# Patient Record
Sex: MALE | Race: WHITE | Employment: OTHER | URBAN - METROPOLITAN AREA
[De-identification: names, ages, dates, MRNs, and addresses within clinical notes are randomized per-mention and may not be internally consistent; named-entity substitution may affect disease eponyms.]

---

## 2017-08-11 ENCOUNTER — HOSPITAL ENCOUNTER (OUTPATIENT)
Dept: GENERAL RADIOLOGY | Age: 57
Discharge: HOME | End: 2017-08-11
Attending: FAMILY MEDICINE | Admitting: FAMILY MEDICINE

## 2017-08-11 LAB
ALBUMIN SERPL-MCNC: 3.8 GM/DL (ref 3.4–5)
ALP BLD-CCNC: 131 IU/L (ref 40–128)
ALT SERPL-CCNC: 113 U/L (ref 10–40)
ANION GAP SERPL CALCULATED.3IONS-SCNC: 13 MMOL/L (ref 4–16)
AST SERPL-CCNC: 114 IU/L (ref 15–37)
BACTERIA: ABNORMAL /HPF
BILIRUB SERPL-MCNC: 0.8 MG/DL (ref 0–1)
BILIRUBIN URINE: NEGATIVE MG/DL
BLOOD, URINE: ABNORMAL
BUN BLDV-MCNC: 14 MG/DL (ref 6–23)
CALCIUM SERPL-MCNC: 9.2 MG/DL (ref 8.3–10.6)
CHLORIDE BLD-SCNC: 100 MMOL/L (ref 99–110)
CHOLESTEROL: 160 MG/DL
CLARITY: CLEAR
CO2: 23 MMOL/L (ref 21–32)
COLOR: YELLOW
CREAT SERPL-MCNC: 0.6 MG/DL (ref 0.9–1.3)
ERYTHROCYTE SEDIMENTATION RATE: 35 MM/HR (ref 0–20)
ESTIMATED AVERAGE GLUCOSE: 255 MG/DL
GFR AFRICAN AMERICAN: >60 ML/MIN/1.73M2
GFR NON-AFRICAN AMERICAN: >60 ML/MIN/1.73M2
GLUCOSE BLD-MCNC: 278 MG/DL (ref 70–140)
GLUCOSE, URINE: >500 MG/DL
HBA1C MFR BLD: 10.5 % (ref 4.2–6.3)
HCT VFR BLD CALC: 46.2 % (ref 42–52)
HDLC SERPL-MCNC: 42 MG/DL
HEMOGLOBIN: 15.7 GM/DL (ref 13.5–18)
HYALINE CASTS: 2 /LPF
KETONES, URINE: NEGATIVE MG/DL
LDL CHOLESTEROL CALCULATED: 85 MG/DL
LEUKOCYTE ESTERASE, URINE: ABNORMAL
MCH RBC QN AUTO: 31.3 PG (ref 27–31)
MCHC RBC AUTO-ENTMCNC: 34 % (ref 32–36)
MCV RBC AUTO: 92 FL (ref 78–100)
MUCUS: ABNORMAL HPF
NITRITE URINE, QUANTITATIVE: NEGATIVE
PDW BLD-RTO: 14 % (ref 11.7–14.9)
PH, URINE: 5 (ref 5–8)
PHOSPHORUS: 3.9 MG/DL (ref 2.5–4.9)
PLATELET # BLD: 169 K/CU MM (ref 140–440)
PMV BLD AUTO: 12.2 FL (ref 7.5–11.1)
POTASSIUM SERPL-SCNC: 4.4 MMOL/L (ref 3.5–5.1)
PROSTATE SPECIFIC ANTIGEN: 0.08 NG/ML (ref 0–4)
PROTEIN UA: 30 MG/DL
RBC # BLD: 5.02 M/CU MM (ref 4.6–6.2)
RBC URINE: 5 /HPF (ref 0–3)
SODIUM BLD-SCNC: 136 MMOL/L (ref 135–145)
SPECIFIC GRAVITY UA: 1.03 (ref 1–1.03)
SQUAMOUS EPITHELIAL: 6 /HPF
T4 FREE: 1.28 NG/DL (ref 0.9–1.8)
TOTAL PROTEIN: 8.5 GM/DL (ref 6.4–8.2)
TRIGL SERPL-MCNC: 167 MG/DL
TSH HIGH SENSITIVITY: 5.29 UIU/ML (ref 0.27–4.2)
UROBILINOGEN, URINE: NORMAL MG/DL (ref 0.2–1)
VITAMIN D 25-HYDROXY: 15.63 NG/ML
WBC # BLD: 5.9 K/CU MM (ref 4–10.5)
WBC UA: 5 /HPF (ref 0–2)

## 2018-05-07 ENCOUNTER — TELEPHONE (OUTPATIENT)
Dept: PHYSICAL MEDICINE AND REHAB | Age: 58
End: 2018-05-07

## 2018-08-26 PROBLEM — G93.40 ENCEPHALOPATHY: Status: ACTIVE | Noted: 2018-08-26

## 2018-08-29 PROBLEM — A09 INFECTIOUS GASTROENTERITIS: Status: ACTIVE | Noted: 2018-08-29

## 2018-08-29 PROBLEM — R26.9 GAIT DISTURBANCE: Status: ACTIVE | Noted: 2018-08-29

## 2018-08-29 PROBLEM — R29.898 BILATERAL LEG WEAKNESS: Status: ACTIVE | Noted: 2018-08-29

## 2018-08-29 PROBLEM — G93.41 METABOLIC ENCEPHALOPATHY: Status: ACTIVE | Noted: 2018-08-29

## 2018-09-09 PROBLEM — E03.9 HYPOTHYROIDISM: Status: ACTIVE | Noted: 2018-09-09

## 2018-09-09 PROBLEM — I65.22 LEFT CAROTID STENOSIS: Status: ACTIVE | Noted: 2018-09-09

## 2019-04-30 ENCOUNTER — APPOINTMENT (OUTPATIENT)
Dept: GENERAL RADIOLOGY | Age: 59
End: 2019-04-30
Payer: MEDICARE

## 2019-04-30 ENCOUNTER — APPOINTMENT (OUTPATIENT)
Dept: CT IMAGING | Age: 59
End: 2019-04-30
Payer: MEDICARE

## 2019-04-30 ENCOUNTER — HOSPITAL ENCOUNTER (EMERGENCY)
Age: 59
Discharge: HOME | End: 2019-04-30
Payer: MEDICARE

## 2019-04-30 VITALS
OXYGEN SATURATION: 98 % | RESPIRATION RATE: 14 BRPM | DIASTOLIC BLOOD PRESSURE: 69 MMHG | BODY MASS INDEX: 25.11 KG/M2 | TEMPERATURE: 98.1 F | HEIGHT: 67 IN | HEART RATE: 91 BPM | SYSTOLIC BLOOD PRESSURE: 128 MMHG | WEIGHT: 160 LBS

## 2019-04-30 DIAGNOSIS — S09.90XA INJURY OF HEAD, INITIAL ENCOUNTER: Primary | ICD-10-CM

## 2019-04-30 DIAGNOSIS — M54.2 NECK PAIN: ICD-10-CM

## 2019-04-30 DIAGNOSIS — S49.91XA ARM INJURY, RIGHT, INITIAL ENCOUNTER: ICD-10-CM
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
## 2019-04-30 DIAGNOSIS — S69.92XA FINGER INJURY, LEFT, INITIAL ENCOUNTER: ICD-10-CM

## 2019-04-30 PROCEDURE — 73030 X-RAY EXAM OF SHOULDER: CPT

## 2019-04-30 PROCEDURE — 71046 X-RAY EXAM CHEST 2 VIEWS: CPT

## 2019-04-30 PROCEDURE — 73080 X-RAY EXAM OF ELBOW: CPT

## 2019-04-30 PROCEDURE — 73110 X-RAY EXAM OF WRIST: CPT

## 2019-04-30 PROCEDURE — 70450 CT HEAD/BRAIN W/O DYE: CPT

## 2019-04-30 PROCEDURE — 73130 X-RAY EXAM OF HAND: CPT

## 2019-04-30 PROCEDURE — 99284 EMERGENCY DEPT VISIT MOD MDM: CPT

## 2019-04-30 PROCEDURE — 72125 CT NECK SPINE W/O DYE: CPT

## 2019-04-30 ASSESSMENT — PAIN DESCRIPTION - PAIN TYPE: TYPE: ACUTE PAIN

## 2019-04-30 ASSESSMENT — PAIN DESCRIPTION - ORIENTATION: ORIENTATION: RIGHT

## 2019-04-30 ASSESSMENT — PAIN DESCRIPTION - DESCRIPTORS: DESCRIPTORS: SHARP

## 2019-04-30 ASSESSMENT — PAIN DESCRIPTION - FREQUENCY: FREQUENCY: CONTINUOUS

## 2019-04-30 ASSESSMENT — PAIN SCALES - GENERAL: PAINLEVEL_OUTOF10: 7

## 2019-04-30 ASSESSMENT — PAIN DESCRIPTION - LOCATION: LOCATION: ARM;SHOULDER;WRIST
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
## 2019-07-05 ENCOUNTER — APPOINTMENT (OUTPATIENT)
Dept: ULTRASOUND IMAGING | Age: 59
End: 2019-07-05
Payer: MEDICARE

## 2019-07-05 ENCOUNTER — HOSPITAL ENCOUNTER (EMERGENCY)
Age: 59
Discharge: HOME | End: 2019-07-05
Payer: MEDICARE

## 2019-07-05 ENCOUNTER — APPOINTMENT (OUTPATIENT)
Dept: GENERAL RADIOLOGY | Age: 59
End: 2019-07-05
Payer: MEDICARE

## 2019-07-05 VITALS
HEIGHT: 67 IN | WEIGHT: 150 LBS | HEART RATE: 104 BPM | RESPIRATION RATE: 14 BRPM | OXYGEN SATURATION: 100 % | DIASTOLIC BLOOD PRESSURE: 77 MMHG | TEMPERATURE: 97.6 F | BODY MASS INDEX: 23.54 KG/M2 | SYSTOLIC BLOOD PRESSURE: 157 MMHG

## 2019-07-05 DIAGNOSIS — M79.604 RIGHT LEG PAIN: Primary | ICD-10-CM

## 2019-07-05 PROCEDURE — 99283 EMERGENCY DEPT VISIT LOW MDM: CPT

## 2019-07-05 ASSESSMENT — PAIN - FUNCTIONAL ASSESSMENT: PAIN_FUNCTIONAL_ASSESSMENT: PREVENTS OR INTERFERES SOME ACTIVE ACTIVITIES AND ADLS

## 2019-07-05 ASSESSMENT — PAIN DESCRIPTION - DESCRIPTORS: DESCRIPTORS: DULL

## 2019-07-05 ASSESSMENT — PAIN DESCRIPTION - PROGRESSION: CLINICAL_PROGRESSION: GRADUALLY WORSENING

## 2019-07-05 ASSESSMENT — PAIN DESCRIPTION - PAIN TYPE: TYPE: ACUTE PAIN;OTHER (COMMENT)

## 2019-07-05 ASSESSMENT — PAIN DESCRIPTION - LOCATION: LOCATION: LEG

## 2019-07-05 ASSESSMENT — PAIN DESCRIPTION - ORIENTATION: ORIENTATION: RIGHT

## 2019-07-05 ASSESSMENT — PAIN DESCRIPTION - ONSET: ONSET: GRADUAL

## 2019-07-05 ASSESSMENT — PAIN SCALES - GENERAL: PAINLEVEL_OUTOF10: 4

## 2019-07-05 ASSESSMENT — PAIN DESCRIPTION - FREQUENCY: FREQUENCY: INTERMITTENT
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
## 2020-03-23 ENCOUNTER — APPOINTMENT (OUTPATIENT)
Dept: GENERAL RADIOLOGY | Age: 60
DRG: 271 | End: 2020-03-23
Payer: MEDICARE

## 2020-03-23 ENCOUNTER — HOSPITAL ENCOUNTER (INPATIENT)
Age: 60
LOS: 9 days | Discharge: NURSING, REHAB OR LONG-TERM CARE | DRG: 271 | End: 2020-04-01
Attending: EMERGENCY MEDICINE | Admitting: INTERNAL MEDICINE
Payer: MEDICARE

## 2020-03-23 PROBLEM — M86.9 OSTEOMYELITIS (HCC): Status: ACTIVE | Noted: 2020-03-23

## 2020-03-23 LAB
ALBUMIN SERPL-MCNC: 2.4 GM/DL (ref 3.4–5)
ALP BLD-CCNC: 110 IU/L (ref 40–128)
ALT SERPL-CCNC: 15 U/L (ref 10–40)
ANION GAP SERPL CALCULATED.3IONS-SCNC: 11 MMOL/L (ref 4–16)
AST SERPL-CCNC: 24 IU/L (ref 15–37)
BASOPHILS ABSOLUTE: 0.1 K/CU MM
BASOPHILS RELATIVE PERCENT: 0.5 % (ref 0–1)
BILIRUB SERPL-MCNC: 0.4 MG/DL (ref 0–1)
BUN BLDV-MCNC: 19 MG/DL (ref 6–23)
CALCIUM SERPL-MCNC: 8.5 MG/DL (ref 8.3–10.6)
CHLORIDE BLD-SCNC: 95 MMOL/L (ref 99–110)
CO2: 24 MMOL/L (ref 21–32)
CREAT SERPL-MCNC: 0.6 MG/DL (ref 0.9–1.3)
DIFFERENTIAL TYPE: ABNORMAL
EOSINOPHILS ABSOLUTE: 0 K/CU MM
EOSINOPHILS RELATIVE PERCENT: 0.2 % (ref 0–3)
GFR AFRICAN AMERICAN: >60 ML/MIN/1.73M2
GFR NON-AFRICAN AMERICAN: >60 ML/MIN/1.73M2
GLUCOSE BLD-MCNC: 302 MG/DL (ref 70–99)
GLUCOSE BLD-MCNC: 338 MG/DL (ref 70–99)
GLUCOSE BLD-MCNC: 352 MG/DL (ref 70–99)
HCT VFR BLD CALC: 38.5 % (ref 42–52)
HEMOGLOBIN: 13.2 GM/DL (ref 13.5–18)
IMMATURE NEUTROPHIL %: 0.6 % (ref 0–0.43)
LACTATE: 1.5 MMOL/L (ref 0.4–2)
LYMPHOCYTES ABSOLUTE: 1.6 K/CU MM
LYMPHOCYTES RELATIVE PERCENT: 11.6 % (ref 24–44)
MCH RBC QN AUTO: 29.4 PG (ref 27–31)
MCHC RBC AUTO-ENTMCNC: 34.3 % (ref 32–36)
MCV RBC AUTO: 85.7 FL (ref 78–100)
MONOCYTES ABSOLUTE: 0.9 K/CU MM
MONOCYTES RELATIVE PERCENT: 6.3 % (ref 0–4)
NUCLEATED RBC %: 0 %
PDW BLD-RTO: 14 % (ref 11.7–14.9)
PLATELET # BLD: 250 K/CU MM (ref 140–440)
PMV BLD AUTO: 10.1 FL (ref 7.5–11.1)
POTASSIUM SERPL-SCNC: 3.9 MMOL/L (ref 3.5–5.1)
RBC # BLD: 4.49 M/CU MM (ref 4.6–6.2)
SEGMENTED NEUTROPHILS ABSOLUTE COUNT: 11.3 K/CU MM
SEGMENTED NEUTROPHILS RELATIVE PERCENT: 80.8 % (ref 36–66)
SODIUM BLD-SCNC: 130 MMOL/L (ref 135–145)
TOTAL IMMATURE NEUTOROPHIL: 0.09 K/CU MM
TOTAL NUCLEATED RBC: 0 K/CU MM
TOTAL PROTEIN: 8 GM/DL (ref 6.4–8.2)
TOTAL RETICULOCYTE COUNT: 0.09 K/CU MM
TSH HIGH SENSITIVITY: 3.99 UIU/ML (ref 0.27–4.2)
WBC # BLD: 14 K/CU MM (ref 4–10.5)

## 2020-03-23 PROCEDURE — 99284 EMERGENCY DEPT VISIT MOD MDM: CPT

## 2020-03-23 PROCEDURE — 96375 TX/PRO/DX INJ NEW DRUG ADDON: CPT

## 2020-03-23 PROCEDURE — 1200000000 HC SEMI PRIVATE

## 2020-03-23 PROCEDURE — 6360000002 HC RX W HCPCS: Performed by: PHYSICIAN ASSISTANT

## 2020-03-23 PROCEDURE — 83605 ASSAY OF LACTIC ACID: CPT

## 2020-03-23 PROCEDURE — 83036 HEMOGLOBIN GLYCOSYLATED A1C: CPT

## 2020-03-23 PROCEDURE — 2580000003 HC RX 258: Performed by: NURSE PRACTITIONER

## 2020-03-23 PROCEDURE — 80053 COMPREHEN METABOLIC PANEL: CPT

## 2020-03-23 PROCEDURE — 6360000002 HC RX W HCPCS: Performed by: NURSE PRACTITIONER

## 2020-03-23 PROCEDURE — 2580000003 HC RX 258: Performed by: PHYSICIAN ASSISTANT

## 2020-03-23 PROCEDURE — 73630 X-RAY EXAM OF FOOT: CPT

## 2020-03-23 PROCEDURE — 85025 COMPLETE CBC W/AUTO DIFF WBC: CPT

## 2020-03-23 PROCEDURE — 6370000000 HC RX 637 (ALT 250 FOR IP): Performed by: PHYSICIAN ASSISTANT

## 2020-03-23 PROCEDURE — 84443 ASSAY THYROID STIM HORMONE: CPT

## 2020-03-23 PROCEDURE — 6370000000 HC RX 637 (ALT 250 FOR IP): Performed by: NURSE PRACTITIONER

## 2020-03-23 PROCEDURE — 96365 THER/PROPH/DIAG IV INF INIT: CPT

## 2020-03-23 PROCEDURE — 87040 BLOOD CULTURE FOR BACTERIA: CPT

## 2020-03-23 PROCEDURE — 82962 GLUCOSE BLOOD TEST: CPT

## 2020-03-23 RX ORDER — SODIUM CHLORIDE 0.9 % (FLUSH) 0.9 %
10 SYRINGE (ML) INJECTION PRN
Status: DISCONTINUED | OUTPATIENT
Start: 2020-03-23 | End: 2020-04-01 | Stop reason: HOSPADM

## 2020-03-23 RX ORDER — LEVOTHYROXINE SODIUM 0.1 MG/1
100 TABLET ORAL DAILY
Status: DISCONTINUED | OUTPATIENT
Start: 2020-03-24 | End: 2020-04-01 | Stop reason: HOSPADM

## 2020-03-23 RX ORDER — MAGNESIUM SULFATE IN WATER 40 MG/ML
2 INJECTION, SOLUTION INTRAVENOUS PRN
Status: DISCONTINUED | OUTPATIENT
Start: 2020-03-23 | End: 2020-04-01 | Stop reason: HOSPADM

## 2020-03-23 RX ORDER — ACETAMINOPHEN 325 MG/1
650 TABLET ORAL EVERY 6 HOURS PRN
Status: DISCONTINUED | OUTPATIENT
Start: 2020-03-23 | End: 2020-04-01 | Stop reason: HOSPADM

## 2020-03-23 RX ORDER — POTASSIUM CHLORIDE 7.45 MG/ML
10 INJECTION INTRAVENOUS PRN
Status: DISCONTINUED | OUTPATIENT
Start: 2020-03-23 | End: 2020-04-01 | Stop reason: HOSPADM

## 2020-03-23 RX ORDER — POLYETHYLENE GLYCOL 3350 17 G/17G
17 POWDER, FOR SOLUTION ORAL DAILY PRN
Status: DISCONTINUED | OUTPATIENT
Start: 2020-03-23 | End: 2020-04-01 | Stop reason: HOSPADM

## 2020-03-23 RX ORDER — VANCOMYCIN HYDROCHLORIDE 1 G/200ML
1000 INJECTION, SOLUTION INTRAVENOUS EVERY 12 HOURS
Status: DISCONTINUED | OUTPATIENT
Start: 2020-03-24 | End: 2020-03-25

## 2020-03-23 RX ORDER — SODIUM CHLORIDE 0.9 % (FLUSH) 0.9 %
10 SYRINGE (ML) INJECTION EVERY 12 HOURS SCHEDULED
Status: DISCONTINUED | OUTPATIENT
Start: 2020-03-23 | End: 2020-04-01 | Stop reason: HOSPADM

## 2020-03-23 RX ORDER — NICOTINE 21 MG/24HR
1 PATCH, TRANSDERMAL 24 HOURS TRANSDERMAL DAILY
Status: DISCONTINUED | OUTPATIENT
Start: 2020-03-23 | End: 2020-04-01 | Stop reason: HOSPADM

## 2020-03-23 RX ORDER — GABAPENTIN 300 MG/1
600 CAPSULE ORAL 3 TIMES DAILY
Status: DISCONTINUED | OUTPATIENT
Start: 2020-03-23 | End: 2020-04-01 | Stop reason: HOSPADM

## 2020-03-23 RX ORDER — PROMETHAZINE HYDROCHLORIDE 25 MG/1
12.5 TABLET ORAL EVERY 6 HOURS PRN
Status: DISCONTINUED | OUTPATIENT
Start: 2020-03-23 | End: 2020-04-01 | Stop reason: HOSPADM

## 2020-03-23 RX ORDER — SODIUM CHLORIDE 9 MG/ML
INJECTION, SOLUTION INTRAVENOUS CONTINUOUS
Status: DISCONTINUED | OUTPATIENT
Start: 2020-03-23 | End: 2020-04-01 | Stop reason: HOSPADM

## 2020-03-23 RX ORDER — HEPARIN SODIUM 5000 [USP'U]/ML
5000 INJECTION, SOLUTION INTRAVENOUS; SUBCUTANEOUS EVERY 8 HOURS SCHEDULED
Status: DISCONTINUED | OUTPATIENT
Start: 2020-03-23 | End: 2020-03-30

## 2020-03-23 RX ORDER — VANCOMYCIN HYDROCHLORIDE 1 G/200ML
1000 INJECTION, SOLUTION INTRAVENOUS ONCE
Status: COMPLETED | OUTPATIENT
Start: 2020-03-23 | End: 2020-03-23

## 2020-03-23 RX ORDER — MORPHINE SULFATE 4 MG/ML
4 INJECTION, SOLUTION INTRAMUSCULAR; INTRAVENOUS EVERY 30 MIN PRN
Status: DISCONTINUED | OUTPATIENT
Start: 2020-03-23 | End: 2020-04-01 | Stop reason: HOSPADM

## 2020-03-23 RX ORDER — ACETAMINOPHEN 650 MG/1
650 SUPPOSITORY RECTAL EVERY 6 HOURS PRN
Status: DISCONTINUED | OUTPATIENT
Start: 2020-03-23 | End: 2020-04-01 | Stop reason: HOSPADM

## 2020-03-23 RX ORDER — POTASSIUM CHLORIDE 20 MEQ/1
40 TABLET, EXTENDED RELEASE ORAL PRN
Status: DISCONTINUED | OUTPATIENT
Start: 2020-03-23 | End: 2020-04-01 | Stop reason: HOSPADM

## 2020-03-23 RX ORDER — HYDROCODONE BITARTRATE AND ACETAMINOPHEN 5; 325 MG/1; MG/1
1 TABLET ORAL EVERY 6 HOURS PRN
Status: DISCONTINUED | OUTPATIENT
Start: 2020-03-23 | End: 2020-03-31

## 2020-03-23 RX ORDER — MORPHINE SULFATE 2 MG/ML
2 INJECTION, SOLUTION INTRAMUSCULAR; INTRAVENOUS EVERY 4 HOURS PRN
Status: DISCONTINUED | OUTPATIENT
Start: 2020-03-23 | End: 2020-04-01 | Stop reason: HOSPADM

## 2020-03-23 RX ORDER — ONDANSETRON 2 MG/ML
4 INJECTION INTRAMUSCULAR; INTRAVENOUS EVERY 6 HOURS PRN
Status: DISCONTINUED | OUTPATIENT
Start: 2020-03-23 | End: 2020-04-01 | Stop reason: HOSPADM

## 2020-03-23 RX ADMIN — MORPHINE SULFATE 4 MG: 4 INJECTION, SOLUTION INTRAMUSCULAR; INTRAVENOUS at 16:50

## 2020-03-23 RX ADMIN — CEFEPIME HYDROCHLORIDE 2 G: 2 INJECTION, POWDER, FOR SOLUTION INTRAVENOUS at 15:54

## 2020-03-23 RX ADMIN — SODIUM CHLORIDE: 9 INJECTION, SOLUTION INTRAVENOUS at 17:48

## 2020-03-23 RX ADMIN — GABAPENTIN 600 MG: 300 CAPSULE ORAL at 21:32

## 2020-03-23 RX ADMIN — VANCOMYCIN HYDROCHLORIDE 1000 MG: 1 INJECTION, SOLUTION INTRAVENOUS at 16:50

## 2020-03-23 RX ADMIN — HYDROCODONE BITARTRATE AND ACETAMINOPHEN 1 TABLET: 5; 325 TABLET ORAL at 20:56

## 2020-03-23 RX ADMIN — HEPARIN SODIUM 5000 UNITS: 5000 INJECTION, SOLUTION INTRAVENOUS; SUBCUTANEOUS at 21:32

## 2020-03-23 ASSESSMENT — PAIN DESCRIPTION - ORIENTATION: ORIENTATION: LEFT

## 2020-03-23 ASSESSMENT — PAIN SCALES - GENERAL
PAINLEVEL_OUTOF10: 10
PAINLEVEL_OUTOF10: 7

## 2020-03-23 ASSESSMENT — PAIN DESCRIPTION - PAIN TYPE: TYPE: ACUTE PAIN

## 2020-03-23 ASSESSMENT — PAIN DESCRIPTION - LOCATION: LOCATION: FOOT

## 2020-03-23 NOTE — ED PROVIDER NOTES
I independently examined and evaluated Malika Mitchell. In brief, 9year-old male with 10 out of 10 pain in the right foot. Started 2 weeks ago with a wound after wearing heavy work boots, had a blister, was treating that and the blister popped and then the wound started enlarging. Now having pain over the last 7 days. It is 10 out of 10 pain and burning in nature. He is a type II diabetic, states his sugars were doing well and so he stopped taking insulin 6 months ago. No fevers. Now having redness and drainage from the wound. Focused exam revealed patient in no acute distress laying on cot, regular rate and rhythm, nonlabored respirations. Abdomen soft and nondistended. No peripheral edema other than right foot. Right foot with full-thickness ulceration over fifth MTP joint extending along the lateral mid to distal foot on the dorsal aspect and extends over the medial midfoot, crosses the third metatarsal.  Dark eschar in place, foul-smelling odor. There is a lip on the lateral aspect of the wound that is blanched, no good blood flow, appears to be early necrosis. Over the more proximal edge there is significant erythema, tenderness. Does appear to have purulent drainage. ED course: large wound that is full thickness, suspect at least fifth metatarsal head is involved given appearance, labs and XR ordered, confirms osteomyelitis, antibiotics had been ordered. Plan for admission. All diagnostic, treatment, and disposition decisions were made by myself in conjunction with the advanced practice provider. For all further details of the patient's emergency department visit, please see the advanced practice provider's documentation. Comment: Please note this report has been produced using speech recognition software and may contain errors related to that system including errors in grammar, punctuation, and spelling, as well as words and phrases that may be inappropriate.  If there are any
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
## 2020-03-23 NOTE — H&P
History and Physical      Name:  Christoph Zuñiga /Age/Sex: 1960  (60 y.o. male)   MRN & CSN:  [id number] & [id number] Admission Date/Time: 3/23/2020  3:03 PM   Location:  ED[facility] PCP: No primary care provider on file. Hospital Day: 1        Admitting Physician: Dr. Lisbet Pratt and Plan:   Christoph Zuñiga is a 60 y.o. male who presents with Wound Infection (right foot)     Osteomyelitis-right foot. XR:\"5th MTP joint with soft tissue air or gas.  Suspected osteomyelitis of the 5th toe proximally and 5th metatarsal head. \"    Admit to Med/Surg   IV abx    Pending cultures    General surgery consult    Wound care   PRN pain control      DMII with chronic complications and with long term use of insulin. Nonadherent to insulin regimen x6 months last A1C 7.7 (2018)   Monitor FSBS and cover with medium dose SSI   Endocrinology consulted       CAD s/p CABG ()   Last TTE 2018 EF 55 to 60% G1 DD, suspicious hyper echoic echodensity located in the [facility] vegetation cannot be ruled out\"   Telemetry    Will likely need cardiology pre operative evaluation. Previously evaluated by Dr Earlene Edmond Hypothyroid- continue synthroid. Last T4 1.12 (2018)     Hx of polysubstance abuse   Pending UDS     Patient case discussed with ED provider    Diet Carb control    DVT Prophylaxis [] Lovenox, [x]  Heparin, [] SCDs, [] Ambulation  [] Long term AC   GI Prophylaxis [] PPI,  [] H2 Blocker,  [] Carafate,  [x] Diet/Tube Feeds   Code Status Full     Disposition Admit to inpatient. Patient plans to return home upon discharge   MDM [] Low, [] Moderate,[x]  High     -Patient assessment and plan discussed and reviewed with admitting physician: Sonya Torres MD.     History of Present Illness:     Chief Complaint: Wound Infection (right foot)    Christoph Zuñiga is a 60 y.o. male who presents with foot pain and concern for wound infection.  He states wound has been worsening over several weeks and that

## 2020-03-23 NOTE — PROGRESS NOTES
PHARMACY VANCOMYCIN MONITORING SERVICE    Antonio Canas is a 60 y.o. male started on vancomycin for Osteomyelitis of the right foot. Pharmacy consulted by STEPHANIE Pacheco/ Dr. Phil Ward for monitoring and adjustment. Indication for treatment: Osteomyelitis of the right foot  Goal trough: 15-20 mcg/mL  Other antimicrobials: Cefepime    Ht Readings from Last 1 Encounters:   03/23/20 5' 8\" (1.727 m)     Wt Readings from Last 3 Encounters:   03/23/20 143 lb (64.9 kg)   07/05/19 150 lb (68 kg)   04/30/19 160 lb (72.6 kg)        Pertinent Laboratory Values:   Temp Readings from Last 3 Encounters:   03/23/20 98.8 °F (37.1 °C) (Oral)   07/05/19 97.6 °F (36.4 °C) (Oral)   04/30/19 98.1 °F (36.7 °C) (Oral)     Recent Labs     03/23/20  1530   WBC 14.0*   LACTATE 1.5     Recent Labs     03/23/20  1530   BUN 19   CREATININE 0.6*     Estimated Creatinine Clearance: 120 mL/min (A) (based on SCr of 0.6 mg/dL (L)). Intake/Output Summary (Last 24 hours) at 3/23/2020 1808  Last data filed at 3/23/2020 1732  Gross per 24 hour   Intake --   Output 350 ml   Net -350 ml       Pertinent Cultures:  Date Source Results   03/23/2020 Wound Pending   03/23/2020 Blood Pending       Previous vancomycin levels:  TROUGH:  No results for input(s): VANCOTROUGH in the last 72 hours. RANDOM:  No results for input(s): VANCORANDOM in the last 72 hours. Assessment/Plan:  Patient is being treated for Osteomyelitis of the right foot. Start Vancomycin 1000 mg Q12H   Estimated trough to be 15mcg/mL  Pharmacy will continue to monitor patient and adjust therapy as indicated    Deondre 03/25/2020 @ 0666    Thank you for the consult.   Meg Dickinson, Student Pharmacist  3/23/2020 6:08 PM

## 2020-03-24 LAB
ALBUMIN SERPL-MCNC: 2 GM/DL (ref 3.4–5)
ALP BLD-CCNC: 102 IU/L (ref 40–128)
ALT SERPL-CCNC: 12 U/L (ref 10–40)
AMPHETAMINES: NEGATIVE
ANION GAP SERPL CALCULATED.3IONS-SCNC: 8 MMOL/L (ref 4–16)
AST SERPL-CCNC: 21 IU/L (ref 15–37)
BACTERIA: ABNORMAL /HPF
BARBITURATE SCREEN URINE: NEGATIVE
BASOPHILS ABSOLUTE: 0.1 K/CU MM
BASOPHILS RELATIVE PERCENT: 1 % (ref 0–1)
BENZODIAZEPINE SCREEN, URINE: NEGATIVE
BILIRUB SERPL-MCNC: 0.2 MG/DL (ref 0–1)
BILIRUBIN URINE: NEGATIVE MG/DL
BLOOD, URINE: ABNORMAL
BUN BLDV-MCNC: 20 MG/DL (ref 6–23)
CALCIUM SERPL-MCNC: 7.8 MG/DL (ref 8.3–10.6)
CANNABINOID SCREEN URINE: NEGATIVE
CHLORIDE BLD-SCNC: 103 MMOL/L (ref 99–110)
CLARITY: CLEAR
CO2: 24 MMOL/L (ref 21–32)
COCAINE METABOLITE: NEGATIVE
COLOR: YELLOW
CREAT SERPL-MCNC: 0.7 MG/DL (ref 0.9–1.3)
DIFFERENTIAL TYPE: ABNORMAL
EOSINOPHILS ABSOLUTE: 0.1 K/CU MM
EOSINOPHILS RELATIVE PERCENT: 1.1 % (ref 0–3)
ESTIMATED AVERAGE GLUCOSE: 303 MG/DL
GFR AFRICAN AMERICAN: >60 ML/MIN/1.73M2
GFR NON-AFRICAN AMERICAN: >60 ML/MIN/1.73M2
GLUCOSE BLD-MCNC: 168 MG/DL (ref 70–99)
GLUCOSE BLD-MCNC: 217 MG/DL (ref 70–99)
GLUCOSE BLD-MCNC: 313 MG/DL (ref 70–99)
GLUCOSE BLD-MCNC: 78 MG/DL (ref 70–99)
GLUCOSE BLD-MCNC: 81 MG/DL (ref 70–99)
GLUCOSE, URINE: >500 MG/DL
HBA1C MFR BLD: 12.2 % (ref 4.2–6.3)
HCT VFR BLD CALC: 32.5 % (ref 42–52)
HEMOGLOBIN: 11 GM/DL (ref 13.5–18)
IMMATURE NEUTROPHIL %: 0.7 % (ref 0–0.43)
KETONES, URINE: NEGATIVE MG/DL
LEUKOCYTE ESTERASE, URINE: NEGATIVE
LYMPHOCYTES ABSOLUTE: 2.3 K/CU MM
LYMPHOCYTES RELATIVE PERCENT: 22.8 % (ref 24–44)
MCH RBC QN AUTO: 29.7 PG (ref 27–31)
MCHC RBC AUTO-ENTMCNC: 33.8 % (ref 32–36)
MCV RBC AUTO: 87.8 FL (ref 78–100)
MONOCYTES ABSOLUTE: 0.9 K/CU MM
MONOCYTES RELATIVE PERCENT: 8.5 % (ref 0–4)
MUCUS: ABNORMAL HPF
NITRITE URINE, QUANTITATIVE: NEGATIVE
NUCLEATED RBC %: 0 %
OPIATES, URINE: ABNORMAL
OXYCODONE: ABNORMAL
PDW BLD-RTO: 13.7 % (ref 11.7–14.9)
PH, URINE: 6 (ref 5–8)
PHENCYCLIDINE, URINE: NEGATIVE
PLATELET # BLD: 171 K/CU MM (ref 140–440)
PMV BLD AUTO: 10.2 FL (ref 7.5–11.1)
POTASSIUM SERPL-SCNC: 4.1 MMOL/L (ref 3.5–5.1)
PROTEIN UA: 100 MG/DL
RBC # BLD: 3.7 M/CU MM (ref 4.6–6.2)
RBC URINE: 3 /HPF (ref 0–3)
SEGMENTED NEUTROPHILS ABSOLUTE COUNT: 6.6 K/CU MM
SEGMENTED NEUTROPHILS RELATIVE PERCENT: 65.9 % (ref 36–66)
SODIUM BLD-SCNC: 135 MMOL/L (ref 135–145)
SPECIFIC GRAVITY UA: 1.02 (ref 1–1.03)
SQUAMOUS EPITHELIAL: 1 /HPF
TOTAL IMMATURE NEUTOROPHIL: 0.07 K/CU MM
TOTAL NUCLEATED RBC: 0 K/CU MM
TOTAL PROTEIN: 6.2 GM/DL (ref 6.4–8.2)
TRANSITIONAL EPITHELIAL: <1 /HPF
TRICHOMONAS: ABNORMAL /HPF
UROBILINOGEN, URINE: NORMAL MG/DL (ref 0.2–1)
WBC # BLD: 10 K/CU MM (ref 4–10.5)
WBC UA: 3 /HPF (ref 0–2)

## 2020-03-24 PROCEDURE — 6370000000 HC RX 637 (ALT 250 FOR IP): Performed by: PHYSICIAN ASSISTANT

## 2020-03-24 PROCEDURE — 6360000002 HC RX W HCPCS: Performed by: NURSE PRACTITIONER

## 2020-03-24 PROCEDURE — 87073 CULTURE BACTERIA ANAEROBIC: CPT

## 2020-03-24 PROCEDURE — 6370000000 HC RX 637 (ALT 250 FOR IP): Performed by: NURSE PRACTITIONER

## 2020-03-24 PROCEDURE — 81001 URINALYSIS AUTO W/SCOPE: CPT

## 2020-03-24 PROCEDURE — 36415 COLL VENOUS BLD VENIPUNCTURE: CPT

## 2020-03-24 PROCEDURE — 2580000003 HC RX 258: Performed by: NURSE PRACTITIONER

## 2020-03-24 PROCEDURE — 80053 COMPREHEN METABOLIC PANEL: CPT

## 2020-03-24 PROCEDURE — 87081 CULTURE SCREEN ONLY: CPT

## 2020-03-24 PROCEDURE — 82962 GLUCOSE BLOOD TEST: CPT

## 2020-03-24 PROCEDURE — 87071 CULTURE AEROBIC QUANT OTHER: CPT

## 2020-03-24 PROCEDURE — 80307 DRUG TEST PRSMV CHEM ANLYZR: CPT

## 2020-03-24 PROCEDURE — 6360000002 HC RX W HCPCS: Performed by: INTERNAL MEDICINE

## 2020-03-24 PROCEDURE — 94761 N-INVAS EAR/PLS OXIMETRY MLT: CPT

## 2020-03-24 PROCEDURE — 1200000000 HC SEMI PRIVATE

## 2020-03-24 PROCEDURE — 2500000003 HC RX 250 WO HCPCS: Performed by: INTERNAL MEDICINE

## 2020-03-24 PROCEDURE — 85025 COMPLETE CBC W/AUTO DIFF WBC: CPT

## 2020-03-24 RX ORDER — INSULIN GLARGINE 100 [IU]/ML
30 INJECTION, SOLUTION SUBCUTANEOUS NIGHTLY
Status: DISCONTINUED | OUTPATIENT
Start: 2020-03-24 | End: 2020-04-01

## 2020-03-24 RX ADMIN — VANCOMYCIN HYDROCHLORIDE 1000 MG: 1 INJECTION, SOLUTION INTRAVENOUS at 17:35

## 2020-03-24 RX ADMIN — SODIUM CHLORIDE: 9 INJECTION, SOLUTION INTRAVENOUS at 22:43

## 2020-03-24 RX ADMIN — GABAPENTIN 600 MG: 300 CAPSULE ORAL at 14:54

## 2020-03-24 RX ADMIN — CEFEPIME 2 G: 20 INJECTION, POWDER, FOR SOLUTION INTRAVENOUS at 16:15

## 2020-03-24 RX ADMIN — CEFEPIME 2 G: 20 INJECTION, POWDER, FOR SOLUTION INTRAVENOUS at 03:38

## 2020-03-24 RX ADMIN — VANCOMYCIN HYDROCHLORIDE 1000 MG: 1 INJECTION, SOLUTION INTRAVENOUS at 05:31

## 2020-03-24 RX ADMIN — HYDROCODONE BITARTRATE AND ACETAMINOPHEN 1 TABLET: 5; 325 TABLET ORAL at 05:35

## 2020-03-24 RX ADMIN — SODIUM CHLORIDE: 9 INJECTION, SOLUTION INTRAVENOUS at 06:40

## 2020-03-24 RX ADMIN — MORPHINE SULFATE 2 MG: 2 INJECTION, SOLUTION INTRAMUSCULAR; INTRAVENOUS at 15:01

## 2020-03-24 RX ADMIN — HYDROCODONE BITARTRATE AND ACETAMINOPHEN 1 TABLET: 5; 325 TABLET ORAL at 12:03

## 2020-03-24 RX ADMIN — HEPARIN SODIUM 5000 UNITS: 5000 INJECTION, SOLUTION INTRAVENOUS; SUBCUTANEOUS at 14:54

## 2020-03-24 RX ADMIN — LEVOTHYROXINE SODIUM 100 MCG: 100 TABLET ORAL at 05:31

## 2020-03-24 RX ADMIN — GABAPENTIN 600 MG: 300 CAPSULE ORAL at 05:31

## 2020-03-24 RX ADMIN — MORPHINE SULFATE 2 MG: 2 INJECTION, SOLUTION INTRAMUSCULAR; INTRAVENOUS at 22:25

## 2020-03-24 RX ADMIN — GABAPENTIN 600 MG: 300 CAPSULE ORAL at 22:43

## 2020-03-24 RX ADMIN — HEPARIN SODIUM 5000 UNITS: 5000 INJECTION, SOLUTION INTRAVENOUS; SUBCUTANEOUS at 05:31

## 2020-03-24 RX ADMIN — HYDROCODONE BITARTRATE AND ACETAMINOPHEN 1 TABLET: 5; 325 TABLET ORAL at 18:26

## 2020-03-24 RX ADMIN — METRONIDAZOLE 500 MG: 500 INJECTION, SOLUTION INTRAVENOUS at 22:43

## 2020-03-24 RX ADMIN — HEPARIN SODIUM 5000 UNITS: 5000 INJECTION, SOLUTION INTRAVENOUS; SUBCUTANEOUS at 22:43

## 2020-03-24 ASSESSMENT — PAIN DESCRIPTION - LOCATION: LOCATION: FOOT

## 2020-03-24 ASSESSMENT — PAIN SCALES - GENERAL
PAINLEVEL_OUTOF10: 8
PAINLEVEL_OUTOF10: 7
PAINLEVEL_OUTOF10: 10
PAINLEVEL_OUTOF10: 9
PAINLEVEL_OUTOF10: 9

## 2020-03-24 ASSESSMENT — PAIN DESCRIPTION - ORIENTATION: ORIENTATION: LEFT

## 2020-03-24 ASSESSMENT — PAIN DESCRIPTION - PAIN TYPE: TYPE: ACUTE PAIN

## 2020-03-24 NOTE — PROGRESS NOTES
[facility]  Pharmacy consulted by STEPHANIE Pacheco/ Dr. Leandro Diaz for monitoring and adjustment. Indication for treatment: Osteomyelitis of the right foot  Goal trough: 15-20 mcg/mL  Other antimicrobials: Cefepime    Ht Readings from Last 1 Encounters:   03/23/20 5' 8\" (1.727 m)     Wt Readings from Last 3 Encounters:   03/23/20 143 lb (64.9 kg)   07/05/19 150 lb (68 kg)   04/30/19 160 lb (72.6 kg)        Pertinent Laboratory Values:   Temp Readings from Last 3 Encounters:   03/24/20 98.5 °F (36.9 °C) (Oral)   07/05/19 97.6 °F (36.4 °C) (Oral)   04/30/19 98.1 °F (36.7 °C) (Oral)     Recent Labs     03/23/20  1530 03/24/20  0332   WBC 14.0* 10.0   LACTATE 1.5  --      Recent Labs     03/23/20  1530 03/24/20  0332   BUN 19 20   CREATININE 0.6* 0.7*     Estimated Creatinine Clearance: 103 mL/min (A) (based on SCr of 0.7 mg/dL (L)). Intake/Output Summary (Last 24 hours) at 3/24/2020 1625  Last data filed at 3/24/2020 0466  Gross per 24 hour   Intake 1990 ml   Output 650 ml   Net 1340 ml       Pertinent Cultures:  Date Source Results   03/23/2020 Wound Pending   03/23/2020 Blood Pending       Previous vancomycin levels:  TROUGH:  No results for input(s): VANCOTROUGH in the last 72 hours. RANDOM:  No results for input(s): VANCORANDOM in the last 72 hours. Assessment:  · WBC and temperature: WBC improved; Afebrile  · SCr, BUN, and urine output: Stable, WNL  · Day(s) of therapy: #2  · Vancomycin level:  To be collected    Plan:  · Continue vancomycin 1000 mg IVPB q12h  · Renal trends are stable, WNL  · A trough level is ordered: 03/25 @ 0430  · Pharmacy will continue to monitor patient and adjust therapy as indicated    [place] 03/25/2020 @ 0430    Thank you for the consult,    Brenda Nazario, PharmD, [facility]

## 2020-03-24 NOTE — PROGRESS NOTES
This nurse consulted general surgery on call, Dr Aamir Mccormick answered paged. Bernadette said he does not have much experience and to page on call podiatrist. No on call podiatry at this time. Kassy Tirado RN interim manager said to  let day shift nurse know and will page dayshift on call general surgeon.   Electronically signed by Khushboo Luu LPN on 1/37/5708 at 0:65 AM

## 2020-03-25 ENCOUNTER — APPOINTMENT (OUTPATIENT)
Dept: ULTRASOUND IMAGING | Age: 60
DRG: 271 | End: 2020-03-25
Payer: MEDICARE

## 2020-03-25 ENCOUNTER — APPOINTMENT (OUTPATIENT)
Dept: MRI IMAGING | Age: 60
DRG: 271 | End: 2020-03-25
Payer: MEDICARE

## 2020-03-25 LAB
BASOPHILS ABSOLUTE: 0.1 K/CU MM
BASOPHILS RELATIVE PERCENT: 0.9 % (ref 0–1)
CULTURE: NORMAL
DIFFERENTIAL TYPE: ABNORMAL
DOSE AMOUNT: NORMAL
DOSE TIME: NORMAL
EOSINOPHILS ABSOLUTE: 0.1 K/CU MM
EOSINOPHILS RELATIVE PERCENT: 0.8 % (ref 0–3)
GLUCOSE BLD-MCNC: 121 MG/DL (ref 70–99)
GLUCOSE BLD-MCNC: 161 MG/DL (ref 70–99)
GLUCOSE BLD-MCNC: 182 MG/DL (ref 70–99)
GLUCOSE BLD-MCNC: 192 MG/DL (ref 70–99)
GLUCOSE BLD-MCNC: 273 MG/DL (ref 70–99)
HCT VFR BLD CALC: 32.8 % (ref 42–52)
HEMOGLOBIN: 10.9 GM/DL (ref 13.5–18)
IMMATURE NEUTROPHIL %: 0.9 % (ref 0–0.43)
LYMPHOCYTES ABSOLUTE: 1.4 K/CU MM
LYMPHOCYTES RELATIVE PERCENT: 15.1 % (ref 24–44)
Lab: NORMAL
MCH RBC QN AUTO: 29.5 PG (ref 27–31)
MCHC RBC AUTO-ENTMCNC: 33.2 % (ref 32–36)
MCV RBC AUTO: 88.9 FL (ref 78–100)
MONOCYTES ABSOLUTE: 0.8 K/CU MM
MONOCYTES RELATIVE PERCENT: 9.2 % (ref 0–4)
NUCLEATED RBC %: 0 %
PDW BLD-RTO: 13.8 % (ref 11.7–14.9)
PLATELET # BLD: 157 K/CU MM (ref 140–440)
PMV BLD AUTO: 10.4 FL (ref 7.5–11.1)
RBC # BLD: 3.69 M/CU MM (ref 4.6–6.2)
SEGMENTED NEUTROPHILS ABSOLUTE COUNT: 6.7 K/CU MM
SEGMENTED NEUTROPHILS RELATIVE PERCENT: 73.1 % (ref 36–66)
SPECIMEN: NORMAL
T4 FREE: 0.95 NG/DL (ref 0.9–1.8)
TOTAL IMMATURE NEUTOROPHIL: 0.08 K/CU MM
TOTAL NUCLEATED RBC: 0 K/CU MM
VANCOMYCIN TROUGH: 12.1 UG/ML (ref 10–20)
WBC # BLD: 9.2 K/CU MM (ref 4–10.5)

## 2020-03-25 PROCEDURE — 2580000003 HC RX 258: Performed by: INTERNAL MEDICINE

## 2020-03-25 PROCEDURE — 6370000000 HC RX 637 (ALT 250 FOR IP): Performed by: PHYSICIAN ASSISTANT

## 2020-03-25 PROCEDURE — 94761 N-INVAS EAR/PLS OXIMETRY MLT: CPT

## 2020-03-25 PROCEDURE — 2500000003 HC RX 250 WO HCPCS: Performed by: INTERNAL MEDICINE

## 2020-03-25 PROCEDURE — 1200000000 HC SEMI PRIVATE

## 2020-03-25 PROCEDURE — 84439 ASSAY OF FREE THYROXINE: CPT

## 2020-03-25 PROCEDURE — 6370000000 HC RX 637 (ALT 250 FOR IP): Performed by: NURSE PRACTITIONER

## 2020-03-25 PROCEDURE — 6360000002 HC RX W HCPCS: Performed by: NURSE PRACTITIONER

## 2020-03-25 PROCEDURE — 93925 LOWER EXTREMITY STUDY: CPT

## 2020-03-25 PROCEDURE — 6360000004 HC RX CONTRAST MEDICATION: Performed by: INTERNAL MEDICINE

## 2020-03-25 PROCEDURE — 80202 ASSAY OF VANCOMYCIN: CPT

## 2020-03-25 PROCEDURE — 99232 SBSQ HOSP IP/OBS MODERATE 35: CPT | Performed by: SURGERY

## 2020-03-25 PROCEDURE — 6360000002 HC RX W HCPCS: Performed by: INTERNAL MEDICINE

## 2020-03-25 PROCEDURE — 73720 MRI LWR EXTREMITY W/O&W/DYE: CPT

## 2020-03-25 PROCEDURE — A9579 GAD-BASE MR CONTRAST NOS,1ML: HCPCS | Performed by: INTERNAL MEDICINE

## 2020-03-25 PROCEDURE — 85025 COMPLETE CBC W/AUTO DIFF WBC: CPT

## 2020-03-25 PROCEDURE — 6370000000 HC RX 637 (ALT 250 FOR IP): Performed by: INTERNAL MEDICINE

## 2020-03-25 PROCEDURE — 82962 GLUCOSE BLOOD TEST: CPT

## 2020-03-25 PROCEDURE — 36415 COLL VENOUS BLD VENIPUNCTURE: CPT

## 2020-03-25 RX ORDER — MORPHINE SULFATE 2 MG/ML
2 INJECTION, SOLUTION INTRAMUSCULAR; INTRAVENOUS ONCE
Status: COMPLETED | OUTPATIENT
Start: 2020-03-25 | End: 2020-03-25

## 2020-03-25 RX ORDER — 0.9 % SODIUM CHLORIDE 0.9 %
250 INTRAVENOUS SOLUTION INTRAVENOUS ONCE
Status: COMPLETED | OUTPATIENT
Start: 2020-03-25 | End: 2020-03-25

## 2020-03-25 RX ORDER — INSULIN GLARGINE 100 [IU]/ML
25 INJECTION, SOLUTION SUBCUTANEOUS ONCE
Status: COMPLETED | OUTPATIENT
Start: 2020-03-25 | End: 2020-03-25

## 2020-03-25 RX ORDER — DIPHENHYDRAMINE HCL 50 MG
50 CAPSULE ORAL ONCE
Status: DISCONTINUED | OUTPATIENT
Start: 2020-03-26 | End: 2020-04-01 | Stop reason: HOSPADM

## 2020-03-25 RX ORDER — DIAZEPAM 5 MG/1
5 TABLET ORAL ONCE
Status: COMPLETED | OUTPATIENT
Start: 2020-03-26 | End: 2020-03-26

## 2020-03-25 RX ADMIN — METRONIDAZOLE 500 MG: 500 INJECTION, SOLUTION INTRAVENOUS at 14:50

## 2020-03-25 RX ADMIN — METRONIDAZOLE 500 MG: 500 INJECTION, SOLUTION INTRAVENOUS at 22:36

## 2020-03-25 RX ADMIN — GABAPENTIN 600 MG: 300 CAPSULE ORAL at 13:56

## 2020-03-25 RX ADMIN — GABAPENTIN 600 MG: 300 CAPSULE ORAL at 06:45

## 2020-03-25 RX ADMIN — VANCOMYCIN HYDROCHLORIDE 1000 MG: 1 INJECTION, SOLUTION INTRAVENOUS at 04:49

## 2020-03-25 RX ADMIN — HYDROCODONE BITARTRATE AND ACETAMINOPHEN 1 TABLET: 5; 325 TABLET ORAL at 19:33

## 2020-03-25 RX ADMIN — VANCOMYCIN HYDROCHLORIDE 1250 MG: 5 INJECTION, POWDER, LYOPHILIZED, FOR SOLUTION INTRAVENOUS at 18:12

## 2020-03-25 RX ADMIN — METRONIDAZOLE 500 MG: 500 INJECTION, SOLUTION INTRAVENOUS at 06:46

## 2020-03-25 RX ADMIN — SODIUM CHLORIDE 250 ML: 9 INJECTION, SOLUTION INTRAVENOUS at 11:32

## 2020-03-25 RX ADMIN — MORPHINE SULFATE 2 MG: 2 INJECTION, SOLUTION INTRAMUSCULAR; INTRAVENOUS at 20:32

## 2020-03-25 RX ADMIN — INSULIN LISPRO 1 UNITS: 100 INJECTION, SOLUTION INTRAVENOUS; SUBCUTANEOUS at 01:53

## 2020-03-25 RX ADMIN — CEFEPIME 2 G: 20 INJECTION, POWDER, FOR SOLUTION INTRAVENOUS at 00:11

## 2020-03-25 RX ADMIN — HYDROCODONE BITARTRATE AND ACETAMINOPHEN 1 TABLET: 5; 325 TABLET ORAL at 13:05

## 2020-03-25 RX ADMIN — GABAPENTIN 600 MG: 300 CAPSULE ORAL at 20:11

## 2020-03-25 RX ADMIN — HEPARIN SODIUM 5000 UNITS: 5000 INJECTION, SOLUTION INTRAVENOUS; SUBCUTANEOUS at 13:58

## 2020-03-25 RX ADMIN — MORPHINE SULFATE 2 MG: 2 INJECTION, SOLUTION INTRAMUSCULAR; INTRAVENOUS at 22:36

## 2020-03-25 RX ADMIN — LEVOTHYROXINE SODIUM 100 MCG: 100 TABLET ORAL at 06:45

## 2020-03-25 RX ADMIN — INSULIN LISPRO 3 UNITS: 100 INJECTION, SOLUTION INTRAVENOUS; SUBCUTANEOUS at 20:14

## 2020-03-25 RX ADMIN — INSULIN GLARGINE 25 UNITS: 100 INJECTION, SOLUTION SUBCUTANEOUS at 20:59

## 2020-03-25 RX ADMIN — CEFEPIME 2 G: 20 INJECTION, POWDER, FOR SOLUTION INTRAVENOUS at 08:46

## 2020-03-25 RX ADMIN — CEFEPIME 2 G: 20 INJECTION, POWDER, FOR SOLUTION INTRAVENOUS at 17:35

## 2020-03-25 RX ADMIN — HYDROCODONE BITARTRATE AND ACETAMINOPHEN 1 TABLET: 5; 325 TABLET ORAL at 04:49

## 2020-03-25 RX ADMIN — HEPARIN SODIUM 5000 UNITS: 5000 INJECTION, SOLUTION INTRAVENOUS; SUBCUTANEOUS at 20:12

## 2020-03-25 RX ADMIN — HEPARIN SODIUM 5000 UNITS: 5000 INJECTION, SOLUTION INTRAVENOUS; SUBCUTANEOUS at 06:46

## 2020-03-25 RX ADMIN — MORPHINE SULFATE 2 MG: 2 INJECTION, SOLUTION INTRAMUSCULAR; INTRAVENOUS at 07:12

## 2020-03-25 RX ADMIN — MORPHINE SULFATE 2 MG: 2 INJECTION, SOLUTION INTRAMUSCULAR; INTRAVENOUS at 16:03

## 2020-03-25 RX ADMIN — GADOTERIDOL 13 ML: 279.3 INJECTION, SOLUTION INTRAVENOUS at 16:50

## 2020-03-25 ASSESSMENT — PAIN DESCRIPTION - FREQUENCY
FREQUENCY: CONTINUOUS

## 2020-03-25 ASSESSMENT — PAIN SCALES - GENERAL
PAINLEVEL_OUTOF10: 10
PAINLEVEL_OUTOF10: 8
PAINLEVEL_OUTOF10: 9
PAINLEVEL_OUTOF10: 8
PAINLEVEL_OUTOF10: 10
PAINLEVEL_OUTOF10: 8
PAINLEVEL_OUTOF10: 7
PAINLEVEL_OUTOF10: 9
PAINLEVEL_OUTOF10: 7
PAINLEVEL_OUTOF10: 5
PAINLEVEL_OUTOF10: 8
PAINLEVEL_OUTOF10: 8
PAINLEVEL_OUTOF10: 10

## 2020-03-25 ASSESSMENT — PAIN DESCRIPTION - DIRECTION
RADIATING_TOWARDS: DOWN
RADIATING_TOWARDS: ACROSS
RADIATING_TOWARDS: DOWN

## 2020-03-25 ASSESSMENT — PAIN DESCRIPTION - ONSET
ONSET: ON-GOING

## 2020-03-25 ASSESSMENT — PAIN DESCRIPTION - ORIENTATION
ORIENTATION: RIGHT

## 2020-03-25 ASSESSMENT — PAIN DESCRIPTION - LOCATION
LOCATION: FOOT

## 2020-03-25 ASSESSMENT — PAIN DESCRIPTION - PAIN TYPE
TYPE: ACUTE PAIN

## 2020-03-25 ASSESSMENT — PAIN DESCRIPTION - DESCRIPTORS
DESCRIPTORS: BURNING;ACHING
DESCRIPTORS: SHARP
DESCRIPTORS: BURNING;ACHING

## 2020-03-25 ASSESSMENT — PAIN DESCRIPTION - PROGRESSION
CLINICAL_PROGRESSION: GRADUALLY WORSENING
CLINICAL_PROGRESSION: GRADUALLY WORSENING
CLINICAL_PROGRESSION: GRADUALLY IMPROVING
CLINICAL_PROGRESSION: GRADUALLY IMPROVING
CLINICAL_PROGRESSION: GRADUALLY WORSENING
CLINICAL_PROGRESSION: GRADUALLY WORSENING
CLINICAL_PROGRESSION: GRADUALLY IMPROVING
CLINICAL_PROGRESSION: NOT CHANGED
CLINICAL_PROGRESSION: GRADUALLY WORSENING

## 2020-03-25 ASSESSMENT — PAIN - FUNCTIONAL ASSESSMENT

## 2020-03-25 NOTE — CARE COORDINATION
CM reviewed chart and saw pt. Pt is a . Pt quit drinking in 1999. Pt states he is independent. His girlfriend is able to drive him to appt. Pt is being seen by ID and surgery. Unsure of outcome at this time. CM discussed both HHC and SNF, pt verbalized understanding. CM will need to con't to follow for d/c planning.

## 2020-03-25 NOTE — CONSULTS
yrs ago    right thumb       Allergies:  Patient has no known allergies. Family History:       Problem Relation Age of Onset   Katarina Leary Cancer Mother         breast    Other Father         parkinsons disease, CVA,HTN, heart disease     REVIEW OF SYSTEMS:  Review of System Done as noted above     PHYSICAL EXAM:      Vitals:    /61   Pulse 61   Temp 98.5 °F (36.9 °C) (Oral)   Resp 12   Ht 5' 8\" (1.727 m)   Wt 143 lb (64.9 kg)   SpO2 98%   BMI 21.74 kg/m²     CONSTITUTIONAL:  awake, alert, cooperative, appears stated age   EYES:  vision intact Fundoscopic Exam not performed   ENT:Normal  NECK:  Supple, No JVD. Thyroid Exam:Normal   LUNGS:  Has Vesicular Breath Sounds,   CARDIOVASCULAR:  Normal apical impulse, regular rate and rhythm, normal S1 and S2, no S3 or S4, and has no  murmur   ABDOMEN:  No scars, normal bowel sounds, soft, non-distended, non-tender, no masses palpated, no hepatolienomegaly  Musculoskeletal: Normal  Extremities: Normal, peripheral pulses normal, , has no edema Right  foot ulcerations possibly gangrene is areas  NEUROLOGIC:  Awake, alert, oriented to name, place and time. Cranial nerves II-XII are grossly intact. Motor is  intact.   Sensory neuropathy,  and gait is abnormal.  And unstable    DATA:    CBC:   Recent Labs     03/23/20  1530 03/24/20  0332   WBC 14.0* 10.0   HGB 13.2* 11.0*    171    CMP:  Recent Labs     03/23/20  1530 03/24/20  0332   * 135   K 3.9 4.1   CL 95* 103   CO2 24 24   BUN 19 20   CREATININE 0.6* 0.7*   CALCIUM 8.5 7.8*   PROT 8.0 6.2*   LABALBU 2.4* 2.0*   BILITOT 0.4 0.2   ALKPHOS 110 102   AST 24 21   ALT 15 12     Lipids:   Lab Results   Component Value Date    CHOL 121 08/29/2018    HDL 67 08/29/2018    TRIG 65 08/29/2018     Glucose:   Recent Labs     03/24/20  0704 03/24/20  1158 03/24/20  1609   POCGLU 313* 78 168*     Hemoglobin A1C:   Lab Results   Component Value Date    LABA1C 12.2 03/23/2020     Free T4:   Lab Results   Component

## 2020-03-25 NOTE — CONSULTS
thumb     Current Medications:   Current Facility-Administered Medications: vancomycin (VANCOCIN) 1,250 mg in dextrose 5 % 250 mL IVPB, 1,250 mg, Intravenous, Q12H  insulin lispro (HUMALOG) injection vial 15 Units, 15 Units, Subcutaneous, TID WC  insulin glargine (LANTUS) injection vial 30 Units, 30 Units, Subcutaneous, Nightly  insulin lispro (HUMALOG) injection vial 0-12 Units, 0-12 Units, Subcutaneous, TID WC  insulin lispro (HUMALOG) injection vial 0-6 Units, 0-6 Units, Subcutaneous, 2 times per day  cefepime (MAXIPIME) 2 g in dextrose 5 % 50 mL IVPB, 2 g, Intravenous, Q8H  metronidazole (FLAGYL) 500 mg in NaCl 100 mL IVPB premix, 500 mg, Intravenous, Q8H  morphine sulfate (PF) injection 4 mg, 4 mg, Intravenous, Q30 Min PRN  0.9 % sodium chloride infusion, , Intravenous, Continuous  sodium chloride flush 0.9 % injection 10 mL, 10 mL, Intravenous, 2 times per day  sodium chloride flush 0.9 % injection 10 mL, 10 mL, Intravenous, PRN  acetaminophen (TYLENOL) tablet 650 mg, 650 mg, Oral, Q6H PRN **OR** acetaminophen (TYLENOL) suppository 650 mg, 650 mg, Rectal, Q6H PRN  polyethylene glycol (GLYCOLAX) packet 17 g, 17 g, Oral, Daily PRN  promethazine (PHENERGAN) tablet 12.5 mg, 12.5 mg, Oral, Q6H PRN **OR** ondansetron (ZOFRAN) injection 4 mg, 4 mg, Intravenous, Q6H PRN  heparin (porcine) injection 5,000 Units, 5,000 Units, Subcutaneous, 3 times per day  potassium chloride (KLOR-CON M) extended release tablet 40 mEq, 40 mEq, Oral, PRN **OR** potassium bicarb-citric acid (EFFER-K) effervescent tablet 40 mEq, 40 mEq, Oral, PRN **OR** potassium chloride 10 mEq/100 mL IVPB (Peripheral Line), 10 mEq, Intravenous, PRN  magnesium sulfate 2 g in 50 mL IVPB premix, 2 g, Intravenous, PRN  nicotine (NICODERM CQ) 21 MG/24HR 1 patch, 1 patch, Transdermal, Daily  gabapentin (NEURONTIN) capsule 600 mg, 600 mg, Oral, TID  levothyroxine (SYNTHROID) tablet 100 mcg, 100 mcg, Oral, Daily  morphine (PF) injection 2 mg, 2 mg, Intravenous, Q4H PRN  HYDROcodone-acetaminophen (NORCO) 5-325 MG per tablet 1 tablet, 1 tablet, Oral, Q6H PRN  Allergies:  No Known Allergies    Social History:   Social History     Socioeconomic History    Marital status: Single     Spouse name: Not on file    Number of children: 10    Years of education: Not on file    Highest education level: Not on file   Occupational History    Not on file   Social Needs    Financial resource strain: Not on file    Food insecurity     Worry: Not on file     Inability: Not on file    Transportation needs     Medical: Not on file     Non-medical: Not on file   Tobacco Use    Smoking status: Current Some Day Smoker     Packs/day: 1.00     Years: 40.00     Pack years: 40.00     Types: Cigarettes    Smokeless tobacco: Former User   Substance and Sexual Activity    Alcohol use: No     Comment: \"quit 19 yrs ago, use to drink, pretty heavy\"    CAFFEINE: 1-16 oz cup coffee daily.  Drug use: Yes     Comment: heroin    Sexual activity: Not on file     Comment:    Lifestyle    Physical activity     Days per week: Not on file     Minutes per session: Not on file    Stress: Not on file   Relationships    Social connections     Talks on phone: Not on file     Gets together: Not on file     Attends Gnosticist service: Not on file     Active member of club or organization: Not on file     Attends meetings of clubs or organizations: Not on file     Relationship status: Not on file    Intimate partner violence     Fear of current or ex partner: Not on file     Emotionally abused: Not on file     Physically abused: Not on file     Forced sexual activity: Not on file   Other Topics Concern    Not on file   Social History Narrative    Not on file       Family History:        Problem Relation Age of Onset    Cancer Mother         breast    Other Father         parkinsons disease, CVA,HTN, heart disease       REVIEW OF SYSTEMS:  ROS: Is as noted above in HPI.  Complete system review is

## 2020-03-25 NOTE — PLAN OF CARE
Problem: Falls - Risk of:  Goal: Will remain free from falls  Description: Will remain free from falls  3/25/2020 0008 by Myke Liang LPN  Outcome: Ongoing  3/24/2020 1701 by Calderon Rooney LPN  Outcome: Ongoing  Goal: Absence of physical injury  Description: Absence of physical injury  3/25/2020 0008 by Myke Liang LPN  Outcome: Ongoing  3/24/2020 1701 by Calderon Rooney LPN  Outcome: Ongoing     Problem: Nutrition  Goal: Optimal nutrition therapy  3/25/2020 0008 by Myke Liang LPN  Outcome: Ongoing  3/24/2020 1701 by Calderon Rooney LPN  Outcome: Ongoing  3/24/2020 1405 by Zoë Jerome RD, LD  Outcome: Ongoing

## 2020-03-25 NOTE — CONSULTS
infusion   Intravenous Continuous Beola Lei, APRN - CNP 75 mL/hr at 03/25/20 9665      sodium chloride flush 0.9 % injection 10 mL  10 mL Intravenous 2 times per day Beola Lei, APRN - CNP        sodium chloride flush 0.9 % injection 10 mL  10 mL Intravenous PRN Beola Lei, APRN - CNP        acetaminophen (TYLENOL) tablet 650 mg  650 mg Oral Q6H PRN Beola Lei, APRN - CNP        Or    acetaminophen (TYLENOL) suppository 650 mg  650 mg Rectal Q6H PRN Beola Lei, APRN - CNP        polyethylene glycol (GLYCOLAX) packet 17 g  17 g Oral Daily PRN Beola Lei, APRN - CNP        promethazine (PHENERGAN) tablet 12.5 mg  12.5 mg Oral Q6H PRN Beola Lei, APRN - CNP        Or    ondansetron (ZOFRAN) injection 4 mg  4 mg Intravenous Q6H PRN Beola Lei, APRN - CNP        heparin (porcine) injection 5,000 Units  5,000 Units Subcutaneous 3 times per day Beola Lei, APRN - CNP   5,000 Units at 03/25/20 0646    potassium chloride (KLOR-CON M) extended release tablet 40 mEq  40 mEq Oral PRN Beola Lei, APRN - CNP        Or    potassium bicarb-citric acid (EFFER-K) effervescent tablet 40 mEq  40 mEq Oral PRN Beola Lei, APRN - CNP        Or    potassium chloride 10 mEq/100 mL IVPB (Peripheral Line)  10 mEq Intravenous PRN Beola Lei, APRN - CNP        magnesium sulfate 2 g in 50 mL IVPB premix  2 g Intravenous PRN Beola Lei, APRN - CNP        nicotine (NICODERM CQ) 21 MG/24HR 1 patch  1 patch Transdermal Daily Beola Lei, APRN - CNP   1 patch at 03/25/20 0846    gabapentin (NEURONTIN) capsule 600 mg  600 mg Oral TID Beola Lei, APRN - CNP   600 mg at 03/25/20 0645    levothyroxine (SYNTHROID) tablet 100 mcg  100 mcg Oral Daily Beola Lei, APRN - CNP   100 mcg at 03/25/20 0645    morphine (PF) injection 2 mg  2 mg Intravenous Q4H PRN Beola Lei, APRN - CNP   2 mg at 03/25/20 7250    HYDROcodone-acetaminophen (NORCO) 5-325 MG per tablet 1 tablet  1 tablet Oral Q6H PRN Easton Fagan PA-C   1 tablet at 03/25/20 0449      sodium chloride 75 mL/hr at 03/25/20 0796       Social History / Family History:     Social History     Socioeconomic History    Marital status: Single     Spouse name: None    Number of children: 6    Years of education: None    Highest education level: None   Occupational History    None   Social Needs    Financial resource strain: None    Food insecurity     Worry: None     Inability: None    Transportation needs     Medical: None     Non-medical: None   Tobacco Use    Smoking status: Current Some Day Smoker     Packs/day: 1.00     Years: 40.00     Pack years: 40.00     Types: Cigarettes    Smokeless tobacco: Former User   Substance and Sexual Activity    Alcohol use: No     Comment: \"quit 19 yrs ago, use to drink, pretty heavy\"    CAFFEINE: 1-16 oz cup coffee daily.  Drug use: Yes     Comment: heroin    Sexual activity: None     Comment:    Lifestyle    Physical activity     Days per week: None     Minutes per session: None    Stress: None   Relationships    Social connections     Talks on phone: None     Gets together: None     Attends Gnosticist service: None     Active member of club or organization: None     Attends meetings of clubs or organizations: None     Relationship status: None    Intimate partner violence     Fear of current or ex partner: None     Emotionally abused: None     Physically abused: None     Forced sexual activity: None   Other Topics Concern    None   Social History Narrative    None      Family History   Problem Relation Age of Onset    Cancer Mother         breast    Other Father         parkinsons disease, CVA,HTN, heart disease    otherwise irrelevant to this surgical issue. Review of Systems:  Constitutional: Negative for fever, chills, diaphoresis, appetite change and fatigue. HENT: Negative for sore throat, trouble swallowing and voice change. Respiratory: Negative for cough, positive for shortness of breath no wheezing. 24 hour(s))   POCT Glucose    Collection Time: 03/24/20 11:58 AM   Result Value Ref Range    POC Glucose 78 70 - 99 MG/DL   POCT Glucose    Collection Time: 03/24/20  4:09 PM   Result Value Ref Range    POC Glucose 168 (H) 70 - 99 MG/DL   POCT Glucose    Collection Time: 03/24/20 10:36 PM   Result Value Ref Range    POC Glucose 81 70 - 99 MG/DL   POCT Glucose    Collection Time: 03/25/20  1:52 AM   Result Value Ref Range    POC Glucose 192 (H) 70 - 99 MG/DL   Vancomycin, trough    Collection Time: 03/25/20  4:30 AM   Result Value Ref Range    Vancomycin Tr 12.1 10 - 20 UG/ML    DOSE AMOUNT DOSE AMT.  GIVEN - 1gm     DOSE TIME DOSE TIME GIVEN - 3/25 @ 0500    CBC auto differential    Collection Time: 03/25/20  4:30 AM   Result Value Ref Range    WBC 9.2 4.0 - 10.5 K/CU MM    RBC 3.69 (L) 4.6 - 6.2 M/CU MM    Hemoglobin 10.9 (L) 13.5 - 18.0 GM/DL    Hematocrit 32.8 (L) 42 - 52 %    MCV 88.9 78 - 100 FL    MCH 29.5 27 - 31 PG    MCHC 33.2 32.0 - 36.0 %    RDW 13.8 11.7 - 14.9 %    Platelets 812 640 - 384 K/CU MM    MPV 10.4 7.5 - 11.1 FL    Differential Type AUTOMATED DIFFERENTIAL     Segs Relative 73.1 (H) 36 - 66 %    Lymphocytes % 15.1 (L) 24 - 44 %    Monocytes % 9.2 (H) 0 - 4 %    Eosinophils % 0.8 0 - 3 %    Basophils % 0.9 0 - 1 %    Segs Absolute 6.7 K/CU MM    Lymphocytes Absolute 1.4 K/CU MM    Monocytes Absolute 0.8 K/CU MM    Eosinophils Absolute 0.1 K/CU MM    Basophils Absolute 0.1 K/CU MM    Nucleated RBC % 0.0 %    Total Nucleated RBC 0.0 K/CU MM    Total Immature Neutrophil 0.08 K/CU MM    Immature Neutrophil % 0.9 (H) 0 - 0.43 %   T4, Free    Collection Time: 03/25/20  4:30 AM   Result Value Ref Range    T4 Free 0.95 0.9 - 1.8 NG/DL   POCT Glucose    Collection Time: 03/25/20  6:49 AM   Result Value Ref Range    POC Glucose 161 (H) 70 - 99 MG/DL       Diagnosis:  Patient Active Problem List   Diagnosis    Chest pain    Diabetes mellitus    H/O echocardiogram    S/P CABG (coronary artery bypass graft)    Chest pain    Cellulitis    Heroin withdrawal (HCC)    CAD (coronary artery disease)    Polysubstance abuse (HCC)    Small bowel obstruction (HCC)    Narcotic abuse, continuous ([facility] [address]    Hx of hepatitis    Epigastric pain    Diarrhea    Constipation with Ileus    Vomiting of fecal matter with nausea    Encephalopathy    Metabolic encephalopathy    Infectious gastroenteritis    Bilateral leg weakness    Gait disturbance    Left carotid stenosis    Hypothyroidism    Osteomyelitis (HCC)       Assessment & plan:  Margarie Paget is a very pleasant 60 y.o. male presenting with right lateral foot ischemia and 5th toe gangrene, with a major wound in the lateral foot, but I could NOT feel pulses, and vascular optimization IS NEEDED BEFORE the amputation. .    Thank you doctor Isaias Rubio MD very much for your consultation and for the opportunity to take care of Mr Margarie Paget with you, I'll follow along with you and I'll update you on any new events in his care.    ____________________________________________    Tatianna Amin MD, FACS, FICS    3/25/2020  8:47 AM      Patient was seen with total face to face time of 45 minutes. More than 50% of this visit was counseling and education as above in my assessment and plan section of my note.

## 2020-03-26 LAB
ACTIVATED CLOTTING TIME, LOW RANGE: 153 SEC
ACTIVATED CLOTTING TIME, LOW RANGE: 178 SEC
ACTIVATED CLOTTING TIME, LOW RANGE: 317 SEC
BASOPHILS ABSOLUTE: 0.1 K/CU MM
BASOPHILS RELATIVE PERCENT: 0.8 % (ref 0–1)
DIFFERENTIAL TYPE: ABNORMAL
EOSINOPHILS ABSOLUTE: 0.1 K/CU MM
EOSINOPHILS RELATIVE PERCENT: 0.7 % (ref 0–3)
GLUCOSE BLD-MCNC: 121 MG/DL (ref 70–99)
GLUCOSE BLD-MCNC: 151 MG/DL (ref 70–99)
GLUCOSE BLD-MCNC: 345 MG/DL (ref 70–99)
GLUCOSE BLD-MCNC: 93 MG/DL (ref 70–99)
HCT VFR BLD CALC: 35.6 % (ref 42–52)
HEMOGLOBIN: 11.9 GM/DL (ref 13.5–18)
IMMATURE NEUTROPHIL %: 0.7 % (ref 0–0.43)
LYMPHOCYTES ABSOLUTE: 1.3 K/CU MM
LYMPHOCYTES RELATIVE PERCENT: 14.7 % (ref 24–44)
MCH RBC QN AUTO: 29.8 PG (ref 27–31)
MCHC RBC AUTO-ENTMCNC: 33.4 % (ref 32–36)
MCV RBC AUTO: 89.2 FL (ref 78–100)
MONOCYTES ABSOLUTE: 0.9 K/CU MM
MONOCYTES RELATIVE PERCENT: 9.8 % (ref 0–4)
NUCLEATED RBC %: 0 %
PDW BLD-RTO: 13.7 % (ref 11.7–14.9)
PLATELET # BLD: 126 K/CU MM (ref 140–440)
PMV BLD AUTO: 11.1 FL (ref 7.5–11.1)
RBC # BLD: 3.99 M/CU MM (ref 4.6–6.2)
SEGMENTED NEUTROPHILS ABSOLUTE COUNT: 6.7 K/CU MM
SEGMENTED NEUTROPHILS RELATIVE PERCENT: 73.3 % (ref 36–66)
TOTAL IMMATURE NEUTOROPHIL: 0.06 K/CU MM
TOTAL NUCLEATED RBC: 0 K/CU MM
WBC # BLD: 9.1 K/CU MM (ref 4–10.5)

## 2020-03-26 PROCEDURE — 6370000000 HC RX 637 (ALT 250 FOR IP): Performed by: SURGERY

## 2020-03-26 PROCEDURE — 82962 GLUCOSE BLOOD TEST: CPT

## 2020-03-26 PROCEDURE — 04HL3DZ INSERTION OF INTRALUMINAL DEVICE INTO LEFT FEMORAL ARTERY, PERCUTANEOUS APPROACH: ICD-10-PCS | Performed by: SURGERY

## 2020-03-26 PROCEDURE — 6360000002 HC RX W HCPCS: Performed by: SURGERY

## 2020-03-26 PROCEDURE — B41F1ZZ FLUOROSCOPY OF RIGHT LOWER EXTREMITY ARTERIES USING LOW OSMOLAR CONTRAST: ICD-10-PCS | Performed by: SURGERY

## 2020-03-26 PROCEDURE — 6370000000 HC RX 637 (ALT 250 FOR IP)

## 2020-03-26 PROCEDURE — 2580000003 HC RX 258: Performed by: NURSE PRACTITIONER

## 2020-03-26 PROCEDURE — 36415 COLL VENOUS BLD VENIPUNCTURE: CPT

## 2020-03-26 PROCEDURE — 37225 HC FEM POP TERRITORY ATHERECTOMY: CPT

## 2020-03-26 PROCEDURE — 6360000002 HC RX W HCPCS: Performed by: INTERNAL MEDICINE

## 2020-03-26 PROCEDURE — 2580000003 HC RX 258

## 2020-03-26 PROCEDURE — 75710 ARTERY X-RAYS ARM/LEG: CPT

## 2020-03-26 PROCEDURE — 2500000003 HC RX 250 WO HCPCS: Performed by: SURGERY

## 2020-03-26 PROCEDURE — 2580000003 HC RX 258: Performed by: INTERNAL MEDICINE

## 2020-03-26 PROCEDURE — 6360000002 HC RX W HCPCS: Performed by: PHYSICIAN ASSISTANT

## 2020-03-26 PROCEDURE — 2580000003 HC RX 258: Performed by: SURGERY

## 2020-03-26 PROCEDURE — 04CK3ZZ EXTIRPATION OF MATTER FROM RIGHT FEMORAL ARTERY, PERCUTANEOUS APPROACH: ICD-10-PCS | Performed by: SURGERY

## 2020-03-26 PROCEDURE — 6360000002 HC RX W HCPCS

## 2020-03-26 PROCEDURE — 6360000004 HC RX CONTRAST MEDICATION

## 2020-03-26 PROCEDURE — 85347 COAGULATION TIME ACTIVATED: CPT

## 2020-03-26 PROCEDURE — 2140000000 HC CCU INTERMEDIATE R&B

## 2020-03-26 PROCEDURE — APPSS60 APP SPLIT SHARED TIME 46-60 MINUTES: Performed by: NURSE PRACTITIONER

## 2020-03-26 PROCEDURE — C1769 GUIDE WIRE: HCPCS

## 2020-03-26 PROCEDURE — C1884 EMBOLIZATION PROTECT SYST: HCPCS

## 2020-03-26 PROCEDURE — C1725 CATH, TRANSLUMIN NON-LASER: HCPCS

## 2020-03-26 PROCEDURE — 85025 COMPLETE CBC W/AUTO DIFF WBC: CPT

## 2020-03-26 PROCEDURE — 94761 N-INVAS EAR/PLS OXIMETRY MLT: CPT

## 2020-03-26 PROCEDURE — 6370000000 HC RX 637 (ALT 250 FOR IP): Performed by: NURSE PRACTITIONER

## 2020-03-26 PROCEDURE — B4101ZZ FLUOROSCOPY OF ABDOMINAL AORTA USING LOW OSMOLAR CONTRAST: ICD-10-PCS | Performed by: SURGERY

## 2020-03-26 PROCEDURE — 2709999900 HC NON-CHARGEABLE SUPPLY

## 2020-03-26 PROCEDURE — C1714 CATH, TRANS ATHERECTOMY, DIR: HCPCS

## 2020-03-26 PROCEDURE — 2500000003 HC RX 250 WO HCPCS: Performed by: INTERNAL MEDICINE

## 2020-03-26 PROCEDURE — 99232 SBSQ HOSP IP/OBS MODERATE 35: CPT | Performed by: SURGERY

## 2020-03-26 PROCEDURE — 2500000003 HC RX 250 WO HCPCS

## 2020-03-26 PROCEDURE — C1894 INTRO/SHEATH, NON-LASER: HCPCS

## 2020-03-26 PROCEDURE — 99222 1ST HOSP IP/OBS MODERATE 55: CPT | Performed by: INTERNAL MEDICINE

## 2020-03-26 PROCEDURE — 6360000002 HC RX W HCPCS: Performed by: NURSE PRACTITIONER

## 2020-03-26 RX ORDER — CLOPIDOGREL BISULFATE 75 MG/1
75 TABLET ORAL DAILY
Status: DISCONTINUED | OUTPATIENT
Start: 2020-03-27 | End: 2020-03-30

## 2020-03-26 RX ORDER — ATROPINE SULFATE 0.1 MG/ML
INJECTION INTRAVENOUS
Status: DISCONTINUED
Start: 2020-03-26 | End: 2020-03-26 | Stop reason: WASHOUT

## 2020-03-26 RX ORDER — ASPIRIN 81 MG/1
81 TABLET ORAL ONCE
Status: COMPLETED | OUTPATIENT
Start: 2020-03-27 | End: 2020-03-27

## 2020-03-26 RX ADMIN — CEFEPIME 2 G: 20 INJECTION, POWDER, FOR SOLUTION INTRAVENOUS at 16:48

## 2020-03-26 RX ADMIN — DIAZEPAM 5 MG: 5 TABLET ORAL at 10:59

## 2020-03-26 RX ADMIN — CEFEPIME 2 G: 20 INJECTION, POWDER, FOR SOLUTION INTRAVENOUS at 08:42

## 2020-03-26 RX ADMIN — ACETAMINOPHEN 650 MG: 325 TABLET ORAL at 21:14

## 2020-03-26 RX ADMIN — MORPHINE SULFATE 4 MG: 4 INJECTION, SOLUTION INTRAMUSCULAR; INTRAVENOUS at 08:51

## 2020-03-26 RX ADMIN — HEPARIN SODIUM 5000 UNITS: 5000 INJECTION, SOLUTION INTRAVENOUS; SUBCUTANEOUS at 21:15

## 2020-03-26 RX ADMIN — INSULIN GLARGINE 30 UNITS: 100 INJECTION, SOLUTION SUBCUTANEOUS at 21:17

## 2020-03-26 RX ADMIN — SODIUM CHLORIDE, PRESERVATIVE FREE 10 ML: 5 INJECTION INTRAVENOUS at 08:52

## 2020-03-26 RX ADMIN — MORPHINE SULFATE 2 MG: 2 INJECTION, SOLUTION INTRAMUSCULAR; INTRAVENOUS at 21:16

## 2020-03-26 RX ADMIN — VANCOMYCIN HYDROCHLORIDE 1250 MG: 5 INJECTION, POWDER, LYOPHILIZED, FOR SOLUTION INTRAVENOUS at 05:10

## 2020-03-26 RX ADMIN — MORPHINE SULFATE 2 MG: 2 INJECTION, SOLUTION INTRAMUSCULAR; INTRAVENOUS at 16:45

## 2020-03-26 RX ADMIN — VANCOMYCIN HYDROCHLORIDE 1250 MG: 5 INJECTION, POWDER, LYOPHILIZED, FOR SOLUTION INTRAVENOUS at 18:02

## 2020-03-26 RX ADMIN — GABAPENTIN 600 MG: 300 CAPSULE ORAL at 13:08

## 2020-03-26 RX ADMIN — METRONIDAZOLE 500 MG: 500 INJECTION, SOLUTION INTRAVENOUS at 07:11

## 2020-03-26 RX ADMIN — GABAPENTIN 600 MG: 300 CAPSULE ORAL at 21:15

## 2020-03-26 RX ADMIN — SODIUM CHLORIDE: 9 INJECTION, SOLUTION INTRAVENOUS at 03:22

## 2020-03-26 RX ADMIN — CEFEPIME 2 G: 20 INJECTION, POWDER, FOR SOLUTION INTRAVENOUS at 00:15

## 2020-03-26 RX ADMIN — MORPHINE SULFATE 2 MG: 2 INJECTION, SOLUTION INTRAMUSCULAR; INTRAVENOUS at 05:44

## 2020-03-26 RX ADMIN — MORPHINE SULFATE 2 MG: 2 INJECTION, SOLUTION INTRAMUSCULAR; INTRAVENOUS at 13:08

## 2020-03-26 RX ADMIN — INSULIN LISPRO 4 UNITS: 100 INJECTION, SOLUTION INTRAVENOUS; SUBCUTANEOUS at 21:16

## 2020-03-26 RX ADMIN — METRONIDAZOLE 500 MG: 500 INJECTION, SOLUTION INTRAVENOUS at 15:27

## 2020-03-26 RX ADMIN — METRONIDAZOLE 500 MG: 500 INJECTION, SOLUTION INTRAVENOUS at 22:35

## 2020-03-26 ASSESSMENT — PAIN DESCRIPTION - DESCRIPTORS
DESCRIPTORS: BURNING;ACHING
DESCRIPTORS: ACHING;BURNING
DESCRIPTORS: BURNING;ACHING

## 2020-03-26 ASSESSMENT — PAIN SCALES - GENERAL
PAINLEVEL_OUTOF10: 9
PAINLEVEL_OUTOF10: 10
PAINLEVEL_OUTOF10: 8
PAINLEVEL_OUTOF10: 8
PAINLEVEL_OUTOF10: 9
PAINLEVEL_OUTOF10: 8
PAINLEVEL_OUTOF10: 6

## 2020-03-26 ASSESSMENT — PAIN DESCRIPTION - LOCATION
LOCATION: FOOT

## 2020-03-26 ASSESSMENT — PAIN DESCRIPTION - PAIN TYPE
TYPE: ACUTE PAIN;CHRONIC PAIN
TYPE: ACUTE PAIN
TYPE: CHRONIC PAIN
TYPE: ACUTE PAIN

## 2020-03-26 ASSESSMENT — PAIN DESCRIPTION - DIRECTION
RADIATING_TOWARDS: DOWN
RADIATING_TOWARDS: DOWN

## 2020-03-26 ASSESSMENT — PAIN DESCRIPTION - ONSET
ONSET: ON-GOING
ONSET: ON-GOING

## 2020-03-26 ASSESSMENT — PAIN DESCRIPTION - PROGRESSION
CLINICAL_PROGRESSION: GRADUALLY IMPROVING
CLINICAL_PROGRESSION: NOT CHANGED
CLINICAL_PROGRESSION: GRADUALLY IMPROVING

## 2020-03-26 ASSESSMENT — PAIN DESCRIPTION - ORIENTATION
ORIENTATION: RIGHT

## 2020-03-26 ASSESSMENT — PAIN DESCRIPTION - FREQUENCY
FREQUENCY: CONTINUOUS
FREQUENCY: CONTINUOUS

## 2020-03-26 NOTE — PROGRESS NOTES
DIFFERENTIAL     Segs Relative 73.1 (H) 36 - 66 %    Lymphocytes % 15.1 (L) 24 - 44 %    Monocytes % 9.2 (H) 0 - 4 %    Eosinophils % 0.8 0 - 3 %    Basophils % 0.9 0 - 1 %    Segs Absolute 6.7 K/CU MM    Lymphocytes Absolute 1.4 K/CU MM    Monocytes Absolute 0.8 K/CU MM    Eosinophils Absolute 0.1 K/CU MM    Basophils Absolute 0.1 K/CU MM    Nucleated RBC % 0.0 %    Total Nucleated RBC 0.0 K/CU MM    Total Immature Neutrophil 0.08 K/CU MM    Immature Neutrophil % 0.9 (H) 0 - 0.43 %   T4, Free    Collection Time: 03/25/20  4:30 AM   Result Value Ref Range    T4 Free 0.95 0.9 - 1.8 NG/DL   POCT Glucose    Collection Time: 03/25/20  6:49 AM   Result Value Ref Range    POC Glucose 161 (H) 70 - 99 MG/DL   POCT Glucose    Collection Time: 03/25/20 11:38 AM   Result Value Ref Range    POC Glucose 121 (H) 70 - 99 MG/DL   POCT Glucose    Collection Time: 03/25/20  5:38 PM   Result Value Ref Range    POC Glucose 182 (H) 70 - 99 MG/DL   POCT Glucose    Collection Time: 03/25/20  8:14 PM   Result Value Ref Range    POC Glucose 273 (H) 70 - 99 MG/DL   POCT Glucose    Collection Time: 03/26/20  2:14 AM   Result Value Ref Range    POC Glucose 151 (H) 70 - 99 MG/DL   CBC auto differential    Collection Time: 03/26/20  3:58 AM   Result Value Ref Range    WBC 9.1 4.0 - 10.5 K/CU MM    RBC 3.99 (L) 4.6 - 6.2 M/CU MM    Hemoglobin 11.9 (L) 13.5 - 18.0 GM/DL    Hematocrit 35.6 (L) 42 - 52 %    MCV 89.2 78 - 100 FL    MCH 29.8 27 - 31 PG    MCHC 33.4 32.0 - 36.0 %    RDW 13.7 11.7 - 14.9 %    Platelets 370 (L) 659 - 440 K/CU MM    MPV 11.1 7.5 - 11.1 FL    Differential Type AUTOMATED DIFFERENTIAL     Segs Relative 73.3 (H) 36 - 66 %    Lymphocytes % 14.7 (L) 24 - 44 %    Monocytes % 9.8 (H) 0 - 4 %    Eosinophils % 0.7 0 - 3 %    Basophils % 0.8 0 - 1 %    Segs Absolute 6.7 K/CU MM    Lymphocytes Absolute 1.3 K/CU MM    Monocytes Absolute 0.9 K/CU MM    Eosinophils Absolute 0.1 K/CU MM    Basophils Absolute 0.1 K/CU MM    Nucleated RBC

## 2020-03-26 NOTE — PROGRESS NOTES
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 male)   MRN & CSN:  [id number] & [id number] Admission Date/Time: 3/23/2020  3:03 PM   Location:  [address] [place] [address]- PCP: No primary care provider on file. 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 Nonadherent to insulin regimen x6 months last A1C 7.7 (2018)              Monitor FSBS and cover with medium dose SSI              Endocrinology consulted                  CAD s/p CABG ()              Last TTE 2018 EF 55 to 60% G1 DD, suspicious hyper echoic echodensity located in the [facility] vegetation cannot be ruled out\"              Telemetry               Will likely need cardiology pre operative evaluation. Previously evaluated by Dr Harley Denney Hypothyroid- continue synthroid. Last T4 1.12 (2018)      Hx of polysubstance abuse      History of Present Illness:     Having a lot of right foot pain    Objective: Intake/Output Summary (Last 24 hours) at 3/26/2020 0738  Last data filed at 3/25/2020 1934  Gross per 24 hour   Intake 1580 ml   Output 1025 ml   Net 555 ml      Vitals:   Vitals:    20 0731   BP:    Pulse:    Resp: 16   Temp:    SpO2: 96%     Physical Exam:      Physical Exam  Pulmonary:      Effort: Pulmonary effort is normal.   Neurological:      General: No focal deficit present. Mental Status: He is alert.            .    Medications:   Medications:    vancomycin  1,250 mg Intravenous Q12H    diazePAM  5 mg Oral Once    diphenhydrAMINE  50 mg Oral Once    insulin lispro  15 Units Subcutaneous TID WC    insulin glargine  30 Units Subcutaneous Nightly    insulin lispro  0-12 Units Subcutaneous TID WC    insulin lispro 0-6 Units Subcutaneous 2 times per day    cefepime  2 g Intravenous Q8H    metroNIDAZOLE  500 mg Intravenous Q8H    sodium chloride flush  10 mL Intravenous 2 times per day    heparin (porcine)  5,000 Units Subcutaneous 3 times per day    nicotine  1 patch Transdermal Daily    gabapentin  600 mg Oral TID    levothyroxine  100 mcg Oral Daily      Infusions:    sodium chloride 75 mL/hr at 03/26/20 0322     PRN Meds: morphine, 4 mg, Q30 Min PRN  sodium chloride flush, 10 mL, PRN  acetaminophen, 650 mg, Q6H PRN    Or  acetaminophen, 650 mg, Q6H PRN  polyethylene glycol, 17 g, Daily PRN  promethazine, 12.5 mg, Q6H PRN    Or  ondansetron, 4 mg, Q6H PRN  potassium chloride, 40 mEq, PRN    Or  potassium alternative oral replacement, 40 mEq, PRN    Or  potassium chloride, 10 mEq, PRN  magnesium sulfate, 2 g, PRN  morphine, 2 mg, Q4H PRN  HYDROcodone 5 mg - acetaminophen, 1 tablet, Q6H PRN          Electronically signed by Fabiola Frias MD on 3/26/2020 at 7:38 AM

## 2020-03-26 NOTE — PROGRESS NOTES
Progress Note( Dr. Longo Arrow)  3/25/2020  Subjective:   Admit Date: 3/23/2020  PCP: No primary care provider on file. Admitted For :Right foot infection possibly gangrene    Consulted For: Better control of blood glucose    Interval History: No big change except started on antibiotic and ordered for arterial Doppler  General surgery saw the patient recommended to have podiatry see him    Denies any chest pains,   Denies SOB . Denies nausea or vomiting. No new bowel or bladder symptoms. Intake/Output Summary (Last 24 hours) at 3/25/2020 2003  Last data filed at 3/25/2020 1934  Gross per 24 hour   Intake 3006.25 ml   Output 1575 ml   Net 1431.25 ml       DATA    CBC:   Recent Labs     03/23/20  1530 03/24/20  0332 03/25/20  0430   WBC 14.0* 10.0 9.2   HGB 13.2* 11.0* 10.9*    171 157    CMP:  Recent Labs     03/23/20  1530 03/24/20  0332   * 135   K 3.9 4.1   CL 95* 103   CO2 24 24   BUN 19 20   CREATININE 0.6* 0.7*   CALCIUM 8.5 7.8*   PROT 8.0 6.2*   LABALBU 2.4* 2.0*   BILITOT 0.4 0.2   ALKPHOS 110 102   AST 24 21   ALT 15 12     Lipids:   Lab Results   Component Value Date    CHOL 121 08/29/2018    HDL 67 08/29/2018    TRIG 65 08/29/2018     Glucose:  Recent Labs     03/25/20  0649 03/25/20  1138 03/25/20  1738   POCGLU 161* 121* 182*     WbmoodixlvA3W:  Lab Results   Component Value Date    LABA1C 12.2 03/23/2020     High Sensitivity TSH:   Lab Results   Component Value Date    TSHHS 3.990 03/23/2020     Free T3: No results found for: FT3  Free T4:  Lab Results   Component Value Date    T4FREE 0.95 03/25/2020       Xr Foot Right (min 3 Views)    Result Date: 3/24/2020  EXAMINATION: THREE XRAY VIEWS OF THE RIGHT FOOT 3/23/2020 3:32 pm     Soft tissue ulceration adjacent to the 5th MTP joint with soft tissue air or gas. Suspected osteomyelitis of the 5th toe proximally and 5th metatarsal head.      Vl Dup Lower Extremity Arteries Bilateral    Result Date: 3/25/2020  EXAMINATION: ARTERIAL DUPLEX ULTRASOUND OF THE BILATERAL LOWER EXTREMITIES  3/25/2020 5:55 am       1. The bilateral proximal peroneal arteries are not visualized. 2. Biphasic and monophasic waveforms are identified throughout the right lower extremity. Scheduled Medicines   Medications:    vancomycin  1,250 mg Intravenous Q12H    [START ON 3/26/2020] diazePAM  5 mg Oral Once    [START ON 3/26/2020] diphenhydrAMINE  50 mg Oral Once    insulin lispro  15 Units Subcutaneous TID WC    insulin glargine  30 Units Subcutaneous Nightly    insulin lispro  0-12 Units Subcutaneous TID WC    insulin lispro  0-6 Units Subcutaneous 2 times per day    cefepime  2 g Intravenous Q8H    metroNIDAZOLE  500 mg Intravenous Q8H    sodium chloride flush  10 mL Intravenous 2 times per day    heparin (porcine)  5,000 Units Subcutaneous 3 times per day    nicotine  1 patch Transdermal Daily    gabapentin  600 mg Oral TID    levothyroxine  100 mcg Oral Daily      Infusions:    sodium chloride 75 mL/hr at 03/25/20 0988         Objective:   Vitals: /60   Pulse 67   Temp 99.5 °F (37.5 °C) (Oral)   Resp 17   Ht 5' 8\" (1.727 m)   Wt 143 lb (64.9 kg)   SpO2 96%   BMI 21.74 kg/m²   General appearance: alert and cooperative with exam  Neck: no JVD or bruit  Thyroid : Normal lobes   Lungs: Has Vesicular Breath sounds   Heart:  regular rate and rhythm  Abdomen: soft, non-tender; bowel sounds normal; no masses,  no organomegaly  Musculoskeletal: Normal  Extremities: extremities normal, , no edema//   Right  foot ulcerations possibly gangrene is areas   Has peripheral vascular disease bilaterally  lower extremities  Neurologic:  Awake, alert, oriented to name, place and time. Cranial nerves II-XII are grossly intact. Motor is  intact.   Sensory neuropathy t.,  and gait is abnormal.  And unstable    Assessment:     Patient Active Problem List:   Diabetes mellitus    S/P CABG (coronary artery bypass graft)    Cellulitis    CAD (coronary

## 2020-03-27 ENCOUNTER — ANESTHESIA EVENT (OUTPATIENT)
Dept: OPERATING ROOM | Age: 60
DRG: 271 | End: 2020-03-27
Payer: MEDICARE

## 2020-03-27 LAB
BASOPHILS ABSOLUTE: 0.1 K/CU MM
BASOPHILS RELATIVE PERCENT: 0.9 % (ref 0–1)
CREAT SERPL-MCNC: 0.7 MG/DL (ref 0.9–1.3)
CULTURE: ABNORMAL
DIFFERENTIAL TYPE: ABNORMAL
DOSE AMOUNT: NORMAL
DOSE TIME: NORMAL
EOSINOPHILS ABSOLUTE: 0.1 K/CU MM
EOSINOPHILS RELATIVE PERCENT: 0.7 % (ref 0–3)
ERYTHROCYTE SEDIMENTATION RATE: 107 MM/HR (ref 0–20)
GFR AFRICAN AMERICAN: >60 ML/MIN/1.73M2
GFR NON-AFRICAN AMERICAN: >60 ML/MIN/1.73M2
GLUCOSE BLD-MCNC: 137 MG/DL (ref 70–99)
GLUCOSE BLD-MCNC: 141 MG/DL (ref 70–99)
GLUCOSE BLD-MCNC: 209 MG/DL (ref 70–99)
GLUCOSE BLD-MCNC: 279 MG/DL (ref 70–99)
GLUCOSE BLD-MCNC: 83 MG/DL (ref 70–99)
HCT VFR BLD CALC: 30.3 % (ref 42–52)
HEMOGLOBIN: 10.3 GM/DL (ref 13.5–18)
HIGH SENSITIVE C-REACTIVE PROTEIN: 36.6 MG/L
IMMATURE NEUTROPHIL %: 0.7 % (ref 0–0.43)
LYMPHOCYTES ABSOLUTE: 1.4 K/CU MM
LYMPHOCYTES RELATIVE PERCENT: 16.9 % (ref 24–44)
Lab: ABNORMAL
MCH RBC QN AUTO: 29.8 PG (ref 27–31)
MCHC RBC AUTO-ENTMCNC: 34 % (ref 32–36)
MCV RBC AUTO: 87.6 FL (ref 78–100)
MONOCYTES ABSOLUTE: 0.8 K/CU MM
MONOCYTES RELATIVE PERCENT: 9.4 % (ref 0–4)
NUCLEATED RBC %: 0 %
PDW BLD-RTO: 14 % (ref 11.7–14.9)
PLATELET # BLD: 131 K/CU MM (ref 140–440)
PMV BLD AUTO: 10.6 FL (ref 7.5–11.1)
RBC # BLD: 3.46 M/CU MM (ref 4.6–6.2)
SEGMENTED NEUTROPHILS ABSOLUTE COUNT: 6 K/CU MM
SEGMENTED NEUTROPHILS RELATIVE PERCENT: 71.4 % (ref 36–66)
SPECIMEN: ABNORMAL
TOTAL IMMATURE NEUTOROPHIL: 0.06 K/CU MM
TOTAL NUCLEATED RBC: 0 K/CU MM
VANCOMYCIN TROUGH: 14.6 UG/ML (ref 10–20)
WBC # BLD: 8.5 K/CU MM (ref 4–10.5)

## 2020-03-27 PROCEDURE — 2140000000 HC CCU INTERMEDIATE R&B

## 2020-03-27 PROCEDURE — 6360000002 HC RX W HCPCS: Performed by: INTERNAL MEDICINE

## 2020-03-27 PROCEDURE — 94761 N-INVAS EAR/PLS OXIMETRY MLT: CPT

## 2020-03-27 PROCEDURE — 6370000000 HC RX 637 (ALT 250 FOR IP): Performed by: SURGERY

## 2020-03-27 PROCEDURE — 2580000003 HC RX 258: Performed by: SURGERY

## 2020-03-27 PROCEDURE — 82962 GLUCOSE BLOOD TEST: CPT

## 2020-03-27 PROCEDURE — 6360000002 HC RX W HCPCS: Performed by: SURGERY

## 2020-03-27 PROCEDURE — 85025 COMPLETE CBC W/AUTO DIFF WBC: CPT

## 2020-03-27 PROCEDURE — 85652 RBC SED RATE AUTOMATED: CPT

## 2020-03-27 PROCEDURE — 2580000003 HC RX 258: Performed by: INTERNAL MEDICINE

## 2020-03-27 PROCEDURE — 2500000003 HC RX 250 WO HCPCS: Performed by: SURGERY

## 2020-03-27 PROCEDURE — 99232 SBSQ HOSP IP/OBS MODERATE 35: CPT | Performed by: SURGERY

## 2020-03-27 PROCEDURE — 36415 COLL VENOUS BLD VENIPUNCTURE: CPT

## 2020-03-27 PROCEDURE — 99232 SBSQ HOSP IP/OBS MODERATE 35: CPT | Performed by: NURSE PRACTITIONER

## 2020-03-27 PROCEDURE — 80202 ASSAY OF VANCOMYCIN: CPT

## 2020-03-27 PROCEDURE — 86141 C-REACTIVE PROTEIN HS: CPT

## 2020-03-27 PROCEDURE — 82565 ASSAY OF CREATININE: CPT

## 2020-03-27 RX ADMIN — MORPHINE SULFATE 2 MG: 2 INJECTION, SOLUTION INTRAMUSCULAR; INTRAVENOUS at 22:07

## 2020-03-27 RX ADMIN — HYDROCODONE BITARTRATE AND ACETAMINOPHEN 1 TABLET: 5; 325 TABLET ORAL at 14:06

## 2020-03-27 RX ADMIN — METRONIDAZOLE 500 MG: 500 INJECTION, SOLUTION INTRAVENOUS at 16:14

## 2020-03-27 RX ADMIN — VANCOMYCIN HYDROCHLORIDE 1250 MG: 5 INJECTION, POWDER, LYOPHILIZED, FOR SOLUTION INTRAVENOUS at 06:39

## 2020-03-27 RX ADMIN — HEPARIN SODIUM 5000 UNITS: 5000 INJECTION, SOLUTION INTRAVENOUS; SUBCUTANEOUS at 14:01

## 2020-03-27 RX ADMIN — GABAPENTIN 600 MG: 300 CAPSULE ORAL at 06:39

## 2020-03-27 RX ADMIN — HEPARIN SODIUM 5000 UNITS: 5000 INJECTION, SOLUTION INTRAVENOUS; SUBCUTANEOUS at 06:37

## 2020-03-27 RX ADMIN — METRONIDAZOLE 500 MG: 500 INJECTION, SOLUTION INTRAVENOUS at 22:07

## 2020-03-27 RX ADMIN — MORPHINE SULFATE 2 MG: 2 INJECTION, SOLUTION INTRAMUSCULAR; INTRAVENOUS at 00:00

## 2020-03-27 RX ADMIN — MORPHINE SULFATE 2 MG: 2 INJECTION, SOLUTION INTRAMUSCULAR; INTRAVENOUS at 12:13

## 2020-03-27 RX ADMIN — ASPIRIN 81 MG: 81 TABLET, COATED ORAL at 01:09

## 2020-03-27 RX ADMIN — SODIUM CHLORIDE, PRESERVATIVE FREE 10 ML: 5 INJECTION INTRAVENOUS at 01:17

## 2020-03-27 RX ADMIN — INSULIN GLARGINE 30 UNITS: 100 INJECTION, SOLUTION SUBCUTANEOUS at 22:12

## 2020-03-27 RX ADMIN — CLOPIDOGREL BISULFATE 75 MG: 75 TABLET ORAL at 08:54

## 2020-03-27 RX ADMIN — GABAPENTIN 600 MG: 300 CAPSULE ORAL at 14:01

## 2020-03-27 RX ADMIN — MORPHINE SULFATE 2 MG: 2 INJECTION, SOLUTION INTRAMUSCULAR; INTRAVENOUS at 07:29

## 2020-03-27 RX ADMIN — METRONIDAZOLE 500 MG: 500 INJECTION, SOLUTION INTRAVENOUS at 07:30

## 2020-03-27 RX ADMIN — GABAPENTIN 600 MG: 300 CAPSULE ORAL at 22:14

## 2020-03-27 RX ADMIN — HEPARIN SODIUM 5000 UNITS: 5000 INJECTION, SOLUTION INTRAVENOUS; SUBCUTANEOUS at 22:12

## 2020-03-27 RX ADMIN — INSULIN LISPRO 3 UNITS: 100 INJECTION, SOLUTION INTRAVENOUS; SUBCUTANEOUS at 02:17

## 2020-03-27 RX ADMIN — INSULIN LISPRO 6 UNITS: 100 INJECTION, SOLUTION INTRAVENOUS; SUBCUTANEOUS at 16:42

## 2020-03-27 RX ADMIN — SODIUM CHLORIDE: 9 INJECTION, SOLUTION INTRAVENOUS at 22:07

## 2020-03-27 RX ADMIN — MORPHINE SULFATE 2 MG: 2 INJECTION, SOLUTION INTRAMUSCULAR; INTRAVENOUS at 01:17

## 2020-03-27 RX ADMIN — HYDROCODONE BITARTRATE AND ACETAMINOPHEN 1 TABLET: 5; 325 TABLET ORAL at 23:13

## 2020-03-27 RX ADMIN — CEFEPIME 2 G: 2 INJECTION, POWDER, FOR SOLUTION INTRAVENOUS at 08:53

## 2020-03-27 RX ADMIN — MORPHINE SULFATE 2 MG: 2 INJECTION, SOLUTION INTRAMUSCULAR; INTRAVENOUS at 17:51

## 2020-03-27 RX ADMIN — CEFEPIME 2 G: 20 INJECTION, POWDER, FOR SOLUTION INTRAVENOUS at 01:08

## 2020-03-27 RX ADMIN — LEVOTHYROXINE SODIUM 100 MCG: 100 TABLET ORAL at 06:39

## 2020-03-27 RX ADMIN — VANCOMYCIN HYDROCHLORIDE 1250 MG: 5 INJECTION, POWDER, LYOPHILIZED, FOR SOLUTION INTRAVENOUS at 16:44

## 2020-03-27 RX ADMIN — CEFEPIME 2 G: 2 INJECTION, POWDER, FOR SOLUTION INTRAVENOUS at 16:51

## 2020-03-27 ASSESSMENT — PAIN SCALES - GENERAL
PAINLEVEL_OUTOF10: 10
PAINLEVEL_OUTOF10: 9
PAINLEVEL_OUTOF10: 7
PAINLEVEL_OUTOF10: 0
PAINLEVEL_OUTOF10: 8
PAINLEVEL_OUTOF10: 9
PAINLEVEL_OUTOF10: 9
PAINLEVEL_OUTOF10: 8
PAINLEVEL_OUTOF10: 7
PAINLEVEL_OUTOF10: 8
PAINLEVEL_OUTOF10: 6

## 2020-03-27 ASSESSMENT — PAIN DESCRIPTION - ONSET
ONSET: ON-GOING
ONSET: ON-GOING

## 2020-03-27 ASSESSMENT — PAIN DESCRIPTION - PAIN TYPE
TYPE: CHRONIC PAIN

## 2020-03-27 ASSESSMENT — PAIN DESCRIPTION - FREQUENCY
FREQUENCY: CONTINUOUS
FREQUENCY: CONTINUOUS

## 2020-03-27 ASSESSMENT — PAIN DESCRIPTION - LOCATION
LOCATION: FOOT

## 2020-03-27 ASSESSMENT — PAIN DESCRIPTION - DESCRIPTORS
DESCRIPTORS: ACHING;BURNING
DESCRIPTORS: ACHING;BURNING

## 2020-03-27 ASSESSMENT — PAIN - FUNCTIONAL ASSESSMENT
PAIN_FUNCTIONAL_ASSESSMENT: PREVENTS OR INTERFERES SOME ACTIVE ACTIVITIES AND ADLS
PAIN_FUNCTIONAL_ASSESSMENT: PREVENTS OR INTERFERES SOME ACTIVE ACTIVITIES AND ADLS

## 2020-03-27 ASSESSMENT — PAIN DESCRIPTION - ORIENTATION
ORIENTATION: RIGHT

## 2020-03-27 ASSESSMENT — LIFESTYLE VARIABLES: SMOKING_STATUS: 1
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
## 2020-03-27 NOTE — PROGRESS NOTES
Progress Note( Dr. Sabrina Gonzalez)  3/26/2020  Subjective:   Admit Date: 3/23/2020  PCP: No primary care provider on file. Admitted For :Right foot infection possibly gangrene    Consulted For: Better control of blood glucose    Interval History: No big change except started on antibiotic and ordered for arterial Doppler  Patient was seen by cardiovascular surgery and going for  right leg angiogram later in the day    Denies any chest pains,   Denies SOB . Denies nausea or vomiting. No new bowel or bladder symptoms. Intake/Output Summary (Last 24 hours) at 3/26/2020 2029  Last data filed at 3/26/2020 0745  Gross per 24 hour   Intake --   Output 500 ml   Net -500 ml       DATA    CBC:   Recent Labs     03/24/20  0332 03/25/20  0430 03/26/20  0358   WBC 10.0 9.2 9.1   HGB 11.0* 10.9* 11.9*    157 126*    CMP:  Recent Labs     03/24/20  0332      K 4.1      CO2 24   BUN 20   CREATININE 0.7*   CALCIUM 7.8*   PROT 6.2*   LABALBU 2.0*   BILITOT 0.2   ALKPHOS 102   AST 21   ALT 12     Lipids:   Lab Results   Component Value Date    CHOL 121 08/29/2018    HDL 67 08/29/2018    TRIG 65 08/29/2018     Glucose:  Recent Labs     03/26/20  0214 03/26/20  0848 03/26/20  1313   POCGLU 151* 121* 93     UcylnokhyrV2R:  Lab Results   Component Value Date    LABA1C 12.2 03/23/2020     High Sensitivity TSH:   Lab Results   Component Value Date    TSHHS 3.990 03/23/2020     Free T3: No results found for: FT3  Free T4:  Lab Results   Component Value Date    T4FREE 0.95 03/25/2020       Xr Foot Right (min 3 Views)    Result Date: 3/24/2020  EXAMINATION: THREE XRAY VIEWS OF THE RIGHT FOOT 3/23/2020 3:32 pm     Soft tissue ulceration adjacent to the 5th MTP joint with soft tissue air or gas. Suspected osteomyelitis of the 5th toe proximally and 5th metatarsal head.      Vl Dup Lower Extremity Arteries Bilateral    Result Date: 3/25/2020  EXAMINATION: ARTERIAL DUPLEX ULTRASOUND OF THE BILATERAL LOWER EXTREMITIES

## 2020-03-27 NOTE — PROGRESS NOTES
or motor deficits     Radiologic / Imaging / TESTING  3/23/20 XR Foot Right:  Impression   Soft tissue ulceration adjacent to the 5th MTP joint with soft tissue air or   gas.  Suspected osteomyelitis of the 5th toe proximally and 5th metatarsal   head.          3/25/20 VL Dup Lower Extremity:  Impression   1. The bilateral proximal peroneal arteries are not visualized. 2. Biphasic and monophasic waveforms are identified throughout the right   lower extremity.          3/25/20 MRI Foot Right W WO Contrast:  Impression   1. Osteomyelitis of the 5th metatarsal head.  Mild marrow edema extending   into the shaft without decreased T1 signal compatible with early   osteomyelitis versus noninfectious reactive osteitis. 2. Deep soft tissue ulceration lateral to the 5th MTP joint with an irregular   underlying fluid signal collection measuring approximately 4.9 x 2.5 x 3.1 cm   compatible with an abscess versus phlegmon.  Associated soft tissue gas. Labs:    Recent Results (from the past 24 hour(s))   POCT Glucose    Collection Time: 03/26/20  9:08 PM   Result Value Ref Range    POC Glucose 345 (H) 70 - 99 MG/DL   POCT Glucose    Collection Time: 03/27/20  2:14 AM   Result Value Ref Range    POC Glucose 279 (H) 70 - 99 MG/DL   Vancomycin, trough    Collection Time: 03/27/20  5:32 AM   Result Value Ref Range    Vancomycin Tr 14.6 10 - 20 UG/ML    DOSE AMOUNT DOSE AMT.  GIVEN - 1250 mg     DOSE TIME DOSE TIME GIVEN - 0500    CBC auto differential    Collection Time: 03/27/20  5:32 AM   Result Value Ref Range    WBC 8.5 4.0 - 10.5 K/CU MM    RBC 3.46 (L) 4.6 - 6.2 M/CU MM    Hemoglobin 10.3 (L) 13.5 - 18.0 GM/DL    Hematocrit 30.3 (L) 42 - 52 %    MCV 87.6 78 - 100 FL    MCH 29.8 27 - 31 PG    MCHC 34.0 32.0 - 36.0 %    RDW 14.0 11.7 - 14.9 %    Platelets 048 (L) 439 - 440 K/CU MM    MPV 10.6 7.5 - 11.1 FL    Differential Type AUTOMATED DIFFERENTIAL     Segs Relative 71.4 (H) 36 - 66 %    Lymphocytes % 16.9 (L) 24 - 44 %    Monocytes % 9.4 (H) 0 - 4 %    Eosinophils % 0.7 0 - 3 %    Basophils % 0.9 0 - 1 %    Segs Absolute 6.0 K/CU MM    Lymphocytes Absolute 1.4 K/CU MM    Monocytes Absolute 0.8 K/CU MM    Eosinophils Absolute 0.1 K/CU MM    Basophils Absolute 0.1 K/CU MM    Nucleated RBC % 0.0 %    Total Nucleated RBC 0.0 K/CU MM    Total Immature Neutrophil 0.06 K/CU MM    Immature Neutrophil % 0.7 (H) 0 - 0.43 %   Creatinine, Serum    Collection Time: 03/27/20  5:32 AM   Result Value Ref Range    CREATININE 0.7 (L) 0.9 - 1.3 MG/DL    GFR Non-African American >60 >60 mL/min/1.73m2    GFR African American >60 >60 mL/min/1.73m2   C-reactive protein    Collection Time: 03/27/20  5:32 AM   Result Value Ref Range    CRP, High Sensitivity 36.6 mg/L   Sedimentation Rate    Collection Time: 03/27/20  5:32 AM   Result Value Ref Range    Sed Rate 107 (H) 0 - 20 MM/HR   POCT Glucose    Collection Time: 03/27/20  7:50 AM   Result Value Ref Range    POC Glucose 137 (H) 70 - 99 MG/DL   POCT Glucose    Collection Time: 03/27/20 11:41 AM   Result Value Ref Range    POC Glucose 83 70 - 99 MG/DL     CULTURE results: Invalid input(s): BLOOD CULTURE,  URINE CULTURE, SURGICAL CULTURE    Diagnosis:  Patient Active Problem List   Diagnosis    Chest pain    Diabetes mellitus    H/O echocardiogram    S/P CABG (coronary artery bypass graft)    Chest pain    Cellulitis    Heroin withdrawal ([address]    CAD (coronary artery disease)    Polysubstance abuse ([address]    Small bowel obstruction ([address]    Narcotic abuse, continuous ([address]    Hx of hepatitis    Epigastric pain    Diarrhea    Constipation with Ileus    Vomiting of fecal matter with nausea    Encephalopathy    Metabolic encephalopathy    Infectious gastroenteritis    Bilateral leg weakness    Gait disturbance    Left carotid stenosis    Hypothyroidism    Osteomyelitis (HCC)    Diabetic osteomyelitis ([address]       Active Problems  Active Problems:    Osteomyelitis (HCC)    Diabetic

## 2020-03-27 NOTE — PROGRESS NOTES
mL/min/1.73m2   C-reactive protein    Collection Time: 03/27/20  5:32 AM   Result Value Ref Range    CRP, High Sensitivity 36.6 mg/L   Sedimentation Rate    Collection Time: 03/27/20  5:32 AM   Result Value Ref Range    Sed Rate 107 (H) 0 - 20 MM/HR       Scheduled Meds:   insulin lispro  0-18 Units Subcutaneous TID WC    insulin lispro  0-18 Units Subcutaneous 2 times per day    clopidogrel  75 mg Oral Daily    vancomycin  1,250 mg Intravenous Q12H    diphenhydrAMINE  50 mg Oral Once    insulin lispro  15 Units Subcutaneous TID WC    insulin glargine  30 Units Subcutaneous Nightly    cefepime  2 g Intravenous Q8H    metroNIDAZOLE  500 mg Intravenous Q8H    sodium chloride flush  10 mL Intravenous 2 times per day    heparin (porcine)  5,000 Units Subcutaneous 3 times per day    nicotine  1 patch Transdermal Daily    gabapentin  600 mg Oral TID    levothyroxine  100 mcg Oral Daily       Continuous Infusions:   sodium chloride 75 mL/hr at 03/26/20 0322       Physical Exam:  HEENT: Anicteric sclerae, Oropharyngeal mucosae moist, pink and intact. Heart:  Normal S1 and S2, RRR  Lungs: Clear to auscultation bilaterally, No audible Wheezes or Rales. Extremities: No edema. The right lateral foot is ischemic, with right 5th toe gangrene. Neuro: Alert and Oriented x 3, Non focal.  Abdomen: Soft, Benign, Non tender, Non distended, Positive bowel sounds. Active Problems:    Osteomyelitis (HCC)    Diabetic osteomyelitis ([address]  Resolved Problems:    * No resolved hospital problems. *      Assessment and Plan:  Ariel Perdomo is a 63 y.o. male who is POD # 1 status post:  Lower extremity angiogram, and addressed the right femoral artery, in the cath lab, will follow and proceed with the needed amputation Monday. Increase Ambulation to at least 4x/day walk in the hallways with assistance. Respiratory vicki-operative care: Incentive Spirometry / deep breathing and coughing 10x/hr while awake.     Continue DVT prophylaxis with Teds and SCDs and SC Lovenox. Continue GI prophylaxis with Protonix IV till able to tolerate PO.    ___________________________________________    Nara Mcconnell MD, FACS, FICS  3/27/2020  7:53 AM    Patient was seen with total face to face time of 25 minutes. More than 50% of this visit was counseling and education as above in my assessment and plan section of my note.

## 2020-03-27 NOTE — PROGRESS NOTES
PATIENT NAME: Jacey Ochoa    TODAY'S DATE: 03/27/20    Reason for today's visit: PAD s/p RLE angio and CFA/SFA intervention    SUBJECTIVE:    Pt with minimal complaints. R foot pain is stable. OBJECTIVE:   VITALS:    Vitals:    03/27/20 0732   BP: 117/62   Pulse: 60   Resp: 12   Temp: 97.8 °F (36.6 °C)   SpO2:      INTAKE/OUTPUT:    Date 03/27/20 0000 - 03/27/20 2359   Shift 8883-0200 3823-7238 3772-2213 24 Hour Total   INTAKE   Shift Total(mL/kg)       OUTPUT   Urine(mL/kg/hr) 600(1.2)   600   Shift Total(mL/kg) 600(9.4)   600(9.4)   Weight (kg) 63.7 63.7 63.7 63.7      Patient Vitals for the past 96 hrs (Last 3 readings):   Weight   03/27/20 0424 140 lb 6.4 oz (63.7 kg)   03/26/20 0603 141 lb 11.2 oz (64.3 kg)   03/23/20 1715 143 lb (64.9 kg)       EXAM:  Constitutional: Blood pressure 117/62, pulse 60, temperature 97.8 °F (36.6 °C), temperature source Oral, resp. rate 12, height 5' 8\" (1.727 m), weight 140 lb 6.4 oz (63.7 kg), SpO2 97 %. No apparent distress, appears stated age and cooperative. Neurologic: follows commands, no focal weakness noted   Lungs: Good respiratory effort. Clear to auscultation,   CV: Regular rate/ rhythm , no peripheral edema, feet warm and well perfused, biphasic DP and PT pulses noted  GI: Soft, non-tender in all four quadrants, non-distended, + bowel sounds, spleen and liver no palpable masses   : bladder nondistended   MSK: no obvious deformity   Skin: R foot with necrotic 5th toe       Data:  CBC:   Recent Labs     03/25/20  0430 03/26/20  0358 03/27/20  0532   WBC 9.2 9.1 8.5   HGB 10.9* 11.9* 10.3*   HCT 32.8* 35.6* 30.3*    126* 131*     BMP:    Recent Labs     03/27/20  0532   CREATININE 0.7*     Hepatic: No results for input(s): AST, ALT, ALB, BILITOT, ALKPHOS in the last 72 hours. Mag:    No results for input(s): MG in the last 72 hours. Phos:   No results for input(s): PHOS in the last 72 hours.    INR: No results for input(s): INR in the last 72

## 2020-03-27 NOTE — CARE COORDINATION
CM reviewed chart. POC amputation on Monday. ID is following, currently on cefemine and Flgyl IV, ID stated they will reevaluate after surgery. May need home IV antibiotics.   CM will con't to follow Remington (dad) returned Tammy's call. Remington said it is ok to leave a detailed message. Please call back. Thank you. [phone]

## 2020-03-28 LAB
BASOPHILS ABSOLUTE: 0.1 K/CU MM
BASOPHILS RELATIVE PERCENT: 0.9 % (ref 0–1)
CREAT SERPL-MCNC: 0.7 MG/DL (ref 0.9–1.3)
CULTURE: NORMAL
CULTURE: NORMAL
DIFFERENTIAL TYPE: ABNORMAL
EOSINOPHILS ABSOLUTE: 0.1 K/CU MM
EOSINOPHILS RELATIVE PERCENT: 1 % (ref 0–3)
GFR AFRICAN AMERICAN: >60 ML/MIN/1.73M2
GFR NON-AFRICAN AMERICAN: >60 ML/MIN/1.73M2
GLUCOSE BLD-MCNC: 117 MG/DL (ref 70–99)
GLUCOSE BLD-MCNC: 135 MG/DL (ref 70–99)
GLUCOSE BLD-MCNC: 141 MG/DL (ref 70–99)
GLUCOSE BLD-MCNC: 200 MG/DL (ref 70–99)
GLUCOSE BLD-MCNC: 229 MG/DL (ref 70–99)
GLUCOSE BLD-MCNC: 249 MG/DL (ref 70–99)
HCT VFR BLD CALC: 30.9 % (ref 42–52)
HEMOGLOBIN: 9.6 GM/DL (ref 13.5–18)
HIGH SENSITIVE C-REACTIVE PROTEIN: 25.9 MG/L
IMMATURE NEUTROPHIL %: 0.7 % (ref 0–0.43)
LYMPHOCYTES ABSOLUTE: 1.5 K/CU MM
LYMPHOCYTES RELATIVE PERCENT: 20.1 % (ref 24–44)
Lab: NORMAL
Lab: NORMAL
MCH RBC QN AUTO: 29.2 PG (ref 27–31)
MCHC RBC AUTO-ENTMCNC: 31.1 % (ref 32–36)
MCV RBC AUTO: 93.9 FL (ref 78–100)
MONOCYTES ABSOLUTE: 0.7 K/CU MM
MONOCYTES RELATIVE PERCENT: 8.7 % (ref 0–4)
NUCLEATED RBC %: 0 %
PDW BLD-RTO: 14.2 % (ref 11.7–14.9)
PLATELET # BLD: 127 K/CU MM (ref 140–440)
PMV BLD AUTO: 10.8 FL (ref 7.5–11.1)
RBC # BLD: 3.29 M/CU MM (ref 4.6–6.2)
SEGMENTED NEUTROPHILS ABSOLUTE COUNT: 5.2 K/CU MM
SEGMENTED NEUTROPHILS RELATIVE PERCENT: 68.6 % (ref 36–66)
SPECIMEN: NORMAL
SPECIMEN: NORMAL
TOTAL IMMATURE NEUTOROPHIL: 0.05 K/CU MM
TOTAL NUCLEATED RBC: 0 K/CU MM
WBC # BLD: 7.6 K/CU MM (ref 4–10.5)

## 2020-03-28 PROCEDURE — 2580000003 HC RX 258: Performed by: SURGERY

## 2020-03-28 PROCEDURE — 6370000000 HC RX 637 (ALT 250 FOR IP): Performed by: SURGERY

## 2020-03-28 PROCEDURE — 36415 COLL VENOUS BLD VENIPUNCTURE: CPT

## 2020-03-28 PROCEDURE — 6360000002 HC RX W HCPCS: Performed by: INTERNAL MEDICINE

## 2020-03-28 PROCEDURE — 6360000002 HC RX W HCPCS: Performed by: SURGERY

## 2020-03-28 PROCEDURE — 82962 GLUCOSE BLOOD TEST: CPT

## 2020-03-28 PROCEDURE — 86141 C-REACTIVE PROTEIN HS: CPT

## 2020-03-28 PROCEDURE — 94761 N-INVAS EAR/PLS OXIMETRY MLT: CPT

## 2020-03-28 PROCEDURE — 94150 VITAL CAPACITY TEST: CPT

## 2020-03-28 PROCEDURE — 85025 COMPLETE CBC W/AUTO DIFF WBC: CPT

## 2020-03-28 PROCEDURE — 2500000003 HC RX 250 WO HCPCS: Performed by: SURGERY

## 2020-03-28 PROCEDURE — 2140000000 HC CCU INTERMEDIATE R&B

## 2020-03-28 PROCEDURE — 2580000003 HC RX 258: Performed by: INTERNAL MEDICINE

## 2020-03-28 PROCEDURE — 82565 ASSAY OF CREATININE: CPT

## 2020-03-28 RX ADMIN — HEPARIN SODIUM 5000 UNITS: 5000 INJECTION, SOLUTION INTRAVENOUS; SUBCUTANEOUS at 06:42

## 2020-03-28 RX ADMIN — MORPHINE SULFATE 2 MG: 2 INJECTION, SOLUTION INTRAMUSCULAR; INTRAVENOUS at 02:13

## 2020-03-28 RX ADMIN — HYDROCODONE BITARTRATE AND ACETAMINOPHEN 1 TABLET: 5; 325 TABLET ORAL at 05:16

## 2020-03-28 RX ADMIN — SODIUM CHLORIDE, PRESERVATIVE FREE 10 ML: 5 INJECTION INTRAVENOUS at 22:55

## 2020-03-28 RX ADMIN — GABAPENTIN 600 MG: 300 CAPSULE ORAL at 06:43

## 2020-03-28 RX ADMIN — GABAPENTIN 600 MG: 300 CAPSULE ORAL at 22:55

## 2020-03-28 RX ADMIN — GABAPENTIN 600 MG: 300 CAPSULE ORAL at 13:07

## 2020-03-28 RX ADMIN — HEPARIN SODIUM 5000 UNITS: 5000 INJECTION, SOLUTION INTRAVENOUS; SUBCUTANEOUS at 14:57

## 2020-03-28 RX ADMIN — CEFEPIME 2 G: 2 INJECTION, POWDER, FOR SOLUTION INTRAVENOUS at 17:30

## 2020-03-28 RX ADMIN — MORPHINE SULFATE 4 MG: 4 INJECTION, SOLUTION INTRAMUSCULAR; INTRAVENOUS at 15:02

## 2020-03-28 RX ADMIN — CEFEPIME 2 G: 2 INJECTION, POWDER, FOR SOLUTION INTRAVENOUS at 02:19

## 2020-03-28 RX ADMIN — VANCOMYCIN HYDROCHLORIDE 1250 MG: 5 INJECTION, POWDER, LYOPHILIZED, FOR SOLUTION INTRAVENOUS at 05:19

## 2020-03-28 RX ADMIN — INSULIN GLARGINE 30 UNITS: 100 INJECTION, SOLUTION SUBCUTANEOUS at 22:56

## 2020-03-28 RX ADMIN — HYDROCODONE BITARTRATE AND ACETAMINOPHEN 1 TABLET: 5; 325 TABLET ORAL at 22:56

## 2020-03-28 RX ADMIN — HYDROCODONE BITARTRATE AND ACETAMINOPHEN 1 TABLET: 5; 325 TABLET ORAL at 13:03

## 2020-03-28 RX ADMIN — SODIUM CHLORIDE, PRESERVATIVE FREE 10 ML: 5 INJECTION INTRAVENOUS at 09:38

## 2020-03-28 RX ADMIN — CEFEPIME 2 G: 2 INJECTION, POWDER, FOR SOLUTION INTRAVENOUS at 09:38

## 2020-03-28 RX ADMIN — METRONIDAZOLE 500 MG: 500 INJECTION, SOLUTION INTRAVENOUS at 14:56

## 2020-03-28 RX ADMIN — SODIUM CHLORIDE: 9 INJECTION, SOLUTION INTRAVENOUS at 16:29

## 2020-03-28 RX ADMIN — LEVOTHYROXINE SODIUM 100 MCG: 100 TABLET ORAL at 06:43

## 2020-03-28 RX ADMIN — CLOPIDOGREL BISULFATE 75 MG: 75 TABLET ORAL at 09:38

## 2020-03-28 RX ADMIN — MORPHINE SULFATE 2 MG: 2 INJECTION, SOLUTION INTRAMUSCULAR; INTRAVENOUS at 06:42

## 2020-03-28 RX ADMIN — HEPARIN SODIUM 5000 UNITS: 5000 INJECTION, SOLUTION INTRAVENOUS; SUBCUTANEOUS at 22:56

## 2020-03-28 RX ADMIN — VANCOMYCIN HYDROCHLORIDE 1250 MG: 5 INJECTION, POWDER, LYOPHILIZED, FOR SOLUTION INTRAVENOUS at 22:55

## 2020-03-28 RX ADMIN — METRONIDAZOLE 500 MG: 500 INJECTION, SOLUTION INTRAVENOUS at 06:43

## 2020-03-28 ASSESSMENT — PAIN DESCRIPTION - ORIENTATION
ORIENTATION: RIGHT

## 2020-03-28 ASSESSMENT — PAIN DESCRIPTION - DESCRIPTORS
DESCRIPTORS: ACHING;BURNING

## 2020-03-28 ASSESSMENT — PAIN SCALES - GENERAL
PAINLEVEL_OUTOF10: 8
PAINLEVEL_OUTOF10: 8
PAINLEVEL_OUTOF10: 7
PAINLEVEL_OUTOF10: 8
PAINLEVEL_OUTOF10: 5
PAINLEVEL_OUTOF10: 7
PAINLEVEL_OUTOF10: 7
PAINLEVEL_OUTOF10: 8
PAINLEVEL_OUTOF10: 7
PAINLEVEL_OUTOF10: 5
PAINLEVEL_OUTOF10: 5
PAINLEVEL_OUTOF10: 7

## 2020-03-28 ASSESSMENT — PAIN DESCRIPTION - FREQUENCY
FREQUENCY: CONTINUOUS

## 2020-03-28 ASSESSMENT — PAIN DESCRIPTION - PAIN TYPE
TYPE: CHRONIC PAIN

## 2020-03-28 ASSESSMENT — PAIN - FUNCTIONAL ASSESSMENT: PAIN_FUNCTIONAL_ASSESSMENT: PREVENTS OR INTERFERES SOME ACTIVE ACTIVITIES AND ADLS

## 2020-03-28 ASSESSMENT — PAIN DESCRIPTION - LOCATION
LOCATION: FOOT

## 2020-03-28 NOTE — PROGRESS NOTES
insulin glargine  30 Units Subcutaneous Nightly    metroNIDAZOLE  500 mg Intravenous Q8H    sodium chloride flush  10 mL Intravenous 2 times per day    heparin (porcine)  5,000 Units Subcutaneous 3 times per day    nicotine  1 patch Transdermal Daily    gabapentin  600 mg Oral TID    levothyroxine  100 mcg Oral Daily      Infusions:    sodium chloride 75 mL/hr at 03/27/20 2207     PRN Meds: morphine, 4 mg, Q30 Min PRN  sodium chloride flush, 10 mL, PRN  acetaminophen, 650 mg, Q6H PRN    Or  acetaminophen, 650 mg, Q6H PRN  polyethylene glycol, 17 g, Daily PRN  promethazine, 12.5 mg, Q6H PRN    Or  ondansetron, 4 mg, Q6H PRN  potassium chloride, 40 mEq, PRN    Or  potassium alternative oral replacement, 40 mEq, PRN    Or  potassium chloride, 10 mEq, PRN  magnesium sulfate, 2 g, PRN  morphine, 2 mg, Q4H PRN  HYDROcodone 5 mg - acetaminophen, 1 tablet, Q6H PRN          Electronically signed by Collin Jacobo MD on 3/28/2020 at 12:13 AM

## 2020-03-29 LAB
BASOPHILS ABSOLUTE: 0.1 K/CU MM
BASOPHILS RELATIVE PERCENT: 0.8 % (ref 0–1)
CREAT SERPL-MCNC: 0.8 MG/DL (ref 0.9–1.3)
DIFFERENTIAL TYPE: ABNORMAL
EOSINOPHILS ABSOLUTE: 0.1 K/CU MM
EOSINOPHILS RELATIVE PERCENT: 1.2 % (ref 0–3)
GFR AFRICAN AMERICAN: >60 ML/MIN/1.73M2
GFR NON-AFRICAN AMERICAN: >60 ML/MIN/1.73M2
GLUCOSE BLD-MCNC: 183 MG/DL (ref 70–99)
GLUCOSE BLD-MCNC: 257 MG/DL (ref 70–99)
GLUCOSE BLD-MCNC: 266 MG/DL (ref 70–99)
GLUCOSE BLD-MCNC: 76 MG/DL (ref 70–99)
GLUCOSE BLD-MCNC: 82 MG/DL (ref 70–99)
HCT VFR BLD CALC: 33.2 % (ref 42–52)
HEMOGLOBIN: 10.8 GM/DL (ref 13.5–18)
IMMATURE NEUTROPHIL %: 1.1 % (ref 0–0.43)
LYMPHOCYTES ABSOLUTE: 1.6 K/CU MM
LYMPHOCYTES RELATIVE PERCENT: 18.5 % (ref 24–44)
MCH RBC QN AUTO: 29.8 PG (ref 27–31)
MCHC RBC AUTO-ENTMCNC: 32.5 % (ref 32–36)
MCV RBC AUTO: 91.5 FL (ref 78–100)
MONOCYTES ABSOLUTE: 0.9 K/CU MM
MONOCYTES RELATIVE PERCENT: 10.3 % (ref 0–4)
NUCLEATED RBC %: 0 %
PDW BLD-RTO: 14.2 % (ref 11.7–14.9)
PLATELET # BLD: 148 K/CU MM (ref 140–440)
PMV BLD AUTO: 10.5 FL (ref 7.5–11.1)
RBC # BLD: 3.63 M/CU MM (ref 4.6–6.2)
SEGMENTED NEUTROPHILS ABSOLUTE COUNT: 5.8 K/CU MM
SEGMENTED NEUTROPHILS RELATIVE PERCENT: 68.1 % (ref 36–66)
TOTAL IMMATURE NEUTOROPHIL: 0.09 K/CU MM
TOTAL NUCLEATED RBC: 0 K/CU MM
WBC # BLD: 8.5 K/CU MM (ref 4–10.5)

## 2020-03-29 PROCEDURE — 85025 COMPLETE CBC W/AUTO DIFF WBC: CPT

## 2020-03-29 PROCEDURE — 6360000002 HC RX W HCPCS: Performed by: INTERNAL MEDICINE

## 2020-03-29 PROCEDURE — 6370000000 HC RX 637 (ALT 250 FOR IP): Performed by: SURGERY

## 2020-03-29 PROCEDURE — 82565 ASSAY OF CREATININE: CPT

## 2020-03-29 PROCEDURE — 6360000002 HC RX W HCPCS: Performed by: SURGERY

## 2020-03-29 PROCEDURE — 36415 COLL VENOUS BLD VENIPUNCTURE: CPT

## 2020-03-29 PROCEDURE — 94761 N-INVAS EAR/PLS OXIMETRY MLT: CPT

## 2020-03-29 PROCEDURE — 2580000003 HC RX 258: Performed by: INTERNAL MEDICINE

## 2020-03-29 PROCEDURE — 82962 GLUCOSE BLOOD TEST: CPT

## 2020-03-29 PROCEDURE — 2580000003 HC RX 258: Performed by: SURGERY

## 2020-03-29 PROCEDURE — 2140000000 HC CCU INTERMEDIATE R&B

## 2020-03-29 PROCEDURE — 2500000003 HC RX 250 WO HCPCS: Performed by: SURGERY

## 2020-03-29 PROCEDURE — 6370000000 HC RX 637 (ALT 250 FOR IP): Performed by: INTERNAL MEDICINE

## 2020-03-29 RX ADMIN — VANCOMYCIN HYDROCHLORIDE 1250 MG: 5 INJECTION, POWDER, LYOPHILIZED, FOR SOLUTION INTRAVENOUS at 21:57

## 2020-03-29 RX ADMIN — VANCOMYCIN HYDROCHLORIDE 1250 MG: 5 INJECTION, POWDER, LYOPHILIZED, FOR SOLUTION INTRAVENOUS at 15:22

## 2020-03-29 RX ADMIN — CLOPIDOGREL BISULFATE 75 MG: 75 TABLET ORAL at 09:33

## 2020-03-29 RX ADMIN — HEPARIN SODIUM 5000 UNITS: 5000 INJECTION, SOLUTION INTRAVENOUS; SUBCUTANEOUS at 15:21

## 2020-03-29 RX ADMIN — METRONIDAZOLE 500 MG: 500 INJECTION, SOLUTION INTRAVENOUS at 02:01

## 2020-03-29 RX ADMIN — HYDROCODONE BITARTRATE AND ACETAMINOPHEN 1 TABLET: 5; 325 TABLET ORAL at 23:30

## 2020-03-29 RX ADMIN — METRONIDAZOLE 500 MG: 500 INJECTION, SOLUTION INTRAVENOUS at 09:58

## 2020-03-29 RX ADMIN — INSULIN LISPRO 3 UNITS: 100 INJECTION, SOLUTION INTRAVENOUS; SUBCUTANEOUS at 09:31

## 2020-03-29 RX ADMIN — GABAPENTIN 600 MG: 300 CAPSULE ORAL at 06:02

## 2020-03-29 RX ADMIN — SODIUM CHLORIDE, PRESERVATIVE FREE 10 ML: 5 INJECTION INTRAVENOUS at 06:02

## 2020-03-29 RX ADMIN — MORPHINE SULFATE 2 MG: 2 INJECTION, SOLUTION INTRAMUSCULAR; INTRAVENOUS at 02:09

## 2020-03-29 RX ADMIN — HEPARIN SODIUM 5000 UNITS: 5000 INJECTION, SOLUTION INTRAVENOUS; SUBCUTANEOUS at 22:11

## 2020-03-29 RX ADMIN — SODIUM CHLORIDE: 9 INJECTION, SOLUTION INTRAVENOUS at 09:35

## 2020-03-29 RX ADMIN — GABAPENTIN 600 MG: 300 CAPSULE ORAL at 15:22

## 2020-03-29 RX ADMIN — HYDROCODONE BITARTRATE AND ACETAMINOPHEN 1 TABLET: 5; 325 TABLET ORAL at 05:02

## 2020-03-29 RX ADMIN — HYDROCODONE BITARTRATE AND ACETAMINOPHEN 1 TABLET: 5; 325 TABLET ORAL at 11:23

## 2020-03-29 RX ADMIN — CEFEPIME 2 G: 2 INJECTION, POWDER, FOR SOLUTION INTRAVENOUS at 19:31

## 2020-03-29 RX ADMIN — METRONIDAZOLE 500 MG: 500 INJECTION, SOLUTION INTRAVENOUS at 17:41

## 2020-03-29 RX ADMIN — INSULIN GLARGINE 30 UNITS: 100 INJECTION, SOLUTION SUBCUTANEOUS at 22:10

## 2020-03-29 RX ADMIN — CEFEPIME 2 G: 2 INJECTION, POWDER, FOR SOLUTION INTRAVENOUS at 02:54

## 2020-03-29 RX ADMIN — LEVOTHYROXINE SODIUM 100 MCG: 100 TABLET ORAL at 06:02

## 2020-03-29 RX ADMIN — MORPHINE SULFATE 2 MG: 2 INJECTION, SOLUTION INTRAMUSCULAR; INTRAVENOUS at 17:36

## 2020-03-29 RX ADMIN — HEPARIN SODIUM 5000 UNITS: 5000 INJECTION, SOLUTION INTRAVENOUS; SUBCUTANEOUS at 06:01

## 2020-03-29 RX ADMIN — GABAPENTIN 600 MG: 300 CAPSULE ORAL at 21:57

## 2020-03-29 RX ADMIN — CEFEPIME 2 G: 2 INJECTION, POWDER, FOR SOLUTION INTRAVENOUS at 11:23

## 2020-03-29 RX ADMIN — MORPHINE SULFATE 2 MG: 2 INJECTION, SOLUTION INTRAMUSCULAR; INTRAVENOUS at 06:03

## 2020-03-29 RX ADMIN — SODIUM CHLORIDE: 9 INJECTION, SOLUTION INTRAVENOUS at 23:31

## 2020-03-29 RX ADMIN — MORPHINE SULFATE 2 MG: 2 INJECTION, SOLUTION INTRAMUSCULAR; INTRAVENOUS at 21:51

## 2020-03-29 ASSESSMENT — PAIN DESCRIPTION - PROGRESSION
CLINICAL_PROGRESSION: GRADUALLY WORSENING
CLINICAL_PROGRESSION: GRADUALLY IMPROVING

## 2020-03-29 ASSESSMENT — PAIN DESCRIPTION - FREQUENCY
FREQUENCY: CONTINUOUS
FREQUENCY: CONTINUOUS

## 2020-03-29 ASSESSMENT — PAIN SCALES - GENERAL
PAINLEVEL_OUTOF10: 8
PAINLEVEL_OUTOF10: 3
PAINLEVEL_OUTOF10: 8
PAINLEVEL_OUTOF10: 3
PAINLEVEL_OUTOF10: 7
PAINLEVEL_OUTOF10: 9
PAINLEVEL_OUTOF10: 7
PAINLEVEL_OUTOF10: 7
PAINLEVEL_OUTOF10: 8

## 2020-03-29 ASSESSMENT — PAIN DESCRIPTION - LOCATION
LOCATION: FOOT

## 2020-03-29 ASSESSMENT — PAIN - FUNCTIONAL ASSESSMENT
PAIN_FUNCTIONAL_ASSESSMENT: PREVENTS OR INTERFERES SOME ACTIVE ACTIVITIES AND ADLS

## 2020-03-29 ASSESSMENT — PAIN DESCRIPTION - ONSET
ONSET: ON-GOING

## 2020-03-29 ASSESSMENT — PAIN DESCRIPTION - DESCRIPTORS
DESCRIPTORS: ACHING;BURNING

## 2020-03-29 ASSESSMENT — PAIN DESCRIPTION - PAIN TYPE
TYPE: CHRONIC PAIN

## 2020-03-29 ASSESSMENT — PAIN DESCRIPTION - ORIENTATION
ORIENTATION: RIGHT

## 2020-03-29 NOTE — PROGRESS NOTES
Hospitalist Progress Note      Name:  Miguelina Ryan /Age/Sex: 1960  (63 y.o. male)   MRN & CSN:  [id number] & [id number] Admission Date/Time: 3/23/2020  3:03 PM   Location:  [id number] PCP: No primary care provider on file. Hospital Day: 6    Assessment and Plan:   Miguelina Ryan is a 63 y.o.  male  who presents with:    Osteomyelitis right foot  -R 5th toe amputation planned 3/30      PAD s/p PCI RLE 3/26 - appreciate surgery cs  -continue Plavix         DMII with chronic complications and with long term use of insulin. Nonadherent to insulin regimen x6 months last A1C 7.7 (2018)              Monitor FSBS and cover with medium dose SSI              Endocrinology consulted                  CAD s/p CABG ()              Last TTE 2018 EF 55 to 60% G1 DD, suspicious hyper echoic echodensity located in the [facility] vegetation cannot be ruled out\"              Telemetry               Will likely need cardiology pre operative evaluation. Previously evaluated by Dr Jonathan Hanson Hypothyroid- continue synthroid. Last T4 1.12 (2018)      Hx of polysubstance abuse      History of Present Illness:     Having anxiety about the planned amputation    Objective:     No intake or output data in the 24 hours ending 20 2150   Vitals:   Vitals:    20 1930   BP: 126/73   Pulse: 77   Resp: 16   Temp: 98.8 °F (37.1 °C)   SpO2: 97%     Physical Exam:      Physical Exam  Pulmonary:      Effort: Pulmonary effort is normal.   Neurological:      General: No focal deficit present. Mental Status: He is alert.            .    Medications:   Medications:    insulin lispro  0-18 Units Subcutaneous TID WC    insulin lispro  0-18 Units Subcutaneous 2 times per day    cefepime  2 g Intravenous Q8H    clopidogrel  75 mg Oral Daily    vancomycin  1,250 mg Intravenous Q12H    diphenhydrAMINE  50 mg Oral Once    insulin lispro  15 Units Subcutaneous TID WC    insulin glargine  30 Units Subcutaneous

## 2020-03-29 NOTE — PROGRESS NOTES
Hospitalist Progress Note      Name:  Melida Barksdale /Age/Sex: 1960  (63 y.o. male)   MRN & CSN:  [id number] & [id number] Admission Date/Time: 3/23/2020  3:03 PM   Location:  [address] [place] [address]- PCP: No primary care provider on file. Hospital Day: 7    Assessment and Plan:   Melida Barksdale is a 63 y.o.  male  who presents with:    Osteomyelitis right foot  -R 5th toe amputation planned 3/30, stable today, continue IV antibiotics      PAD s/p PCI RLE 3/26 - appreciate surgery cs  -continue Plavix         DMII with chronic complications and with long term use of insulin. Nonadherent to insulin regimen x6 months last A1C 7.7 (2018)              Monitor FSBS and cover with medium dose SSI              Endocrinology consulted    Continue insulin                 CAD s/p CABG ()              Last TTE 2018 EF 55 to 60% G1 DD, suspicious hyper echoic echodensity located in the [facility] vegetation cannot be ruled out\"              Telemetry               Will likely need cardiology pre operative evaluation. Previously evaluated by Dr Damir Galdamez Hypothyroid- continue synthroid. Last T4 1.12 (2018)      Hx of polysubstance abuse      History of Present Illness:     Was asleep both time when I saw him today    Objective: Intake/Output Summary (Last 24 hours) at 3/29/2020 0842  Last data filed at 3/29/2020 0711  Gross per 24 hour   Intake 1443 ml   Output 1050 ml   Net 393 ml      Vitals:   Vitals:    20 0241   BP: (!) 142/66   Pulse: 69   Resp: 20   Temp: 98.7 °F (37.1 °C)   SpO2: 97%     Physical Exam:      Physical Exam  Pulmonary:      Effort: Pulmonary effort is normal.   Neurological:      General: No focal deficit present. Mental Status: He is alert.            .    Medications:   Medications:    insulin lispro  0-18 Units Subcutaneous TID WC    insulin lispro  0-18 Units Subcutaneous 2 times per day    cefepime  2 g Intravenous Q8H    clopidogrel  75 mg Oral Daily    vancomycin

## 2020-03-29 NOTE — PROGRESS NOTES
[address] [facility]  consulted by STEPHANIE Pacheco/Dr. Zohra Mann for monitoring and adjustment. Indication for treatment: Osteomyelitis of the right foot  Goal trough: 15-20 mcg/mL  Other antimicrobials: Cefepime    Ht Readings from Last 1 Encounters:   03/28/20 5' 8\" (1.727 m)     Wt Readings from Last 3 Encounters:   03/29/20 147 lb (66.7 kg)   07/05/19 150 lb (68 kg)   04/30/19 160 lb (72.6 kg)        Pertinent Laboratory Values:   Temp Readings from Last 3 Encounters:   03/29/20 98.7 °F (37.1 °C) (Oral)   07/05/19 97.6 °F (36.4 °C) (Oral)   04/30/19 98.1 °F (36.7 °C) (Oral)     Recent Labs     03/27/20  0532 03/28/20  0343 03/29/20  0306   WBC 8.5 7.6 8.5     Recent Labs     03/27/20  0532 03/28/20  0343 03/29/20  0306   CREATININE 0.7* 0.7* 0.8*     Estimated Creatinine Clearance: 91 mL/min (A) (based on SCr of 0.8 mg/dL (L)). Intake/Output Summary (Last 24 hours) at 3/29/2020 1526  Last data filed at 3/29/2020 0937  Gross per 24 hour   Intake 3843 ml   Output 1450 ml   Net 2393 ml       Pertinent Cultures:  Date Source Results   03/23/2020 Wound CoNS   03/23/2020  Blood NGTD     Previous vancomycin levels:  TROUGH:    Recent Labs     03/27/20  0532   VANCOTROUGH 14.6     RANDOM:  No results for input(s): VANCORANDOM in the last 72 hours. Assessment:  · WBC and temperature: WNL;  Afebrile  · SCr, BUN, and urine output: WNL  · Day(s) of therapy: #7  · Vancomycin level:   · 3/25: 12.1 mcg/mL, subtherapeutic (1000 mg q12h)  · 3/27: 14.6 mcg/mL, therapeutic (1250 mg q12h)    Plan:  · Continue vancomycin 1250 mg IVPB q12h  · Renal trends are stable  · Repeat trough level: 3/30 @ 0430  · Pharmacy will continue to monitor patient and adjust therapy as indicated    Thank you for the consult,    Selwyn Suarez, PharmD, [facility]

## 2020-03-29 NOTE — PROGRESS NOTES
IV access lost, consult to IV therapy made. This RN attempted twice. Vancomycin will need to be retimed once IV access is obtained.

## 2020-03-30 ENCOUNTER — ANESTHESIA (OUTPATIENT)
Dept: OPERATING ROOM | Age: 60
DRG: 271 | End: 2020-03-30
Payer: MEDICARE

## 2020-03-30 LAB
BASOPHILS ABSOLUTE: 0.1 K/CU MM
BASOPHILS RELATIVE PERCENT: 0.9 % (ref 0–1)
CREAT SERPL-MCNC: 0.7 MG/DL (ref 0.9–1.3)
DIFFERENTIAL TYPE: ABNORMAL
DOSE AMOUNT: NORMAL
DOSE TIME: NORMAL
EOSINOPHILS ABSOLUTE: 0.1 K/CU MM
EOSINOPHILS RELATIVE PERCENT: 1.1 % (ref 0–3)
GFR AFRICAN AMERICAN: >60 ML/MIN/1.73M2
GFR NON-AFRICAN AMERICAN: >60 ML/MIN/1.73M2
GLUCOSE BLD-MCNC: 106 MG/DL (ref 70–99)
GLUCOSE BLD-MCNC: 109 MG/DL (ref 70–99)
GLUCOSE BLD-MCNC: 182 MG/DL (ref 70–99)
GLUCOSE BLD-MCNC: 84 MG/DL (ref 70–99)
HCT VFR BLD CALC: 31.9 % (ref 42–52)
HEMOGLOBIN: 10 GM/DL (ref 13.5–18)
IMMATURE NEUTROPHIL %: 1.8 % (ref 0–0.43)
LYMPHOCYTES ABSOLUTE: 1.5 K/CU MM
LYMPHOCYTES RELATIVE PERCENT: 18.5 % (ref 24–44)
MCH RBC QN AUTO: 30.1 PG (ref 27–31)
MCHC RBC AUTO-ENTMCNC: 31.3 % (ref 32–36)
MCV RBC AUTO: 96.1 FL (ref 78–100)
MONOCYTES ABSOLUTE: 0.8 K/CU MM
MONOCYTES RELATIVE PERCENT: 10.3 % (ref 0–4)
NUCLEATED RBC %: 0 %
PDW BLD-RTO: 14.4 % (ref 11.7–14.9)
PLATELET # BLD: 130 K/CU MM (ref 140–440)
PMV BLD AUTO: 10.8 FL (ref 7.5–11.1)
RBC # BLD: 3.32 M/CU MM (ref 4.6–6.2)
SEGMENTED NEUTROPHILS ABSOLUTE COUNT: 5.3 K/CU MM
SEGMENTED NEUTROPHILS RELATIVE PERCENT: 67.4 % (ref 36–66)
TOTAL IMMATURE NEUTOROPHIL: 0.14 K/CU MM
TOTAL NUCLEATED RBC: 0 K/CU MM
VANCOMYCIN TROUGH: 16.9 UG/ML (ref 10–20)
WBC # BLD: 7.9 K/CU MM (ref 4–10.5)

## 2020-03-30 PROCEDURE — 80202 ASSAY OF VANCOMYCIN: CPT

## 2020-03-30 PROCEDURE — 2580000003 HC RX 258: Performed by: INTERNAL MEDICINE

## 2020-03-30 PROCEDURE — 36415 COLL VENOUS BLD VENIPUNCTURE: CPT

## 2020-03-30 PROCEDURE — 6360000002 HC RX W HCPCS: Performed by: INTERNAL MEDICINE

## 2020-03-30 PROCEDURE — 6360000002 HC RX W HCPCS: Performed by: SURGERY

## 2020-03-30 PROCEDURE — 2500000003 HC RX 250 WO HCPCS: Performed by: SURGERY

## 2020-03-30 PROCEDURE — 6370000000 HC RX 637 (ALT 250 FOR IP): Performed by: SURGERY

## 2020-03-30 PROCEDURE — 94761 N-INVAS EAR/PLS OXIMETRY MLT: CPT

## 2020-03-30 PROCEDURE — 85025 COMPLETE CBC W/AUTO DIFF WBC: CPT

## 2020-03-30 PROCEDURE — 82962 GLUCOSE BLOOD TEST: CPT

## 2020-03-30 PROCEDURE — 2140000000 HC CCU INTERMEDIATE R&B

## 2020-03-30 PROCEDURE — 94150 VITAL CAPACITY TEST: CPT

## 2020-03-30 PROCEDURE — 99232 SBSQ HOSP IP/OBS MODERATE 35: CPT | Performed by: SURGERY

## 2020-03-30 PROCEDURE — 2580000003 HC RX 258: Performed by: SURGERY

## 2020-03-30 PROCEDURE — 82565 ASSAY OF CREATININE: CPT

## 2020-03-30 PROCEDURE — 99232 SBSQ HOSP IP/OBS MODERATE 35: CPT | Performed by: NURSE PRACTITIONER

## 2020-03-30 RX ORDER — VANCOMYCIN HYDROCHLORIDE 1 G/200ML
1000 INJECTION, SOLUTION INTRAVENOUS EVERY 12 HOURS
Status: DISCONTINUED | OUTPATIENT
Start: 2020-03-30 | End: 2020-04-01 | Stop reason: HOSPADM

## 2020-03-30 RX ORDER — HEPARIN SODIUM 5000 [USP'U]/ML
5000 INJECTION, SOLUTION INTRAVENOUS; SUBCUTANEOUS EVERY 8 HOURS SCHEDULED
Status: COMPLETED | OUTPATIENT
Start: 2020-03-30 | End: 2020-03-30

## 2020-03-30 RX ADMIN — HYDROCODONE BITARTRATE AND ACETAMINOPHEN 1 TABLET: 5; 325 TABLET ORAL at 14:20

## 2020-03-30 RX ADMIN — GABAPENTIN 600 MG: 300 CAPSULE ORAL at 14:20

## 2020-03-30 RX ADMIN — HYDROCODONE BITARTRATE AND ACETAMINOPHEN 1 TABLET: 5; 325 TABLET ORAL at 21:08

## 2020-03-30 RX ADMIN — CEFEPIME 2 G: 2 INJECTION, POWDER, FOR SOLUTION INTRAVENOUS at 19:48

## 2020-03-30 RX ADMIN — GABAPENTIN 600 MG: 300 CAPSULE ORAL at 21:08

## 2020-03-30 RX ADMIN — MORPHINE SULFATE 2 MG: 2 INJECTION, SOLUTION INTRAMUSCULAR; INTRAVENOUS at 02:40

## 2020-03-30 RX ADMIN — HYDROCODONE BITARTRATE AND ACETAMINOPHEN 1 TABLET: 5; 325 TABLET ORAL at 05:31

## 2020-03-30 RX ADMIN — MORPHINE SULFATE 2 MG: 2 INJECTION, SOLUTION INTRAMUSCULAR; INTRAVENOUS at 11:33

## 2020-03-30 RX ADMIN — MORPHINE SULFATE 2 MG: 2 INJECTION, SOLUTION INTRAMUSCULAR; INTRAVENOUS at 06:40

## 2020-03-30 RX ADMIN — MORPHINE SULFATE 2 MG: 2 INJECTION, SOLUTION INTRAMUSCULAR; INTRAVENOUS at 20:07

## 2020-03-30 RX ADMIN — METRONIDAZOLE 500 MG: 500 INJECTION, SOLUTION INTRAVENOUS at 02:05

## 2020-03-30 RX ADMIN — METRONIDAZOLE 500 MG: 500 INJECTION, SOLUTION INTRAVENOUS at 10:03

## 2020-03-30 RX ADMIN — VANCOMYCIN HYDROCHLORIDE 1000 MG: 1 INJECTION, SOLUTION INTRAVENOUS at 14:28

## 2020-03-30 RX ADMIN — SODIUM CHLORIDE: 9 INJECTION, SOLUTION INTRAVENOUS at 18:07

## 2020-03-30 RX ADMIN — INSULIN GLARGINE 30 UNITS: 100 INJECTION, SOLUTION SUBCUTANEOUS at 21:13

## 2020-03-30 RX ADMIN — METRONIDAZOLE 500 MG: 500 INJECTION, SOLUTION INTRAVENOUS at 17:59

## 2020-03-30 RX ADMIN — GABAPENTIN 600 MG: 300 CAPSULE ORAL at 06:39

## 2020-03-30 RX ADMIN — CEFEPIME 2 G: 2 INJECTION, POWDER, FOR SOLUTION INTRAVENOUS at 02:41

## 2020-03-30 RX ADMIN — HEPARIN SODIUM 5000 UNITS: 5000 INJECTION, SOLUTION INTRAVENOUS; SUBCUTANEOUS at 21:14

## 2020-03-30 RX ADMIN — CEFEPIME 2 G: 2 INJECTION, POWDER, FOR SOLUTION INTRAVENOUS at 11:32

## 2020-03-30 RX ADMIN — MORPHINE SULFATE 2 MG: 2 INJECTION, SOLUTION INTRAMUSCULAR; INTRAVENOUS at 15:35

## 2020-03-30 RX ADMIN — HEPARIN SODIUM 5000 UNITS: 5000 INJECTION, SOLUTION INTRAVENOUS; SUBCUTANEOUS at 06:39

## 2020-03-30 RX ADMIN — LEVOTHYROXINE SODIUM 100 MCG: 100 TABLET ORAL at 06:39

## 2020-03-30 ASSESSMENT — PAIN DESCRIPTION - ORIENTATION
ORIENTATION: RIGHT

## 2020-03-30 ASSESSMENT — PAIN SCALES - GENERAL
PAINLEVEL_OUTOF10: 5
PAINLEVEL_OUTOF10: 3
PAINLEVEL_OUTOF10: 7
PAINLEVEL_OUTOF10: 7
PAINLEVEL_OUTOF10: 10
PAINLEVEL_OUTOF10: 7
PAINLEVEL_OUTOF10: 7
PAINLEVEL_OUTOF10: 8
PAINLEVEL_OUTOF10: 6
PAINLEVEL_OUTOF10: 9
PAINLEVEL_OUTOF10: 10
PAINLEVEL_OUTOF10: 5
PAINLEVEL_OUTOF10: 9
PAINLEVEL_OUTOF10: 8
PAINLEVEL_OUTOF10: 6

## 2020-03-30 ASSESSMENT — PAIN DESCRIPTION - ONSET
ONSET: ON-GOING
ONSET: ON-GOING

## 2020-03-30 ASSESSMENT — PAIN DESCRIPTION - FREQUENCY
FREQUENCY: CONTINUOUS
FREQUENCY: CONTINUOUS

## 2020-03-30 ASSESSMENT — PAIN DESCRIPTION - LOCATION
LOCATION: FOOT

## 2020-03-30 ASSESSMENT — PAIN - FUNCTIONAL ASSESSMENT
PAIN_FUNCTIONAL_ASSESSMENT: PREVENTS OR INTERFERES WITH MANY ACTIVE NOT PASSIVE ACTIVITIES
PAIN_FUNCTIONAL_ASSESSMENT: PREVENTS OR INTERFERES SOME ACTIVE ACTIVITIES AND ADLS

## 2020-03-30 ASSESSMENT — PAIN DESCRIPTION - PAIN TYPE
TYPE: CHRONIC PAIN
TYPE: CHRONIC PAIN
TYPE: ACUTE PAIN

## 2020-03-30 ASSESSMENT — PAIN DESCRIPTION - DESCRIPTORS
DESCRIPTORS: ACHING
DESCRIPTORS: ACHING;BURNING

## 2020-03-30 ASSESSMENT — PAIN DESCRIPTION - PROGRESSION: CLINICAL_PROGRESSION: GRADUALLY WORSENING

## 2020-03-30 NOTE — PLAN OF CARE
Problem: Falls - Risk of:  Goal: Will remain free from falls  Description: Will remain free from falls  3/29/2020 2244 by Shukri Canada RN  Outcome: Ongoing     Problem: Falls - Risk of:  Goal: Absence of physical injury  Description: Absence of physical injury  3/29/2020 2244 by Shukri Canada RN  Outcome: Ongoing     Problem: Nutrition  Goal: Optimal nutrition therapy  3/29/2020 2244 by Shukri Canada RN  Outcome: Ongoing     Problem: Pain:  Goal: Pain level will decrease  Description: Pain level will decrease  3/29/2020 2244 by Shukri Canada RN  Outcome: Ongoing     Problem: Pain:  Goal: Control of acute pain  Description: Control of acute pain  3/29/2020 2244 by Shukri Canada RN  Outcome: Ongoing     Problem: Pain:  Goal: Control of chronic pain  Description: Control of chronic pain  3/29/2020 2244 by Shukri Canada RN  Outcome: Ongoing     Problem: Nutritional:  Goal: Nutritional status will improve  Description: Nutritional status will improve  3/29/2020 2244 by Shukri Canada RN  Outcome: Ongoing     Problem: Physical Regulation:  Goal: Diagnostic test results will improve  Description: Diagnostic test results will improve  3/29/2020 2244 by Shukri Canada RN  Outcome: Ongoing     Problem: Physical Regulation:  Goal: Will remain free from infection  Description: Will remain free from infection  3/29/2020 2244 by Shukri Canada RN  Outcome: Ongoing     Problem: Physical Regulation:  Goal: Ability to maintain vital signs within normal range will improve  Description: Ability to maintain vital signs within normal range will improve  3/29/2020 2244 by Shukri Canada RN  Outcome: Ongoing     Problem: Skin Integrity:  Goal: Demonstration of wound healing without infection will improve  Description: Demonstration of wound healing without infection will improve  3/29/2020 2244 by Shukri Canada RN  Outcome: Ongoing     Problem: Skin Integrity:  Goal: Complications related to intravenous access or infusion will be avoided or minimized  Description: Complications related to intravenous access or infusion will be avoided or minimized  3/29/2020 2244 by Hui Ruggiero RN  Outcome: Ongoing     Problem: Skin Integrity:  Goal: Will show no infection signs and symptoms  Description: Will show no infection signs and symptoms  Outcome: Ongoing

## 2020-03-30 NOTE — PROGRESS NOTES
[address] [facility]  consulted by STEPHANIE Pacheco/Dr. Tipton Speaker for monitoring and adjustment. Indication for treatment: Osteomyelitis of the right foot  Goal trough: 15-20 mcg/mL  Other antimicrobials: Cefepime    Ht Readings from Last 1 Encounters:   03/30/20 5' 8\" (1.727 m)     Wt Readings from Last 3 Encounters:   03/30/20 147 lb 14.4 oz (67.1 kg)   07/05/19 150 lb (68 kg)   04/30/19 160 lb (72.6 kg)        Pertinent Laboratory Values:   Temp Readings from Last 3 Encounters:   03/30/20 98 °F (36.7 °C) (Oral)   07/05/19 97.6 °F (36.4 °C) (Oral)   04/30/19 98.1 °F (36.7 °C) (Oral)     Recent Labs     03/28/20  0343 03/29/20  0306 03/30/20  0349   WBC 7.6 8.5 7.9     Recent Labs     03/28/20  0343 03/29/20  0306 03/30/20  0349   CREATININE 0.7* 0.8* 0.7*     Estimated Creatinine Clearance: 105 mL/min (A) (based on SCr of 0.7 mg/dL (L)). Intake/Output Summary (Last 24 hours) at 3/30/2020 1250  Last data filed at 3/30/2020 0240  Gross per 24 hour   Intake 931.25 ml   Output 650 ml   Net 281.25 ml       Pertinent Cultures:  Date Source Results   03/23/2020 Wound CoNS   03/23/2020  Blood NGTD     Previous vancomycin levels:  TROUGH:    Recent Labs     03/30/20  1120   VANCOTROUGH 16.9     RANDOM:  No results for input(s): VANCORANDOM in the last 72 hours. Assessment:  · WBC and temperature: WBC WNL; Afebrile  · SCr, BUN, and urine output: SCR remains normal, output borderline  · Day(s) of therapy: #8  · Vancomycin level:   · 3/25: 12.1 mcg/mL, subtherapeutic (1000 mg q12h)  · 3/27: 14.6 mcg/mL, therapeutic (1250 mg q12h)  · 3/30: 16.9 mcg/ml, 13 hour level, therapeutic (1,250mg q12h)    Plan:  · Vancomycin level therapeutic @16.9, however, vanco appears to be accumulating. Renal trends remain stable.   · Decrease vancomycin to 1gm ivpb q12h  · Repeat trough level: 4/1 @01:30  · Pharmacy will continue to monitor patient and adjust therapy as indicated    Thank you for the consult,    Mark Chin

## 2020-03-30 NOTE — PROGRESS NOTES
identified throughout the right lower extremity. Right leg arteriogram done on 3/26/2020   Lesion on Superficial Femoral, Right: 100% stenosis. Peripheral Vascular Intervention:Peripheral PTA, Peripheral   Atherectomy: Fem/Pop  Findings: Severe CFA stenosis noted with good result post PTA      Additional Procedure: Atherectomy of CFA and prox SFA and PTA of CFA and SFA      Scheduled Medicines   Medications:    insulin lispro  0-18 Units Subcutaneous TID WC    insulin lispro  0-18 Units Subcutaneous 2 times per day    cefepime  2 g Intravenous Q8H    clopidogrel  75 mg Oral Daily    vancomycin  1,250 mg Intravenous Q12H    diphenhydrAMINE  50 mg Oral Once    insulin lispro  15 Units Subcutaneous TID WC    insulin glargine  30 Units Subcutaneous Nightly    metroNIDAZOLE  500 mg Intravenous Q8H    sodium chloride flush  10 mL Intravenous 2 times per day    heparin (porcine)  5,000 Units Subcutaneous 3 times per day    nicotine  1 patch Transdermal Daily    gabapentin  600 mg Oral TID    levothyroxine  100 mcg Oral Daily      Infusions:    sodium chloride 75 mL/hr at 03/29/20 2331         Objective:   Vitals: /66   Pulse 71   Temp 98.4 °F (36.9 °C) (Oral)   Resp 14   Ht 5' 8\" (1.727 m)   Wt 147 lb 14.4 oz (67.1 kg)   SpO2 97%   BMI 22.49 kg/m²   General appearance: alert and cooperative with exam  Neck: no JVD or bruit  Thyroid : Normal lobes   Lungs: Has Vesicular Breath sounds   Heart:  regular rate and rhythm  Abdomen: soft, non-tender; bowel sounds normal; no masses,  no organomegaly  Musculoskeletal: Normal  Extremities: extremities normal, , no edema//   Right  foot ulcerations possibly gangrene is areas   Has peripheral vascular disease bilaterally  lower extremities  Neurologic:  Awake, alert, oriented to name, place and time. Cranial nerves II-XII are grossly intact. Motor is  intact.   Sensory neuropathy t.,  and gait is abnormal.  And unstable    Assessment:     Patient Active Problem List:   Diabetes mellitus    S/P CABG (coronary artery bypass graft)    Cellulitis    CAD (coronary artery disease)    Polysubstance abuse (HCC)    Narcotic abuse, continuous (HCC)    Hx of hepatitis    Epigastric pain    Bilateral leg weakness    Gait disturbance    Left carotid stenosis    Hypothyroidism    Osteomyelitis (HCC) Right foot          Peripheral vascular disease        Plan:     1. Reviewed POC blood glucose . Labs and X ray results   2. Reviewed Current Medicines   3. On meal/ Correction bolus Humalog/ Basal Lantus Insulin regime   4. Monitor Blood glucose frequently   5. Modified  the dose of Insulin/ other medicines as needed   6. Right leg arteriogram done vascular disease and the peripheral vascular disease was corrected see the note above  7. Seen by general surgeon and possibly amputation of the foot  tomorrow or Monday  8. Will follow     .      Shavonne Flores MD

## 2020-03-30 NOTE — PROGRESS NOTES
GENERAL SURGERY PROGRESS NOTE    CC/HPI:           Patient feels better but still hurting right foot. Vitals:    03/29/20 1735 03/29/20 2147 03/30/20 0240 03/30/20 1018   BP: 131/64 119/60 127/66 123/66   Pulse: 69 80 71 63   Resp: 15 19 14 14   Temp: 98.8 °F (37.1 °C) 98.6 °F (37 °C) 98.4 °F (36.9 °C)    TempSrc: Oral Oral Oral    SpO2:  98% 97% 98%   Weight:   147 lb 14.4 oz (67.1 kg)    Height:   5' 8\" (1.727 m)      I/O last 3 completed shifts: In: 4231.3 [P.O.:2400; I.V.:1831.3]  Out: 1050 [Urine:1050]  No intake/output data recorded.     Diet NPO Effective Now Exceptions are: Sips with Meds    Recent Results (from the past 48 hour(s))   POCT Glucose    Collection Time: 03/28/20 11:23 AM   Result Value Ref Range    POC Glucose 200 (H) 70 - 99 MG/DL   POCT Glucose    Collection Time: 03/28/20  4:31 PM   Result Value Ref Range    POC Glucose 117 (H) 70 - 99 MG/DL   POCT Glucose    Collection Time: 03/28/20 10:48 PM   Result Value Ref Range    POC Glucose 229 (H) 70 - 99 MG/DL   POCT Glucose    Collection Time: 03/29/20  2:53 AM   Result Value Ref Range    POC Glucose 76 70 - 99 MG/DL   CBC auto differential    Collection Time: 03/29/20  3:06 AM   Result Value Ref Range    WBC 8.5 4.0 - 10.5 K/CU MM    RBC 3.63 (L) 4.6 - 6.2 M/CU MM    Hemoglobin 10.8 (L) 13.5 - 18.0 GM/DL    Hematocrit 33.2 (L) 42 - 52 %    MCV 91.5 78 - 100 FL    MCH 29.8 27 - 31 PG    MCHC 32.5 32.0 - 36.0 %    RDW 14.2 11.7 - 14.9 %    Platelets 529 528 - 121 K/CU MM    MPV 10.5 7.5 - 11.1 FL    Differential Type AUTOMATED DIFFERENTIAL     Segs Relative 68.1 (H) 36 - 66 %    Lymphocytes % 18.5 (L) 24 - 44 %    Monocytes % 10.3 (H) 0 - 4 %    Eosinophils % 1.2 0 - 3 %    Basophils % 0.8 0 - 1 %    Segs Absolute 5.8 K/CU MM    Lymphocytes Absolute 1.6 K/CU MM    Monocytes Absolute 0.9 K/CU MM    Eosinophils Absolute 0.1 K/CU MM    Basophils Absolute 0.1 K/CU MM    Nucleated RBC % 0.0 %    Total Nucleated RBC 0.0 K/CU MM    Total Immature Neutrophil 0.09 K/CU MM    Immature Neutrophil % 1.1 (H) 0 - 0.43 %   Creatinine, Serum    Collection Time: 03/29/20  3:06 AM   Result Value Ref Range    CREATININE 0.8 (L) 0.9 - 1.3 MG/DL    GFR Non-African American >60 >60 mL/min/1.73m2    GFR African American >60 >60 mL/min/1.73m2   POCT Glucose    Collection Time: 03/29/20  7:39 AM   Result Value Ref Range    POC Glucose 183 (H) 70 - 99 MG/DL   POCT Glucose    Collection Time: 03/29/20 11:25 AM   Result Value Ref Range    POC Glucose 82 70 - 99 MG/DL   POCT Glucose    Collection Time: 03/29/20  4:36 PM   Result Value Ref Range    POC Glucose 257 (H) 70 - 99 MG/DL   POCT Glucose    Collection Time: 03/29/20  9:57 PM   Result Value Ref Range    POC Glucose 266 (H) 70 - 99 MG/DL   POCT Glucose    Collection Time: 03/30/20  2:39 AM   Result Value Ref Range    POC Glucose 109 (H) 70 - 99 MG/DL   CBC auto differential    Collection Time: 03/30/20  3:49 AM   Result Value Ref Range    WBC 7.9 4.0 - 10.5 K/CU MM    RBC 3.32 (L) 4.6 - 6.2 M/CU MM    Hemoglobin 10.0 (L) 13.5 - 18.0 GM/DL    Hematocrit 31.9 (L) 42 - 52 %    MCV 96.1 78 - 100 FL    MCH 30.1 27 - 31 PG    MCHC 31.3 (L) 32.0 - 36.0 %    RDW 14.4 11.7 - 14.9 %    Platelets 528 (L) 705 - 440 K/CU MM    MPV 10.8 7.5 - 11.1 FL    Differential Type AUTOMATED DIFFERENTIAL     Segs Relative 67.4 (H) 36 - 66 %    Lymphocytes % 18.5 (L) 24 - 44 %    Monocytes % 10.3 (H) 0 - 4 %    Eosinophils % 1.1 0 - 3 %    Basophils % 0.9 0 - 1 %    Segs Absolute 5.3 K/CU MM    Lymphocytes Absolute 1.5 K/CU MM    Monocytes Absolute 0.8 K/CU MM    Eosinophils Absolute 0.1 K/CU MM    Basophils Absolute 0.1 K/CU MM    Nucleated RBC % 0.0 %    Total Nucleated RBC 0.0 K/CU MM    Total Immature Neutrophil 0.14 K/CU MM    Immature Neutrophil % 1.8 (H) 0 - 0.43 %   Creatinine, Serum    Collection Time: 03/30/20  3:49 AM   Result Value Ref Range    CREATININE 0.7 (L) 0.9 - 1.3 MG/DL    GFR Non-African American >60 >60 mL/min/1.73m2 GFR African American >60 >60 mL/min/1.73m2   POCT Glucose    Collection Time: 03/30/20  6:47 AM   Result Value Ref Range    POC Glucose 84 70 - 99 MG/DL   POCT Glucose    Collection Time: 03/30/20 10:24 AM   Result Value Ref Range    POC Glucose 106 (H) 70 - 99 MG/DL       Scheduled Meds:   insulin lispro  0-18 Units Subcutaneous TID WC    insulin lispro  0-18 Units Subcutaneous 2 times per day    cefepime  2 g Intravenous Q8H    clopidogrel  75 mg Oral Daily    vancomycin  1,250 mg Intravenous Q12H    diphenhydrAMINE  50 mg Oral Once    insulin lispro  15 Units Subcutaneous TID WC    insulin glargine  30 Units Subcutaneous Nightly    metroNIDAZOLE  500 mg Intravenous Q8H    sodium chloride flush  10 mL Intravenous 2 times per day    heparin (porcine)  5,000 Units Subcutaneous 3 times per day    nicotine  1 patch Transdermal Daily    gabapentin  600 mg Oral TID    levothyroxine  100 mcg Oral Daily       Continuous Infusions:   sodium chloride 75 mL/hr at 03/29/20 2331       Physical Exam:  HEENT: Anicteric sclerae, Oropharyngeal mucosae moist, pink and intact. Heart:  Normal S1 and S2, RRR  Lungs: Clear to auscultation bilaterally, No audible Wheezes or Rales. Extremities: No edema. The right lateral foot is ischemic, with right 5th toe gangrene. Neuro: Alert and Oriented x 3, Non focal.  Abdomen: Soft, Benign, Non tender, Non distended, Positive bowel sounds. Active Problems:    Osteomyelitis (HCC)    Diabetic osteomyelitis ([address]  Resolved Problems:    * No resolved hospital problems. *      Assessment and Plan:  Elisabeth Patel is a 63 y.o. male who is POD # 4 status post:  Lower extremity angiogram, and addressed the right femoral artery, in the cath lab, will follow and proceed with the needed amputation tomorrow after strict NPO after midnight, continue to HOLD Plavix and ASA. Increase Ambulation to at least 4x/day walk in the hallways with assistance.   Respiratory vicki-operative care:

## 2020-03-30 NOTE — PROGRESS NOTES
Infectious Disease Progress Note  3/30/2020   Patient Name: Christoph Zuñiga : 1960   Impression  · Right Foot DFI:  § Afebrile >48h, no leukocytosis  § Blood Cultures 3/23-0-NGTD  § MRSA screen Negative  §   § CRP 3/27-36.6, 3/28-25.9  § Right Foot wound culture 3/24-CoNS and CoNS Variant Strain  § General surgery onboard, Dr. Jenny Chin, plan to proceed with amputation 3/31, postponed this am due to patient was not NPO.    · DMII:  Endocrinology onboard, Dr. Sabrina Gonzalez, [facility] 12.2 on 3/23/20, uncontrolled  ? PAD:  CTS onboard, Dr. Iris Gilmore, underwent angiogram/athrectomy 3/26-  · Multi-morbidity: per PMHx: DMII, CAD, COPD, DDD, IVDU, HTN, CABG, hypothyroid  Plan:  · Continue vancomycin, cefepime and Flagyl until surgery, then will reassess ABX therapy  · Appreciate following general surgery,  Dr. Jenny Chin for amputation tentative  3/31, please send bone for pathology and histology    Ongoing Antimicrobial Therapy  Vancomycin 3/25-  Cefepime 3/25-  Flagyl 3/24-? Completed Antimicrobial Therapy  ? History:? Interval history noted. Right 5th metatarsal DFO. Denies n/v/d/f or untoward effects of antibiotics. States is just waiting for surgery today, states he went to the OR and was brought back to room due to he had juice this am.  [facility] is tolerating ABX without any problems. Physical Exam:  Vital Signs: /67   Pulse 68   Temp 98 °F (36.7 °C) (Oral)   Resp 14   Ht 5' 8\" (1.727 m)   Wt 147 lb 14.4 oz (67.1 kg)   SpO2 97%   BMI 22.49 kg/m²   Gen: alert and oriented X3, no distress  Skin: no stigmata of endocarditis  Wounds: Right foot ulceration, lateral foot blackened area  HEMT: AT/NC Oropharynx pink, moist, and without lesions or exudates; edentulous  Eyes: PERRLA, EOMI, conjunctiva pink, scler/-a anicteric. Neck: Supple. Trachea midline. No LAD. Chest: no distress and CTA. Good air movement. Heart: RRR and no MRG. Abd: soft, non-distended, no tenderness, no hepatomegaly. Normoactive bowel sounds. Ext: no clubbing, cyanosis, or edema, see wound above  Neuro: Mental status intact. CN 2-12 intact and no focal sensory or motor deficits         Radiologic / Imaging / TESTING  3/23/20 XR Foot Right:  Impression   Soft tissue ulceration adjacent to the 5th MTP joint with soft tissue air or   gas.  Suspected osteomyelitis of the 5th toe proximally and 5th metatarsal   head.          3/25/20 VL Dup Lower Extremity:  Impression   1. The bilateral proximal peroneal arteries are not visualized. 2. Biphasic and monophasic waveforms are identified throughout the right   lower extremity.          3/25/20 MRI Foot Right W WO Contrast:  Impression   1. Osteomyelitis of the 5th metatarsal head.  Mild marrow edema extending   into the shaft without decreased T1 signal compatible with early   osteomyelitis versus noninfectious reactive osteitis. 2. Deep soft tissue ulceration lateral to the 5th MTP joint with an irregular   underlying fluid signal collection measuring approximately 4.9 x 2.5 x 3.1 cm   compatible with an abscess versus phlegmon.  Associated soft tissue gas.           Labs:    Recent Results (from the past 24 hour(s))   POCT Glucose    Collection Time: 03/29/20  4:36 PM   Result Value Ref Range    POC Glucose 257 (H) 70 - 99 MG/DL   POCT Glucose    Collection Time: 03/29/20  9:57 PM   Result Value Ref Range    POC Glucose 266 (H) 70 - 99 MG/DL   POCT Glucose    Collection Time: 03/30/20  2:39 AM   Result Value Ref Range    POC Glucose 109 (H) 70 - 99 MG/DL   CBC auto differential    Collection Time: 03/30/20  3:49 AM   Result Value Ref Range    WBC 7.9 4.0 - 10.5 K/CU MM    RBC 3.32 (L) 4.6 - 6.2 M/CU MM    Hemoglobin 10.0 (L) 13.5 - 18.0 GM/DL    Hematocrit 31.9 (L) 42 - 52 %    MCV 96.1 78 - 100 FL    MCH 30.1 27 - 31 PG    MCHC 31.3 (L) 32.0 - 36.0 %    RDW 14.4 11.7 - 14.9 %    Platelets 134 (L) 429 - 440 K/CU MM    MPV 10.8 7.5 - 11.1 FL    Differential Type AUTOMATED APRN - CNP on 3/30/2020 at 11:46 AM

## 2020-03-30 NOTE — PROGRESS NOTES
Progress Note( Dr. Dirk Roque)  3/29/2020  Subjective:   Admit Date: 3/23/2020  PCP: No primary care provider on file. Admitted For :Right foot infection possibly gangrene    Consulted For: Better control of blood glucose    Interval History: No major change except back right leg arteriogram done  See the results below      Denies any chest pains,   Denies SOB . Denies nausea or vomiting. No new bowel or bladder symptoms. Intake/Output Summary (Last 24 hours) at 3/29/2020 2037  Last data filed at 3/29/2020 1936  Gross per 24 hour   Intake 4774.25 ml   Output 1450 ml   Net 3324.25 ml       DATA    CBC:   Recent Labs     03/27/20  0532 03/28/20  0343 03/29/20  0306   WBC 8.5 7.6 8.5   HGB 10.3* 9.6* 10.8*   * 127* 148    CMP:  Recent Labs     03/27/20  0532 03/28/20  0343 03/29/20  0306   CREATININE 0.7* 0.7* 0.8*     Lipids:   Lab Results   Component Value Date    CHOL 121 08/29/2018    HDL 67 08/29/2018    TRIG 65 08/29/2018     Glucose:  Recent Labs     03/29/20  0739 03/29/20  1125 03/29/20  1636   POCGLU 183* 82 257*     [id number]:  Lab Results   Component Value Date    LABA1C 12.2 03/23/2020     High Sensitivity TSH:   Lab Results   Component Value Date    TSHHS 3.990 03/23/2020     Free T3: No results found for: FT3  Free T4:  Lab Results   Component Value Date    T4FREE 0.95 03/25/2020       Xr Foot Right (min 3 Views)    Result Date: 3/24/2020  EXAMINATION: THREE XRAY VIEWS OF THE RIGHT FOOT 3/23/2020 3:32 pm     Soft tissue ulceration adjacent to the 5th MTP joint with soft tissue air or gas. Suspected osteomyelitis of the 5th toe proximally and 5th metatarsal head. Vl Dup Lower Extremity Arteries Bilateral    Result Date: 3/25/2020  EXAMINATION: ARTERIAL DUPLEX ULTRASOUND OF THE BILATERAL LOWER EXTREMITIES  3/25/2020 5:55 am       1. The bilateral proximal peroneal arteries are not visualized.  2. Biphasic and monophasic waveforms are identified throughout the right lower

## 2020-03-30 NOTE — PLAN OF CARE
Problem: Falls - Risk of:  Goal: Will remain free from falls  Description: Will remain free from falls  Outcome: Ongoing  Goal: Absence of physical injury  Description: Absence of physical injury  Outcome: Ongoing     Problem: Nutrition  Goal: Optimal nutrition therapy  Outcome: Ongoing     Problem: Pain:  Goal: Pain level will decrease  Description: Pain level will decrease  Outcome: Ongoing  Goal: Control of acute pain  Description: Control of acute pain  Outcome: Ongoing  Goal: Control of chronic pain  Description: Control of chronic pain  Outcome: Ongoing     Problem: Nutritional:  Goal: Nutritional status will improve  Description: Nutritional status will improve  Outcome: Ongoing     Problem: Physical Regulation:  Goal: Diagnostic test results will improve  Description: Diagnostic test results will improve  Outcome: Ongoing  Goal: Will remain free from infection  Description: Will remain free from infection  Outcome: Ongoing  Goal: Ability to maintain vital signs within normal range will improve  Description: Ability to maintain vital signs within normal range will improve  Outcome: Ongoing     Problem: Skin Integrity:  Goal: Demonstration of wound healing without infection will improve  Description: Demonstration of wound healing without infection will improve  Outcome: Ongoing  Goal: Complications related to intravenous access or infusion will be avoided or minimized  Description: Complications related to intravenous access or infusion will be avoided or minimized  Outcome: Ongoing  Goal: Will show no infection signs and symptoms  Description: Will show no infection signs and symptoms  Outcome: Ongoing

## 2020-03-31 VITALS
RESPIRATION RATE: 1 BRPM | OXYGEN SATURATION: 100 % | SYSTOLIC BLOOD PRESSURE: 94 MMHG | DIASTOLIC BLOOD PRESSURE: 58 MMHG

## 2020-03-31 LAB
AMPHETAMINES: NEGATIVE
BARBITURATE SCREEN URINE: NEGATIVE
BASOPHILS ABSOLUTE: 0.1 K/CU MM
BASOPHILS RELATIVE PERCENT: 0.8 % (ref 0–1)
BENZODIAZEPINE SCREEN, URINE: NEGATIVE
CANNABINOID SCREEN URINE: NEGATIVE
COCAINE METABOLITE: NEGATIVE
CREAT SERPL-MCNC: 0.9 MG/DL (ref 0.9–1.3)
DIFFERENTIAL TYPE: ABNORMAL
EOSINOPHILS ABSOLUTE: 0.1 K/CU MM
EOSINOPHILS RELATIVE PERCENT: 1.2 % (ref 0–3)
GFR AFRICAN AMERICAN: >60 ML/MIN/1.73M2
GFR NON-AFRICAN AMERICAN: >60 ML/MIN/1.73M2
GLUCOSE BLD-MCNC: 151 MG/DL (ref 70–99)
GLUCOSE BLD-MCNC: 182 MG/DL (ref 70–99)
GLUCOSE BLD-MCNC: 183 MG/DL (ref 70–99)
GLUCOSE BLD-MCNC: 83 MG/DL (ref 70–99)
GLUCOSE BLD-MCNC: 83 MG/DL (ref 70–99)
GLUCOSE BLD-MCNC: 98 MG/DL (ref 70–99)
HCT VFR BLD CALC: 31.1 % (ref 42–52)
HEMOGLOBIN: 9.9 GM/DL (ref 13.5–18)
IMMATURE NEUTROPHIL %: 1.4 % (ref 0–0.43)
LYMPHOCYTES ABSOLUTE: 1.3 K/CU MM
LYMPHOCYTES RELATIVE PERCENT: 14.9 % (ref 24–44)
MCH RBC QN AUTO: 29.9 PG (ref 27–31)
MCHC RBC AUTO-ENTMCNC: 31.8 % (ref 32–36)
MCV RBC AUTO: 94 FL (ref 78–100)
MONOCYTES ABSOLUTE: 0.8 K/CU MM
MONOCYTES RELATIVE PERCENT: 9.4 % (ref 0–4)
NUCLEATED RBC %: 0.2 %
OPIATES, URINE: ABNORMAL
OXYCODONE: ABNORMAL
PDW BLD-RTO: 14.6 % (ref 11.7–14.9)
PHENCYCLIDINE, URINE: NEGATIVE
PLATELET # BLD: 144 K/CU MM (ref 140–440)
PMV BLD AUTO: 10.6 FL (ref 7.5–11.1)
RBC # BLD: 3.31 M/CU MM (ref 4.6–6.2)
SEGMENTED NEUTROPHILS ABSOLUTE COUNT: 6.1 K/CU MM
SEGMENTED NEUTROPHILS RELATIVE PERCENT: 72.3 % (ref 36–66)
TOTAL IMMATURE NEUTOROPHIL: 0.12 K/CU MM
TOTAL NUCLEATED RBC: 0 K/CU MM
WBC # BLD: 8.4 K/CU MM (ref 4–10.5)

## 2020-03-31 PROCEDURE — 6360000002 HC RX W HCPCS: Performed by: INTERNAL MEDICINE

## 2020-03-31 PROCEDURE — 2580000003 HC RX 258: Performed by: SURGERY

## 2020-03-31 PROCEDURE — 6360000002 HC RX W HCPCS: Performed by: SURGERY

## 2020-03-31 PROCEDURE — 2500000003 HC RX 250 WO HCPCS: Performed by: SURGERY

## 2020-03-31 PROCEDURE — 0Y6X0Z0 DETACHMENT AT RIGHT 5TH TOE, COMPLETE, OPEN APPROACH: ICD-10-PCS | Performed by: SURGERY

## 2020-03-31 PROCEDURE — 3600000012 HC SURGERY LEVEL 2 ADDTL 15MIN: Performed by: SURGERY

## 2020-03-31 PROCEDURE — 36415 COLL VENOUS BLD VENIPUNCTURE: CPT

## 2020-03-31 PROCEDURE — 28805 AMPUTATION THRU METATARSAL: CPT | Performed by: SURGERY

## 2020-03-31 PROCEDURE — 6370000000 HC RX 637 (ALT 250 FOR IP): Performed by: SURGERY

## 2020-03-31 PROCEDURE — 94761 N-INVAS EAR/PLS OXIMETRY MLT: CPT

## 2020-03-31 PROCEDURE — 2709999900 HC NON-CHARGEABLE SUPPLY: Performed by: SURGERY

## 2020-03-31 PROCEDURE — 3700000001 HC ADD 15 MINUTES (ANESTHESIA): Performed by: SURGERY

## 2020-03-31 PROCEDURE — 87522 HEPATITIS C REVRS TRNSCRPJ: CPT

## 2020-03-31 PROCEDURE — 7100000000 HC PACU RECOVERY - FIRST 15 MIN: Performed by: SURGERY

## 2020-03-31 PROCEDURE — 2580000003 HC RX 258: Performed by: INTERNAL MEDICINE

## 2020-03-31 PROCEDURE — 3700000000 HC ANESTHESIA ATTENDED CARE: Performed by: SURGERY

## 2020-03-31 PROCEDURE — 88311 DECALCIFY TISSUE: CPT

## 2020-03-31 PROCEDURE — 82565 ASSAY OF CREATININE: CPT

## 2020-03-31 PROCEDURE — 99232 SBSQ HOSP IP/OBS MODERATE 35: CPT | Performed by: SURGERY

## 2020-03-31 PROCEDURE — 88305 TISSUE EXAM BY PATHOLOGIST: CPT

## 2020-03-31 PROCEDURE — 80307 DRUG TEST PRSMV CHEM ANLYZR: CPT

## 2020-03-31 PROCEDURE — 85025 COMPLETE CBC W/AUTO DIFF WBC: CPT

## 2020-03-31 PROCEDURE — 82962 GLUCOSE BLOOD TEST: CPT

## 2020-03-31 PROCEDURE — 6360000002 HC RX W HCPCS: Performed by: ANESTHESIOLOGY

## 2020-03-31 PROCEDURE — 6360000002 HC RX W HCPCS: Performed by: NURSE ANESTHETIST, CERTIFIED REGISTERED

## 2020-03-31 PROCEDURE — 3600000002 HC SURGERY LEVEL 2 BASE: Performed by: SURGERY

## 2020-03-31 PROCEDURE — 99232 SBSQ HOSP IP/OBS MODERATE 35: CPT | Performed by: NURSE PRACTITIONER

## 2020-03-31 PROCEDURE — 2500000003 HC RX 250 WO HCPCS: Performed by: NURSE ANESTHETIST, CERTIFIED REGISTERED

## 2020-03-31 PROCEDURE — 11055 PARING/CUTG B9 HYPRKER LES 1: CPT | Performed by: SURGERY

## 2020-03-31 PROCEDURE — 2140000000 HC CCU INTERMEDIATE R&B

## 2020-03-31 PROCEDURE — 7100000001 HC PACU RECOVERY - ADDTL 15 MIN: Performed by: SURGERY

## 2020-03-31 RX ORDER — HYDRALAZINE HYDROCHLORIDE 20 MG/ML
5 INJECTION INTRAMUSCULAR; INTRAVENOUS EVERY 10 MIN PRN
Status: DISCONTINUED | OUTPATIENT
Start: 2020-03-31 | End: 2020-03-31 | Stop reason: HOSPADM

## 2020-03-31 RX ORDER — KETAMINE HYDROCHLORIDE 10 MG/ML
INJECTION, SOLUTION INTRAMUSCULAR; INTRAVENOUS PRN
Status: DISCONTINUED | OUTPATIENT
Start: 2020-03-31 | End: 2020-03-31 | Stop reason: SDUPTHER

## 2020-03-31 RX ORDER — HYDROCODONE BITARTRATE AND ACETAMINOPHEN 5; 325 MG/1; MG/1
2 TABLET ORAL EVERY 4 HOURS PRN
Status: DISCONTINUED | OUTPATIENT
Start: 2020-03-31 | End: 2020-04-01 | Stop reason: HOSPADM

## 2020-03-31 RX ORDER — ONDANSETRON 2 MG/ML
4 INJECTION INTRAMUSCULAR; INTRAVENOUS
Status: DISCONTINUED | OUTPATIENT
Start: 2020-03-31 | End: 2020-03-31 | Stop reason: HOSPADM

## 2020-03-31 RX ORDER — ATORVASTATIN CALCIUM 40 MG/1
40 TABLET, FILM COATED ORAL NIGHTLY
Status: DISCONTINUED | OUTPATIENT
Start: 2020-03-31 | End: 2020-04-01 | Stop reason: HOSPADM

## 2020-03-31 RX ORDER — ONDANSETRON 2 MG/ML
INJECTION INTRAMUSCULAR; INTRAVENOUS PRN
Status: DISCONTINUED | OUTPATIENT
Start: 2020-03-31 | End: 2020-03-31 | Stop reason: SDUPTHER

## 2020-03-31 RX ORDER — LIDOCAINE HYDROCHLORIDE 20 MG/ML
INJECTION, SOLUTION INFILTRATION; PERINEURAL PRN
Status: DISCONTINUED | OUTPATIENT
Start: 2020-03-31 | End: 2020-03-31 | Stop reason: SDUPTHER

## 2020-03-31 RX ORDER — PROPOFOL 10 MG/ML
INJECTION, EMULSION INTRAVENOUS PRN
Status: DISCONTINUED | OUTPATIENT
Start: 2020-03-31 | End: 2020-03-31 | Stop reason: SDUPTHER

## 2020-03-31 RX ORDER — FENTANYL CITRATE 50 UG/ML
25 INJECTION, SOLUTION INTRAMUSCULAR; INTRAVENOUS EVERY 5 MIN PRN
Status: DISCONTINUED | OUTPATIENT
Start: 2020-03-31 | End: 2020-03-31 | Stop reason: HOSPADM

## 2020-03-31 RX ORDER — FENTANYL CITRATE 50 UG/ML
INJECTION, SOLUTION INTRAMUSCULAR; INTRAVENOUS PRN
Status: DISCONTINUED | OUTPATIENT
Start: 2020-03-31 | End: 2020-03-31 | Stop reason: SDUPTHER

## 2020-03-31 RX ORDER — ROCURONIUM BROMIDE 10 MG/ML
INJECTION, SOLUTION INTRAVENOUS PRN
Status: DISCONTINUED | OUTPATIENT
Start: 2020-03-31 | End: 2020-03-31 | Stop reason: SDUPTHER

## 2020-03-31 RX ORDER — DEXAMETHASONE SODIUM PHOSPHATE 4 MG/ML
INJECTION, SOLUTION INTRA-ARTICULAR; INTRALESIONAL; INTRAMUSCULAR; INTRAVENOUS; SOFT TISSUE PRN
Status: DISCONTINUED | OUTPATIENT
Start: 2020-03-31 | End: 2020-03-31 | Stop reason: SDUPTHER

## 2020-03-31 RX ADMIN — METRONIDAZOLE 500 MG: 500 INJECTION, SOLUTION INTRAVENOUS at 09:24

## 2020-03-31 RX ADMIN — PHENYLEPHRINE HYDROCHLORIDE 100 MCG: 10 INJECTION INTRAVENOUS at 16:23

## 2020-03-31 RX ADMIN — FENTANYL CITRATE 25 MCG: 50 INJECTION, SOLUTION INTRAMUSCULAR; INTRAVENOUS at 17:25

## 2020-03-31 RX ADMIN — FENTANYL CITRATE 25 MCG: 50 INJECTION, SOLUTION INTRAMUSCULAR; INTRAVENOUS at 17:32

## 2020-03-31 RX ADMIN — VANCOMYCIN HYDROCHLORIDE 1000 MG: 1 INJECTION, SOLUTION INTRAVENOUS at 15:10

## 2020-03-31 RX ADMIN — CEFEPIME 2 G: 2 INJECTION, POWDER, FOR SOLUTION INTRAVENOUS at 02:45

## 2020-03-31 RX ADMIN — KETAMINE HYDROCHLORIDE 40 MG: 10 INJECTION INTRAMUSCULAR; INTRAVENOUS at 16:17

## 2020-03-31 RX ADMIN — PROPOFOL 160 MG: 10 INJECTION, EMULSION INTRAVENOUS at 16:17

## 2020-03-31 RX ADMIN — ATORVASTATIN CALCIUM 40 MG: 40 TABLET, FILM COATED ORAL at 20:09

## 2020-03-31 RX ADMIN — LIDOCAINE HYDROCHLORIDE 100 MG: 20 INJECTION, SOLUTION INFILTRATION; PERINEURAL at 16:17

## 2020-03-31 RX ADMIN — GABAPENTIN 600 MG: 300 CAPSULE ORAL at 06:02

## 2020-03-31 RX ADMIN — ONDANSETRON 4 MG: 2 INJECTION INTRAMUSCULAR; INTRAVENOUS at 16:17

## 2020-03-31 RX ADMIN — METRONIDAZOLE 500 MG: 500 INJECTION, SOLUTION INTRAVENOUS at 19:25

## 2020-03-31 RX ADMIN — SODIUM CHLORIDE: 9 INJECTION, SOLUTION INTRAVENOUS at 14:07

## 2020-03-31 RX ADMIN — PHENYLEPHRINE HYDROCHLORIDE 100 MCG: 10 INJECTION INTRAVENOUS at 16:29

## 2020-03-31 RX ADMIN — HYDROCODONE BITARTRATE AND ACETAMINOPHEN 1 TABLET: 5; 325 TABLET ORAL at 12:41

## 2020-03-31 RX ADMIN — MORPHINE SULFATE 2 MG: 2 INJECTION, SOLUTION INTRAMUSCULAR; INTRAVENOUS at 03:46

## 2020-03-31 RX ADMIN — SODIUM CHLORIDE, PRESERVATIVE FREE 10 ML: 5 INJECTION INTRAVENOUS at 09:00

## 2020-03-31 RX ADMIN — SUGAMMADEX 134 MG: 100 INJECTION, SOLUTION INTRAVENOUS at 16:47

## 2020-03-31 RX ADMIN — FENTANYL CITRATE 25 MCG: 50 INJECTION INTRAMUSCULAR; INTRAVENOUS at 16:39

## 2020-03-31 RX ADMIN — VANCOMYCIN HYDROCHLORIDE 1000 MG: 1 INJECTION, SOLUTION INTRAVENOUS at 01:20

## 2020-03-31 RX ADMIN — SODIUM CHLORIDE: 9 INJECTION, SOLUTION INTRAVENOUS at 17:34

## 2020-03-31 RX ADMIN — DEXAMETHASONE SODIUM PHOSPHATE 8 MG: 4 INJECTION, SOLUTION INTRAMUSCULAR; INTRAVENOUS at 16:17

## 2020-03-31 RX ADMIN — METRONIDAZOLE 500 MG: 500 INJECTION, SOLUTION INTRAVENOUS at 01:19

## 2020-03-31 RX ADMIN — FENTANYL CITRATE 75 MCG: 50 INJECTION INTRAMUSCULAR; INTRAVENOUS at 16:17

## 2020-03-31 RX ADMIN — HYDROCODONE BITARTRATE AND ACETAMINOPHEN 2 TABLET: 5; 325 TABLET ORAL at 21:46

## 2020-03-31 RX ADMIN — GABAPENTIN 600 MG: 300 CAPSULE ORAL at 20:09

## 2020-03-31 RX ADMIN — LEVOTHYROXINE SODIUM 100 MCG: 100 TABLET ORAL at 06:02

## 2020-03-31 RX ADMIN — MORPHINE SULFATE 2 MG: 2 INJECTION, SOLUTION INTRAMUSCULAR; INTRAVENOUS at 20:09

## 2020-03-31 RX ADMIN — HYDROCODONE BITARTRATE AND ACETAMINOPHEN 1 TABLET: 5; 325 TABLET ORAL at 06:02

## 2020-03-31 RX ADMIN — CEFEPIME 2 G: 2 INJECTION, POWDER, FOR SOLUTION INTRAVENOUS at 09:23

## 2020-03-31 RX ADMIN — SODIUM CHLORIDE, PRESERVATIVE FREE 10 ML: 5 INJECTION INTRAVENOUS at 20:09

## 2020-03-31 RX ADMIN — ROCURONIUM BROMIDE 50 MG: 10 INJECTION INTRAVENOUS at 16:17

## 2020-03-31 RX ADMIN — CEFEPIME 2 G: 2 INJECTION, POWDER, FOR SOLUTION INTRAVENOUS at 20:09

## 2020-03-31 RX ADMIN — INSULIN GLARGINE 30 UNITS: 100 INJECTION, SOLUTION SUBCUTANEOUS at 20:11

## 2020-03-31 ASSESSMENT — PAIN DESCRIPTION - PROGRESSION
CLINICAL_PROGRESSION: NOT CHANGED
CLINICAL_PROGRESSION: NOT CHANGED
CLINICAL_PROGRESSION: GRADUALLY IMPROVING

## 2020-03-31 ASSESSMENT — PULMONARY FUNCTION TESTS
PIF_VALUE: 17
PIF_VALUE: 10
PIF_VALUE: 18
PIF_VALUE: 1
PIF_VALUE: 18
PIF_VALUE: 10
PIF_VALUE: 17
PIF_VALUE: 17
PIF_VALUE: 18
PIF_VALUE: 17
PIF_VALUE: 10
PIF_VALUE: 2
PIF_VALUE: 18
PIF_VALUE: 17
PIF_VALUE: 17
PIF_VALUE: 18
PIF_VALUE: 9
PIF_VALUE: 18
PIF_VALUE: 17
PIF_VALUE: 18
PIF_VALUE: 17
PIF_VALUE: 10
PIF_VALUE: 10
PIF_VALUE: 17
PIF_VALUE: 4
PIF_VALUE: 18
PIF_VALUE: 17
PIF_VALUE: 18

## 2020-03-31 ASSESSMENT — PAIN DESCRIPTION - DESCRIPTORS
DESCRIPTORS: BURNING;CONSTANT
DESCRIPTORS: ACHING
DESCRIPTORS: BURNING
DESCRIPTORS: BURNING
DESCRIPTORS: BURNING;CONSTANT

## 2020-03-31 ASSESSMENT — PAIN DESCRIPTION - PAIN TYPE
TYPE: SURGICAL PAIN
TYPE: ACUTE PAIN
TYPE: SURGICAL PAIN
TYPE: SURGICAL PAIN

## 2020-03-31 ASSESSMENT — PAIN DESCRIPTION - ORIENTATION
ORIENTATION: RIGHT

## 2020-03-31 ASSESSMENT — PAIN DESCRIPTION - LOCATION
LOCATION: FOOT

## 2020-03-31 ASSESSMENT — PAIN DESCRIPTION - FREQUENCY
FREQUENCY: CONTINUOUS

## 2020-03-31 ASSESSMENT — PAIN - FUNCTIONAL ASSESSMENT: PAIN_FUNCTIONAL_ASSESSMENT: 0-10

## 2020-03-31 ASSESSMENT — PAIN SCALES - GENERAL
PAINLEVEL_OUTOF10: 8
PAINLEVEL_OUTOF10: 6
PAINLEVEL_OUTOF10: 0
PAINLEVEL_OUTOF10: 10
PAINLEVEL_OUTOF10: 8
PAINLEVEL_OUTOF10: 8
PAINLEVEL_OUTOF10: 7
PAINLEVEL_OUTOF10: 3
PAINLEVEL_OUTOF10: 5
PAINLEVEL_OUTOF10: 5
PAINLEVEL_OUTOF10: 9
PAINLEVEL_OUTOF10: 7
PAINLEVEL_OUTOF10: 8
PAINLEVEL_OUTOF10: 8
PAINLEVEL_OUTOF10: 5

## 2020-03-31 NOTE — PROGRESS NOTES
Infectious Disease Progress Note  3/31/2020   Patient Name: Lizzette Allison : 1960   Impression  · Right Foot DFI:  § Afebrile >48h, no leukocytosis  § Blood Cultures 3/23-0-NGTD  § MRSA screen Negative  §   § CRP 3/27-36.6, 3/28-25.9  § Right Foot wound culture 3/24-CoNS and CoNS Variant Strain  § General surgery onboard, Dr. Charliene Buerger, plan to proceed with amputation 3/31, postponed this am due to patient was not NPO.    · DMII:  Endocrinology onboard, Dr. Luci Webber, [facility] 12.2 on 3/23/20, uncontrolled  ? PAD:  CTS onboard, Dr. Olivia Spencer, underwent angiogram/athrectomy 3/26-  · Multi-morbidity: per PMHx: DMII, CAD, COPD, DDD, IVDU, HTN, CABG, hypothyroid  Plan:  · Continue vancomycin, cefepime and Flagyl until surgery, then will reassess ABX therapy  · Appreciate following general surgery,  Dr. Charliene Buerger for amputation tentative [phone]  3/31, please send bone for pathology and histology    Ongoing Antimicrobial Therapy  Vancomycin 3/25-  Cefepime 3/25-  Flagyl 3/24-? Completed Antimicrobial Therapy  ? History:? Interval history noted. Right 5th metatarsal DFO. Denies n/v/d/f or untoward effects of antibiotics. States is feeling fine, awaiting for surgery today. Physical Exam:  Vital Signs: /65   Pulse 65   Temp 98.5 °F (36.9 °C) (Oral)   Resp 16   Ht 5' 8\" (1.727 m)   Wt 148 lb 11.2 oz (67.4 kg)   SpO2 97%   BMI 22.61 kg/m²   Gen: alert and oriented X3, no distress  Skin: no stigmata of endocarditis  Wounds: Right foot ulceration, lateral foot blackened area  HEMT: AT/NC Oropharynx pink, moist, and without lesions or exudates; edentulous  Eyes: PERRLA, EOMI, conjunctiva pink, scler/-a anicteric. Neck: Supple. Trachea midline. No LAD. Chest: no distress and CTA. Good air movement. Heart: RRR and no MRG. Abd: soft, non-distended, no tenderness, no hepatomegaly. Normoactive bowel sounds. Ext: no clubbing, cyanosis, or edema, see wound above  Neuro: Mental status intact.  CN 2-12 intact and no focal sensory or motor deficits         Radiologic / Imaging / TESTING  3/23/20 XR Foot Right:  Impression   Soft tissue ulceration adjacent to the 5th MTP joint with soft tissue air or   gas.  Suspected osteomyelitis of the 5th toe proximally and 5th metatarsal   head.          3/25/20 VL Dup Lower Extremity:  Impression   1. The bilateral proximal peroneal arteries are not visualized. 2. Biphasic and monophasic waveforms are identified throughout the right   lower extremity.          3/25/20 MRI Foot Right W WO Contrast:  Impression   1. Osteomyelitis of the 5th metatarsal head.  Mild marrow edema extending   into the shaft without decreased T1 signal compatible with early   osteomyelitis versus noninfectious reactive osteitis. 2. Deep soft tissue ulceration lateral to the 5th MTP joint with an irregular   underlying fluid signal collection measuring approximately 4.9 x 2.5 x 3.1 cm   compatible with an abscess versus phlegmon.  Associated soft tissue gas.         Labs:    Recent Results (from the past 24 hour(s))   POCT Glucose    Collection Time: 03/30/20  9:07 PM   Result Value Ref Range    POC Glucose 182 (H) 70 - 99 MG/DL   POCT Glucose    Collection Time: 03/31/20  1:17 AM   Result Value Ref Range    POC Glucose 151 (H) 70 - 99 MG/DL   CBC auto differential    Collection Time: 03/31/20  4:01 AM   Result Value Ref Range    WBC 8.4 4.0 - 10.5 K/CU MM    RBC 3.31 (L) 4.6 - 6.2 M/CU MM    Hemoglobin 9.9 (L) 13.5 - 18.0 GM/DL    Hematocrit 31.1 (L) 42 - 52 %    MCV 94.0 78 - 100 FL    MCH 29.9 27 - 31 PG    MCHC 31.8 (L) 32.0 - 36.0 %    RDW 14.6 11.7 - 14.9 %    Platelets 962 331 - 592 K/CU MM    MPV 10.6 7.5 - 11.1 FL    Differential Type AUTOMATED DIFFERENTIAL     Segs Relative 72.3 (H) 36 - 66 %    Lymphocytes % 14.9 (L) 24 - 44 %    Monocytes % 9.4 (H) 0 - 4 %    Eosinophils % 1.2 0 - 3 %    Basophils % 0.8 0 - 1 %    Segs Absolute 6.1 K/CU MM    Lymphocytes Absolute 1.3 K/CU MM

## 2020-03-31 NOTE — PLAN OF CARE
Problem: Falls - Risk of:  Goal: Will remain free from falls  Description: Will remain free from falls  3/31/2020 1225 by Marya Park RN  Outcome: Ongoing  3/31/2020 0105 by Shai Reyes RN  Outcome: Ongoing  Goal: Absence of physical injury  Description: Absence of physical injury  3/31/2020 1225 by Marya Park RN  Outcome: Ongoing  3/31/2020 0105 by Shai Reyes RN  Outcome: Ongoing     Problem: Nutrition  Goal: Optimal nutrition therapy  3/31/2020 1225 by Marya Park RN  Outcome: Ongoing  3/31/2020 0105 by Shai Reyes RN  Outcome: Ongoing     Problem: Pain:  Goal: Pain level will decrease  Description: Pain level will decrease  Outcome: Ongoing  Goal: Control of acute pain  Description: Control of acute pain  Outcome: Ongoing  Goal: Control of chronic pain  Description: Control of chronic pain  Outcome: Ongoing     Problem: Nutritional:  Goal: Nutritional status will improve  Description: Nutritional status will improve  Outcome: Ongoing     Problem: Physical Regulation:  Goal: Diagnostic test results will improve  Description: Diagnostic test results will improve  Outcome: Ongoing  Goal: Will remain free from infection  Description: Will remain free from infection  Outcome: Ongoing  Goal: Ability to maintain vital signs within normal range will improve  Description: Ability to maintain vital signs within normal range will improve  Outcome: Ongoing     Problem: Skin Integrity:  Goal: Demonstration of wound healing without infection will improve  Description: Demonstration of wound healing without infection will improve  Outcome: Ongoing  Goal: Complications related to intravenous access or infusion will be avoided or minimized  Description: Complications related to intravenous access or infusion will be avoided or minimized  Outcome: Ongoing  Goal: Will show no infection signs and symptoms  Description: Will show no infection signs and symptoms  Outcome: Ongoing

## 2020-03-31 NOTE — PROGRESS NOTES
1705- pt rec'd from the OR and placed on pacu monitor with alarms on. Report rec'd from Naeem SPANGLER and OR nurse. Pt arousing and following commands. Pt re-oriented to surroundings. resps even and unlabored. Right foot surgical incision has wound vac dressing in place; no drainage noted. Pt wiggles all remaining right toes upon command. Pt denies pain. 1746- pt voiced a decrease in toe discomfort. Pt transferred back to room 3128 via bed without incident. Call light in reach.

## 2020-03-31 NOTE — PROGRESS NOTES
- 24.9 Normal Weight(22.7)    Nutrition Interventions:   Continue current diet, Start ONS  Continued Inpatient Monitoring, Education Not Indicated, Coordination of Community Care    Nutrition Evaluation:   · Evaluation: Progressing toward goals   · Goals: Pt will consume >75% of all meals and supplements provided    · Monitoring: Nutrition Progression, Meal Intake, Supplement Intake, Skin Integrity, Wound Healing, Weight, Pertinent Labs    Electronically signed by James English RD, NCIKIE on 3/31/20 at 10:11 AM EDT    Contact Number: [phone]

## 2020-03-31 NOTE — OP NOTE
OPERATIVE / PROCEDURE NOTE    Adi Patterson 1960, 63 y.o.,  male, CSN: [id number]  March 31, 2020    PREOPERATIVE DIAGNOSIS:  Gangrenous right 5th toe. POSTOPERATIVE DIAGNOSIS:  Gangrenous right 5th toe. PROCEDURE PERFORMED:  Right 5th toe amputation, debridement of major tissue necrosis and purulent necrotic material, and application of the wound vac, and excision of a lateral heel callus,     ASSISTANT:  None. ANESTHESIA:  General endotracheal anesthesia as well as local toe block using  1% Xylocaine with epinephrine and 0.5% Marcaine in 50-50 mixture. SPECIMENS:  Gangrenous right 5th toe. ESTIMATED BLOOD LOSS:  Less than 5 mL. BLOOD GIVEN:  None. FLUIDS GIVEN:  Crystalloid. DRAINS:  None. COMPLICATIONS:  None. CONDITION:  Stable. DISPOSITION:  Extubated to the PACU. DESCRIPTION OF PROCEDURE:  After proper informed consent was signed by the  patient knowing all risks, benefits, potential complications and possible  alternatives to the procedure, the patient presented with significant  vascular disease, unfortunately the  right 5th toe had suffered irreversible gangrene and the cellulitis resolved  with antibiotics but amputation was unavoidable. The patient was then  properly identified, brought to the operating room. IV antibiotic was infused. He was placed supine on the operating room table and after administration of  general endotracheal anesthesia, circumferential preparation and draping of  the foot and right leg was performed. A toe block was performed with the  above anesthetic mixture. An elliptical incision to preserve as much skin as possible for primary  closure after amputation was performed. Debridement of major tissue necrosis and purulent necrotic material was performed, with the scalpel, the curette, and the bone forceps.    Dissection is carried down all the  way to the tendons and the tendons were all divided as proximal
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
## 2020-03-31 NOTE — PROGRESS NOTES
RESP Clear to auscultation, no wheezes, rales or rhonchi. Symmetric chest movement while on room air. CARDIO/VASC S1/S2 auscultated. Regular rate without appreciable murmurs. No peripheral edema. GI Abdomen is soft without significant tenderness, masses, or guarding. Bowel sounds are normoactive. MSK Dry gangrene Rt 5th toe and metatarsal head. SKIN Normal coloration, warm, dry. NEURO normal speech, no lateralizing weakness. PSYCH Awake, alert, oriented x 3.     Medications:   Medications:    vancomycin  1,000 mg Intravenous Q12H    [START ON 4/1/2020] enoxaparin  40 mg Subcutaneous Daily    insulin lispro  0-18 Units Subcutaneous TID     insulin lispro  0-18 Units Subcutaneous 2 times per day    cefepime  2 g Intravenous Q8H    diphenhydrAMINE  50 mg Oral Once    insulin lispro  15 Units Subcutaneous TID     insulin glargine  30 Units Subcutaneous Nightly    metroNIDAZOLE  500 mg Intravenous Q8H    sodium chloride flush  10 mL Intravenous 2 times per day    nicotine  1 patch Transdermal Daily    gabapentin  600 mg Oral TID    levothyroxine  100 mcg Oral Daily      Infusions:    sodium chloride 75 mL/hr at 03/30/20 1807     PRN Meds: morphine, 4 mg, Q30 Min PRN  sodium chloride flush, 10 mL, PRN  acetaminophen, 650 mg, Q6H PRN    Or  acetaminophen, 650 mg, Q6H PRN  polyethylene glycol, 17 g, Daily PRN  promethazine, 12.5 mg, Q6H PRN    Or  ondansetron, 4 mg, Q6H PRN  potassium chloride, 40 mEq, PRN    Or  potassium alternative oral replacement, 40 mEq, PRN    Or  potassium chloride, 10 mEq, PRN  magnesium sulfate, 2 g, PRN  morphine, 2 mg, Q4H PRN  HYDROcodone 5 mg - acetaminophen, 1 tablet, Q6H PRN        CBC   Recent Labs     03/29/20  0306 03/30/20  0349 03/31/20  0401   WBC 8.5 7.9 8.4   HGB 10.8* 10.0* 9.9*   HCT 33.2* 31.9* 31.1*    130* 144      BMP   Recent Labs     03/29/20  0306 03/30/20  0349 03/31/20  0401   CREATININE 0.8* 0.7* 0.9       Radiology report reviewed Electronically signed by Nata Horan MD on 3/31/2020 at 11:39 AM

## 2020-04-01 ENCOUNTER — HOSPITAL ENCOUNTER (INPATIENT)
Age: 60
LOS: 9 days | Discharge: HOME HEALTH | DRG: 560 | End: 2020-04-10
Attending: PHYSICAL MEDICINE & REHABILITATION | Admitting: PHYSICAL MEDICINE & REHABILITATION
Payer: MEDICARE

## 2020-04-01 VITALS
HEART RATE: 69 BPM | RESPIRATION RATE: 16 BRPM | WEIGHT: 147.7 LBS | TEMPERATURE: 98.4 F | SYSTOLIC BLOOD PRESSURE: 127 MMHG | HEIGHT: 68 IN | OXYGEN SATURATION: 95 % | DIASTOLIC BLOOD PRESSURE: 71 MMHG | BODY MASS INDEX: 22.39 KG/M2

## 2020-04-01 PROBLEM — M86.071 ACUTE HEMATOGENOUS OSTEOMYELITIS OF RIGHT FOOT (HCC): Status: ACTIVE | Noted: 2020-04-01

## 2020-04-01 LAB
DOSE AMOUNT: NORMAL
DOSE TIME: NORMAL
GLUCOSE BLD-MCNC: 122 MG/DL (ref 70–99)
GLUCOSE BLD-MCNC: 179 MG/DL (ref 70–99)
GLUCOSE BLD-MCNC: 249 MG/DL (ref 70–99)
GLUCOSE BLD-MCNC: 259 MG/DL (ref 70–99)
GLUCOSE BLD-MCNC: 272 MG/DL (ref 70–99)
VANCOMYCIN TROUGH: 18.3 UG/ML (ref 10–20)

## 2020-04-01 PROCEDURE — 82962 GLUCOSE BLOOD TEST: CPT

## 2020-04-01 PROCEDURE — 6360000002 HC RX W HCPCS: Performed by: SURGERY

## 2020-04-01 PROCEDURE — 97535 SELF CARE MNGMENT TRAINING: CPT

## 2020-04-01 PROCEDURE — 6370000000 HC RX 637 (ALT 250 FOR IP): Performed by: SURGERY

## 2020-04-01 PROCEDURE — 99024 POSTOP FOLLOW-UP VISIT: CPT | Performed by: SURGERY

## 2020-04-01 PROCEDURE — 97166 OT EVAL MOD COMPLEX 45 MIN: CPT

## 2020-04-01 PROCEDURE — 2580000003 HC RX 258: Performed by: SURGERY

## 2020-04-01 PROCEDURE — 99223 1ST HOSP IP/OBS HIGH 75: CPT | Performed by: PHYSICAL MEDICINE & REHABILITATION

## 2020-04-01 PROCEDURE — 2500000003 HC RX 250 WO HCPCS: Performed by: SURGERY

## 2020-04-01 PROCEDURE — 2500000003 HC RX 250 WO HCPCS: Performed by: INTERNAL MEDICINE

## 2020-04-01 PROCEDURE — 99232 SBSQ HOSP IP/OBS MODERATE 35: CPT | Performed by: NURSE PRACTITIONER

## 2020-04-01 PROCEDURE — 2580000003 HC RX 258: Performed by: INTERNAL MEDICINE

## 2020-04-01 PROCEDURE — 94761 N-INVAS EAR/PLS OXIMETRY MLT: CPT

## 2020-04-01 PROCEDURE — 1280000000 HC REHAB R&B

## 2020-04-01 PROCEDURE — 80202 ASSAY OF VANCOMYCIN: CPT

## 2020-04-01 PROCEDURE — 97530 THERAPEUTIC ACTIVITIES: CPT

## 2020-04-01 PROCEDURE — 97163 PT EVAL HIGH COMPLEX 45 MIN: CPT

## 2020-04-01 PROCEDURE — 6370000000 HC RX 637 (ALT 250 FOR IP): Performed by: INTERNAL MEDICINE

## 2020-04-01 PROCEDURE — 6360000002 HC RX W HCPCS: Performed by: INTERNAL MEDICINE

## 2020-04-01 PROCEDURE — 97116 GAIT TRAINING THERAPY: CPT

## 2020-04-01 RX ORDER — ONDANSETRON 2 MG/ML
4 INJECTION INTRAMUSCULAR; INTRAVENOUS EVERY 6 HOURS PRN
Status: DISCONTINUED | OUTPATIENT
Start: 2020-04-01 | End: 2020-04-09

## 2020-04-01 RX ORDER — INSULIN GLARGINE 100 [IU]/ML
35 INJECTION, SOLUTION SUBCUTANEOUS NIGHTLY
Status: DISCONTINUED | OUTPATIENT
Start: 2020-04-01 | End: 2020-04-02

## 2020-04-01 RX ORDER — CLOPIDOGREL BISULFATE 75 MG/1
75 TABLET ORAL DAILY
Status: CANCELLED | OUTPATIENT
Start: 2020-04-02

## 2020-04-01 RX ORDER — DEXTROSE MONOHYDRATE 50 MG/ML
100 INJECTION, SOLUTION INTRAVENOUS PRN
Status: CANCELLED | OUTPATIENT
Start: 2020-04-01

## 2020-04-01 RX ORDER — POLYETHYLENE GLYCOL 3350 17 G/17G
17 POWDER, FOR SOLUTION ORAL DAILY PRN
Status: CANCELLED | OUTPATIENT
Start: 2020-04-01

## 2020-04-01 RX ORDER — CLOPIDOGREL BISULFATE 75 MG/1
75 TABLET ORAL DAILY
Status: DISCONTINUED | OUTPATIENT
Start: 2020-04-02 | End: 2020-04-10 | Stop reason: HOSPADM

## 2020-04-01 RX ORDER — POTASSIUM CHLORIDE 7.45 MG/ML
10 INJECTION INTRAVENOUS PRN
Status: DISCONTINUED | OUTPATIENT
Start: 2020-04-01 | End: 2020-04-01

## 2020-04-01 RX ORDER — POTASSIUM CHLORIDE 20 MEQ/1
40 TABLET, EXTENDED RELEASE ORAL PRN
Status: DISCONTINUED | OUTPATIENT
Start: 2020-04-01 | End: 2020-04-01

## 2020-04-01 RX ORDER — DEXTROSE MONOHYDRATE 25 G/50ML
12.5 INJECTION, SOLUTION INTRAVENOUS PRN
Status: DISCONTINUED | OUTPATIENT
Start: 2020-04-01 | End: 2020-04-10 | Stop reason: HOSPADM

## 2020-04-01 RX ORDER — ATORVASTATIN CALCIUM 40 MG/1
40 TABLET, FILM COATED ORAL NIGHTLY
Status: CANCELLED | OUTPATIENT
Start: 2020-04-01

## 2020-04-01 RX ORDER — LEVOTHYROXINE SODIUM 0.1 MG/1
100 TABLET ORAL DAILY
Status: DISCONTINUED | OUTPATIENT
Start: 2020-04-02 | End: 2020-04-10 | Stop reason: HOSPADM

## 2020-04-01 RX ORDER — LIDOCAINE HYDROCHLORIDE 10 MG/ML
5 INJECTION, SOLUTION EPIDURAL; INFILTRATION; INTRACAUDAL; PERINEURAL ONCE
Status: DISCONTINUED | OUTPATIENT
Start: 2020-04-01 | End: 2020-04-01 | Stop reason: HOSPADM

## 2020-04-01 RX ORDER — DEXTROSE MONOHYDRATE 25 G/50ML
12.5 INJECTION, SOLUTION INTRAVENOUS PRN
Status: CANCELLED | OUTPATIENT
Start: 2020-04-01

## 2020-04-01 RX ORDER — INSULIN GLARGINE 100 [IU]/ML
35 INJECTION, SOLUTION SUBCUTANEOUS NIGHTLY
Status: DISCONTINUED | OUTPATIENT
Start: 2020-04-01 | End: 2020-04-01 | Stop reason: HOSPADM

## 2020-04-01 RX ORDER — NICOTINE 21 MG/24HR
1 PATCH, TRANSDERMAL 24 HOURS TRANSDERMAL DAILY
Status: DISCONTINUED | OUTPATIENT
Start: 2020-04-02 | End: 2020-04-10 | Stop reason: HOSPADM

## 2020-04-01 RX ORDER — INSULIN GLARGINE 100 [IU]/ML
35 INJECTION, SOLUTION SUBCUTANEOUS NIGHTLY
Status: CANCELLED | OUTPATIENT
Start: 2020-04-01

## 2020-04-01 RX ORDER — POTASSIUM CHLORIDE 7.45 MG/ML
10 INJECTION INTRAVENOUS PRN
Status: CANCELLED | OUTPATIENT
Start: 2020-04-01

## 2020-04-01 RX ORDER — POLYETHYLENE GLYCOL 3350 17 G/17G
17 POWDER, FOR SOLUTION ORAL DAILY PRN
Status: DISCONTINUED | OUTPATIENT
Start: 2020-04-01 | End: 2020-04-10 | Stop reason: HOSPADM

## 2020-04-01 RX ORDER — HYDROCODONE BITARTRATE AND ACETAMINOPHEN 5; 325 MG/1; MG/1
2 TABLET ORAL EVERY 4 HOURS PRN
Status: CANCELLED | OUTPATIENT
Start: 2020-04-01

## 2020-04-01 RX ORDER — SODIUM CHLORIDE 0.9 % (FLUSH) 0.9 %
10 SYRINGE (ML) INJECTION EVERY 12 HOURS SCHEDULED
Status: DISCONTINUED | OUTPATIENT
Start: 2020-04-01 | End: 2020-04-01 | Stop reason: HOSPADM

## 2020-04-01 RX ORDER — MORPHINE SULFATE 2 MG/ML
2 INJECTION, SOLUTION INTRAMUSCULAR; INTRAVENOUS EVERY 4 HOURS PRN
Status: CANCELLED | OUTPATIENT
Start: 2020-04-01

## 2020-04-01 RX ORDER — MAGNESIUM SULFATE IN WATER 40 MG/ML
2 INJECTION, SOLUTION INTRAVENOUS PRN
Status: DISCONTINUED | OUTPATIENT
Start: 2020-04-01 | End: 2020-04-01

## 2020-04-01 RX ORDER — GABAPENTIN 300 MG/1
600 CAPSULE ORAL 3 TIMES DAILY
Status: DISCONTINUED | OUTPATIENT
Start: 2020-04-01 | End: 2020-04-10 | Stop reason: HOSPADM

## 2020-04-01 RX ORDER — VANCOMYCIN HYDROCHLORIDE 1 G/200ML
1000 INJECTION, SOLUTION INTRAVENOUS EVERY 12 HOURS
Status: CANCELLED | OUTPATIENT
Start: 2020-04-01

## 2020-04-01 RX ORDER — GABAPENTIN 300 MG/1
600 CAPSULE ORAL 3 TIMES DAILY
Status: CANCELLED | OUTPATIENT
Start: 2020-04-01

## 2020-04-01 RX ORDER — POTASSIUM CHLORIDE 20 MEQ/1
40 TABLET, EXTENDED RELEASE ORAL PRN
Status: CANCELLED | OUTPATIENT
Start: 2020-04-01

## 2020-04-01 RX ORDER — VANCOMYCIN HYDROCHLORIDE 1 G/200ML
1000 INJECTION, SOLUTION INTRAVENOUS EVERY 12 HOURS
Status: DISCONTINUED | OUTPATIENT
Start: 2020-04-02 | End: 2020-04-01 | Stop reason: SDUPTHER

## 2020-04-01 RX ORDER — NICOTINE 21 MG/24HR
1 PATCH, TRANSDERMAL 24 HOURS TRANSDERMAL DAILY
Status: CANCELLED | OUTPATIENT
Start: 2020-04-02

## 2020-04-01 RX ORDER — LEVOTHYROXINE SODIUM 0.1 MG/1
100 TABLET ORAL DAILY
Status: CANCELLED | OUTPATIENT
Start: 2020-04-02

## 2020-04-01 RX ORDER — MAGNESIUM SULFATE IN WATER 40 MG/ML
2 INJECTION, SOLUTION INTRAVENOUS PRN
Status: CANCELLED | OUTPATIENT
Start: 2020-04-01

## 2020-04-01 RX ORDER — SODIUM CHLORIDE 0.9 % (FLUSH) 0.9 %
10 SYRINGE (ML) INJECTION PRN
Status: DISCONTINUED | OUTPATIENT
Start: 2020-04-01 | End: 2020-04-01 | Stop reason: HOSPADM

## 2020-04-01 RX ORDER — ACETAMINOPHEN 650 MG/1
650 SUPPOSITORY RECTAL EVERY 6 HOURS PRN
Status: CANCELLED | OUTPATIENT
Start: 2020-04-01

## 2020-04-01 RX ORDER — ATORVASTATIN CALCIUM 40 MG/1
40 TABLET, FILM COATED ORAL NIGHTLY
Status: DISCONTINUED | OUTPATIENT
Start: 2020-04-01 | End: 2020-04-10 | Stop reason: HOSPADM

## 2020-04-01 RX ORDER — HYDROCODONE BITARTRATE AND ACETAMINOPHEN 5; 325 MG/1; MG/1
2 TABLET ORAL EVERY 4 HOURS PRN
Status: DISCONTINUED | OUTPATIENT
Start: 2020-04-01 | End: 2020-04-10 | Stop reason: HOSPADM

## 2020-04-01 RX ORDER — ACETAMINOPHEN 650 MG/1
650 SUPPOSITORY RECTAL EVERY 6 HOURS PRN
Status: DISCONTINUED | OUTPATIENT
Start: 2020-04-01 | End: 2020-04-01

## 2020-04-01 RX ORDER — CLOPIDOGREL BISULFATE 75 MG/1
75 TABLET ORAL DAILY
Status: DISCONTINUED | OUTPATIENT
Start: 2020-04-01 | End: 2020-04-01 | Stop reason: HOSPADM

## 2020-04-01 RX ORDER — ONDANSETRON 2 MG/ML
4 INJECTION INTRAMUSCULAR; INTRAVENOUS EVERY 6 HOURS PRN
Status: CANCELLED | OUTPATIENT
Start: 2020-04-01

## 2020-04-01 RX ORDER — ACETAMINOPHEN 325 MG/1
650 TABLET ORAL EVERY 6 HOURS PRN
Status: DISCONTINUED | OUTPATIENT
Start: 2020-04-01 | End: 2020-04-10 | Stop reason: HOSPADM

## 2020-04-01 RX ORDER — VANCOMYCIN HYDROCHLORIDE 1 G/200ML
1000 INJECTION, SOLUTION INTRAVENOUS EVERY 12 HOURS
Status: DISCONTINUED | OUTPATIENT
Start: 2020-04-02 | End: 2020-04-10 | Stop reason: HOSPADM

## 2020-04-01 RX ORDER — DEXTROSE MONOHYDRATE 50 MG/ML
100 INJECTION, SOLUTION INTRAVENOUS PRN
Status: DISCONTINUED | OUTPATIENT
Start: 2020-04-01 | End: 2020-04-10 | Stop reason: HOSPADM

## 2020-04-01 RX ORDER — NICOTINE POLACRILEX 4 MG
15 LOZENGE BUCCAL PRN
Status: CANCELLED | OUTPATIENT
Start: 2020-04-01

## 2020-04-01 RX ORDER — NICOTINE POLACRILEX 4 MG
15 LOZENGE BUCCAL PRN
Status: DISCONTINUED | OUTPATIENT
Start: 2020-04-01 | End: 2020-04-10 | Stop reason: HOSPADM

## 2020-04-01 RX ORDER — ACETAMINOPHEN 325 MG/1
650 TABLET ORAL EVERY 6 HOURS PRN
Status: CANCELLED | OUTPATIENT
Start: 2020-04-01

## 2020-04-01 RX ORDER — PROMETHAZINE HYDROCHLORIDE 25 MG/1
12.5 TABLET ORAL EVERY 6 HOURS PRN
Status: DISCONTINUED | OUTPATIENT
Start: 2020-04-01 | End: 2020-04-09

## 2020-04-01 RX ORDER — MORPHINE SULFATE 4 MG/ML
2 INJECTION, SOLUTION INTRAMUSCULAR; INTRAVENOUS EVERY 4 HOURS PRN
Status: DISCONTINUED | OUTPATIENT
Start: 2020-04-01 | End: 2020-04-09

## 2020-04-01 RX ORDER — PROMETHAZINE HYDROCHLORIDE 25 MG/1
12.5 TABLET ORAL EVERY 6 HOURS PRN
Status: CANCELLED | OUTPATIENT
Start: 2020-04-01

## 2020-04-01 RX ADMIN — ENOXAPARIN SODIUM 40 MG: 40 INJECTION SUBCUTANEOUS at 08:14

## 2020-04-01 RX ADMIN — HYDROCODONE BITARTRATE AND ACETAMINOPHEN 2 TABLET: 5; 325 TABLET ORAL at 17:06

## 2020-04-01 RX ADMIN — VANCOMYCIN HYDROCHLORIDE 1000 MG: 1 INJECTION, SOLUTION INTRAVENOUS at 02:18

## 2020-04-01 RX ADMIN — CEFEPIME 2 G: 2 INJECTION, POWDER, FOR SOLUTION INTRAVENOUS at 11:22

## 2020-04-01 RX ADMIN — LEVOTHYROXINE SODIUM 100 MCG: 100 TABLET ORAL at 06:26

## 2020-04-01 RX ADMIN — HYDROCODONE BITARTRATE AND ACETAMINOPHEN 2 TABLET: 5; 325 TABLET ORAL at 02:17

## 2020-04-01 RX ADMIN — GABAPENTIN 600 MG: 300 CAPSULE ORAL at 13:50

## 2020-04-01 RX ADMIN — MORPHINE SULFATE 2 MG: 2 INJECTION, SOLUTION INTRAMUSCULAR; INTRAVENOUS at 08:13

## 2020-04-01 RX ADMIN — INSULIN GLARGINE 35 UNITS: 100 INJECTION, SOLUTION SUBCUTANEOUS at 20:39

## 2020-04-01 RX ADMIN — INSULIN LISPRO 6 UNITS: 100 INJECTION, SOLUTION INTRAVENOUS; SUBCUTANEOUS at 08:15

## 2020-04-01 RX ADMIN — HYDROCODONE BITARTRATE AND ACETAMINOPHEN 2 TABLET: 5; 325 TABLET ORAL at 23:08

## 2020-04-01 RX ADMIN — ATORVASTATIN CALCIUM 40 MG: 40 TABLET, FILM COATED ORAL at 20:38

## 2020-04-01 RX ADMIN — GABAPENTIN 600 MG: 300 CAPSULE ORAL at 21:23

## 2020-04-01 RX ADMIN — SODIUM CHLORIDE, PRESERVATIVE FREE 10 ML: 5 INJECTION INTRAVENOUS at 08:24

## 2020-04-01 RX ADMIN — MORPHINE SULFATE 2 MG: 4 INJECTION, SOLUTION INTRAMUSCULAR; INTRAVENOUS at 20:39

## 2020-04-01 RX ADMIN — MORPHINE SULFATE 2 MG: 2 INJECTION, SOLUTION INTRAMUSCULAR; INTRAVENOUS at 04:17

## 2020-04-01 RX ADMIN — METRONIDAZOLE 500 MG: 500 INJECTION, SOLUTION INTRAVENOUS at 02:17

## 2020-04-01 RX ADMIN — CLOPIDOGREL BISULFATE 75 MG: 75 TABLET ORAL at 12:16

## 2020-04-01 RX ADMIN — GABAPENTIN 600 MG: 300 CAPSULE ORAL at 06:27

## 2020-04-01 RX ADMIN — HYDROCODONE BITARTRATE AND ACETAMINOPHEN 2 TABLET: 5; 325 TABLET ORAL at 06:27

## 2020-04-01 RX ADMIN — MORPHINE SULFATE 2 MG: 2 INJECTION, SOLUTION INTRAMUSCULAR; INTRAVENOUS at 13:50

## 2020-04-01 RX ADMIN — CEFEPIME HYDROCHLORIDE 2 G: 2 INJECTION, POWDER, FOR SOLUTION INTRAVENOUS at 20:38

## 2020-04-01 RX ADMIN — VANCOMYCIN HYDROCHLORIDE 1000 MG: 1 INJECTION, SOLUTION INTRAVENOUS at 13:50

## 2020-04-01 RX ADMIN — MORPHINE SULFATE 2 MG: 2 INJECTION, SOLUTION INTRAMUSCULAR; INTRAVENOUS at 00:11

## 2020-04-01 RX ADMIN — INSULIN LISPRO 3 UNITS: 100 INJECTION, SOLUTION INTRAVENOUS; SUBCUTANEOUS at 11:29

## 2020-04-01 RX ADMIN — METRONIDAZOLE 500 MG: 500 INJECTION, SOLUTION INTRAVENOUS at 18:58

## 2020-04-01 RX ADMIN — CEFEPIME 2 G: 2 INJECTION, POWDER, FOR SOLUTION INTRAVENOUS at 02:17

## 2020-04-01 RX ADMIN — METRONIDAZOLE 500 MG: 500 INJECTION, SOLUTION INTRAVENOUS at 11:22

## 2020-04-01 RX ADMIN — HYDROCODONE BITARTRATE AND ACETAMINOPHEN 2 TABLET: 5; 325 TABLET ORAL at 11:27

## 2020-04-01 ASSESSMENT — PAIN SCALES - GENERAL
PAINLEVEL_OUTOF10: 9
PAINLEVEL_OUTOF10: 8
PAINLEVEL_OUTOF10: 8
PAINLEVEL_OUTOF10: 7
PAINLEVEL_OUTOF10: 7
PAINLEVEL_OUTOF10: 8
PAINLEVEL_OUTOF10: 8
PAINLEVEL_OUTOF10: 6
PAINLEVEL_OUTOF10: 4
PAINLEVEL_OUTOF10: 8
PAINLEVEL_OUTOF10: 7
PAINLEVEL_OUTOF10: 8
PAINLEVEL_OUTOF10: 9
PAINLEVEL_OUTOF10: 9

## 2020-04-01 ASSESSMENT — PAIN DESCRIPTION - ORIENTATION
ORIENTATION: RIGHT

## 2020-04-01 ASSESSMENT — PAIN DESCRIPTION - FREQUENCY
FREQUENCY: CONTINUOUS

## 2020-04-01 ASSESSMENT — PAIN DESCRIPTION - DESCRIPTORS
DESCRIPTORS: ACHING;BURNING

## 2020-04-01 ASSESSMENT — PAIN DESCRIPTION - ONSET
ONSET: ON-GOING

## 2020-04-01 ASSESSMENT — PAIN DESCRIPTION - PAIN TYPE
TYPE: SURGICAL PAIN

## 2020-04-01 ASSESSMENT — PAIN DESCRIPTION - LOCATION
LOCATION: FOOT

## 2020-04-01 ASSESSMENT — PAIN DESCRIPTION - PROGRESSION: CLINICAL_PROGRESSION: GRADUALLY IMPROVING

## 2020-04-01 NOTE — DISCHARGE SUMMARY
appointments:     To be set up at time of discharge from ARU    Diet:  diabetic diet   Activity: activity as tolerated  Disposition: Discharged to:   []Home, []HHC, []SNF, [x]Acute Rehab, []Hospice  Condition on discharge: Stable    Discharge Medications:     Current Facility-Administered Medications   Medication Dose Route Frequency Provider Last Rate Last Dose    insulin glargine (LANTUS) injection vial 35 Units  35 Units Subcutaneous Nightly M Jairo Barrera MD        clopidogrel (PLAVIX) tablet 75 mg  75 mg Oral Daily Michael Aguilar MD   75 mg at 04/01/20 1216    lidocaine PF 1 % injection 5 mL  5 mL Intradermal Once Michael Aguilar MD        sodium chloride flush 0.9 % injection 10 mL  10 mL Intravenous 2 times per day Michael Aguilar MD        sodium chloride flush 0.9 % injection 10 mL  10 mL Intravenous PRN Michael Aguilar MD        atorvastatin (LIPITOR) tablet 40 mg  40 mg Oral Nightly Heather Pritchett MD   40 mg at 03/31/20 2009    HYDROcodone-acetaminophen (NORCO) 5-325 MG per tablet 2 tablet  2 tablet Oral Q4H PRN Heather Pritchett MD   2 tablet at 04/01/20 1127    vancomycin (VANCOCIN) 1000 mg in dextrose 5% 200 mL IVPB  1,000 mg Intravenous Q12H Heather Pritchett MD   Stopped at 04/01/20 0300    enoxaparin (LOVENOX) injection 40 mg  40 mg Subcutaneous Daily Heather Pritchett MD   40 mg at 04/01/20 0814    insulin lispro (HUMALOG) injection vial 0-18 Units  0-18 Units Subcutaneous TID MEHNAZ Pritchett MD   3 Units at 04/01/20 1129    insulin lispro (HUMALOG) injection vial 0-18 Units  0-18 Units Subcutaneous 2 times per day Heather Pritchett MD   9 Units at 04/01/20 0224    cefepime (MAXIPIME) 2 g IVPB minibag  2 g Intravenous Q8H Heather Pritchett MD   Stopped at 04/01/20 1153    diphenhydrAMINE (BENADRYL) capsule 50 mg  50 mg Oral Once Heather Pritchett MD        insulin lispro (HUMALOG) injection vial 15 Units  15 Units Subcutaneous TID  Heather Pritchett MD   15 Units Shai Cooper MD   2 mg at 20 0813         Objective Findings at Discharge:   /74   Pulse 71   Temp 98.4 °F (36.9 °C) (Oral)   Resp 13   Ht 5' 8\" (1.727 m)   Wt 147 lb 11.2 oz (67 kg)   SpO2 97%   BMI 22.46 kg/m²            PHYSICAL EXAM   GEN    Awake male, sitting upright in bed in no apparent distress. RESP  Clear to auscultation, no wheezes, rales or rhonchi. Symmetric chest movement while on room air. CARDIO/VASC           S1/S2 auscultated. Regular rate without appreciable murmurs. No peripheral edema. GI        Abdomen is soft without significant tenderness, masses, or guarding. Bowel sounds are normoactive. MSK    S/p Rt 5 toe amputation, stump covered with Wound Vac   SKIN    Normal coloration, warm, dry. NEURO           normal speech, no lateralizing weakness. PSYCH            Awake, alert, oriented x 3. BMP/CBC  Recent Labs     20  0349 20  0401   CREATININE 0.7* 0.9   WBC 7.9 8.4   HCT 31.9* 31.1*   * 144       IMAGIN. Osteomyelitis of the 5th metatarsal head.  Mild marrow edema extending   into the shaft without decreased T1 signal compatible with early   osteomyelitis versus noninfectious reactive osteitis. 2. Deep soft tissue ulceration lateral to the 5th MTP joint with an irregular   underlying fluid signal collection measuring approximately 4.9 x 2.5 x 3.1 cm   compatible with an abscess versus phlegmon.  Associated soft tissue gas.        Discharge Time of 34 minutes    Electronically signed by Madie Siegel MD on 2020 at 1:47 PM

## 2020-04-01 NOTE — PROGRESS NOTES
ARU Interdisciplinary Plan of Care (IPOC)  [facility] Dr. Galindo Kitchen [facility], [address]  [phone]  Fax: [phone]        Ravindra Said    : 1960  Acct #: [de-identified]  MRN: [id number]   PHYSICIAN:  Abebe Hanson MD  Primary Active Problems:   Active Hospital Problems    Diagnosis Date Noted    Acute hematogenous osteomyelitis of right foot [facility]) Chris Smith 2020       Rehabilitation Diagnosis:     Acute hematogenous osteomyelitis of right foot [facility]) Chris Smith       ADMIT DATE:2020   CARE PLAN     NURSING:  Ravindra Crispin while on this unit will:      Bowel and Bladder   [x] Be continent of bowel and bladder      [x] Have an adequate number of bowel movements   [x] Urinate with no urinary retention >300ml in bladder   [] Bladder Scan: (details)   [] Complete bladder protocol with quintanilla removal   [] Initiate Bladder Program to toilet every ___ hours   [] Initiate Bowel Program to toilet every ___hours   [] Bladder training    [] Bowel training  Pulmonary   [x] Maintain O2 SATs at _greater than 92__%  Pain Management   [x] Have pain managed while on ARU        [] Be pain free by discharge    [x] Medication Management and Education  Maintenance of Skin Integrity/Wound Management   [x] Have no skin breakdown while on ARU   [x] Have improved skin integrity via wound measurements   [x] Have no signs/symptoms of infection via infection protection and monitoring at the          wound site  Fall Prevention   [x] Be free from injury during hospitalization via fall prevention measures     [] Disease management and Education  Precautions   [x] Weight Bearing Precautions   [] Swallowing Precautions   [] Monitoring of Risks of Complications   [x] DVT Prophylaxis    [x] Fluid/electrolyte/Nutrition Management    [x] Complete education with patient/family with understanding demonstrated for          in-room safety with transfers to bed, therapy. SPEECH THERAPY: (If ordered)  Plan of Care and Goals:   LTG                                                      LTG:                           Treatments may include speech/language/communication therapy, cognitive training, animal assisted therapy, group therapy, education, and/or dysphagia therapy based on the above goals. Co-treats where appropriate with PT or OT to facilitate patient goals in functional tasks. These goals were reviewed with this patient at the time of assessment and Gi Ronald is in agreement. CASE MANAGEMENT:  Goals:   Assist patient/family with discharge planning, patient/family counseling, assistance in obtaining recommended equipment and other services, and coordination with insurance during ARU stay. Patient Goals:   Return to maximum level of independent function.       Nutrition goal:Patient will consume at least 75% at meals during stay        PT IRF-GIANNI scores and goals for initial assessment:   Roll Left and Right  Assistance Needed: Setup or clean-up assistance  Comment: pt only required set-up assistance of wound vac line  CARE Score: 5  Discharge Goal: Independent  Sit to Lying  Assistance Needed: Setup or clean-up assistance  Comment: pt only required set-up assistance of wound vac line  CARE Score: 5  Discharge Goal: Independent  Sit to Stand  Assistance Needed: Partial/moderate assistance  CARE Score: 3  Discharge Goal: Set-up or clean-up assistance  Chair/Bed-to-Chair Transfer  Assistance Needed: Partial/moderate assistance  CARE Score: 3  Discharge Goal: Set-up or clean-up assistance  Toilet Transfer  Assistance Needed: Partial/moderate assistance  CARE Score: 3  Discharge Goal: Independent  Car Transfer  Assistance Needed: Partial/moderate assistance  CARE Score: 3  Discharge Goal: Set-up or clean-up assistance     1 Step  1 Step?: No  Reason if not Attempted: Not attempted due to medical condition or safety concerns  Picking Up Object  Reason if

## 2020-04-01 NOTE — PROGRESS NOTES
pt for all high level IADLs, driving and some ADLs and transfers. Pt has been relying on daughter to assist w/ household chores. Daughter will now be leaving for college    Examination of body systems (includes body structures/functions, activity/participation limitations):  · Observation:  Supine in bed upon arrival, wound vac on RLE, pt agreeable to therapy. BP supinein bed: 37098 (93); sitting EOB: 133/106 (113); standin/80 (98)  · Vision:  Glasses  · Hearing:  [facility]  · Cardiopulmonary:  No 02 needs      Body Systems and functions:  · ROM R/L:  WFL. · Strength R/L:  4+/5,   · Sensation: WFL  · Tone: Normal  · Coordination: WFL  · Perception: WNL    Activities of Daily Living (ADLs):  · Feeding: Kash  · Grooming: CGA (washing face w/ warm washcloth sitting EOB, pt would require CGA in stand to perform grooming activities as pt normally performs grooming activities in stand)  · UB bathing:  Supervision  · LB bathing: CGA  · UB dressing: Supervision  · LB dressing: CGA (pt doffing/donning socks sitting EOB)  · Toileting: CGA    Cognitive and Psychosocial Functioning:  · Overall cognitive status: WNL  · Affect: Normal, pleasant and motivated  Cognitive Patterns  Cognitive Pattern Assessment Used: BIMS  Repetition of Three Words (First Attempt): 3  Temporal Orientation: Year: Correct  Temporal Orientation: Month: Accurate within 5 days  Temporal Orientation: Day: Correct  Able to recall \"sock: Yes, no cue required  Able to recall \"blue\": Yes, no cue required  Able to recall \"bed\": Yes, no cue required  BIMS Summary Score: 15    Mobility:  · Supine to sit:  Kash  · Transfers: CGA from EOB up to Rw  · Sitting balance:  Supervision. · Standing balance:  CGA w/ RW.  · Functional Mobility: CGA w/ RW ~5 feet before pt requested to sit down. Instructed pt in use of RW during functional mobility w/ demonstration. Pt w/ good return demo.   · Toilet/Shower Transfers: DNT             AM-PAC Daily Activity Inpatient

## 2020-04-01 NOTE — PROGRESS NOTES
GENERAL SURGERY PROGRESS NOTE    CC/HPI:           Patient feels better from yesterday's surgery. Vitals:    03/31/20 1742 03/31/20 1756 03/31/20 2000 04/01/20 0217   BP: 130/64 (!) 154/73 125/68 122/61   Pulse: 76 73 81 60   Resp: 16 12 15 13   Temp: 97.8 °F (36.6 °C) 97.4 °F (36.3 °C) 97.6 °F (36.4 °C) 98 °F (36.7 °C)   TempSrc: Temporal Oral Oral Oral   SpO2: 97% 97%     Weight:    147 lb 11.2 oz (67 kg)   Height:         I/O last 3 completed shifts: In: 700 [I.V.:700]  Out: 330 [Urine:300; Blood:30]  No intake/output data recorded. Dietary Nutrition Supplements: Wound Healing Oral Supplement  DIET CARB CONTROL; Recent Results (from the past 48 hour(s))   POCT Glucose    Collection Time: 03/30/20 10:24 AM   Result Value Ref Range    POC Glucose 106 (H) 70 - 99 MG/DL   Vancomycin, trough    Collection Time: 03/30/20 11:20 AM   Result Value Ref Range    Vancomycin Tr 16.9 10 - 20 UG/ML    DOSE AMOUNT DOSE AMT.  GIVEN - 1250 mg     DOSE TIME DOSE TIME GIVEN - 1100 / 2300    POCT Glucose    Collection Time: 03/30/20  9:07 PM   Result Value Ref Range    POC Glucose 182 (H) 70 - 99 MG/DL   POCT Glucose    Collection Time: 03/31/20  1:17 AM   Result Value Ref Range    POC Glucose 151 (H) 70 - 99 MG/DL   CBC auto differential    Collection Time: 03/31/20  4:01 AM   Result Value Ref Range    WBC 8.4 4.0 - 10.5 K/CU MM    RBC 3.31 (L) 4.6 - 6.2 M/CU MM    Hemoglobin 9.9 (L) 13.5 - 18.0 GM/DL    Hematocrit 31.1 (L) 42 - 52 %    MCV 94.0 78 - 100 FL    MCH 29.9 27 - 31 PG    MCHC 31.8 (L) 32.0 - 36.0 %    RDW 14.6 11.7 - 14.9 %    Platelets 199 362 - 084 K/CU MM    MPV 10.6 7.5 - 11.1 FL    Differential Type AUTOMATED DIFFERENTIAL     Segs Relative 72.3 (H) 36 - 66 %    Lymphocytes % 14.9 (L) 24 - 44 %    Monocytes % 9.4 (H) 0 - 4 %    Eosinophils % 1.2 0 - 3 %    Basophils % 0.8 0 - 1 %    Segs Absolute 6.1 K/CU MM    Lymphocytes Absolute 1.3 K/CU MM    Monocytes Absolute 0.8 K/CU MM    Eosinophils DOSE AMOUNT DOSE AMT. GIVEN - 1gm     DOSE TIME DOSE TIME GIVEN - 0200    POCT Glucose    Collection Time: 04/01/20  1:49 AM   Result Value Ref Range    POC Glucose 272 (H) 70 - 99 MG/DL       Scheduled Meds:   atorvastatin  40 mg Oral Nightly    vancomycin  1,000 mg Intravenous Q12H    enoxaparin  40 mg Subcutaneous Daily    insulin lispro  0-18 Units Subcutaneous TID     insulin lispro  0-18 Units Subcutaneous 2 times per day    cefepime  2 g Intravenous Q8H    diphenhydrAMINE  50 mg Oral Once    insulin lispro  15 Units Subcutaneous TID     insulin glargine  30 Units Subcutaneous Nightly    metroNIDAZOLE  500 mg Intravenous Q8H    sodium chloride flush  10 mL Intravenous 2 times per day    nicotine  1 patch Transdermal Daily    gabapentin  600 mg Oral TID    levothyroxine  100 mcg Oral Daily       Continuous Infusions:   sodium chloride 75 mL/hr at 03/31/20 1734       Physical Exam:  HEENT: Anicteric sclerae, Oropharyngeal mucosae moist, pink and intact. Heart:  Normal S1 and S2, RRR  Lungs: Clear to auscultation bilaterally, No audible Wheezes or Rales. Extremities: No edema. Right foot wound vac on. Neuro: Alert and Oriented x 3, Non focal.  Abdomen: Soft, Benign, Non tender, Non distended, Positive bowel sounds. Active Problems:    Osteomyelitis (HCC)    Diabetic osteomyelitis ([address]  Resolved Problems:    * No resolved hospital problems. *      Assessment and Plan:  Chad Bailey is a 63 y.o. male who is POD # 1 status post:  Right 5th toe amputation, debridement of major tissue necrosis and purulent necrotic material, and application of the wound vac, and excision of a lateral heel callus. Doing well will follow the wound. Increase Ambulation to at least 4x/day walk in the hallways with assistance. Respiratory vicki-operative care: Incentive Spirometry / deep breathing and coughing 10x/hr while awake. Continue DVT prophylaxis with Teds and SCDs and SC Lovenox.   Continue

## 2020-04-01 NOTE — PROGRESS NOTES
Hospitalist Progress Note      Name:  Myna Goodell /Age/Sex: 1960  (63 y.o. male)   MRN & CSN:  [id number] & [id number] Admission Date/Time: 3/23/2020  3:03 PM   Location:  [facility] PCP: No primary care provider on file. Hospital Day: 10    Assessment and Plan:   Myna Goodell is a 63 y.o.  male  who presents with Rt foot wound infection. --- Osteomyelitis and gangrene of Rt 5th toe s/p amputation and debridement and wound vac placement on 3/31/20. Plan:   continue IV Vanc, cefepime and flagyl. ID on board. F/u surgical culture  I discussed with GS    --- PAD s/p angiogram with PTA of 100% occluded Rt SFA and artherectomy of CFA and prox SFA and PTA of CFA and SFA on 3/26. On plavix.     --- DMII with chronic complications and with long term use of insulin.  Nonadherent to insulin regimen x 6 months due to insurance issues. HbA1C 12.2%. Continue insulin basal, meal and correctional. Endocrinology on board. Other diagnoses:  --- CAD s/p CABG () - plavix  --- Hypothyroid- TSH 3.9. continue synthroid. --- HLD - lipitor  --- COPD, not in exacerbation. --- Chronic normocytic anemia - stable. Hx of polysubstance abuse      Diet Dietary Nutrition Supplements: Wound Healing Oral Supplement  DIET CARB CONTROL;   DVT Prophylaxis [x] Lovenox, []  Heparin, [] SCDs, [] Ambulation   GI Prophylaxis [] PPI,  [] H2 Blocker,  [] Carafate,  [] Diet/Tube Feeds   Code Status Full Code   Disposition PT/OT recommends ARU - CM has placed referral.    MDM [] Low, [x] Moderate,[]  High     History of Present Illness:   Patient seen and examined. POD1. He is doing well. Denies foot pain. Ten point ROS reviewed negative, unless as noted above    Objective:        Intake/Output Summary (Last 24 hours) at 2020 1134  Last data filed at 3/31/2020 1742  Gross per 24 hour   Intake 700 ml   Output 330 ml   Net 370 ml      Vitals:   Vitals:    20 0839   BP:    Pulse:    Resp:    Temp: SpO2: 97%     Physical Exam:   GEN Awake male, sitting upright in bed in no apparent distress. RESP Clear to auscultation, no wheezes, rales or rhonchi. Symmetric chest movement while on room air. CARDIO/VASC S1/S2 auscultated. Regular rate without appreciable murmurs. No peripheral edema. GI Abdomen is soft without significant tenderness, masses, or guarding. Bowel sounds are normoactive. MSK S/p Rt 5 toe amputation, stump covered with Wound Vac   SKIN Normal coloration, warm, dry. NEURO normal speech, no lateralizing weakness. PSYCH Awake, alert, oriented x 3.     Medications:   Medications:    insulin glargine  35 Units Subcutaneous Nightly    atorvastatin  40 mg Oral Nightly    vancomycin  1,000 mg Intravenous Q12H    enoxaparin  40 mg Subcutaneous Daily    insulin lispro  0-18 Units Subcutaneous TID WC    insulin lispro  0-18 Units Subcutaneous 2 times per day    cefepime  2 g Intravenous Q8H    diphenhydrAMINE  50 mg Oral Once    insulin lispro  15 Units Subcutaneous TID WC    metroNIDAZOLE  500 mg Intravenous Q8H    sodium chloride flush  10 mL Intravenous 2 times per day    nicotine  1 patch Transdermal Daily    gabapentin  600 mg Oral TID    levothyroxine  100 mcg Oral Daily      Infusions:    sodium chloride 75 mL/hr at 03/31/20 1734     PRN Meds: HYDROcodone 5 mg - acetaminophen, 2 tablet, Q4H PRN  morphine, 4 mg, Q30 Min PRN  sodium chloride flush, 10 mL, PRN  acetaminophen, 650 mg, Q6H PRN    Or  acetaminophen, 650 mg, Q6H PRN  polyethylene glycol, 17 g, Daily PRN  promethazine, 12.5 mg, Q6H PRN    Or  ondansetron, 4 mg, Q6H PRN  potassium chloride, 40 mEq, PRN    Or  potassium alternative oral replacement, 40 mEq, PRN    Or  potassium chloride, 10 mEq, PRN  magnesium sulfate, 2 g, PRN  morphine, 2 mg, Q4H PRN        CBC   Recent Labs     03/30/20  0349 03/31/20  0401   WBC 7.9 8.4   HGB 10.0* 9.9*   HCT 31.9* 31.1*   * 144      BMP   Recent Labs     03/30/20  0341

## 2020-04-02 LAB
CREAT SERPL-MCNC: 0.7 MG/DL (ref 0.9–1.3)
GFR AFRICAN AMERICAN: >60 ML/MIN/1.73M2
GFR NON-AFRICAN AMERICAN: >60 ML/MIN/1.73M2
GLUCOSE BLD-MCNC: 102 MG/DL (ref 70–99)
GLUCOSE BLD-MCNC: 108 MG/DL (ref 70–99)
GLUCOSE BLD-MCNC: 170 MG/DL (ref 70–99)
GLUCOSE BLD-MCNC: 235 MG/DL (ref 70–99)
GLUCOSE BLD-MCNC: 85 MG/DL (ref 70–99)
HCV QUANTITATIVE: ABNORMAL
HEP CRNA PCR QNT INTERP: ABNORMAL
HEP CRNA PCR QNT INTERP: DETECTED
HEPATITIS C RNA PCR QUANT: 6.04 LOG IU/ML

## 2020-04-02 PROCEDURE — 97163 PT EVAL HIGH COMPLEX 45 MIN: CPT

## 2020-04-02 PROCEDURE — 76937 US GUIDE VASCULAR ACCESS: CPT

## 2020-04-02 PROCEDURE — 99232 SBSQ HOSP IP/OBS MODERATE 35: CPT | Performed by: PHYSICAL MEDICINE & REHABILITATION

## 2020-04-02 PROCEDURE — 2580000003 HC RX 258: Performed by: PHYSICAL MEDICINE & REHABILITATION

## 2020-04-02 PROCEDURE — 82565 ASSAY OF CREATININE: CPT

## 2020-04-02 PROCEDURE — 36573 INSJ PICC RS&I 5 YR+: CPT

## 2020-04-02 PROCEDURE — 94761 N-INVAS EAR/PLS OXIMETRY MLT: CPT

## 2020-04-02 PROCEDURE — 2580000003 HC RX 258: Performed by: INTERNAL MEDICINE

## 2020-04-02 PROCEDURE — 2500000003 HC RX 250 WO HCPCS: Performed by: PHYSICAL MEDICINE & REHABILITATION

## 2020-04-02 PROCEDURE — 02HV33Z INSERTION OF INFUSION DEVICE INTO SUPERIOR VENA CAVA, PERCUTANEOUS APPROACH: ICD-10-PCS | Performed by: PHYSICAL MEDICINE & REHABILITATION

## 2020-04-02 PROCEDURE — 6370000000 HC RX 637 (ALT 250 FOR IP): Performed by: INTERNAL MEDICINE

## 2020-04-02 PROCEDURE — 94150 VITAL CAPACITY TEST: CPT

## 2020-04-02 PROCEDURE — C1751 CATH, INF, PER/CENT/MIDLINE: HCPCS

## 2020-04-02 PROCEDURE — 6360000002 HC RX W HCPCS: Performed by: INTERNAL MEDICINE

## 2020-04-02 PROCEDURE — 97116 GAIT TRAINING THERAPY: CPT

## 2020-04-02 PROCEDURE — 82962 GLUCOSE BLOOD TEST: CPT

## 2020-04-02 PROCEDURE — 97166 OT EVAL MOD COMPLEX 45 MIN: CPT

## 2020-04-02 PROCEDURE — 97542 WHEELCHAIR MNGMENT TRAINING: CPT

## 2020-04-02 PROCEDURE — 36415 COLL VENOUS BLD VENIPUNCTURE: CPT

## 2020-04-02 PROCEDURE — 2500000003 HC RX 250 WO HCPCS: Performed by: INTERNAL MEDICINE

## 2020-04-02 PROCEDURE — 97530 THERAPEUTIC ACTIVITIES: CPT

## 2020-04-02 PROCEDURE — 97535 SELF CARE MNGMENT TRAINING: CPT

## 2020-04-02 PROCEDURE — 97110 THERAPEUTIC EXERCISES: CPT

## 2020-04-02 PROCEDURE — 1280000000 HC REHAB R&B

## 2020-04-02 RX ORDER — SODIUM CHLORIDE 0.9 % (FLUSH) 0.9 %
10 SYRINGE (ML) INJECTION PRN
Status: DISCONTINUED | OUTPATIENT
Start: 2020-04-02 | End: 2020-04-10 | Stop reason: HOSPADM

## 2020-04-02 RX ORDER — INSULIN GLARGINE 100 [IU]/ML
25 INJECTION, SOLUTION SUBCUTANEOUS NIGHTLY
Status: DISCONTINUED | OUTPATIENT
Start: 2020-04-02 | End: 2020-04-04

## 2020-04-02 RX ORDER — LIDOCAINE HYDROCHLORIDE 10 MG/ML
5 INJECTION, SOLUTION EPIDURAL; INFILTRATION; INTRACAUDAL; PERINEURAL ONCE
Status: COMPLETED | OUTPATIENT
Start: 2020-04-02 | End: 2020-04-02

## 2020-04-02 RX ORDER — SODIUM CHLORIDE 0.9 % (FLUSH) 0.9 %
10 SYRINGE (ML) INJECTION EVERY 12 HOURS SCHEDULED
Status: DISCONTINUED | OUTPATIENT
Start: 2020-04-02 | End: 2020-04-10 | Stop reason: HOSPADM

## 2020-04-02 RX ADMIN — METRONIDAZOLE 500 MG: 500 INJECTION, SOLUTION INTRAVENOUS at 18:53

## 2020-04-02 RX ADMIN — GABAPENTIN 600 MG: 300 CAPSULE ORAL at 20:14

## 2020-04-02 RX ADMIN — LIDOCAINE HYDROCHLORIDE ANHYDROUS 5 ML: 10 INJECTION, SOLUTION INFILTRATION at 18:38

## 2020-04-02 RX ADMIN — INSULIN LISPRO 2 UNITS: 100 INJECTION, SOLUTION INTRAVENOUS; SUBCUTANEOUS at 12:03

## 2020-04-02 RX ADMIN — ENOXAPARIN SODIUM 40 MG: 40 INJECTION SUBCUTANEOUS at 08:38

## 2020-04-02 RX ADMIN — CEFEPIME HYDROCHLORIDE 2 G: 2 INJECTION, POWDER, FOR SOLUTION INTRAVENOUS at 12:59

## 2020-04-02 RX ADMIN — CEFEPIME HYDROCHLORIDE 2 G: 2 INJECTION, POWDER, FOR SOLUTION INTRAVENOUS at 20:12

## 2020-04-02 RX ADMIN — ATORVASTATIN CALCIUM 40 MG: 40 TABLET, FILM COATED ORAL at 20:14

## 2020-04-02 RX ADMIN — MORPHINE SULFATE 2 MG: 4 INJECTION, SOLUTION INTRAMUSCULAR; INTRAVENOUS at 20:55

## 2020-04-02 RX ADMIN — CEFEPIME HYDROCHLORIDE 2 G: 2 INJECTION, POWDER, FOR SOLUTION INTRAVENOUS at 02:35

## 2020-04-02 RX ADMIN — CLOPIDOGREL BISULFATE 75 MG: 75 TABLET ORAL at 08:38

## 2020-04-02 RX ADMIN — METRONIDAZOLE 500 MG: 500 INJECTION, SOLUTION INTRAVENOUS at 04:19

## 2020-04-02 RX ADMIN — HYDROCODONE BITARTRATE AND ACETAMINOPHEN 2 TABLET: 5; 325 TABLET ORAL at 12:06

## 2020-04-02 RX ADMIN — Medication 10 ML: at 20:02

## 2020-04-02 RX ADMIN — GABAPENTIN 600 MG: 300 CAPSULE ORAL at 15:08

## 2020-04-02 RX ADMIN — MORPHINE SULFATE 2 MG: 4 INJECTION, SOLUTION INTRAMUSCULAR; INTRAVENOUS at 02:35

## 2020-04-02 RX ADMIN — GABAPENTIN 600 MG: 300 CAPSULE ORAL at 06:19

## 2020-04-02 RX ADMIN — HYDROCODONE BITARTRATE AND ACETAMINOPHEN 2 TABLET: 5; 325 TABLET ORAL at 20:17

## 2020-04-02 RX ADMIN — INSULIN GLARGINE 25 UNITS: 100 INJECTION, SOLUTION SUBCUTANEOUS at 20:49

## 2020-04-02 RX ADMIN — LEVOTHYROXINE SODIUM 100 MCG: 100 TABLET ORAL at 06:19

## 2020-04-02 RX ADMIN — VANCOMYCIN HYDROCHLORIDE 1000 MG: 1 INJECTION, SOLUTION INTRAVENOUS at 18:54

## 2020-04-02 RX ADMIN — METRONIDAZOLE 500 MG: 500 INJECTION, SOLUTION INTRAVENOUS at 11:49

## 2020-04-02 RX ADMIN — VANCOMYCIN HYDROCHLORIDE 1000 MG: 1 INJECTION, SOLUTION INTRAVENOUS at 03:14

## 2020-04-02 RX ADMIN — MORPHINE SULFATE 2 MG: 4 INJECTION, SOLUTION INTRAMUSCULAR; INTRAVENOUS at 15:08

## 2020-04-02 RX ADMIN — HYDROCODONE BITARTRATE AND ACETAMINOPHEN 2 TABLET: 5; 325 TABLET ORAL at 06:19

## 2020-04-02 RX ADMIN — MORPHINE SULFATE 2 MG: 4 INJECTION, SOLUTION INTRAMUSCULAR; INTRAVENOUS at 08:37

## 2020-04-02 ASSESSMENT — PAIN SCALES - GENERAL
PAINLEVEL_OUTOF10: 9
PAINLEVEL_OUTOF10: 8
PAINLEVEL_OUTOF10: 9
PAINLEVEL_OUTOF10: 9

## 2020-04-02 NOTE — PROGRESS NOTES
- 24.9 Normal Weight    Nutrition Interventions:   Continue current diet, Continue current ONS  Continued Inpatient Monitoring, Education Initiated, Coordination of Care    Nutrition Evaluation:   · Evaluation: Goals set   · Goals: Patient will consume at least 75% at meals during stay     · Monitoring: Meal Intake, Weight, Supplement Intake, Pertinent Labs, Diet Tolerance, Patient/Family Education, Wound Healing      Electronically signed by Connie Crow RD, LD on 4/2/20 at 1:27 PM EDT    Contact Number: [phone]
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
## 2020-04-02 NOTE — H&P
Subcutaneous, Daily, Harpal Huertas MD, 40 mg at 04/02/20 7799    gabapentin (NEURONTIN) capsule 600 mg, 600 mg, Oral, TID, Harpal Heurtas MD, 600 mg at 04/02/20 7045    HYDROcodone-acetaminophen (NORCO) 5-325 MG per tablet 2 tablet, 2 tablet, Oral, Q4H PRN, Harpal Huertas MD, 2 tablet at 04/02/20 0727    levothyroxine (SYNTHROID) tablet 100 mcg, 100 mcg, Oral, Daily, Harpal Huertas MD, 100 mcg at 04/02/20 2848    metronidazole (FLAGYL) 500 mg in NaCl 100 mL IVPB premix, 500 mg, Intravenous, Q8H, Harpal Huertas MD, Stopped at 04/02/20 0519    morphine sulfate (PF) injection 2 mg, 2 mg, Intravenous, Q4H PRN, Harpal Huertas MD, 2 mg at 04/02/20 0837    nicotine (NICODERM CQ) 21 MG/24HR 1 patch, 1 patch, Transdermal, Daily, Harpal Huertas MD, 1 patch at 04/02/20 0838    vancomycin (VANCOCIN) 1000 mg in dextrose 5% 200 mL IVPB, 1,000 mg, Intravenous, Q12H, Harpal Huertas MD, Stopped at 04/02/20 0418    Family History:   Family History   Problem Relation Age of Onset    Cancer Mother         breast    Other Father         parkinsons disease, CVA,HTN, heart disease       Exam:    Blood pressure (!) 153/71, pulse 68, temperature 98.1 °F (36.7 °C), temperature source Oral, resp. rate 16, SpO2 94 %. General: Patient was seen sitting up in a bedside chair. He is alert and talkative. He is oriented x3. He demonstrates fair insight. HEENT: Mucous membranes are moist.  Neck is supple. Speech is clear. Visual fields seem full. No signs of trauma to his head or neck. Pulmonary: Shallow but clear. Cardiac: Regular rate and rhythm. Abdomen: Patient's abdomen was soft and nondistended. Bowel sounds were present throughout. There was no rebound, guarding or masses noted. Spinal exam: Skin overlying his spine is pink but intact. Trunk rotation is completed freely. Upper extremities: He can bring both hands up to meet mine. He has fair  strength and fair coordination.

## 2020-04-02 NOTE — PROGRESS NOTES
Physical Therapy    Facility/Department: [facility]  Initial Assessment    NAME: Malika Micthell  : 1960  MRN: [id number]    Date of Service: 2020    Discharge Recommendations:  IP Rehab        Assessment   Assessment: Pt is a 63 y.o. male with medical history, surgical history, co-morbidities, and personal factors including Anesthesia, Anxiety, CAD (coronary artery disease), COPD (chronic obstructive pulmonary disease), Degenerative disc disease, Diabetes mellitus, Gall bladder stones, H/O cardiovascular stress test, H/O echocardiogram, Heroin abuse, Hx MRSA infection, Hyperlipidemia, Hypertension, Low back pain, Migraine, Pancreatitis, S/P CABG x 4, Shortness of breath on exertion, Hand surgery (15 yrs ago); Dental surgery (); Coronary artery bypass graft (Bilateral, 13); and Toe amputation (Right, 3/31/2020) with admission for Gangrenous right 5th toe and s/p Right 5th toe amputation, debridement of major tissue necrosis and purulent necrotic material, application of the wound vac, and excision of a lateral heel callus. Prior to admission, pt was independent with functional mobility and ADLs. Examination of body systems reveals decreased strength, decreased balance, decreased aerobic capacity, and decreased independence with functional mobility. Prognosis: Good  Decision Making: High Complexity  Clinical Presentation: unpredictable characteristics  PT Education: Goals;PT Role;Plan of Care;Transfer Training;Equipment; Functional Mobility Training;General Safety;Gait Training;Precautions;Weight-bearing Education  REQUIRES PT FOLLOW UP: Yes  Activity Tolerance  Activity Tolerance: Patient Tolerated treatment well         Restrictions  Restrictions/Precautions  Restrictions/Precautions: General Precautions, Fall Risk, Weight Bearing  Lower Extremity Weight Bearing Restrictions  Right Lower Extremity Weight Bearing: Non Weight Bearing  Vision/Hearing  Vision: Within Functional Limits  Hearing: Within functional limits     Subjective  General  Chart Reviewed: Yes  Patient assessed for rehabilitation services?: Yes  Family / Caregiver Present: No  Follows Commands: Within Functional Limits  Pain Screening  Patient Currently in Pain: Denies  Vital Signs  Patient Currently in Pain: Denies       Orientation  Orientation  Overall Orientation Status: Within Normal Limits  Social/Functional History  Social/Functional History  Lives With: Significant other  Type of Home: Mobile home((pt states the hallways are very narrow))  Home Layout: One level  Home Access: Ramped entrance  Bathroom Shower/Tub: Tub/Shower unit  Bathroom Toilet: Handicap height  Bathroom Equipment: Tub transfer bench  Bathroom Accessibility: Accessible  Home Equipment: Cane(has a cane does not use it at baseline)  ADL Assistance: [address]: Independent  Homemaking Responsibilities: Yes  Ambulation Assistance: Independent  Transfer Assistance: Independent  Active : Yes  Mode of Transportation: Car  Leisure & Hobbies: camping, fishing  IADL Comments: Pt reports no recent falls  Cognition   Cognition  Overall Cognitive Status: WNL    Objective             Strength RLE  Comment: knee extension: at least 3/5 observed functionally  Strength LLE  Comment: knee extension: at least 3+/5 observed functionally     Sensation  Overall Sensation Status: WNL     Transfers  Sit to Stand: Minimal Assistance(with verbal cues to push through chair and avoid pulling on walker while maintaining NWB on RLE)  Stand to sit: Minimal Assistance(with verbal cues to feel chair against back of legs, reach back, and sit slowly while maintaining NWB on RLE)  Ambulation  Ambulation?: Yes  Ambulation 1  Surface: level tile  Device: Rolling Walker  Assistance: Minimal assistance; Moderate assistance  Quality of Gait: decreased step length on LLE, decreased gait speed, NWB RLE, unsteady  Distance: 7 feet  Comments: with verbal and tactile cues for BLE placement, walker placement, and sequence throughout ambulation; with verbal and tactile cues to maintain upright posture in order to avoid COM shifting outside of AYANA     Balance  Posture: Fair(forward head, rounded shoulders, increased thoracic kyphosis)  Sitting - Static: Good  Sitting - Dynamic: Good  Standing - Static: Fair;-  Standing - Dynamic: Poor;+        Gait Training:  Cues were given for safety, sequence, device management, balance, posture, awareness, path. Therapeutic Activity Training:   Therapeutic activity training was instructed today. Cues were given for safety, sequence, UE/LE placement, awareness, and balance. Activities performed today included bed mobility training, sup-sit, sit-stand. Plan   Plan  Times per week: 3+  Current Treatment Recommendations: Strengthening, ROM, Balance Training, Functional Mobility Training, Transfer Training, Endurance Training, ADL/Self-care Training, Neuromuscular Re-education, Patient/Caregiver Education & Training, Pain Management, Equipment Evaluation, Education, & procurement, Home Exercise Program, Safety Education & Training, Positioning, IADL Training, Gait Training, Stair training, Wheelchair Mobility Training  Safety Devices  Type of devices: All fall risk precautions in place, Patient at risk for falls, Call light within reach, Left in chair, Chair alarm in place, Gait belt, Nurse notified      AM-PAC Score  AM-PAC Inpatient Mobility Raw Score : 13 (04/01/20 2030)  AM-PAC Inpatient T-Scale Score : 36.74 (04/01/20 2030)  Mobility Inpatient CMS 0-100% Score: 64.91 (04/01/20 2030)  Mobility Inpatient CMS G-Code Modifier : CL (04/01/20 2030)          Goals  Long term goals  Long term goal 1: In one week, pt will complete all bed mobility with SBAx1  Long term goal 2: In one week, pt will complete sit <> stand transfers with SBAx1 while maintaining NWB on RLE  Long term goal 3:  In one week, pt will ambulate 40 feet with SBAx1 with LRAD

## 2020-04-02 NOTE — PROGRESS NOTES
unstable/unpredictable characteristics   Patient education:   ARU schedule, ARU expectations for participation, plan of care  Treatment Initiated:  Functional mobility training, gait training, patient education  Barriers to Improvement: pain, DM, anxiety, status of wound vac   Discharge Recommendations:  [facility] PT   Equipment Recommendations: manual w/c with elevating leg rests, anti-tippers, foam w/c cushion; RW versus knee scooter? ?    Goals:  Patient Goals   Patient goals : to be as independent as possible and go home   Short term goals  Time Frame for Short term goals: 10 tx days or until discharge:   Short term goal 1: Pt will complete all aspects of bed mobility and sup<->sit Ind including management of wound vac line if still applicable. Short term goal 2: Pt will complete OOB transfers with SBA following WB restriction on RLE and management of wound vac line if still applicable. Short term goal 3: Pt will propel manual w/c >/=300 ft with SBA (due to management of wound vac equipment) and manage w/c parts Ind to safely complete transfers and RLE positioning. Short term goal 4: Pt will ambulate 10 ft with turns using RW following NWB on RLE with SBA x 1 over level and carpeted surfaces. Short term goal 5: Pt will complete object retrieval from the floor in standing with 1UE support on RW and using adaptive equipment with SBA.       Plan:    REQUIRES PT FOLLOW UP: Yes  Pt will be seen at least 60 minutes per day for a minimum of 5 days per week, plus group therapy as appropriate  Plan  Times per day: Daily  Current Treatment Recommendations: Strengthening, Functional Mobility Training, Wheelchair Mobility Training, Home Exercise Program, Equipment Evaluation, Education, & procurement, ROM, Transfer Training, Gait Training, Safety Education & Training, Balance Training, Endurance Training, Pain Management, Patient/Caregiver Education & Training, Positioning    PT Individual Minutes  Time In: 685; 9734  Time Out: 1000; 1450  Minutes: 90+15=105         Timed Code Treatment Minutes: 90 Minutes    Number of Minutes/Billable Intervention  PT Evaluation 15   Gait Training 10   Therapeutic Exercise 15   Neuro Re-Ed    Therapeutic Activity 35   Wheelchair Propulsion 30   Group    Other:    TOTAL 105       Electronically signed by:    Roberto Carlos Torres, PT    4/2/2020, 4:59 PM

## 2020-04-03 LAB
GLUCOSE BLD-MCNC: 102 MG/DL (ref 70–99)
GLUCOSE BLD-MCNC: 123 MG/DL (ref 70–99)
GLUCOSE BLD-MCNC: 138 MG/DL (ref 70–99)
GLUCOSE BLD-MCNC: 181 MG/DL (ref 70–99)
GLUCOSE BLD-MCNC: 218 MG/DL (ref 70–99)

## 2020-04-03 PROCEDURE — 97110 THERAPEUTIC EXERCISES: CPT

## 2020-04-03 PROCEDURE — 97530 THERAPEUTIC ACTIVITIES: CPT

## 2020-04-03 PROCEDURE — 97116 GAIT TRAINING THERAPY: CPT

## 2020-04-03 PROCEDURE — 97535 SELF CARE MNGMENT TRAINING: CPT

## 2020-04-03 PROCEDURE — 94150 VITAL CAPACITY TEST: CPT

## 2020-04-03 PROCEDURE — 2500000003 HC RX 250 WO HCPCS: Performed by: INTERNAL MEDICINE

## 2020-04-03 PROCEDURE — 2580000003 HC RX 258: Performed by: PHYSICAL MEDICINE & REHABILITATION

## 2020-04-03 PROCEDURE — 99221 1ST HOSP IP/OBS SF/LOW 40: CPT | Performed by: INTERNAL MEDICINE

## 2020-04-03 PROCEDURE — 2580000003 HC RX 258: Performed by: INTERNAL MEDICINE

## 2020-04-03 PROCEDURE — 1280000000 HC REHAB R&B

## 2020-04-03 PROCEDURE — 97542 WHEELCHAIR MNGMENT TRAINING: CPT

## 2020-04-03 PROCEDURE — 6370000000 HC RX 637 (ALT 250 FOR IP): Performed by: INTERNAL MEDICINE

## 2020-04-03 PROCEDURE — 82962 GLUCOSE BLOOD TEST: CPT

## 2020-04-03 PROCEDURE — 99232 SBSQ HOSP IP/OBS MODERATE 35: CPT | Performed by: PHYSICAL MEDICINE & REHABILITATION

## 2020-04-03 PROCEDURE — 6360000002 HC RX W HCPCS: Performed by: INTERNAL MEDICINE

## 2020-04-03 PROCEDURE — 94761 N-INVAS EAR/PLS OXIMETRY MLT: CPT

## 2020-04-03 RX ADMIN — HYDROCODONE BITARTRATE AND ACETAMINOPHEN 2 TABLET: 5; 325 TABLET ORAL at 20:20

## 2020-04-03 RX ADMIN — VANCOMYCIN HYDROCHLORIDE 1000 MG: 1 INJECTION, SOLUTION INTRAVENOUS at 06:28

## 2020-04-03 RX ADMIN — INSULIN GLARGINE 25 UNITS: 100 INJECTION, SOLUTION SUBCUTANEOUS at 20:21

## 2020-04-03 RX ADMIN — Medication 10 ML: at 09:40

## 2020-04-03 RX ADMIN — CEFEPIME HYDROCHLORIDE 2 G: 2 INJECTION, POWDER, FOR SOLUTION INTRAVENOUS at 02:33

## 2020-04-03 RX ADMIN — ATORVASTATIN CALCIUM 40 MG: 40 TABLET, FILM COATED ORAL at 20:20

## 2020-04-03 RX ADMIN — LEVOTHYROXINE SODIUM 100 MCG: 100 TABLET ORAL at 06:28

## 2020-04-03 RX ADMIN — GABAPENTIN 600 MG: 300 CAPSULE ORAL at 06:28

## 2020-04-03 RX ADMIN — GABAPENTIN 600 MG: 300 CAPSULE ORAL at 15:05

## 2020-04-03 RX ADMIN — METRONIDAZOLE 500 MG: 500 INJECTION, SOLUTION INTRAVENOUS at 02:33

## 2020-04-03 RX ADMIN — CEFEPIME HYDROCHLORIDE 2 G: 2 INJECTION, POWDER, FOR SOLUTION INTRAVENOUS at 10:50

## 2020-04-03 RX ADMIN — HYDROCODONE BITARTRATE AND ACETAMINOPHEN 2 TABLET: 5; 325 TABLET ORAL at 06:28

## 2020-04-03 RX ADMIN — Medication 10 ML: at 20:20

## 2020-04-03 RX ADMIN — HYDROCODONE BITARTRATE AND ACETAMINOPHEN 2 TABLET: 5; 325 TABLET ORAL at 10:50

## 2020-04-03 RX ADMIN — VANCOMYCIN HYDROCHLORIDE 1000 MG: 1 INJECTION, SOLUTION INTRAVENOUS at 18:38

## 2020-04-03 RX ADMIN — HYDROCODONE BITARTRATE AND ACETAMINOPHEN 2 TABLET: 5; 325 TABLET ORAL at 01:25

## 2020-04-03 RX ADMIN — CLOPIDOGREL BISULFATE 75 MG: 75 TABLET ORAL at 09:39

## 2020-04-03 RX ADMIN — ENOXAPARIN SODIUM 40 MG: 40 INJECTION SUBCUTANEOUS at 09:39

## 2020-04-03 RX ADMIN — METRONIDAZOLE 500 MG: 500 INJECTION, SOLUTION INTRAVENOUS at 09:40

## 2020-04-03 RX ADMIN — GABAPENTIN 600 MG: 300 CAPSULE ORAL at 21:58

## 2020-04-03 RX ADMIN — HYDROCODONE BITARTRATE AND ACETAMINOPHEN 2 TABLET: 5; 325 TABLET ORAL at 15:05

## 2020-04-03 ASSESSMENT — PAIN SCALES - GENERAL
PAINLEVEL_OUTOF10: 9
PAINLEVEL_OUTOF10: 9
PAINLEVEL_OUTOF10: 8
PAINLEVEL_OUTOF10: 9
PAINLEVEL_OUTOF10: 10

## 2020-04-03 NOTE — PROGRESS NOTES
Physical Therapy      [x] daily progress note       [] discharge       Patient Name:  Rodrigo Arvizu   :  1960 MRN: [id number]  Room:  [address] [place], [place] [address] Date of Admission: 2020  Rehabilitation Diagnosis:   Acute hematogenous osteomyelitis of right foot ([address] [place]bt       Date 4/3/2020       Day of ARU Week:  3   Time IN/OUT 8284-4644   Individual Tx Minutes 60   TOTAL Tx Time Mins 60   Variance Time    Variance Time []   Refusal due to:     []   Medical hold/reason:    []   Illness   []   Off Unit for test/procedure  []   Extra time needed to complete task  []   Therapeutic need  []   Other (specify):   Restrictions Restrictions/Precautions  Restrictions/Precautions: General Precautions, Fall Risk, Weight Bearing(wound vac, NWB RLE)  Position Activity Restriction  Other position/activity restrictions: IV   Interdisciplinary communication [x]   Cleared for therapy per nursing     []   RN notified about issues during session  []   RN updated on pt performance  []   Spoke with   []   Spoke with OT  []   Spoke with MD  []   Other:    Subjective observations and cognitive status: Pt drowsy, resting in bed, agreeable to session. Pt reports feels apprehensive regarding hitting his foot when moving around, wanting a sx boot for protection. Discussed possibility of waffle boot due to pt NWB on R with [facility] PT. Reports feeling more confident in transfers and ambulation with RW with NWB R after todays session. Discussed with pt to have family bring in tennis shoe or other supportive shoe for improved stability with ambulation. Pt reports will call family to have dropped off. Pain level/location:  9 /10       Location: R ankle, reports meds received.     Discharge recommendations  Anticipated discharge date:  TBD  Destination: []home alone   []home alone with assist PRN     [] home w/ family      [] Continuous supervision  []SNF    [] Assisted living     [] Other:  Continued therapy: [x]C PT Responsibility: Primary  Shopping Responsibility: Primary  Dependent Care Responsibility: Primary(dogs; cares for disabled girlfriend as well )  Health Care Management: Secondary(required reminders from girlfriend )  Ambulation Assistance: Independent  Transfer Assistance: Independent  Active : Yes  Mode of Transportation: SUV, Truck  Occupation: On disability  Additional Comments: sleeps in regular, flat bed; no falls in the past year     Objective                                                                                    Goals:  (Update in navigator)  Short term goals  Time Frame for Short term goals: 10 tx days or until discharge:   Short term goal 1: Pt will complete all aspects of bed mobility and sup<->sit Ind including management of wound vac line if still applicable. Short term goal 2: Pt will complete OOB transfers with SBA following WB restriction on RLE and management of wound vac line if still applicable. Short term goal 3: Pt will propel manual w/c >/=300 ft with SBA (due to management of wound vac equipment) and manage w/c parts Ind to safely complete transfers and RLE positioning. Short term goal 4: Pt will ambulate 10 ft with turns using RW following NWB on RLE with SBA x 1 over level and carpeted surfaces. Short term goal 5: Pt will complete object retrieval from the floor in standing with 1UE support on RW and using adaptive equipment with SBA. :   :        Plan of Care                                                                              Times per week: 5 days per week for a minimum of 60 minutes/day plus group as appropriate for 60 minutes.   Treatment to include Current Treatment Recommendations: Strengthening, Functional Mobility Training, Wheelchair Mobility Training, Home Exercise Program, Equipment Evaluation, Education, & procurement, ROM, Transfer Training, Gait Training, Safety Education & Training, Balance Training, Endurance Training, Pain Management,

## 2020-04-03 NOTE — FLOWSHEET NOTE
(disabled but is ambulatory))  Type of Home: Mobile home  Home Layout: One level  Home Access: Ramped entrance  Bathroom Shower/Tub: Tub/Shower unit, Shower chair without back  Bathroom Toilet: Handicap height  Bathroom Equipment: Grab bars in shower, Shower chair  ADL Assistance: Independent  Homemaking Assistance: Independent  Meal Prep Responsibility: Primary  Laundry Responsibility: Primary  Cleaning Responsibility: Primary  Bill Paying/Finance Responsibility: Primary  Shopping Responsibility: Primary  Dependent Care Responsibility: Primary(dogs; cares for disabled girlfriend as well )  Health Care Management: Secondary(required reminders from girlfriend )  Ambulation Assistance: Independent  Transfer Assistance: Independent  Active : Yes  Mode of Transportation: SUV, Truck  Occupation: On disability  Additional Comments: sleeps in regular, flat bed; no falls in the past year     Objective                                                                                    Goals:  (Update in navigator)  Short term goals  Time Frame for Short term goals: 10 tx days or until discharge:   Short term goal 1: Pt will complete all aspects of bed mobility and sup<->sit Ind including management of wound vac line if still applicable. Short term goal 2: Pt will complete OOB transfers with SBA following WB restriction on RLE and management of wound vac line if still applicable. Short term goal 3: Pt will propel manual w/c >/=300 ft with SBA (due to management of wound vac equipment) and manage w/c parts Ind to safely complete transfers and RLE positioning. Short term goal 4: Pt will ambulate 10 ft with turns using RW following NWB on RLE with SBA x 1 over level and carpeted surfaces.    Short term goal 5: Pt will complete object retrieval from the floor in standing with 1UE support on RW and using adaptive equipment with SBA. :   :        Plan of Care

## 2020-04-03 NOTE — CONSULTS
Procedure Laterality Date    CORONARY ARTERY BYPASS GRAFT Bilateral 1/6/13   [facility] DENTAL SURGERY  2010    All upper teeth and some teeth on the bottom extracted.  HAND SURGERY  15 yrs ago    right thumb    TOE AMPUTATION Right 3/31/2020    RIGHT 5TH TOE AMPUTATION AND WOUND VAC PLACEMENT performed by Nito Erickson MD at [facility] OR      Family History   Problem Relation Age of Onset   [facility] Cancer Mother         breast    Other Father         parkinsons disease, CVA,HTN, heart disease      Infectious disease related family history - not contibutory. SOCIAL HISTORY  Social History     Tobacco Use    Smoking status: Current Some Day Smoker     Packs/day: 1.00     Years: 40.00     Pack years: 40.00     Types: Cigarettes    Smokeless tobacco: Former User   Substance Use Topics    Alcohol use: No     Comment: \"quit 19 yrs ago, use to drink, pretty heavy\"    CAFFEINE: 1-16 oz cup coffee daily.  Born:   Lived   Occupation:   No recent travel of significance.  No recent unusual exposures.  NO pets    ? ALLERGIES  No Known Allergies   MEDICATIONS  Reviewed and are per the chart/EMR. ? Antibiotics:   Vancomycin 3/25-  Cefepime 3/25  Metronidazole 3/24-  ?  -------------------------------------------------------------------------------------------------------------------    Vital Signs:  Vitals:    04/03/20 0915   BP: 132/67   Pulse: 65   Resp:    Temp:    SpO2:          Exam:    VS: noted; wt 69.3 kg  Gen: alert and oriented X3, no distress  Skin: no stigmata of endocarditis  Wounds: right foot wound VAC. HEMT: AT/NC Oropharynx pink, moist, and without lesions or exudates  Eyes: PERRLA, EOMI, conjunctiva pink, sclera anicteric. Neck: Supple. Trachea midline. No LAD. Chest: no distress and CTA. Good air movement. Heart: RRR and no MRG. Abd: soft, non-distended, no tenderness, no hepatomegaly. Normoactive bowel sounds.   Ext: no clubbing, cyanosis, or edema  Catheter Site: without erythema or tenderness  Neuro: Mental status intact. CN 2-12 intact and no focal sensory or motor deficits    ? Diagnostic Studies: reviewed  ? ? I have examined this patient and available medical records on this date and have made the above observations, conclusions and recommendations.   Electronically signed by: Electronically signed by Twyla Rodríguez MD on 4/3/2020 at 9:20 AM

## 2020-04-03 NOTE — PROGRESS NOTES
Goal: Independent  Sit to Lying  Assistance Needed: Setup or clean-up assistance  Comment: pt only required set-up assistance of wound vac line  CARE Score: 5  Discharge Goal: Independent  Sit to Stand  Assistance Needed: Partial/moderate assistance  CARE Score: 3  Discharge Goal: Set-up or clean-up assistance  Chair/Bed-to-Chair Transfer  Assistance Needed: Partial/moderate assistance  CARE Score: 3  Discharge Goal: Set-up or clean-up assistance  Toilet Transfer  Assistance Needed: Partial/moderate assistance  CARE Score: 3  Discharge Goal: Independent  Car Transfer  Assistance Needed: Partial/moderate assistance  CARE Score: 3  Discharge Goal: Set-up or clean-up assistance      1 Step  1 Step?: No  Reason if not Attempted: Not attempted due to medical condition or safety concerns  Picking Up Object  Reason if not Attempted: Not attempted due to medical condition or safety concerns  CARE Score: 88  Discharge Goal: Set-up or clean-up assistance  Wheelchair Ability  Uses a Wheelchair and/or Scooter?: Yes  Wheel 50 Feet with Two Turns  Assistance Needed: Supervision or touching assistance  CARE Score: 4  Discharge Goal: Set-up or clean-up assistance  Wheel 150 Feet  Assistance Needed: Supervision or touching assistance  CARE Score: 4  Discharge Goal: Set-up or clean-up assistance            I      Exam:    Blood pressure 113/65, pulse 63, temperature 98.3 °F (36.8 °C), resp. rate 18, height 5' 8\" (1.727 m), SpO2 96 %. General: Up in a wheelchair. Trying to elevate his right leg. VAC dressing in place. Alert and oriented. HEENT: MMM. Neck supple. No adenopathy. Pulmonary: Clear and unlabored. Cardiac: RRR. Abdomen: Patient's abdomen is soft and nondistended. Bowel sounds were present throughout. There was no rebound, guarding or masses noted. Upper extremities: Clumsy hand movements with diminished sensation. Fair . Lower extremities: VAC dressing in place on the right foot.   [place] of

## 2020-04-04 LAB
CREAT SERPL-MCNC: 0.6 MG/DL (ref 0.9–1.3)
GFR AFRICAN AMERICAN: >60 ML/MIN/1.73M2
GFR NON-AFRICAN AMERICAN: >60 ML/MIN/1.73M2
GLUCOSE BLD-MCNC: 115 MG/DL (ref 70–99)
GLUCOSE BLD-MCNC: 134 MG/DL (ref 70–99)
GLUCOSE BLD-MCNC: 141 MG/DL (ref 70–99)
GLUCOSE BLD-MCNC: 176 MG/DL (ref 70–99)
GLUCOSE BLD-MCNC: 70 MG/DL (ref 70–99)
GLUCOSE BLD-MCNC: 99 MG/DL (ref 70–99)

## 2020-04-04 PROCEDURE — 97535 SELF CARE MNGMENT TRAINING: CPT

## 2020-04-04 PROCEDURE — 97530 THERAPEUTIC ACTIVITIES: CPT

## 2020-04-04 PROCEDURE — 1280000000 HC REHAB R&B

## 2020-04-04 PROCEDURE — 82962 GLUCOSE BLOOD TEST: CPT

## 2020-04-04 PROCEDURE — 2580000003 HC RX 258: Performed by: PHYSICAL MEDICINE & REHABILITATION

## 2020-04-04 PROCEDURE — 99232 SBSQ HOSP IP/OBS MODERATE 35: CPT | Performed by: PHYSICAL MEDICINE & REHABILITATION

## 2020-04-04 PROCEDURE — 6370000000 HC RX 637 (ALT 250 FOR IP): Performed by: INTERNAL MEDICINE

## 2020-04-04 PROCEDURE — 82565 ASSAY OF CREATININE: CPT

## 2020-04-04 PROCEDURE — 97542 WHEELCHAIR MNGMENT TRAINING: CPT

## 2020-04-04 PROCEDURE — 97116 GAIT TRAINING THERAPY: CPT

## 2020-04-04 PROCEDURE — 97110 THERAPEUTIC EXERCISES: CPT

## 2020-04-04 PROCEDURE — 6360000002 HC RX W HCPCS: Performed by: INTERNAL MEDICINE

## 2020-04-04 RX ORDER — INSULIN GLARGINE 100 [IU]/ML
20 INJECTION, SOLUTION SUBCUTANEOUS NIGHTLY
Status: DISCONTINUED | OUTPATIENT
Start: 2020-04-04 | End: 2020-04-05

## 2020-04-04 RX ADMIN — VANCOMYCIN HYDROCHLORIDE 1000 MG: 1 INJECTION, SOLUTION INTRAVENOUS at 06:22

## 2020-04-04 RX ADMIN — MORPHINE SULFATE 2 MG: 4 INJECTION, SOLUTION INTRAMUSCULAR; INTRAVENOUS at 00:20

## 2020-04-04 RX ADMIN — HYDROCODONE BITARTRATE AND ACETAMINOPHEN 2 TABLET: 5; 325 TABLET ORAL at 15:47

## 2020-04-04 RX ADMIN — MORPHINE SULFATE 2 MG: 4 INJECTION, SOLUTION INTRAMUSCULAR; INTRAVENOUS at 06:28

## 2020-04-04 RX ADMIN — LEVOTHYROXINE SODIUM 100 MCG: 100 TABLET ORAL at 06:22

## 2020-04-04 RX ADMIN — Medication 10 ML: at 20:41

## 2020-04-04 RX ADMIN — ATORVASTATIN CALCIUM 40 MG: 40 TABLET, FILM COATED ORAL at 20:41

## 2020-04-04 RX ADMIN — MORPHINE SULFATE 2 MG: 4 INJECTION, SOLUTION INTRAMUSCULAR; INTRAVENOUS at 12:00

## 2020-04-04 RX ADMIN — HYDROCODONE BITARTRATE AND ACETAMINOPHEN 2 TABLET: 5; 325 TABLET ORAL at 20:41

## 2020-04-04 RX ADMIN — GABAPENTIN 600 MG: 300 CAPSULE ORAL at 20:41

## 2020-04-04 RX ADMIN — SODIUM CHLORIDE, PRESERVATIVE FREE 10 ML: 5 INJECTION INTRAVENOUS at 11:53

## 2020-04-04 RX ADMIN — HYDROCODONE BITARTRATE AND ACETAMINOPHEN 2 TABLET: 5; 325 TABLET ORAL at 03:12

## 2020-04-04 RX ADMIN — INSULIN GLARGINE 20 UNITS: 100 INJECTION, SOLUTION SUBCUTANEOUS at 20:44

## 2020-04-04 RX ADMIN — HYDROCODONE BITARTRATE AND ACETAMINOPHEN 2 TABLET: 5; 325 TABLET ORAL at 08:41

## 2020-04-04 RX ADMIN — GABAPENTIN 600 MG: 300 CAPSULE ORAL at 14:43

## 2020-04-04 RX ADMIN — GABAPENTIN 600 MG: 300 CAPSULE ORAL at 06:22

## 2020-04-04 RX ADMIN — VANCOMYCIN HYDROCHLORIDE 1000 MG: 1 INJECTION, SOLUTION INTRAVENOUS at 18:16

## 2020-04-04 RX ADMIN — MORPHINE SULFATE 2 MG: 4 INJECTION, SOLUTION INTRAMUSCULAR; INTRAVENOUS at 23:38

## 2020-04-04 RX ADMIN — SODIUM CHLORIDE, PRESERVATIVE FREE 10 ML: 5 INJECTION INTRAVENOUS at 18:15

## 2020-04-04 RX ADMIN — ENOXAPARIN SODIUM 40 MG: 40 INJECTION SUBCUTANEOUS at 07:46

## 2020-04-04 RX ADMIN — CLOPIDOGREL BISULFATE 75 MG: 75 TABLET ORAL at 07:46

## 2020-04-04 RX ADMIN — Medication 10 ML: at 07:48

## 2020-04-04 RX ADMIN — MORPHINE SULFATE 2 MG: 4 INJECTION, SOLUTION INTRAMUSCULAR; INTRAVENOUS at 18:53

## 2020-04-04 ASSESSMENT — PAIN - FUNCTIONAL ASSESSMENT
PAIN_FUNCTIONAL_ASSESSMENT: PREVENTS OR INTERFERES WITH MANY ACTIVE NOT PASSIVE ACTIVITIES
PAIN_FUNCTIONAL_ASSESSMENT: PREVENTS OR INTERFERES SOME ACTIVE ACTIVITIES AND ADLS
PAIN_FUNCTIONAL_ASSESSMENT: PREVENTS OR INTERFERES WITH MANY ACTIVE NOT PASSIVE ACTIVITIES
PAIN_FUNCTIONAL_ASSESSMENT: PREVENTS OR INTERFERES SOME ACTIVE ACTIVITIES AND ADLS

## 2020-04-04 ASSESSMENT — PAIN DESCRIPTION - FREQUENCY
FREQUENCY: INTERMITTENT
FREQUENCY: CONTINUOUS

## 2020-04-04 ASSESSMENT — PAIN DESCRIPTION - PROGRESSION
CLINICAL_PROGRESSION: GRADUALLY WORSENING
CLINICAL_PROGRESSION: NOT CHANGED

## 2020-04-04 ASSESSMENT — PAIN DESCRIPTION - PAIN TYPE
TYPE: SURGICAL PAIN

## 2020-04-04 ASSESSMENT — PAIN DESCRIPTION - ONSET
ONSET: ON-GOING

## 2020-04-04 ASSESSMENT — PAIN SCALES - GENERAL
PAINLEVEL_OUTOF10: 10
PAINLEVEL_OUTOF10: 8
PAINLEVEL_OUTOF10: 9
PAINLEVEL_OUTOF10: 9
PAINLEVEL_OUTOF10: 8
PAINLEVEL_OUTOF10: 6
PAINLEVEL_OUTOF10: 9
PAINLEVEL_OUTOF10: 9
PAINLEVEL_OUTOF10: 8
PAINLEVEL_OUTOF10: 10

## 2020-04-04 ASSESSMENT — PAIN DESCRIPTION - ORIENTATION
ORIENTATION: RIGHT

## 2020-04-04 ASSESSMENT — PAIN DESCRIPTION - LOCATION
LOCATION: FOOT

## 2020-04-04 ASSESSMENT — PAIN DESCRIPTION - DESCRIPTORS
DESCRIPTORS: BURNING;THROBBING
DESCRIPTORS: THROBBING;BURNING
DESCRIPTORS: ACHING;BURNING;SORE
DESCRIPTORS: BURNING;THROBBING

## 2020-04-04 NOTE — PROGRESS NOTES
but is ambulatory))  Type of Home: Mobile home  Home Layout: One level  Home Access: Ramped entrance  Bathroom Shower/Tub: Tub/Shower unit, Shower chair without back  Bathroom Toilet: Handicap height  Bathroom Equipment: Grab bars in shower, Shower chair  ADL Assistance: Independent  Homemaking Assistance: Independent  Meal Prep Responsibility: Primary  Laundry Responsibility: Primary  Cleaning Responsibility: Primary  Bill Paying/Finance Responsibility: Primary  Shopping Responsibility: Primary  Dependent Care Responsibility: Primary(dogs; cares for disabled girlfriend as well )  Health Care Management: Secondary(required reminders from girlfriend )  Ambulation Assistance: Independent  Transfer Assistance: Independent  Active : Yes  Mode of Transportation: SUV, Truck  Occupation: On disability  Additional Comments: sleeps in regular, flat bed; no falls in the past year     Objective                                                                                    Goals:  (Update in navigator)  Short term goals  Time Frame for Short term goals: STG=LTG:  Long term goals  Time Frame for Long term goals : 7 days or until D/C  Long term goal 1: Pt to complete grooming with Ind  Long term goal 2: Pt to complete bathing using AE/DME PRN with Mod(I)  Long term goal 3: Pt to complete UB dressing with Ind  Long term goal 4: Pt to complete LB dressing with Ind  Long term goal 5: Pt to complete toileting with Ind  Long term goals 6: Pt to complete all transfers (bed, toilet, shower) with mod(I)  Long term goal 8: Pt to doff/don footwear with Ind  Long term goal 9: Pt to complete ongoing therex/act. w/emphasis on >5-8 min static/dynamic standing for ADL/IADL's :        Plan of Care                                                                              Times per week: 5 days per week for a minimum of 60 minutes/day plus group as appropriate for 60 minutes.   Treatment to include Plan  Times per day: Daily  Current

## 2020-04-04 NOTE — PROGRESS NOTES
Progress Note( Dr. Ana Joaquin)  4/4/2020  Subjective:   Admit Date: 4/1/2020  PCP: Brigido Santiago MD    Admitted For :Right foot infection possibly gangrene    Consulted For: Better control of blood glucose    Interval History: No major change except back right leg arteriogram done  See the results below    Patient was transferred to acute rehab unit after right fifth toe amputation and placing wound vacuum done on 3/31/2020    Denies any chest pains,   Denies SOB . Denies nausea or vomiting. No new bowel or bladder symptoms. Intake/Output Summary (Last 24 hours) at 4/4/2020 0815  Last data filed at 4/4/2020 0748  Gross per 24 hour   Intake 250 ml   Output 2000 ml   Net -1750 ml       DATA    CBC:   No results for input(s): WBC, HGB, PLT in the last 72 hours. CMP:  Recent Labs     04/02/20  0331 04/04/20  0700   CREATININE 0.7* 0.6*     Lipids:   Lab Results   Component Value Date    CHOL 121 08/29/2018    HDL 67 08/29/2018    TRIG 65 08/29/2018     Glucose:  Recent Labs     04/04/20  0236 04/04/20  0308 04/04/20  0734   POCGLU 70 141* 115*     [id number]:  Lab Results   Component Value Date    LABA1C 12.2 03/23/2020     High Sensitivity TSH:   Lab Results   Component Value Date    TSHHS 3.990 03/23/2020     Free T3: No results found for: FT3  Free T4:  Lab Results   Component Value Date    T4FREE 0.95 03/25/2020       Xr Foot Right (min 3 Views)    Result Date: 3/24/2020  EXAMINATION: THREE XRAY VIEWS OF THE RIGHT FOOT 3/23/2020 3:32 pm     Soft tissue ulceration adjacent to the 5th MTP joint with soft tissue air or gas. Suspected osteomyelitis of the 5th toe proximally and 5th metatarsal head. Vl Dup Lower Extremity Arteries Bilateral    Result Date: 3/25/2020  EXAMINATION: ARTERIAL DUPLEX ULTRASOUND OF THE BILATERAL LOWER EXTREMITIES  3/25/2020 5:55 am       1. The bilateral proximal peroneal arteries are not visualized.  2. Biphasic and monophasic waveforms are identified throughout the right

## 2020-04-05 LAB
GLUCOSE BLD-MCNC: 147 MG/DL (ref 70–99)
GLUCOSE BLD-MCNC: 153 MG/DL (ref 70–99)
GLUCOSE BLD-MCNC: 162 MG/DL (ref 70–99)
GLUCOSE BLD-MCNC: 230 MG/DL (ref 70–99)
GLUCOSE BLD-MCNC: 89 MG/DL (ref 70–99)

## 2020-04-05 PROCEDURE — 94150 VITAL CAPACITY TEST: CPT

## 2020-04-05 PROCEDURE — 1280000000 HC REHAB R&B

## 2020-04-05 PROCEDURE — 94761 N-INVAS EAR/PLS OXIMETRY MLT: CPT

## 2020-04-05 PROCEDURE — 6360000002 HC RX W HCPCS: Performed by: INTERNAL MEDICINE

## 2020-04-05 PROCEDURE — 6370000000 HC RX 637 (ALT 250 FOR IP): Performed by: INTERNAL MEDICINE

## 2020-04-05 PROCEDURE — 82962 GLUCOSE BLOOD TEST: CPT

## 2020-04-05 PROCEDURE — 2580000003 HC RX 258: Performed by: PHYSICAL MEDICINE & REHABILITATION

## 2020-04-05 RX ORDER — INSULIN GLARGINE 100 [IU]/ML
10 INJECTION, SOLUTION SUBCUTANEOUS NIGHTLY
Status: DISCONTINUED | OUTPATIENT
Start: 2020-04-05 | End: 2020-04-07

## 2020-04-05 RX ADMIN — ENOXAPARIN SODIUM 40 MG: 40 INJECTION SUBCUTANEOUS at 08:41

## 2020-04-05 RX ADMIN — INSULIN GLARGINE 10 UNITS: 100 INJECTION, SOLUTION SUBCUTANEOUS at 20:59

## 2020-04-05 RX ADMIN — CLOPIDOGREL BISULFATE 75 MG: 75 TABLET ORAL at 08:41

## 2020-04-05 RX ADMIN — ATORVASTATIN CALCIUM 40 MG: 40 TABLET, FILM COATED ORAL at 20:48

## 2020-04-05 RX ADMIN — LEVOTHYROXINE SODIUM 100 MCG: 100 TABLET ORAL at 06:30

## 2020-04-05 RX ADMIN — VANCOMYCIN HYDROCHLORIDE 1000 MG: 1 INJECTION, SOLUTION INTRAVENOUS at 06:31

## 2020-04-05 RX ADMIN — HYDROCODONE BITARTRATE AND ACETAMINOPHEN 2 TABLET: 5; 325 TABLET ORAL at 21:58

## 2020-04-05 RX ADMIN — MORPHINE SULFATE 2 MG: 4 INJECTION, SOLUTION INTRAMUSCULAR; INTRAVENOUS at 03:44

## 2020-04-05 RX ADMIN — SODIUM CHLORIDE, PRESERVATIVE FREE 10 ML: 5 INJECTION INTRAVENOUS at 12:03

## 2020-04-05 RX ADMIN — VANCOMYCIN HYDROCHLORIDE 1000 MG: 1 INJECTION, SOLUTION INTRAVENOUS at 20:47

## 2020-04-05 RX ADMIN — Medication 10 ML: at 08:41

## 2020-04-05 RX ADMIN — MORPHINE SULFATE 2 MG: 4 INJECTION, SOLUTION INTRAMUSCULAR; INTRAVENOUS at 20:37

## 2020-04-05 RX ADMIN — Medication 10 ML: at 20:38

## 2020-04-05 RX ADMIN — GABAPENTIN 600 MG: 300 CAPSULE ORAL at 13:28

## 2020-04-05 RX ADMIN — INSULIN LISPRO 2 UNITS: 100 INJECTION, SOLUTION INTRAVENOUS; SUBCUTANEOUS at 12:09

## 2020-04-05 RX ADMIN — GABAPENTIN 600 MG: 300 CAPSULE ORAL at 06:30

## 2020-04-05 RX ADMIN — INSULIN LISPRO 4 UNITS: 100 INJECTION, SOLUTION INTRAVENOUS; SUBCUTANEOUS at 17:15

## 2020-04-05 RX ADMIN — GABAPENTIN 600 MG: 300 CAPSULE ORAL at 20:48

## 2020-04-05 RX ADMIN — INSULIN LISPRO 2 UNITS: 100 INJECTION, SOLUTION INTRAVENOUS; SUBCUTANEOUS at 08:47

## 2020-04-05 RX ADMIN — HYDROCODONE BITARTRATE AND ACETAMINOPHEN 2 TABLET: 5; 325 TABLET ORAL at 15:18

## 2020-04-05 RX ADMIN — HYDROCODONE BITARTRATE AND ACETAMINOPHEN 2 TABLET: 5; 325 TABLET ORAL at 08:39

## 2020-04-05 RX ADMIN — MORPHINE SULFATE 2 MG: 4 INJECTION, SOLUTION INTRAMUSCULAR; INTRAVENOUS at 12:03

## 2020-04-05 ASSESSMENT — PAIN DESCRIPTION - ORIENTATION
ORIENTATION: RIGHT

## 2020-04-05 ASSESSMENT — PAIN DESCRIPTION - PAIN TYPE
TYPE: SURGICAL PAIN

## 2020-04-05 ASSESSMENT — PAIN DESCRIPTION - FREQUENCY
FREQUENCY: INTERMITTENT
FREQUENCY: CONTINUOUS
FREQUENCY: INTERMITTENT

## 2020-04-05 ASSESSMENT — PAIN DESCRIPTION - ONSET
ONSET: ON-GOING

## 2020-04-05 ASSESSMENT — PAIN DESCRIPTION - PROGRESSION
CLINICAL_PROGRESSION: GRADUALLY WORSENING
CLINICAL_PROGRESSION: GRADUALLY WORSENING
CLINICAL_PROGRESSION: NOT CHANGED

## 2020-04-05 ASSESSMENT — PAIN SCALES - GENERAL
PAINLEVEL_OUTOF10: 6
PAINLEVEL_OUTOF10: 9
PAINLEVEL_OUTOF10: 9
PAINLEVEL_OUTOF10: 6
PAINLEVEL_OUTOF10: 6
PAINLEVEL_OUTOF10: 9
PAINLEVEL_OUTOF10: 8
PAINLEVEL_OUTOF10: 8
PAINLEVEL_OUTOF10: 10

## 2020-04-05 ASSESSMENT — PAIN - FUNCTIONAL ASSESSMENT
PAIN_FUNCTIONAL_ASSESSMENT: PREVENTS OR INTERFERES SOME ACTIVE ACTIVITIES AND ADLS

## 2020-04-05 ASSESSMENT — PAIN DESCRIPTION - LOCATION
LOCATION: FOOT

## 2020-04-05 ASSESSMENT — PAIN DESCRIPTION - DESCRIPTORS
DESCRIPTORS: BURNING;THROBBING
DESCRIPTORS: THROBBING;BURNING
DESCRIPTORS: CONSTANT

## 2020-04-05 NOTE — PROGRESS NOTES
Right  Assistance Needed: Setup or clean-up assistance  Comment: pt only required set-up assistance of wound vac line  CARE Score: 5  Discharge Goal: Independent  Sit to Lying  Assistance Needed: Setup or clean-up assistance  Comment: pt only required set-up assistance of wound vac line  CARE Score: 5  Discharge Goal: Independent  Sit to Stand  Assistance Needed: Partial/moderate assistance  CARE Score: 3  Discharge Goal: Set-up or clean-up assistance  Chair/Bed-to-Chair Transfer  Assistance Needed: Partial/moderate assistance  CARE Score: 3  Discharge Goal: Set-up or clean-up assistance  Toilet Transfer  Assistance Needed: Partial/moderate assistance  CARE Score: 3  Discharge Goal: Independent  Car Transfer  Assistance Needed: Partial/moderate assistance  CARE Score: 3  Discharge Goal: Set-up or clean-up assistance      1 Step  1 Step?: No  Reason if not Attempted: Not attempted due to medical condition or safety concerns  Picking Up Object  Reason if not Attempted: Not attempted due to medical condition or safety concerns  CARE Score: 88  Discharge Goal: Set-up or clean-up assistance  Wheelchair Ability  Uses a Wheelchair and/or Scooter?: Yes  Wheel 50 Feet with Two Turns  Assistance Needed: Supervision or touching assistance  CARE Score: 4  Discharge Goal: Set-up or clean-up assistance  Wheel 150 Feet  Assistance Needed: Supervision or touching assistance  CARE Score: 4  Discharge Goal: Set-up or clean-up assistance            I      Exam:    Blood pressure (!) 152/67, pulse 70, temperature 98.6 °F (37 °C), resp. rate 16, height 5' 8\" (1.727 m), weight 152 lb 12.8 oz (69.3 kg), SpO2 96 %. General: Up in wheelchair. Right leg elevated some. In no distress. Oriented x3. HEENT: MMM. Neck supple. Pulmonary: Clear and unlabored. Cardiac: RRR. Abdomen: Patient's abdomen is soft and nondistended. Bowel sounds were present throughout. There was no rebound, guarding or masses noted.     Upper

## 2020-04-05 NOTE — PROGRESS NOTES
Right leg arteriogram done on 3/26/2020   Lesion on Superficial Femoral, Right: 100% stenosis. Peripheral Vascular Intervention:Peripheral PTA, Peripheral   Atherectomy: Fem/Pop  Findings: Severe CFA stenosis noted with good result post PTA      Additional Procedure: Atherectomy of CFA and prox SFA and PTA of CFA and SFA      Scheduled Medicines   Medications:    insulin glargine  10 Units Subcutaneous Nightly    insulin lispro  0-12 Units Subcutaneous TID WC    insulin lispro  0-6 Units Subcutaneous 2 times per day    sodium chloride flush  10 mL Intravenous 2 times per day    atorvastatin  40 mg Oral Nightly    clopidogrel  75 mg Oral Daily    enoxaparin  40 mg Subcutaneous Daily    gabapentin  600 mg Oral TID    levothyroxine  100 mcg Oral Daily    nicotine  1 patch Transdermal Daily    vancomycin  1,000 mg Intravenous Q12H      Infusions:    dextrose           Objective:   Vitals: BP (!) 163/77   Pulse 67   Temp 98.3 °F (36.8 °C) (Oral)   Resp 16   Ht 5' 8\" (1.727 m)   Wt 152 lb 12.8 oz (69.3 kg)   SpO2 99%   BMI 23.23 kg/m²   General appearance: alert and cooperative with exam  Neck: no JVD or bruit  Thyroid : Normal lobes   Lungs: Has Vesicular Breath sounds   Heart:  regular rate and rhythm  Abdomen: soft, non-tender; bowel sounds normal; no masses,  no organomegaly  Musculoskeletal: Normal  Extremities: extremities normal, , no edema//   right fifth toe amputation and placed wound vacuum done on 3/31/2020  Has peripheral vascular disease bilaterally  lower extremities  Neurologic:  Awake, alert, oriented to name, place and time. Cranial nerves II-XII are grossly intact. Motor is  intact.   Sensory neuropathy t.,  and gait is abnormal.  And unstable    Assessment:     Patient Active Problem List:   Diabetes mellitus    S/P CABG (coronary artery bypass graft)    Cellulitis    CAD (coronary artery disease)    Polysubstance abuse ([facility] [address]    Narcotic abuse, continuous (HCC)    Hx of

## 2020-04-05 NOTE — PROGRESS NOTES
Malika Mitchell    : 1960  Acct #: [de-identified]  MRN: [id number]              PM&R Progress Note      Admitting diagnosis: Osteomyelitis of the right foot.     Comorbid diagnoses impacting rehabilitation: Status post amputation of the right fifth toe, PAD, uncontrolled pain, gait disturbance, generalized weakness, uncontrolled diabetes with peripheral neuropathy, COPD, history of polysubstance abuse. Chief complaint: Tired after earlier therapies. Some nausea at lunchtime that has passed. No chills or coughing. Prior (baseline) level of function: Independent. Current level of function:         Current  IRF-GIANNI and Goals:   Hearing, Speech, and Vision  Expression of Ideas and Wants: Without difficulty  Understanding Verbal and Non-Verbal Content: Understands  Prior Functioning: Everyday Activities  Self Care: Independent  Indoor Mobility (Ambulation):  Independent  Stairs: Independent  Functional Cognition: Needed some help  Prior Device Use: None of the given options(Ind)    Eating  Assistance Needed: Setup or clean-up assistance  Comment: Pt reports difficulty opening packets on this date requiring set up at times  CARE Score: 5  Discharge Goal: Independent  Oral Hygiene  Assistance Needed: Supervision or touching assistance  CARE Score: 4  Discharge Goal: Independent  Toileting Hygiene  Assistance Needed: Partial/moderate assistance  Comment: per PT; required physical assist for stability to manage clothing (up/down)  CARE Score: 3  Discharge Goal: Independent  Shower/Bathe Self  Assistance Needed: Supervision or touching assistance  CARE Score: 4  Discharge Goal: Independent  Upper Body Dressing  Assistance Needed: Supervision or touching assistance  CARE Score: 4  Discharge Goal: Independent  Lower Body Dressing  Assistance Needed: Supervision or touching assistance  CARE Score: 4  Discharge Goal: Independent  Putting On/Taking Off Footwear  Assistance Needed: Setup or clean-up assistance  CARE Score: 5  Discharge Goal: Independent    Roll Left and Right  Assistance Needed: Setup or clean-up assistance  Comment: pt only required set-up assistance of wound vac line  CARE Score: 5  Discharge Goal: Independent  Sit to Lying  Assistance Needed: Setup or clean-up assistance  Comment: pt only required set-up assistance of wound vac line  CARE Score: 5  Discharge Goal: Independent  Sit to Stand  Assistance Needed: Partial/moderate assistance  CARE Score: 3  Discharge Goal: Set-up or clean-up assistance  Chair/Bed-to-Chair Transfer  Assistance Needed: Partial/moderate assistance  CARE Score: 3  Discharge Goal: Set-up or clean-up assistance  Toilet Transfer  Assistance Needed: Partial/moderate assistance  CARE Score: 3  Discharge Goal: Independent  Car Transfer  Assistance Needed: Partial/moderate assistance  CARE Score: 3  Discharge Goal: Set-up or clean-up assistance      1 Step  1 Step?: No  Reason if not Attempted: Not attempted due to medical condition or safety concerns  Picking Up Object  Reason if not Attempted: Not attempted due to medical condition or safety concerns  CARE Score: 88  Discharge Goal: Set-up or clean-up assistance  Wheelchair Ability  Uses a Wheelchair and/or Scooter?: Yes  Wheel 50 Feet with Two Turns  Assistance Needed: Supervision or touching assistance  CARE Score: 4  Discharge Goal: Set-up or clean-up assistance  Wheel 150 Feet  Assistance Needed: Supervision or touching assistance  CARE Score: 4  Discharge Goal: Set-up or clean-up assistance            I      Exam:    Blood pressure (!) 152/67, pulse 70, temperature 98.6 °F (37 °C), resp. rate 16, height 5' 8\" (1.727 m), weight 152 lb 12.8 oz (69.3 kg), SpO2 96 %. General: Lying back in bed. Alert talkative. In fair spirits. HEENT: MMM. Neck supple. Pulmonary: Clear and unlabored. Cardiac: RRR. Abdomen: Patient's abdomen is soft and nondistended. Bowel sounds were present throughout.   There was no rebound, guarding Currently the patient is tolerating Neurontin, oxycodone and IV morphine.    Encouraging limb elevation to reduce swelling and provide therapeutic exercises. 3. Uncontrolled diabetes with peripheral neuropathy: Patient requires a diet modified for carbohydrates.  He is on scheduled Lantus at night and Humalog sliding scale insulin with each meal.  Consistent oral intake will be encouraged.  Blood sugars are checked at mealtime and bedtime.  Blood sugars occasionally greater than 200.  4.  COPD: Patient requires PRN nebulizers, aggressive pulmonary hygiene and monitoring of his O2 saturations at rest and with activity.  He is on NicoDerm and smoking cessation encouraged.

## 2020-04-06 LAB
CREAT SERPL-MCNC: 0.6 MG/DL (ref 0.9–1.3)
DOSE AMOUNT: NORMAL
DOSE TIME: NORMAL
GFR AFRICAN AMERICAN: >60 ML/MIN/1.73M2
GFR NON-AFRICAN AMERICAN: >60 ML/MIN/1.73M2
GLUCOSE BLD-MCNC: 150 MG/DL (ref 70–99)
GLUCOSE BLD-MCNC: 179 MG/DL (ref 70–99)
GLUCOSE BLD-MCNC: 181 MG/DL (ref 70–99)
GLUCOSE BLD-MCNC: 197 MG/DL (ref 70–99)
GLUCOSE BLD-MCNC: 256 MG/DL (ref 70–99)
VANCOMYCIN TROUGH: 16.5 UG/ML (ref 10–20)

## 2020-04-06 PROCEDURE — 97530 THERAPEUTIC ACTIVITIES: CPT

## 2020-04-06 PROCEDURE — 82565 ASSAY OF CREATININE: CPT

## 2020-04-06 PROCEDURE — 2580000003 HC RX 258: Performed by: PHYSICAL MEDICINE & REHABILITATION

## 2020-04-06 PROCEDURE — 97605 NEG PRS WND THER DME<=50SQCM: CPT

## 2020-04-06 PROCEDURE — 82962 GLUCOSE BLOOD TEST: CPT

## 2020-04-06 PROCEDURE — 36592 COLLECT BLOOD FROM PICC: CPT

## 2020-04-06 PROCEDURE — 80202 ASSAY OF VANCOMYCIN: CPT

## 2020-04-06 PROCEDURE — 6360000002 HC RX W HCPCS: Performed by: INTERNAL MEDICINE

## 2020-04-06 PROCEDURE — 97535 SELF CARE MNGMENT TRAINING: CPT

## 2020-04-06 PROCEDURE — 94010 BREATHING CAPACITY TEST: CPT

## 2020-04-06 PROCEDURE — 6370000000 HC RX 637 (ALT 250 FOR IP): Performed by: INTERNAL MEDICINE

## 2020-04-06 PROCEDURE — 94761 N-INVAS EAR/PLS OXIMETRY MLT: CPT

## 2020-04-06 PROCEDURE — 99232 SBSQ HOSP IP/OBS MODERATE 35: CPT | Performed by: PHYSICAL MEDICINE & REHABILITATION

## 2020-04-06 PROCEDURE — 97116 GAIT TRAINING THERAPY: CPT

## 2020-04-06 PROCEDURE — 99232 SBSQ HOSP IP/OBS MODERATE 35: CPT | Performed by: NURSE PRACTITIONER

## 2020-04-06 PROCEDURE — 1280000000 HC REHAB R&B

## 2020-04-06 PROCEDURE — 97110 THERAPEUTIC EXERCISES: CPT

## 2020-04-06 RX ADMIN — LEVOTHYROXINE SODIUM 100 MCG: 100 TABLET ORAL at 06:12

## 2020-04-06 RX ADMIN — Medication 10 ML: at 20:50

## 2020-04-06 RX ADMIN — MORPHINE SULFATE 2 MG: 4 INJECTION, SOLUTION INTRAMUSCULAR; INTRAVENOUS at 02:30

## 2020-04-06 RX ADMIN — GABAPENTIN 600 MG: 300 CAPSULE ORAL at 06:12

## 2020-04-06 RX ADMIN — INSULIN GLARGINE 10 UNITS: 100 INJECTION, SOLUTION SUBCUTANEOUS at 20:42

## 2020-04-06 RX ADMIN — VANCOMYCIN HYDROCHLORIDE 1000 MG: 1 INJECTION, SOLUTION INTRAVENOUS at 19:47

## 2020-04-06 RX ADMIN — GABAPENTIN 600 MG: 300 CAPSULE ORAL at 20:42

## 2020-04-06 RX ADMIN — VANCOMYCIN HYDROCHLORIDE 1000 MG: 1 INJECTION, SOLUTION INTRAVENOUS at 06:35

## 2020-04-06 RX ADMIN — INSULIN LISPRO 2 UNITS: 100 INJECTION, SOLUTION INTRAVENOUS; SUBCUTANEOUS at 11:47

## 2020-04-06 RX ADMIN — HYDROCODONE BITARTRATE AND ACETAMINOPHEN 2 TABLET: 5; 325 TABLET ORAL at 13:16

## 2020-04-06 RX ADMIN — HYDROCODONE BITARTRATE AND ACETAMINOPHEN 2 TABLET: 5; 325 TABLET ORAL at 06:35

## 2020-04-06 RX ADMIN — Medication 10 ML: at 07:57

## 2020-04-06 RX ADMIN — INSULIN LISPRO 2 UNITS: 100 INJECTION, SOLUTION INTRAVENOUS; SUBCUTANEOUS at 07:58

## 2020-04-06 RX ADMIN — MORPHINE SULFATE 2 MG: 4 INJECTION, SOLUTION INTRAMUSCULAR; INTRAVENOUS at 15:20

## 2020-04-06 RX ADMIN — HYDROCODONE BITARTRATE AND ACETAMINOPHEN 2 TABLET: 5; 325 TABLET ORAL at 19:47

## 2020-04-06 RX ADMIN — ATORVASTATIN CALCIUM 40 MG: 40 TABLET, FILM COATED ORAL at 20:42

## 2020-04-06 RX ADMIN — ENOXAPARIN SODIUM 40 MG: 40 INJECTION SUBCUTANEOUS at 07:57

## 2020-04-06 RX ADMIN — INSULIN LISPRO 1 UNITS: 100 INJECTION, SOLUTION INTRAVENOUS; SUBCUTANEOUS at 17:25

## 2020-04-06 RX ADMIN — CLOPIDOGREL BISULFATE 75 MG: 75 TABLET ORAL at 07:58

## 2020-04-06 RX ADMIN — GABAPENTIN 600 MG: 300 CAPSULE ORAL at 13:16

## 2020-04-06 ASSESSMENT — PAIN SCALES - GENERAL
PAINLEVEL_OUTOF10: 9
PAINLEVEL_OUTOF10: 0
PAINLEVEL_OUTOF10: 8
PAINLEVEL_OUTOF10: 5
PAINLEVEL_OUTOF10: 8
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
## 2020-04-06 NOTE — PROGRESS NOTES
Patient instructed and educated on Cor[employer]. Patient able to do 2000 ml. Vital capacity 2000 ml  Patient's goal is 2500 ml.  Electronically signed by Mary Anna RCP on 4/6/2020 at 12:17 PM

## 2020-04-06 NOTE — PROGRESS NOTES
CABG (coronary artery bypass graft)    Cellulitis    CAD (coronary artery disease)    Polysubstance abuse (HCC)    Narcotic abuse, continuous (HCC)    Hx of hepatitis    Epigastric pain    Bilateral leg weakness    Gait disturbance    Left carotid stenosis    Hypothyroidism    Osteomyelitis (HCC) Right foot          Peripheral vascular disease             right fifth toe amputation and placed wound vacuum done on 3/31/2020        Plan:     1. Reviewed POC blood glucose . Labs and X ray results   2. Reviewed Current Medicines   3. Will discontinue meal/but continue on correction bolus Humalog/ Basal Lantus Insulin regime   4. Monitor Blood glucose frequently   5. Modified  the dose of Insulin/ other medicines as needed   6. Patient participating in physical activities of acute rehab unit  7. Will follow     .      Bruce Rivera MD

## 2020-04-06 NOTE — PROGRESS NOTES
Occupational Therapy  Physical Rehabilitation: OCCUPATIONAL THERAPY     [x] daily progress note       [] discharge       Patient Name:  Mik Izaguirre   :  1960 MRN: [id number]  Room:  [address] [place] [address] Date of Admission: 2020  Rehabilitation Diagnosis:   Acute hematogenous osteomyelitis of right foot ([address] Gorge Bullion       Date 2020       Day of ARU Week:  6   Time IN/OUT 13:05/14:05   Individual Tx Minutes 60   Group Tx Minutes    Co-Treat Minutes    Concurrent Tx Minutes    TOTAL Tx Time Mins 60   Variance Time    Variance Time []   Refusal due to:     []   Medical hold/reason:    []   Illness   []   Off Unit for test/procedure  []   Extra time needed to complete task  []   Therapeutic need  []   Other (specify):   Restrictions Restrictions/Precautions: General Precautions, Fall Risk, Weight Bearing(wound vac, NWB RLE)         Communication with other providers: [x]   OK to see per nursing:     []   Spoke with team member regarding:      Subjective observations and cognitive status: Pt upright EOB and agreeable to therapy session     Pain level/location:    6/10       Location: R foot; nursing present and administers pain medication   Discharge recommendations  Anticipated discharge date:  TBD  Destination: []home alone   []home alone w assist prn   [] home w/ family    [] Continuous supervision       []SNF    [] Assisted living     [] Other:   Continued therapy: [x]HHC OT  []OUTPATIENT  OT   [] No Further OT  Equipment needs: Mik Izaguirre requires the assistance of a tub transfer bench to successfully and safely complete bathing activities necessary due to reduced balance, endurance, need for ADL assist, and LE weakness and reduced ROM. These tasks cannot be safely completed without this device and would place Mik Izaguirre at greater risk of falls. Therapist recommended BSC, however, Pt declines.         Toileting:  Declines need      Toilet Transfers:  n/a  Device Used:    []   Standard Toilet []   Grab Bars           []  Bedside Commode       []   Elevated Toilet          []   Other:        Bed Mobility:           [x]   Pt received out of bed       Transfers:    Sit--> Stand:  Sup  Stand --> Sit:  Sup  Stand-Pivot:  SBA; VC for alignment   Other:    Assistive device required for transfer:  FWW       Tub Transfers:  Sup; therapist educated Pt on w/c<>TTB transfer with verbalization and demonstration immediately prior to task. Pt verbalized understanding and completed with accuracy, however, does display slight unsteadiness on this date. Device Used:    [x]   TTB    Functional Mobility:    Assistance:  Mod(I) @ w/c level  Device:   []   Rolling Walker     []   Standard Walker []   Wheelchair        []   [employer]       []   4-Wheeled Shiva          []   Cardiac Walker       []   Other:        Homemaking Tasks:  Sup; therapist engaged Pt in IADL kitchen task of making coffee in which patient completes task entirely at w/c level, however, does stand to complete task. Pt gathers all supplies and measures out appropriate amount of coffee w/o issue. Therapist educated on use of countertop to transport coffee decanter to sink to fill with water as well as appropriate alignment when completing task at countertop completely squaring up for safety. Pt completes task w/100% accuracy and carry over. Additional Therapeutic activities/exercises completed this date:     []   ADL Training   []   Balance/Postural training     []   Bed/Transfer Training   []   Endurance Training   []   Neuromuscular Re-ed   []   Nu-step:  Time:        Level:         #Steps:       []   Rebounder:    []  Seated     []  Standing        []   Supine Ther Ex (reps/sets):     [x]   Seated Ther Ex (reps/sets): In order to promote improved UB strength therapist engages Pt in 3x15 set/reps of protractions and 3x12 set/reps of shoulder press using 3lb straight wt.  Additionally, in order to promote improved UB strength in prep for transfers

## 2020-04-06 NOTE — PROGRESS NOTES
Independent  Homemaking Assistance: Independent  Meal Prep Responsibility: Primary  Laundry Responsibility: Primary  Cleaning Responsibility: Primary  Bill Paying/Finance Responsibility: Primary  Shopping Responsibility: Primary  Dependent Care Responsibility: Primary(dogs; cares for disabled girlfriend as well )  Health Care Management: Secondary(required reminders from girlfriend )  Ambulation Assistance: Independent  Transfer Assistance: Independent  Active : Yes  Mode of Transportation: SUV, Truck  Occupation: On disability  Additional Comments: sleeps in regular, flat bed; no falls in the past year     Objective                                                                                    Goals:  (Update in navigator)  Short term goals  Time Frame for Short term goals: 10 tx days or until discharge:   Short term goal 1: Pt will complete all aspects of bed mobility and sup<->sit Ind including management of wound vac line if still applicable. Short term goal 2: Pt will complete OOB transfers with SBA following WB restriction on RLE and management of wound vac line if still applicable. Short term goal 3: Pt will propel manual w/c >/=300 ft with SBA (due to management of wound vac equipment) and manage w/c parts Ind to safely complete transfers and RLE positioning. Short term goal 4: Pt will ambulate 10 ft with turns using RW following NWB on RLE with SBA x 1 over level and carpeted surfaces. Short term goal 5: Pt will complete object retrieval from the floor in standing with 1UE support on RW and using adaptive equipment with SBA. :   :        Plan of Care                                                                              Times per week: 5 days per week for a minimum of 60 minutes/day plus group as appropriate for 60 minutes.   Treatment to include Current Treatment Recommendations: Strengthening, Functional Mobility Training, Wheelchair Mobility Training, Home Exercise Program, Equipment Evaluation, Education, & procurement, ROM, Transfer Training, Gait Training, Safety Education & Training, Balance Training, Endurance Training, Pain Management, Patient/Caregiver Education & Training, Positioning    Electronically signed by   Rodriguez Edge, PT   4/6/2020, 2:11 PM

## 2020-04-06 NOTE — PATIENT CARE CONFERENCE
ACUTE REHAB TEAM CONFERENCE SUMMARY   [facility]    NAME: Rolanda Dudley  : 1960 ADMIT DATE: 2020    Rehab Admitting Dx:Acute hematogenous osteomyelitis of right foot ([facility] Donna Smith  Patient Comorbid Conditions: Active Hospital Problems    Diagnosis Date Noted    Amputation of little toe ([facility] [A71.799G]     PAD (peripheral artery disease) ([address] [I73.9]     Uncontrolled pain [R52]     Generalized weakness [R53.1]     Simple chronic bronchitis (HCC) [J41.0]     Acute hematogenous osteomyelitis of right foot ([address] Donna Smith 2020    Uncontrolled type II diabetes with peripheral autonomic neuropathy ([facility] [E11.43, E11.65]      Date: 2020    CASE MANAGEMENT  Current issues/needs regarding patient and family discharge status:   Patient plans d/c mobile home with sig other (disabled) and daughter. Possible wound vac & IV antibiotics at d/c. PHYSICAL THERAPY   Short term goals  Time Frame for Short term goals: 10 tx days or until discharge:   Short term goal 1: Pt will complete all aspects of bed mobility and sup<->sit Ind including management of wound vac line if still applicable. MOD IND  Short term goal 2: Pt will complete OOB transfers with SBA following WB restriction on RLE and management of wound vac line if still applicable. CGA WITH KNEE SCOOTER   Short term goal 3: Pt will propel manual w/c >/=300 ft with SBA (due to management of wound vac equipment) and manage w/c parts Ind to safely complete transfers and RLE positioning. 300 FT, SBA   Short term goal 4: Pt will ambulate 10 ft with turns using RW following NWB on RLE with SBA x 1 over level and carpeted surfaces. 216 FT, KNEE SCOOTER, CGA OVER LEVEL AND CARPETED SURFACES; 33 FT, WALKER, CGA +W/C FOLLOW   Short term goal 5: Pt will complete object retrieval from the floor in standing with 1UE support on RW and using adaptive equipment with SBA.               Ongoing impairments or deficits: Balance  Areas where progress has been made: ambulation distance   Specific barriers to progress: wound vac status   Strategies that improve performance:use of knee scooter   Equipment needed at discharge:knee scooter       PT IRF-GIANNI scores and goals for initial assessment:   Roll Left and Right  Assistance Needed: Setup or clean-up assistance  Comment: pt only required set-up assistance of wound vac line  CARE Score: 5  Discharge Goal: Independent  Sit to Lying  Assistance Needed: Setup or clean-up assistance  Comment: pt only required set-up assistance of wound vac line  CARE Score: 5  Discharge Goal: Independent  Sit to Stand  Assistance Needed: Partial/moderate assistance  CARE Score: 3  Discharge Goal: Set-up or clean-up assistance  Chair/Bed-to-Chair Transfer  Assistance Needed: Partial/moderate assistance  CARE Score: 3  Discharge Goal: Set-up or clean-up assistance  Toilet Transfer  Assistance Needed: Partial/moderate assistance  CARE Score: 3  Discharge Goal: Independent  Car Transfer  Assistance Needed: Partial/moderate assistance  CARE Score: 3  Discharge Goal: Set-up or clean-up assistance     1 Step  1 Step?: No  Reason if not Attempted: Not attempted due to medical condition or safety concerns  Picking Up Object  Reason if not Attempted: Not attempted due to medical condition or safety concerns  CARE Score: 88  Discharge Goal: Set-up or clean-up assistance  Wheelchair Ability  Uses a Wheelchair and/or Scooter?: Yes  Wheel 50 Feet with Two Turns  Assistance Needed: Supervision or touching assistance  CARE Score: 4  Discharge Goal: Set-up or clean-up assistance  Wheel 150 Feet  Assistance Needed: Supervision or touching assistance  CARE Score: 4  Discharge Goal: Set-up or clean-up assistance            Fall Risk: [x]  Yes  []  No    OCCUPATIONAL THERAPY   Short term goals  Time Frame for Short term goals: STG=LTG :   Long term goals  Time Frame for Long term goals : 7 days or until D/C  Long term goal 1: Pt to complete grooming with Ind-MET-Ind  Long term goal 2: Pt to complete bathing using AE/DME PRN with Mod(I)-Sup  Long term goal 3: Pt to complete UB dressing with Ind-Set up  Long term goal 4: Pt to complete LB dressing with Ind-Sup  Long term goal 5: Pt to complete toileting with Ind-Sup  Long term goals 6: Pt to complete all transfers (bed, toilet, shower) with mod(I)-Sup  Long term goal 8: Pt to doff/don footwear with Ind-Ind   Long term goal 9: Pt to complete ongoing therex/act. w/emphasis on >5-8 min static/dynamic standing for ADL/IADL's  :        Ongoing impairments or deficits: Dec. Standing tolerance/balance   Areas where progress has been made:  All ADL's, standing balance  Specific barriers to progress: pain, wound vac, IV  Strategies that improve performance: VC, DME  Equipment needed at discharge: TTB                        Equipment Needed: (TTB)         OT IRF-GIANNI scores and goals for initial assessment:    Eating  Assistance Needed: Setup or clean-up assistance  Comment: Pt reports difficulty opening packets on this date requiring set up at times  CARE Score: 5  Discharge Goal: Independent  Oral Hygiene  Assistance Needed: Supervision or touching assistance  CARE Score: 4  Discharge Goal: Independent  Toileting Hygiene  Assistance Needed: Partial/moderate assistance  Comment: per PT; required physical assist for stability to manage clothing (up/down)  CARE Score: 3  Discharge Goal: Independent  Shower/Bathe Self  Assistance Needed: Supervision or touching assistance  CARE Score: 4  Discharge Goal: Independent  Upper Body Dressing  Assistance Needed: Supervision or touching assistance  CARE Score: 4  Discharge Goal: Independent  Lower Body Dressing  Assistance Needed: Supervision or touching assistance  CARE Score: 4  Discharge Goal: Independent  Putting On/Taking Off Footwear  Assistance Needed: Setup or clean-up assistance  CARE Score: 5  Discharge Goal: Independent            COGNITIVE FUNCTION/SPEECH THERAPY (AS INDICATED)      Nursing Current Medical Status:   [x] Is continent of bowel and bladder     [] Is incontinent of bowel and bladder    [x] Has had an adequate number of bowel movements   [] Urinates with no urinary retention >300ml in bladder   [] Targeting bladder protocol with quintanilla removal   [x] Maintaining O2 SATs at 92% or greaer   [x] Has pain managed while on ARU         [x] Has had no skin breakdown while on ARU   [] Has improved skin integrity via wound measurements   [] Has no signs/symptoms of infection at the wound site    [] Pressure wounds Stage/Location:         [] Arrived on unit with pressure wound  [x] Has been free from injury during hospitalization   [] Has experienced a fall during hospitalization  [] Ongoing education with patient/family with understanding demonstrated for:  [] Receives IV Fluids  [x] Other:wound vac        NUTRITION  Weight: 148 lb 6.4 oz (67.3 kg)(bed scale) / Body mass index is 22.56 kg/m². Current diet: DIET CARB CONTROL; Dietary Nutrition Supplements: Wound Healing Oral Supplement  Intake: Good appetite, meal intake % and drinks erin. Medical improvements/barriers: managing wound vac line, improved performance with knee scooter, fearful with hopping, good appetite, adjust meds    Team goals for next treatment period/Intervention for current barriers:   [] Pt will increase activity tolerance for daily tasks.   [] Pt will improve bed mobility with reduced assist.  [x] Pt will improve safety in fx tasks with reduced cues/assist  [x] Pt will improve transfers with reduced assist  [] Pt will improve toileting with reduced assist  [x] Pt will improve ADL's with use of adaptive equipment with reduced assist  [] Pt will improve pain mgmt for maximum participation in tx program  [] Pt will improve communication to get basic needs met on unit  [] Pt will improve swallowing for safe diet advancement with use of strategies  []  Plan for

## 2020-04-07 LAB
GLUCOSE BLD-MCNC: 153 MG/DL (ref 70–99)
GLUCOSE BLD-MCNC: 203 MG/DL (ref 70–99)
GLUCOSE BLD-MCNC: 210 MG/DL (ref 70–99)
GLUCOSE BLD-MCNC: 230 MG/DL (ref 70–99)
GLUCOSE BLD-MCNC: 326 MG/DL (ref 70–99)

## 2020-04-07 PROCEDURE — 82962 GLUCOSE BLOOD TEST: CPT

## 2020-04-07 PROCEDURE — 6360000002 HC RX W HCPCS: Performed by: INTERNAL MEDICINE

## 2020-04-07 PROCEDURE — 6370000000 HC RX 637 (ALT 250 FOR IP): Performed by: INTERNAL MEDICINE

## 2020-04-07 PROCEDURE — 99233 SBSQ HOSP IP/OBS HIGH 50: CPT | Performed by: PHYSICAL MEDICINE & REHABILITATION

## 2020-04-07 PROCEDURE — 97535 SELF CARE MNGMENT TRAINING: CPT

## 2020-04-07 PROCEDURE — 2580000003 HC RX 258: Performed by: PHYSICAL MEDICINE & REHABILITATION

## 2020-04-07 PROCEDURE — 94150 VITAL CAPACITY TEST: CPT

## 2020-04-07 PROCEDURE — 97530 THERAPEUTIC ACTIVITIES: CPT

## 2020-04-07 PROCEDURE — 94761 N-INVAS EAR/PLS OXIMETRY MLT: CPT

## 2020-04-07 PROCEDURE — 1280000000 HC REHAB R&B

## 2020-04-07 PROCEDURE — 97110 THERAPEUTIC EXERCISES: CPT

## 2020-04-07 RX ORDER — INSULIN GLARGINE 100 [IU]/ML
15 INJECTION, SOLUTION SUBCUTANEOUS NIGHTLY
Status: DISCONTINUED | OUTPATIENT
Start: 2020-04-07 | End: 2020-04-10 | Stop reason: HOSPADM

## 2020-04-07 RX ADMIN — ATORVASTATIN CALCIUM 40 MG: 40 TABLET, FILM COATED ORAL at 20:27

## 2020-04-07 RX ADMIN — Medication 10 ML: at 08:00

## 2020-04-07 RX ADMIN — HYDROCODONE BITARTRATE AND ACETAMINOPHEN 2 TABLET: 5; 325 TABLET ORAL at 00:33

## 2020-04-07 RX ADMIN — HYDROCODONE BITARTRATE AND ACETAMINOPHEN 2 TABLET: 5; 325 TABLET ORAL at 09:03

## 2020-04-07 RX ADMIN — INSULIN GLARGINE 15 UNITS: 100 INJECTION, SOLUTION SUBCUTANEOUS at 20:28

## 2020-04-07 RX ADMIN — ENOXAPARIN SODIUM 40 MG: 40 INJECTION SUBCUTANEOUS at 08:59

## 2020-04-07 RX ADMIN — VANCOMYCIN HYDROCHLORIDE 1000 MG: 1 INJECTION, SOLUTION INTRAVENOUS at 06:58

## 2020-04-07 RX ADMIN — GABAPENTIN 600 MG: 300 CAPSULE ORAL at 13:26

## 2020-04-07 RX ADMIN — VANCOMYCIN HYDROCHLORIDE 1000 MG: 1 INJECTION, SOLUTION INTRAVENOUS at 18:32

## 2020-04-07 RX ADMIN — LEVOTHYROXINE SODIUM 100 MCG: 100 TABLET ORAL at 06:57

## 2020-04-07 RX ADMIN — INSULIN LISPRO 4 UNITS: 100 INJECTION, SOLUTION INTRAVENOUS; SUBCUTANEOUS at 12:05

## 2020-04-07 RX ADMIN — Medication 10 ML: at 19:40

## 2020-04-07 RX ADMIN — GABAPENTIN 600 MG: 300 CAPSULE ORAL at 20:27

## 2020-04-07 RX ADMIN — CLOPIDOGREL BISULFATE 75 MG: 75 TABLET ORAL at 08:59

## 2020-04-07 RX ADMIN — HYDROCODONE BITARTRATE AND ACETAMINOPHEN 2 TABLET: 5; 325 TABLET ORAL at 13:26

## 2020-04-07 RX ADMIN — HYDROCODONE BITARTRATE AND ACETAMINOPHEN 2 TABLET: 5; 325 TABLET ORAL at 17:45

## 2020-04-07 RX ADMIN — INSULIN LISPRO 8 UNITS: 100 INJECTION, SOLUTION INTRAVENOUS; SUBCUTANEOUS at 17:16

## 2020-04-07 RX ADMIN — GABAPENTIN 600 MG: 300 CAPSULE ORAL at 06:57

## 2020-04-07 RX ADMIN — INSULIN LISPRO 1 UNITS: 100 INJECTION, SOLUTION INTRAVENOUS; SUBCUTANEOUS at 08:01

## 2020-04-07 ASSESSMENT — PAIN SCALES - GENERAL
PAINLEVEL_OUTOF10: 8
PAINLEVEL_OUTOF10: 9
PAINLEVEL_OUTOF10: 3
PAINLEVEL_OUTOF10: 2

## 2020-04-07 NOTE — PROGRESS NOTES
appropriate for 60 minutes.   Treatment to include Current Treatment Recommendations: Strengthening, Functional Mobility Training, Wheelchair Mobility Training, Home Exercise Program, Equipment Evaluation, Education, & procurement, ROM, Transfer Training, Gait Training, Safety Education & Training, Balance Training, Endurance Training, Pain Management, Patient/Caregiver Education & Training, Positioning    Electronically signed by   Vivian Ferguson, PT   4/7/2020, 3:51 PM

## 2020-04-07 NOTE — PROGRESS NOTES
and would place Antonietta Wahl at greater risk of falls.     Therapist recommended BSC, however, Pt declines.     (HIT F2 to transition between stars)          Dressing:      Upper Body Dressing:  Set up  Lower Body Dressing:  Set up   Footwear: Set up     Toileting:   Declines need        Toilet Transfers:   NA  Device Used:    []   Standard Toilet         []   Amber Civatte           []  Bedside Commode       []   Elevated Toilet          []   Other:          Bed Mobility:           []   Pt received out of bed   Rolling R/L:  ind  Scooting:  Ind  Supine --> Sit:  Ind  Sit --> Supine:  Ind    Transfers:    Sit--> Stand:  Sup  Stand --> Sit:   Sup  Stand-Pivot:   CGA for proper turn and hand/foot/body placement during transfer from bed to wheelchair   Other:    Assistive device required for transfer:  Did not use AD         Homemaking Tasks:   Patient stands at washing machine and loads clothing into washer; patient to retrieve clothing with PT before lunch       Additional Therapeutic activities/exercises completed this date:     [x]   ADL Training   []   Balance/Postural training     [x]   Bed/Transfer Training   []   Endurance Training   []   Neuromuscular Re-ed   []   Nu-step:  Time:        Level:         #Steps:       []   Rebounder:    []  Seated     []  Standing        []   Supine Ther Ex (reps/sets):     [x]   Seated Ther Ex (reps/sets):  Patient instructed in wc pushups times 10 with one rest break at 6  Instructed in BUE exercises with 4# dumbbell all planes and all joints to increase strength and endurance for facilitation of self care ADl tasks and functional transfers and mobility    []   Standing Ther Ex (reps/sets):     []   Other:      Comments:      Patient/Caregiver Education and Training:   []   [employer] Equipment Use  [x]   Bed Mobility/Transfer Technique/Safety  [x]   Energy Conservation Tips  []   Family training  []   Postural Awareness  [x]   Safety During Functional Activities  []   Reinforced goals  Time Frame for Short term goals: STG=LTG:  Long term goals  Time Frame for Long term goals : 7 days or until D/C  Long term goal 1: Pt to complete grooming with Ind  Long term goal 2: Pt to complete bathing using AE/DME PRN with Mod(I)  Long term goal 3: Pt to complete UB dressing with Ind  Long term goal 4: Pt to complete LB dressing with Ind  Long term goal 5: Pt to complete toileting with Ind  Long term goals 6: Pt to complete all transfers (bed, toilet, shower) with mod(I)  Long term goal 8: Pt to doff/don footwear with Ind  Long term goal 9: Pt to complete ongoing therex/act. w/emphasis on >5-8 min static/dynamic standing for ADL/IADL's :        Plan of Care                                                                              Times per week: 5 days per week for a minimum of 60 minutes/day plus group as appropriate for 60 minutes.   Treatment to include Plan  Times per day: Daily  Current Treatment Recommendations: Strengthening, Endurance Training, Patient/Caregiver Education & Training, Equipment Evaluation, Education, & procurement, Balance Training, Pain Management, Self-Care / ADL, Functional Mobility Training, Safety Education & Training, Home Management Training, Wheelchair Mobility Training, Positioning    Electronically signed by   TARIQ Chao  4/7/2020, 10:35 AM

## 2020-04-07 NOTE — PROGRESS NOTES
Independent    Roll Left and Right  Assistance Needed: Setup or clean-up assistance  Comment: pt only required set-up assistance of wound vac line  CARE Score: 5  Discharge Goal: Independent  Sit to Lying  Assistance Needed: Setup or clean-up assistance  Comment: pt only required set-up assistance of wound vac line  CARE Score: 5  Discharge Goal: Independent  Sit to Stand  Assistance Needed: Partial/moderate assistance  CARE Score: 3  Discharge Goal: Set-up or clean-up assistance  Chair/Bed-to-Chair Transfer  Assistance Needed: Partial/moderate assistance  CARE Score: 3  Discharge Goal: Set-up or clean-up assistance  Toilet Transfer  Assistance Needed: Partial/moderate assistance  CARE Score: 3  Discharge Goal: Independent  Car Transfer  Assistance Needed: Partial/moderate assistance  CARE Score: 3  Discharge Goal: Set-up or clean-up assistance      1 Step  1 Step?: No  Reason if not Attempted: Not attempted due to medical condition or safety concerns  Picking Up Object  Reason if not Attempted: Not attempted due to medical condition or safety concerns  CARE Score: 88  Discharge Goal: Set-up or clean-up assistance  Wheelchair Ability  Uses a Wheelchair and/or Scooter?: Yes  Wheel 50 Feet with Two Turns  Assistance Needed: Supervision or touching assistance  CARE Score: 4  Discharge Goal: Set-up or clean-up assistance  Wheel 150 Feet  Assistance Needed: Supervision or touching assistance  CARE Score: 4  Discharge Goal: Set-up or clean-up assistance            I      Exam:    Blood pressure (!) 142/65, pulse 70, temperature 97.6 °F (36.4 °C), resp. rate 16, height 5' 8\" (1.727 m), weight 148 lb 6.4 oz (67.3 kg), SpO2 95 %. General: Lying back in bed. Right foot elevated. In no distress. HEENT: MMM. Neck supple. Pulmonary: Unlabored breathing with no rales or rhonchi. Cardiac: RRR. Abdomen: Patient's abdomen is soft and nondistended. Bowel sounds were present throughout.   There was no rebound,

## 2020-04-07 NOTE — PROGRESS NOTES
Progress Note( Dr. Schreiber Flank)  4/7/2020  Subjective:   Admit Date: 4/1/2020  PCP: Deep Subramanian MD    Admitted For :Right foot infection possibly gangrene    Consulted For: Better control of blood glucose    Interval History: No major change except back right leg arteriogram done  See the results below    Patient was transferred to acute rehab unit after right fifth toe amputation and placing wound vacuum done on 3/31/2020  Right foot wound and the wound vacuum was reviewed by surgery and wound center team to the wound vacuum and made some adjustment to the wound vacuum    Denies any chest pains,   Denies SOB . Denies nausea or vomiting. No new bowel or bladder symptoms. Intake/Output Summary (Last 24 hours) at 4/7/2020 0732  Last data filed at 4/6/2020 1824  Gross per 24 hour   Intake 700 ml   Output 850 ml   Net -150 ml       DATA    CBC:   No results for input(s): WBC, HGB, PLT in the last 72 hours. CMP:  Recent Labs     04/06/20  0600   CREATININE 0.6*     Lipids:   Lab Results   Component Value Date    CHOL 121 08/29/2018    HDL 67 08/29/2018    TRIG 65 08/29/2018     Glucose:  Recent Labs     04/06/20  1700 04/06/20  1953 04/07/20  0402   POCGLU 150* 256* 203*     [id number]:  Lab Results   Component Value Date    LABA1C 12.2 03/23/2020     High Sensitivity TSH:   Lab Results   Component Value Date    TSHHS 3.990 03/23/2020     Free T3: No results found for: FT3  Free T4:  Lab Results   Component Value Date    T4FREE 0.95 03/25/2020       Xr Foot Right (min 3 Views)    Result Date: 3/24/2020  EXAMINATION: THREE XRAY VIEWS OF THE RIGHT FOOT 3/23/2020 3:32 pm     Soft tissue ulceration adjacent to the 5th MTP joint with soft tissue air or gas. Suspected osteomyelitis of the 5th toe proximally and 5th metatarsal head. Vl Dup Lower Extremity Arteries Bilateral    Result Date: 3/25/2020  EXAMINATION: ARTERIAL DUPLEX ULTRASOUND OF THE BILATERAL LOWER EXTREMITIES  3/25/2020 5:55 am       1.  The bilateral proximal peroneal arteries are not visualized. 2. Biphasic and monophasic waveforms are identified throughout the right lower extremity. Right leg arteriogram done on 3/26/2020   Lesion on Superficial Femoral, Right: 100% stenosis. Peripheral Vascular Intervention:Peripheral PTA, Peripheral   Atherectomy: Fem/Pop  Findings: Severe CFA stenosis noted with good result post PTA      Additional Procedure: Atherectomy of CFA and prox SFA and PTA of CFA and SFA      Scheduled Medicines   Medications:    insulin lispro  0-12 Units Subcutaneous 2 times per day    insulin glargine  15 Units Subcutaneous Nightly    insulin lispro  0-12 Units Subcutaneous TID WC    sodium chloride flush  10 mL Intravenous 2 times per day    atorvastatin  40 mg Oral Nightly    clopidogrel  75 mg Oral Daily    enoxaparin  40 mg Subcutaneous Daily    gabapentin  600 mg Oral TID    levothyroxine  100 mcg Oral Daily    nicotine  1 patch Transdermal Daily    vancomycin  1,000 mg Intravenous Q12H      Infusions:    dextrose           Objective:   Vitals: BP (!) 142/65   Pulse 70   Temp 97.6 °F (36.4 °C)   Resp 16   Ht 5' 8\" (1.727 m)   Wt 148 lb 6.4 oz (67.3 kg) Comment: bed scale  SpO2 95%   BMI 22.56 kg/m²   General appearance: alert and cooperative with exam  Neck: no JVD or bruit  Thyroid : Normal lobes   Lungs: Has Vesicular Breath sounds   Heart:  regular rate and rhythm  Abdomen: soft, non-tender; bowel sounds normal; no masses,  no organomegaly  Musculoskeletal: Normal  Extremities: extremities normal, , no edema//   right fifth toe amputation and placed wound vacuum done on 3/31/2020  Has peripheral vascular disease bilaterally  lower extremities  Neurologic:  Awake, alert, oriented to name, place and time. Cranial nerves II-XII are grossly intact. Motor is  intact.   Sensory neuropathy t.,  and gait is abnormal.  And unstable    Assessment:     Patient Active Problem List:   Diabetes mellitus    S/P CABG

## 2020-04-07 NOTE — FLOWSHEET NOTE
[x] daily progress note       [] discharge       Patient Name:  Bowen Campbell   :  1960 MRN: [id number]  Room:  [address] [place], [place] [address] Date of Admission: 2020  Rehabilitation Diagnosis:   [facility]       Date 2020       Day of ARU Week:  7   Time IN/OUT 5704-2475 (unbillable)   Individual Tx Minutes 0   TOTAL Tx Time Mins 0   Variance Time -60 minutes   Variance Time [x]   Adamant refusal due to increased frustration regarding discharge plan specifically insurance coverage regarding knee scooter and equipment. []   Medical hold/reason:    []   Illness   []   Off Unit for test/procedure  []   Extra time needed to complete task  []   Therapeutic need  []   Other (specify):   Restrictions Restrictions/Precautions  Restrictions/Precautions: General Precautions, Fall Risk, Weight Bearing(wound vac, NWB RLE)  Position Activity Restriction  Other position/activity restrictions: IV   Communication with other providers: [x]   OK to see per nursing:     [x]   Spoke with  Abby Mandel) regarding pt's refusal of therapy at this time, pt's frustration regarding inability of insurance to cover knee scooter, and pain in right LE, and patient's report that he would possibly sign himself out AMA. \"   Subjective observations and cognitive status: AM attempt: pt seen supine in bed upon entering room. Pt reported \"No, I can't do anything right now. \" pt appeared very frustrated and agitated at this time. This PTA offered LE exercises. Pt stated \"No, not with the leg feeling the way it is right now. \" This PTA offered to have patient to practice ambulation with knee scooter. pt stated \"I am just not feeling well. My insurance won't cover the equipment. I have to pay $100 out of pocket per month for the knee scooter. The wheelchair and walker can't fit in my mobile home, and I can't afford it anways. I am not going to deal with walker or the wheelchair.  I am just going to disability  Additional Comments: sleeps in regular, flat bed; no falls in the past year     Objective                                                                                    Goals:  (Update in navigator)  Short term goals  Time Frame for Short term goals: 10 tx days or until discharge:   Short term goal 1: Pt will complete all aspects of bed mobility and sup<->sit Ind including management of wound vac line if still applicable. Short term goal 2: Pt will complete OOB transfers with SBA following WB restriction on RLE and management of wound vac line if still applicable. Short term goal 3: Pt will propel manual w/c >/=300 ft with SBA (due to management of wound vac equipment) and manage w/c parts Ind to safely complete transfers and RLE positioning. Short term goal 4: Goal progressed 04/06/20: Pt will ambulate >/= 300 ft with turns using knee scooter with SBA (due to management of wound vac equipment) Mod Ind over level and uneven surfaces. Short term goal 5: Goal progressed 04/06/20: Pt will complete object retrieval from the floor in standing using adaptive equipment Mod Ind. :   :        Plan of Care                                                                              Times per week: 5 days per week for a minimum of 60 minutes/day plus group as appropriate for 60 minutes.   Treatment to include Current Treatment Recommendations: Strengthening, Functional Mobility Training, Wheelchair Mobility Training, Home Exercise Program, Equipment Evaluation, Education, & procurement, ROM, Transfer Training, Gait Training, Safety Education & Training, Balance Training, Endurance Training, Pain Management, Patient/Caregiver Education & Training, Positioning    Electronically signed by   Adriana Ahn, [id number]  4/7/2020, 12:03 PM

## 2020-04-08 LAB
CREAT SERPL-MCNC: 0.7 MG/DL (ref 0.9–1.3)
GFR AFRICAN AMERICAN: >60 ML/MIN/1.73M2
GFR NON-AFRICAN AMERICAN: >60 ML/MIN/1.73M2
GLUCOSE BLD-MCNC: 110 MG/DL (ref 70–99)
GLUCOSE BLD-MCNC: 196 MG/DL (ref 70–99)
GLUCOSE BLD-MCNC: 219 MG/DL (ref 70–99)
GLUCOSE BLD-MCNC: 244 MG/DL (ref 70–99)
GLUCOSE BLD-MCNC: 251 MG/DL (ref 70–99)

## 2020-04-08 PROCEDURE — 94150 VITAL CAPACITY TEST: CPT

## 2020-04-08 PROCEDURE — 6360000002 HC RX W HCPCS: Performed by: INTERNAL MEDICINE

## 2020-04-08 PROCEDURE — 6370000000 HC RX 637 (ALT 250 FOR IP): Performed by: SURGERY

## 2020-04-08 PROCEDURE — 82962 GLUCOSE BLOOD TEST: CPT

## 2020-04-08 PROCEDURE — 82565 ASSAY OF CREATININE: CPT

## 2020-04-08 PROCEDURE — 1280000000 HC REHAB R&B

## 2020-04-08 PROCEDURE — 6370000000 HC RX 637 (ALT 250 FOR IP): Performed by: INTERNAL MEDICINE

## 2020-04-08 PROCEDURE — 97535 SELF CARE MNGMENT TRAINING: CPT

## 2020-04-08 PROCEDURE — 2580000003 HC RX 258: Performed by: PHYSICAL MEDICINE & REHABILITATION

## 2020-04-08 PROCEDURE — 94761 N-INVAS EAR/PLS OXIMETRY MLT: CPT

## 2020-04-08 PROCEDURE — 97035 APP MDLTY 1+ULTRASOUND EA 15: CPT

## 2020-04-08 PROCEDURE — 99211 OFF/OP EST MAY X REQ PHY/QHP: CPT

## 2020-04-08 PROCEDURE — 99232 SBSQ HOSP IP/OBS MODERATE 35: CPT | Performed by: PHYSICAL MEDICINE & REHABILITATION

## 2020-04-08 PROCEDURE — 97530 THERAPEUTIC ACTIVITIES: CPT

## 2020-04-08 PROCEDURE — 97116 GAIT TRAINING THERAPY: CPT

## 2020-04-08 PROCEDURE — 97110 THERAPEUTIC EXERCISES: CPT

## 2020-04-08 RX ADMIN — Medication 10 ML: at 08:29

## 2020-04-08 RX ADMIN — ENOXAPARIN SODIUM 40 MG: 40 INJECTION SUBCUTANEOUS at 08:23

## 2020-04-08 RX ADMIN — HYDROCODONE BITARTRATE AND ACETAMINOPHEN 2 TABLET: 5; 325 TABLET ORAL at 16:53

## 2020-04-08 RX ADMIN — COLLAGENASE SANTYL: 250 OINTMENT TOPICAL at 16:39

## 2020-04-08 RX ADMIN — HYDROCODONE BITARTRATE AND ACETAMINOPHEN 2 TABLET: 5; 325 TABLET ORAL at 08:34

## 2020-04-08 RX ADMIN — MORPHINE SULFATE 2 MG: 4 INJECTION, SOLUTION INTRAMUSCULAR; INTRAVENOUS at 11:17

## 2020-04-08 RX ADMIN — LEVOTHYROXINE SODIUM 100 MCG: 100 TABLET ORAL at 06:32

## 2020-04-08 RX ADMIN — VANCOMYCIN HYDROCHLORIDE 1000 MG: 1 INJECTION, SOLUTION INTRAVENOUS at 19:46

## 2020-04-08 RX ADMIN — INSULIN LISPRO 4 UNITS: 100 INJECTION, SOLUTION INTRAVENOUS; SUBCUTANEOUS at 17:48

## 2020-04-08 RX ADMIN — HYDROCODONE BITARTRATE AND ACETAMINOPHEN 2 TABLET: 5; 325 TABLET ORAL at 21:21

## 2020-04-08 RX ADMIN — ATORVASTATIN CALCIUM 40 MG: 40 TABLET, FILM COATED ORAL at 21:21

## 2020-04-08 RX ADMIN — GABAPENTIN 600 MG: 300 CAPSULE ORAL at 06:32

## 2020-04-08 RX ADMIN — GABAPENTIN 600 MG: 300 CAPSULE ORAL at 14:31

## 2020-04-08 RX ADMIN — GABAPENTIN 600 MG: 300 CAPSULE ORAL at 21:20

## 2020-04-08 RX ADMIN — Medication 10 ML: at 21:24

## 2020-04-08 RX ADMIN — HYDROCODONE BITARTRATE AND ACETAMINOPHEN 2 TABLET: 5; 325 TABLET ORAL at 03:56

## 2020-04-08 RX ADMIN — MORPHINE SULFATE 2 MG: 4 INJECTION, SOLUTION INTRAMUSCULAR; INTRAVENOUS at 23:25

## 2020-04-08 RX ADMIN — CLOPIDOGREL BISULFATE 75 MG: 75 TABLET ORAL at 08:23

## 2020-04-08 RX ADMIN — INSULIN GLARGINE 15 UNITS: 100 INJECTION, SOLUTION SUBCUTANEOUS at 21:25

## 2020-04-08 RX ADMIN — VANCOMYCIN HYDROCHLORIDE 1000 MG: 1 INJECTION, SOLUTION INTRAVENOUS at 06:32

## 2020-04-08 RX ADMIN — INSULIN LISPRO 6 UNITS: 100 INJECTION, SOLUTION INTRAVENOUS; SUBCUTANEOUS at 08:38

## 2020-04-08 ASSESSMENT — PAIN SCALES - GENERAL
PAINLEVEL_OUTOF10: 8
PAINLEVEL_OUTOF10: 9
PAINLEVEL_OUTOF10: 8

## 2020-04-08 ASSESSMENT — PAIN DESCRIPTION - PROGRESSION
CLINICAL_PROGRESSION: NOT CHANGED

## 2020-04-08 ASSESSMENT — PAIN DESCRIPTION - DESCRIPTORS: DESCRIPTORS: BURNING;CONSTANT;THROBBING

## 2020-04-08 ASSESSMENT — PAIN DESCRIPTION - PAIN TYPE: TYPE: SURGICAL PAIN

## 2020-04-08 ASSESSMENT — PAIN DESCRIPTION - LOCATION: LOCATION: FOOT

## 2020-04-08 ASSESSMENT — PAIN DESCRIPTION - FREQUENCY: FREQUENCY: CONTINUOUS

## 2020-04-08 ASSESSMENT — PAIN DESCRIPTION - ONSET: ONSET: ON-GOING

## 2020-04-08 ASSESSMENT — PAIN DESCRIPTION - ORIENTATION: ORIENTATION: RIGHT

## 2020-04-08 NOTE — PROGRESS NOTES
Physical Rehabilitation: OCCUPATIONAL THERAPY     [x] daily progress note       [] discharge       Patient Name:  Rosio Mathews   :  1960 MRN: [id number]  Room:  [address] [place] [address] Date of Admission: 2020  Rehabilitation Diagnosis:   Acute hematogenous osteomyelitis of right foot ([address] Eulalio Stain       Date 2020       Day of ARU Week:  1   Time IN/OUT 1500/1600   Individual Tx Minutes 60   Group Tx Minutes    Co-Treat Minutes    Concurrent Tx Minutes    TOTAL Tx Time Mins 60   Variance Time    Variance Time []   Refusal due to:     []   Medical hold/reason:    []   Illness   []   Off Unit for test/procedure  []   Extra time needed to complete task  []   Therapeutic need  []   Other (specify):   Restrictions Restrictions/Precautions  Restrictions/Precautions: General Precautions, Fall Risk, Weight Bearing(wound vac, NWB RLE)  Position Activity Restriction  Other position/activity restrictions: IV   Communication with other providers: [x]   OK to see per nursing:     []   Spoke with team member regarding:      Subjective observations and cognitive status:  patient pleasant and ready for therapy at this time; CASILLAS notices at that time wound vac off of patients foot      Pain level/location:    7/10       Location: RLE    Discharge recommendations  Anticipated discharge date:  4-10  Destination: []?home alone   []?home alone w assist prn []? home w/ family    []? Continuous supervision       []? SNF            []? Assisted living     []? Other:   Continued therapy: []?HHC OT  []? OUTPATIENT  OT   []? No Further OT  Equipment needs: Amol Ruiz the assistance of a tub transfer bench to successfully and safely complete bathing activities necessary due to reduced balance, endurance, need for ADL assist, and LE weakness and reduced ROM.  These tasks cannot be safely completed without this device and would place Carlos Alberto Lyman at greater risk of falls.     Therapist recommended BSC, however, Pt declines.

## 2020-04-08 NOTE — PROGRESS NOTES
Rodrigo Arvizu    : 1960  Acct #: [de-identified]  MRN: [id number]              PM&R Progress Note      Admitting diagnosis: Osteomyelitis of the right foot.     Comorbid diagnoses impacting rehabilitation: Status post amputation of the right fifth toe, PAD, uncontrolled pain, gait disturbance, generalized weakness, uncontrolled diabetes with peripheral neuropathy, COPD, history of polysubstance abuse.     Chief complaint: Anxious to get home this week but worried about his wound healing. Prior (baseline) level of function: Independent. Current level of function:         Current  IRF-GIANNI and Goals:   Hearing, Speech, and Vision  Expression of Ideas and Wants: Without difficulty  Understanding Verbal and Non-Verbal Content: Understands  Prior Functioning: Everyday Activities  Self Care: Independent  Indoor Mobility (Ambulation):  Independent  Stairs: Independent  Functional Cognition: Needed some help  Prior Device Use: None of the given options(Ind)    Eating  Assistance Needed: Setup or clean-up assistance  Comment: Pt reports difficulty opening packets on this date requiring set up at times  CARE Score: 5  Discharge Goal: Independent  Oral Hygiene  Assistance Needed: Supervision or touching assistance  CARE Score: 4  Discharge Goal: Independent  Toileting Hygiene  Assistance Needed: Partial/moderate assistance  Comment: per PT; required physical assist for stability to manage clothing (up/down)  CARE Score: 3  Discharge Goal: Independent  Shower/Bathe Self  Assistance Needed: Supervision or touching assistance  CARE Score: 4  Discharge Goal: Independent  Upper Body Dressing  Assistance Needed: Supervision or touching assistance  CARE Score: 4  Discharge Goal: Independent  Lower Body Dressing  Assistance Needed: Supervision or touching assistance  CARE Score: 4  Discharge Goal: Independent  Putting On/Taking Off Footwear  Assistance Needed: Setup or clean-up assistance  CARE Score: 5  Discharge Goal:

## 2020-04-08 NOTE — PROGRESS NOTES
Physical Therapy      [x] daily progress note       [] discharge       Patient Name:  Tamara Renteria   :  1960 MRN: [id number]  Room:  [address] [place], [place] [address] Date of Admission: 2020  Rehabilitation Diagnosis:   Acute hematogenous osteomyelitis of right foot ([address] Gregorio Ngo       Date 2020       Day of ARU Week:  1   Time IN/OUT 6201-1846   Individual Tx Minutes 60   TOTAL Tx Time Mins 60   Variance Time    Variance Time []   Refusal due to:     []   Medical hold/reason:    []   Illness   []   Off Unit for test/procedure  []   Extra time needed to complete task  []   Therapeutic need  []   Other (specify):   Restrictions Restrictions/Precautions  Restrictions/Precautions: General Precautions, Fall Risk, Weight Bearing(wound vac, NWB RLE)  Position Activity Restriction  Other position/activity restrictions: IV   Interdisciplinary communication [x]   Cleared for therapy per nursing     []   RN notified about issues during session  []   RN updated on pt performance  []   Spoke with   []   Spoke with OT  []   Spoke with MD  []   Other:    Subjective observations and cognitive status: Pt sitting EOB talking on phone, agreeable to session     Pain level/location:  6 /10       Location: R foot, pain meds received. Discharge recommendations  Anticipated discharge date:  04/10/2020  Destination: []?home alone   []?home alone with assist PRN     [x]? home w/ family      []? Continuous supervision  []? SNF    []? Assisted living     []? Other:  Continued therapy: [x]? [facility] PT  []? OUTPATIENT  PT   []? No Further PT  []? SNF PT  Caregiver training recommended: []? Yes  [x]? No   Equipment needs: knee scooter      Bed Mobility:           [x]   Pt received out of bed      Transfers:     Sit--> Stand:  SB-Supervision  Stand --> Sit:   SB-Supervision transfered on/off scooter seat during rest breaks with min cues to lock brakes.   Assistive device required for transfer:   Knee scooter    Gait:    Distance:  500+'+200'+ 120'+ 365'  Assistance:  SB-CGA  Device:  Knee scooter  Gait Quality:  Hop-to pattern, amb on level surfaces SBA including with turns and backing. Outside surfaces including multi concrete pavers, lateral grades, threshold, and incline/declines with SB-CGA, min cues for controlled descent on declines and propulsion over threshold. Additional Therapeutic activities/exercises completed this date:     []   Nu-step:  Time:        Level:         #Steps:       []   Rebounder:    []  Seated     []  Standing        [x]   Balance training : Standing functional task at counter to pour coffee and rinsing out sink with SBA, min cues to lock brakes when performing standing tasks at knee scooter. []   Postural training    []   Supine ther ex (reps/sets):     []   Seated ther ex (reps/sets):     [x]   Standing ther ex (reps/sets):  Min squats x 20 wth B UE support, heel raises with 1 UE support x 20, SB for balance. []   Picking up object from floor (standing):                   []   Reacher used   [x]   Other: Amb around obstacles in tight spaces with knee scooter with SBA   []   Other:      Patient/Caregiver Education and Training:   [x]   Role of PT  []   Education about Dx  []   Use of call light for assist   []   Wheelchair mobility/management  []   HEP provided and explained   [x]   Treatment plan reviewed  [x]   Home safety  []   Body mechanics  []   Positioning  [x]   Bed Mobility/Transfer technique  [x]   Gait technique/sequencing  [x]   Proper use of assistive device/adaptive equipment  []   Stair training/Advanced mobility safety and technique  []   Reinforced patient's precautions/mobility while maintaining precautions  [x]   Postural awareness  []   Family/caregiver training  []   Progress was updated and reviewed in Rehabtracker with patient and/or family this date.   []   Other:      Treatment Plan for Next Session: Continue gait training with knee scooter, safety with functional tasks and standing

## 2020-04-08 NOTE — CARE COORDINATION
Received referral from Crystal Dasilva. Agreed to cover tub transfer bench. Order being sent to [facility] and I will fax the voucher to them. If patient needs help with new medication at discharge please have rx sent to [facility] for the first fill then patient can have transferred to his regular pharmacy for refill if ordered. Please notify when discharge orders are in.

## 2020-04-08 NOTE — PROGRESS NOTES
peroneal arteries are not visualized. 2. Biphasic and monophasic waveforms are identified throughout the right lower extremity. Right leg arteriogram done on 3/26/2020   Lesion on Superficial Femoral, Right: 100% stenosis. Peripheral Vascular Intervention:Peripheral PTA, Peripheral   Atherectomy: Fem/Pop  Findings: Severe CFA stenosis noted with good result post PTA      Additional Procedure: Atherectomy of CFA and prox SFA and PTA of CFA and SFA      Scheduled Medicines   Medications:    insulin lispro  10 Units Subcutaneous TID WC    insulin lispro  0-12 Units Subcutaneous 2 times per day    insulin glargine  15 Units Subcutaneous Nightly    insulin lispro  0-12 Units Subcutaneous TID WC    sodium chloride flush  10 mL Intravenous 2 times per day    atorvastatin  40 mg Oral Nightly    clopidogrel  75 mg Oral Daily    enoxaparin  40 mg Subcutaneous Daily    gabapentin  600 mg Oral TID    levothyroxine  100 mcg Oral Daily    nicotine  1 patch Transdermal Daily    vancomycin  1,000 mg Intravenous Q12H      Infusions:    dextrose           Objective:   Vitals: BP (!) 141/67   Pulse 66   Temp 98.2 °F (36.8 °C) (Oral)   Resp 16   Ht 5' 8\" (1.727 m)   Wt 148 lb 6.4 oz (67.3 kg) Comment: bed scale  SpO2 98%   BMI 22.56 kg/m²   General appearance: alert and cooperative with exam  Neck: no JVD or bruit  Thyroid : Normal lobes   Lungs: Has Vesicular Breath sounds   Heart:  regular rate and rhythm  Abdomen: soft, non-tender; bowel sounds normal; no masses,  no organomegaly  Musculoskeletal: Normal  Extremities: extremities normal, , no edema//   right fifth toe amputation a  Has peripheral vascular disease bilaterally  lower extremities  Neurologic:  Awake, alert, oriented to name, place and time. Cranial nerves II-XII are grossly intact. Motor is  intact.   Sensory neuropathy t.,  and gait is abnormal.  And unstable    Assessment:     Patient Active Problem List:   Diabetes mellitus    S/P CABG

## 2020-04-09 LAB
GLUCOSE BLD-MCNC: 159 MG/DL (ref 70–99)
GLUCOSE BLD-MCNC: 200 MG/DL (ref 70–99)
GLUCOSE BLD-MCNC: 210 MG/DL (ref 70–99)
GLUCOSE BLD-MCNC: 224 MG/DL (ref 70–99)
GLUCOSE BLD-MCNC: 81 MG/DL (ref 70–99)

## 2020-04-09 PROCEDURE — 82962 GLUCOSE BLOOD TEST: CPT

## 2020-04-09 PROCEDURE — 99233 SBSQ HOSP IP/OBS HIGH 50: CPT | Performed by: PHYSICAL MEDICINE & REHABILITATION

## 2020-04-09 PROCEDURE — 94761 N-INVAS EAR/PLS OXIMETRY MLT: CPT

## 2020-04-09 PROCEDURE — 97116 GAIT TRAINING THERAPY: CPT

## 2020-04-09 PROCEDURE — 97110 THERAPEUTIC EXERCISES: CPT

## 2020-04-09 PROCEDURE — 97535 SELF CARE MNGMENT TRAINING: CPT

## 2020-04-09 PROCEDURE — 6370000000 HC RX 637 (ALT 250 FOR IP): Performed by: INTERNAL MEDICINE

## 2020-04-09 PROCEDURE — 97530 THERAPEUTIC ACTIVITIES: CPT

## 2020-04-09 PROCEDURE — 2580000003 HC RX 258: Performed by: PHYSICAL MEDICINE & REHABILITATION

## 2020-04-09 PROCEDURE — 6360000002 HC RX W HCPCS: Performed by: INTERNAL MEDICINE

## 2020-04-09 PROCEDURE — 99024 POSTOP FOLLOW-UP VISIT: CPT | Performed by: SURGERY

## 2020-04-09 PROCEDURE — 1280000000 HC REHAB R&B

## 2020-04-09 RX ORDER — ATORVASTATIN CALCIUM 40 MG/1
40 TABLET, FILM COATED ORAL NIGHTLY
Qty: 30 TABLET | Refills: 0 | Status: ON HOLD | OUTPATIENT
Start: 2020-04-09 | End: 2022-09-06 | Stop reason: SDUPTHER

## 2020-04-09 RX ORDER — CLOPIDOGREL BISULFATE 75 MG/1
75 TABLET ORAL DAILY
Qty: 30 TABLET | Refills: 0 | Status: SHIPPED | OUTPATIENT
Start: 2020-04-10 | End: 2022-09-15

## 2020-04-09 RX ORDER — NICOTINE 21 MG/24HR
1 PATCH, TRANSDERMAL 24 HOURS TRANSDERMAL DAILY
Qty: 30 PATCH | Refills: 3 | COMMUNITY
Start: 2020-04-10

## 2020-04-09 RX ORDER — HYDROCODONE BITARTRATE AND ACETAMINOPHEN 5; 325 MG/1; MG/1
1 TABLET ORAL EVERY 6 HOURS PRN
Qty: 28 TABLET | Refills: 0 | Status: SHIPPED | OUTPATIENT
Start: 2020-04-09 | End: 2020-04-16

## 2020-04-09 RX ORDER — ONDANSETRON 4 MG/1
4 TABLET, ORALLY DISINTEGRATING ORAL 4 TIMES DAILY PRN
Status: DISCONTINUED | OUTPATIENT
Start: 2020-04-09 | End: 2020-04-10 | Stop reason: HOSPADM

## 2020-04-09 RX ADMIN — LEVOTHYROXINE SODIUM 100 MCG: 100 TABLET ORAL at 04:00

## 2020-04-09 RX ADMIN — INSULIN GLARGINE 15 UNITS: 100 INJECTION, SOLUTION SUBCUTANEOUS at 21:57

## 2020-04-09 RX ADMIN — GABAPENTIN 600 MG: 300 CAPSULE ORAL at 03:59

## 2020-04-09 RX ADMIN — VANCOMYCIN HYDROCHLORIDE 1000 MG: 1 INJECTION, SOLUTION INTRAVENOUS at 06:27

## 2020-04-09 RX ADMIN — CLOPIDOGREL BISULFATE 75 MG: 75 TABLET ORAL at 08:45

## 2020-04-09 RX ADMIN — SODIUM CHLORIDE, PRESERVATIVE FREE 10 ML: 5 INJECTION INTRAVENOUS at 14:48

## 2020-04-09 RX ADMIN — ENOXAPARIN SODIUM 40 MG: 40 INJECTION SUBCUTANEOUS at 08:51

## 2020-04-09 RX ADMIN — COLLAGENASE SANTYL: 250 OINTMENT TOPICAL at 10:06

## 2020-04-09 RX ADMIN — INSULIN LISPRO 2 UNITS: 100 INJECTION, SOLUTION INTRAVENOUS; SUBCUTANEOUS at 08:40

## 2020-04-09 RX ADMIN — Medication 10 ML: at 20:49

## 2020-04-09 RX ADMIN — HYDROCODONE BITARTRATE AND ACETAMINOPHEN 2 TABLET: 5; 325 TABLET ORAL at 06:55

## 2020-04-09 RX ADMIN — HYDROCODONE BITARTRATE AND ACETAMINOPHEN 2 TABLET: 5; 325 TABLET ORAL at 19:50

## 2020-04-09 RX ADMIN — GABAPENTIN 600 MG: 300 CAPSULE ORAL at 14:21

## 2020-04-09 RX ADMIN — INSULIN LISPRO 4 UNITS: 100 INJECTION, SOLUTION INTRAVENOUS; SUBCUTANEOUS at 17:00

## 2020-04-09 RX ADMIN — MORPHINE SULFATE 2 MG: 4 INJECTION, SOLUTION INTRAMUSCULAR; INTRAVENOUS at 14:47

## 2020-04-09 RX ADMIN — ATORVASTATIN CALCIUM 40 MG: 40 TABLET, FILM COATED ORAL at 19:51

## 2020-04-09 RX ADMIN — MORPHINE SULFATE 2 MG: 4 INJECTION, SOLUTION INTRAMUSCULAR; INTRAVENOUS at 08:42

## 2020-04-09 RX ADMIN — HYDROCODONE BITARTRATE AND ACETAMINOPHEN 2 TABLET: 5; 325 TABLET ORAL at 23:51

## 2020-04-09 RX ADMIN — Medication 10 ML: at 09:56

## 2020-04-09 RX ADMIN — VANCOMYCIN HYDROCHLORIDE 1000 MG: 1 INJECTION, SOLUTION INTRAVENOUS at 19:48

## 2020-04-09 RX ADMIN — HYDROCODONE BITARTRATE AND ACETAMINOPHEN 2 TABLET: 5; 325 TABLET ORAL at 11:30

## 2020-04-09 RX ADMIN — GABAPENTIN 600 MG: 300 CAPSULE ORAL at 22:29

## 2020-04-09 ASSESSMENT — PAIN SCALES - GENERAL
PAINLEVEL_OUTOF10: 8
PAINLEVEL_OUTOF10: 6
PAINLEVEL_OUTOF10: 8
PAINLEVEL_OUTOF10: 8
PAINLEVEL_OUTOF10: 6
PAINLEVEL_OUTOF10: 9
PAINLEVEL_OUTOF10: 8
PAINLEVEL_OUTOF10: 9
PAINLEVEL_OUTOF10: 7

## 2020-04-09 ASSESSMENT — PAIN DESCRIPTION - PROGRESSION
CLINICAL_PROGRESSION: GRADUALLY WORSENING
CLINICAL_PROGRESSION: NOT CHANGED
CLINICAL_PROGRESSION: NOT CHANGED
CLINICAL_PROGRESSION: GRADUALLY WORSENING
CLINICAL_PROGRESSION: NOT CHANGED

## 2020-04-09 ASSESSMENT — PAIN DESCRIPTION - LOCATION
LOCATION: FOOT

## 2020-04-09 ASSESSMENT — PAIN DESCRIPTION - ORIENTATION
ORIENTATION: RIGHT

## 2020-04-09 ASSESSMENT — PAIN DESCRIPTION - ONSET
ONSET: ON-GOING

## 2020-04-09 ASSESSMENT — PAIN - FUNCTIONAL ASSESSMENT
PAIN_FUNCTIONAL_ASSESSMENT: PREVENTS OR INTERFERES SOME ACTIVE ACTIVITIES AND ADLS

## 2020-04-09 ASSESSMENT — PAIN DESCRIPTION - DESCRIPTORS
DESCRIPTORS: BURNING;THROBBING;SHARP
DESCRIPTORS: BURNING;SHARP;THROBBING
DESCRIPTORS: BURNING

## 2020-04-09 ASSESSMENT — PAIN DESCRIPTION - PAIN TYPE
TYPE: SURGICAL PAIN

## 2020-04-09 ASSESSMENT — PAIN DESCRIPTION - FREQUENCY
FREQUENCY: CONTINUOUS

## 2020-04-09 NOTE — PROGRESS NOTES
Patient limited by fatigue  [] Patient limited by pain   [] Patient limited by medical complications:    [] Adverse reaction to Tx:   [] Significant change in status    Safety:       []  bed alarm set    []  chair alarm set    [x]  Pt refused alarms                []  Telesitter activated      [x]  Gait belt used during tx session      []other:       Number of Minutes/Billable Intervention  Therapeutic Exercise 45   ADL Self-care    Neuro Re-Ed    Therapeutic Activity 15   Group    Other:    TOTAL 60       Social History  Social/Functional History  Lives With: Daughter, Significant other(girlfriend (disabled but is ambulatory))  Type of Home: Mobile home  Home Layout: One level  Home Access: Ramped entrance  Bathroom Shower/Tub: Tub/Shower unit, Shower chair without back  Bathroom Toilet: Handicap height  Bathroom Equipment: Grab bars in shower, Shower chair  ADL Assistance: Independent  Homemaking Assistance: Independent  Meal Prep Responsibility: Primary  Laundry Responsibility: Primary  Cleaning Responsibility: Primary  Bill Paying/Finance Responsibility: Primary  Shopping Responsibility: Primary  Dependent Care Responsibility: Primary(dogs; cares for disabled girlfriend as well )  Health Care Management: Secondary(required reminders from girlfriend )  Ambulation Assistance: Independent  Transfer Assistance: Independent  Active : Yes  Mode of Transportation: SUV, Truck  Occupation: On disability  Additional Comments: sleeps in regular, flat bed; no falls in the past year     Objective                                                                                    Goals:  (Update in navigator)  Short term goals  Time Frame for Short term goals: STG=LTG:  Long term goals  Time Frame for Long term goals : 7 days or until D/C  Long term goal 1: Pt to complete grooming with Ind  Long term goal 2: Pt to complete bathing using AE/DME PRN with Mod(I)  Long term goal 3: Pt to complete UB dressing with Ind  Long

## 2020-04-09 NOTE — PROGRESS NOTES
Ind  Assistive device required for transfer:   Knee scooter     Gait:    Walk 10 feet: Mod Ind  Walk 50 feet with 2 turns: Mod Ind  Walk 150 feet: Mod Ind  Other distance:  ~400 ft x 2  Device:  knee scooter   Gait Quality:  Hop-to     Wheelchair Propulsion:  Wheel 50 feet with 2 turns: Mod Ind  Wheel 150 feet: Mod Ind   Extremities Used:   BUE  Type:    [x]  Manual        []  Electric    Stairs  1 step or curb: CGA   Supportive Device:  Knee scooter  Height:   2/4\"    4 steps:  Pt unable to safely perform   12 steps:  Pt unable to safely perform     Uneven Surfaces:       Assistance: Mod Ind   Device:  Knee scooter   Surfaces Completed:   []  Green mat with bean bags beneath      []  Throw rugs       [x]  Ramp       [x]  Outdoor pavements        []  Grass             []  Loose gravel        [x]  Other: carpet        Picking Up Object From Floor (must be standing position):  Assistance: Mod Ind   [x]   Reacher used      Additional Therapeutic activities/exercises completed this date:     []   Nu-step:  Time:        Level:         #Steps:       []   Rebounder:    []  Seated     []  Standing        []   Balance training         []   Postural training    []   Supine ther ex (reps/sets):     []   Seated ther ex (reps/sets):     []   Standing ther ex (reps/sets):     []   Picking up object from floor (standing):                   []   Reacher used   []   Other:  Toileting activity completed with    []   Other:    Comments:      Patient/Caregiver Education and Training:   []   Role of PT  []   Education about Dx  []   Use of call light for assist   []   HEP provided and explained   []   Treatment plan reviewed  []   Home safety  []   Wheelchair mobility/management   []   Body mechanics  []   Bed Mobility/Transfer technique  []   Gait technique/sequencing  []   Proper use of assistive device/adaptive equipment  [x]   Advanced mobility safety and technique  []   Reinforced patient's precautions/mobility while maintaining precautions  []   Postural awareness  []   Family/caregiver training  [x]   Progress was updated and reviewed in Rehabtracker with patient and/or family this date. []   Other:    Treatment Plan for Next Session: discharge to home       Assessment: Pt has met and even surpassed most of the PT goals established. Pt is still unable to safely manage stairs following WB restriction on R LE due to impaired strength and balance but his home is accessible to manage a knee scooter.       Treatment/Activity Tolerance:   [x] Tolerated treatment with no adverse effects    [] Patient limited by fatigue  [] Patient limited by pain   [] Patient limited by medical complications:    [] Adverse reaction to Tx:   [] Significant change in status    Barrier/s to progress/learning:   [x]   None  []   Cognition  []   Hearing deficit  []   Pre-morbid mental/psychological status   []   Motivation  []   Communication  []   Anxiety  []   Vision deficit  []   Attention  []   Other:    Safety:       []  bed alarm set    []  chair alarm set    []  Pt refused alarms                []  Telesitter activated      [x]  Gait belt used during tx session      []other:         Number of Minutes/Billable Intervention  Gait Training 30   Therapeutic Exercise    Neuro Re-Ed    Therapeutic Activity 30   Wheelchair Propulsion    Group    Other:    TOTAL 60         Social History  Social/Functional History  Lives With: Daughter, Significant other(girlfriend (disabled but is ambulatory))  Type of Home: Mobile home  Home Layout: One level  Home Access: Ramped entrance  Bathroom Shower/Tub: Tub/Shower unit, Shower chair without back  Bathroom Toilet: Handicap height  Bathroom Equipment: Grab bars in shower, Shower chair  ADL Assistance: Independent  Homemaking Assistance: Independent  Meal Prep Responsibility: Primary  Laundry Responsibility: Primary  Cleaning Responsibility: Primary  Bill Paying/Finance Responsibility: Primary  Shopping

## 2020-04-09 NOTE — PROGRESS NOTES
Occupational Therapy  Physical Rehabilitation: OCCUPATIONAL THERAPY     [x] daily progress note       [] discharge       Patient Name:  Cristy King   :  1960 MRN: [id number]  Room:  [address] [place] [address] Date of Admission: 2020  Rehabilitation Diagnosis:   Acute hematogenous osteomyelitis of right foot ([facility] [address] Benjy Loera       Date 2020       Day of ARU Week:  2   Time IN/OUT 9:4610:46   Individual Tx Minutes 60   Group Tx Minutes    Co-Treat Minutes    Concurrent Tx Minutes    TOTAL Tx Time Mins 60   Variance Time    Variance Time []   Refusal due to:     []   Medical hold/reason:    []   Illness   []   Off Unit for test/procedure  []   Extra time needed to complete task  []   Therapeutic need  []   Other (specify):   Restrictions Restrictions/Precautions  Restrictions/Precautions: General Precautions, Fall Risk, Weight Bearing(wound vac, NWB RLE)  Position Activity Restriction  Other position/activity restrictions: IV   Communication with other providers: [x]   OK to see per nursing:     []   Spoke with team member regarding:      Subjective observations and cognitive status: Pt upright EOB and agreeable to therapy session     Pain level/location:    0/10       Location: no complaints    Discharge recommendations  Anticipated discharge date:  4/10  Destination: []home alone   []home alone w assist prn [] home w/ family    [] Continuous supervision       []SNF            [] Assisted living     [] Other:   Continued therapy: [x]HHC OT  []OUTPATIENT  OT   [] No Further OT  Equipment needs: Sal Richards the assistance of a tub transfer bench to successfully and safely complete bathing activities necessary due to reduced balance, endurance, need for ADL assist, and LE weakness and reduced ROM.  These tasks cannot be safely completed without this device and would place Carlos Alberto Lyman at greater risk of falls.     Therapist recommended BSC, however, Pt declines.    (HIT F2 to transition between and Training:   []   Adaptive Equipment Use  []   Bed Mobility/Transfer Technique/Safety  []   Energy Conservation Tips  []   Family training  [x]   Postural Awareness  [x]   Safety During Functional Activities  []   Reinforced Patient's Precautions   []   Progress was updated and reviewed in Rehabtracker with patient and/or family this         date. Treatment Plan for Next Session: Cont. OT POC as tolerated      Assessment:  Pt tolerates session w/o any adverse events on this date and meets all goals at Mod(I) level.        Treatment/Activity Tolerance:   [x] Tolerated treatment with no adverse effects    [x] Patient limited by fatigue  [] Patient limited by pain   [] Patient limited by medical complications:    [] Adverse reaction to Tx:   [] Significant change in status    Safety:       []  bed alarm set    []  chair alarm set    []  Pt refused alarms                []  Telesitter activated      [x]  Gait belt used during tx session      []other:       Number of Minutes/Billable Intervention  Therapeutic Exercise    ADL Self-care 60   Neuro Re-Ed    Therapeutic Activity    Group    Other:    TOTAL 60       Social History  Social/Functional History  Lives With: Daughter, Significant other(girlfriend (disabled but is ambulatory))  Type of Home: Mobile home  Home Layout: One level  Home Access: Ramped entrance  Bathroom Shower/Tub: Tub/Shower unit, Shower chair without back  Bathroom Toilet: Handicap height  Bathroom Equipment: Grab bars in shower, Shower chair  ADL Assistance: Independent  Homemaking Assistance: Independent  Meal Prep Responsibility: Primary  Laundry Responsibility: Primary  Cleaning Responsibility: Primary  Bill Paying/Finance Responsibility: Primary  Shopping Responsibility: Primary  Dependent Care Responsibility: Primary(dogs; cares for disabled girlfriend as well )  Health Care Management: Secondary(required reminders from girlfriend )  Ambulation Assistance: Independent  Transfer

## 2020-04-09 NOTE — PROGRESS NOTES
Wound vac orders haven't been returned to [facility] yet. Case mgt will leave copy in patient's hard chart for Dr Lucio Armenta. Wound care indicates he will likely see patient today as f/u to wound vac removal.      Patient looking forward to tomorrow's discharge. Nervous about cost of IV meds. Taryn @ [address] [place] [address] Rd plans to discuss with patient today then update case mgt on conversation. Family will transport when ready. Patient would like meds to beds. Med assist referral made 4/8. Patient has knee scooter and TTB will be delivered this afternoon. Patient continues to worry about finances. Reminded him to fill out [facility] financial assistance forms soon. [facility] signed and filed.

## 2020-04-09 NOTE — PROGRESS NOTES
[address] [facility]  consulted by Dr. Faisal Tang for monitoring and adjustment. Indication for treatment: OM R foot  Goal trough: 15 mcg/mL     Pertinent Laboratory Values:   Temp Readings from Last 3 Encounters:   04/09/20 98.3 °F (36.8 °C) (Oral)   04/01/20 98.4 °F (36.9 °C) (Oral)   07/05/19 97.6 °F (36.4 °C) (Oral)     No results for input(s): WBC, LACTATE in the last 72 hours. Recent Labs     04/08/20  0630   CREATININE 0.7*     Estimated Creatinine Clearance: 105 mL/min (A) (based on SCr of 0.7 mg/dL (L)). Intake/Output Summary (Last 24 hours) at 4/9/2020 1553  Last data filed at 4/9/2020 1432  Gross per 24 hour   Intake 540 ml   Output --   Net 540 ml     Vancomycin level:   TROUGH:    Recent Labs     04/06/20  1822   VANCOTROUGH 16.5     RANDOM:  No results for input(s): VANCORANDOM in the last 72 hours.     Assessment:  · WBC and temperature: no new WBC; afebrile  · SCr, BUN, and urine output: stable   · Day(s) of therapy: 18  · Vancomycin level:   · 3/27: 14.6, therapeutic (1250 mg q12h)   · 3/30: 16.9, therapeutic (1250 mg q12h) accumulation  · 4/1: 18.3, therapeutic (1000 mg q12h)  · 4/6: 16.5, therapeutic (1000 mg q12h)     Plan:  · Continue vancomycin 1000 mg q12h   · Repeat trough in 3 days   · Pharmacy will continue to monitor patient and adjust therapy as indicated    Thank you for the consult,    Evelia Rodriguez, PharmD, [facility]

## 2020-04-10 VITALS
RESPIRATION RATE: 16 BRPM | SYSTOLIC BLOOD PRESSURE: 123 MMHG | TEMPERATURE: 98 F | OXYGEN SATURATION: 96 % | WEIGHT: 148.4 LBS | HEIGHT: 68 IN | DIASTOLIC BLOOD PRESSURE: 69 MMHG | HEART RATE: 58 BPM | BODY MASS INDEX: 22.49 KG/M2

## 2020-04-10 LAB
CREAT SERPL-MCNC: 0.7 MG/DL (ref 0.9–1.3)
GFR AFRICAN AMERICAN: >60 ML/MIN/1.73M2
GFR NON-AFRICAN AMERICAN: >60 ML/MIN/1.73M2
GLUCOSE BLD-MCNC: 120 MG/DL (ref 70–99)
GLUCOSE BLD-MCNC: 197 MG/DL (ref 70–99)

## 2020-04-10 PROCEDURE — 94150 VITAL CAPACITY TEST: CPT

## 2020-04-10 PROCEDURE — 94761 N-INVAS EAR/PLS OXIMETRY MLT: CPT

## 2020-04-10 PROCEDURE — 2580000003 HC RX 258: Performed by: PHYSICAL MEDICINE & REHABILITATION

## 2020-04-10 PROCEDURE — 6370000000 HC RX 637 (ALT 250 FOR IP): Performed by: INTERNAL MEDICINE

## 2020-04-10 PROCEDURE — 82962 GLUCOSE BLOOD TEST: CPT

## 2020-04-10 PROCEDURE — 6360000002 HC RX W HCPCS: Performed by: INTERNAL MEDICINE

## 2020-04-10 PROCEDURE — 82565 ASSAY OF CREATININE: CPT

## 2020-04-10 PROCEDURE — 99239 HOSP IP/OBS DSCHRG MGMT >30: CPT | Performed by: PHYSICAL MEDICINE & REHABILITATION

## 2020-04-10 RX ADMIN — GABAPENTIN 600 MG: 300 CAPSULE ORAL at 05:24

## 2020-04-10 RX ADMIN — COLLAGENASE SANTYL: 250 OINTMENT TOPICAL at 09:20

## 2020-04-10 RX ADMIN — HYDROCODONE BITARTRATE AND ACETAMINOPHEN 2 TABLET: 5; 325 TABLET ORAL at 05:24

## 2020-04-10 RX ADMIN — VANCOMYCIN HYDROCHLORIDE 1000 MG: 1 INJECTION, SOLUTION INTRAVENOUS at 07:53

## 2020-04-10 RX ADMIN — CLOPIDOGREL BISULFATE 75 MG: 75 TABLET ORAL at 07:52

## 2020-04-10 RX ADMIN — LEVOTHYROXINE SODIUM 100 MCG: 100 TABLET ORAL at 05:24

## 2020-04-10 RX ADMIN — Medication 10 ML: at 07:52

## 2020-04-10 RX ADMIN — HYDROCODONE BITARTRATE AND ACETAMINOPHEN 2 TABLET: 5; 325 TABLET ORAL at 10:07

## 2020-04-10 ASSESSMENT — PAIN DESCRIPTION - PROGRESSION
CLINICAL_PROGRESSION: NOT CHANGED
CLINICAL_PROGRESSION: NOT CHANGED

## 2020-04-10 ASSESSMENT — PAIN DESCRIPTION - DESCRIPTORS: DESCRIPTORS: BURNING;SHARP

## 2020-04-10 ASSESSMENT — PAIN DESCRIPTION - ONSET: ONSET: ON-GOING

## 2020-04-10 ASSESSMENT — PAIN DESCRIPTION - FREQUENCY: FREQUENCY: CONTINUOUS

## 2020-04-10 ASSESSMENT — PAIN SCALES - GENERAL
PAINLEVEL_OUTOF10: 8
PAINLEVEL_OUTOF10: 8
PAINLEVEL_OUTOF10: 6

## 2020-04-10 ASSESSMENT — PAIN DESCRIPTION - ORIENTATION: ORIENTATION: RIGHT

## 2020-04-10 ASSESSMENT — PAIN - FUNCTIONAL ASSESSMENT: PAIN_FUNCTIONAL_ASSESSMENT: PREVENTS OR INTERFERES SOME ACTIVE ACTIVITIES AND ADLS

## 2020-04-10 ASSESSMENT — PAIN DESCRIPTION - LOCATION: LOCATION: FOOT

## 2020-04-10 ASSESSMENT — PAIN DESCRIPTION - PAIN TYPE: TYPE: SURGICAL PAIN

## 2020-04-10 NOTE — PLAN OF CARE
Problem: Infection:  Goal: Will remain free from infection  Description: Will remain free from infection  Outcome: Completed     Problem: Safety:  Goal: Free from accidental physical injury  Description: Free from accidental physical injury  Outcome: Completed  Goal: Free from intentional harm  Description: Free from intentional harm  Outcome: Completed     Problem: Daily Care:  Goal: Daily care needs are met  Description: Daily care needs are met  Outcome: Completed     Problem: Pain:  Goal: Patient's pain/discomfort is manageable  Description: Patient's pain/discomfort is manageable  Outcome: Completed  Goal: Pain level will decrease  Description: Pain level will decrease  Outcome: Completed  Goal: Control of acute pain  Description: Control of acute pain  Outcome: Completed  Goal: Control of chronic pain  Description: Control of chronic pain  Outcome: Completed     Problem: Skin Integrity:  Goal: Skin integrity will stabilize  Description: Skin integrity will stabilize  Outcome: Completed     Problem: Discharge Planning:  Goal: Patients continuum of care needs are met  Description: Patients continuum of care needs are met  Outcome: Completed     Problem: Falls - Risk of:  Goal: Will remain free from falls  Description: Will remain free from falls  Outcome: Completed  Goal: Absence of physical injury  Description: Absence of physical injury  Outcome: Completed     Problem: Infection:  Goal: Will remain free from infection  Description: Will remain free from infection  Outcome: Completed     Problem: Safety:  Goal: Free from accidental physical injury  Description: Free from accidental physical injury  Outcome: Completed  Goal: Free from intentional harm  Description: Free from intentional harm  Outcome: Completed     Problem: Daily Care:  Goal: Daily care needs are met  Description: Daily care needs are met  Outcome: Completed     Problem: Pain:  Goal: Patient's pain/discomfort is manageable  Description:
Problem: Infection:  Goal: Will remain free from infection  Description: Will remain free from infection  Outcome: Ongoing     Problem: Safety:  Goal: Free from accidental physical injury  Description: Free from accidental physical injury  4/4/2020 1207 by Josh Quinonez RN  Outcome: Ongoing  4/4/2020 0645 by Laurita Terrazas RN  Outcome: Met This Shift  Goal: Free from intentional harm  Description: Free from intentional harm  4/4/2020 1207 by Josh Quinonez RN  Outcome: Ongoing  4/4/2020 0645 by Laurita Terrazas RN  Outcome: Met This Shift     Problem: Daily Care:  Goal: Daily care needs are met  Description: Daily care needs are met  4/4/2020 1207 by Josh Quinonez RN  Outcome: Ongoing  4/4/2020 0645 by Laurita Terrazas RN  Outcome: Met This Shift     Problem: Pain:  Goal: Patient's pain/discomfort is manageable  Description: Patient's pain/discomfort is manageable  4/4/2020 1207 by Josh Quinonez RN  Outcome: Ongoing  4/4/2020 0645 by Laurita Terrazas RN  Outcome: Met This Shift  Goal: Pain level will decrease  Description: Pain level will decrease  4/4/2020 1207 by Josh Quinonez RN  Outcome: Ongoing  4/4/2020 0645 by Laurita Terrazas RN  Outcome: Ongoing  Goal: Control of acute pain  Description: Control of acute pain  4/4/2020 1207 by Josh Quinonez RN  Outcome: Ongoing  4/4/2020 0645 by Laurita Terrazas RN  Outcome: Ongoing  Goal: Control of chronic pain  Description: Control of chronic pain  Outcome: Ongoing     Problem: Skin Integrity:  Goal: Skin integrity will stabilize  Description: Skin integrity will stabilize  Outcome: Ongoing     Problem: Discharge Planning:  Goal: Patients continuum of care needs are met  Description: Patients continuum of care needs are met  Outcome: Ongoing     Problem: Falls - Risk of:  Goal: Will remain free from falls  Description: Will remain free from falls  Outcome: Ongoing  Goal: Absence of physical injury  Description: Absence of physical injury  Outcome: Ongoing
Problem: Infection:  Goal: Will remain free from infection  Description: Will remain free from infection  Outcome: Ongoing     Problem: Safety:  Goal: Free from accidental physical injury  Description: Free from accidental physical injury  Outcome: Ongoing  Goal: Free from intentional harm  Description: Free from intentional harm  Outcome: Ongoing     Problem: Daily Care:  Goal: Daily care needs are met  Description: Daily care needs are met  Outcome: Ongoing     Problem: Pain:  Goal: Patient's pain/discomfort is manageable  Description: Patient's pain/discomfort is manageable  Outcome: Ongoing  Goal: Pain level will decrease  Description: Pain level will decrease  Outcome: Ongoing  Goal: Control of acute pain  Description: Control of acute pain  Outcome: Ongoing  Goal: Control of chronic pain  Description: Control of chronic pain  Outcome: Ongoing     Problem: Skin Integrity:  Goal: Skin integrity will stabilize  Description: Skin integrity will stabilize  Outcome: Ongoing     Problem: Discharge Planning:  Goal: Patients continuum of care needs are met  Description: Patients continuum of care needs are met  Outcome: Ongoing     Problem: Falls - Risk of:  Goal: Will remain free from falls  Description: Will remain free from falls  Outcome: Ongoing  Goal: Absence of physical injury  Description: Absence of physical injury  Outcome: Ongoing
of:  Goal: Will remain free from falls  Description: Will remain free from falls  4/3/2020 1739 by Julia Marshall, MELISSA  Outcome: Ongoing  Goal: Absence of physical injury  Description: Absence of physical injury  4/3/2020 1739 by Aurelia Marshall, RN  Outcome: Ongoing

## 2020-04-10 NOTE — PROGRESS NOTES
Discharge home with family and [facility]. Wife and daughter coming in the car to pickup patient and belongs which were reviewed. Discharge instructions, and follow up visits reviewed and questions answered. Durable equipment =scooter delivered to room and taking home.  Left the unit at

## 2020-04-10 NOTE — CARE COORDINATION
Discharge order ready. Approved a 1x voucher to [facility]. Voucher will cover atrovastatin, clopidogrel and collagenase. Patient will need to pay for the norco as it is over the 3 day supply we allow and the nicoderm is otc. Mailing an application to patient should he need further help.   Patient placed on flagged list.

## 2020-04-13 ENCOUNTER — TELEPHONE (OUTPATIENT)
Dept: INFECTIOUS DISEASES | Age: 60
End: 2020-04-13

## 2020-04-13 NOTE — DISCHARGE SUMMARY
patient participated in the prescribed therapy treatment plan with reasonable compliance and progressive tolerance. They avoided significant medical complications. By the time of discharge the patient had become safer with adaptive equipment to transfer and toilet with a FWW with the supervision of family/caregivers. The patient was tolerating an oral diet without choking/coughing and was back to their baseline with regards to bowel and bladder control. Discharge instructions were reviewed with the patient. The patient is to follow up with the surgeon in 1 week and his PCP in 1 week. No driving. [facility] PT/OT/RN will be arranged. Gustavo Urias   Home Medication Instructions [id number]    Printed on:04/13/20 4373   Medication Information                      atorvastatin (LIPITOR) 40 MG tablet  Take 1 tablet by mouth nightly             Blood Glucose Monitoring Suppl (FREESTYLE LITE) MARGARITA  Apply 1 Device topically three times daily. clopidogrel (PLAVIX) 75 MG tablet  Take 1 tablet by mouth daily             collagenase 250 UNIT/GM ointment  Apply topically daily. gabapentin (NEURONTIN) 600 MG tablet  Take 1 tablet by mouth 3 times daily             glucose blood VI test strips (FREESTYLE LITE) strip  Test 3 times daily as needed. HYDROcodone-acetaminophen (NORCO) 5-325 MG per tablet  Take 1 tablet by mouth every 6 hours as needed for Pain for up to 7 days. Insulin Aspart Prot & Aspart (NOVOLOG MIX 70/30 SC)  Inject 25 Units into the skin every morning              Insulin Pen Needle 31G X 8 MM MISC  1 Device by Does not apply route three times daily.              Lancets (STERILANCE TL) MISC  USE AS DIRECTED TO TEST BLOOD SUGAR THREE TIMES DAILY BEFORE MEALS             levothyroxine (SYNTHROID) 100 MCG tablet  Take 1 tablet by mouth Daily             nicotine (NICODERM CQ) 21 MG/24HR  Place 1 patch onto the skin daily                 CONDITION ON

## 2020-04-13 NOTE — PROGRESS NOTES
Zuleyma Snowball    : 1960  Acct #: [de-identified]  MRN: [id number]              PM&R Progress Note      Admitting diagnosis: Osteomyelitis of the right foot.     Comorbid diagnoses impacting rehabilitation: Status post amputation of the right fifth toe, PAD, uncontrolled pain, gait disturbance, generalized weakness, uncontrolled diabetes with peripheral neuropathy, COPD, history of polysubstance abuse. Chief complaint: Anxious to get home tomorrow. Prior (baseline) level of function: Independent. Current level of function:         Current  IRF-GIANNI and Goals:   Hearing, Speech, and Vision  Expression of Ideas and Wants: Without difficulty  Understanding Verbal and Non-Verbal Content: Understands  Prior Functioning: Everyday Activities  Self Care: Independent  Indoor Mobility (Ambulation):  Independent  Stairs: Independent  Functional Cognition: Needed some help  Prior Device Use: None of the given options(Ind)    Eating  Assistance Needed: Independent  Comment: Pt reports difficulty opening packets on this date requiring set up at times  CARE Score: 6  Discharge Goal: Independent  Oral Hygiene  Assistance Needed: Independent  CARE Score: 6  Discharge Goal: [address] Needed: Independent  Comment: (Per Richard--BRANDON Mcgarry )  CARE Score: 6  Discharge Goal: Independent  Shower/Bathe Self  Assistance Needed: Independent  CARE Score: 6  Discharge Goal: Independent  Upper Body Dressing  Assistance Needed: Independent  CARE Score: 6  Discharge Goal: Independent  Lower Body Dressing  Assistance Needed: Independent  CARE Score: 6  Discharge Goal: Independent  Putting On/Taking Off Footwear  Assistance Needed: Independent  CARE Score: 6  Discharge Goal: Independent    Roll Left and Right  Assistance Needed: Independent  Comment: pt only required set-up assistance of wound vac line  CARE Score: 6  Discharge Goal: Independent  Sit to Lying  Assistance Needed: Independent  Comment: pt Component Value Date    WBC 8.4 03/31/2020    HGB 9.9 (L) 03/31/2020    HCT 31.1 (L) 03/31/2020    MCV 94.0 03/31/2020     03/31/2020     Lab Results   Component Value Date    INR 1.14 08/28/2018    INR 1.09 08/17/2016    INR 0.99 05/15/2015    PROTIME 13.0 (H) 08/28/2018    PROTIME 12.5 08/17/2016    PROTIME 11.3 05/15/2015     Lab Results   Component Value Date    CREATININE 0.7 (L) 04/10/2020    BUN 20 03/24/2020     03/24/2020    K 4.1 03/24/2020     03/24/2020    CO2 24 03/24/2020     Lab Results   Component Value Date    ALT 12 03/24/2020    AST 21 03/24/2020    ALKPHOS 102 03/24/2020    BILITOT 0.2 03/24/2020       Expected length of stay  prior to a supervised level of function for discharge home with a walker and [facility] OT/PT is 4/10/2020. Recommendations:    1. Osteomyelitis of the right foot with gait disturbance: Nonweightbearing remains a problem for him in his daily occupational and physical therapy. His podiatrist is taken the wound VAC away and we are using wet-to-dry dressings now. He requires IV antibiotics until 5/12/2020. No biotic orders have been sent to the home care team. Home health care services will be needed in order to get him home this week. 2.  DVT prophylaxis: Lovenox 40 mg subcu daily. Must monitor his hemoglobin and platelet count periodically while on this medication. We are doing weightbearing to the left lower limb. GI prophylaxis offered. No signs of acute blood loss. 3.  Uncontrolled pain: Mild pain persist despite significant amount of narcotic use. I have transitioned him from IV morphine to only oral oxycodone. He needs to increase the time in between oral doses. Offering distraction and bowel intervention as well. 4.  Uncontrolled diabetes with peripheral neuropathy: Patient is receiving a CCD diet and we monitor blood sugars with meals and at bedtime.   He requires Lantus at night and Humalog with each meal.  He has committed to planned

## 2020-04-14 ENCOUNTER — HOSPITAL ENCOUNTER (OUTPATIENT)
Age: 60
Setting detail: SPECIMEN
Discharge: HOME | End: 2020-04-14
Payer: MEDICARE

## 2020-04-14 LAB
ALBUMIN SERPL-MCNC: 2.9 GM/DL (ref 3.4–5)
ALP BLD-CCNC: 365 IU/L (ref 40–128)
ALT SERPL-CCNC: 40 U/L (ref 10–40)
ANION GAP SERPL CALCULATED.3IONS-SCNC: 11 MMOL/L (ref 4–16)
AST SERPL-CCNC: 68 IU/L (ref 15–37)
BASOPHILS ABSOLUTE: 0.1 K/CU MM
BASOPHILS RELATIVE PERCENT: 1.1 % (ref 0–1)
BILIRUB SERPL-MCNC: 0.4 MG/DL (ref 0–1)
BUN BLDV-MCNC: 17 MG/DL (ref 6–23)
C-REACTIVE PROTEIN, HIGH SENSITIVITY: 5.5 MG/L
CALCIUM SERPL-MCNC: 8.3 MG/DL (ref 8.3–10.6)
CHLORIDE BLD-SCNC: 103 MMOL/L (ref 99–110)
CO2: 23 MMOL/L (ref 21–32)
CREAT SERPL-MCNC: 0.6 MG/DL (ref 0.9–1.3)
DIFFERENTIAL TYPE: ABNORMAL
DOSE AMOUNT: NORMAL
DOSE TIME: NORMAL
EOSINOPHILS ABSOLUTE: 0.1 K/CU MM
EOSINOPHILS RELATIVE PERCENT: 1.9 % (ref 0–3)
ERYTHROCYTE SEDIMENTATION RATE: >120 MM/HR (ref 0–20)
GFR AFRICAN AMERICAN: >60 ML/MIN/1.73M2
GFR NON-AFRICAN AMERICAN: >60 ML/MIN/1.73M2
GLUCOSE BLD-MCNC: 263 MG/DL (ref 70–99)
HCT VFR BLD CALC: 21.2 % (ref 42–52)
HEMOGLOBIN: 7 GM/DL (ref 13.5–18)
IMMATURE NEUTROPHIL %: 0.4 % (ref 0–0.43)
LYMPHOCYTES ABSOLUTE: 1.3 K/CU MM
LYMPHOCYTES RELATIVE PERCENT: 17.8 % (ref 24–44)
MCH RBC QN AUTO: 30.4 PG (ref 27–31)
MCHC RBC AUTO-ENTMCNC: 33 % (ref 32–36)
MCV RBC AUTO: 92.2 FL (ref 78–100)
MONOCYTES ABSOLUTE: 0.7 K/CU MM
MONOCYTES RELATIVE PERCENT: 10.3 % (ref 0–4)
NUCLEATED RBC %: 0 %
PDW BLD-RTO: 15.9 % (ref 11.7–14.9)
PLATELET # BLD: 159 K/CU MM (ref 140–440)
PMV BLD AUTO: 10.5 FL (ref 7.5–11.1)
POTASSIUM SERPL-SCNC: 4.1 MMOL/L (ref 3.5–5.1)
RBC # BLD: 2.3 M/CU MM (ref 4.6–6.2)
SEGMENTED NEUTROPHILS ABSOLUTE COUNT: 4.9 K/CU MM
SEGMENTED NEUTROPHILS RELATIVE PERCENT: 68.5 % (ref 36–66)
SODIUM BLD-SCNC: 137 MMOL/L (ref 135–145)
TOTAL IMMATURE NEUTOROPHIL: 0.03 K/CU MM
TOTAL NUCLEATED RBC: 0 K/CU MM
TOTAL PROTEIN: 7 GM/DL (ref 6.4–8.2)
TOTAL RETICULOCYTE COUNT: 0.04 K/CU MM
VANCOMYCIN TROUGH: 15.4 UG/ML (ref 10–20)
WBC # BLD: 7.2 K/CU MM (ref 4–10.5)

## 2020-04-14 PROCEDURE — 80053 COMPREHEN METABOLIC PANEL: CPT

## 2020-04-14 PROCEDURE — 86140 C-REACTIVE PROTEIN: CPT

## 2020-04-14 PROCEDURE — 85025 COMPLETE CBC W/AUTO DIFF WBC: CPT

## 2020-04-14 PROCEDURE — 80202 ASSAY OF VANCOMYCIN: CPT

## 2020-04-14 PROCEDURE — 85652 RBC SED RATE AUTOMATED: CPT

## 2020-04-14 NOTE — TELEPHONE ENCOUNTER
Advised Rowan that labs should be drawn asap. Stated they have been drawn and she is taking them to the Lab.

## 2020-04-20 ENCOUNTER — HOSPITAL ENCOUNTER (OUTPATIENT)
Age: 60
Setting detail: SPECIMEN
Discharge: HOME | End: 2020-04-20
Payer: MEDICARE

## 2020-04-20 ENCOUNTER — HOSPITAL ENCOUNTER (EMERGENCY)
Age: 60
Discharge: HOME | End: 2020-04-20
Attending: EMERGENCY MEDICINE
Payer: MEDICARE

## 2020-04-20 VITALS
BODY MASS INDEX: 22.76 KG/M2 | SYSTOLIC BLOOD PRESSURE: 130 MMHG | HEIGHT: 67 IN | TEMPERATURE: 98.7 F | DIASTOLIC BLOOD PRESSURE: 67 MMHG | WEIGHT: 145 LBS | HEART RATE: 87 BPM | OXYGEN SATURATION: 98 % | RESPIRATION RATE: 18 BRPM

## 2020-04-20 LAB
BASOPHILS ABSOLUTE: 0 K/CU MM
BASOPHILS ABSOLUTE: 0.1 K/CU MM
BASOPHILS RELATIVE PERCENT: 1.1 % (ref 0–1)
BASOPHILS RELATIVE PERCENT: 1.5 % (ref 0–1)
DIFFERENTIAL TYPE: ABNORMAL
DIFFERENTIAL TYPE: ABNORMAL
EOSINOPHILS ABSOLUTE: 0 K/CU MM
EOSINOPHILS ABSOLUTE: 0.1 K/CU MM
EOSINOPHILS RELATIVE PERCENT: 1.5 % (ref 0–3)
EOSINOPHILS RELATIVE PERCENT: 2.3 % (ref 0–3)
HCT VFR BLD CALC: 34.6 % (ref 42–52)
HCT VFR BLD CALC: ABNORMAL % (ref 42–52)
HEMOGLOBIN: 11.4 GM/DL (ref 13.5–18)
HEMOGLOBIN: ABNORMAL GM/DL (ref 13.5–18)
IMMATURE NEUTROPHIL %: 0 % (ref 0–0.43)
IMMATURE NEUTROPHIL %: 0.2 % (ref 0–0.43)
LYMPHOCYTES ABSOLUTE: 0.7 K/CU MM
LYMPHOCYTES ABSOLUTE: 1.1 K/CU MM
LYMPHOCYTES RELATIVE PERCENT: 20.1 % (ref 24–44)
LYMPHOCYTES RELATIVE PERCENT: 24.5 % (ref 24–44)
MCH RBC QN AUTO: 30.1 PG (ref 27–31)
MCH RBC QN AUTO: ABNORMAL PG (ref 27–31)
MCHC RBC AUTO-ENTMCNC: 32.9 % (ref 32–36)
MCHC RBC AUTO-ENTMCNC: ABNORMAL % (ref 32–36)
MCV RBC AUTO: 91.3 FL (ref 78–100)
MCV RBC AUTO: ABNORMAL FL (ref 78–100)
MONOCYTES ABSOLUTE: 0.2 K/CU MM
MONOCYTES ABSOLUTE: 0.5 K/CU MM
MONOCYTES RELATIVE PERCENT: 6.2 % (ref 0–4)
MONOCYTES RELATIVE PERCENT: 9.6 % (ref 0–4)
NUCLEATED RBC %: 0 %
NUCLEATED RBC %: 0 %
PDW BLD-RTO: 15.5 % (ref 11.7–14.9)
PDW BLD-RTO: ABNORMAL % (ref 11.7–14.9)
PLATELET # BLD: 139 K/CU MM (ref 140–440)
PLATELET # BLD: ABNORMAL K/CU MM (ref 140–440)
PMV BLD AUTO: 10.3 FL (ref 7.5–11.1)
PMV BLD AUTO: ABNORMAL FL (ref 7.5–11.1)
RBC # BLD: 3.79 M/CU MM (ref 4.6–6.2)
RBC # BLD: ABNORMAL M/CU MM (ref 4.6–6.2)
SEGMENTED NEUTROPHILS ABSOLUTE COUNT: 1.8 K/CU MM
SEGMENTED NEUTROPHILS ABSOLUTE COUNT: 3.6 K/CU MM
SEGMENTED NEUTROPHILS RELATIVE PERCENT: 66.7 % (ref 36–66)
SEGMENTED NEUTROPHILS RELATIVE PERCENT: ABNORMAL % (ref 36–66)
TOTAL IMMATURE NEUTOROPHIL: 0 K/CU MM
TOTAL IMMATURE NEUTOROPHIL: 0.01 K/CU MM
TOTAL NUCLEATED RBC: 0 K/CU MM
TOTAL NUCLEATED RBC: 0 K/CU MM
WBC # BLD: 5.3 K/CU MM (ref 4–10.5)
WBC # BLD: ABNORMAL K/CU MM (ref 4–10.5)

## 2020-04-20 PROCEDURE — 85025 COMPLETE CBC W/AUTO DIFF WBC: CPT

## 2020-04-20 PROCEDURE — 99283 EMERGENCY DEPT VISIT LOW MDM: CPT

## 2020-04-20 PROCEDURE — 6370000000 HC RX 637 (ALT 250 FOR IP): Performed by: EMERGENCY MEDICINE

## 2020-04-20 RX ORDER — HYDROCODONE BITARTRATE AND ACETAMINOPHEN 5; 325 MG/1; MG/1
1 TABLET ORAL ONCE
Status: COMPLETED | OUTPATIENT
Start: 2020-04-20 | End: 2020-04-20

## 2020-04-20 RX ADMIN — HYDROCODONE BITARTRATE AND ACETAMINOPHEN 1 TABLET: 5; 325 TABLET ORAL at 22:14

## 2020-04-20 ASSESSMENT — PAIN DESCRIPTION - LOCATION: LOCATION: FOOT

## 2020-04-20 ASSESSMENT — PAIN DESCRIPTION - ORIENTATION: ORIENTATION: RIGHT

## 2020-04-20 ASSESSMENT — PAIN DESCRIPTION - PAIN TYPE: TYPE: ACUTE PAIN

## 2020-04-20 ASSESSMENT — PAIN SCALES - GENERAL
PAINLEVEL_OUTOF10: 8
PAINLEVEL_OUTOF10: 8

## 2020-04-21 ENCOUNTER — TELEPHONE (OUTPATIENT)
Dept: INFECTIOUS DISEASES | Age: 60
End: 2020-04-21

## 2020-04-21 NOTE — TELEPHONE ENCOUNTER
Abe Serna called to let us know that the pts PICC line purple lumen was hard to flush the blood out after the draw. Was able to get blood, but was hard to transfer from syringe to tube. Platelets were 32 and pt went to ED. The red line could flush with resistance but couldn't get blood out. Can you put in a Cath Flow Order for this pt. Please Advise.

## 2020-04-21 NOTE — ED PROVIDER NOTES
rhythm  Respiratory: Respirations nonlabored. Abdominal:   Non distended. Neurological:  Alert and oriented, normal gait. Psychiatric:  Appropriate    I have reviewed and interpreted all of the currently available lab results from this visit (if applicable):  Results for orders placed or performed during the hospital encounter of 04/20/20   CBC Auto Differential   Result Value Ref Range    WBC 5.3 4.0 - 10.5 K/CU MM    RBC 3.79 (L) 4.6 - 6.2 M/CU MM    Hemoglobin 11.4 (L) 13.5 - 18.0 GM/DL    Hematocrit 34.6 (L) 42 - 52 %    MCV 91.3 78 - 100 FL    MCH 30.1 27 - 31 PG    MCHC 32.9 32.0 - 36.0 %    RDW 15.5 (H) 11.7 - 14.9 %    Platelets 016 (L) 547 - 440 K/CU MM    MPV 10.3 7.5 - 11.1 FL    Differential Type AUTOMATED DIFFERENTIAL     Segs Relative 66.7 (H) 36 - 66 %    Lymphocytes % 20.1 (L) 24 - 44 %    Monocytes % 9.6 (H) 0 - 4 %    Eosinophils % 2.3 0 - 3 %    Basophils % 1.1 (H) 0 - 1 %    Segs Absolute 3.6 K/CU MM    Lymphocytes Absolute 1.1 K/CU MM    Monocytes Absolute 0.5 K/CU MM    Eosinophils Absolute 0.1 K/CU MM    Basophils Absolute 0.1 K/CU MM    Nucleated RBC % 0.0 %    Total Nucleated RBC 0.0 K/CU MM    Total Immature Neutrophil 0.01 K/CU MM    Immature Neutrophil % 0.2 0 - 0.43 %      Radiographs (if obtained):  Radiologist's Report Reviewed:  No results found. EKG (if obtained): (All EKG's are interpreted by myself in the absence of a cardiologist)      MDM:  60yom with complaint of abnormal lab, was called that his platelets were really low. When I reviewed labs from earlier appears that labs were resulted in error and that the blood had clotted. His vitals are normal and he has no other symptoms other than mild fatigue after recent hospitalization, so I have ordered a recheck of CBC but otherwise does not appear to require further emergent workup.      CBC here does not show critical platelets, in fact his white count is normal at 5.3, hemoglobin is improved from previous at

## 2020-04-22 ENCOUNTER — HOSPITAL ENCOUNTER (OUTPATIENT)
Age: 60
Setting detail: SPECIMEN
Discharge: HOME | End: 2020-04-22
Payer: MEDICARE

## 2020-04-22 LAB
ALBUMIN SERPL-MCNC: 3.1 GM/DL (ref 3.4–5)
ALP BLD-CCNC: 279 IU/L (ref 40–128)
ALT SERPL-CCNC: 45 U/L (ref 10–40)
ANION GAP SERPL CALCULATED.3IONS-SCNC: 12 MMOL/L (ref 4–16)
AST SERPL-CCNC: 66 IU/L (ref 15–37)
BASOPHILS ABSOLUTE: 0.1 K/CU MM
BASOPHILS RELATIVE PERCENT: 1 % (ref 0–1)
BILIRUB SERPL-MCNC: 0.5 MG/DL (ref 0–1)
BUN BLDV-MCNC: 11 MG/DL (ref 6–23)
C-REACTIVE PROTEIN, HIGH SENSITIVITY: 3.2 MG/L
CALCIUM SERPL-MCNC: 8.5 MG/DL (ref 8.3–10.6)
CHLORIDE BLD-SCNC: 101 MMOL/L (ref 99–110)
CO2: 22 MMOL/L (ref 21–32)
CREAT SERPL-MCNC: 0.7 MG/DL (ref 0.9–1.3)
DIFFERENTIAL TYPE: ABNORMAL
DOSE AMOUNT: NORMAL
DOSE TIME: NORMAL
EOSINOPHILS ABSOLUTE: 0.1 K/CU MM
EOSINOPHILS RELATIVE PERCENT: 2 % (ref 0–3)
ERYTHROCYTE SEDIMENTATION RATE: 104 MM/HR (ref 0–20)
GFR AFRICAN AMERICAN: >60 ML/MIN/1.73M2
GFR NON-AFRICAN AMERICAN: >60 ML/MIN/1.73M2
GLUCOSE BLD-MCNC: 270 MG/DL (ref 70–99)
HCT VFR BLD CALC: 30.4 % (ref 42–52)
HEMOGLOBIN: 10.1 GM/DL (ref 13.5–18)
IMMATURE NEUTROPHIL %: 0.6 % (ref 0–0.43)
LYMPHOCYTES ABSOLUTE: 1.2 K/CU MM
LYMPHOCYTES RELATIVE PERCENT: 16.6 % (ref 24–44)
MCH RBC QN AUTO: 30.1 PG (ref 27–31)
MCHC RBC AUTO-ENTMCNC: 33.2 % (ref 32–36)
MCV RBC AUTO: 90.5 FL (ref 78–100)
MONOCYTES ABSOLUTE: 0.7 K/CU MM
MONOCYTES RELATIVE PERCENT: 10.4 % (ref 0–4)
NUCLEATED RBC %: 0 %
PDW BLD-RTO: 15.7 % (ref 11.7–14.9)
PLATELET # BLD: 171 K/CU MM (ref 140–440)
PMV BLD AUTO: 10.8 FL (ref 7.5–11.1)
POTASSIUM SERPL-SCNC: 4.2 MMOL/L (ref 3.5–5.1)
RBC # BLD: 3.36 M/CU MM (ref 4.6–6.2)
SEGMENTED NEUTROPHILS ABSOLUTE COUNT: 4.9 K/CU MM
SEGMENTED NEUTROPHILS RELATIVE PERCENT: 69.4 % (ref 36–66)
SODIUM BLD-SCNC: 135 MMOL/L (ref 135–145)
TOTAL IMMATURE NEUTOROPHIL: 0.04 K/CU MM
TOTAL NUCLEATED RBC: 0 K/CU MM
TOTAL PROTEIN: 7.4 GM/DL (ref 6.4–8.2)
VANCOMYCIN TROUGH: 10.6 UG/ML (ref 10–20)
WBC # BLD: 7 K/CU MM (ref 4–10.5)

## 2020-04-22 PROCEDURE — 85652 RBC SED RATE AUTOMATED: CPT

## 2020-04-22 PROCEDURE — 80202 ASSAY OF VANCOMYCIN: CPT

## 2020-04-22 PROCEDURE — 85025 COMPLETE CBC W/AUTO DIFF WBC: CPT

## 2020-04-22 PROCEDURE — 86140 C-REACTIVE PROTEIN: CPT

## 2020-04-22 PROCEDURE — 80053 COMPREHEN METABOLIC PANEL: CPT

## 2020-04-23 ENCOUNTER — TELEPHONE (OUTPATIENT)
Dept: INFECTIOUS DISEASES | Age: 60
End: 2020-04-23

## 2020-04-23 RX ORDER — ONDANSETRON 4 MG/1
4 TABLET, ORALLY DISINTEGRATING ORAL 3 TIMES DAILY PRN
Qty: 21 TABLET | Refills: 0 | Status: SHIPPED | OUTPATIENT
Start: 2020-04-23 | End: 2022-08-29 | Stop reason: ALTCHOICE

## 2020-04-23 NOTE — TELEPHONE ENCOUNTER
Pt is having issues with diarrhea, nausea and not able to hold anything down, (gagging). Wants to know what to do. Please advise.

## 2020-04-27 ENCOUNTER — HOSPITAL ENCOUNTER (OUTPATIENT)
Age: 60
Setting detail: SPECIMEN
Discharge: HOME | End: 2020-04-27
Payer: MEDICARE

## 2020-04-27 LAB
ALBUMIN SERPL-MCNC: 2.9 GM/DL (ref 3.4–5)
ALP BLD-CCNC: 215 IU/L (ref 40–128)
ALT SERPL-CCNC: 44 U/L (ref 10–40)
ANION GAP SERPL CALCULATED.3IONS-SCNC: 9 MMOL/L (ref 4–16)
AST SERPL-CCNC: 59 IU/L (ref 15–37)
BASOPHILS ABSOLUTE: 0.1 K/CU MM
BASOPHILS RELATIVE PERCENT: 1.8 % (ref 0–1)
BILIRUB SERPL-MCNC: 0.4 MG/DL (ref 0–1)
BUN BLDV-MCNC: 22 MG/DL (ref 6–23)
C-REACTIVE PROTEIN, HIGH SENSITIVITY: 3.3 MG/L
CALCIUM SERPL-MCNC: 8.5 MG/DL (ref 8.3–10.6)
CHLORIDE BLD-SCNC: 102 MMOL/L (ref 99–110)
CO2: 22 MMOL/L (ref 21–32)
CREAT SERPL-MCNC: 0.6 MG/DL (ref 0.9–1.3)
DIFFERENTIAL TYPE: ABNORMAL
DOSE AMOUNT: NORMAL
DOSE TIME: NORMAL
EOSINOPHILS ABSOLUTE: 0.1 K/CU MM
EOSINOPHILS RELATIVE PERCENT: 2.3 % (ref 0–3)
ERYTHROCYTE SEDIMENTATION RATE: 94 MM/HR (ref 0–20)
GFR AFRICAN AMERICAN: >60 ML/MIN/1.73M2
GFR NON-AFRICAN AMERICAN: >60 ML/MIN/1.73M2
GLUCOSE BLD-MCNC: 313 MG/DL (ref 70–99)
HCT VFR BLD CALC: 28.1 % (ref 42–52)
HEMOGLOBIN: 9.4 GM/DL (ref 13.5–18)
IMMATURE NEUTROPHIL %: 0.5 % (ref 0–0.43)
LYMPHOCYTES ABSOLUTE: 1.4 K/CU MM
LYMPHOCYTES RELATIVE PERCENT: 25.6 % (ref 24–44)
MCH RBC QN AUTO: 30.5 PG (ref 27–31)
MCHC RBC AUTO-ENTMCNC: 33.5 % (ref 32–36)
MCV RBC AUTO: 91.2 FL (ref 78–100)
MONOCYTES ABSOLUTE: 0.6 K/CU MM
MONOCYTES RELATIVE PERCENT: 11.4 % (ref 0–4)
NUCLEATED RBC %: 0 %
PDW BLD-RTO: 15.8 % (ref 11.7–14.9)
PLATELET # BLD: 145 K/CU MM (ref 140–440)
PMV BLD AUTO: 11.4 FL (ref 7.5–11.1)
POTASSIUM SERPL-SCNC: 4.3 MMOL/L (ref 3.5–5.1)
RBC # BLD: 3.08 M/CU MM (ref 4.6–6.2)
SEGMENTED NEUTROPHILS ABSOLUTE COUNT: 3.3 K/CU MM
SEGMENTED NEUTROPHILS RELATIVE PERCENT: 58.4 % (ref 36–66)
SODIUM BLD-SCNC: 133 MMOL/L (ref 135–145)
TOTAL IMMATURE NEUTOROPHIL: 0.03 K/CU MM
TOTAL NUCLEATED RBC: 0 K/CU MM
TOTAL PROTEIN: 6.9 GM/DL (ref 6.4–8.2)
VANCOMYCIN TROUGH: 10.9 UG/ML (ref 10–20)
WBC # BLD: 5.6 K/CU MM (ref 4–10.5)

## 2020-04-27 PROCEDURE — 80202 ASSAY OF VANCOMYCIN: CPT

## 2020-04-27 PROCEDURE — 85025 COMPLETE CBC W/AUTO DIFF WBC: CPT

## 2020-04-27 PROCEDURE — 80053 COMPREHEN METABOLIC PANEL: CPT

## 2020-04-27 PROCEDURE — 86140 C-REACTIVE PROTEIN: CPT

## 2020-04-27 PROCEDURE — 85652 RBC SED RATE AUTOMATED: CPT

## 2020-04-28 ENCOUNTER — HOSPITAL ENCOUNTER (OUTPATIENT)
Dept: INFUSION THERAPY | Age: 60
Setting detail: INFUSION SERIES
Discharge: HOME | End: 2020-04-28
Payer: MEDICARE

## 2020-04-28 VITALS
SYSTOLIC BLOOD PRESSURE: 147 MMHG | DIASTOLIC BLOOD PRESSURE: 71 MMHG | OXYGEN SATURATION: 99 % | RESPIRATION RATE: 16 BRPM | HEART RATE: 80 BPM | TEMPERATURE: 97.2 F

## 2020-04-28 PROCEDURE — 6360000002 HC RX W HCPCS: Performed by: INTERNAL MEDICINE

## 2020-04-28 PROCEDURE — 99211 OFF/OP EST MAY X REQ PHY/QHP: CPT

## 2020-04-28 PROCEDURE — 36593 DECLOT VASCULAR DEVICE: CPT

## 2020-04-28 RX ADMIN — ALTEPLASE 1 MG: 2.2 INJECTION, POWDER, LYOPHILIZED, FOR SOLUTION INTRAVENOUS at 08:45

## 2020-04-28 ASSESSMENT — PAIN DESCRIPTION - ORIENTATION: ORIENTATION: RIGHT

## 2020-04-28 ASSESSMENT — PAIN DESCRIPTION - PAIN TYPE: TYPE: ACUTE PAIN

## 2020-04-28 ASSESSMENT — PAIN DESCRIPTION - LOCATION: LOCATION: FOOT

## 2020-04-28 ASSESSMENT — PAIN SCALES - GENERAL: PAINLEVEL_OUTOF10: 8

## 2020-04-29 ENCOUNTER — OFFICE VISIT (OUTPATIENT)
Dept: BARIATRICS/WEIGHT MGMT | Age: 60
End: 2020-04-29

## 2020-04-29 VITALS
HEART RATE: 90 BPM | RESPIRATION RATE: 14 BRPM | HEIGHT: 68 IN | TEMPERATURE: 98 F | WEIGHT: 136.3 LBS | OXYGEN SATURATION: 99 % | SYSTOLIC BLOOD PRESSURE: 122 MMHG | DIASTOLIC BLOOD PRESSURE: 78 MMHG | BODY MASS INDEX: 20.66 KG/M2

## 2020-04-29 PROBLEM — Z89.421 STATUS POST AMPUTATION OF LESSER TOE OF RIGHT FOOT (HCC): Status: ACTIVE | Noted: 2020-04-29

## 2020-04-29 PROCEDURE — 99024 POSTOP FOLLOW-UP VISIT: CPT | Performed by: SURGERY

## 2020-04-29 RX ORDER — OXYCODONE HYDROCHLORIDE AND ACETAMINOPHEN 5; 325 MG/1; MG/1
1-2 TABLET ORAL 2 TIMES DAILY PRN
Qty: 35 TABLET | Refills: 0 | Status: SHIPPED | OUTPATIENT
Start: 2020-04-29 | End: 2020-05-06

## 2020-05-05 ENCOUNTER — HOSPITAL ENCOUNTER (OUTPATIENT)
Age: 60
Setting detail: SPECIMEN
Discharge: HOME | End: 2020-05-05
Payer: MEDICARE

## 2020-05-05 LAB
ALBUMIN SERPL-MCNC: 2.9 GM/DL (ref 3.4–5)
ALP BLD-CCNC: 178 IU/L (ref 40–128)
ALT SERPL-CCNC: 38 U/L (ref 10–40)
ANION GAP SERPL CALCULATED.3IONS-SCNC: 10 MMOL/L (ref 4–16)
AST SERPL-CCNC: 47 IU/L (ref 15–37)
BASOPHILS ABSOLUTE: 0.1 K/CU MM
BASOPHILS RELATIVE PERCENT: 0.9 % (ref 0–1)
BILIRUB SERPL-MCNC: 0.6 MG/DL (ref 0–1)
BUN BLDV-MCNC: 21 MG/DL (ref 6–23)
C-REACTIVE PROTEIN, HIGH SENSITIVITY: 5.9 MG/L
CALCIUM SERPL-MCNC: 8.5 MG/DL (ref 8.3–10.6)
CHLORIDE BLD-SCNC: 98 MMOL/L (ref 99–110)
CO2: 22 MMOL/L (ref 21–32)
CREAT SERPL-MCNC: 0.7 MG/DL (ref 0.9–1.3)
DIFFERENTIAL TYPE: ABNORMAL
DOSE AMOUNT: ABNORMAL
DOSE TIME: ABNORMAL
EOSINOPHILS ABSOLUTE: 0.1 K/CU MM
EOSINOPHILS RELATIVE PERCENT: 1.5 % (ref 0–3)
ERYTHROCYTE SEDIMENTATION RATE: 68 MM/HR (ref 0–20)
GFR AFRICAN AMERICAN: >60 ML/MIN/1.73M2
GFR NON-AFRICAN AMERICAN: >60 ML/MIN/1.73M2
GLUCOSE BLD-MCNC: 360 MG/DL (ref 70–99)
HCT VFR BLD CALC: 30.8 % (ref 42–52)
HEMOGLOBIN: 10.4 GM/DL (ref 13.5–18)
IMMATURE NEUTROPHIL %: 0.2 % (ref 0–0.43)
LYMPHOCYTES ABSOLUTE: 1.3 K/CU MM
LYMPHOCYTES RELATIVE PERCENT: 23.8 % (ref 24–44)
MCH RBC QN AUTO: 30.4 PG (ref 27–31)
MCHC RBC AUTO-ENTMCNC: 33.8 % (ref 32–36)
MCV RBC AUTO: 90.1 FL (ref 78–100)
MONOCYTES ABSOLUTE: 0.6 K/CU MM
MONOCYTES RELATIVE PERCENT: 10.6 % (ref 0–4)
NUCLEATED RBC %: 0 %
PDW BLD-RTO: 15.1 % (ref 11.7–14.9)
PLATELET # BLD: 106 K/CU MM (ref 140–440)
PMV BLD AUTO: 11.4 FL (ref 7.5–11.1)
POTASSIUM SERPL-SCNC: 3.5 MMOL/L (ref 3.5–5.1)
RBC # BLD: 3.42 M/CU MM (ref 4.6–6.2)
SEGMENTED NEUTROPHILS ABSOLUTE COUNT: 3.4 K/CU MM
SEGMENTED NEUTROPHILS RELATIVE PERCENT: 63 % (ref 36–66)
SODIUM BLD-SCNC: 130 MMOL/L (ref 135–145)
TOTAL IMMATURE NEUTOROPHIL: 0.01 K/CU MM
TOTAL NUCLEATED RBC: 0 K/CU MM
TOTAL PROTEIN: 6.9 GM/DL (ref 6.4–8.2)
VANCOMYCIN TROUGH: 8.4 UG/ML (ref 10–20)
WBC # BLD: 5.4 K/CU MM (ref 4–10.5)

## 2020-05-05 PROCEDURE — 85025 COMPLETE CBC W/AUTO DIFF WBC: CPT

## 2020-05-05 PROCEDURE — 80053 COMPREHEN METABOLIC PANEL: CPT

## 2020-05-05 PROCEDURE — 85652 RBC SED RATE AUTOMATED: CPT

## 2020-05-05 PROCEDURE — 86140 C-REACTIVE PROTEIN: CPT

## 2020-05-05 PROCEDURE — 80202 ASSAY OF VANCOMYCIN: CPT

## 2020-05-07 ENCOUNTER — TELEPHONE (OUTPATIENT)
Dept: BARIATRICS/WEIGHT MGMT | Age: 60
End: 2020-05-07

## 2020-05-08 ENCOUNTER — TELEPHONE (OUTPATIENT)
Dept: INFECTIOUS DISEASES | Age: 60
End: 2020-05-08

## 2020-05-08 NOTE — TELEPHONE ENCOUNTER
Called from [facility] pt is done with ABX therapy on 5/12/2020. Is it ok to pull the PICC or do you want them to maintain it?   Vanco Trough low at 8.4  Sed Rate High at 68 but trending down    Please Advise

## 2020-05-20 ENCOUNTER — OFFICE VISIT (OUTPATIENT)
Dept: BARIATRICS/WEIGHT MGMT | Age: 60
End: 2020-05-20

## 2020-05-20 VITALS
HEART RATE: 85 BPM | TEMPERATURE: 98.2 F | HEIGHT: 67 IN | RESPIRATION RATE: 16 BRPM | WEIGHT: 142.4 LBS | DIASTOLIC BLOOD PRESSURE: 62 MMHG | OXYGEN SATURATION: 99 % | SYSTOLIC BLOOD PRESSURE: 132 MMHG | BODY MASS INDEX: 22.35 KG/M2

## 2020-05-20 PROBLEM — L98.493 SKIN ULCER WITH NECROSIS OF MUSCLE (HCC): Status: ACTIVE | Noted: 2020-05-20

## 2020-05-20 PROCEDURE — 99024 POSTOP FOLLOW-UP VISIT: CPT | Performed by: SURGERY

## 2020-05-20 RX ORDER — AMOXICILLIN 250 MG
2 CAPSULE ORAL DAILY
Qty: 60 TABLET | Refills: 3 | Status: SHIPPED | OUTPATIENT
Start: 2020-05-20 | End: 2020-05-30

## 2020-05-20 RX ORDER — OXYCODONE HYDROCHLORIDE AND ACETAMINOPHEN 5; 325 MG/1; MG/1
1-2 TABLET ORAL EVERY 6 HOURS PRN
Qty: 35 TABLET | Refills: 0 | Status: SHIPPED | OUTPATIENT
Start: 2020-05-20 | End: 2020-05-27

## 2020-05-20 ASSESSMENT — ENCOUNTER SYMPTOMS
DIARRHEA: 0
COUGH: 0
SORE THROAT: 0
ABDOMINAL PAIN: 0
NAUSEA: 0
BLOOD IN STOOL: 0
PHOTOPHOBIA: 0
SHORTNESS OF BREATH: 0
CONSTIPATION: 0
TROUBLE SWALLOWING: 0
ANAL BLEEDING: 0
VOMITING: 0
WHEEZING: 0
VOICE CHANGE: 0
COLOR CHANGE: 0

## 2020-05-20 NOTE — PROGRESS NOTES
2013    Shortness of breath on exertion         Past Surgical History:   Procedure Laterality Date    CORONARY ARTERY BYPASS GRAFT Bilateral 1/6/13   [facility] DENTAL SURGERY  2010    All upper teeth and some teeth on the bottom extracted.  HAND SURGERY  15 yrs ago    right thumb    TOE AMPUTATION Right 3/31/2020    RIGHT 5TH TOE AMPUTATION AND WOUND VAC PLACEMENT performed by Corine Keenan MD at [address] [place] [address] History     Socioeconomic History    Marital status: Single     Spouse name: Not on file    Number of children: 10    Years of education: Not on file    Highest education level: Not on file   Occupational History    Not on file   Social Needs    Financial resource strain: Not on file    Food insecurity     Worry: Not on file     Inability: Not on file    Transportation needs     Medical: Not on file     Non-medical: Not on file   Tobacco Use    Smoking status: Current Some Day Smoker     Packs/day: 1.00     Years: 40.00     Pack years: 40.00     Types: Cigarettes    Smokeless tobacco: Current User     Types: Chew   Substance and Sexual Activity    Alcohol use: No     Comment: \"quit 19 yrs ago, use to drink, pretty heavy\"    CAFFEINE: 1-16 oz cup coffee daily.     Drug use: Not Currently     Comment: heroin    Sexual activity: Not on file     Comment:    Lifestyle    Physical activity     Days per week: Not on file     Minutes per session: Not on file    Stress: Not on file   Relationships    Social connections     Talks on phone: Not on file     Gets together: Not on file     Attends [employer] service: Not on file     Active member of club or organization: Not on file     Attends meetings of clubs or organizations: Not on file     Relationship status: Not on file    Intimate partner violence     Fear of current or ex partner: Not on file     Emotionally abused: Not on file     Physically abused: Not on file     Forced sexual activity: Not on file   Other Topics Concern    Not on file   Social History Narrative    Not on file        No Known Allergies     The patient has a family history of    Current Outpatient Medications   Medication Sig Dispense Refill    collagenase (SANTYL) 250 UNIT/GM ointment Apply topically daily. 1 Tube 5    ondansetron (ZOFRAN-ODT) 4 MG disintegrating tablet Take 1 tablet by mouth 3 times daily as needed for Nausea or Vomiting 21 tablet 0    atorvastatin (LIPITOR) 40 MG tablet Take 1 tablet by mouth nightly 30 tablet 0    clopidogrel (PLAVIX) 75 MG tablet Take 1 tablet by mouth daily 30 tablet 0    nicotine (NICODERM CQ) 21 MG/24HR Place 1 patch onto the skin daily 30 patch 3    levothyroxine (SYNTHROID) 100 MCG tablet Take 1 tablet by mouth Daily 30 tablet 0    gabapentin (NEURONTIN) 600 MG tablet Take 1 tablet by mouth 3 times daily 90 tablet 3    Insulin Aspart Prot & Aspart (NOVOLOG MIX 70/30 SC) Inject 25 Units into the skin every morning       Blood Glucose Monitoring Suppl (FREESTYLE LITE) MARGARITA Apply 1 Device topically three times daily. 1 Device 0    Lancets (STERILANCE TL) MISC USE AS DIRECTED TO TEST BLOOD SUGAR THREE TIMES DAILY BEFORE MEALS 100 each 11    glucose blood VI test strips (FREESTYLE LITE) strip Test 3 times daily as needed. 100 each 3    Insulin Pen Needle 31G X 8 MM MISC 1 Device by Does not apply route three times daily. 100 each 3     No current facility-administered medications for this visit. Review of Systems   Constitutional: Negative for activity change, chills, diaphoresis and fever. HENT: Negative for sore throat, trouble swallowing and voice change. Eyes: Negative for photophobia and visual disturbance. Respiratory: Negative for cough, shortness of breath and wheezing. Cardiovascular: Negative for chest pain, palpitations and leg swelling. Gastrointestinal: Negative for abdominal pain, anal bleeding, blood in stool, constipation, diarrhea, nausea and vomiting.    Endocrine: Negative for cold

## 2020-05-21 ENCOUNTER — TELEPHONE (OUTPATIENT)
Dept: BARIATRICS/WEIGHT MGMT | Age: 60
End: 2020-05-21

## 2020-05-26 ENCOUNTER — TELEPHONE (OUTPATIENT)
Dept: SURGERY | Age: 60
End: 2020-05-26

## 2020-06-03 ENCOUNTER — TELEPHONE (OUTPATIENT)
Dept: BARIATRICS/WEIGHT MGMT | Age: 60
End: 2020-06-03

## 2020-06-05 ENCOUNTER — TELEPHONE (OUTPATIENT)
Dept: CARDIOLOGY CLINIC | Age: 60
End: 2020-06-05

## 2020-06-10 ENCOUNTER — TELEPHONE (OUTPATIENT)
Dept: BARIATRICS/WEIGHT MGMT | Age: 60
End: 2020-06-10

## 2020-06-10 RX ORDER — GAUZE BANDAGE 3.4"X129"
1 BANDAGE TOPICAL DAILY PRN
Qty: 30 EACH | Refills: 2 | Status: SHIPPED | OUTPATIENT
Start: 2020-06-10

## 2020-06-17 ENCOUNTER — TELEPHONE (OUTPATIENT)
Dept: BARIATRICS/WEIGHT MGMT | Age: 60
End: 2020-06-17

## 2021-10-31 ENCOUNTER — HOSPITAL ENCOUNTER (INPATIENT)
Age: 61
LOS: 7 days | Discharge: HOME | DRG: 271 | End: 2021-11-08
Attending: INTERNAL MEDICINE | Admitting: FAMILY MEDICINE
Payer: MEDICARE

## 2021-10-31 ENCOUNTER — APPOINTMENT (OUTPATIENT)
Dept: CT IMAGING | Age: 61
DRG: 271 | End: 2021-10-31
Payer: MEDICARE

## 2021-10-31 ENCOUNTER — APPOINTMENT (OUTPATIENT)
Dept: GENERAL RADIOLOGY | Age: 61
DRG: 271 | End: 2021-10-31
Payer: MEDICARE

## 2021-10-31 DIAGNOSIS — S91.101A OPEN WOUND OF RIGHT GREAT TOE, INITIAL ENCOUNTER: ICD-10-CM

## 2021-10-31 DIAGNOSIS — E11.9 DIABETES MELLITUS (HCC): ICD-10-CM

## 2021-10-31 DIAGNOSIS — I73.9 PAD (PERIPHERAL ARTERY DISEASE) (HCC): ICD-10-CM

## 2021-10-31 PROBLEM — L97.509 TOE ULCER (HCC): Status: ACTIVE | Noted: 2021-10-31

## 2021-10-31 LAB
ALBUMIN SERPL-MCNC: 3 GM/DL (ref 3.4–5)
ALP BLD-CCNC: 112 IU/L (ref 40–129)
ALT SERPL-CCNC: 37 U/L (ref 10–40)
ANION GAP SERPL CALCULATED.3IONS-SCNC: 7 MMOL/L (ref 4–16)
AST SERPL-CCNC: 42 IU/L (ref 15–37)
BASOPHILS ABSOLUTE: 0 K/CU MM
BASOPHILS RELATIVE PERCENT: 0.5 % (ref 0–1)
BILIRUB SERPL-MCNC: 0.3 MG/DL (ref 0–1)
BUN BLDV-MCNC: 18 MG/DL (ref 6–23)
CALCIUM SERPL-MCNC: 8.6 MG/DL (ref 8.3–10.6)
CHLORIDE BLD-SCNC: 99 MMOL/L (ref 99–110)
CO2: 22 MMOL/L (ref 21–32)
CREAT SERPL-MCNC: 0.6 MG/DL (ref 0.9–1.3)
DIFFERENTIAL TYPE: ABNORMAL
EOSINOPHILS ABSOLUTE: 0.1 K/CU MM
EOSINOPHILS RELATIVE PERCENT: 0.7 % (ref 0–3)
ERYTHROCYTE SEDIMENTATION RATE: 70 MM/HR (ref 0–20)
GFR AFRICAN AMERICAN: >60 ML/MIN/1.73M2
GFR NON-AFRICAN AMERICAN: >60 ML/MIN/1.73M2
GLUCOSE BLD-MCNC: 352 MG/DL (ref 70–99)
GLUCOSE BLD-MCNC: 382 MG/DL (ref 70–99)
HCT VFR BLD CALC: 34.9 % (ref 42–52)
HEMOGLOBIN: 12.1 GM/DL (ref 13.5–18)
IMMATURE NEUTROPHIL %: 0.4 % (ref 0–0.43)
LYMPHOCYTES ABSOLUTE: 1 K/CU MM
LYMPHOCYTES RELATIVE PERCENT: 12.9 % (ref 24–44)
MCH RBC QN AUTO: 30.6 PG (ref 27–31)
MCHC RBC AUTO-ENTMCNC: 34.7 % (ref 32–36)
MCV RBC AUTO: 88.4 FL (ref 78–100)
MONOCYTES ABSOLUTE: 0.5 K/CU MM
MONOCYTES RELATIVE PERCENT: 6.8 % (ref 0–4)
NUCLEATED RBC %: 0 %
PDW BLD-RTO: 13.8 % (ref 11.7–14.9)
PLATELET # BLD: 124 K/CU MM (ref 140–440)
PMV BLD AUTO: 11.1 FL (ref 7.5–11.1)
POTASSIUM SERPL-SCNC: 4.1 MMOL/L (ref 3.5–5.1)
RBC # BLD: 3.95 M/CU MM (ref 4.6–6.2)
SEGMENTED NEUTROPHILS ABSOLUTE COUNT: 5.8 K/CU MM
SEGMENTED NEUTROPHILS RELATIVE PERCENT: 78.7 % (ref 36–66)
SODIUM BLD-SCNC: 128 MMOL/L (ref 135–145)
TOTAL IMMATURE NEUTOROPHIL: 0.03 K/CU MM
TOTAL NUCLEATED RBC: 0 K/CU MM
TOTAL PROTEIN: 7.4 GM/DL (ref 6.4–8.2)
TOTAL RETICULOCYTE COUNT: 0.06 K/CU MM
WBC # BLD: 7.4 K/CU MM (ref 4–10.5)

## 2021-10-31 PROCEDURE — 6360000004 HC RX CONTRAST MEDICATION: Performed by: PHYSICIAN ASSISTANT

## 2021-10-31 PROCEDURE — 73706 CT ANGIO LWR EXTR W/O&W/DYE: CPT

## 2021-10-31 PROCEDURE — 99285 EMERGENCY DEPT VISIT HI MDM: CPT

## 2021-10-31 PROCEDURE — 85730 THROMBOPLASTIN TIME PARTIAL: CPT

## 2021-10-31 PROCEDURE — 96365 THER/PROPH/DIAG IV INF INIT: CPT

## 2021-10-31 PROCEDURE — 6360000002 HC RX W HCPCS: Performed by: PHYSICIAN ASSISTANT

## 2021-10-31 PROCEDURE — 85025 COMPLETE CBC W/AUTO DIFF WBC: CPT

## 2021-10-31 PROCEDURE — 80053 COMPREHEN METABOLIC PANEL: CPT

## 2021-10-31 PROCEDURE — 73630 X-RAY EXAM OF FOOT: CPT

## 2021-10-31 PROCEDURE — 6370000000 HC RX 637 (ALT 250 FOR IP): Performed by: PHYSICIAN ASSISTANT

## 2021-10-31 PROCEDURE — 83605 ASSAY OF LACTIC ACID: CPT

## 2021-10-31 PROCEDURE — 82962 GLUCOSE BLOOD TEST: CPT

## 2021-10-31 PROCEDURE — 2580000003 HC RX 258: Performed by: PHYSICIAN ASSISTANT

## 2021-10-31 PROCEDURE — 85652 RBC SED RATE AUTOMATED: CPT

## 2021-10-31 PROCEDURE — 96375 TX/PRO/DX INJ NEW DRUG ADDON: CPT

## 2021-10-31 RX ORDER — HEPARIN SODIUM 1000 [USP'U]/ML
80 INJECTION, SOLUTION INTRAVENOUS; SUBCUTANEOUS ONCE
Status: COMPLETED | OUTPATIENT
Start: 2021-10-31 | End: 2021-10-31

## 2021-10-31 RX ORDER — HEPARIN SODIUM 1000 [USP'U]/ML
40 INJECTION, SOLUTION INTRAVENOUS; SUBCUTANEOUS PRN
Status: DISCONTINUED | OUTPATIENT
Start: 2021-10-31 | End: 2021-11-02

## 2021-10-31 RX ORDER — HYDROCODONE BITARTRATE AND ACETAMINOPHEN 5; 325 MG/1; MG/1
1 TABLET ORAL ONCE
Status: COMPLETED | OUTPATIENT
Start: 2021-10-31 | End: 2021-10-31

## 2021-10-31 RX ORDER — SODIUM CHLORIDE 0.9 % (FLUSH) 0.9 %
5-40 SYRINGE (ML) INJECTION 2 TIMES DAILY
Status: DISCONTINUED | OUTPATIENT
Start: 2021-10-31 | End: 2021-11-08 | Stop reason: HOSPADM

## 2021-10-31 RX ORDER — HEPARIN SODIUM 10000 [USP'U]/100ML
5-30 INJECTION, SOLUTION INTRAVENOUS CONTINUOUS
Status: DISCONTINUED | OUTPATIENT
Start: 2021-10-31 | End: 2021-11-02

## 2021-10-31 RX ORDER — HEPARIN SODIUM 1000 [USP'U]/ML
80 INJECTION, SOLUTION INTRAVENOUS; SUBCUTANEOUS PRN
Status: DISCONTINUED | OUTPATIENT
Start: 2021-10-31 | End: 2021-11-02

## 2021-10-31 RX ORDER — VANCOMYCIN 1.5 G/300ML
1500 INJECTION, SOLUTION INTRAVENOUS ONCE
Status: COMPLETED | OUTPATIENT
Start: 2021-10-31 | End: 2021-11-01

## 2021-10-31 RX ADMIN — HYDROCODONE BITARTRATE AND ACETAMINOPHEN 1 TABLET: 5; 325 TABLET ORAL at 22:31

## 2021-10-31 RX ADMIN — VANCOMYCIN 1500 MG: 1.5 INJECTION, SOLUTION INTRAVENOUS at 22:30

## 2021-10-31 RX ADMIN — HEPARIN SODIUM 18 UNITS/KG/HR: 10000 INJECTION, SOLUTION INTRAVENOUS at 21:45

## 2021-10-31 RX ADMIN — CEFEPIME 2000 MG: 2 INJECTION, POWDER, FOR SOLUTION INTRAVENOUS at 18:51

## 2021-10-31 RX ADMIN — IOPAMIDOL 75 ML: 755 INJECTION, SOLUTION INTRAVENOUS at 19:35

## 2021-10-31 RX ADMIN — HEPARIN SODIUM 5260 UNITS: 1000 INJECTION INTRAVENOUS; SUBCUTANEOUS at 21:45

## 2021-10-31 RX ADMIN — SODIUM CHLORIDE, PRESERVATIVE FREE 10 ML: 5 INJECTION INTRAVENOUS at 20:04

## 2021-10-31 ASSESSMENT — PAIN DESCRIPTION - PAIN TYPE: TYPE: ACUTE PAIN

## 2021-10-31 ASSESSMENT — PAIN DESCRIPTION - ORIENTATION: ORIENTATION: RIGHT

## 2021-10-31 ASSESSMENT — PAIN SCALES - GENERAL: PAINLEVEL_OUTOF10: 10

## 2021-10-31 ASSESSMENT — PAIN DESCRIPTION - LOCATION: LOCATION: TOE (COMMENT WHICH ONE)

## 2021-11-01 LAB
APTT: 140.1 SECONDS (ref 25.1–37.1)
CHP ED QC CHECK: YES
EKG ATRIAL RATE: 62 BPM
EKG DIAGNOSIS: NORMAL
EKG P AXIS: -13 DEGREES
EKG P-R INTERVAL: 144 MS
EKG Q-T INTERVAL: 450 MS
EKG QRS DURATION: 82 MS
EKG QTC CALCULATION (BAZETT): 456 MS
EKG R AXIS: 145 DEGREES
EKG T AXIS: 149 DEGREES
EKG VENTRICULAR RATE: 62 BPM
GLUCOSE BLD-MCNC: 187 MG/DL
GLUCOSE BLD-MCNC: 187 MG/DL (ref 70–99)
GLUCOSE BLD-MCNC: 205 MG/DL (ref 70–99)
GLUCOSE BLD-MCNC: 206 MG/DL (ref 70–99)
GLUCOSE BLD-MCNC: 314 MG/DL (ref 70–99)

## 2021-11-01 PROCEDURE — 6370000000 HC RX 637 (ALT 250 FOR IP): Performed by: FAMILY MEDICINE

## 2021-11-01 PROCEDURE — 85730 THROMBOPLASTIN TIME PARTIAL: CPT

## 2021-11-01 PROCEDURE — 6360000002 HC RX W HCPCS: Performed by: INTERNAL MEDICINE

## 2021-11-01 PROCEDURE — 2580000003 HC RX 258: Performed by: INTERNAL MEDICINE

## 2021-11-01 PROCEDURE — 93005 ELECTROCARDIOGRAM TRACING: CPT | Performed by: INTERNAL MEDICINE

## 2021-11-01 PROCEDURE — 6370000000 HC RX 637 (ALT 250 FOR IP): Performed by: INTERNAL MEDICINE

## 2021-11-01 PROCEDURE — 36415 COLL VENOUS BLD VENIPUNCTURE: CPT

## 2021-11-01 PROCEDURE — B4101ZZ FLUOROSCOPY OF ABDOMINAL AORTA USING LOW OSMOLAR CONTRAST: ICD-10-PCS | Performed by: SURGERY

## 2021-11-01 PROCEDURE — 93010 ELECTROCARDIOGRAM REPORT: CPT | Performed by: INTERNAL MEDICINE

## 2021-11-01 PROCEDURE — 80202 ASSAY OF VANCOMYCIN: CPT

## 2021-11-01 PROCEDURE — 82962 GLUCOSE BLOOD TEST: CPT

## 2021-11-01 PROCEDURE — B41F1ZZ FLUOROSCOPY OF RIGHT LOWER EXTREMITY ARTERIES USING LOW OSMOLAR CONTRAST: ICD-10-PCS | Performed by: SURGERY

## 2021-11-01 PROCEDURE — 1200000000 HC SEMI PRIVATE

## 2021-11-01 RX ORDER — VANCOMYCIN 1 G/200ML
1000 INJECTION, SOLUTION INTRAVENOUS EVERY 12 HOURS
Status: DISCONTINUED | OUTPATIENT
Start: 2021-11-01 | End: 2021-11-04

## 2021-11-01 RX ORDER — ERGOCALCIFEROL 1.25 MG/1
50000 CAPSULE ORAL WEEKLY
Status: DISCONTINUED | OUTPATIENT
Start: 2021-11-01 | End: 2021-11-08 | Stop reason: HOSPADM

## 2021-11-01 RX ORDER — INSULIN ASPART 100 [IU]/ML
25 INJECTION, SUSPENSION SUBCUTANEOUS EVERY MORNING
Status: DISCONTINUED | OUTPATIENT
Start: 2021-11-01 | End: 2021-11-01

## 2021-11-01 RX ORDER — SODIUM CHLORIDE 0.9 % (FLUSH) 0.9 %
5-40 SYRINGE (ML) INJECTION EVERY 12 HOURS SCHEDULED
Status: DISCONTINUED | OUTPATIENT
Start: 2021-11-01 | End: 2021-11-08 | Stop reason: HOSPADM

## 2021-11-01 RX ORDER — ACETAMINOPHEN 325 MG/1
650 TABLET ORAL EVERY 6 HOURS PRN
Status: DISCONTINUED | OUTPATIENT
Start: 2021-11-01 | End: 2021-11-08 | Stop reason: HOSPADM

## 2021-11-01 RX ORDER — ZOLPIDEM TARTRATE 5 MG/1
5 TABLET ORAL NIGHTLY PRN
Status: DISCONTINUED | OUTPATIENT
Start: 2021-11-01 | End: 2021-11-08 | Stop reason: HOSPADM

## 2021-11-01 RX ORDER — LOPERAMIDE HYDROCHLORIDE 2 MG/1
2 CAPSULE ORAL 4 TIMES DAILY PRN
Status: DISCONTINUED | OUTPATIENT
Start: 2021-11-01 | End: 2021-11-08 | Stop reason: HOSPADM

## 2021-11-01 RX ORDER — INSULIN GLARGINE 100 [IU]/ML
14 INJECTION, SOLUTION SUBCUTANEOUS 2 TIMES DAILY
Status: DISCONTINUED | OUTPATIENT
Start: 2021-11-01 | End: 2021-11-08 | Stop reason: HOSPADM

## 2021-11-01 RX ORDER — CLOPIDOGREL BISULFATE 75 MG/1
75 TABLET ORAL DAILY
Status: DISCONTINUED | OUTPATIENT
Start: 2021-11-01 | End: 2021-11-08 | Stop reason: HOSPADM

## 2021-11-01 RX ORDER — DEXTROSE MONOHYDRATE 25 G/50ML
12.5 INJECTION, SOLUTION INTRAVENOUS PRN
Status: DISCONTINUED | OUTPATIENT
Start: 2021-11-01 | End: 2021-11-05 | Stop reason: SDUPTHER

## 2021-11-01 RX ORDER — SODIUM CHLORIDE 0.9 % (FLUSH) 0.9 %
5-40 SYRINGE (ML) INJECTION PRN
Status: DISCONTINUED | OUTPATIENT
Start: 2021-11-01 | End: 2021-11-08 | Stop reason: HOSPADM

## 2021-11-01 RX ORDER — GUAIFENESIN 600 MG/1
1200 TABLET, EXTENDED RELEASE ORAL 2 TIMES DAILY
Status: DISCONTINUED | OUTPATIENT
Start: 2021-11-01 | End: 2021-11-08 | Stop reason: HOSPADM

## 2021-11-01 RX ORDER — DEXTROSE MONOHYDRATE 50 MG/ML
100 INJECTION, SOLUTION INTRAVENOUS PRN
Status: DISCONTINUED | OUTPATIENT
Start: 2021-11-01 | End: 2021-11-08 | Stop reason: HOSPADM

## 2021-11-01 RX ORDER — INSULIN GLARGINE 100 [IU]/ML
14 INJECTION, SOLUTION SUBCUTANEOUS NIGHTLY
Status: DISCONTINUED | OUTPATIENT
Start: 2021-11-01 | End: 2021-11-01

## 2021-11-01 RX ORDER — GUAIFENESIN/DEXTROMETHORPHAN 100-10MG/5
10 SYRUP ORAL EVERY 6 HOURS PRN
Status: DISCONTINUED | OUTPATIENT
Start: 2021-11-01 | End: 2021-11-08 | Stop reason: HOSPADM

## 2021-11-01 RX ORDER — GABAPENTIN 300 MG/1
600 CAPSULE ORAL 3 TIMES DAILY
Status: DISCONTINUED | OUTPATIENT
Start: 2021-11-01 | End: 2021-11-08 | Stop reason: HOSPADM

## 2021-11-01 RX ORDER — LEVOTHYROXINE SODIUM 0.1 MG/1
100 TABLET ORAL DAILY
Status: DISCONTINUED | OUTPATIENT
Start: 2021-11-01 | End: 2021-11-08 | Stop reason: HOSPADM

## 2021-11-01 RX ORDER — LACTULOSE 10 G/15ML
20 SOLUTION ORAL 2 TIMES DAILY PRN
Status: DISCONTINUED | OUTPATIENT
Start: 2021-11-01 | End: 2021-11-08 | Stop reason: HOSPADM

## 2021-11-01 RX ORDER — SODIUM CHLORIDE 9 MG/ML
25 INJECTION, SOLUTION INTRAVENOUS PRN
Status: DISCONTINUED | OUTPATIENT
Start: 2021-11-01 | End: 2021-11-08 | Stop reason: HOSPADM

## 2021-11-01 RX ORDER — NICOTINE POLACRILEX 4 MG
15 LOZENGE BUCCAL PRN
Status: DISCONTINUED | OUTPATIENT
Start: 2021-11-01 | End: 2021-11-08 | Stop reason: HOSPADM

## 2021-11-01 RX ORDER — OXYCODONE HYDROCHLORIDE AND ACETAMINOPHEN 5; 325 MG/1; MG/1
1 TABLET ORAL EVERY 6 HOURS PRN
Status: DISCONTINUED | OUTPATIENT
Start: 2021-11-01 | End: 2021-11-04

## 2021-11-01 RX ORDER — ONDANSETRON 4 MG/1
4 TABLET, ORALLY DISINTEGRATING ORAL 3 TIMES DAILY PRN
Status: DISCONTINUED | OUTPATIENT
Start: 2021-11-01 | End: 2021-11-01

## 2021-11-01 RX ORDER — ONDANSETRON 4 MG/1
4 TABLET, ORALLY DISINTEGRATING ORAL 3 TIMES DAILY PRN
Status: DISCONTINUED | OUTPATIENT
Start: 2021-11-01 | End: 2021-11-08 | Stop reason: HOSPADM

## 2021-11-01 RX ORDER — CALCIUM CARBONATE 200(500)MG
750 TABLET,CHEWABLE ORAL 3 TIMES DAILY PRN
Status: DISCONTINUED | OUTPATIENT
Start: 2021-11-01 | End: 2021-11-08 | Stop reason: HOSPADM

## 2021-11-01 RX ORDER — ACETAMINOPHEN 650 MG/1
650 SUPPOSITORY RECTAL EVERY 6 HOURS PRN
Status: DISCONTINUED | OUTPATIENT
Start: 2021-11-01 | End: 2021-11-08 | Stop reason: HOSPADM

## 2021-11-01 RX ORDER — POLYETHYLENE GLYCOL 3350 17 G/17G
17 POWDER, FOR SOLUTION ORAL DAILY PRN
Status: DISCONTINUED | OUTPATIENT
Start: 2021-11-01 | End: 2021-11-08 | Stop reason: HOSPADM

## 2021-11-01 RX ORDER — NICOTINE 21 MG/24HR
1 PATCH, TRANSDERMAL 24 HOURS TRANSDERMAL DAILY
Status: DISCONTINUED | OUTPATIENT
Start: 2021-11-01 | End: 2021-11-08 | Stop reason: HOSPADM

## 2021-11-01 RX ORDER — ONDANSETRON 2 MG/ML
4 INJECTION INTRAMUSCULAR; INTRAVENOUS EVERY 6 HOURS PRN
Status: DISCONTINUED | OUTPATIENT
Start: 2021-11-01 | End: 2021-11-08 | Stop reason: HOSPADM

## 2021-11-01 RX ORDER — BENZONATATE 100 MG/1
200 CAPSULE ORAL 3 TIMES DAILY PRN
Status: DISCONTINUED | OUTPATIENT
Start: 2021-11-01 | End: 2021-11-08 | Stop reason: HOSPADM

## 2021-11-01 RX ORDER — ATORVASTATIN CALCIUM 40 MG/1
40 TABLET, FILM COATED ORAL NIGHTLY
Status: DISCONTINUED | OUTPATIENT
Start: 2021-11-01 | End: 2021-11-08 | Stop reason: HOSPADM

## 2021-11-01 RX ADMIN — VANCOMYCIN 1000 MG: 1 INJECTION, SOLUTION INTRAVENOUS at 14:22

## 2021-11-01 RX ADMIN — OXYCODONE AND ACETAMINOPHEN 1 TABLET: 5; 325 TABLET ORAL at 19:20

## 2021-11-01 RX ADMIN — CLOPIDOGREL BISULFATE 75 MG: 75 TABLET, FILM COATED ORAL at 08:43

## 2021-11-01 RX ADMIN — GABAPENTIN 600 MG: 300 CAPSULE ORAL at 14:42

## 2021-11-01 RX ADMIN — HEPARIN SODIUM 15 UNITS/KG/HR: 10000 INJECTION, SOLUTION INTRAVENOUS at 21:33

## 2021-11-01 RX ADMIN — GABAPENTIN 600 MG: 300 CAPSULE ORAL at 08:44

## 2021-11-01 RX ADMIN — INSULIN GLARGINE 14 UNITS: 100 INJECTION, SOLUTION SUBCUTANEOUS at 14:22

## 2021-11-01 RX ADMIN — SODIUM CHLORIDE, PRESERVATIVE FREE 10 ML: 5 INJECTION INTRAVENOUS at 21:32

## 2021-11-01 RX ADMIN — GUAIFENESIN 1200 MG: 600 TABLET, EXTENDED RELEASE ORAL at 20:21

## 2021-11-01 RX ADMIN — INSULIN GLARGINE 14 UNITS: 100 INJECTION, SOLUTION SUBCUTANEOUS at 23:31

## 2021-11-01 RX ADMIN — CEFEPIME 2000 MG: 2 INJECTION, POWDER, FOR SOLUTION INTRAVENOUS at 20:20

## 2021-11-01 RX ADMIN — LEVOTHYROXINE SODIUM 100 MCG: 25 TABLET ORAL at 08:44

## 2021-11-01 RX ADMIN — ATORVASTATIN CALCIUM 40 MG: 40 TABLET, FILM COATED ORAL at 20:21

## 2021-11-01 RX ADMIN — CEFEPIME 2000 MG: 2 INJECTION, POWDER, FOR SOLUTION INTRAVENOUS at 08:52

## 2021-11-01 RX ADMIN — SODIUM CHLORIDE, PRESERVATIVE FREE 10 ML: 5 INJECTION INTRAVENOUS at 10:14

## 2021-11-01 RX ADMIN — GABAPENTIN 600 MG: 300 CAPSULE ORAL at 20:21

## 2021-11-01 ASSESSMENT — PAIN DESCRIPTION - LOCATION: LOCATION: LEG;GROIN

## 2021-11-01 ASSESSMENT — PAIN DESCRIPTION - PAIN TYPE: TYPE: ACUTE PAIN

## 2021-11-01 ASSESSMENT — PAIN DESCRIPTION - ORIENTATION: ORIENTATION: RIGHT

## 2021-11-01 ASSESSMENT — PAIN DESCRIPTION - DESCRIPTORS: DESCRIPTORS: ACHING

## 2021-11-01 NOTE — ED NOTES
Report received from [facility]. Care assumed.      Karri Meneses RN  11/01/21 0692

## 2021-11-01 NOTE — CONSULTS
[facility]  consulted by Dr. Elva Clemens for monitoring and adjustment. Indication for treatment: SSTI, Foot infection  Goal trough: 10-15 mcg/mL    Pertinent Laboratory Values:   Temp Readings from Last 3 Encounters:   10/31/21 98 °F (36.7 °C) (Oral)   05/20/20 98.2 °F (36.8 °C) (Infrared)   04/29/20 98 °F (36.7 °C)     Recent Labs     10/31/21  1251   WBC 7.4     Recent Labs     10/31/21  1251   BUN 18   CREATININE 0.6*     Estimated Creatinine Clearance: 119 mL/min (A) (based on SCr of 0.6 mg/dL (L)). Intake/Output Summary (Last 24 hours) at 11/1/2021 1223  Last data filed at 10/31/2021 2004  Gross per 24 hour   Intake 10 ml   Output --   Net 10 ml       Pertinent Cultures:  Date    Source    Results  N/A                    Vancomycin level:   TROUGH:  No results for input(s): VANCOTROUGH in the last 72 hours. RANDOM:  No results for input(s): VANCORANDOM in the last 72 hours. Assessment:  WBC and temperature: WBC normal, pt afebrile  SCr, BUN, and urine output: SCR at baseline, no UOP data  Day(s) of therapy:  1  Vancomycin concentration:   11/2 - trough scheduled for 00:30    Plan:  The patient received vancomycin 1500mg ivpb x1 dose last night. Start vancomycin 1000mg ivpb q12h today for a predicted trough of 13, AUC <500. Check the trough level tomorrow just after midnight. Pharmacy will continue to monitor patient and adjust therapy as indicated    Sonal 3 11/2 @00:30    Thank you for the consult. Zafar Sloan, [address] [place] [address]  11/1/2021 12:23 PM

## 2021-11-01 NOTE — H&P
HISTORY AND PHYSICAL    2021     Patient Information:    Patient: Chilo Pierson     Gender: male  : 1960   Age: 57 y.o. MRN: [id number]  Room : ED30/ED-30      Pt`s preferred phone number :[phone]  [facility]  Extended Emergency Contact Information  Primary Emergency Contact: Mylene Orellana  Address: [address] [place] [address] Phone: [phone]  Relation: Domestic Partner  Secondary Emergency Contact: [place] Phone: [phone]  Relation: Child        PCP:  Pebbles Ortiz MD (Tel: [phone] )    Chief complaint:    Chief Complaint   Patient presents with    Toe Pain     right great toe pain    Leg Pain     right leg pain              History of Present Illness:  Chilo Pierson is a 57 y.o. male with   has a past medical history of Anesthesia, Anxiety, CAD , COPD , Degenerative disc disease, Diabetes mellitus II uncontrolled, Edwena Homa bladder stones, h/o Heroin abuse , Hx MRSA infection, Hyperlipidemia, Hypertension, , Migraine, Pancreatitis, S/P CABG x 4,   who presented to ER with ongoing leg pain for last few days but lately is got worse . Pt has dry black gangrene on right big toe and he was not taking any of his meds as he has been out for almost a year . Pt is very non complaint . In ER lab data revealed hyponatremia with elevated glucose . And  XR . And   CT revealed   History obtained from patient . REVIEW OF SYSTEMS:   Constitutional: Negative for fever,chills . ENT: Negative for rhinorrhea,  sore throat.   Respiratory: Negative for cough, shortness of breath,wheezing  Cardiovascular: Negative for chest pain, palpitations   Gastrointestinal: Negative for Abdominal pain, nausea, vomiting, diarrhea  Genitourinary: Negative for polyuria, dysuria   Hematologic/Lymphatic: Negative for bleeding tendency, easy bruising  Musculoskeletal: + for myalgias and arthralgias  Neurologic: Negative for confusion,dysarthria. Skin: Negative for itching,rash  Psychiatric: Negative for depression,anxiety, agitation. Endocrine: Negative for polydipsia,polyuria,heat /cold intolerance. Past Medical History:   has a past medical history of Anesthesia, Anxiety, CAD (coronary artery disease), COPD (chronic obstructive pulmonary disease) ([facility] Degenerative disc disease, Diabetes mellitus ([facility] Gall bladder stones, H/O cardiovascular stress test, H/O echocardiogram, Heroin abuse ([facility] [facility] Hx MRSA infection, Hyperlipidemia, Hypertension, Low back pain, Migraine, Pancreatitis, S/P CABG x 4, and Shortness of breath on exertion. Past Surgical History:   has a past surgical history that includes Hand surgery (15 yrs ago); Dental surgery (2010); Coronary artery bypass graft (1/6/13); and Toe amputation (Right, 3/31/2020). Medications:  No current facility-administered medications on file prior to encounter. Current Outpatient Medications on File Prior to Encounter   Medication Sig Dispense Refill    Gauze Pads & Dressings 2\"X2\" PADS 1 each by Does not apply route daily as needed (for wound care) 30 each 1    Gauze Pads & Dressings (KERLIX GAUZE ROLL MEDIUM) MISC 1 each by Does not apply route daily as needed (wound care) 30 each 2    atorvastatin (LIPITOR) 40 MG tablet Take 1 tablet by mouth nightly 30 tablet 0    clopidogrel (PLAVIX) 75 MG tablet Take 1 tablet by mouth daily 30 tablet 0    nicotine (NICODERM CQ) 21 MG/24HR Place 1 patch onto the skin daily 30 patch 3    levothyroxine (SYNTHROID) 100 MCG tablet Take 1 tablet by mouth Daily 30 tablet 0    gabapentin (NEURONTIN) 600 MG tablet Take 1 tablet by mouth 3 times daily 90 tablet 3    Insulin Aspart Prot & Aspart (NOVOLOG MIX 70/30 SC) Inject 25 Units into the skin every morning       Blood Glucose Monitoring Suppl (FREESTYLE LITE) MARGARITA Apply 1 Device topically three times daily.  1 Device 0    Lancets 11.1 10/31/2021     BMP:    Lab Results   Component Value Date     10/31/2021    K 4.1 10/31/2021    CL 99 10/31/2021    CO2 22 10/31/2021    BUN 18 10/31/2021    CREATININE 0.6 10/31/2021    CALCIUM 8.6 10/31/2021    GFRAA >60 10/31/2021    LABGLOM >60 10/31/2021    GLUCOSE 382 10/31/2021       Chest Xray:   EKG:    I visualized CXR images and EKG strips      Patient Active Problem List   Diagnosis Code    Diabetes mellitus E11.9    H/O echocardiogram Z92.89    S/P CABG (coronary artery bypass graft) Z95.1    Chest pain R07.9    Cellulitis L03.90    Heroin withdrawal ([facility]) F11.23    CAD (coronary artery disease) I25.10    Polysubstance abuse ([address] F19.10    Small bowel obstruction ([address] K56.609    Narcotic abuse, continuous ([address] F11.10    Hx of hepatitis Z86.19    Epigastric pain R10.13    Diarrhea R19.7    Constipation with Ileus K59.00    Vomiting of fecal matter with nausea R11.13    Encephalopathy J15.11    Metabolic encephalopathy R74.88    Infectious gastroenteritis A09    Bilateral leg weakness R29.898    Gait disturbance R26.9    Left carotid stenosis I65.22    Hypothyroidism E03.9    Osteomyelitis ([facility]) M86.9    Uncontrolled type II diabetes with peripheral autonomic neuropathy ([facility]) E11.43, E11.65    Gangrene of toe ([facility]) I96    Acute hematogenous osteomyelitis of right foot ([facility]) M86.071    Amputation of little toe ([facility]) B34.633I    PAD (peripheral artery disease) ([facility]) I73.9    Uncontrolled pain R52    Generalized weakness R53.1    Simple chronic bronchitis ([facility]) J41.0    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 ([facility]) Z89.421    Skin ulcer with necrosis of muscle ([facility]) L98.493    Toe ulcer ([address] L97.509         Active Hospital Problems    Diagnosis     Toe ulcer ([address] [L97.509]     PAD (peripheral artery disease) ([address] [I73.9]     Uncontrolled type II diabetes with peripheral autonomic neuropathy ([facility]) [E11.43, E11.65]      DM II uncontrolled Assessment/Plan:   Tele   Vascular surg consult , heparin drip   Glucose control, insulin coverage   Pain control   IVF , lytes balance   Home meds , reviewed and resumed as appropriate   Symptoms releif/Pain control  DVT proph           Kailash Daniels MD    11/1/2021 1:21 PM

## 2021-11-01 NOTE — ED NOTES
Pt provided with urinal and water per request. Pt updated on pain meds ordered by provider. Pt refuses tylenol at this time. Pt informed about order for gabapentin and educated on timing of med. Pt verbalizes understanding and denies any other needs at this time.      Gayathri Kelly RN  11/01/21 6349

## 2021-11-01 NOTE — ED PROVIDER NOTES
Patient Identification  Pee Fiore is a 57 y.o. male    Chief Complaint  Toe Pain (right great toe pain) and Leg Pain (right leg pain)      HPI  (History provided by patient)  This is a 57 y.o. male with a history of polysubstance abuse, hypertension, coronary artery disease, diabetes, hyperlipidemia who was brought in by self for chief complaint of toe and leg pain. States the last 3-week he has been having pain in his right foot and lower leg as well as a developing black wound on the distal aspect of his right great toe. States the pain is 10 out of 10, sharp, worse with walking. Notes that he stopped using IV drugs 3 or 4 weeks ago due to the pain. No chest pain or shortness of breath. REVIEW OF SYSTEMS    Constitutional:  Denies fever, chills  HENT:  Denies sore throat or ear pain   Eyes: Denies vision changes, eye pain  Cardiovascular:  Denies chest pain, syncope  Respiratory:  Denies shortness of breath, cough   GI:  Denies abdominal pain, nausea, vomiting  :  Denies dysuria, discharge  Musculoskeletal:  + toe and leg pain. Skin:  Denies pruritis. + toe wound  Neurologic:  Denies headache, focal weakness, or sensory changes     See HPI and nursing notes for additional information     I have reviewed the following nursing documentation:  Allergies: No Known Allergies    Past medical history:  has a past medical history of Anesthesia, Anxiety, CAD (coronary artery disease), COPD (chronic obstructive pulmonary disease) ([facility] [address] Degenerative disc disease, Diabetes mellitus ([facility] [address] Gall bladder stones, H/O cardiovascular stress test (7/17/13), H/O echocardiogram (7/17/13, 05/28/13), Heroin abuse ([facility] [address] MRSA infection (2005), Hyperlipidemia, Hypertension, Low back pain, Migraine, Pancreatitis, S/P CABG x 4 (2013), and Shortness of breath on exertion. Past surgical history:  has a past surgical history that includes Hand surgery (15 yrs ago); Dental surgery (2010);  Coronary artery bypass graft (1/6/13); and Toe amputation (Right, 3/31/2020). Home medications:   Prior to Admission medications    Medication Sig Start Date End Date Taking? Authorizing Provider   Gauze Pads & Dressings 2\"X2\" PADS 1 each by Does not apply route daily as needed (for wound care) 6/10/20   Lela Roman MD   Gauze Pads & Dressings (KERLIX GAUZE ROLL MEDIUM) MISC 1 each by Does not apply route daily as needed (wound care) 6/10/20   Lela Roman MD   ondansetron (ZOFRAN-ODT) 4 MG disintegrating tablet Take 1 tablet by mouth 3 times daily as needed for Nausea or Vomiting 4/23/20   Ok Reyes MD   atorvastatin (LIPITOR) 40 MG tablet Take 1 tablet by mouth nightly 4/9/20   ELMIRA Whalen MD   clopidogrel (PLAVIX) 75 MG tablet Take 1 tablet by mouth daily 4/10/20   ELMIRA Whalen MD   nicotine (NICODERM CQ) 21 MG/24HR Place 1 patch onto the skin daily 4/10/20   ELMIRA Whalen MD   levothyroxine (SYNTHROID) 100 MCG tablet Take 1 tablet by mouth Daily 9/9/18   Marquis Keith MD   gabapentin (NEURONTIN) 600 MG tablet Take 1 tablet by mouth 3 times daily 5/17/16   Otto Mirza MD   Insulin Aspart Prot & Aspart (NOVOLOG MIX 70/30 SC) Inject 25 Units into the skin every morning     Historical Provider, MD   Blood Glucose Monitoring Suppl (FREESTYLE LITE) MARGARITA Apply 1 Device topically three times daily. 7/8/14   Roe Hernandez MD   Lancets (STERILANCE TL) MISC USE AS DIRECTED TO TEST BLOOD SUGAR THREE TIMES DAILY BEFORE MEALS 6/3/14   Roe Hernandez MD   glucose blood VI test strips (FREESTYLE LITE) strip Test 3 times daily as needed. 5/28/14   Roe Hernandez MD   Insulin Pen Needle 31G X 8 MM MISC 1 Device by Does not apply route three times daily. 5/28/14   Roe Hernandez MD       Social history:  reports that he has been smoking cigarettes. He has a 40.00 pack-year smoking history. His smokeless tobacco use includes chew. He reports previous drug use. He reports that he does not drink alcohol.     Family history: Family History   Problem Relation Age of Onset   Lopez Cancer Mother         breast    Other Father         parkinsons disease, CVA,HTN, heart disease         Exam  BP (!) 157/65   Pulse 65   Temp 98 °F (36.7 °C) (Oral)   Resp 17   Ht 5' 7.5\" (1.715 m)   Wt 145 lb (65.8 kg)   SpO2 100%   BMI 22.38 kg/m²   Nursing note and vitals reviewed. Constitutional: Well developed, well nourished. No acute distress. HENT:      Head: Normocephalic and atraumatic. Ears: External ears normal.      Nose: Nose normal.     Mouth: Membrane mucosa moist and pink. No posterior oropharynx erythema or tonsillar edema  Eyes: Anicteric sclera. No discharge, PERRL  Neck: Supple. Trachea midline. Cardiovascular: RRR, no murmurs, rubs, or gallops, radial pulses 2+ bilaterally. Bilateral feet are somewhat cool to the touch, unable to palpate DP and PT pulses, capillary refill is somewhat sluggish in her bilateral toes. There is a blackened wound over the distal aspect of the right great toe. Pulmonary/Chest: Effort normal. No respiratory distress. CTAB. No stridor. No wheezes. No rales. Abdominal: Soft. Nontender to palpation. No distension. No guarding, rebound tenderness, or evidence of ascites. : No CVA tenderness. Musculoskeletal: Moves all extremities. No gross deformity. Neurological: Alert and oriented to person, place, and time. Normal muscle tone. Skin: Warm and dry. No rash. Psychiatric: Normal mood and affect. Behavior is normal.        Radiographs (if obtained):  [] The following radiograph was interpreted by myself in the absence of a radiologist:   [x] Radiologist's Report Reviewed:  CTA LOWER EXTREMITY RIGHT W CONTRAST   Final Result   1. Complete occlusion of the right common femoral artery secondary to   atherosclerosis with reconstitution of the proximal superficial and deep   femoral arteries.   2 vessel runoff into the forefoot with contrast opacifying   the anterior and posterior tibial arteries. The peroneal artery loses   opacification at the level of the distal tibial shaft. 2. Moderate to severe atherosclerosis of the left femoral arteries and   popliteal arteries. The anterior tibial artery is seen opacifying below the   ankle. No significant contrast opacification of the posterior tibial artery. The peroneal artery fills to the level of the distal tibia. 3. Small shallow soft tissue ulceration plantar to the right 2nd   metatarsophalangeal joint with subcutaneous edema. No abscess or soft tissue   gas identified. 4. No CT evidence of osteomyelitis or other acute osseous abnormality. XR FOOT RIGHT (MIN 3 VIEWS)   Final Result   1. Chronic appearing deformities of the 4th metatarsal head and base of the   4th proximal phalanx new from 03/23/2020. This may represent chronic   osteomyelitis with a superimposed acute component not excluded and if   clinically indicated further evaluation could be obtained with MRI. 2. Status post interval partial 5th ray amputation.                 Labs  Results for orders placed or performed during the hospital encounter of 10/31/21   CBC Auto Differential   Result Value Ref Range    WBC 7.4 4.0 - 10.5 K/CU MM    RBC 3.95 (L) 4.6 - 6.2 M/CU MM    Hemoglobin 12.1 (L) 13.5 - 18.0 GM/DL    Hematocrit 34.9 (L) 42 - 52 %    MCV 88.4 78 - 100 FL    MCH 30.6 27 - 31 PG    MCHC 34.7 32.0 - 36.0 %    RDW 13.8 11.7 - 14.9 %    Platelets 029 (L) 283 - 440 K/CU MM    MPV 11.1 7.5 - 11.1 FL    Differential Type AUTOMATED DIFFERENTIAL     Segs Relative 78.7 (H) 36 - 66 %    Lymphocytes % 12.9 (L) 24 - 44 %    Monocytes % 6.8 (H) 0 - 4 %    Eosinophils % 0.7 0 - 3 %    Basophils % 0.5 0 - 1 %    Segs Absolute 5.8 K/CU MM    Lymphocytes Absolute 1.0 K/CU MM    Monocytes Absolute 0.5 K/CU MM    Eosinophils Absolute 0.1 K/CU MM    Basophils Absolute 0.0 K/CU MM    Nucleated RBC % 0.0 %    Total Nucleated RBC 0.0 K/CU MM    TRC 0.0553 K/CU MM    Total Immature Neutrophil 0.03 K/CU MM    Immature Neutrophil % 0.4 0 - 0.43 %   Comprehensive Metabolic Panel w/ Reflex to MG   Result Value Ref Range    Sodium 128 (L) 135 - 145 MMOL/L    Potassium 4.1 3.5 - 5.1 MMOL/L    Chloride 99 99 - 110 mMol/L    CO2 22 21 - 32 MMOL/L    BUN 18 6 - 23 MG/DL    CREATININE 0.6 (L) 0.9 - 1.3 MG/DL    Glucose 382 (H) 70 - 99 MG/DL    Calcium 8.6 8.3 - 10.6 MG/DL    Albumin 3.0 (L) 3.4 - 5.0 GM/DL    Total Protein 7.4 6.4 - 8.2 GM/DL    Total Bilirubin 0.3 0.0 - 1.0 MG/DL    ALT 37 10 - 40 U/L    AST 42 (H) 15 - 37 IU/L    Alkaline Phosphatase 112 40 - 129 IU/L    GFR Non-African American >60 >60 mL/min/1.73m2    GFR African American >60 >60 mL/min/1.73m2    Anion Gap 7 4 - 16   Sedimentation Rate   Result Value Ref Range    Sed Rate 70 (H) 0 - 20 MM/HR   POCT Glucose   Result Value Ref Range    POC Glucose 352 (H) 70 - 99 MG/DL         MDM  Patient presents for wound on the right great toe, CTAs found to have significant peripheral arterial disease although there is some collateralizing flow. Sed rate elevated. X-rays revealed concern for osteomyelitis but this is not present on CT. Patient was initially placed on broad-spectrum antibiotics due to this initial concern for osteomyelitis. I did speak with Dr. Zeinab Elliott cardiothoracic surgery who recommends admission and heparin infusion which was ordered. Spoke with Dr. Elva Clemens, on-call PCP, who agreed to admit. In consideration of current COVID19 pandemic, with effort to minimize unnecessary provider exposure, this patient was seen at bedside by me independently. However, in compliance with current hospital CHRIS/ED protocol, prior to admission I did discuss this patient case with emergency department physician, Dr. Debbie Kaminski. Of note, this Pt was NOT admitted to the ICU. Final Impression  1.  PAD (peripheral artery disease) ([address]    2. Open wound of right great toe, initial encounter        Blood pressure (!) 157/65, pulse 65, temperature 98 °F (36.7 °C), temperature source Oral, resp. rate 17, height 5' 7.5\" (1.715 m), weight 145 lb (65.8 kg), SpO2 100 %. Disposition:  Admit to med/surg floor in stable condition. Patient was given scripts for the following medications. I counseled patient how to take these medications. New Prescriptions    No medications on file       This chart was generated using the [address] [place] [address] dictation system. I created this record but it may contain dictation errors given the limitations of this technology.        Sammie Brooks PA-C  10/31/21 4723
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
## 2021-11-01 NOTE — ED NOTES
Patient requesting pain mediction at this time. Perfect served Dr. Lani Mckeon.      Leslie Castelan, MELISSA  11/01/21 7466

## 2021-11-02 LAB
ACTIVATED CLOTTING TIME, LOW RANGE: 165 SEC
ACTIVATED CLOTTING TIME, LOW RANGE: 208 SEC
ACTIVATED CLOTTING TIME, LOW RANGE: 253 SEC
ACTIVATED CLOTTING TIME, LOW RANGE: >400 SEC
ANION GAP SERPL CALCULATED.3IONS-SCNC: 8 MMOL/L (ref 4–16)
APTT: 68.3 SECONDS (ref 25.1–37.1)
APTT: 79.8 SECONDS (ref 25.1–37.1)
BASOPHILS ABSOLUTE: 0.1 K/CU MM
BASOPHILS RELATIVE PERCENT: 0.9 % (ref 0–1)
BUN BLDV-MCNC: 19 MG/DL (ref 6–23)
CALCIUM SERPL-MCNC: 8.3 MG/DL (ref 8.3–10.6)
CHLORIDE BLD-SCNC: 104 MMOL/L (ref 99–110)
CO2: 25 MMOL/L (ref 21–32)
CREAT SERPL-MCNC: 0.9 MG/DL (ref 0.9–1.3)
DIFFERENTIAL TYPE: ABNORMAL
DOSE AMOUNT: NORMAL
DOSE TIME: NORMAL
EOSINOPHILS ABSOLUTE: 0.1 K/CU MM
EOSINOPHILS RELATIVE PERCENT: 1.3 % (ref 0–3)
GFR AFRICAN AMERICAN: >60 ML/MIN/1.73M2
GFR NON-AFRICAN AMERICAN: >60 ML/MIN/1.73M2
GLUCOSE BLD-MCNC: 164 MG/DL (ref 70–99)
GLUCOSE BLD-MCNC: 186 MG/DL (ref 70–99)
GLUCOSE BLD-MCNC: 190 MG/DL (ref 70–99)
GLUCOSE BLD-MCNC: 226 MG/DL (ref 70–99)
HCT VFR BLD CALC: 32.8 % (ref 42–52)
HEMOGLOBIN: 11.4 GM/DL (ref 13.5–18)
IMMATURE NEUTROPHIL %: 0.3 % (ref 0–0.43)
LYMPHOCYTES ABSOLUTE: 1.6 K/CU MM
LYMPHOCYTES RELATIVE PERCENT: 23.4 % (ref 24–44)
MCH RBC QN AUTO: 30.6 PG (ref 27–31)
MCHC RBC AUTO-ENTMCNC: 34.8 % (ref 32–36)
MCV RBC AUTO: 88.2 FL (ref 78–100)
MONOCYTES ABSOLUTE: 0.7 K/CU MM
MONOCYTES RELATIVE PERCENT: 9.6 % (ref 0–4)
NUCLEATED RBC %: 0 %
PDW BLD-RTO: 13.9 % (ref 11.7–14.9)
PLATELET # BLD: 114 K/CU MM (ref 140–440)
PMV BLD AUTO: 11 FL (ref 7.5–11.1)
POTASSIUM SERPL-SCNC: 4.3 MMOL/L (ref 3.5–5.1)
RBC # BLD: 3.72 M/CU MM (ref 4.6–6.2)
SARS-COV-2, NAAT: NOT DETECTED
SEGMENTED NEUTROPHILS ABSOLUTE COUNT: 4.3 K/CU MM
SEGMENTED NEUTROPHILS RELATIVE PERCENT: 64.5 % (ref 36–66)
SODIUM BLD-SCNC: 137 MMOL/L (ref 135–145)
SOURCE: NORMAL
TOTAL IMMATURE NEUTOROPHIL: 0.02 K/CU MM
TOTAL NUCLEATED RBC: 0 K/CU MM
VANCOMYCIN TROUGH: 14.2 UG/ML (ref 10–20)
WBC # BLD: 6.7 K/CU MM (ref 4–10.5)

## 2021-11-02 PROCEDURE — 0238T TRLUML PERIP ATHRC ILIAC ART: CPT

## 2021-11-02 PROCEDURE — 04CH3ZZ EXTIRPATION OF MATTER FROM RIGHT EXTERNAL ILIAC ARTERY, PERCUTANEOUS APPROACH: ICD-10-PCS | Performed by: SURGERY

## 2021-11-02 PROCEDURE — 6370000000 HC RX 637 (ALT 250 FOR IP)

## 2021-11-02 PROCEDURE — C1894 INTRO/SHEATH, NON-LASER: HCPCS

## 2021-11-02 PROCEDURE — 6360000002 HC RX W HCPCS: Performed by: INTERNAL MEDICINE

## 2021-11-02 PROCEDURE — 94761 N-INVAS EAR/PLS OXIMETRY MLT: CPT

## 2021-11-02 PROCEDURE — 2580000003 HC RX 258: Performed by: SURGERY

## 2021-11-02 PROCEDURE — 6370000000 HC RX 637 (ALT 250 FOR IP): Performed by: FAMILY MEDICINE

## 2021-11-02 PROCEDURE — 75716 ARTERY X-RAYS ARMS/LEGS: CPT

## 2021-11-02 PROCEDURE — 6360000002 HC RX W HCPCS

## 2021-11-02 PROCEDURE — 2500000003 HC RX 250 WO HCPCS

## 2021-11-02 PROCEDURE — 04CK3ZZ EXTIRPATION OF MATTER FROM RIGHT FEMORAL ARTERY, PERCUTANEOUS APPROACH: ICD-10-PCS | Performed by: SURGERY

## 2021-11-02 PROCEDURE — 85730 THROMBOPLASTIN TIME PARTIAL: CPT

## 2021-11-02 PROCEDURE — 75625 CONTRAST EXAM ABDOMINL AORTA: CPT

## 2021-11-02 PROCEDURE — 75774 ARTERY X-RAY EACH VESSEL: CPT

## 2021-11-02 PROCEDURE — 1200000000 HC SEMI PRIVATE

## 2021-11-02 PROCEDURE — 6370000000 HC RX 637 (ALT 250 FOR IP): Performed by: SURGERY

## 2021-11-02 PROCEDURE — 6360000004 HC RX CONTRAST MEDICATION

## 2021-11-02 PROCEDURE — 85347 COAGULATION TIME ACTIVATED: CPT

## 2021-11-02 PROCEDURE — 6370000000 HC RX 637 (ALT 250 FOR IP): Performed by: INTERNAL MEDICINE

## 2021-11-02 PROCEDURE — 047D3ZZ DILATION OF LEFT COMMON ILIAC ARTERY, PERCUTANEOUS APPROACH: ICD-10-PCS | Performed by: SURGERY

## 2021-11-02 PROCEDURE — 2709999900 HC NON-CHARGEABLE SUPPLY

## 2021-11-02 PROCEDURE — 80048 BASIC METABOLIC PNL TOTAL CA: CPT

## 2021-11-02 PROCEDURE — C1724 CATH, TRANS ATHEREC,ROTATION: HCPCS

## 2021-11-02 PROCEDURE — 37220 HC ILIAC TERRITORY PLASTY: CPT

## 2021-11-02 PROCEDURE — 6360000002 HC RX W HCPCS: Performed by: SURGERY

## 2021-11-02 PROCEDURE — C1769 GUIDE WIRE: HCPCS

## 2021-11-02 PROCEDURE — 87635 SARS-COV-2 COVID-19 AMP PRB: CPT

## 2021-11-02 PROCEDURE — C1884 EMBOLIZATION PROTECT SYST: HCPCS

## 2021-11-02 PROCEDURE — 36415 COLL VENOUS BLD VENIPUNCTURE: CPT

## 2021-11-02 PROCEDURE — 04C33ZZ EXTIRPATION OF MATTER FROM HEPATIC ARTERY, PERCUTANEOUS APPROACH: ICD-10-PCS | Performed by: SURGERY

## 2021-11-02 PROCEDURE — C1725 CATH, TRANSLUMIN NON-LASER: HCPCS

## 2021-11-02 PROCEDURE — 82962 GLUCOSE BLOOD TEST: CPT

## 2021-11-02 PROCEDURE — 85025 COMPLETE CBC W/AUTO DIFF WBC: CPT

## 2021-11-02 RX ORDER — ACETAMINOPHEN 325 MG/1
650 TABLET ORAL EVERY 4 HOURS PRN
Status: DISCONTINUED | OUTPATIENT
Start: 2021-11-02 | End: 2021-11-06

## 2021-11-02 RX ADMIN — GABAPENTIN 600 MG: 300 CAPSULE ORAL at 15:43

## 2021-11-02 RX ADMIN — OXYCODONE AND ACETAMINOPHEN 1 TABLET: 5; 325 TABLET ORAL at 02:06

## 2021-11-02 RX ADMIN — CEFEPIME 2000 MG: 2 INJECTION, POWDER, FOR SOLUTION INTRAVENOUS at 20:15

## 2021-11-02 RX ADMIN — GABAPENTIN 600 MG: 300 CAPSULE ORAL at 20:18

## 2021-11-02 RX ADMIN — OXYCODONE AND ACETAMINOPHEN 1 TABLET: 5; 325 TABLET ORAL at 20:19

## 2021-11-02 RX ADMIN — ATORVASTATIN CALCIUM 40 MG: 40 TABLET, FILM COATED ORAL at 20:19

## 2021-11-02 RX ADMIN — INSULIN GLARGINE 14 UNITS: 100 INJECTION, SOLUTION SUBCUTANEOUS at 23:25

## 2021-11-02 RX ADMIN — LEVOTHYROXINE SODIUM 100 MCG: 25 TABLET ORAL at 05:11

## 2021-11-02 RX ADMIN — VANCOMYCIN 1000 MG: 1 INJECTION, SOLUTION INTRAVENOUS at 01:43

## 2021-11-02 RX ADMIN — OXYCODONE AND ACETAMINOPHEN 1 TABLET: 5; 325 TABLET ORAL at 15:43

## 2021-11-02 RX ADMIN — GUAIFENESIN 1200 MG: 600 TABLET, EXTENDED RELEASE ORAL at 20:18

## 2021-11-02 RX ADMIN — SODIUM CHLORIDE 25 ML: 9 INJECTION, SOLUTION INTRAVENOUS at 16:22

## 2021-11-02 RX ADMIN — VANCOMYCIN 1000 MG: 1 INJECTION, SOLUTION INTRAVENOUS at 16:24

## 2021-11-02 ASSESSMENT — PAIN SCALES - GENERAL
PAINLEVEL_OUTOF10: 8
PAINLEVEL_OUTOF10: 4
PAINLEVEL_OUTOF10: 9
PAINLEVEL_OUTOF10: 8
PAINLEVEL_OUTOF10: 7
PAINLEVEL_OUTOF10: 8
PAINLEVEL_OUTOF10: 7
PAINLEVEL_OUTOF10: 4

## 2021-11-02 NOTE — PROGRESS NOTES
Attending Progress Note      PCP: Isamar Lincoln MD      Patient: Pauly Crandall   Gender: male  : 1960   Age: 57 y.o.   MRN: [id number]  Room  : Cath Lab/NONE      Date of Admission: 10/31/2021    Chief Complaint   Patient presents with    Toe Pain     right great toe pain    Leg Pain     right leg pain           Subjective: less leg pain, more foot pain         Medications:  Reviewed  Infusion Medications    sodium chloride      dextrose      heparin (PORCINE) Infusion 15 Units/kg/hr (21 7433)     Scheduled Medications    cefepime  2,000 mg IntraVENous Q12H    atorvastatin  40 mg Oral Nightly    clopidogrel  75 mg Oral Daily    gabapentin  600 mg Oral TID    levothyroxine  100 mcg Oral Daily    nicotine  1 patch TransDERmal Daily    sodium chloride flush  5-40 mL IntraVENous 2 times per day    insulin lispro  0-12 Units SubCUTAneous TID WC    insulin lispro  0-6 Units SubCUTAneous Nightly    vancomycin  1,000 mg IntraVENous Q12H    insulin glargine  14 Units SubCUTAneous BID    insulin lispro  6 Units SubCUTAneous TID WC    guaiFENesin  1,200 mg Oral BID    vitamin D  50,000 Units Oral Weekly    sodium chloride flush  5-40 mL IntraVENous BID     PRN Meds: sodium chloride flush, sodium chloride, ondansetron **OR** ondansetron, polyethylene glycol, acetaminophen **OR** acetaminophen, glucose, dextrose, glucagon (rDNA), dextrose, zolpidem, oxyCODONE-acetaminophen, guaiFENesin-dextromethorphan, benzonatate, calcium carbonate, lactulose, loperamide, heparin (porcine), heparin (porcine)      Intake/Output Summary (Last 24 hours) at 2021 1047  Last data filed at 2021 0448  Gross per 24 hour   Intake --   Output 600 ml   Net -600 ml       Exam:  BP (!) 115/58   Pulse 59   Temp 98.1 °F (36.7 °C)   Resp 17   Ht 5' 7.5\" (1.715 m)   Wt 145 lb (65.8 kg)   SpO2 99%   BMI 22.38 kg/m²   General appearance: No distress,   Respiratory:  good air entry , no Rales , No wheezing, or rhonchi,  Cardiovascular: RRR, with normal S1/S2 . Abdomen : Soft, non-tender, non-distended  , normal bowel sounds. Legs : No edema bilaterally. No DVT signs ,    Neurologic:  Alert and oriented ,        Labs:   Recent Labs     10/31/21  1251 11/02/21  0632   WBC 7.4 6.7   HGB 12.1* 11.4*   HCT 34.9* 32.8*   * 114*     Recent Labs     10/31/21  1251 11/02/21  0632   * 137   K 4.1 4.3   CL 99 104   CO2 22 25   BUN 18 19   CREATININE 0.6* 0.9   CALCIUM 8.6 8.3     Recent Labs     10/31/21  1251   AST 42*   ALT 37   BILITOT 0.3   ALKPHOS 112     No results for input(s): INR in the last 72 hours. No results for input(s): Ledy Fuchs in the last 72 hours. Assessment/Plan:  Active Hospital Problems    Diagnosis Date Noted    Toe ulcer [facility]) [F87.171] 10/31/2021    PAD (peripheral artery disease) ([facility]) [I73.9]     Uncontrolled type II diabetes with peripheral autonomic neuropathy ([facility]) [E11.43, E11.65]    DM II     Plan:  Tele : no event - no fever - on room air   Angio done , dilation /stent . . on  heparin drip    Pain control  Glucose control\" further increase to lantus and insulin   DVT Prophylaxis   Diet: Diet NPO  Code Status: Full Code          Treatment progress and plan was d/w pt/family .     Miranda Nieto MD

## 2021-11-02 NOTE — PROGRESS NOTES
11/02/21 1648   Oxygen Therapy/Pulse Ox   O2 Therapy Room air   O2 Device None (Room air)   Resp 16   SpO2 99 %   Pulse Oximeter Device Mode Intermittent   $Pulse Oximeter $Spot check (multiple/continuous)   Blood Gas  Performed?  No

## 2021-11-02 NOTE — CONSULTS
[address] [facility]  consulted by Dr. Hamlet Pacheco for monitoring and adjustment. Indication for treatment: SSTI, Foot infection  Goal trough: 10-15 mcg/mL    Pertinent Laboratory Values:   Temp Readings from Last 3 Encounters:   11/01/21 97.4 °F (36.3 °C) (Oral)   05/20/20 98.2 °F (36.8 °C) (Infrared)   04/29/20 98 °F (36.7 °C)     Recent Labs     10/31/21  1251   WBC 7.4     Recent Labs     10/31/21  1251   BUN 18   CREATININE 0.6*     Estimated Creatinine Clearance: 119 mL/min (A) (based on SCr of 0.6 mg/dL (L)). No intake or output data in the 24 hours ending 11/02/21 0134    Pertinent Cultures:  Date    Source    Results  N/A                    Vancomycin level:   TROUGH:    Recent Labs     11/01/21  2345   VANCOTROUGH 14.2     RANDOM:  No results for input(s): VANCORANDOM in the last 72 hours. Assessment:  · WBC and temperature: WBC normal, pt afebrile  · SCr, BUN, and urine output: SCR at baseline; no new screat past 2 days, no UOP data  · Day(s) of therapy:  3  · Vancomycin concentration:   · 11/1 @ 2345 = vanco level= 14.2    Plan:  · Renal - screat at baseline. · Vanco levle from this pm =14.2;  ·  actual vanco TROUGH from this level= 15.4 & AUC = 516 [both in appropriate range];  · THUS CONTINUE SAME DOSE AND FREQUENCY =  · Vancomycin 1000mg ivpb q12h. .  · Pharmacy will continue to monitor patient and adjust therapy as indicated    Next VANCOMYCIN CONCENTRATION SCHEDULED FOR 11/4 1215 pm    Thank you for the consult.   Ashlee Cheng [facility]  11/2/2021 1:34 AM

## 2021-11-03 LAB
APTT: 25.4 SECONDS (ref 25.1–37.1)
GLUCOSE BLD-MCNC: 170 MG/DL (ref 70–99)
GLUCOSE BLD-MCNC: 176 MG/DL (ref 70–99)
GLUCOSE BLD-MCNC: 187 MG/DL (ref 70–99)
GLUCOSE BLD-MCNC: 211 MG/DL (ref 70–99)

## 2021-11-03 PROCEDURE — 94761 N-INVAS EAR/PLS OXIMETRY MLT: CPT

## 2021-11-03 PROCEDURE — 1200000000 HC SEMI PRIVATE

## 2021-11-03 PROCEDURE — 85730 THROMBOPLASTIN TIME PARTIAL: CPT

## 2021-11-03 PROCEDURE — 6370000000 HC RX 637 (ALT 250 FOR IP): Performed by: SURGERY

## 2021-11-03 PROCEDURE — 82962 GLUCOSE BLOOD TEST: CPT

## 2021-11-03 PROCEDURE — 36415 COLL VENOUS BLD VENIPUNCTURE: CPT

## 2021-11-03 PROCEDURE — 2580000003 HC RX 258: Performed by: SURGERY

## 2021-11-03 PROCEDURE — 6360000002 HC RX W HCPCS: Performed by: SURGERY

## 2021-11-03 RX ADMIN — ATORVASTATIN CALCIUM 40 MG: 40 TABLET, FILM COATED ORAL at 21:01

## 2021-11-03 RX ADMIN — SODIUM CHLORIDE, PRESERVATIVE FREE 10 ML: 5 INJECTION INTRAVENOUS at 21:06

## 2021-11-03 RX ADMIN — INSULIN GLARGINE 14 UNITS: 100 INJECTION, SOLUTION SUBCUTANEOUS at 21:01

## 2021-11-03 RX ADMIN — GUAIFENESIN 1200 MG: 600 TABLET, EXTENDED RELEASE ORAL at 21:01

## 2021-11-03 RX ADMIN — CEFEPIME 2000 MG: 2 INJECTION, POWDER, FOR SOLUTION INTRAVENOUS at 21:09

## 2021-11-03 RX ADMIN — SODIUM CHLORIDE, PRESERVATIVE FREE 10 ML: 5 INJECTION INTRAVENOUS at 09:12

## 2021-11-03 RX ADMIN — SODIUM CHLORIDE 25 ML: 9 INJECTION, SOLUTION INTRAVENOUS at 09:05

## 2021-11-03 RX ADMIN — CEFEPIME 2000 MG: 2 INJECTION, POWDER, FOR SOLUTION INTRAVENOUS at 09:06

## 2021-11-03 RX ADMIN — CLOPIDOGREL BISULFATE 75 MG: 75 TABLET, FILM COATED ORAL at 08:59

## 2021-11-03 RX ADMIN — OXYCODONE AND ACETAMINOPHEN 1 TABLET: 5; 325 TABLET ORAL at 03:21

## 2021-11-03 RX ADMIN — INSULIN GLARGINE 14 UNITS: 100 INJECTION, SOLUTION SUBCUTANEOUS at 09:14

## 2021-11-03 RX ADMIN — LEVOTHYROXINE SODIUM 100 MCG: 25 TABLET ORAL at 05:13

## 2021-11-03 RX ADMIN — SODIUM CHLORIDE, PRESERVATIVE FREE 10 ML: 5 INJECTION INTRAVENOUS at 08:58

## 2021-11-03 RX ADMIN — GABAPENTIN 600 MG: 300 CAPSULE ORAL at 13:00

## 2021-11-03 RX ADMIN — GABAPENTIN 600 MG: 300 CAPSULE ORAL at 21:01

## 2021-11-03 RX ADMIN — SODIUM CHLORIDE 25 ML: 9 INJECTION, SOLUTION INTRAVENOUS at 21:07

## 2021-11-03 RX ADMIN — VANCOMYCIN 1000 MG: 1 INJECTION, SOLUTION INTRAVENOUS at 03:15

## 2021-11-03 RX ADMIN — VANCOMYCIN 1000 MG: 1 INJECTION, SOLUTION INTRAVENOUS at 12:44

## 2021-11-03 RX ADMIN — OXYCODONE AND ACETAMINOPHEN 1 TABLET: 5; 325 TABLET ORAL at 21:00

## 2021-11-03 RX ADMIN — SODIUM CHLORIDE 25 ML: 9 INJECTION, SOLUTION INTRAVENOUS at 12:42

## 2021-11-03 RX ADMIN — GABAPENTIN 600 MG: 300 CAPSULE ORAL at 08:59

## 2021-11-03 RX ADMIN — GUAIFENESIN 1200 MG: 600 TABLET, EXTENDED RELEASE ORAL at 08:59

## 2021-11-03 RX ADMIN — OXYCODONE AND ACETAMINOPHEN 1 TABLET: 5; 325 TABLET ORAL at 12:36

## 2021-11-03 ASSESSMENT — PAIN SCALES - GENERAL
PAINLEVEL_OUTOF10: 7
PAINLEVEL_OUTOF10: 4
PAINLEVEL_OUTOF10: 8
PAINLEVEL_OUTOF10: 8
PAINLEVEL_OUTOF10: 9
PAINLEVEL_OUTOF10: 4

## 2021-11-03 ASSESSMENT — PAIN - FUNCTIONAL ASSESSMENT: PAIN_FUNCTIONAL_ASSESSMENT: PREVENTS OR INTERFERES SOME ACTIVE ACTIVITIES AND ADLS

## 2021-11-03 ASSESSMENT — PAIN DESCRIPTION - ORIENTATION: ORIENTATION: RIGHT

## 2021-11-03 ASSESSMENT — PAIN DESCRIPTION - LOCATION: LOCATION: LEG

## 2021-11-03 ASSESSMENT — PAIN DESCRIPTION - DESCRIPTORS: DESCRIPTORS: ACHING

## 2021-11-03 ASSESSMENT — PAIN DESCRIPTION - FREQUENCY: FREQUENCY: CONTINUOUS

## 2021-11-03 ASSESSMENT — PAIN DESCRIPTION - PROGRESSION: CLINICAL_PROGRESSION: NOT CHANGED

## 2021-11-03 ASSESSMENT — PAIN DESCRIPTION - ONSET: ONSET: ON-GOING

## 2021-11-03 ASSESSMENT — PAIN DESCRIPTION - PAIN TYPE: TYPE: ACUTE PAIN

## 2021-11-03 NOTE — PROGRESS NOTES
ASSESSMENT:  Patient Active Problem List   Diagnosis    Diabetes mellitus    H/O echocardiogram    S/P CABG (coronary artery bypass graft)    Chest pain    Cellulitis    Heroin withdrawal (HCC)    CAD (coronary artery disease)    Polysubstance abuse ([address]    Small bowel obstruction (HCC)    [facility], continuous [facility] [address]    Hx of hepatitis    Epigastric pain    Diarrhea    Constipation with Ileus    Vomiting of fecal matter with nausea    Encephalopathy    Metabolic encephalopathy    Infectious gastroenteritis    Bilateral leg weakness    Gait disturbance    Left carotid stenosis    Hypothyroidism    Osteomyelitis (HCC)    Uncontrolled type II diabetes with peripheral autonomic neuropathy (HCC)    Gangrene of toe (HCC)    Acute hematogenous osteomyelitis of right foot (HCC)    Amputation of little toe (HCC)    PAD (peripheral artery disease) (HCC)    Uncontrolled pain    Generalized weakness    Simple chronic bronchitis (HCC)    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 (HCC)    Skin ulcer with necrosis of muscle (HCC)    Toe ulcer (HCC)       PAD s/p R LUCAS, R EIA, RCFA, and R SFA lesions crossed and R sided lesions atherectomied andPTA'd with good result    PLAN:     Vascular status intact. Continue plavix. Recommend general surgery consult for R toe wound care.      Maria Isabel Sultana PA-C

## 2021-11-03 NOTE — PROGRESS NOTES
Attending Progress Note      PCP: Donta Saldana MD      Patient: Josh Cross   Gender: male  : 1960   Age: 57 y.o.   MRN: [id number]  Room  : [address] [place] [address]      Date of Admission: 10/31/2021    Chief Complaint   Patient presents with    Toe Pain     right great toe pain    Leg Pain     right leg pain           Subjective: less leg pain, more foot pain         Medications:  Reviewed  Infusion Medications    sodium chloride 25 mL (21 1242)    dextrose       Scheduled Medications    cefepime  2,000 mg IntraVENous Q12H    atorvastatin  40 mg Oral Nightly    clopidogrel  75 mg Oral Daily    gabapentin  600 mg Oral TID    levothyroxine  100 mcg Oral Daily    nicotine  1 patch TransDERmal Daily    sodium chloride flush  5-40 mL IntraVENous 2 times per day    insulin lispro  0-12 Units SubCUTAneous TID WC    insulin lispro  0-6 Units SubCUTAneous Nightly    vancomycin  1,000 mg IntraVENous Q12H    insulin glargine  14 Units SubCUTAneous BID    insulin lispro  6 Units SubCUTAneous TID WC    guaiFENesin  1,200 mg Oral BID    vitamin D  50,000 Units Oral Weekly    sodium chloride flush  5-40 mL IntraVENous BID     PRN Meds: acetaminophen, sodium chloride flush, sodium chloride, ondansetron **OR** ondansetron, polyethylene glycol, acetaminophen **OR** acetaminophen, glucose, dextrose, glucagon (rDNA), dextrose, zolpidem, oxyCODONE-acetaminophen, guaiFENesin-dextromethorphan, benzonatate, calcium carbonate, lactulose, loperamide      Intake/Output Summary (Last 24 hours) at 11/3/2021 1246  Last data filed at 11/3/2021 0316  Gross per 24 hour   Intake --   Output 1600 ml   Net -1600 ml       Exam:  BP (!) 118/59   Pulse 67   Temp 98.2 °F (36.8 °C) (Oral)   Resp 17   Ht 5' 7.5\" (1.715 m)   Wt 145 lb (65.8 kg)   SpO2 99%   BMI 22.38 kg/m²   General appearance: No distress,   Respiratory:  good air entry , no Rales , No wheezing, or rhonchi,  Cardiovascular: RRR, with normal S1/S2 .  Abdomen : Soft, non-tender, non-distended  , normal bowel sounds. Legs : No edema bilaterally. No DVT signs ,    Neurologic:  Alert and oriented ,        Labs:   Recent Labs     10/31/21  1251 11/02/21  0632   WBC 7.4 6.7   HGB 12.1* 11.4*   HCT 34.9* 32.8*   * 114*     Recent Labs     10/31/21  1251 11/02/21  0632   * 137   K 4.1 4.3   CL 99 104   CO2 22 25   BUN 18 19   CREATININE 0.6* 0.9   CALCIUM 8.6 8.3     Recent Labs     10/31/21  1251   AST 42*   ALT 37   BILITOT 0.3   ALKPHOS 112     No results for input(s): INR in the last 72 hours. No results for input(s): Magdalene Julien in the last 72 hours. Assessment/Plan:  Active Hospital Problems    Diagnosis Date Noted    Toe ulcer [facility]) [E95.280] 10/31/2021    PAD [facility]peripheral artery disease) ([facility]) [I73.9]     Uncontrolled type II diabetes with peripheral autonomic neuropathy ([facility]) [E11.43, E11.65]    DM II     Plan:  Tele : no event - no fever - on room air   Angio done , dilation /stent . Franky Rakes off  heparin drip    Pain control  surg consult   Glucose control\" further increase to lantus and insulin   DVT Prophylaxis   Diet: ADULT DIET; Regular; 3 carb choices (45 gm/meal); Low Fat/Low Chol/High Fiber/2 gm Na  Code Status: Full Code          Treatment progress and plan was d/w pt/family .     Arelis Rubio MD

## 2021-11-03 NOTE — PROGRESS NOTES
This pt was found with a lot of candies in his room and the pt said, it is all sugar free and he was advice not to take them because he is diabatic and his sugar will go up and he said since is sugar free, that will not do anything to his Blood sugar, this Rn  took the candies from the pt and locked it .  Will continue to monitor

## 2021-11-03 NOTE — CONSULTS
[address] [facility]  consulted by Dr. Elva Clemens for monitoring and adjustment. Indication for treatment: SSTI, Foot infection  Goal trough: 10-15 mcg/mL    Pertinent Laboratory Values:   Temp Readings from Last 3 Encounters:   11/03/21 98.2 °F (36.8 °C)   05/20/20 98.2 °F (36.8 °C) (Infrared)   04/29/20 98 °F (36.7 °C)     Recent Labs     11/02/21  0632   WBC 6.7     Recent Labs     11/02/21  0632   BUN 19   CREATININE 0.9     Estimated Creatinine Clearance: 79 mL/min (based on SCr of 0.9 mg/dL). Intake/Output Summary (Last 24 hours) at 11/3/2021 1729  Last data filed at 11/3/2021 0316  Gross per 24 hour   Intake --   Output 1600 ml   Net -1600 ml       Pertinent Cultures:  Date    Source    Results  N/A                    Vancomycin level:   TROUGH:    Recent Labs     11/01/21  2345   VANCOTROUGH 14.2     RANDOM:  No results for input(s): VANCORANDOM in the last 72 hours. Assessment:  · WBC and temperature: WBC normal, pt afebrile  · SCr, BUN, and urine output: SCR at baseline; UOP data ok  · Day(s) of therapy:  4  · Vancomycin concentration:   · 11/1 @ 2345 = vanco level= 14.2    Plan:  · Renal - screat at baseline. · Actual vanco TROUGH from this level= 15.4 & AUC = 516 [both in appropriate range]; · Vancomycin 1000mg ivpb q12h. .  · Repeat Vancomycin level tomorrow PM  · Pharmacy will continue to monitor patient and adjust therapy as indicated    Next VANCOMYCIN CONCENTRATION SCHEDULED FOR 11/4 1215 pm    Thank you for the consult.   Jabari Townsend, [facility]  11/3/2021 5:29 PM

## 2021-11-04 ENCOUNTER — APPOINTMENT (OUTPATIENT)
Dept: MRI IMAGING | Age: 61
DRG: 271 | End: 2021-11-04
Payer: MEDICARE

## 2021-11-04 LAB
DOSE AMOUNT: ABNORMAL
DOSE TIME: ABNORMAL
GLUCOSE BLD-MCNC: 101 MG/DL (ref 70–99)
GLUCOSE BLD-MCNC: 192 MG/DL (ref 70–99)
GLUCOSE BLD-MCNC: 238 MG/DL (ref 70–99)
GLUCOSE BLD-MCNC: 256 MG/DL (ref 70–99)
GLUCOSE BLD-MCNC: 69 MG/DL (ref 70–99)
VANCOMYCIN TROUGH: 20.1 UG/ML (ref 10–20)

## 2021-11-04 PROCEDURE — 6370000000 HC RX 637 (ALT 250 FOR IP): Performed by: SURGERY

## 2021-11-04 PROCEDURE — 82962 GLUCOSE BLOOD TEST: CPT

## 2021-11-04 PROCEDURE — 94761 N-INVAS EAR/PLS OXIMETRY MLT: CPT

## 2021-11-04 PROCEDURE — 1200000000 HC SEMI PRIVATE

## 2021-11-04 PROCEDURE — 2580000003 HC RX 258: Performed by: SURGERY

## 2021-11-04 PROCEDURE — 36415 COLL VENOUS BLD VENIPUNCTURE: CPT

## 2021-11-04 PROCEDURE — 6360000002 HC RX W HCPCS: Performed by: SURGERY

## 2021-11-04 PROCEDURE — 85730 THROMBOPLASTIN TIME PARTIAL: CPT

## 2021-11-04 PROCEDURE — 73718 MRI LOWER EXTREMITY W/O DYE: CPT

## 2021-11-04 PROCEDURE — 80202 ASSAY OF VANCOMYCIN: CPT

## 2021-11-04 RX ORDER — VANCOMYCIN 750 MG/150ML
750 INJECTION, SOLUTION INTRAVENOUS EVERY 12 HOURS
Status: DISCONTINUED | OUTPATIENT
Start: 2021-11-05 | End: 2021-11-08 | Stop reason: SDUPTHER

## 2021-11-04 RX ORDER — OXYCODONE AND ACETAMINOPHEN 7.5; 325 MG/1; MG/1
1 TABLET ORAL ONCE
Status: COMPLETED | OUTPATIENT
Start: 2021-11-04 | End: 2021-11-04

## 2021-11-04 RX ORDER — OXYCODONE AND ACETAMINOPHEN 7.5; 325 MG/1; MG/1
1 TABLET ORAL EVERY 6 HOURS PRN
Status: DISCONTINUED | OUTPATIENT
Start: 2021-11-04 | End: 2021-11-08 | Stop reason: HOSPADM

## 2021-11-04 RX ADMIN — SODIUM CHLORIDE, PRESERVATIVE FREE 10 ML: 5 INJECTION INTRAVENOUS at 22:04

## 2021-11-04 RX ADMIN — SODIUM CHLORIDE, PRESERVATIVE FREE 10 ML: 5 INJECTION INTRAVENOUS at 07:43

## 2021-11-04 RX ADMIN — GABAPENTIN 600 MG: 300 CAPSULE ORAL at 13:03

## 2021-11-04 RX ADMIN — GUAIFENESIN 1200 MG: 600 TABLET, EXTENDED RELEASE ORAL at 07:42

## 2021-11-04 RX ADMIN — ATORVASTATIN CALCIUM 40 MG: 40 TABLET, FILM COATED ORAL at 22:03

## 2021-11-04 RX ADMIN — SODIUM CHLORIDE 25 ML: 9 INJECTION, SOLUTION INTRAVENOUS at 13:02

## 2021-11-04 RX ADMIN — CLOPIDOGREL BISULFATE 75 MG: 75 TABLET, FILM COATED ORAL at 07:43

## 2021-11-04 RX ADMIN — GABAPENTIN 600 MG: 300 CAPSULE ORAL at 22:03

## 2021-11-04 RX ADMIN — VANCOMYCIN 1000 MG: 1 INJECTION, SOLUTION INTRAVENOUS at 01:11

## 2021-11-04 RX ADMIN — INSULIN GLARGINE 14 UNITS: 100 INJECTION, SOLUTION SUBCUTANEOUS at 07:46

## 2021-11-04 RX ADMIN — CEFEPIME 2000 MG: 2 INJECTION, POWDER, FOR SOLUTION INTRAVENOUS at 07:53

## 2021-11-04 RX ADMIN — LEVOTHYROXINE SODIUM 100 MCG: 25 TABLET ORAL at 05:09

## 2021-11-04 RX ADMIN — GUAIFENESIN 1200 MG: 600 TABLET, EXTENDED RELEASE ORAL at 22:03

## 2021-11-04 RX ADMIN — CEFEPIME 2000 MG: 2 INJECTION, POWDER, FOR SOLUTION INTRAVENOUS at 22:23

## 2021-11-04 RX ADMIN — GABAPENTIN 600 MG: 300 CAPSULE ORAL at 07:42

## 2021-11-04 RX ADMIN — SODIUM CHLORIDE 25 ML: 9 INJECTION, SOLUTION INTRAVENOUS at 01:10

## 2021-11-04 RX ADMIN — OXYCODONE AND ACETAMINOPHEN 1 TABLET: 5; 325 TABLET ORAL at 05:08

## 2021-11-04 RX ADMIN — INSULIN GLARGINE 14 UNITS: 100 INJECTION, SOLUTION SUBCUTANEOUS at 22:58

## 2021-11-04 RX ADMIN — SODIUM CHLORIDE 25 ML: 9 INJECTION, SOLUTION INTRAVENOUS at 07:53

## 2021-11-04 RX ADMIN — OXYCODONE AND ACETAMINOPHEN 1 TABLET: 5; 325 TABLET ORAL at 11:47

## 2021-11-04 RX ADMIN — OXYCODONE AND ACETAMINOPHEN 1 TABLET: 7.5; 325 TABLET ORAL at 13:14

## 2021-11-04 RX ADMIN — OXYCODONE AND ACETAMINOPHEN 1 TABLET: 7.5; 325 TABLET ORAL at 22:15

## 2021-11-04 RX ADMIN — VANCOMYCIN 1000 MG: 1 INJECTION, SOLUTION INTRAVENOUS at 13:02

## 2021-11-04 ASSESSMENT — PAIN DESCRIPTION - FREQUENCY
FREQUENCY: CONTINUOUS

## 2021-11-04 ASSESSMENT — PAIN DESCRIPTION - LOCATION
LOCATION: LEG

## 2021-11-04 ASSESSMENT — PAIN SCALES - GENERAL
PAINLEVEL_OUTOF10: 10
PAINLEVEL_OUTOF10: 6
PAINLEVEL_OUTOF10: 10
PAINLEVEL_OUTOF10: 8
PAINLEVEL_OUTOF10: 10

## 2021-11-04 ASSESSMENT — PAIN DESCRIPTION - PAIN TYPE
TYPE: ACUTE PAIN

## 2021-11-04 ASSESSMENT — PAIN DESCRIPTION - PROGRESSION
CLINICAL_PROGRESSION: NOT CHANGED
CLINICAL_PROGRESSION: GRADUALLY IMPROVING
CLINICAL_PROGRESSION: NOT CHANGED

## 2021-11-04 ASSESSMENT — PAIN DESCRIPTION - ORIENTATION
ORIENTATION: RIGHT

## 2021-11-04 ASSESSMENT — PAIN DESCRIPTION - ONSET
ONSET: ON-GOING

## 2021-11-04 ASSESSMENT — PAIN DESCRIPTION - DESCRIPTORS
DESCRIPTORS: ACHING;BURNING;DISCOMFORT
DESCRIPTORS: ACHING
DESCRIPTORS: ACHING

## 2021-11-04 ASSESSMENT — PAIN - FUNCTIONAL ASSESSMENT: PAIN_FUNCTIONAL_ASSESSMENT: PREVENTS OR INTERFERES SOME ACTIVE ACTIVITIES AND ADLS

## 2021-11-04 NOTE — CARE COORDINATION
LSW reviewed chart/screened Pt for discharge needs. Pt is from home. Pt has PCP. Pt is independent of ADL prior to admission. Pt has med/Rx insurance and is able to afford Rx. Pt has no DME or HC in the home. Pt discharge plan is to return home with no needs. CM to continue to follow. LSW is following for possible discharge with IV abx.      It should be noted that Pt reported history of polysubstance abuse and he stopped using IV drugs 3 or 4 weeks ago due to the pain

## 2021-11-04 NOTE — PROGRESS NOTES
Dr. Maxim Ramachandran consulted for foot wound, per charge nurse MELISSA SADLER, Cee consulted Dr. Maxim Ramachandran on 11/3/2021 which stated he does not consult on these types of wounds. Called house supervisor Perez Butt RN whom is going to call this nurse back regarding what to do at this point.

## 2021-11-04 NOTE — CONSULTS
[address] [facility]  consulted by Dr. Pablo Reeves for monitoring and adjustment. Indication for treatment: SSTI, Foot infection  Goal trough: 10-15 mcg/mL    Pertinent Laboratory Values:   Temp Readings from Last 3 Encounters:   11/04/21 98.1 °F (36.7 °C) (Oral)   05/20/20 98.2 °F (36.8 °C) (Infrared)   04/29/20 98 °F (36.7 °C)     Recent Labs     11/02/21  0632   WBC 6.7     Recent Labs     11/02/21  0632   BUN 19   CREATININE 0.9     Estimated Creatinine Clearance: 71 mL/min (based on SCr of 0.9 mg/dL). Intake/Output Summary (Last 24 hours) at 11/4/2021 1501  Last data filed at 11/4/2021 1103  Gross per 24 hour   Intake 2033.89 ml   Output --   Net 2033.89 ml       Pertinent Cultures:  Date    Source    Results  N/A                    Vancomycin level:   TROUGH:    Recent Labs     11/01/21  2345 11/04/21  1250   VANCOTROUGH 14.2 20.1*     RANDOM:  No results for input(s): VANCORANDOM in the last 72 hours. Assessment:  · WBC and temperature: WBC normal, pt afebrile  · SCr, BUN, and urine output: SCR at baseline; UOP data ok  · Day(s) of therapy:  5  · Vancomycin concentration:   · 11/1 @ 2345 = vanco level= 14.2  · 11/04:  20.1, supra-therapeutic    Plan:  · Renal - screat at baseline. · Trough level @ 20.1, supra-therapeutic for indication; DTG=129  · Changed dose to Vancomycin 750mg ivpb q12h. · Predicted AUC = 497, Predicted Trough = 16.0  · Repeat Vancomycin level in 2 days  · Pharmacy will continue to monitor patient and adjust therapy as indicated    Next VANCOMYCIN CONCENTRATION SCHEDULED FOR 11/6 1200 pm    Thank you for the consult.   Evelia Ballard, [facility]  11/4/2021 3:01 PM

## 2021-11-04 NOTE — PLAN OF CARE
Problem: Falls - Risk of:  Goal: Will remain free from falls  Description: Will remain free from falls  11/4/2021 0239 by Marcy Aguilar RN  Outcome: Ongoing  11/3/2021 1307 by Leslie To RN  Outcome: Ongoing  Goal: Absence of physical injury  Description: Absence of physical injury  11/4/2021 0239 by Marcy Aguilar RN  Outcome: Ongoing  11/3/2021 1307 by Leslie To RN  Outcome: Ongoing     Problem: Pain:  Goal: Pain level will decrease  Description: Pain level will decrease  11/4/2021 0239 by Marcy Aguilar RN  Outcome: Ongoing  11/3/2021 1307 by Leslie To RN  Outcome: Ongoing  Goal: Control of acute pain  Description: Control of acute pain  11/4/2021 0239 by Marcy Aguilar RN  Outcome: Ongoing  11/3/2021 1307 by Leslie To RN  Outcome: Ongoing  Goal: Control of chronic pain  Description: Control of chronic pain  11/4/2021 0239 by Marcy Aguilar RN  Outcome: Ongoing  11/3/2021 1307 by Leslie To RN  Outcome: Ongoing

## 2021-11-04 NOTE — PROGRESS NOTES
Per Dr. Vic Ferguson she will not see this patient, see would like us to call Dr. Quinten Zavala to see how he wants to handle the situation. Spoke with Dr. Quinten Zavala he states to make patient NPO after midnight and he will see patient.

## 2021-11-04 NOTE — PROGRESS NOTES
Dr. Markus Rojas placed an order for podiatry consult, Dr. Markus Rojas aware their are no poditrist on call.

## 2021-11-04 NOTE — PROGRESS NOTES
accu check 69, this nurse asked if patient feels like blood sugar is low and if he would like some OJ, patient sternly says \" I DONT CARE\", 120ML OJ given.

## 2021-11-05 ENCOUNTER — ANESTHESIA EVENT (OUTPATIENT)
Dept: OPERATING ROOM | Age: 61
DRG: 271 | End: 2021-11-05
Payer: MEDICARE

## 2021-11-05 ENCOUNTER — ANESTHESIA (OUTPATIENT)
Dept: OPERATING ROOM | Age: 61
DRG: 271 | End: 2021-11-05
Payer: MEDICARE

## 2021-11-05 VITALS — SYSTOLIC BLOOD PRESSURE: 99 MMHG | DIASTOLIC BLOOD PRESSURE: 54 MMHG | OXYGEN SATURATION: 100 % | TEMPERATURE: 96.8 F

## 2021-11-05 LAB
APTT: 32.9 SECONDS (ref 25.1–37.1)
GLUCOSE BLD-MCNC: 114 MG/DL (ref 70–99)
GLUCOSE BLD-MCNC: 117 MG/DL (ref 70–99)
GLUCOSE BLD-MCNC: 136 MG/DL (ref 70–99)
GLUCOSE BLD-MCNC: 168 MG/DL (ref 70–99)
GLUCOSE BLD-MCNC: 186 MG/DL (ref 70–99)
GLUCOSE BLD-MCNC: 53 MG/DL (ref 70–99)

## 2021-11-05 PROCEDURE — 88311 DECALCIFY TISSUE: CPT

## 2021-11-05 PROCEDURE — 6360000002 HC RX W HCPCS: Performed by: PHYSICIAN ASSISTANT

## 2021-11-05 PROCEDURE — 3700000000 HC ANESTHESIA ATTENDED CARE: Performed by: SURGERY

## 2021-11-05 PROCEDURE — 1200000000 HC SEMI PRIVATE

## 2021-11-05 PROCEDURE — 36415 COLL VENOUS BLD VENIPUNCTURE: CPT

## 2021-11-05 PROCEDURE — 3600000012 HC SURGERY LEVEL 2 ADDTL 15MIN: Performed by: SURGERY

## 2021-11-05 PROCEDURE — 3600000002 HC SURGERY LEVEL 2 BASE: Performed by: SURGERY

## 2021-11-05 PROCEDURE — 7100000001 HC PACU RECOVERY - ADDTL 15 MIN: Performed by: SURGERY

## 2021-11-05 PROCEDURE — 0Y6P0Z0 DETACHMENT AT RIGHT 1ST TOE, COMPLETE, OPEN APPROACH: ICD-10-PCS | Performed by: SURGERY

## 2021-11-05 PROCEDURE — 2580000003 HC RX 258: Performed by: PHYSICIAN ASSISTANT

## 2021-11-05 PROCEDURE — 6370000000 HC RX 637 (ALT 250 FOR IP): Performed by: PHYSICIAN ASSISTANT

## 2021-11-05 PROCEDURE — 2500000003 HC RX 250 WO HCPCS: Performed by: ANESTHESIOLOGY

## 2021-11-05 PROCEDURE — 2580000003 HC RX 258: Performed by: SURGERY

## 2021-11-05 PROCEDURE — 94761 N-INVAS EAR/PLS OXIMETRY MLT: CPT

## 2021-11-05 PROCEDURE — 3700000001 HC ADD 15 MINUTES (ANESTHESIA): Performed by: SURGERY

## 2021-11-05 PROCEDURE — 6360000002 HC RX W HCPCS: Performed by: NURSE ANESTHETIST, CERTIFIED REGISTERED

## 2021-11-05 PROCEDURE — 2500000003 HC RX 250 WO HCPCS: Performed by: NURSE ANESTHETIST, CERTIFIED REGISTERED

## 2021-11-05 PROCEDURE — 88305 TISSUE EXAM BY PATHOLOGIST: CPT

## 2021-11-05 PROCEDURE — 28825 PARTIAL AMPUTATION OF TOE: CPT | Performed by: SURGERY

## 2021-11-05 PROCEDURE — 6370000000 HC RX 637 (ALT 250 FOR IP): Performed by: SURGERY

## 2021-11-05 PROCEDURE — 6360000002 HC RX W HCPCS: Performed by: SURGERY

## 2021-11-05 PROCEDURE — 85730 THROMBOPLASTIN TIME PARTIAL: CPT

## 2021-11-05 PROCEDURE — 6360000002 HC RX W HCPCS: Performed by: INTERNAL MEDICINE

## 2021-11-05 PROCEDURE — 2500000003 HC RX 250 WO HCPCS: Performed by: SURGERY

## 2021-11-05 PROCEDURE — 2709999900 HC NON-CHARGEABLE SUPPLY: Performed by: SURGERY

## 2021-11-05 PROCEDURE — 82962 GLUCOSE BLOOD TEST: CPT

## 2021-11-05 PROCEDURE — 99223 1ST HOSP IP/OBS HIGH 75: CPT | Performed by: SURGERY

## 2021-11-05 PROCEDURE — 2720000010 HC SURG SUPPLY STERILE: Performed by: SURGERY

## 2021-11-05 PROCEDURE — 7100000000 HC PACU RECOVERY - FIRST 15 MIN: Performed by: SURGERY

## 2021-11-05 RX ORDER — PROMETHAZINE HYDROCHLORIDE 25 MG/ML
6.25 INJECTION, SOLUTION INTRAMUSCULAR; INTRAVENOUS
Status: DISCONTINUED | OUTPATIENT
Start: 2021-11-05 | End: 2021-11-05 | Stop reason: HOSPADM

## 2021-11-05 RX ORDER — HYDROCODONE BITARTRATE AND ACETAMINOPHEN 5; 325 MG/1; MG/1
2 TABLET ORAL PRN
Status: DISCONTINUED | OUTPATIENT
Start: 2021-11-05 | End: 2021-11-05 | Stop reason: HOSPADM

## 2021-11-05 RX ORDER — DIPHENHYDRAMINE HYDROCHLORIDE 50 MG/ML
12.5 INJECTION INTRAMUSCULAR; INTRAVENOUS
Status: DISCONTINUED | OUTPATIENT
Start: 2021-11-05 | End: 2021-11-05 | Stop reason: HOSPADM

## 2021-11-05 RX ORDER — LIDOCAINE HYDROCHLORIDE 10 MG/ML
INJECTION, SOLUTION EPIDURAL; INFILTRATION; INTRACAUDAL; PERINEURAL
Status: COMPLETED | OUTPATIENT
Start: 2021-11-05 | End: 2021-11-05

## 2021-11-05 RX ORDER — MIDAZOLAM HYDROCHLORIDE 1 MG/ML
INJECTION INTRAMUSCULAR; INTRAVENOUS PRN
Status: DISCONTINUED | OUTPATIENT
Start: 2021-11-05 | End: 2021-11-05 | Stop reason: SDUPTHER

## 2021-11-05 RX ORDER — FENTANYL CITRATE 50 UG/ML
50 INJECTION, SOLUTION INTRAMUSCULAR; INTRAVENOUS EVERY 5 MIN PRN
Status: DISCONTINUED | OUTPATIENT
Start: 2021-11-05 | End: 2021-11-05 | Stop reason: HOSPADM

## 2021-11-05 RX ORDER — KETAMINE HCL 50MG/ML(1)
SYRINGE (ML) INTRAVENOUS PRN
Status: DISCONTINUED | OUTPATIENT
Start: 2021-11-05 | End: 2021-11-05 | Stop reason: SDUPTHER

## 2021-11-05 RX ORDER — LABETALOL HYDROCHLORIDE 5 MG/ML
5 INJECTION, SOLUTION INTRAVENOUS EVERY 10 MIN PRN
Status: DISCONTINUED | OUTPATIENT
Start: 2021-11-05 | End: 2021-11-05 | Stop reason: HOSPADM

## 2021-11-05 RX ORDER — DEXTROSE MONOHYDRATE 25 G/50ML
6.25 INJECTION, SOLUTION INTRAVENOUS PRN
Status: DISCONTINUED | OUTPATIENT
Start: 2021-11-05 | End: 2021-11-08 | Stop reason: HOSPADM

## 2021-11-05 RX ORDER — PROPOFOL 10 MG/ML
INJECTION, EMULSION INTRAVENOUS PRN
Status: DISCONTINUED | OUTPATIENT
Start: 2021-11-05 | End: 2021-11-05 | Stop reason: SDUPTHER

## 2021-11-05 RX ORDER — HYDROMORPHONE HCL 110MG/55ML
0.5 PATIENT CONTROLLED ANALGESIA SYRINGE INTRAVENOUS EVERY 5 MIN PRN
Status: DISCONTINUED | OUTPATIENT
Start: 2021-11-05 | End: 2021-11-05 | Stop reason: HOSPADM

## 2021-11-05 RX ORDER — HYDROCODONE BITARTRATE AND ACETAMINOPHEN 5; 325 MG/1; MG/1
1 TABLET ORAL PRN
Status: DISCONTINUED | OUTPATIENT
Start: 2021-11-05 | End: 2021-11-05 | Stop reason: HOSPADM

## 2021-11-05 RX ORDER — HYDRALAZINE HYDROCHLORIDE 20 MG/ML
5 INJECTION INTRAMUSCULAR; INTRAVENOUS EVERY 10 MIN PRN
Status: DISCONTINUED | OUTPATIENT
Start: 2021-11-05 | End: 2021-11-05 | Stop reason: HOSPADM

## 2021-11-05 RX ORDER — METOCLOPRAMIDE HYDROCHLORIDE 5 MG/ML
10 INJECTION INTRAMUSCULAR; INTRAVENOUS
Status: DISCONTINUED | OUTPATIENT
Start: 2021-11-05 | End: 2021-11-05 | Stop reason: HOSPADM

## 2021-11-05 RX ORDER — MEPERIDINE HYDROCHLORIDE 25 MG/ML
12.5 INJECTION INTRAMUSCULAR; INTRAVENOUS; SUBCUTANEOUS EVERY 5 MIN PRN
Status: DISCONTINUED | OUTPATIENT
Start: 2021-11-05 | End: 2021-11-05 | Stop reason: HOSPADM

## 2021-11-05 RX ORDER — PHENYLEPHRINE HCL IN 0.9% NACL 1 MG/10 ML
SYRINGE (ML) INTRAVENOUS PRN
Status: DISCONTINUED | OUTPATIENT
Start: 2021-11-05 | End: 2021-11-05 | Stop reason: SDUPTHER

## 2021-11-05 RX ADMIN — HYDROMORPHONE HYDROCHLORIDE 1 MG: 1 INJECTION, SOLUTION INTRAMUSCULAR; INTRAVENOUS; SUBCUTANEOUS at 09:15

## 2021-11-05 RX ADMIN — OXYCODONE AND ACETAMINOPHEN 1 TABLET: 7.5; 325 TABLET ORAL at 05:00

## 2021-11-05 RX ADMIN — VANCOMYCIN 750 MG: 750 INJECTION, SOLUTION INTRAVENOUS at 15:29

## 2021-11-05 RX ADMIN — CEFEPIME 2000 MG: 2 INJECTION, POWDER, FOR SOLUTION INTRAVENOUS at 19:56

## 2021-11-05 RX ADMIN — ATORVASTATIN CALCIUM 40 MG: 40 TABLET, FILM COATED ORAL at 19:50

## 2021-11-05 RX ADMIN — OXYCODONE AND ACETAMINOPHEN 1 TABLET: 7.5; 325 TABLET ORAL at 21:54

## 2021-11-05 RX ADMIN — OXYCODONE AND ACETAMINOPHEN 1 TABLET: 7.5; 325 TABLET ORAL at 15:34

## 2021-11-05 RX ADMIN — LEVOTHYROXINE SODIUM 100 MCG: 25 TABLET ORAL at 05:01

## 2021-11-05 RX ADMIN — SODIUM CHLORIDE, PRESERVATIVE FREE 10 ML: 5 INJECTION INTRAVENOUS at 09:15

## 2021-11-05 RX ADMIN — VANCOMYCIN 750 MG: 750 INJECTION, SOLUTION INTRAVENOUS at 03:08

## 2021-11-05 RX ADMIN — HYDROMORPHONE HYDROCHLORIDE 1 MG: 1 INJECTION, SOLUTION INTRAMUSCULAR; INTRAVENOUS; SUBCUTANEOUS at 18:50

## 2021-11-05 RX ADMIN — GUAIFENESIN 1200 MG: 600 TABLET, EXTENDED RELEASE ORAL at 19:50

## 2021-11-05 RX ADMIN — INSULIN GLARGINE 14 UNITS: 100 INJECTION, SOLUTION SUBCUTANEOUS at 09:22

## 2021-11-05 RX ADMIN — SODIUM CHLORIDE, PRESERVATIVE FREE 10 ML: 5 INJECTION INTRAVENOUS at 23:10

## 2021-11-05 RX ADMIN — CEFEPIME 2000 MG: 2 INJECTION, POWDER, FOR SOLUTION INTRAVENOUS at 10:49

## 2021-11-05 RX ADMIN — INSULIN GLARGINE 14 UNITS: 100 INJECTION, SOLUTION SUBCUTANEOUS at 19:50

## 2021-11-05 RX ADMIN — GABAPENTIN 600 MG: 300 CAPSULE ORAL at 19:50

## 2021-11-05 ASSESSMENT — PULMONARY FUNCTION TESTS
PIF_VALUE: 0
PIF_VALUE: 1
PIF_VALUE: 0
PIF_VALUE: 1
PIF_VALUE: 0
PIF_VALUE: 1

## 2021-11-05 ASSESSMENT — PAIN SCALES - GENERAL
PAINLEVEL_OUTOF10: 0
PAINLEVEL_OUTOF10: 0
PAINLEVEL_OUTOF10: 9
PAINLEVEL_OUTOF10: 8
PAINLEVEL_OUTOF10: 9
PAINLEVEL_OUTOF10: 8
PAINLEVEL_OUTOF10: 9
PAINLEVEL_OUTOF10: 9

## 2021-11-05 ASSESSMENT — ENCOUNTER SYMPTOMS
CONSTIPATION: 0
PHOTOPHOBIA: 0
ANAL BLEEDING: 0
SORE THROAT: 0
STRIDOR: 0
EYE REDNESS: 0
COLOR CHANGE: 0
RECTAL PAIN: 0
APNEA: 0
BACK PAIN: 0
CHOKING: 0
EYE ITCHING: 0

## 2021-11-05 ASSESSMENT — PAIN DESCRIPTION - ORIENTATION: ORIENTATION: RIGHT

## 2021-11-05 ASSESSMENT — PAIN DESCRIPTION - PAIN TYPE: TYPE: ACUTE PAIN

## 2021-11-05 ASSESSMENT — PAIN DESCRIPTION - LOCATION: LOCATION: LEG;FOOT

## 2021-11-05 NOTE — PROGRESS NOTES
Attending Progress Note      PCP: Kailash Daniels MD      Patient: Jaquelin Sewell   Gender: male  : 1960   Age: 57 y.o.   MRN: [id number]  Room  : [address] [place] [address]      Date of Admission: 10/31/2021    Chief Complaint   Patient presents with    Toe Pain     right great toe pain    Leg Pain     right leg pain           Subjective: less leg pain, more foot pain         Medications:  Reviewed  Infusion Medications    sodium chloride 25 mL (21 1302)    dextrose       Scheduled Medications    vancomycin  750 mg IntraVENous Q12H    cefepime  2,000 mg IntraVENous Q12H    atorvastatin  40 mg Oral Nightly    clopidogrel  75 mg Oral Daily    gabapentin  600 mg Oral TID    levothyroxine  100 mcg Oral Daily    nicotine  1 patch TransDERmal Daily    sodium chloride flush  5-40 mL IntraVENous 2 times per day    insulin lispro  0-12 Units SubCUTAneous TID WC    insulin lispro  0-6 Units SubCUTAneous Nightly    insulin glargine  14 Units SubCUTAneous BID    insulin lispro  6 Units SubCUTAneous TID WC    guaiFENesin  1,200 mg Oral BID    vitamin D  50,000 Units Oral Weekly    sodium chloride flush  5-40 mL IntraVENous BID     PRN Meds: oxyCODONE-acetaminophen, acetaminophen, sodium chloride flush, sodium chloride, ondansetron **OR** ondansetron, polyethylene glycol, acetaminophen **OR** acetaminophen, glucose, dextrose, glucagon (rDNA), dextrose, zolpidem, guaiFENesin-dextromethorphan, benzonatate, calcium carbonate, lactulose, loperamide      Intake/Output Summary (Last 24 hours) at 2021 0107  Last data filed at 2021 1103  Gross per 24 hour   Intake 355.21 ml   Output --   Net 355.21 ml       Exam:  /64   Pulse 76   Temp 99.1 °F (37.3 °C) (Oral)   Resp 16   Ht 5' 7.5\" (1.715 m)   Wt 130 lb 8.2 oz (59.2 kg)   SpO2 100%   BMI 20.14 kg/m²   General appearance: No distress,   Respiratory:  good air entry , no Rales , No wheezing, or rhonchi,  Cardiovascular: RRR, with normal S1/S2 . Abdomen : Soft, non-tender, non-distended  , normal bowel sounds. Legs : No edema bilaterally. No DVT signs ,    Neurologic:  Alert and oriented ,        Labs:   Recent Labs     11/02/21  0632   WBC 6.7   HGB 11.4*   HCT 32.8*   *     Recent Labs     11/02/21  0632      K 4.3      CO2 25   BUN 19   CREATININE 0.9   CALCIUM 8.3     No results for input(s): AST, ALT, BILIDIR, BILITOT, ALKPHOS in the last 72 hours. No results for input(s): INR in the last 72 hours. No results for input(s): Rena Gazella in the last 72 hours. Assessment/Plan:  Active Hospital Problems    Diagnosis Date Noted    Toe ulcer [facility]) [M77.476] 10/31/2021    PAD (peripheral artery disease) ([facility]) [I73.9]     Uncontrolled type II diabetes with peripheral autonomic neuropathy ([facility]) [E11.43, E11.65]    DM II     Plan:  Tele : no event - no fever - on room air   Angio done , dilation /stent . Sander Linda off  heparin drip    Pain control  surg consult : not avaiable. . foor MRI  . Sander Linda d/w pt , possible transfer to different hospital if needed   Glucose control\" further increase to lantus and insulin   DVT Prophylaxis   Diet: Diet NPO  Code Status: Full Code          Treatment progress and plan was d/w pt/family .     Jeff Garvey MD

## 2021-11-05 NOTE — PROGRESS NOTES
1358: Arrived to PACU from OR. Monitors applied, alarms on. Report obtained from [facility] and [employer]. 1410: Turned and repositioned in bed, warm blankets on, right lower leg elevated on pillow. 1420: Dr. Rodger Ziegler aware accu check 48. Medicated with 50% Dextrose 6.25g IV. Ice chips given per patient request.  6160: Transported to room 1113.

## 2021-11-05 NOTE — PROGRESS NOTES
Comprehensive Nutrition Assessment    Type and Reason for Visit:  Initial (length of stay)    Nutrition Recommendations/Plan:   Resume carb controlled (60 g/meal) with cardiac diet   Will continue to follow up during stay, monitor intake and diet ed needs     Nutrition Assessment:  Admit with toe pain, hx PAD, DM. NPO today for surgery, toe amputation. Has been on carb controlled, cardiac diet during stay, limited po data. Off unit for procedure on visit. Will follow at moderate nutrition risk at this time. Malnutrition Assessment:  Malnutrition Status:  Insufficient data    Context:  Chronic Illness       Estimated Daily Nutrient Needs:  Energy (kcal):  5263-8549 (25-30 pramod/kg); Weight Used for Energy Requirements:  Current     Protein (g):  59-71 (1-1.2 g/kg); Weight Used for Protein Requirements:  Current        Fluid (ml/day):  1800; Method Used for Fluid Requirements:  1 ml/kcal      Nutrition Related Findings:  out of room/off unit on visit, hx DM with poor management/compliance, polysubstance abuse      Wounds:  Surgical Incision (necrotic right toe-plan amputation)       Current Nutrition Therapies:    Diet NPO    Anthropometric Measures:  · Height: 5' 7.5\" (171.5 cm)  · Current Body Weight: 130 lb 8.2 oz (59.2 kg)   · Admission Body Weight: 145 lb 1 oz (65.8 kg)    · Usual Body Weight:  (n/a)     · Ideal Body Weight: 151 lbs; % Ideal Body Weight 86.4 %   · BMI: 20.1  · Adjusted Body Weight:  ; No Adjustment   · BMI Categories: Normal Weight (BMI 18.5-24. 9)       Nutrition Diagnosis:   · Altered nutrition-related lab values related to endocrine dysfuntion as evidenced by lab values      Nutrition Interventions:   Food and/or Nutrient Delivery:  Start Oral Diet  Nutrition Education/Counseling:  No recommendation at this time   Coordination of Nutrition Care:  Continue to monitor while inpatient    Goals:  Patient will consume at least 75% of meals, comply with carb controlled diet       Nutrition Monitoring and Evaluation:   Behavioral-Environmental Outcomes:  None Identified   Food/Nutrient Intake Outcomes:  Diet Advancement/Tolerance  Physical Signs/Symptoms Outcomes:  Biochemical Data, Meal Time Behavior, Skin, Weight     Discharge Planning:    Recommend pursue outpatient diabetes education     Electronically signed by Choco Granado RD, NICKIE on 11/5/21 at 12:38 PM EDT    Contact: [phone]

## 2021-11-05 NOTE — CONSULTS
[address] [facility]  consulted by Dr. Ariana Wharton for monitoring and adjustment. Indication for treatment: SSTI, Foot infection  Goal trough: 10-15 mcg/mL    Pertinent Laboratory Values:   Temp Readings from Last 3 Encounters:   11/05/21 98.6 °F (37 °C) (Oral)   11/05/21 96.8 °F (36 °C)   05/20/20 98.2 °F (36.8 °C) (Infrared)     No results for input(s): WBC, LACTATE in the last 72 hours. No results for input(s): BUN, CREATININE in the last 72 hours. Estimated Creatinine Clearance: 71 mL/min (based on SCr of 0.9 mg/dL). Intake/Output Summary (Last 24 hours) at 11/5/2021 1609  Last data filed at 11/5/2021 1430  Gross per 24 hour   Intake 300 ml   Output 1 ml   Net 299 ml       Pertinent Cultures:  Date    Source    Results  N/A                    Vancomycin level:   TROUGH:    Recent Labs     11/04/21  1250   VANCOTROUGH 20.1*     RANDOM:  No results for input(s): VANCORANDOM in the last 72 hours.     Assessment:  · WBC and temperature: WBC normal, pt afebrile  · SCr, BUN, and urine output: Renal trends previously WNL, stable  · Day(s) of therapy:  6  · Vancomycin concentration:   · 11/1: 14.2, therapeutic  · 11/04:  20.1, supra-therapeutic    Plan:  · Continue vancomycin 750mg ivpb q12h  · Predicted AUC: 497, Predicted Trough: 16  · Repeat vancomycin level tomorrow  · Repeat renal labs tomorrow AM  · Pharmacy will continue to monitor patient and adjust therapy as indicated    Next VANCOMYCIN CONCENTRATION SCHEDULED FOR 11/6 @ 1200    Thank you for the consult,  Preethi Bourgeois, [facility]  11/5/2021 4:09 PM
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
## 2021-11-05 NOTE — BRIEF OP NOTE
Brief Postoperative Note      Patient: Siobhan Higuera  YOB: 1960  MRN: [id number]    Date of Procedure: 11/5/2021    Pre-Op Diagnosis: PAD    Post-Op Diagnosis: Same, Osteomyelitis of right great toe       Procedure(s):  RIGHT GREAT TOE AMPUTATION    Surgeon(s):  Jaleesa Hernández MD    Assistant:  First Assistant: Savannah Parham PA-C    Anesthesia: General    Estimated Blood Loss (mL): Minimal    Complications: None    Specimens:   ID Type Source Tests Collected by Time Destination   A : Right great toe Specimen Toe SURGICAL PATHOLOGY Jaleesa Hernández MD 11/5/2021 1333        Implants:  * No implants in log *      Drains: * No LDAs found *    Findings: As Above    Electronically signed by Savannah Parham PA-C on 11/5/2021 at 2:04 PM

## 2021-11-05 NOTE — PLAN OF CARE
Nutrition Problem #1: Altered nutrition-related lab values  Intervention: Food and/or Nutrient Delivery: Start Oral Diet  Nutritional Goals: Patient will consume at least 75% of meals, comply with carb controlled diet

## 2021-11-05 NOTE — CARE COORDINATION
CM reviewed chart and notes a possible right toe amputation today. CM spoke with patient who states he had his pinky toe removed on the same foot and so he is familiar with the process. He states he had [address] last time but does not think he will need it this time. He states he has good support from his wife and daughter and the only thing he will need is supplies. He can receive a prescription for supplies at discharge and get it from his pharmacy. Please update Cm if plans change.

## 2021-11-06 LAB
ALBUMIN SERPL-MCNC: 3.2 GM/DL (ref 3.4–5)
ANION GAP SERPL CALCULATED.3IONS-SCNC: 11 MMOL/L (ref 4–16)
APTT: 34.1 SECONDS (ref 25.1–37.1)
BUN BLDV-MCNC: 22 MG/DL (ref 6–23)
CALCIUM SERPL-MCNC: 8.3 MG/DL (ref 8.3–10.6)
CHLORIDE BLD-SCNC: 105 MMOL/L (ref 99–110)
CO2: 23 MMOL/L (ref 21–32)
CREAT SERPL-MCNC: 0.8 MG/DL (ref 0.9–1.3)
GFR AFRICAN AMERICAN: >60 ML/MIN/1.73M2
GFR NON-AFRICAN AMERICAN: >60 ML/MIN/1.73M2
GLUCOSE BLD-MCNC: 127 MG/DL (ref 70–99)
GLUCOSE BLD-MCNC: 155 MG/DL (ref 70–99)
GLUCOSE BLD-MCNC: 161 MG/DL (ref 70–99)
GLUCOSE BLD-MCNC: 251 MG/DL (ref 70–99)
GLUCOSE BLD-MCNC: 85 MG/DL (ref 70–99)
PHOSPHORUS: 4.1 MG/DL (ref 2.5–4.9)
POTASSIUM SERPL-SCNC: 4.4 MMOL/L (ref 3.5–5.1)
SODIUM BLD-SCNC: 139 MMOL/L (ref 135–145)

## 2021-11-06 PROCEDURE — 36415 COLL VENOUS BLD VENIPUNCTURE: CPT

## 2021-11-06 PROCEDURE — 6370000000 HC RX 637 (ALT 250 FOR IP): Performed by: PHYSICIAN ASSISTANT

## 2021-11-06 PROCEDURE — 6360000002 HC RX W HCPCS: Performed by: PHYSICIAN ASSISTANT

## 2021-11-06 PROCEDURE — 6360000002 HC RX W HCPCS: Performed by: FAMILY MEDICINE

## 2021-11-06 PROCEDURE — 1200000000 HC SEMI PRIVATE

## 2021-11-06 PROCEDURE — 2580000003 HC RX 258: Performed by: PHYSICIAN ASSISTANT

## 2021-11-06 PROCEDURE — 99024 POSTOP FOLLOW-UP VISIT: CPT | Performed by: SURGERY

## 2021-11-06 PROCEDURE — 82962 GLUCOSE BLOOD TEST: CPT

## 2021-11-06 PROCEDURE — 94761 N-INVAS EAR/PLS OXIMETRY MLT: CPT

## 2021-11-06 PROCEDURE — 85730 THROMBOPLASTIN TIME PARTIAL: CPT

## 2021-11-06 PROCEDURE — 80069 RENAL FUNCTION PANEL: CPT

## 2021-11-06 RX ADMIN — HYDROMORPHONE HYDROCHLORIDE 1 MG: 1 INJECTION, SOLUTION INTRAMUSCULAR; INTRAVENOUS; SUBCUTANEOUS at 00:45

## 2021-11-06 RX ADMIN — GABAPENTIN 600 MG: 300 CAPSULE ORAL at 20:58

## 2021-11-06 RX ADMIN — LEVOTHYROXINE SODIUM 100 MCG: 25 TABLET ORAL at 06:55

## 2021-11-06 RX ADMIN — ATORVASTATIN CALCIUM 40 MG: 40 TABLET, FILM COATED ORAL at 20:58

## 2021-11-06 RX ADMIN — GUAIFENESIN 1200 MG: 600 TABLET, EXTENDED RELEASE ORAL at 09:40

## 2021-11-06 RX ADMIN — SODIUM CHLORIDE, PRESERVATIVE FREE 10 ML: 5 INJECTION INTRAVENOUS at 11:15

## 2021-11-06 RX ADMIN — LACTULOSE 20 G: 10 SOLUTION ORAL at 09:42

## 2021-11-06 RX ADMIN — OXYCODONE AND ACETAMINOPHEN 1 TABLET: 7.5; 325 TABLET ORAL at 11:13

## 2021-11-06 RX ADMIN — CLOPIDOGREL BISULFATE 75 MG: 75 TABLET, FILM COATED ORAL at 09:40

## 2021-11-06 RX ADMIN — DEXTROSE MONOHYDRATE 100 ML/HR: 5 INJECTION INTRAVENOUS at 09:32

## 2021-11-06 RX ADMIN — HYDROMORPHONE HYDROCHLORIDE 1 MG: 1 INJECTION, SOLUTION INTRAMUSCULAR; INTRAVENOUS; SUBCUTANEOUS at 13:02

## 2021-11-06 RX ADMIN — CEFEPIME 2000 MG: 2 INJECTION, POWDER, FOR SOLUTION INTRAVENOUS at 21:11

## 2021-11-06 RX ADMIN — INSULIN GLARGINE 14 UNITS: 100 INJECTION, SOLUTION SUBCUTANEOUS at 20:59

## 2021-11-06 RX ADMIN — CEFEPIME 2000 MG: 2 INJECTION, POWDER, FOR SOLUTION INTRAVENOUS at 08:00

## 2021-11-06 RX ADMIN — INSULIN GLARGINE 14 UNITS: 100 INJECTION, SOLUTION SUBCUTANEOUS at 09:42

## 2021-11-06 RX ADMIN — OXYCODONE AND ACETAMINOPHEN 1 TABLET: 7.5; 325 TABLET ORAL at 23:00

## 2021-11-06 RX ADMIN — VANCOMYCIN 750 MG: 750 INJECTION, SOLUTION INTRAVENOUS at 00:53

## 2021-11-06 RX ADMIN — OXYCODONE AND ACETAMINOPHEN 1 TABLET: 7.5; 325 TABLET ORAL at 18:58

## 2021-11-06 RX ADMIN — HYDROMORPHONE HYDROCHLORIDE 1 MG: 1 INJECTION, SOLUTION INTRAMUSCULAR; INTRAVENOUS; SUBCUTANEOUS at 07:03

## 2021-11-06 RX ADMIN — VANCOMYCIN 750 MG: 750 INJECTION, SOLUTION INTRAVENOUS at 13:09

## 2021-11-06 RX ADMIN — DEXTROSE MONOHYDRATE 100 ML/HR: 5 INJECTION INTRAVENOUS at 21:07

## 2021-11-06 RX ADMIN — GUAIFENESIN 1200 MG: 600 TABLET, EXTENDED RELEASE ORAL at 20:56

## 2021-11-06 RX ADMIN — GABAPENTIN 600 MG: 300 CAPSULE ORAL at 13:03

## 2021-11-06 RX ADMIN — ENOXAPARIN SODIUM 40 MG: 100 INJECTION SUBCUTANEOUS at 20:58

## 2021-11-06 RX ADMIN — OXYCODONE AND ACETAMINOPHEN 1 TABLET: 7.5; 325 TABLET ORAL at 04:10

## 2021-11-06 RX ADMIN — GABAPENTIN 600 MG: 300 CAPSULE ORAL at 09:40

## 2021-11-06 ASSESSMENT — PAIN SCALES - GENERAL
PAINLEVEL_OUTOF10: 10
PAINLEVEL_OUTOF10: 8
PAINLEVEL_OUTOF10: 9
PAINLEVEL_OUTOF10: 5
PAINLEVEL_OUTOF10: 8
PAINLEVEL_OUTOF10: 9

## 2021-11-06 ASSESSMENT — PAIN DESCRIPTION - PAIN TYPE: TYPE: SURGICAL PAIN

## 2021-11-06 NOTE — CONSULTS
[facility]  consulted by Dr. Ata Giraldo for monitoring and adjustment. Indication for treatment: SSTI, Foot infection  Goal trough: 10-15 mcg/mL    Pertinent Laboratory Values:   Temp Readings from Last 3 Encounters:   11/06/21 98.9 °F (37.2 °C) (Oral)   11/05/21 96.8 °F (36 °C)   05/20/20 98.2 °F (36.8 °C) (Infrared)     No results for input(s): WBC, LACTATE in the last 72 hours. Recent Labs     11/06/21  0349   BUN 22   CREATININE 0.8*     Estimated Creatinine Clearance: 87 mL/min (A) (based on SCr of 0.8 mg/dL (L)). Intake/Output Summary (Last 24 hours) at 11/6/2021 1418  Last data filed at 11/6/2021 0405  Gross per 24 hour   Intake 300 ml   Output 450 ml   Net -150 ml       Pertinent Cultures:  Date    Source    Results  N/A                    Vancomycin level:   TROUGH:    Recent Labs     11/04/21  1250   VANCOTROUGH 20.1*     RANDOM:  No results for input(s): VANCORANDOM in the last 72 hours.     Assessment:  · WBC and temperature: WBC normal, pt afebrile  · SCr, BUN, and urine output: Renal trends previously WNL, stable  · Day(s) of therapy:  7  · Vancomycin concentration:   · 11/1: 14.2, therapeutic  · 11/04:  20.1, supra-therapeutic  · 11/06: not yet resulted, drawn while infusing    Plan:  · Continue vancomycin 750mg ivpb q12h  · Predicted AUC: 497, Predicted Trough: 16  · Level ordered today was drawn while infusing, will be falsely elevated  · Repeat a true trough prior to dose tomorrow afternoon  · Pharmacy will continue to monitor patient and adjust therapy as indicated    Next [address] 11/7 @ 1200    Thank you for the consult,  NIKKI Christiansen [facility]  11/6/2021 2:18 PM

## 2021-11-06 NOTE — PROGRESS NOTES
INTERNAL MEDICINE PROGRESS NOTE        Kaushal Gilbert   1960   Primary Care Physician:  Rudi Frankel MD  Admit Date: 10/31/2021     Subjective:   Patient is feeling better this am. Pain is in adequate control. No nausea,v omiting  S/p amputation of toe on 11/6/21      Objective:   /60   Pulse 73   Temp 98.9 °F (37.2 °C) (Oral)   Resp 16   Ht 5' 7.5\" (1.715 m)   Wt 142 lb 6.7 oz (64.6 kg)   SpO2 98%   BMI 21.98 kg/m²    General appearance: alert, appears stated age and cooperative  Head: Normocephalic, without obvious abnormality, atraumatic  Neck: no adenopathy and supple, symmetrical, trachea midline  Lungs: clear to auscultation bilaterally  Heart: regular rate and rhythm and S1, S2 normal  Abdomen: soft, non-tender; bowel sounds normal; no masses,  no organomegaly  Extremities: no clubbing or cyanosis. Dressing present.    Neurologic: Grossly normal    Data Review  Lab Results   Component Value Date     11/06/2021    K 4.4 11/06/2021     11/06/2021    CO2 23 11/06/2021    CREATININE 0.8 (L) 11/06/2021    BUN 22 11/06/2021    CALCIUM 8.3 11/06/2021     Lab Results   Component Value Date    WBC 6.7 11/02/2021    HGB 11.4 (L) 11/02/2021    HCT 32.8 (L) 11/02/2021    MCV 88.2 11/02/2021     (L) 11/02/2021     INR/Prothrombin Time      Meds:    enoxaparin  40 mg SubCUTAneous Daily    vancomycin  750 mg IntraVENous Q12H    cefepime  2,000 mg IntraVENous Q12H    atorvastatin  40 mg Oral Nightly    clopidogrel  75 mg Oral Daily    gabapentin  600 mg Oral TID    levothyroxine  100 mcg Oral Daily    nicotine  1 patch TransDERmal Daily    sodium chloride flush  5-40 mL IntraVENous 2 times per day    insulin lispro  0-12 Units SubCUTAneous TID WC    insulin lispro  0-6 Units SubCUTAneous Nightly    insulin glargine  14 Units SubCUTAneous BID    insulin lispro  6 Units SubCUTAneous TID     guaiFENesin  1,200 mg Oral BID    vitamin D  50,000 Units Oral Weekly    sodium chloride flush  5-40 mL IntraVENous BID     PRN Meds: HYDROmorphone, dextrose, oxyCODONE-acetaminophen, sodium chloride flush, sodium chloride, ondansetron **OR** ondansetron, polyethylene glycol, acetaminophen **OR** acetaminophen, glucose, glucagon (rDNA), dextrose, zolpidem, guaiFENesin-dextromethorphan, benzonatate, calcium carbonate, lactulose, loperamide    Assessment/Plan:   Patient Active Hospital Problem List:  Patient Active Problem List   Diagnosis    Diabetes mellitus    H/O echocardiogram    S/P CABG (coronary artery bypass graft)    Chest pain    Cellulitis    Heroin withdrawal ([address]    CAD (coronary artery disease)    Polysubstance abuse ([address]    Small bowel obstruction (HCC)    [facility]arcotic abuse[facility] continuous ([facility]    Hx of hepatitis    Epigastric pain    Diarrhea    Constipation with Ileus    Vomiting of fecal matter with nausea    Encephalopathy    Metabolic encephalopathy    Infectious gastroenteritis    Bilateral leg weakness    Gait disturbance    Left carotid stenosis    Hypothyroidism    Osteomyelitis (HCC)    Uncontrolled type II diabetes with peripheral autonomic neuropathy (HCC)    Gangrene of toe (HCC)    Acute hematogenous osteomyelitis of right foot (HCC)    Amputation of little toe (HCC)    PAD (peripheral artery disease) (HCC)    Uncontrolled pain    Generalized weakness    Simple chronic bronchitis (HCC)    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 (HCC)    Skin ulcer with necrosis of muscle (HCC)    Toe ulcer ([address]   amputation of the toe: right  DM  Hypertension  PVD  Hypothyroidism        Plan:   Pain control    Restarted anticoagulant   Continue present treatment. Continue antibiotics   Continue lantus and SSI    Monitor condition closely.

## 2021-11-06 NOTE — OP NOTE
Procedure Note:    Patient ID:  Yolanda Ibrahim  [id number]  58 y.o.  1960    Indications: Ischemic right great toe    Pre-operative Diagnosis: Ischemic right great toe    Post-operative Diagnosis: same    Procedure:  Amputation of right great toe    Surgeon: Manoj Shook MD , MD    First Assistant: Radha Lynn PA-C  The  Use of a first assistant was necessary for the proper positioning, prepping, and draping of the patient, as well as the safe and expeditious execution of the case and closure of skin and subcutaneous tissues. Findings:  same    Estimated Blood Loss:  Minimal           Total IV Fluids: 500 ml            Complications:  None; patient tolerated the procedure well. Disposition: PACU - hemodynamically stable. Condition: stable    Procedure Details: The patient was seen again in the Holding Room. The risks, benefits, complications, treatment options, and expected outcomes were discussed with the patient. The possibilities of reaction to medication, pulmonary aspiration, bleeding, recurrent infection, the need for additional procedures, and development of a complication requiring transfusion or further operation were discussed with the patient and/or family. There was concurrence with the proposed plan, and informed consent was obtained. The site of surgery was properly noted/marked. The patient was taken to the Operating Room, identified as Yolanda Ibrahim, and the procedure verified. A Time Out was held and the above information confirmed. The patient was brought to the operating room and   placed supine. After the induction of anesthesia, the right foot was prepped   and draped in the usual sterile fashion.  Then, 0.25% Marcaine was   infiltrated around the base of the first toe and a fish-mouth type of   incision was made at the proximal interphalangeal joint of the toe circumferentially, and the incision was deepened through subcutaneous tissue using Bovie electrocautery with good   hemostasis. The bone was isolated and circumferentially dissected using the   periosteal elevator. The bone was cut at the interphalangeal joint and the   wound was hemostatic using Bovie electrocautery. The specimen was sent to   Pathology. Using 3-0 Vicryl, the fascial plane was closed covering the bony stump and 4-0 nylon was used to approximate the skin in interrupted fashion. Fluffs and Kerlix   dressings were applied. The patient was subsequently transferred to the   recovery room in stable condition. Instrument and lap counts were correct at the end of the case.        Neo Loera MD

## 2021-11-06 NOTE — PROGRESS NOTES
GENERAL SURGERY INPATIENT POST-OP NOTE  [facility]) Phys[facility]    PATIENT: Jaquelin Sewell, 57 y.o., male, MRN: [id number]    Hospital Day:  LOS: 5 days , Post-Op Day: 1 Day Post-Op    Procedure(s): 21 (Dr. Les Albert) right great toe amputation    Jaquelin Sewell is a 57 y.o. male who presented with osteomyelitis of the right great toe             Subjective:  Chief Complaint: foot sore  Pain: controlled  BM:    Diet: ADULT DIET; Regular; 3 carb choices (45 gm/meal)  Activity: as tolerated    He is feeling well overall since surgery. Pain is controlled. He has been getting up to the bathroom. Tolerating a diet.      Objective:    Vitals: /60   Pulse 73   Temp 98.9 °F (37.2 °C) (Oral)   Resp 16   Ht 5' 7.5\" (1.715 m)   Wt 142 lb 6.7 oz (64.6 kg)   SpO2 98%   BMI 21.98 kg/m²   Vital Signs (Last 24 Hours)  Temp  Av °F (36.7 °C)  Min: 96.8 °F (36 °C)  Max: 98.9 °F (37.2 °C)  Pulse  Av.1  Min: 65  Max: 76  BP  Min: 90/54  Max: 150/73  Resp  Avg: 15.5  Min: 12  Max: 18  SpO2  Av.6 %  Min: 98 %  Max: 100 %  Wt Readings from Last 3 Encounters:   21 142 lb 6.7 oz (64.6 kg)   20 142 lb 6.4 oz (64.6 kg)   20 136 lb 4.8 oz (61.8 kg)       I/O:  0701 -  0700  In: 300 [I.V.:300]  Out: 451 [Urine:450]    IV Fluids: sodium chloride Last Rate: 300 mL/hr at 21 1352    dextrose Last Rate: 100 mL/hr (21 0932)    Scheduled Meds:   enoxaparin, 40 mg, SubCUTAneous, Daily    vancomycin, 750 mg, IntraVENous, Q12H    cefepime, 2,000 mg, IntraVENous, Q12H    atorvastatin, 40 mg, Oral, Nightly    clopidogrel, 75 mg, Oral, Daily    gabapentin, 600 mg, Oral, TID    levothyroxine, 100 mcg, Oral, Daily    nicotine, 1 patch, TransDERmal, Daily    sodium chloride flush, 5-40 mL, IntraVENous, 2 times per day    insulin lispro, 0-12 Units, SubCUTAneous, TID WC    insulin lispro, 0-6 Units, SubCUTAneous, Nightly    insulin glargine, 14 Units, SubCUTAneous, BID    insulin lispro, 6 Units, SubCUTAneous, TID     guaiFENesin, 1,200 mg, Oral, BID    vitamin D, 50,000 Units, Oral, Weekly    sodium chloride flush, 5-40 mL, IntraVENous, BID    Physical Exam:  General Appearance:   Alert, cooperative, no distress    Head:   Normocephalic, atraumatic    Lungs:    Equal chest rise, respirations unlabored   Heart:   Regular rate and rhythm    Abdomen:    Soft, non-tender, no rebound or guarding    Extremities:  No cyanosis. Right foot with dressing C/D/I no strikethorugh.  Sutures intact        Labs/Imaging Results:   Recent Results (from the past 24 hour(s))   POCT Glucose    Collection Time: 11/05/21 11:34 AM   Result Value Ref Range    POC Glucose 117 (H) 70 - 99 MG/DL   POCT Glucose    Collection Time: 11/05/21  2:13 PM   Result Value Ref Range    POC Glucose 53 (L) 70 - 99 MG/DL   POCT Glucose    Collection Time: 11/05/21  2:31 PM   Result Value Ref Range    POC Glucose 114 (H) 70 - 99 MG/DL   POCT Glucose    Collection Time: 11/05/21  3:35 PM   Result Value Ref Range    POC Glucose 136 (H) 70 - 99 MG/DL   POCT Glucose    Collection Time: 11/05/21  7:49 PM   Result Value Ref Range    POC Glucose 168 (H) 70 - 99 MG/DL   APTT    Collection Time: 11/06/21  3:49 AM   Result Value Ref Range    aPTT 34.1 25.1 - 37.1 SECONDS   Renal function panel    Collection Time: 11/06/21  3:49 AM   Result Value Ref Range    Sodium 139 135 - 145 MMOL/L    Potassium 4.4 3.5 - 5.1 MMOL/L    Chloride 105 99 - 110 mMol/L    CO2 23 21 - 32 MMOL/L    Anion Gap 11 4 - 16    BUN 22 6 - 23 MG/DL    CREATININE 0.8 (L) 0.9 - 1.3 MG/DL    Glucose 155 (H) 70 - 99 MG/DL    Calcium 8.3 8.3 - 10.6 MG/DL    GFR Non-African American >60 >60 mL/min/1.73m2    GFR African American >60 >60 mL/min/1.73m2    Albumin 3.2 (L) 3.4 - 5.0 GM/DL    Phosphorus 4.1 2.5 - 4.9 MG/DL   POCT Glucose    Collection Time: 11/06/21  6:56 AM   Result Value Ref Range    POC Glucose 251 (H) 70 - 99 MG/DL   POCT Glucose    Collection Time: 11/06/21 11:08 AM   Result Value Ref Range    POC Glucose 127 (H) 70 - 99 MG/DL         Assessment:  Arnie Montes is a 57 y.o. male who is post-op day #1 Day Post-Op 11/5/21 (Dr. Daniel Paul) right great toe amputation . Principal Problem:    PAD (peripheral artery disease) ([facility])  Active Problems:    Uncontrolled type II diabetes with peripheral autonomic neuropathy (HCC)    Toe ulcer ([address]  Resolved Problems:    * No resolved hospital problems.  *      Plan:  · Surgical shoe when out of bed  · Start dressing changes POD2  · Continue symptomatic management for pain  · Ok to resume anticoagulation from a surgical standpoint  · Potentially home tomorrow, follow up with Dr. Daniel Paul in 1 week for postop check     Electronically signed: Gauri Inman MD 11/6/2021 11:33 AM
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
## 2021-11-06 NOTE — PROGRESS NOTES
11/06/21 0832   Oxygen Therapy/Pulse Ox   O2 Therapy Room air   O2 Device None (Room air)   Resp 16   SpO2 98 %   Pulse Oximeter Device Mode Intermittent   $Pulse Oximeter $Spot check (multiple/continuous)   Blood Gas  Performed?  No

## 2021-11-06 NOTE — PROGRESS NOTES
Attending Progress Note      PCP: Gatito Kimbrough MD      Patient: Jacquie Starr   Gender: male  : 1960   Age: 57 y.o. MRN: [id number]  Room  : [address] [place] [address]      Date of Admission: 10/31/2021    Chief Complaint   Patient presents with    Toe Pain     right great toe pain    Leg Pain     right leg pain           Subjective:  pain controlled .         Medications:  Reviewed  Infusion Medications    sodium chloride 300 mL/hr at 21 1352    dextrose       Scheduled Medications    vancomycin  750 mg IntraVENous Q12H    cefepime  2,000 mg IntraVENous Q12H    atorvastatin  40 mg Oral Nightly    clopidogrel  75 mg Oral Daily    gabapentin  600 mg Oral TID    levothyroxine  100 mcg Oral Daily    nicotine  1 patch TransDERmal Daily    sodium chloride flush  5-40 mL IntraVENous 2 times per day    insulin lispro  0-12 Units SubCUTAneous TID WC    insulin lispro  0-6 Units SubCUTAneous Nightly    insulin glargine  14 Units SubCUTAneous BID    insulin lispro  6 Units SubCUTAneous TID WC    guaiFENesin  1,200 mg Oral BID    vitamin D  50,000 Units Oral Weekly    sodium chloride flush  5-40 mL IntraVENous BID     PRN Meds: HYDROmorphone, dextrose, oxyCODONE-acetaminophen, acetaminophen, sodium chloride flush, sodium chloride, ondansetron **OR** ondansetron, polyethylene glycol, acetaminophen **OR** acetaminophen, glucose, glucagon (rDNA), dextrose, zolpidem, guaiFENesin-dextromethorphan, benzonatate, calcium carbonate, lactulose, loperamide      Intake/Output Summary (Last 24 hours) at 2021 0026  Last data filed at 2021 1430  Gross per 24 hour   Intake 300 ml   Output 1 ml   Net 299 ml       Exam:  BP (!) 120/58   Pulse 75   Temp 98 °F (36.7 °C) (Oral)   Resp 16   Ht 5' 7.5\" (1.715 m)   Wt 130 lb 8.2 oz (59.2 kg)   SpO2 98%   BMI 20.14 kg/m²   General appearance: No distress,   Respiratory:  good air entry , no Rales , No wheezing, or rhonchi,  Cardiovascular: RRR, with normal S1/S2 . Abdomen : Soft, non-tender, non-distended  , normal bowel sounds. Legs : No edema bilaterally. No DVT signs ,    Neurologic:  Alert and oriented ,        Labs:   No results for input(s): WBC, HGB, HCT, PLT in the last 72 hours. No results for input(s): NA, K, CL, CO2, BUN, CREATININE, CALCIUM, PHOS in the last 72 hours. Invalid input(s): MAGNES  No results for input(s): AST, ALT, BILIDIR, BILITOT, ALKPHOS in the last 72 hours. No results for input(s): INR in the last 72 hours. No results for input(s): Yahir Gey in the last 72 hours. Assessment/Plan:  Active Hospital Problems    Diagnosis Date Noted    Toe ulcer [facility]) [V03.422] 10/31/2021    PAD [facility]peripheral artery disease) ([facility]) [I73.9]     Uncontrolled type II diabetes with peripheral autonomic neuropathy ([facility]) [E11.43, E11.65]    DM II     Plan:  OR- today:  Amputation of right great toe  Tele : no event - no fever - on room air   Angio done , dilation /stent . Trula Blew Pain control  Glucose control\" further increase to lantus and insulin   DVT Prophylaxis   Diet: ADULT DIET; Regular; 3 carb choices (45 gm/meal)  Code Status: Full Code          Treatment progress and plan was d/w pt/family .     Danii Gregory MD

## 2021-11-06 NOTE — PLAN OF CARE
Problem: Falls - Risk of:  Goal: Will remain free from falls  Description: Will remain free from falls  Outcome: Ongoing  Goal: Absence of physical injury  Description: Absence of physical injury  Outcome: Ongoing     Problem: Pain:  Goal: Pain level will decrease  Description: Pain level will decrease  Outcome: Ongoing  Goal: Control of acute pain  Description: Control of acute pain  Outcome: Ongoing  Goal: Control of chronic pain  Description: Control of chronic pain  Outcome: Ongoing     Problem: Nutrition  Goal: Optimal nutrition therapy  Outcome: Ongoing  Goal: Understanding of nutritional guidelines  Outcome: Ongoing

## 2021-11-07 LAB
ALBUMIN SERPL-MCNC: 2.9 GM/DL (ref 3.4–5)
ANION GAP SERPL CALCULATED.3IONS-SCNC: 8 MMOL/L (ref 4–16)
APTT: 38.3 SECONDS (ref 25.1–37.1)
BUN BLDV-MCNC: 28 MG/DL (ref 6–23)
CALCIUM SERPL-MCNC: 7.8 MG/DL (ref 8.3–10.6)
CHLORIDE BLD-SCNC: 105 MMOL/L (ref 99–110)
CO2: 23 MMOL/L (ref 21–32)
CREAT SERPL-MCNC: 0.8 MG/DL (ref 0.9–1.3)
DOSE AMOUNT: NORMAL
DOSE TIME: NORMAL
GFR AFRICAN AMERICAN: >60 ML/MIN/1.73M2
GFR NON-AFRICAN AMERICAN: >60 ML/MIN/1.73M2
GLUCOSE BLD-MCNC: 136 MG/DL (ref 70–99)
GLUCOSE BLD-MCNC: 149 MG/DL (ref 70–99)
GLUCOSE BLD-MCNC: 152 MG/DL (ref 70–99)
GLUCOSE BLD-MCNC: 239 MG/DL (ref 70–99)
GLUCOSE BLD-MCNC: 64 MG/DL (ref 70–99)
PHOSPHORUS: 4.4 MG/DL (ref 2.5–4.9)
POTASSIUM SERPL-SCNC: 4.7 MMOL/L (ref 3.5–5.1)
SODIUM BLD-SCNC: 136 MMOL/L (ref 135–145)
VANCOMYCIN TROUGH: 17.6 UG/ML (ref 10–20)

## 2021-11-07 PROCEDURE — 94761 N-INVAS EAR/PLS OXIMETRY MLT: CPT

## 2021-11-07 PROCEDURE — 85730 THROMBOPLASTIN TIME PARTIAL: CPT

## 2021-11-07 PROCEDURE — 1200000000 HC SEMI PRIVATE

## 2021-11-07 PROCEDURE — 99024 POSTOP FOLLOW-UP VISIT: CPT | Performed by: SURGERY

## 2021-11-07 PROCEDURE — 6370000000 HC RX 637 (ALT 250 FOR IP): Performed by: PHYSICIAN ASSISTANT

## 2021-11-07 PROCEDURE — 2580000003 HC RX 258: Performed by: PHYSICIAN ASSISTANT

## 2021-11-07 PROCEDURE — 82962 GLUCOSE BLOOD TEST: CPT

## 2021-11-07 PROCEDURE — 80202 ASSAY OF VANCOMYCIN: CPT

## 2021-11-07 PROCEDURE — 36415 COLL VENOUS BLD VENIPUNCTURE: CPT

## 2021-11-07 PROCEDURE — 80069 RENAL FUNCTION PANEL: CPT

## 2021-11-07 PROCEDURE — 6360000002 HC RX W HCPCS: Performed by: PHYSICIAN ASSISTANT

## 2021-11-07 PROCEDURE — 6360000002 HC RX W HCPCS: Performed by: FAMILY MEDICINE

## 2021-11-07 RX ORDER — LACTULOSE 10 G/15ML
20 SOLUTION ORAL 2 TIMES DAILY PRN
Qty: 450 ML | Refills: 1 | Status: SHIPPED | OUTPATIENT
Start: 2021-11-07 | End: 2022-07-25

## 2021-11-07 RX ORDER — PEN NEEDLE, DIABETIC 30 GX5/16"
1 NEEDLE, DISPOSABLE MISCELLANEOUS DAILY
Qty: 100 EACH | Refills: 3 | Status: SHIPPED | OUTPATIENT
Start: 2021-11-07

## 2021-11-07 RX ORDER — POLYETHYLENE GLYCOL 3350 17 G/17G
17 POWDER, FOR SOLUTION ORAL DAILY PRN
Qty: 527 G | Refills: 1 | Status: SHIPPED | OUTPATIENT
Start: 2021-11-07 | End: 2021-12-07

## 2021-11-07 RX ORDER — OXYCODONE AND ACETAMINOPHEN 7.5; 325 MG/1; MG/1
1 TABLET ORAL EVERY 6 HOURS PRN
Qty: 12 TABLET | Refills: 0 | Status: SHIPPED | OUTPATIENT
Start: 2021-11-07 | End: 2021-11-10

## 2021-11-07 RX ORDER — CEPHALEXIN 500 MG/1
500 CAPSULE ORAL 2 TIMES DAILY
Qty: 14 CAPSULE | Refills: 0 | Status: SHIPPED | OUTPATIENT
Start: 2021-11-07 | End: 2021-11-14

## 2021-11-07 RX ORDER — DOXYCYCLINE HYCLATE 100 MG
100 TABLET ORAL 2 TIMES DAILY
Qty: 28 TABLET | Refills: 0 | Status: SHIPPED | OUTPATIENT
Start: 2021-11-07 | End: 2021-11-21

## 2021-11-07 RX ORDER — INSULIN ASPART 100 [IU]/ML
INJECTION, SOLUTION INTRAVENOUS; SUBCUTANEOUS
Qty: 5 PEN | Refills: 3 | Status: ON HOLD | OUTPATIENT
Start: 2021-11-07 | End: 2022-09-06 | Stop reason: SDUPTHER

## 2021-11-07 RX ORDER — INSULIN GLARGINE 100 [IU]/ML
INJECTION, SOLUTION SUBCUTANEOUS
Qty: 5 PEN | Refills: 3 | Status: ON HOLD | OUTPATIENT
Start: 2021-11-07 | End: 2022-09-06 | Stop reason: SDUPTHER

## 2021-11-07 RX ADMIN — HYDROMORPHONE HYDROCHLORIDE 1 MG: 1 INJECTION, SOLUTION INTRAMUSCULAR; INTRAVENOUS; SUBCUTANEOUS at 13:53

## 2021-11-07 RX ADMIN — GABAPENTIN 600 MG: 300 CAPSULE ORAL at 20:30

## 2021-11-07 RX ADMIN — HYDROMORPHONE HYDROCHLORIDE 1 MG: 1 INJECTION, SOLUTION INTRAMUSCULAR; INTRAVENOUS; SUBCUTANEOUS at 08:54

## 2021-11-07 RX ADMIN — POLYETHYLENE GLYCOL (3350) 17 G: 17 POWDER, FOR SOLUTION ORAL at 11:34

## 2021-11-07 RX ADMIN — HYDROMORPHONE HYDROCHLORIDE 1 MG: 1 INJECTION, SOLUTION INTRAMUSCULAR; INTRAVENOUS; SUBCUTANEOUS at 00:26

## 2021-11-07 RX ADMIN — INSULIN GLARGINE 14 UNITS: 100 INJECTION, SOLUTION SUBCUTANEOUS at 09:01

## 2021-11-07 RX ADMIN — CLOPIDOGREL BISULFATE 75 MG: 75 TABLET, FILM COATED ORAL at 08:54

## 2021-11-07 RX ADMIN — OXYCODONE AND ACETAMINOPHEN 1 TABLET: 7.5; 325 TABLET ORAL at 18:05

## 2021-11-07 RX ADMIN — ATORVASTATIN CALCIUM 40 MG: 40 TABLET, FILM COATED ORAL at 20:30

## 2021-11-07 RX ADMIN — OXYCODONE AND ACETAMINOPHEN 1 TABLET: 7.5; 325 TABLET ORAL at 06:10

## 2021-11-07 RX ADMIN — GUAIFENESIN 1200 MG: 600 TABLET, EXTENDED RELEASE ORAL at 20:30

## 2021-11-07 RX ADMIN — VANCOMYCIN 750 MG: 750 INJECTION, SOLUTION INTRAVENOUS at 01:30

## 2021-11-07 RX ADMIN — HYDROMORPHONE HYDROCHLORIDE 1 MG: 1 INJECTION, SOLUTION INTRAMUSCULAR; INTRAVENOUS; SUBCUTANEOUS at 20:31

## 2021-11-07 RX ADMIN — GUAIFENESIN 1200 MG: 600 TABLET, EXTENDED RELEASE ORAL at 08:54

## 2021-11-07 RX ADMIN — VANCOMYCIN 750 MG: 750 INJECTION, SOLUTION INTRAVENOUS at 13:58

## 2021-11-07 RX ADMIN — SODIUM CHLORIDE, PRESERVATIVE FREE 10 ML: 5 INJECTION INTRAVENOUS at 09:08

## 2021-11-07 RX ADMIN — ENOXAPARIN SODIUM 40 MG: 100 INJECTION SUBCUTANEOUS at 20:31

## 2021-11-07 RX ADMIN — CEFEPIME 2000 MG: 2 INJECTION, POWDER, FOR SOLUTION INTRAVENOUS at 20:11

## 2021-11-07 RX ADMIN — SODIUM CHLORIDE, PRESERVATIVE FREE 10 ML: 5 INJECTION INTRAVENOUS at 20:40

## 2021-11-07 RX ADMIN — GABAPENTIN 600 MG: 300 CAPSULE ORAL at 08:54

## 2021-11-07 RX ADMIN — HYDROMORPHONE HYDROCHLORIDE 1 MG: 1 INJECTION, SOLUTION INTRAMUSCULAR; INTRAVENOUS; SUBCUTANEOUS at 03:47

## 2021-11-07 RX ADMIN — LEVOTHYROXINE SODIUM 100 MCG: 25 TABLET ORAL at 06:10

## 2021-11-07 RX ADMIN — GABAPENTIN 600 MG: 300 CAPSULE ORAL at 13:53

## 2021-11-07 RX ADMIN — CEFEPIME 2000 MG: 2 INJECTION, POWDER, FOR SOLUTION INTRAVENOUS at 09:00

## 2021-11-07 ASSESSMENT — PAIN SCALES - GENERAL
PAINLEVEL_OUTOF10: 9
PAINLEVEL_OUTOF10: 10
PAINLEVEL_OUTOF10: 0
PAINLEVEL_OUTOF10: 9
PAINLEVEL_OUTOF10: 3
PAINLEVEL_OUTOF10: 9
PAINLEVEL_OUTOF10: 6

## 2021-11-07 ASSESSMENT — PAIN DESCRIPTION - PAIN TYPE
TYPE: SURGICAL PAIN
TYPE: SURGICAL PAIN

## 2021-11-07 NOTE — PROGRESS NOTES
INTERNAL MEDICINE PROGRESS NOTE        Arnie Montes   1960   Primary Care Physician:  Jeff Garvey MD  Admit Date: 10/31/2021     Subjective:   Patient is feeling better this am. Pain control is fair. Objective:   BP (!) 131/57   Pulse 69   Temp 98.8 °F (37.1 °C) (Oral)   Resp 16   Ht 5' 7.5\" (1.715 m)   Wt 142 lb 3.2 oz (64.5 kg)   SpO2 98%   BMI 21.94 kg/m²    General appearance: alert, appears stated age and cooperative  Head: Normocephalic, without obvious abnormality, atraumatic  Neck: no adenopathy and supple, symmetrical, trachea midline  Lungs: clear to auscultation bilaterally  Heart: regular rate and rhythm and S1, S2 normal  Abdomen: soft, non-tender; bowel sounds normal; no masses,  no organomegaly  Extremities: no clubbing or cyanosis. Dressing present.    Neurologic: Grossly normal    Data Review  Lab Results   Component Value Date     11/07/2021    K 4.7 11/07/2021     11/07/2021    CO2 23 11/07/2021    CREATININE 0.8 (L) 11/07/2021    BUN 28 (H) 11/07/2021    CALCIUM 7.8 (L) 11/07/2021     Lab Results   Component Value Date    WBC 6.7 11/02/2021    HGB 11.4 (L) 11/02/2021    HCT 32.8 (L) 11/02/2021    MCV 88.2 11/02/2021     (L) 11/02/2021     INR/Prothrombin Time      Meds:    enoxaparin  40 mg SubCUTAneous Daily    vancomycin  750 mg IntraVENous Q12H    cefepime  2,000 mg IntraVENous Q12H    atorvastatin  40 mg Oral Nightly    clopidogrel  75 mg Oral Daily    gabapentin  600 mg Oral TID    levothyroxine  100 mcg Oral Daily    nicotine  1 patch TransDERmal Daily    sodium chloride flush  5-40 mL IntraVENous 2 times per day    insulin lispro  0-12 Units SubCUTAneous TID WC    insulin lispro  0-6 Units SubCUTAneous Nightly    insulin glargine  14 Units SubCUTAneous BID    insulin lispro  6 Units SubCUTAneous TID WC    guaiFENesin  1,200 mg Oral BID    vitamin D  50,000 Units Oral Weekly    sodium chloride flush  5-40 mL IntraVENous BID     PRN Meds: HYDROmorphone, dextrose, oxyCODONE-acetaminophen, sodium chloride flush, sodium chloride, ondansetron **OR** ondansetron, polyethylene glycol, acetaminophen **OR** acetaminophen, glucose, glucagon (rDNA), dextrose, zolpidem, guaiFENesin-dextromethorphan, benzonatate, calcium carbonate, lactulose, loperamide    Assessment/Plan:   Patient Active Hospital Problem List:  Patient Active Problem List   Diagnosis    Diabetes mellitus    H/O echocardiogram    S/P CABG (coronary artery bypass graft)    Chest pain    Cellulitis    Heroin withdrawal ([address]    CAD (coronary artery disease)    Polysubstance abuse ([address]    Small bowel obstruction (HCC)    Narcotic abuse, continuous ([address]    Hx of hepatitis    Epigastric pain    Diarrhea    Constipation with Ileus    Vomiting of fecal matter with nausea    Encephalopathy    Metabolic encephalopathy    Infectious gastroenteritis    Bilateral leg weakness    Gait disturbance    Left carotid stenosis    Hypothyroidism    Osteomyelitis (HCC)    Uncontrolled type II diabetes with peripheral autonomic neuropathy (HCC)    Gangrene of toe (HCC)    Acute hematogenous osteomyelitis of right foot (HCC)    Amputation of little toe (HCC)    PAD (peripheral artery disease) (HCC)    Uncontrolled pain    Generalized weakness    Simple chronic bronchitis (HCC)    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 (HCC)    Skin ulcer with necrosis of muscle (HCC)    Toe ulcer ([address]   amputation of the toe: right  DM  Hypertension  PVD  Hypothyroidism        Plan:   Initial plan was to dischage patient home today, however patient does not have his glucometer, strips, lancets, insulin, or needles. He is not sure he can afford medications at this time. To better help this patient, we will have to make sure pt has adequate supply of appropriate medications. Pt seems to not understand the seriousness of his diabetes either.  He is willing to get medications as long as he can afford.

## 2021-11-07 NOTE — PROGRESS NOTES
Per Dr. Abida Scanlon this nurse redressed patient's right great toe surgical site dressing. Per her verbal order this nurse applied a xeroform dressing 4X4 gauze and wrapped it with Kerlix. This nurse semi wrapped it loosely due to patient's PAD to prevent from compromising circulation to the rest of the toes and foot. Educated patient to wear surgical shoe when getting up to prevent falling. Patient states aware and tolerated dressing change well.

## 2021-11-07 NOTE — PROGRESS NOTES
GENERAL SURGERY INPATIENT POST-OP NOTE  [facility]) Physicians    PATIENT: Jayden Nathan, 57 y.o., male, MRN: [id number]    Hospital Day:  LOS: 6 days , Post-Op Day: 2 Days Post-Op    Procedure(s): 21 (Dr. Cesario Vu) right great toe amputation    Jayden Nathan is a 57 y.o. male who presented with osteomyelitis of the right great toe             Subjective:  Chief Complaint: foot sore  Pain: controlled  BM:    Diet: ADULT DIET; Regular; 3 carb choices (45 gm/meal)  Activity: as tolerated    He is feeling well overall since surgery. Pain is controlled. He has been getting up to the bathroom. Tolerating a diet. Wants to go home soon but needs assistance with his diabetes medications.      Objective:    Vitals: BP (!) 131/57   Pulse 69   Temp 98.8 °F (37.1 °C) (Oral)   Resp 16   Ht 5' 7.5\" (1.715 m)   Wt 142 lb 3.2 oz (64.5 kg)   SpO2 98%   BMI 21.94 kg/m²   Vital Signs (Last 24 Hours)  Temp  Av °F (37.2 °C)  Min: 98.4 °F (36.9 °C)  Max: 99.8 °F (37.7 °C)  Pulse  Av.8  Min: 69  Max: 88  BP  Min: 127/59  Max: 141/50  Resp  Av.5  Min: 16  Max: 20  SpO2  Av %  Min: 98 %  Max: 100 %  Wt Readings from Last 3 Encounters:   21 142 lb 3.2 oz (64.5 kg)   20 142 lb 6.4 oz (64.6 kg)   20 136 lb 4.8 oz (61.8 kg)       I/O: 701 - 700  In: 360 [P.O.:360]  Out: -     IV Fluids: sodium chloride Last Rate: 300 mL/hr at 21 1352    dextrose Last Rate: 100 mL/hr (21 2107)    Scheduled Meds:   enoxaparin, 40 mg, SubCUTAneous, Daily    vancomycin, 750 mg, IntraVENous, Q12H    cefepime, 2,000 mg, IntraVENous, Q12H    atorvastatin, 40 mg, Oral, Nightly    clopidogrel, 75 mg, Oral, Daily    gabapentin, 600 mg, Oral, TID    levothyroxine, 100 mcg, Oral, Daily    nicotine, 1 patch, TransDERmal, Daily    sodium chloride flush, 5-40 mL, IntraVENous, 2 times per day    insulin lispro, 0-12 Units, SubCUTAneous, TID WC    insulin lispro, 0-6 Units, SubCUTAneous, Nightly    insulin glargine, 14 Units, SubCUTAneous, BID    insulin lispro, 6 Units, SubCUTAneous, TID WC    guaiFENesin, 1,200 mg, Oral, BID    vitamin D, 50,000 Units, Oral, Weekly    sodium chloride flush, 5-40 mL, IntraVENous, BID    Physical Exam:  General Appearance:   Alert, cooperative, no distress    Head:   Normocephalic, atraumatic    Lungs:    Equal chest rise, respirations unlabored   Heart:   Regular rate and rhythm    Abdomen:    Soft, non-tender, no rebound or guarding    Extremities:  No cyanosis. Right foot with dressing C/D/I no strikethorugh. Sutures intact. Some old bloody drainage but no active bleeding        Labs/Imaging Results:   Recent Results (from the past 24 hour(s))   POCT Glucose    Collection Time: 11/06/21  4:41 PM   Result Value Ref Range    POC Glucose 85 70 - 99 MG/DL   POCT Glucose    Collection Time: 11/06/21  8:44 PM   Result Value Ref Range    POC Glucose 161 (H) 70 - 99 MG/DL   APTT    Collection Time: 11/07/21  3:58 AM   Result Value Ref Range    aPTT 38.3 (H) 25.1 - 37.1 SECONDS   Renal function panel    Collection Time: 11/07/21  3:58 AM   Result Value Ref Range    Sodium 136 135 - 145 MMOL/L    Potassium 4.7 3.5 - 5.1 MMOL/L    Chloride 105 99 - 110 mMol/L    CO2 23 21 - 32 MMOL/L    Anion Gap 8 4 - 16    BUN 28 (H) 6 - 23 MG/DL    CREATININE 0.8 (L) 0.9 - 1.3 MG/DL    Glucose 149 (H) 70 - 99 MG/DL    Calcium 7.8 (L) 8.3 - 10.6 MG/DL    GFR Non-African American >60 >60 mL/min/1.73m2    GFR African American >60 >60 mL/min/1.73m2    Albumin 2.9 (L) 3.4 - 5.0 GM/DL    Phosphorus 4.4 2.5 - 4.9 MG/DL   POCT Glucose    Collection Time: 11/07/21  7:10 AM   Result Value Ref Range    POC Glucose 152 (H) 70 - 99 MG/DL   POCT Glucose    Collection Time: 11/07/21 11:11 AM   Result Value Ref Range    POC Glucose 64 (L) 70 - 99 MG/DL         Assessment:  Mary James is a 57 y.o. male who is post-op day #2 Days Post-Op 11/5/21 (Dr. Parul Vega) right great toe amputation . Principal Problem:    PAD (peripheral artery disease) ([facility])  Active Problems:    Uncontrolled type II diabetes with peripheral autonomic neuropathy (HCC)    Toe ulcer ([facility]  Resolved Problems:    * No resolved hospital problems.  *      Plan:  · Surgical shoe when out of bed  · Continue dressing changes  · Continue symptomatic management for pain  · Ok to resume anticoagulation from a surgical standpoint  · Wants to go home soon but needs assistance with his diabetes medications  · Potentially home tomorrow, follow up with Dr. Daniel Paul in 1 week for postop check     Electronically signed: Gauri Inman MD 11/7/2021 1:00 PM

## 2021-11-07 NOTE — CONSULTS
[address]  consulted by Dr. Elvira Dee for monitoring and adjustment. Indication for treatment: SSTI, Foot infection  Goal trough: 10-15 mcg/mL    Pertinent Laboratory Values:   Temp Readings from Last 3 Encounters:   11/07/21 98.4 °F (36.9 °C) (Oral)   11/05/21 96.8 °F (36 °C)   05/20/20 98.2 °F (36.8 °C) (Infrared)     No results for input(s): WBC, LACTATE in the last 72 hours. Recent Labs     11/06/21  0349 11/07/21  0358   BUN 22 28*   CREATININE 0.8* 0.8*     Estimated Creatinine Clearance: 87 mL/min (A) (based on SCr of 0.8 mg/dL (L)). Intake/Output Summary (Last 24 hours) at 11/7/2021 1535  Last data filed at 11/7/2021 0540  Gross per 24 hour   Intake 360 ml   Output --   Net 360 ml       Pertinent Cultures:  Date    Source    Results  N/A                    Vancomycin level:   TROUGH:    Recent Labs     11/07/21  1242   VANCOTROUGH 17.6     RANDOM:  No results for input(s): VANCORANDOM in the last 72 hours.     Assessment:  · WBC and temperature: WBC normal, afebrile  · SCr, BUN, and urine output:  · Stable, WNL  · Day(s) of therapy: 8  · Vancomycin concentration:   · 11/1: 14.2, therapeutic  · 11/04:  20.1, supra-therapeutic  · 11/06: level drawn while infusing  · 11/07: 17.6, 12h post-dose, , therapeutic    Plan:  · Continue vancomycin 750mg ivpb q12h  · Collected level: 17.6, , therapeutic  · Repeat next level in 3-5 days to monitor for accumulation  · Pharmacy will continue to monitor patient and adjust therapy as indicated    Thank you for the consult,  Denilson Whitmore, [address] [facility]  11/7/2021 3:35 PM

## 2021-11-08 VITALS
HEIGHT: 68 IN | WEIGHT: 142.42 LBS | HEART RATE: 81 BPM | BODY MASS INDEX: 21.58 KG/M2 | TEMPERATURE: 99.7 F | OXYGEN SATURATION: 97 % | DIASTOLIC BLOOD PRESSURE: 60 MMHG | SYSTOLIC BLOOD PRESSURE: 139 MMHG | RESPIRATION RATE: 16 BRPM

## 2021-11-08 LAB
ALBUMIN SERPL-MCNC: 3 GM/DL (ref 3.4–5)
ANION GAP SERPL CALCULATED.3IONS-SCNC: 8 MMOL/L (ref 4–16)
APTT: 35.7 SECONDS (ref 25.1–37.1)
BUN BLDV-MCNC: 29 MG/DL (ref 6–23)
CALCIUM SERPL-MCNC: 8.3 MG/DL (ref 8.3–10.6)
CHLORIDE BLD-SCNC: 106 MMOL/L (ref 99–110)
CO2: 21 MMOL/L (ref 21–32)
CREAT SERPL-MCNC: 0.7 MG/DL (ref 0.9–1.3)
GFR AFRICAN AMERICAN: >60 ML/MIN/1.73M2
GFR NON-AFRICAN AMERICAN: >60 ML/MIN/1.73M2
GLUCOSE BLD-MCNC: 176 MG/DL (ref 70–99)
GLUCOSE BLD-MCNC: 189 MG/DL (ref 70–99)
GLUCOSE BLD-MCNC: 242 MG/DL (ref 70–99)
GLUCOSE BLD-MCNC: 97 MG/DL (ref 70–99)
PHOSPHORUS: 3.1 MG/DL (ref 2.5–4.9)
POTASSIUM SERPL-SCNC: 4.6 MMOL/L (ref 3.5–5.1)
SODIUM BLD-SCNC: 135 MMOL/L (ref 135–145)

## 2021-11-08 PROCEDURE — 6370000000 HC RX 637 (ALT 250 FOR IP): Performed by: PHYSICIAN ASSISTANT

## 2021-11-08 PROCEDURE — 2580000003 HC RX 258: Performed by: PHYSICIAN ASSISTANT

## 2021-11-08 PROCEDURE — 80069 RENAL FUNCTION PANEL: CPT

## 2021-11-08 PROCEDURE — 6360000002 HC RX W HCPCS: Performed by: PHYSICIAN ASSISTANT

## 2021-11-08 PROCEDURE — 82962 GLUCOSE BLOOD TEST: CPT

## 2021-11-08 PROCEDURE — 94761 N-INVAS EAR/PLS OXIMETRY MLT: CPT

## 2021-11-08 PROCEDURE — 85730 THROMBOPLASTIN TIME PARTIAL: CPT

## 2021-11-08 PROCEDURE — 36415 COLL VENOUS BLD VENIPUNCTURE: CPT

## 2021-11-08 RX ADMIN — HYDROMORPHONE HYDROCHLORIDE 1 MG: 1 INJECTION, SOLUTION INTRAMUSCULAR; INTRAVENOUS; SUBCUTANEOUS at 14:45

## 2021-11-08 RX ADMIN — HYDROMORPHONE HYDROCHLORIDE 1 MG: 1 INJECTION, SOLUTION INTRAMUSCULAR; INTRAVENOUS; SUBCUTANEOUS at 06:04

## 2021-11-08 RX ADMIN — VANCOMYCIN 750 MG: 750 INJECTION, SOLUTION INTRAVENOUS at 01:20

## 2021-11-08 RX ADMIN — INSULIN GLARGINE 14 UNITS: 100 INJECTION, SOLUTION SUBCUTANEOUS at 09:59

## 2021-11-08 RX ADMIN — OXYCODONE AND ACETAMINOPHEN 1 TABLET: 7.5; 325 TABLET ORAL at 18:10

## 2021-11-08 RX ADMIN — VANCOMYCIN 750 MG: 750 INJECTION, SOLUTION INTRAVENOUS at 14:36

## 2021-11-08 RX ADMIN — SODIUM CHLORIDE, PRESERVATIVE FREE 10 ML: 5 INJECTION INTRAVENOUS at 10:02

## 2021-11-08 RX ADMIN — CEFEPIME 2000 MG: 2 INJECTION, POWDER, FOR SOLUTION INTRAVENOUS at 09:58

## 2021-11-08 RX ADMIN — SODIUM CHLORIDE 25 ML: 9 INJECTION, SOLUTION INTRAVENOUS at 14:29

## 2021-11-08 RX ADMIN — SODIUM CHLORIDE, PRESERVATIVE FREE 10 ML: 5 INJECTION INTRAVENOUS at 10:06

## 2021-11-08 RX ADMIN — GABAPENTIN 600 MG: 300 CAPSULE ORAL at 10:02

## 2021-11-08 RX ADMIN — CLOPIDOGREL BISULFATE 75 MG: 75 TABLET, FILM COATED ORAL at 09:40

## 2021-11-08 RX ADMIN — ERGOCALCIFEROL 50000 UNITS: 1.25 CAPSULE ORAL at 10:01

## 2021-11-08 RX ADMIN — HYDROMORPHONE HYDROCHLORIDE 1 MG: 1 INJECTION, SOLUTION INTRAMUSCULAR; INTRAVENOUS; SUBCUTANEOUS at 10:04

## 2021-11-08 RX ADMIN — GABAPENTIN 600 MG: 300 CAPSULE ORAL at 14:44

## 2021-11-08 RX ADMIN — HYDROMORPHONE HYDROCHLORIDE 1 MG: 1 INJECTION, SOLUTION INTRAMUSCULAR; INTRAVENOUS; SUBCUTANEOUS at 01:10

## 2021-11-08 RX ADMIN — GUAIFENESIN 1200 MG: 600 TABLET, EXTENDED RELEASE ORAL at 09:40

## 2021-11-08 RX ADMIN — LEVOTHYROXINE SODIUM 100 MCG: 25 TABLET ORAL at 06:04

## 2021-11-08 ASSESSMENT — PAIN SCALES - GENERAL
PAINLEVEL_OUTOF10: 10
PAINLEVEL_OUTOF10: 4
PAINLEVEL_OUTOF10: 8
PAINLEVEL_OUTOF10: 8
PAINLEVEL_OUTOF10: 5
PAINLEVEL_OUTOF10: 4
PAINLEVEL_OUTOF10: 7
PAINLEVEL_OUTOF10: 8

## 2021-11-08 ASSESSMENT — PAIN DESCRIPTION - PAIN TYPE
TYPE: SURGICAL PAIN
TYPE: ACUTE PAIN;SURGICAL PAIN
TYPE: SURGICAL PAIN

## 2021-11-08 ASSESSMENT — PAIN DESCRIPTION - LOCATION
LOCATION: FOOT;LEG

## 2021-11-08 ASSESSMENT — PAIN DESCRIPTION - FREQUENCY
FREQUENCY: CONTINUOUS
FREQUENCY: CONTINUOUS

## 2021-11-08 ASSESSMENT — PAIN DESCRIPTION - ORIENTATION
ORIENTATION: RIGHT

## 2021-11-08 ASSESSMENT — PAIN DESCRIPTION - DESCRIPTORS
DESCRIPTORS: ACHING;CONSTANT;DISCOMFORT
DESCRIPTORS: ACHING;CONSTANT;DISCOMFORT

## 2021-11-08 ASSESSMENT — PAIN DESCRIPTION - ONSET
ONSET: ON-GOING
ONSET: ON-GOING

## 2021-11-08 NOTE — DISCHARGE SUMMARY
Tele   Vascular surg consult , heparin drip   surg consult , foot MRI   Glucose control, insulin coverage   Pain control   IVF , lytes balance   Home meds , reviewed and resumed as appropriate   Symptoms releif/Pain control  DVT proph       Consults. PHARMACY TO DOSE VANCOMYCIN  IP CONSULT TO CARDIOTHORACIC SURGERY  IP CONSULT TO GENERAL SURGERY        Discharge Medications:   Current Discharge Medication List      START taking these medications    Details   lactulose (CHRONULAC) 10 GM/15ML solution Take 30 mLs by mouth 2 times daily as needed (constipation)  Qty: 450 mL, Refills: 1      polyethylene glycol (GLYCOLAX) 17 g packet Take 17 g by mouth daily as needed for Constipation  Qty: 527 g, Refills: 1      oxyCODONE-acetaminophen (PERCOCET) 7.5-325 MG per tablet Take 1 tablet by mouth every 6 hours as needed for Pain for up to 3 days.   Qty: 12 tablet, Refills: 0    Comments: Reduce doses taken as pain becomes manageable  Associated Diagnoses: PAD (peripheral artery disease) ([facility])      insulin glargine (LANTUS SOLOSTAR) 100 UNIT/ML injection pen 15 units twice a day  Qty: 5 pen, Refills: 3      insulin aspart (NOVOLOG FLEXPEN) 100 UNIT/ML injection pen 10 units before each meal  Qty: 5 pen, Refills: 3      doxycycline hyclate (VIBRA-TABS) 100 MG tablet Take 1 tablet by mouth 2 times daily for 14 days  Qty: 28 tablet, Refills: 0      cephALEXin (KEFLEX) 500 MG capsule Take 1 capsule by mouth 2 times daily for 7 days  Qty: 14 capsule, Refills: 0           Current Discharge Medication List      CONTINUE these medications which have CHANGED    Details   Insulin Pen Needle (PEN NEEDLES 3/16\") 31G X 5 MM MISC 1 each by Does not apply route daily  Qty: 100 each, Refills: 3           Current Discharge Medication List      CONTINUE these medications which have NOT CHANGED    Details   Gauze Pads & Dressings 2\"X2\" PADS 1 each by Does not apply route daily as needed (for wound care)  Qty: 30 each, Refills: 1    Associated Diagnoses: 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 ([facility] [address] Encounter for wound care      Gauze Pads & Dressings (KERLIX GAUZE ROLL MEDIUM) MISC 1 each by Does not apply route daily as needed (wound care)  Qty: 30 each, Refills: 2    Associated Diagnoses: 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 ([facility] [address] Encounter for wound care      atorvastatin (LIPITOR) 40 MG tablet Take 1 tablet by mouth nightly  Qty: 30 tablet, Refills: 0      clopidogrel (PLAVIX) 75 MG tablet Take 1 tablet by mouth daily  Qty: 30 tablet, Refills: 0      nicotine (NICODERM CQ) 21 MG/24HR Place 1 patch onto the skin daily  Qty: 30 patch, Refills: 3      levothyroxine (SYNTHROID) 100 MCG tablet Take 1 tablet by mouth Daily  Qty: 30 tablet, Refills: 0      gabapentin (NEURONTIN) 600 MG tablet Take 1 tablet by mouth 3 times daily  Qty: 90 tablet, Refills: 3      Blood Glucose Monitoring Suppl (FREESTYLE LITE) MARGARITA Apply 1 Device topically three times daily. Qty: 1 Device, Refills: 0    Associated Diagnoses: Diabetes mellitus ([facility]      Lancets (STERILANCE TL) MISC USE AS DIRECTED TO TEST BLOOD SUGAR THREE TIMES DAILY BEFORE MEALS  Qty: 100 each, Refills: 11      glucose blood VI test strips (FREESTYLE LITE) strip Test 3 times daily as needed. Qty: 100 each, Refills: 3    Associated Diagnoses: Diabetes mellitus ([facility])      ondansetron (ZOFRAN-ODT) 4 MG disintegrating tablet Take 1 tablet by mouth 3 times daily as needed for Nausea or Vomiting  Qty: 21 tablet, Refills: 0    Associated Diagnoses: Nausea           Current Discharge Medication List      STOP taking these medications       Insulin Aspart Prot & Aspart (NOVOLOG MIX 70/30 SC) Comments:   Reason for Stopping:               Discharge ROS:  A complete review of systems was asked and negative . Discharge Exam:  Estimated body mass index is 21.98 kg/m² as calculated from the following:    Height as of this encounter: 5' 7.5\" (1.715 m).     Weight as of this encounter: 142 lb 6.7 oz (64.6 kg). BP (!) 134/91   Pulse 78   Temp 98.2 °F (36.8 °C) (Oral)   Resp 17   Ht 5' 7.5\" (1.715 m)   Wt 142 lb 6.7 oz (64.6 kg)   SpO2 97%   BMI 21.98 kg/m²   General appearance:  NAD  Heart[de-identified] Normal s1/s2, RRR, no murmurs, gallops, or rubs. No leg edema  Lungs:  Clear to auscultation, bilaterally without Rales/Wheezes/Rhonchi. Abdomen: Soft, non-tender, non-distended, bowel sounds present  Musculoskeletal:   no cyanosis, no edema  Neurologic:  Cranial nerves: II-XII intact, grossly non-focal.  Psychiatric:  A & O x3      Labs:  For convenience and continuity at follow-up the following most recent labs are provided:    Lab Results   Component Value Date    WBC 6.7 11/02/2021    HGB 11.4 11/02/2021    HCT 32.8 11/02/2021    MCV 88.2 11/02/2021     11/02/2021     11/08/2021    K 4.6 11/08/2021     11/08/2021    CO2 21 11/08/2021    BUN 29 11/08/2021    CREATININE 0.7 11/08/2021    CALCIUM 8.3 11/08/2021    PHOS 3.1 11/08/2021    BNP 3 08/16/2012    ALKPHOS 112 10/31/2021    ALT 37 10/31/2021    AST 42 10/31/2021    BILITOT 0.3 10/31/2021    BILIDIR 0.4 08/28/2018    LABALBU 3.0 11/08/2021    LDLCALC 85 08/11/2017    TRIG 65 08/29/2018     Lab Results   Component Value Date    INR 1.14 08/28/2018    INR 1.09 08/17/2016    INR 0.99 05/15/2015       Results for orders placed or performed during the hospital encounter of 10/31/21   COVID-19, Rapid    Specimen: Nasopharyngeal   Result Value Ref Range    Source UNKNOWN     SARS-CoV-2, NAAT NOT DETECTED NOT DETECTED   CBC Auto Differential   Result Value Ref Range    WBC 7.4 4.0 - 10.5 K/CU MM    RBC 3.95 (L) 4.6 - 6.2 M/CU MM    Hemoglobin 12.1 (L) 13.5 - 18.0 GM/DL    Hematocrit 34.9 (L) 42 - 52 %    MCV 88.4 78 - 100 FL    MCH 30.6 27 - 31 PG    MCHC 34.7 32.0 - 36.0 %    RDW 13.8 11.7 - 14.9 %    Platelets 270 (L) 514 - 440 K/CU MM    MPV 11.1 7.5 - 11.1 FL    Differential Type AUTOMATED DIFFERENTIAL     Segs Relative 78.7 (H) 36 - 66 %    Lymphocytes % 12.9 (L) 24 - 44 %    Monocytes % 6.8 (H) 0 - 4 %    Eosinophils % 0.7 0 - 3 %    Basophils % 0.5 0 - 1 %    Segs Absolute 5.8 K/CU MM    Lymphocytes Absolute 1.0 K/CU MM    Monocytes Absolute 0.5 K/CU MM    Eosinophils Absolute 0.1 K/CU MM    Basophils Absolute 0.0 K/CU MM    Nucleated RBC % 0.0 %    Total Nucleated RBC 0.0 K/CU MM    TRC 0.0553 K/CU MM    Total Immature Neutrophil 0.03 K/CU MM    Immature Neutrophil % 0.4 0 - 0.43 %   Comprehensive Metabolic Panel w/ Reflex to MG   Result Value Ref Range    Sodium 128 (L) 135 - 145 MMOL/L    Potassium 4.1 3.5 - 5.1 MMOL/L    Chloride 99 99 - 110 mMol/L    CO2 22 21 - 32 MMOL/L    BUN 18 6 - 23 MG/DL    CREATININE 0.6 (L) 0.9 - 1.3 MG/DL    Glucose 382 (H) 70 - 99 MG/DL    Calcium 8.6 8.3 - 10.6 MG/DL    Albumin 3.0 (L) 3.4 - 5.0 GM/DL    Total Protein 7.4 6.4 - 8.2 GM/DL    Total Bilirubin 0.3 0.0 - 1.0 MG/DL    ALT 37 10 - 40 U/L    AST 42 (H) 15 - 37 IU/L    Alkaline Phosphatase 112 40 - 129 IU/L    GFR Non-African American >60 >60 mL/min/1.73m2    GFR African American >60 >60 mL/min/1.73m2    Anion Gap 7 4 - 16   Sedimentation Rate   Result Value Ref Range    Sed Rate 70 (H) 0 - 20 MM/HR   APTT   Result Value Ref Range    aPTT 140.1 (H) 25.1 - 37.1 SECONDS   Vancomycin, trough   Result Value Ref Range    Vancomycin Tr 14.2 10 - 20 UG/ML    DOSE AMOUNT DOSE AMT.  GIVEN - 1000mg     DOSE TIME DOSE TIME GIVEN - 0100    APTT   Result Value Ref Range    aPTT 68.3 (H) 25.1 - 37.1 SECONDS   CBC auto differential   Result Value Ref Range    WBC 6.7 4.0 - 10.5 K/CU MM    RBC 3.72 (L) 4.6 - 6.2 M/CU MM    Hemoglobin 11.4 (L) 13.5 - 18.0 GM/DL    Hematocrit 32.8 (L) 42 - 52 %    MCV 88.2 78 - 100 FL    MCH 30.6 27 - 31 PG    MCHC 34.8 32.0 - 36.0 %    RDW 13.9 11.7 - 14.9 %    Platelets 192 (L) 448 - 440 K/CU MM    MPV 11.0 7.5 - 11.1 FL    Differential Type AUTOMATED DIFFERENTIAL     Segs Relative 64.5 36 - 66 %    Lymphocytes % 23.4 (L) 24 - 44 % Monocytes % 9.6 (H) 0 - 4 %    Eosinophils % 1.3 0 - 3 %    Basophils % 0.9 0 - 1 %    Segs Absolute 4.3 K/CU MM    Lymphocytes Absolute 1.6 K/CU MM    Monocytes Absolute 0.7 K/CU MM    Eosinophils Absolute 0.1 K/CU MM    Basophils Absolute 0.1 K/CU MM    Nucleated RBC % 0.0 %    Total Nucleated RBC 0.0 K/CU MM    Total Immature Neutrophil 0.02 K/CU MM    Immature Neutrophil % 0.3 0 - 0.43 %   Basic Metabolic Panel w/ Reflex to MG   Result Value Ref Range    Sodium 137 135 - 145 MMOL/L    Potassium 4.3 3.5 - 5.1 MMOL/L    Chloride 104 99 - 110 mMol/L    CO2 25 21 - 32 MMOL/L    Anion Gap 8 4 - 16    BUN 19 6 - 23 MG/DL    CREATININE 0.9 0.9 - 1.3 MG/DL    Glucose 186 (H) 70 - 99 MG/DL    Calcium 8.3 8.3 - 10.6 MG/DL    GFR Non-African American >60 >60 mL/min/1.73m2    GFR African American >60 >60 mL/min/1.73m2   APTT   Result Value Ref Range    aPTT 79.8 (H) 25.1 - 37.1 SECONDS   APTT   Result Value Ref Range    aPTT 25.4 25.1 - 37.1 SECONDS   Vancomycin, trough   Result Value Ref Range    Vancomycin Tr 20.1 (H) 10 - 20 UG/ML    DOSE AMOUNT DOSE AMT.  GIVEN - 1000mg     DOSE TIME DOSE TIME GIVEN - 1300    APTT   Result Value Ref Range    aPTT 32.9 25.1 - 37.1 SECONDS   APTT   Result Value Ref Range    aPTT 34.1 25.1 - 37.1 SECONDS   Renal function panel   Result Value Ref Range    Sodium 139 135 - 145 MMOL/L    Potassium 4.4 3.5 - 5.1 MMOL/L    Chloride 105 99 - 110 mMol/L    CO2 23 21 - 32 MMOL/L    Anion Gap 11 4 - 16    BUN 22 6 - 23 MG/DL    CREATININE 0.8 (L) 0.9 - 1.3 MG/DL    Glucose 155 (H) 70 - 99 MG/DL    Calcium 8.3 8.3 - 10.6 MG/DL    GFR Non-African American >60 >60 mL/min/1.73m2    GFR African American >60 >60 mL/min/1.73m2    Albumin 3.2 (L) 3.4 - 5.0 GM/DL    Phosphorus 4.1 2.5 - 4.9 MG/DL   APTT   Result Value Ref Range    aPTT 38.3 (H) 25.1 - 37.1 SECONDS   Renal function panel   Result Value Ref Range    Sodium 136 135 - 145 MMOL/L    Potassium 4.7 3.5 - 5.1 MMOL/L    Chloride 105 99 - 110 Value Ref Range    POC Glucose 211 (H) 70 - 99 MG/DL   POC ACT-Low Range   Result Value Ref Range    Activated Clotting Time, Low Range >400 SEC   POCT Glucose   Result Value Ref Range    POC Glucose 170 (H) 70 - 99 MG/DL   POCT Glucose   Result Value Ref Range    POC Glucose 187 (H) 70 - 99 MG/DL   POCT Glucose   Result Value Ref Range    POC Glucose 256 (H) 70 - 99 MG/DL   POCT Glucose   Result Value Ref Range    POC Glucose 69 (L) 70 - 99 MG/DL   POCT Glucose   Result Value Ref Range    POC Glucose 101 (H) 70 - 99 MG/DL   POCT Glucose   Result Value Ref Range    POC Glucose 238 (H) 70 - 99 MG/DL   POCT Glucose   Result Value Ref Range    POC Glucose 192 (H) 70 - 99 MG/DL   POCT Glucose   Result Value Ref Range    POC Glucose 186 (H) 70 - 99 MG/DL   POCT Glucose   Result Value Ref Range    POC Glucose 117 (H) 70 - 99 MG/DL   POCT Glucose   Result Value Ref Range    POC Glucose 53 (L) 70 - 99 MG/DL   POCT Glucose   Result Value Ref Range    POC Glucose 114 (H) 70 - 99 MG/DL   POCT Glucose   Result Value Ref Range    POC Glucose 136 (H) 70 - 99 MG/DL   POCT Glucose   Result Value Ref Range    POC Glucose 168 (H) 70 - 99 MG/DL   POCT Glucose   Result Value Ref Range    POC Glucose 251 (H) 70 - 99 MG/DL   POCT Glucose   Result Value Ref Range    POC Glucose 127 (H) 70 - 99 MG/DL   POCT Glucose   Result Value Ref Range    POC Glucose 85 70 - 99 MG/DL   POCT Glucose   Result Value Ref Range    POC Glucose 161 (H) 70 - 99 MG/DL   POCT Glucose   Result Value Ref Range    POC Glucose 152 (H) 70 - 99 MG/DL   POCT Glucose   Result Value Ref Range    POC Glucose 64 (L) 70 - 99 MG/DL   POCT Glucose   Result Value Ref Range    POC Glucose 239 (H) 70 - 99 MG/DL   POCT Glucose   Result Value Ref Range    POC Glucose 136 (H) 70 - 99 MG/DL   POC ACT-Low Range   Result Value Ref Range    Activated Clotting Time, Low Range 208 SEC   POC ACT-Low Range   Result Value Ref Range    Activated Clotting Time, Low Range 165 SEC   POCT Glucose   Result Value Ref Range    POC Glucose 242 (H) 70 - 99 MG/DL   POCT Glucose   Result Value Ref Range    POC Glucose 189 (H) 70 - 99 MG/DL   EKG 12 lead   Result Value Ref Range    Ventricular Rate 62 BPM    Atrial Rate 62 BPM    P-R Interval 144 ms    QRS Duration 82 ms    Q-T Interval 450 ms    QTc Calculation (Bazett) 456 ms    P Axis -13 degrees    R Axis 145 degrees    T Axis 149 degrees    Diagnosis       Normal sinus rhythm  Right axis deviation  Low voltage QRS  Abnormal ECG  When compared with ECG of 26-AUG-2018 03:03,  QRS axis shifted right  T wave inversion no longer evident in Anterior leads  QT has shortened  Confirmed by Noman Alvarado ([id number]) on 11/1/2021 7:23:04 PM           Chart review shows recent radiographs:  XR FOOT RIGHT (MIN 3 VIEWS)    Result Date: 10/31/2021  EXAMINATION: THREE XRAY VIEWS OF THE RIGHT FOOT 10/31/2021 2:15 pm COMPARISON: Right foot radiographs 03/23/2020. MRI right foot 03/25/2020. HISTORY: ORDERING SYSTEM PROVIDED HISTORY: pain TECHNOLOGIST PROVIDED HISTORY: Right Foot Reason for exam:->pain Reason for Exam: pain Acuity: Acute Type of Exam: Initial FINDINGS: Status post interval partial 5th ray amputation at the proximal metatarsal shaft. New but chronic appearing deformities of the distal 4th metatarsal and base of the 4th proximal phalanx. Normal midfoot alignment is maintained. No fracture or dislocation. Atherosclerotic vascular calcifications are seen. No soft tissue gas or radiopaque foreign body identified. 1. Chronic appearing deformities of the 4th metatarsal head and base of the 4th proximal phalanx new from 03/23/2020. This may represent chronic osteomyelitis with a superimposed acute component not excluded and if clinically indicated further evaluation could be obtained with MRI. 2. Status post interval partial 5th ray amputation.      MRI FOOT RIGHT WO CONTRAST    Result Date: 11/4/2021  EXAMINATION: MRI OF THE RIGHT FOOT WITHOUT CONTRAST, signal in the 2nd through 4th metatarsals and phalanges. This is likely reactive. Osteomyelitis is unlikely     CTA LOWER EXTREMITY RIGHT W CONTRAST    Result Date: 10/31/2021  EXAMINATION: CTA OF THE RIGHT LOWER EXTREMITY 10/31/2021 7:19 pm TECHNIQUE: CTA of the right lower extremity was performed after the administration of intravenous contrast. Multiplanar reformatted images are provided for review. MIP images are provided for review. . Dose modulation, iterative reconstruction, and/or weight based adjustment of the mA/kV was utilized to reduce the radiation dose to as low as reasonably achievable. COMPARISON: None. HISTORY ORDERING SYSTEM PROVIDED HISTORY: blackened great toe, poor pulses, ? osteomyelitis in left foot TECHNOLOGIST PROVIDED HISTORY: Reason for exam:->blackened great toe, poor pulses, ? osteomyelitis in left foot Decision Support Exception - unselect if not a suspected or confirmed emergency medical condition->Emergency Medical Condition (MA) Reason for Exam: blackened great toe, poor pulses Acuity: Acute Type of Exam: Initial FINDINGS: The urinary bladder is normal in appearance. The prostate gland is unremarkable. Normal appendix. The remaining visualized bowel is unremarkable. No free fluid in the pelvis. No pelvic or inguinal lymphadenopathy. Moderate to severe atherosclerosis of the distal abdominal aorta. Moderate to severe atherosclerosis of the bilateral common, external, and internal iliac arteries. 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 There is reconstitution of the proximal superficial and deep femoral arteries. Severe atherosclerosis throughout the superficial femoral artery. The popliteal artery is patent with moderate atherosclerosis. A three-vessel runoff is seen into the lower leg at the level of the distal tibial shaft. The peroneal artery loses opacification at the level of the distal tibia.  The anterior and posterior tibial arteries are seen into the forefoot. Moderate to severe atherosclerosis of the left common, superficial, and deep femoral arteries which remain patent. Similar appearance of the left popliteal artery. No significant contrast seen within the posterior tibial artery. The peroneal artery appears to fill with contrast to the level of the distal tibia. Contrast also seen within the anterior tibial artery to the level of the talus. Suspected shallow soft tissue ulceration plantar to the right 2nd metatarsophalangeal joint. No deep soft tissue ulceration, sinus tract, or drainable fluid collection identified. Mild subcutaneous fat stranding in the bilateral lower legs and feet. The visualized tendons are grossly intact. No definite osseous erosion or periostitis. No fracture or dislocation. 1. Complete occlusion of the right common femoral artery secondary to atherosclerosis with reconstitution of the proximal superficial and deep femoral arteries. 2 vessel runoff into the forefoot with contrast opacifying the anterior and posterior tibial arteries. The peroneal artery loses opacification at the level of the distal tibial shaft. 2. Moderate to severe atherosclerosis of the left femoral arteries and popliteal arteries. The anterior tibial artery is seen opacifying below the ankle. No significant contrast opacification of the posterior tibial artery. The peroneal artery fills to the level of the distal tibia. 3. Small shallow soft tissue ulceration plantar to the right 2nd metatarsophalangeal joint with subcutaneous edema. No abscess or soft tissue gas identified. 4. No CT evidence of osteomyelitis or other acute osseous abnormality.        EKG     Rhythm: normal sinus       Immunization History   Administered Date(s) Administered    Influenza Virus Vaccine 12/02/2013    Pneumococcal Polysaccharide ([id number]) 06/14/2013         The patient was seen and examined on day of discharge and this discharge summary is in conjunction with any daily progress note from day of discharge. Time Spent on discharge is   >35  min  in the examination, evaluation, counseling and review of medications and discharge plan.             SignedSoren Shin MD   11/8/2021

## 2021-11-08 NOTE — ANESTHESIA POSTPROCEDURE EVALUATION
Department of Anesthesiology  Postprocedure Note    Patient: Jorge Montano  MRN: [id number]  YOB: 1960  Date of evaluation: 11/8/2021  Time:  8:58 AM     Procedure Summary     Date: 11/05/21 Room / Location: [address] [place] [address]    Anesthesia Start: 1300 Anesthesia Stop: 6049    Procedure: RIGHT GREAT TOE AMPUTATION (Right Toes) Diagnosis: (PAD)    Surgeons: Olaf Vegas MD Responsible Provider: Braulio Pabon DO    Anesthesia Type: MAC, general ASA Status: 4          Anesthesia Type: MAC, general    Carmen Phase I: Carmen Score: 10    Carmen Phase II:      Last vitals: Reviewed and per EMR flowsheets.        Anesthesia Post Evaluation    Patient location during evaluation: PACU  Patient participation: complete - patient participated  Level of consciousness: sleepy but conscious  Airway patency: patent  Nausea & Vomiting: no nausea  Complications: no  Cardiovascular status: hemodynamically stable  Respiratory status: acceptable  Hydration status: euvolemic

## 2021-11-09 ENCOUNTER — CARE COORDINATION (OUTPATIENT)
Dept: CASE MANAGEMENT | Age: 61
End: 2021-11-09

## 2021-11-09 NOTE — CARE COORDINATION
[facility] Transitions Initial Follow Up Call    Call within 2 business days of discharge: Yes    Patient: Ira Prince Patient : 1960   MRN: <[id number]>  Reason for Admission: PAD, uncontrolled DM  Discharge Date: 21 RARS: Readmission Risk Score: 14.3 ( )      Last Discharge [address] [address]       Complaint Diagnosis Description Type Department Provider    10/31/21 Toe Pain; Leg Pain PAD (peripheral artery disease) ([facility] [address] . .. ED to Hosp-Admission (Discharged) (ADMITTED) [facility] [address] Masoud Obrien MD; [place]. .. Facility:  [facility]    1st attempt to contact patient for initial care transition follow up. Unable to reach patient. Left message with contact information and request for call back. Follow Up  No future appointments.     Sharif Flynn RN

## 2021-11-10 ENCOUNTER — CARE COORDINATION (OUTPATIENT)
Dept: CASE MANAGEMENT | Age: 61
End: 2021-11-10

## 2021-11-10 NOTE — CARE COORDINATION
Judy 45 Transitions Initial Follow Up Call    Call within 2 business days of discharge: Yes    Patient: Chilo Pierson Patient : 1960   MRN: <[id number]>  Reason for Admission: PAD  Discharge Date: 21 RARS: Readmission Risk Score: 14.3 ( )      Last Discharge [address] [address]       Complaint Diagnosis Description Type Department Provider    10/31/21 Toe Pain; Leg Pain PAD (peripheral artery disease) ([facility] [address] . .. ED to Hosp-Admission (Discharged) (ADMITTED) [facility] [address] Pebbles Ortiz MD; Fermin Parham. .. Facility: [facility]    2nd attempt to contact patient for initial care transition follow up. Unable to reach patient. Home number listed is not in service. Attempted to contact mobile number. Left message with contact information and request for call back. Episode to be resolved and CTN to sign off if return call is not received from the patient. Follow Up  No future appointments.     Sourav Napier RN

## 2021-11-18 ENCOUNTER — OFFICE VISIT (OUTPATIENT)
Dept: SURGERY | Age: 61
End: 2021-11-18

## 2021-11-18 VITALS
DIASTOLIC BLOOD PRESSURE: 82 MMHG | SYSTOLIC BLOOD PRESSURE: 124 MMHG | HEIGHT: 68 IN | BODY MASS INDEX: 20.28 KG/M2 | WEIGHT: 133.8 LBS | HEART RATE: 58 BPM | OXYGEN SATURATION: 99 %

## 2021-11-18 DIAGNOSIS — Z09 POSTOPERATIVE EXAMINATION: Primary | ICD-10-CM

## 2021-11-18 PROCEDURE — 99024 POSTOP FOLLOW-UP VISIT: CPT | Performed by: SURGERY

## 2021-11-19 ENCOUNTER — TELEPHONE (OUTPATIENT)
Dept: SURGERY | Age: 61
End: 2021-11-19
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
## 2021-11-22 RX ORDER — HYDROCODONE BITARTRATE AND ACETAMINOPHEN 5; 325 MG/1; MG/1
1 TABLET ORAL EVERY 4 HOURS PRN
Qty: 10 TABLET | Refills: 0 | Status: SHIPPED | OUTPATIENT
Start: 2021-11-22 | End: 2021-11-29

## 2021-11-24 ENCOUNTER — HOSPITAL ENCOUNTER (OUTPATIENT)
Age: 61
Discharge: HOME | End: 2021-11-24
Payer: MEDICARE

## 2021-11-24 LAB
ALBUMIN SERPL-MCNC: 4 GM/DL (ref 3.4–5)
ALP BLD-CCNC: 115 IU/L (ref 40–129)
ALT SERPL-CCNC: 41 U/L (ref 10–40)
ANION GAP SERPL CALCULATED.3IONS-SCNC: 13 MMOL/L (ref 4–16)
AST SERPL-CCNC: 60 IU/L (ref 15–37)
BACTERIA: NEGATIVE /HPF
BASOPHILS ABSOLUTE: 0.1 K/CU MM
BASOPHILS RELATIVE PERCENT: 1.2 % (ref 0–1)
BILIRUB SERPL-MCNC: 0.6 MG/DL (ref 0–1)
BILIRUBIN DIRECT: 0.2 MG/DL (ref 0–0.3)
BILIRUBIN URINE: NEGATIVE MG/DL
BILIRUBIN, INDIRECT: 0.4 MG/DL (ref 0–0.7)
BLOOD, URINE: ABNORMAL
BUN BLDV-MCNC: 28 MG/DL (ref 6–23)
CALCIUM SERPL-MCNC: 9.3 MG/DL (ref 8.3–10.6)
CHLORIDE BLD-SCNC: 101 MMOL/L (ref 99–110)
CHOLESTEROL: 179 MG/DL
CLARITY: CLEAR
CO2: 23 MMOL/L (ref 21–32)
COLOR: YELLOW
CREAT SERPL-MCNC: 0.8 MG/DL (ref 0.9–1.3)
DIFFERENTIAL TYPE: ABNORMAL
EOSINOPHILS ABSOLUTE: 0.1 K/CU MM
EOSINOPHILS RELATIVE PERCENT: 1.4 % (ref 0–3)
ERYTHROCYTE SEDIMENTATION RATE: 66 MM/HR (ref 0–20)
ESTIMATED AVERAGE GLUCOSE: 157 MG/DL
GFR AFRICAN AMERICAN: >60 ML/MIN/1.73M2
GFR NON-AFRICAN AMERICAN: >60 ML/MIN/1.73M2
GLUCOSE BLD-MCNC: 216 MG/DL (ref 70–99)
GLUCOSE, URINE: 50 MG/DL
HBA1C MFR BLD: 7.1 % (ref 4.2–6.3)
HCT VFR BLD CALC: 39.2 % (ref 42–52)
HDLC SERPL-MCNC: 95 MG/DL
HEMOGLOBIN: 13.3 GM/DL (ref 13.5–18)
IMMATURE NEUTROPHIL %: 0.5 % (ref 0–0.43)
KETONES, URINE: NEGATIVE MG/DL
LDL CHOLESTEROL DIRECT: 78 MG/DL
LEUKOCYTE ESTERASE, URINE: NEGATIVE
LYMPHOCYTES ABSOLUTE: 1.1 K/CU MM
LYMPHOCYTES RELATIVE PERCENT: 18.9 % (ref 24–44)
MAGNESIUM: 2 MG/DL (ref 1.8–2.4)
MCH RBC QN AUTO: 30.8 PG (ref 27–31)
MCHC RBC AUTO-ENTMCNC: 33.9 % (ref 32–36)
MCV RBC AUTO: 90.7 FL (ref 78–100)
MONOCYTES ABSOLUTE: 0.6 K/CU MM
MONOCYTES RELATIVE PERCENT: 9.5 % (ref 0–4)
MUCUS: ABNORMAL HPF
NITRITE URINE, QUANTITATIVE: NEGATIVE
NUCLEATED RBC %: 0 %
PDW BLD-RTO: 15.2 % (ref 11.7–14.9)
PH, URINE: 5 (ref 5–8)
PLATELET # BLD: 116 K/CU MM (ref 140–440)
PMV BLD AUTO: 11.3 FL (ref 7.5–11.1)
POTASSIUM SERPL-SCNC: 4.6 MMOL/L (ref 3.5–5.1)
PROTEIN UA: >500 MG/DL
RBC # BLD: 4.32 M/CU MM (ref 4.6–6.2)
RBC URINE: 1 /HPF (ref 0–3)
SEGMENTED NEUTROPHILS ABSOLUTE COUNT: 4.1 K/CU MM
SEGMENTED NEUTROPHILS RELATIVE PERCENT: 68.5 % (ref 36–66)
SODIUM BLD-SCNC: 137 MMOL/L (ref 135–145)
SPECIFIC GRAVITY UA: 1.01 (ref 1–1.03)
SQUAMOUS EPITHELIAL: 1 /HPF
T4 FREE: 1.33 NG/DL (ref 0.9–1.8)
TOTAL IMMATURE NEUTOROPHIL: 0.03 K/CU MM
TOTAL NUCLEATED RBC: 0 K/CU MM
TOTAL PROTEIN: 8.6 GM/DL (ref 6.4–8.2)
TRICHOMONAS: ABNORMAL /HPF
TRIGL SERPL-MCNC: 80 MG/DL
TSH HIGH SENSITIVITY: 9.54 UIU/ML (ref 0.27–4.2)
URIC ACID: 5.2 MG/DL (ref 3.5–7.2)
UROBILINOGEN, URINE: NEGATIVE MG/DL (ref 0.2–1)
VITAMIN D 25-HYDROXY: 12.84 NG/ML
WBC # BLD: 5.9 K/CU MM (ref 4–10.5)
WBC UA: 1 /HPF (ref 0–2)

## 2021-11-24 PROCEDURE — 81001 URINALYSIS AUTO W/SCOPE: CPT

## 2021-11-24 PROCEDURE — 85652 RBC SED RATE AUTOMATED: CPT

## 2021-11-24 PROCEDURE — 83735 ASSAY OF MAGNESIUM: CPT

## 2021-11-24 PROCEDURE — 80061 LIPID PANEL: CPT

## 2021-11-24 PROCEDURE — 84439 ASSAY OF FREE THYROXINE: CPT

## 2021-11-24 PROCEDURE — 36415 COLL VENOUS BLD VENIPUNCTURE: CPT

## 2021-11-24 PROCEDURE — 84443 ASSAY THYROID STIM HORMONE: CPT

## 2021-11-24 PROCEDURE — 83721 ASSAY OF BLOOD LIPOPROTEIN: CPT

## 2021-11-24 PROCEDURE — 82248 BILIRUBIN DIRECT: CPT

## 2021-11-24 PROCEDURE — 85025 COMPLETE CBC W/AUTO DIFF WBC: CPT

## 2021-11-24 PROCEDURE — 82306 VITAMIN D 25 HYDROXY: CPT

## 2021-11-24 PROCEDURE — 80053 COMPREHEN METABOLIC PANEL: CPT

## 2021-11-24 PROCEDURE — 84550 ASSAY OF BLOOD/URIC ACID: CPT

## 2021-11-24 PROCEDURE — 84154 ASSAY OF PSA FREE: CPT

## 2021-11-24 PROCEDURE — 83036 HEMOGLOBIN GLYCOSYLATED A1C: CPT

## 2021-11-24 PROCEDURE — 84153 ASSAY OF PSA TOTAL: CPT

## 2021-11-26 LAB
PROSTATE SPECIFIC ANTIGEN FREE: <0.1 NG/ML
PROSTATE SPECIFIC ANTIGEN PERCENT FREE: <100 %
PROSTATE SPECIFIC ANTIGEN: 0.1 NG/ML (ref 0–4)

## 2022-06-23 ENCOUNTER — APPOINTMENT (OUTPATIENT)
Dept: GENERAL RADIOLOGY | Age: 62
DRG: 981 | End: 2022-06-23
Payer: MEDICARE

## 2022-06-23 PROCEDURE — 96365 THER/PROPH/DIAG IV INF INIT: CPT

## 2022-06-23 PROCEDURE — 99285 EMERGENCY DEPT VISIT HI MDM: CPT

## 2022-06-23 PROCEDURE — 96375 TX/PRO/DX INJ NEW DRUG ADDON: CPT

## 2022-06-23 PROCEDURE — 73630 X-RAY EXAM OF FOOT: CPT

## 2022-06-24 ENCOUNTER — HOSPITAL ENCOUNTER (INPATIENT)
Age: 62
LOS: 8 days | Discharge: HOME | DRG: 981 | End: 2022-07-02
Attending: HOSPITALIST
Payer: MEDICARE

## 2022-06-24 ENCOUNTER — ANESTHESIA (OUTPATIENT)
Dept: OPERATING ROOM | Age: 62
DRG: 981 | End: 2022-06-24
Payer: MEDICARE

## 2022-06-24 ENCOUNTER — ANESTHESIA EVENT (OUTPATIENT)
Dept: OPERATING ROOM | Age: 62
DRG: 981 | End: 2022-06-24
Payer: MEDICARE

## 2022-06-24 DIAGNOSIS — E11.628 DIABETIC FOOT INFECTION (HCC): Primary | ICD-10-CM

## 2022-06-24 DIAGNOSIS — L98.493 SKIN ULCER WITH NECROSIS OF MUSCLE (HCC): ICD-10-CM

## 2022-06-24 DIAGNOSIS — E11.8 DIABETIC FOOT (HCC): ICD-10-CM

## 2022-06-24 DIAGNOSIS — L08.9 DIABETIC FOOT INFECTION (HCC): Primary | ICD-10-CM

## 2022-06-24 DIAGNOSIS — L02.611 ABSCESS OF RIGHT FOOT: ICD-10-CM

## 2022-06-24 LAB
ALBUMIN SERPL-MCNC: 2.4 GM/DL (ref 3.4–5)
ALP BLD-CCNC: 114 IU/L (ref 40–128)
ALT SERPL-CCNC: 17 U/L (ref 10–40)
ANION GAP SERPL CALCULATED.3IONS-SCNC: 6 MMOL/L (ref 4–16)
AST SERPL-CCNC: 24 IU/L (ref 15–37)
BASOPHILS ABSOLUTE: 0.1 K/CU MM
BASOPHILS RELATIVE PERCENT: 0.5 % (ref 0–1)
BILIRUB SERPL-MCNC: 0.4 MG/DL (ref 0–1)
BUN BLDV-MCNC: 19 MG/DL (ref 6–23)
CALCIUM SERPL-MCNC: 8.1 MG/DL (ref 8.3–10.6)
CHLORIDE BLD-SCNC: 101 MMOL/L (ref 99–110)
CO2: 24 MMOL/L (ref 21–32)
CREAT SERPL-MCNC: 0.7 MG/DL (ref 0.9–1.3)
DIFFERENTIAL TYPE: ABNORMAL
EOSINOPHILS ABSOLUTE: 0.1 K/CU MM
EOSINOPHILS RELATIVE PERCENT: 0.8 % (ref 0–3)
GFR AFRICAN AMERICAN: >60 ML/MIN/1.73M2
GFR NON-AFRICAN AMERICAN: >60 ML/MIN/1.73M2
GLUCOSE BLD-MCNC: 165 MG/DL (ref 70–99)
GLUCOSE BLD-MCNC: 233 MG/DL (ref 70–99)
GLUCOSE BLD-MCNC: 256 MG/DL (ref 70–99)
GLUCOSE BLD-MCNC: 302 MG/DL (ref 70–99)
GLUCOSE BLD-MCNC: 332 MG/DL (ref 70–99)
HCT VFR BLD CALC: 34.2 % (ref 42–52)
HEMOGLOBIN: 11.6 GM/DL (ref 13.5–18)
IMMATURE NEUTROPHIL %: 0.4 % (ref 0–0.43)
LACTATE: 1.3 MMOL/L (ref 0.4–2)
LYMPHOCYTES ABSOLUTE: 1.1 K/CU MM
LYMPHOCYTES RELATIVE PERCENT: 9.9 % (ref 24–44)
MCH RBC QN AUTO: 30.4 PG (ref 27–31)
MCHC RBC AUTO-ENTMCNC: 33.9 % (ref 32–36)
MCV RBC AUTO: 89.8 FL (ref 78–100)
MONOCYTES ABSOLUTE: 0.8 K/CU MM
MONOCYTES RELATIVE PERCENT: 7.5 % (ref 0–4)
NUCLEATED RBC %: 0 %
PDW BLD-RTO: 13.9 % (ref 11.7–14.9)
PLATELET # BLD: 144 K/CU MM (ref 140–440)
PMV BLD AUTO: 10.6 FL (ref 7.5–11.1)
POTASSIUM SERPL-SCNC: 3.7 MMOL/L (ref 3.5–5.1)
RBC # BLD: 3.81 M/CU MM (ref 4.6–6.2)
SEGMENTED NEUTROPHILS ABSOLUTE COUNT: 8.7 K/CU MM
SEGMENTED NEUTROPHILS RELATIVE PERCENT: 80.9 % (ref 36–66)
SODIUM BLD-SCNC: 131 MMOL/L (ref 135–145)
TOTAL IMMATURE NEUTOROPHIL: 0.04 K/CU MM
TOTAL NUCLEATED RBC: 0 K/CU MM
TOTAL PROTEIN: 7.1 GM/DL (ref 6.4–8.2)
WBC # BLD: 10.7 K/CU MM (ref 4–10.5)

## 2022-06-24 PROCEDURE — 6360000002 HC RX W HCPCS: Performed by: HOSPITALIST

## 2022-06-24 PROCEDURE — 3700000001 HC ADD 15 MINUTES (ANESTHESIA): Performed by: SURGERY

## 2022-06-24 PROCEDURE — 0KBV0ZZ EXCISION OF RIGHT FOOT MUSCLE, OPEN APPROACH: ICD-10-PCS | Performed by: SURGERY

## 2022-06-24 PROCEDURE — 87040 BLOOD CULTURE FOR BACTERIA: CPT

## 2022-06-24 PROCEDURE — 3700000000 HC ANESTHESIA ATTENDED CARE: Performed by: SURGERY

## 2022-06-24 PROCEDURE — 3600000012 HC SURGERY LEVEL 2 ADDTL 15MIN: Performed by: SURGERY

## 2022-06-24 PROCEDURE — 7100000000 HC PACU RECOVERY - FIRST 15 MIN: Performed by: SURGERY

## 2022-06-24 PROCEDURE — 80053 COMPREHEN METABOLIC PANEL: CPT

## 2022-06-24 PROCEDURE — 1200000000 HC SEMI PRIVATE

## 2022-06-24 PROCEDURE — 87186 SC STD MICRODIL/AGAR DIL: CPT

## 2022-06-24 PROCEDURE — 6370000000 HC RX 637 (ALT 250 FOR IP): Performed by: INTERNAL MEDICINE

## 2022-06-24 PROCEDURE — 97605 NEG PRS WND THER DME<=50SQCM: CPT | Performed by: SURGERY

## 2022-06-24 PROCEDURE — 99223 1ST HOSP IP/OBS HIGH 75: CPT | Performed by: INTERNAL MEDICINE

## 2022-06-24 PROCEDURE — 87075 CULTR BACTERIA EXCEPT BLOOD: CPT

## 2022-06-24 PROCEDURE — 2580000003 HC RX 258: Performed by: PHYSICIAN ASSISTANT

## 2022-06-24 PROCEDURE — 11043 DBRDMT MUSC&/FSCA 1ST 20/<: CPT | Performed by: SURGERY

## 2022-06-24 PROCEDURE — 87070 CULTURE OTHR SPECIMN AEROBIC: CPT

## 2022-06-24 PROCEDURE — 82962 GLUCOSE BLOOD TEST: CPT

## 2022-06-24 PROCEDURE — 2580000003 HC RX 258

## 2022-06-24 PROCEDURE — 6370000000 HC RX 637 (ALT 250 FOR IP): Performed by: HOSPITALIST

## 2022-06-24 PROCEDURE — 6360000002 HC RX W HCPCS: Performed by: INTERNAL MEDICINE

## 2022-06-24 PROCEDURE — 85025 COMPLETE CBC W/AUTO DIFF WBC: CPT

## 2022-06-24 PROCEDURE — 99222 1ST HOSP IP/OBS MODERATE 55: CPT | Performed by: SURGERY

## 2022-06-24 PROCEDURE — 2709999900 HC NON-CHARGEABLE SUPPLY: Performed by: SURGERY

## 2022-06-24 PROCEDURE — 7100000001 HC PACU RECOVERY - ADDTL 15 MIN: Performed by: SURGERY

## 2022-06-24 PROCEDURE — 2580000003 HC RX 258: Performed by: HOSPITALIST

## 2022-06-24 PROCEDURE — 83605 ASSAY OF LACTIC ACID: CPT

## 2022-06-24 PROCEDURE — 6360000002 HC RX W HCPCS: Performed by: PHYSICIAN ASSISTANT

## 2022-06-24 PROCEDURE — 87205 SMEAR GRAM STAIN: CPT

## 2022-06-24 PROCEDURE — 3600000002 HC SURGERY LEVEL 2 BASE: Performed by: SURGERY

## 2022-06-24 PROCEDURE — 87077 CULTURE AEROBIC IDENTIFY: CPT

## 2022-06-24 PROCEDURE — 2500000003 HC RX 250 WO HCPCS

## 2022-06-24 PROCEDURE — 11046 DBRDMT MUSC&/FSCA EA ADDL: CPT | Performed by: SURGERY

## 2022-06-24 PROCEDURE — 2500000003 HC RX 250 WO HCPCS: Performed by: PHYSICIAN ASSISTANT

## 2022-06-24 PROCEDURE — 6360000002 HC RX W HCPCS

## 2022-06-24 RX ORDER — INSULIN LISPRO 100 [IU]/ML
0-6 INJECTION, SOLUTION INTRAVENOUS; SUBCUTANEOUS
Status: DISCONTINUED | OUTPATIENT
Start: 2022-06-24 | End: 2022-06-27

## 2022-06-24 RX ORDER — LEVOTHYROXINE SODIUM 0.1 MG/1
100 TABLET ORAL DAILY
Status: DISCONTINUED | OUTPATIENT
Start: 2022-06-24 | End: 2022-06-24

## 2022-06-24 RX ORDER — ENOXAPARIN SODIUM 100 MG/ML
40 INJECTION SUBCUTANEOUS DAILY
Status: DISCONTINUED | OUTPATIENT
Start: 2022-06-24 | End: 2022-07-02 | Stop reason: HOSPADM

## 2022-06-24 RX ORDER — NICOTINE 21 MG/24HR
1 PATCH, TRANSDERMAL 24 HOURS TRANSDERMAL DAILY
Status: DISCONTINUED | OUTPATIENT
Start: 2022-06-24 | End: 2022-07-02 | Stop reason: HOSPADM

## 2022-06-24 RX ORDER — INSULIN LISPRO 100 [IU]/ML
10 INJECTION, SOLUTION INTRAVENOUS; SUBCUTANEOUS
Status: DISCONTINUED | OUTPATIENT
Start: 2022-06-24 | End: 2022-06-25

## 2022-06-24 RX ORDER — INSULIN GLARGINE 100 [IU]/ML
20 INJECTION, SOLUTION SUBCUTANEOUS NIGHTLY
Status: DISCONTINUED | OUTPATIENT
Start: 2022-06-24 | End: 2022-06-25

## 2022-06-24 RX ORDER — SODIUM CHLORIDE 9 MG/ML
25 INJECTION, SOLUTION INTRAVENOUS PRN
Status: DISCONTINUED | OUTPATIENT
Start: 2022-06-24 | End: 2022-06-24 | Stop reason: HOSPADM

## 2022-06-24 RX ORDER — LINEZOLID 2 MG/ML
600 INJECTION, SOLUTION INTRAVENOUS EVERY 12 HOURS
Status: DISCONTINUED | OUTPATIENT
Start: 2022-06-24 | End: 2022-06-28

## 2022-06-24 RX ORDER — NICOTINE 21 MG/24HR
1 PATCH, TRANSDERMAL 24 HOURS TRANSDERMAL DAILY
Status: DISCONTINUED | OUTPATIENT
Start: 2022-06-24 | End: 2022-06-24 | Stop reason: SDUPTHER

## 2022-06-24 RX ORDER — MEPERIDINE HYDROCHLORIDE 25 MG/ML
12.5 INJECTION INTRAMUSCULAR; INTRAVENOUS; SUBCUTANEOUS ONCE
Status: DISCONTINUED | OUTPATIENT
Start: 2022-06-24 | End: 2022-06-24 | Stop reason: HOSPADM

## 2022-06-24 RX ORDER — INSULIN LISPRO 100 [IU]/ML
0.08 INJECTION, SOLUTION INTRAVENOUS; SUBCUTANEOUS
Status: DISCONTINUED | OUTPATIENT
Start: 2022-06-24 | End: 2022-06-24

## 2022-06-24 RX ORDER — SODIUM CHLORIDE 0.9 % (FLUSH) 0.9 %
5-40 SYRINGE (ML) INJECTION EVERY 12 HOURS SCHEDULED
Status: DISCONTINUED | OUTPATIENT
Start: 2022-06-24 | End: 2022-07-02 | Stop reason: HOSPADM

## 2022-06-24 RX ORDER — ONDANSETRON 4 MG/1
4 TABLET, ORALLY DISINTEGRATING ORAL EVERY 8 HOURS PRN
Status: DISCONTINUED | OUTPATIENT
Start: 2022-06-24 | End: 2022-07-02 | Stop reason: HOSPADM

## 2022-06-24 RX ORDER — HYDRALAZINE HYDROCHLORIDE 20 MG/ML
10 INJECTION INTRAMUSCULAR; INTRAVENOUS
Status: DISCONTINUED | OUTPATIENT
Start: 2022-06-24 | End: 2022-06-24 | Stop reason: HOSPADM

## 2022-06-24 RX ORDER — ONDANSETRON 2 MG/ML
4 INJECTION INTRAMUSCULAR; INTRAVENOUS ONCE
Status: COMPLETED | OUTPATIENT
Start: 2022-06-24 | End: 2022-06-24

## 2022-06-24 RX ORDER — MAGNESIUM SULFATE IN WATER 40 MG/ML
2000 INJECTION, SOLUTION INTRAVENOUS PRN
Status: DISCONTINUED | OUTPATIENT
Start: 2022-06-24 | End: 2022-07-02 | Stop reason: HOSPADM

## 2022-06-24 RX ORDER — ONDANSETRON 2 MG/ML
4 INJECTION INTRAMUSCULAR; INTRAVENOUS EVERY 6 HOURS PRN
Status: DISCONTINUED | OUTPATIENT
Start: 2022-06-24 | End: 2022-07-02 | Stop reason: HOSPADM

## 2022-06-24 RX ORDER — DEXTROSE MONOHYDRATE 50 MG/ML
100 INJECTION, SOLUTION INTRAVENOUS PRN
Status: DISCONTINUED | OUTPATIENT
Start: 2022-06-24 | End: 2022-06-24 | Stop reason: HOSPADM

## 2022-06-24 RX ORDER — ONDANSETRON 2 MG/ML
INJECTION INTRAMUSCULAR; INTRAVENOUS PRN
Status: DISCONTINUED | OUTPATIENT
Start: 2022-06-24 | End: 2022-06-24 | Stop reason: SDUPTHER

## 2022-06-24 RX ORDER — ONDANSETRON 4 MG/1
4 TABLET, ORALLY DISINTEGRATING ORAL 3 TIMES DAILY PRN
Status: DISCONTINUED | OUTPATIENT
Start: 2022-06-24 | End: 2022-06-24 | Stop reason: SDUPTHER

## 2022-06-24 RX ORDER — SODIUM CHLORIDE 9 MG/ML
INJECTION, SOLUTION INTRAVENOUS CONTINUOUS PRN
Status: DISCONTINUED | OUTPATIENT
Start: 2022-06-24 | End: 2022-06-24 | Stop reason: SDUPTHER

## 2022-06-24 RX ORDER — HYDROCODONE BITARTRATE AND ACETAMINOPHEN 5; 325 MG/1; MG/1
1 TABLET ORAL EVERY 6 HOURS PRN
Status: DISCONTINUED | OUTPATIENT
Start: 2022-06-24 | End: 2022-06-28

## 2022-06-24 RX ORDER — ACETAMINOPHEN 650 MG/1
650 SUPPOSITORY RECTAL EVERY 6 HOURS PRN
Status: DISCONTINUED | OUTPATIENT
Start: 2022-06-24 | End: 2022-06-29

## 2022-06-24 RX ORDER — DEXAMETHASONE SODIUM PHOSPHATE 4 MG/ML
INJECTION, SOLUTION INTRA-ARTICULAR; INTRALESIONAL; INTRAMUSCULAR; INTRAVENOUS; SOFT TISSUE PRN
Status: DISCONTINUED | OUTPATIENT
Start: 2022-06-24 | End: 2022-06-24 | Stop reason: SDUPTHER

## 2022-06-24 RX ORDER — LIDOCAINE HYDROCHLORIDE 20 MG/ML
INJECTION, SOLUTION EPIDURAL; INFILTRATION; INTRACAUDAL; PERINEURAL PRN
Status: DISCONTINUED | OUTPATIENT
Start: 2022-06-24 | End: 2022-06-24 | Stop reason: SDUPTHER

## 2022-06-24 RX ORDER — INSULIN LISPRO 100 [IU]/ML
0-3 INJECTION, SOLUTION INTRAVENOUS; SUBCUTANEOUS
Status: DISCONTINUED | OUTPATIENT
Start: 2022-06-24 | End: 2022-06-26

## 2022-06-24 RX ORDER — LACTULOSE 10 G/15ML
20 SOLUTION ORAL 2 TIMES DAILY PRN
Status: DISCONTINUED | OUTPATIENT
Start: 2022-06-24 | End: 2022-07-02 | Stop reason: HOSPADM

## 2022-06-24 RX ORDER — FENTANYL CITRATE 50 UG/ML
50 INJECTION, SOLUTION INTRAMUSCULAR; INTRAVENOUS EVERY 5 MIN PRN
Status: DISCONTINUED | OUTPATIENT
Start: 2022-06-24 | End: 2022-06-24 | Stop reason: HOSPADM

## 2022-06-24 RX ORDER — SODIUM CHLORIDE, SODIUM LACTATE, POTASSIUM CHLORIDE, CALCIUM CHLORIDE 600; 310; 30; 20 MG/100ML; MG/100ML; MG/100ML; MG/100ML
INJECTION, SOLUTION INTRAVENOUS CONTINUOUS
Status: DISCONTINUED | OUTPATIENT
Start: 2022-06-24 | End: 2022-06-24

## 2022-06-24 RX ORDER — LEVOTHYROXINE SODIUM 112 UG/1
112 TABLET ORAL DAILY
Status: DISCONTINUED | OUTPATIENT
Start: 2022-06-25 | End: 2022-07-02 | Stop reason: HOSPADM

## 2022-06-24 RX ORDER — SODIUM CHLORIDE 0.9 % (FLUSH) 0.9 %
5-40 SYRINGE (ML) INJECTION EVERY 12 HOURS SCHEDULED
Status: DISCONTINUED | OUTPATIENT
Start: 2022-06-24 | End: 2022-06-24 | Stop reason: HOSPADM

## 2022-06-24 RX ORDER — MORPHINE SULFATE 4 MG/ML
4 INJECTION, SOLUTION INTRAMUSCULAR; INTRAVENOUS ONCE
Status: COMPLETED | OUTPATIENT
Start: 2022-06-24 | End: 2022-06-24

## 2022-06-24 RX ORDER — PROPOFOL 10 MG/ML
INJECTION, EMULSION INTRAVENOUS PRN
Status: DISCONTINUED | OUTPATIENT
Start: 2022-06-24 | End: 2022-06-24 | Stop reason: SDUPTHER

## 2022-06-24 RX ORDER — SODIUM CHLORIDE 0.9 % (FLUSH) 0.9 %
5-40 SYRINGE (ML) INJECTION PRN
Status: DISCONTINUED | OUTPATIENT
Start: 2022-06-24 | End: 2022-06-24 | Stop reason: HOSPADM

## 2022-06-24 RX ORDER — OXYCODONE HYDROCHLORIDE 5 MG/1
5 TABLET ORAL
Status: DISCONTINUED | OUTPATIENT
Start: 2022-06-24 | End: 2022-06-24 | Stop reason: HOSPADM

## 2022-06-24 RX ORDER — CLOPIDOGREL BISULFATE 75 MG/1
75 TABLET ORAL DAILY
Status: DISCONTINUED | OUTPATIENT
Start: 2022-06-24 | End: 2022-07-02 | Stop reason: HOSPADM

## 2022-06-24 RX ORDER — ATORVASTATIN CALCIUM 40 MG/1
40 TABLET, FILM COATED ORAL NIGHTLY
Status: DISCONTINUED | OUTPATIENT
Start: 2022-06-24 | End: 2022-07-02 | Stop reason: HOSPADM

## 2022-06-24 RX ORDER — SODIUM CHLORIDE 0.9 % (FLUSH) 0.9 %
5-40 SYRINGE (ML) INJECTION PRN
Status: DISCONTINUED | OUTPATIENT
Start: 2022-06-24 | End: 2022-07-02 | Stop reason: HOSPADM

## 2022-06-24 RX ORDER — GABAPENTIN 300 MG/1
600 CAPSULE ORAL 3 TIMES DAILY
Status: DISCONTINUED | OUTPATIENT
Start: 2022-06-24 | End: 2022-07-02 | Stop reason: HOSPADM

## 2022-06-24 RX ORDER — ACETAMINOPHEN 325 MG/1
650 TABLET ORAL EVERY 6 HOURS PRN
Status: DISCONTINUED | OUTPATIENT
Start: 2022-06-24 | End: 2022-06-29

## 2022-06-24 RX ORDER — DIPHENHYDRAMINE HYDROCHLORIDE 50 MG/ML
12.5 INJECTION INTRAMUSCULAR; INTRAVENOUS
Status: DISCONTINUED | OUTPATIENT
Start: 2022-06-24 | End: 2022-06-24 | Stop reason: HOSPADM

## 2022-06-24 RX ORDER — LORAZEPAM 2 MG/ML
0.5 INJECTION INTRAMUSCULAR
Status: DISCONTINUED | OUTPATIENT
Start: 2022-06-24 | End: 2022-06-24 | Stop reason: HOSPADM

## 2022-06-24 RX ORDER — POTASSIUM CHLORIDE 7.45 MG/ML
10 INJECTION INTRAVENOUS PRN
Status: DISCONTINUED | OUTPATIENT
Start: 2022-06-24 | End: 2022-07-02 | Stop reason: HOSPADM

## 2022-06-24 RX ORDER — INSULIN LISPRO 100 [IU]/ML
0-3 INJECTION, SOLUTION INTRAVENOUS; SUBCUTANEOUS NIGHTLY
Status: DISCONTINUED | OUTPATIENT
Start: 2022-06-24 | End: 2022-06-24

## 2022-06-24 RX ORDER — POTASSIUM CHLORIDE 20 MEQ/1
40 TABLET, EXTENDED RELEASE ORAL PRN
Status: DISCONTINUED | OUTPATIENT
Start: 2022-06-24 | End: 2022-07-02 | Stop reason: HOSPADM

## 2022-06-24 RX ORDER — METRONIDAZOLE 500 MG/100ML
500 INJECTION, SOLUTION INTRAVENOUS ONCE
Status: COMPLETED | OUTPATIENT
Start: 2022-06-24 | End: 2022-06-24

## 2022-06-24 RX ORDER — ONDANSETRON 2 MG/ML
4 INJECTION INTRAMUSCULAR; INTRAVENOUS
Status: DISCONTINUED | OUTPATIENT
Start: 2022-06-24 | End: 2022-06-24 | Stop reason: HOSPADM

## 2022-06-24 RX ORDER — SODIUM CHLORIDE 9 MG/ML
INJECTION, SOLUTION INTRAVENOUS CONTINUOUS
Status: DISCONTINUED | OUTPATIENT
Start: 2022-06-24 | End: 2022-06-25

## 2022-06-24 RX ORDER — FENTANYL CITRATE 50 UG/ML
INJECTION, SOLUTION INTRAMUSCULAR; INTRAVENOUS PRN
Status: DISCONTINUED | OUTPATIENT
Start: 2022-06-24 | End: 2022-06-24 | Stop reason: SDUPTHER

## 2022-06-24 RX ORDER — POLYETHYLENE GLYCOL 3350 17 G/17G
17 POWDER, FOR SOLUTION ORAL DAILY PRN
Status: DISCONTINUED | OUTPATIENT
Start: 2022-06-24 | End: 2022-07-02 | Stop reason: HOSPADM

## 2022-06-24 RX ORDER — SODIUM CHLORIDE 9 MG/ML
INJECTION, SOLUTION INTRAVENOUS PRN
Status: DISCONTINUED | OUTPATIENT
Start: 2022-06-24 | End: 2022-07-02 | Stop reason: HOSPADM

## 2022-06-24 RX ORDER — PROCHLORPERAZINE EDISYLATE 5 MG/ML
5 INJECTION INTRAMUSCULAR; INTRAVENOUS
Status: DISCONTINUED | OUTPATIENT
Start: 2022-06-24 | End: 2022-06-24 | Stop reason: HOSPADM

## 2022-06-24 RX ORDER — DEXTROSE MONOHYDRATE 50 MG/ML
100 INJECTION, SOLUTION INTRAVENOUS PRN
Status: DISCONTINUED | OUTPATIENT
Start: 2022-06-24 | End: 2022-07-02 | Stop reason: HOSPADM

## 2022-06-24 RX ORDER — INSULIN GLARGINE 100 [IU]/ML
0.25 INJECTION, SOLUTION SUBCUTANEOUS NIGHTLY
Status: DISCONTINUED | OUTPATIENT
Start: 2022-06-24 | End: 2022-06-24

## 2022-06-24 RX ORDER — IPRATROPIUM BROMIDE AND ALBUTEROL SULFATE 2.5; .5 MG/3ML; MG/3ML
1 SOLUTION RESPIRATORY (INHALATION)
Status: DISCONTINUED | OUTPATIENT
Start: 2022-06-24 | End: 2022-06-24 | Stop reason: HOSPADM

## 2022-06-24 RX ADMIN — INSULIN LISPRO 3 UNITS: 100 INJECTION, SOLUTION INTRAVENOUS; SUBCUTANEOUS at 21:01

## 2022-06-24 RX ADMIN — INSULIN GLARGINE 20 UNITS: 100 INJECTION, SOLUTION SUBCUTANEOUS at 21:02

## 2022-06-24 RX ADMIN — SODIUM CHLORIDE: 9 INJECTION, SOLUTION INTRAVENOUS at 05:32

## 2022-06-24 RX ADMIN — HYDROCODONE BITARTRATE AND ACETAMINOPHEN 1 TABLET: 5; 325 TABLET ORAL at 20:58

## 2022-06-24 RX ADMIN — INSULIN LISPRO 4 UNITS: 100 INJECTION, SOLUTION INTRAVENOUS; SUBCUTANEOUS at 12:46

## 2022-06-24 RX ADMIN — GABAPENTIN 600 MG: 300 CAPSULE ORAL at 14:19

## 2022-06-24 RX ADMIN — CEFEPIME 2000 MG: 2 INJECTION, POWDER, FOR SOLUTION INTRAVENOUS at 01:33

## 2022-06-24 RX ADMIN — PIPERACILLIN AND TAZOBACTAM 3375 MG: 3; .375 INJECTION, POWDER, LYOPHILIZED, FOR SOLUTION INTRAVENOUS at 21:06

## 2022-06-24 RX ADMIN — PIPERACILLIN AND TAZOBACTAM 3375 MG: 3; .375 INJECTION, POWDER, LYOPHILIZED, FOR SOLUTION INTRAVENOUS at 08:44

## 2022-06-24 RX ADMIN — SODIUM CHLORIDE: 9 INJECTION, SOLUTION INTRAVENOUS at 10:17

## 2022-06-24 RX ADMIN — FENTANYL CITRATE 100 MCG: 50 INJECTION, SOLUTION INTRAMUSCULAR; INTRAVENOUS at 09:02

## 2022-06-24 RX ADMIN — LIDOCAINE HYDROCHLORIDE 100 MG: 20 INJECTION, SOLUTION EPIDURAL; INFILTRATION; INTRACAUDAL; PERINEURAL at 09:02

## 2022-06-24 RX ADMIN — DEXAMETHASONE SODIUM PHOSPHATE 4 MG: 4 INJECTION, SOLUTION INTRAMUSCULAR; INTRAVENOUS at 09:02

## 2022-06-24 RX ADMIN — HYDROMORPHONE HYDROCHLORIDE 0.5 MG: 1 INJECTION, SOLUTION INTRAMUSCULAR; INTRAVENOUS; SUBCUTANEOUS at 11:04

## 2022-06-24 RX ADMIN — LINEZOLID 600 MG: 600 INJECTION, SOLUTION INTRAVENOUS at 18:42

## 2022-06-24 RX ADMIN — ATORVASTATIN CALCIUM 40 MG: 40 TABLET, FILM COATED ORAL at 20:58

## 2022-06-24 RX ADMIN — PIPERACILLIN AND TAZOBACTAM 3375 MG: 3; .375 INJECTION, POWDER, LYOPHILIZED, FOR SOLUTION INTRAVENOUS at 12:37

## 2022-06-24 RX ADMIN — ONDANSETRON 4 MG: 2 INJECTION INTRAMUSCULAR; INTRAVENOUS at 09:17

## 2022-06-24 RX ADMIN — HYDROCODONE BITARTRATE AND ACETAMINOPHEN 1 TABLET: 5; 325 TABLET ORAL at 16:37

## 2022-06-24 RX ADMIN — INSULIN LISPRO 10 UNITS: 100 INJECTION, SOLUTION INTRAVENOUS; SUBCUTANEOUS at 16:38

## 2022-06-24 RX ADMIN — PROPOFOL 150 MG: 10 INJECTION, EMULSION INTRAVENOUS at 09:02

## 2022-06-24 RX ADMIN — VANCOMYCIN HYDROCHLORIDE 1250 MG: 1.25 INJECTION, POWDER, LYOPHILIZED, FOR SOLUTION INTRAVENOUS at 05:34

## 2022-06-24 RX ADMIN — GABAPENTIN 600 MG: 300 CAPSULE ORAL at 20:59

## 2022-06-24 RX ADMIN — SODIUM CHLORIDE: 900 INJECTION INTRAVENOUS at 08:57

## 2022-06-24 RX ADMIN — SODIUM CHLORIDE, PRESERVATIVE FREE 10 ML: 5 INJECTION INTRAVENOUS at 21:06

## 2022-06-24 RX ADMIN — INSULIN LISPRO 4 UNITS: 100 INJECTION, SOLUTION INTRAVENOUS; SUBCUTANEOUS at 16:38

## 2022-06-24 RX ADMIN — METRONIDAZOLE 500 MG: 500 INJECTION, SOLUTION INTRAVENOUS at 03:17

## 2022-06-24 RX ADMIN — ONDANSETRON 4 MG: 2 INJECTION INTRAMUSCULAR; INTRAVENOUS at 01:33

## 2022-06-24 RX ADMIN — MORPHINE SULFATE 4 MG: 4 INJECTION, SOLUTION INTRAMUSCULAR; INTRAVENOUS at 01:33

## 2022-06-24 RX ADMIN — ENOXAPARIN SODIUM 40 MG: 100 INJECTION SUBCUTANEOUS at 12:35

## 2022-06-24 RX ADMIN — INSULIN LISPRO 10 UNITS: 100 INJECTION, SOLUTION INTRAVENOUS; SUBCUTANEOUS at 12:46

## 2022-06-24 RX ADMIN — ACETAMINOPHEN 650 MG: 325 TABLET ORAL at 05:34

## 2022-06-24 ASSESSMENT — ENCOUNTER SYMPTOMS
NAUSEA: 0
SHORTNESS OF BREATH: 0
COUGH: 0
SORE THROAT: 0
BACK PAIN: 0
VOMITING: 0
ABDOMINAL DISTENTION: 0
ABDOMINAL PAIN: 0
COLOR CHANGE: 0
TROUBLE SWALLOWING: 0
CHOKING: 0
STRIDOR: 0
BLOOD IN STOOL: 0

## 2022-06-24 ASSESSMENT — PAIN - FUNCTIONAL ASSESSMENT
PAIN_FUNCTIONAL_ASSESSMENT: PREVENTS OR INTERFERES SOME ACTIVE ACTIVITIES AND ADLS
PAIN_FUNCTIONAL_ASSESSMENT: 0-10
PAIN_FUNCTIONAL_ASSESSMENT: PREVENTS OR INTERFERES SOME ACTIVE ACTIVITIES AND ADLS

## 2022-06-24 ASSESSMENT — PAIN DESCRIPTION - DESCRIPTORS
DESCRIPTORS: THROBBING
DESCRIPTORS: ACHING;SHOOTING;THROBBING
DESCRIPTORS: ACHING;GNAWING
DESCRIPTORS: THROBBING;POUNDING;ACHING

## 2022-06-24 ASSESSMENT — PAIN DESCRIPTION - ORIENTATION
ORIENTATION: RIGHT

## 2022-06-24 ASSESSMENT — PAIN SCALES - GENERAL
PAINLEVEL_OUTOF10: 8
PAINLEVEL_OUTOF10: 6
PAINLEVEL_OUTOF10: 8
PAINLEVEL_OUTOF10: 8
PAINLEVEL_OUTOF10: 2
PAINLEVEL_OUTOF10: 10
PAINLEVEL_OUTOF10: 0
PAINLEVEL_OUTOF10: 0
PAINLEVEL_OUTOF10: 5

## 2022-06-24 ASSESSMENT — PAIN DESCRIPTION - LOCATION
LOCATION: FOOT

## 2022-06-24 NOTE — PROGRESS NOTES
[address] [facility]  consulted by Dr. Julia Urias for monitoring and adjustment. Indication for treatment: Gangrene of right foot  Goal trough: [x] 10-15 mcg/mL or [] 15-20 mcg/ml  AUC/SUHA: [x] <500 or [] 400-600    Pertinent Laboratory Values:   Temp Readings from Last 3 Encounters:   06/23/22 98.5 °F (36.9 °C) (Oral)   11/08/21 99.7 °F (37.6 °C) (Oral)   11/05/21 96.8 °F (36 °C)     Recent Labs     06/24/22  0125   WBC 10.7*   LACTATE 1.3     Recent Labs     06/24/22  0125   BUN 19   CREATININE 0.7*     Estimated Creatinine Clearance: 97 mL/min (A) (based on SCr of 0.7 mg/dL (L)). No intake or output data in the 24 hours ending 06/24/22 0541    Pertinent Cultures:  Date    Source    Results  6/24               Wound    Pending  6/24               Blood x 2   Ordered    Vancomycin level:   TROUGH:  No results for input(s): VANCOTROUGH in the last 72 hours. RANDOM:  No results for input(s): VANCORANDOM in the last 72 hours. Assessment:  SCr, BUN, and urine output: WNL  Day(s) of therapy: 1  Vancomycin concentration: Pending    Plan:  Received vancomycin 1250 mg x 1 in the ED  Start vancomycin 750 mg IVPB q12h  Predicted trough of 11.8 and AUC: 392 at steady-state  Pharmacy will continue to monitor patient and adjust therapy as indicated    VANCOMYCIN CONCENTRATION SCHEDULED FOR 6/25 @0500    Thank you for the consult.   Daysi Medeiros, [facility]  6/24/2022 5:41 AM
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
## 2022-06-24 NOTE — OP NOTE
Operative Note      Patient: Josh Cross  YOB: 1960  MRN: [id number]    Date of Procedure: 6/24/2022    Pre-Op Diagnosis: Right Foot Wound    Post-Op Diagnosis: Same       Procedure(s):  RIGHT FOOT WOUND DEBRIDEMENT, WOUND VAC PLACEMENT    Surgeon(s):  Lia Srivastava DO    Assistant:   * No surgical staff found *    Anesthesia: Choice    Estimated Blood Loss (mL): Minimal    Complications: None    Specimens:   ID Type Source Tests Collected by Time Destination   1 : Right Plantar Foot Tissue Culture Specimen Foot CULTURE, SURGICAL Lia Srivastava DO 6/24/2022 0919        Implants:  * No implants in log *      Drains:   Negative Pressure Wound Therapy Foot Anterior;Right (Active)   Dressing Status Clean, dry & intact 06/24/22 1017   Drainage Amount None 06/24/22 1017       Findings: Necrotic right foot wound    Indication: Right foot wound    Procedure: Patient was taken to the OR and laid in supine position. After induction of general anesthesia, patient was prepped and draped in usual sterile fashion. Using a 10 blade scalpel the wound was sharply debrided. Necrotic tissue was excised from the lateral right foot wound. This extended onto the lateral plantar aspect. Necrotic tissue was debrided back to healthy bleeding tissue. Hemostasis achieved with electrocautery. Total debrided area 8 x 5 cm x 1 cm deep. Wound debrided down to muscle/fascia. Close to bone laterally. A wound VAC was placed with silver foam.  Wound VAC applied with good seal.  Patient tolerated the procedure well, without complication, and end of case was transported to the recovery area in stable condition. All counts correct at end of case.     Electronically signed by Lia Srivastava DO on 6/24/2022 at 12:26 PM
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
## 2022-06-24 NOTE — CARE COORDINATION
Pt admitted to [facility] after having a debridement of right foot and wound vac was placed. LSW is not able to get precert for a home wound vac due to not having measurements of the wound. LSW was informed that pt may need IV anti-bio after discharge. Pt has a past history of IV drug use. Pt will not be able to go home on IV anti-bio. ID has been consulted. LSW was also informed that pt is threatening to leave AMA. CM following.
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
## 2022-06-24 NOTE — PROGRESS NOTES
[address] [place] [address]    Lynn Gonzalez is a 60 y.o. male started on vancomycin for skin and soft tissue infection. Pharmacy consulted by ED provider Josey Argueta PA-C to order a dose of vancomycin in the emergency department. Other antimicrobials: cefepime, metronidazole    Ht Readings from Last 1 Encounters:   06/23/22 5' 8\" (1.727 m)     Wt Readings from Last 3 Encounters:   06/23/22 140 lb (63.5 kg)   11/24/21 131 lb 1.6 oz (59.5 kg)   11/18/21 133 lb 12.8 oz (60.7 kg)        Pertinent Laboratory Values:   Temp Readings from Last 3 Encounters:   06/23/22 98.5 °F (36.9 °C) (Oral)   11/08/21 99.7 °F (37.6 °C) (Oral)   11/05/21 96.8 °F (36 °C)     No results for input(s): WBC, LACTATE in the last 72 hours. No results for input(s): BUN, CREATININE in the last 72 hours. CrCl cannot be calculated (Patient's most recent lab result is older than the maximum 30 days allowed. ). No intake or output data in the 24 hours ending 06/24/22 0131    Assessment/Plan:  Pharmacy will order vancomycin 1250 mg (19.7 mg/kg). Please note, pharmacy will order a one-time dose of vancomycin for the Emergency Department. The consult will need to be re-ordered if vancomycin is to continue upon admission. Thank you for the consult.   Felicity Spears [facility]  6/24/2022 1:31 AM

## 2022-06-24 NOTE — PLAN OF CARE
He is a 79-year-old male, diabetic, who presented to the hospital on because of right foot gangrene. He underwent debridement this morning. Wound VAC was placed. Antibiotics were continued.     Kortney Vazquez MD

## 2022-06-25 LAB
ALBUMIN SERPL-MCNC: 2.4 GM/DL (ref 3.4–5)
ALP BLD-CCNC: 109 IU/L (ref 40–128)
ALT SERPL-CCNC: 20 U/L (ref 10–40)
ANION GAP SERPL CALCULATED.3IONS-SCNC: 8 MMOL/L (ref 4–16)
AST SERPL-CCNC: 30 IU/L (ref 15–37)
BASOPHILS ABSOLUTE: 0 K/CU MM
BASOPHILS RELATIVE PERCENT: 0.3 % (ref 0–1)
BILIRUB SERPL-MCNC: 0.3 MG/DL (ref 0–1)
BUN BLDV-MCNC: 21 MG/DL (ref 6–23)
CALCIUM SERPL-MCNC: 7.9 MG/DL (ref 8.3–10.6)
CHLORIDE BLD-SCNC: 102 MMOL/L (ref 99–110)
CO2: 24 MMOL/L (ref 21–32)
CREAT SERPL-MCNC: 0.8 MG/DL (ref 0.9–1.3)
DIFFERENTIAL TYPE: ABNORMAL
DOSE AMOUNT: NORMAL
DOSE TIME: NORMAL
EOSINOPHILS ABSOLUTE: 0 K/CU MM
EOSINOPHILS RELATIVE PERCENT: 0.1 % (ref 0–3)
ESTIMATED AVERAGE GLUCOSE: 177 MG/DL
GFR AFRICAN AMERICAN: >60 ML/MIN/1.73M2
GFR NON-AFRICAN AMERICAN: >60 ML/MIN/1.73M2
GLUCOSE BLD-MCNC: 104 MG/DL (ref 70–99)
GLUCOSE BLD-MCNC: 178 MG/DL (ref 70–99)
GLUCOSE BLD-MCNC: 224 MG/DL (ref 70–99)
GLUCOSE BLD-MCNC: 279 MG/DL (ref 70–99)
GLUCOSE BLD-MCNC: 314 MG/DL (ref 70–99)
GLUCOSE BLD-MCNC: 320 MG/DL (ref 70–99)
GLUCOSE BLD-MCNC: 323 MG/DL (ref 70–99)
HBA1C MFR BLD: 7.8 % (ref 4.2–6.3)
HCT VFR BLD CALC: 31.7 % (ref 42–52)
HEMOGLOBIN: 11 GM/DL (ref 13.5–18)
HIGH SENSITIVE C-REACTIVE PROTEIN: 15.1 MG/L
IMMATURE NEUTROPHIL %: 0.6 % (ref 0–0.43)
LYMPHOCYTES ABSOLUTE: 0.9 K/CU MM
LYMPHOCYTES RELATIVE PERCENT: 8 % (ref 24–44)
MCH RBC QN AUTO: 29.6 PG (ref 27–31)
MCHC RBC AUTO-ENTMCNC: 34.7 % (ref 32–36)
MCV RBC AUTO: 85.4 FL (ref 78–100)
MONOCYTES ABSOLUTE: 0.7 K/CU MM
MONOCYTES RELATIVE PERCENT: 6.2 % (ref 0–4)
NUCLEATED RBC %: 0 %
PDW BLD-RTO: 13.6 % (ref 11.7–14.9)
PLATELET # BLD: 141 K/CU MM (ref 140–440)
PMV BLD AUTO: 11 FL (ref 7.5–11.1)
POTASSIUM SERPL-SCNC: 4.7 MMOL/L (ref 3.5–5.1)
PROCALCITONIN: 0.14
RBC # BLD: 3.71 M/CU MM (ref 4.6–6.2)
SEGMENTED NEUTROPHILS ABSOLUTE COUNT: 9.5 K/CU MM
SEGMENTED NEUTROPHILS RELATIVE PERCENT: 84.8 % (ref 36–66)
SODIUM BLD-SCNC: 134 MMOL/L (ref 135–145)
TOTAL IMMATURE NEUTOROPHIL: 0.07 K/CU MM
TOTAL NUCLEATED RBC: 0 K/CU MM
TOTAL PROTEIN: 5.9 GM/DL (ref 6.4–8.2)
VANCOMYCIN RANDOM: <4 UG/ML
WBC # BLD: 11.2 K/CU MM (ref 4–10.5)

## 2022-06-25 PROCEDURE — 2580000003 HC RX 258: Performed by: HOSPITALIST

## 2022-06-25 PROCEDURE — 6370000000 HC RX 637 (ALT 250 FOR IP): Performed by: INTERNAL MEDICINE

## 2022-06-25 PROCEDURE — 82962 GLUCOSE BLOOD TEST: CPT

## 2022-06-25 PROCEDURE — 80202 ASSAY OF VANCOMYCIN: CPT

## 2022-06-25 PROCEDURE — 6360000002 HC RX W HCPCS: Performed by: HOSPITALIST

## 2022-06-25 PROCEDURE — APPNB15 APP NON BILLABLE TIME 0-15 MINS: Performed by: NURSE PRACTITIONER

## 2022-06-25 PROCEDURE — 6360000002 HC RX W HCPCS: Performed by: INTERNAL MEDICINE

## 2022-06-25 PROCEDURE — 85025 COMPLETE CBC W/AUTO DIFF WBC: CPT

## 2022-06-25 PROCEDURE — 94761 N-INVAS EAR/PLS OXIMETRY MLT: CPT

## 2022-06-25 PROCEDURE — 83036 HEMOGLOBIN GLYCOSYLATED A1C: CPT

## 2022-06-25 PROCEDURE — 1200000000 HC SEMI PRIVATE

## 2022-06-25 PROCEDURE — 86141 C-REACTIVE PROTEIN HS: CPT

## 2022-06-25 PROCEDURE — 99024 POSTOP FOLLOW-UP VISIT: CPT | Performed by: NURSE PRACTITIONER

## 2022-06-25 PROCEDURE — 6370000000 HC RX 637 (ALT 250 FOR IP): Performed by: HOSPITALIST

## 2022-06-25 PROCEDURE — 36415 COLL VENOUS BLD VENIPUNCTURE: CPT

## 2022-06-25 PROCEDURE — 80053 COMPREHEN METABOLIC PANEL: CPT

## 2022-06-25 PROCEDURE — 84145 PROCALCITONIN (PCT): CPT

## 2022-06-25 RX ORDER — INSULIN LISPRO 100 [IU]/ML
20 INJECTION, SOLUTION INTRAVENOUS; SUBCUTANEOUS
Status: DISCONTINUED | OUTPATIENT
Start: 2022-06-25 | End: 2022-06-29

## 2022-06-25 RX ORDER — INSULIN GLARGINE 100 [IU]/ML
30 INJECTION, SOLUTION SUBCUTANEOUS NIGHTLY
Status: DISCONTINUED | OUTPATIENT
Start: 2022-06-25 | End: 2022-06-26

## 2022-06-25 RX ORDER — INSULIN LISPRO 100 [IU]/ML
15 INJECTION, SOLUTION INTRAVENOUS; SUBCUTANEOUS
Status: DISCONTINUED | OUTPATIENT
Start: 2022-06-25 | End: 2022-06-25

## 2022-06-25 RX ADMIN — INSULIN GLARGINE 30 UNITS: 100 INJECTION, SOLUTION SUBCUTANEOUS at 21:06

## 2022-06-25 RX ADMIN — HYDROCODONE BITARTRATE AND ACETAMINOPHEN 1 TABLET: 5; 325 TABLET ORAL at 04:04

## 2022-06-25 RX ADMIN — PIPERACILLIN AND TAZOBACTAM 3375 MG: 3; .375 INJECTION, POWDER, LYOPHILIZED, FOR SOLUTION INTRAVENOUS at 14:12

## 2022-06-25 RX ADMIN — HYDROMORPHONE HYDROCHLORIDE 0.5 MG: 1 INJECTION, SOLUTION INTRAMUSCULAR; INTRAVENOUS; SUBCUTANEOUS at 01:11

## 2022-06-25 RX ADMIN — INSULIN LISPRO 15 UNITS: 100 INJECTION, SOLUTION INTRAVENOUS; SUBCUTANEOUS at 09:10

## 2022-06-25 RX ADMIN — PIPERACILLIN AND TAZOBACTAM 3375 MG: 3; .375 INJECTION, POWDER, LYOPHILIZED, FOR SOLUTION INTRAVENOUS at 01:08

## 2022-06-25 RX ADMIN — ATORVASTATIN CALCIUM 40 MG: 40 TABLET, FILM COATED ORAL at 21:04

## 2022-06-25 RX ADMIN — PIPERACILLIN AND TAZOBACTAM 3375 MG: 3; .375 INJECTION, POWDER, LYOPHILIZED, FOR SOLUTION INTRAVENOUS at 05:39

## 2022-06-25 RX ADMIN — INSULIN LISPRO 2 UNITS: 100 INJECTION, SOLUTION INTRAVENOUS; SUBCUTANEOUS at 21:05

## 2022-06-25 RX ADMIN — ENOXAPARIN SODIUM 40 MG: 100 INJECTION SUBCUTANEOUS at 09:04

## 2022-06-25 RX ADMIN — HYDROCODONE BITARTRATE AND ACETAMINOPHEN 1 TABLET: 5; 325 TABLET ORAL at 21:04

## 2022-06-25 RX ADMIN — GABAPENTIN 600 MG: 300 CAPSULE ORAL at 21:04

## 2022-06-25 RX ADMIN — LINEZOLID 600 MG: 600 INJECTION, SOLUTION INTRAVENOUS at 17:57

## 2022-06-25 RX ADMIN — PIPERACILLIN AND TAZOBACTAM 3375 MG: 3; .375 INJECTION, POWDER, LYOPHILIZED, FOR SOLUTION INTRAVENOUS at 19:24

## 2022-06-25 RX ADMIN — HYDROMORPHONE HYDROCHLORIDE 0.5 MG: 1 INJECTION, SOLUTION INTRAMUSCULAR; INTRAVENOUS; SUBCUTANEOUS at 09:05

## 2022-06-25 RX ADMIN — HYDROCODONE BITARTRATE AND ACETAMINOPHEN 1 TABLET: 5; 325 TABLET ORAL at 14:10

## 2022-06-25 RX ADMIN — GABAPENTIN 600 MG: 300 CAPSULE ORAL at 16:06

## 2022-06-25 RX ADMIN — HYDROMORPHONE HYDROCHLORIDE 0.5 MG: 1 INJECTION, SOLUTION INTRAMUSCULAR; INTRAVENOUS; SUBCUTANEOUS at 17:02

## 2022-06-25 RX ADMIN — INSULIN LISPRO 1 UNITS: 100 INJECTION, SOLUTION INTRAVENOUS; SUBCUTANEOUS at 01:11

## 2022-06-25 RX ADMIN — LINEZOLID 600 MG: 600 INJECTION, SOLUTION INTRAVENOUS at 04:05

## 2022-06-25 RX ADMIN — INSULIN LISPRO 4 UNITS: 100 INJECTION, SOLUTION INTRAVENOUS; SUBCUTANEOUS at 09:10

## 2022-06-25 RX ADMIN — CLOPIDOGREL BISULFATE 75 MG: 75 TABLET ORAL at 09:04

## 2022-06-25 RX ADMIN — SODIUM CHLORIDE: 9 INJECTION, SOLUTION INTRAVENOUS at 09:07

## 2022-06-25 RX ADMIN — LEVOTHYROXINE SODIUM 112 MCG: 0.11 TABLET ORAL at 05:41

## 2022-06-25 RX ADMIN — GABAPENTIN 600 MG: 300 CAPSULE ORAL at 09:04

## 2022-06-25 RX ADMIN — SODIUM CHLORIDE, PRESERVATIVE FREE 10 ML: 5 INJECTION INTRAVENOUS at 21:10

## 2022-06-25 RX ADMIN — INSULIN LISPRO 15 UNITS: 100 INJECTION, SOLUTION INTRAVENOUS; SUBCUTANEOUS at 12:10

## 2022-06-25 RX ADMIN — INSULIN LISPRO 4 UNITS: 100 INJECTION, SOLUTION INTRAVENOUS; SUBCUTANEOUS at 12:10

## 2022-06-25 ASSESSMENT — PAIN SCALES - GENERAL
PAINLEVEL_OUTOF10: 8
PAINLEVEL_OUTOF10: 7
PAINLEVEL_OUTOF10: 2
PAINLEVEL_OUTOF10: 8
PAINLEVEL_OUTOF10: 9
PAINLEVEL_OUTOF10: 2
PAINLEVEL_OUTOF10: 6
PAINLEVEL_OUTOF10: 9
PAINLEVEL_OUTOF10: 3
PAINLEVEL_OUTOF10: 2

## 2022-06-25 ASSESSMENT — PAIN DESCRIPTION - DESCRIPTORS
DESCRIPTORS: ACHING
DESCRIPTORS: PINS AND NEEDLES
DESCRIPTORS: ACHING;STABBING

## 2022-06-25 ASSESSMENT — PAIN DESCRIPTION - ONSET: ONSET: ON-GOING

## 2022-06-25 ASSESSMENT — PAIN DESCRIPTION - ORIENTATION
ORIENTATION: RIGHT

## 2022-06-25 ASSESSMENT — PAIN DESCRIPTION - LOCATION
LOCATION: FOOT

## 2022-06-25 ASSESSMENT — PAIN SCALES - WONG BAKER
WONGBAKER_NUMERICALRESPONSE: 2
WONGBAKER_NUMERICALRESPONSE: 4

## 2022-06-25 ASSESSMENT — PAIN - FUNCTIONAL ASSESSMENT
PAIN_FUNCTIONAL_ASSESSMENT: PREVENTS OR INTERFERES SOME ACTIVE ACTIVITIES AND ADLS

## 2022-06-25 ASSESSMENT — PAIN DESCRIPTION - PAIN TYPE: TYPE: SURGICAL PAIN

## 2022-06-25 NOTE — PLAN OF CARE
Problem: Discharge Planning  Goal: Discharge to home or other facility with appropriate resources  Outcome: Progressing  Flowsheets (Taken 6/24/2022 [id number] by Royal Gabriel RN)  Discharge to home or other facility with appropriate resources:   Identify barriers to discharge with patient and caregiver   Arrange for interpreters to assist at discharge as needed   Arrange for needed discharge resources and transportation as appropriate   Identify discharge learning needs (meds, wound care, etc)     Problem: Skin/Tissue Integrity  Goal: Absence of new skin breakdown  Description: 1. Monitor for areas of redness and/or skin breakdown  2. Assess vascular access sites hourly  3. Every 4-6 hours minimum:  Change oxygen saturation probe site  4. Every 4-6 hours:  If on nasal continuous positive airway pressure, respiratory therapy assess nares and determine need for appliance change or resting period.   Outcome: Progressing     Problem: Safety - Adult  Goal: Free from fall injury  Outcome: Progressing

## 2022-06-25 NOTE — PROGRESS NOTES
Hospitalist Progress Note      Name:  Nimsiha Tuttle /Age/Sex: 1960  (60 y.o. male)   MRN & CSN:  [id number] & [id number] Admission Date/Time: 2022 12:29 AM   Location:  [address] [place] [address] PCP: Obey Larsen MD         Hospital Day: 2    Assessment and Plan:   Nimisha Tuttle is a 60 y.o.  male  who presents with Diabetic foot ([facility]    Diabetic foot infection, right foot gangrene  · Status post right foot wound debridement with wound VAC placement   · Afebrile, WBC 11.2  · Right foot x-ray on admission with soft tissue ulcer with soft tissue gas adjacent to the proximal right fifth metatarsal  · Continue Zosyn and linezolid  · Surgery and infectious disease following    Type 2 Diabetes   Continue Lantus   Continue Prandial insulin   Sliding scale.  Hypoglycemia protocol. Accucheck. Coronary Artery Disease:    S/P CABG/PCI    Hypothyroidism:    continue Synthroid. Diabetic neuropathy: Continue gabapentin. Tobacco abuse: Advised    Diet ADULT DIET; Regular; 5 carb choices (75 gm/meal); Low Fat/Low Chol/High Fiber/2 gm Na   DVT Prophylaxis [x] Lovenox, []  Heparin, [] SCDs, [] Warfarin  [] NOAC     GI Prophylaxis [] PPI,  [] H2 Blocker,  [] Carafate,  [x] Diet/Tube Feeds   Code Status Full Code   MDM [] Low, [] Moderate,[x]  High     History of Present Illness:     Chief Complaint: Diabetic foot ([facility]    Seen and examined today. Complaining of pain in the right foot. Denied fever or chills. Ten point ROS reviewed negative, unless as noted above    Objective: Intake/Output Summary (Last 24 hours) at 2022 1112  Last data filed at 2022 0935  Gross per 24 hour   Intake 480 ml   Output 600 ml   Net -120 ml      Vitals:   Vitals:    22 0935   BP:    Pulse:    Resp: 18   Temp:    SpO2:      Physical Exam:   GEN Awake male, sitting upright in bed in no apparent distress. Appears given age. EYES Pupils are equally round.   No scleral erythema, discharge, or conjunctivitis. HENT Mucous membranes are moist. Oral pharynx without exudates, no evidence of thrush. NECK Supple, no apparent thyromegaly or masses. RESP Clear to auscultation, no wheezes, rales or rhonchi. Symmetric chest movement while on room air. CARDIO/VASC S1/S2 auscultated. Regular rate without appreciable murmurs, rubs, or gallops. No JVD or carotid bruits. Peripheral pulses equal bilaterally and palpable. No peripheral edema. GI Abdomen is soft without significant tenderness, masses, or guarding. Bowel sounds are normoactive. Rectal exam deferred. MSK No gross joint deformities. 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 NEURO Cranial nerves appear grossly intact, normal speech, no lateralizing weakness. PSYCH Awake, alert, oriented x 4. Affect appropriate.     Medications:   Medications:    insulin glargine  30 Units SubCUTAneous Nightly    insulin lispro  15 Units SubCUTAneous TID WC    gabapentin  600 mg Oral TID    atorvastatin  40 mg Oral Nightly    clopidogrel  75 mg Oral Daily    nicotine  1 patch TransDERmal Daily    sodium chloride flush  5-40 mL IntraVENous 2 times per day    enoxaparin  40 mg SubCUTAneous Daily    insulin lispro  0-6 Units SubCUTAneous TID     piperacillin-tazobactam  3,375 mg IntraVENous Q6H    levothyroxine  112 mcg Oral Daily    insulin lispro  0-3 Units SubCUTAneous 2 times per day    linezolid  600 mg IntraVENous Q12H      Infusions:    sodium chloride      sodium chloride 50 mL/hr at 06/25/22 0907    dextrose       PRN Meds: lactulose, 20 g, BID PRN  sodium chloride flush, 5-40 mL, PRN  sodium chloride, , PRN  ondansetron, 4 mg, Q8H PRN   Or  ondansetron, 4 mg, Q6H PRN  polyethylene glycol, 17 g, Daily PRN  acetaminophen, 650 mg, Q6H PRN   Or  acetaminophen, 650 mg, Q6H PRN  potassium chloride, 40 mEq, PRN   Or  potassium alternative oral replacement, 40 mEq, PRN   Or  potassium chloride, 10 mEq, PRN  magnesium sulfate, 2,000 mg, PRN  glucose, 4 tablet, PRN  dextrose bolus, 125 mL, PRN   Or  dextrose bolus, 250 mL, PRN  glucagon (rDNA), 1 mg, PRN  dextrose, 100 mL/hr, PRN  HYDROmorphone, 0.5 mg, Q4H PRN  HYDROcodone-acetaminophen, 1 tablet, Q6H PRN        Recent Labs     06/24/22  0125 06/25/22  0351   WBC 10.7* 11.2*   HGB 11.6* 11.0*   HCT 34.2* 31.7*    141      Recent Labs     06/24/22  0125 06/25/22  0351   * 134*   K 3.7 4.7    102   CO2 24 24   BUN 19 21   CREATININE 0.7* 0.8*     Recent Labs     06/24/22  0125 06/25/22  0351   AST 24 30   ALT 17 20   BILITOT 0.4 0.3   ALKPHOS 114 109     Imaging reviewed    Electronically signed by Oumou Coon MD on 6/25/2022 at 11:12 AM

## 2022-06-25 NOTE — CARE COORDINATION
Patient in the weekend follow up list. LSW reviewed the patient medical record. Patient had a wound vac placed 6/25. LSW faxed wound vac information packet to Franci/MARÍA so that auth can be initiated.

## 2022-06-25 NOTE — PROGRESS NOTES
GENERAL SURGERY INPATIENT PROGRESS NOTE  [facility]    PATIENT: Nimisha Tuttle, 60 y.o., male, MRN: [id number]    Hospital Day:  LOS: 1 day , Post-Op Day: 1 Day Post-Op Right foot debridement with wound vac placement. Subjective:    Patient was stable overnight. Chart reviewed. The patient feels better. Wound vac in place      Objective:    Vitals: BP (!) 157/72   Pulse 62   Temp 98 °F (36.7 °C) (Axillary)   Resp 18   Ht 5' 8\" (1.727 m)   Wt 140 lb (63.5 kg)   SpO2 100%   BMI 21.29 kg/m²     I/O: 06/23 1901 - 06/25 0700  In: 863.3 [P.O.:240; I.V.:515]  Out: 600 [Urine:600]    Physical Exam:  General Appearance:   Alert, cooperative, no distress    Head:   Normocephalic, atraumatic    Lungs:    Equal chest rise, respirations unlabored   Heart:   Regular rate and rhythm    Abdomen:    Soft, non-tender, no rebound or guarding    Extremities:  Right foot with wound vac in place, minimal erythema to plantar aspect of foot   Neurologic:  Nonfocal, grossly intact      Labs/Imaging Results:   Recent Results (from the past 24 hour(s))   POCT Glucose    Collection Time: 06/24/22  4:36 PM   Result Value Ref Range    POC Glucose 302 (H) 70 - 99 MG/DL   POCT Glucose    Collection Time: 06/24/22  9:00 PM   Result Value Ref Range    POC Glucose 256 (H) 70 - 99 MG/DL   POCT Glucose    Collection Time: 06/25/22  1:10 AM   Result Value Ref Range    POC Glucose 224 (H) 70 - 99 MG/DL   Hemoglobin A1c    Collection Time: 06/25/22  3:51 AM   Result Value Ref Range    Hemoglobin A1C 7.8 (H) 4.2 - 6.3 %    eAG 177 mg/dL   Vancomycin Level, Random    Collection Time: 06/25/22  3:51 AM   Result Value Ref Range    Vancomycin Rm <4.0 UG/ML    DOSE AMOUNT DOSE AMT.  GIVEN - 750 mg     DOSE TIME DOSE TIME GIVEN - 1800    Comprehensive Metabolic Panel w/ Reflex to MG    Collection Time: 06/25/22  3:51 AM   Result Value Ref Range    Sodium 134 (L) 135 - 145 MMOL/L    Potassium 4.7 3.5 - 5.1 MMOL/L    Chloride 102 99 - 110 mMol/L    CO2 24 21 - 32 MMOL/L    BUN 21 6 - 23 MG/DL    CREATININE 0.8 (L) 0.9 - 1.3 MG/DL    Glucose 178 (H) 70 - 99 MG/DL    Calcium 7.9 (L) 8.3 - 10.6 MG/DL    Albumin 2.4 (L) 3.4 - 5.0 GM/DL    Total Protein 5.9 (L) 6.4 - 8.2 GM/DL    Total Bilirubin 0.3 0.0 - 1.0 MG/DL    ALT 20 10 - 40 U/L    AST 30 15 - 37 IU/L    Alkaline Phosphatase 109 40 - 128 IU/L    GFR Non-African American >60 >60 mL/min/1.73m2    GFR African American >60 >60 mL/min/1.73m2    Anion Gap 8 4 - 16   CBC with Auto Differential    Collection Time: 06/25/22  3:51 AM   Result Value Ref Range    WBC 11.2 (H) 4.0 - 10.5 K/CU MM    RBC 3.71 (L) 4.6 - 6.2 M/CU MM    Hemoglobin 11.0 (L) 13.5 - 18.0 GM/DL    Hematocrit 31.7 (L) 42 - 52 %    MCV 85.4 78 - 100 FL    MCH 29.6 27 - 31 PG    MCHC 34.7 32.0 - 36.0 %    RDW 13.6 11.7 - 14.9 %    Platelets 391 596 - 068 K/CU MM    MPV 11.0 7.5 - 11.1 FL    Differential Type AUTOMATED DIFFERENTIAL     Segs Relative 84.8 (H) 36 - 66 %    Lymphocytes % 8.0 (L) 24 - 44 %    Monocytes % 6.2 (H) 0 - 4 %    Eosinophils % 0.1 0 - 3 %    Basophils % 0.3 0 - 1 %    Segs Absolute 9.5 K/CU MM    Lymphocytes Absolute 0.9 K/CU MM    Monocytes Absolute 0.7 K/CU MM    Eosinophils Absolute 0.0 K/CU MM    Basophils Absolute 0.0 K/CU MM    Nucleated RBC % 0.0 %    Total Nucleated RBC 0.0 K/CU MM    Total Immature Neutrophil 0.07 K/CU MM    Immature Neutrophil % 0.6 (H) 0 - 0.43 %   C-Reactive Protein    Collection Time: 06/25/22  3:51 AM   Result Value Ref Range    CRP, High Sensitivity 15.1 mg/L   Procalcitonin    Collection Time: 06/25/22  3:51 AM   Result Value Ref Range    Procalcitonin 0.142    POCT Glucose    Collection Time: 06/25/22  5:44 AM   Result Value Ref Range    POC Glucose 279 (H) 70 - 99 MG/DL   POCT Glucose    Collection Time: 06/25/22  9:12 AM   Result Value Ref Range    POC Glucose 320 (H) 70 - 99 MG/DL   POCT Glucose    Collection Time: 06/25/22 12:09 PM   Result Value Ref Range    POC Glucose 323 (H) 70 - 99 MG/DL         Assessment:  Jacquie Starr is a 60 y.o. male who is post-op day #1 Day Post-Op wound debridement with vac placement.        Stable postop   Wound: vac in place, plan for vac change on Monday   Abx: per ID   Respiratory:  IS at bedside, encourage hourly IS and deep breathing    STEPHANIE Shen-CNP  6/25/2022  1:44 PM

## 2022-06-26 LAB
GLUCOSE BLD-MCNC: 121 MG/DL (ref 70–99)
GLUCOSE BLD-MCNC: 133 MG/DL (ref 70–99)
GLUCOSE BLD-MCNC: 217 MG/DL (ref 70–99)
GLUCOSE BLD-MCNC: 220 MG/DL (ref 70–99)
GLUCOSE BLD-MCNC: 275 MG/DL (ref 70–99)
HIGH SENSITIVE C-REACTIVE PROTEIN: 7.4 MG/L

## 2022-06-26 PROCEDURE — 6360000002 HC RX W HCPCS: Performed by: INTERNAL MEDICINE

## 2022-06-26 PROCEDURE — 97535 SELF CARE MNGMENT TRAINING: CPT

## 2022-06-26 PROCEDURE — 94761 N-INVAS EAR/PLS OXIMETRY MLT: CPT

## 2022-06-26 PROCEDURE — 6370000000 HC RX 637 (ALT 250 FOR IP): Performed by: HOSPITALIST

## 2022-06-26 PROCEDURE — 6370000000 HC RX 637 (ALT 250 FOR IP): Performed by: INTERNAL MEDICINE

## 2022-06-26 PROCEDURE — 2580000003 HC RX 258: Performed by: HOSPITALIST

## 2022-06-26 PROCEDURE — 97166 OT EVAL MOD COMPLEX 45 MIN: CPT

## 2022-06-26 PROCEDURE — 97530 THERAPEUTIC ACTIVITIES: CPT

## 2022-06-26 PROCEDURE — 97162 PT EVAL MOD COMPLEX 30 MIN: CPT

## 2022-06-26 PROCEDURE — 36415 COLL VENOUS BLD VENIPUNCTURE: CPT

## 2022-06-26 PROCEDURE — 1200000000 HC SEMI PRIVATE

## 2022-06-26 PROCEDURE — 86141 C-REACTIVE PROTEIN HS: CPT

## 2022-06-26 PROCEDURE — 6360000002 HC RX W HCPCS: Performed by: HOSPITALIST

## 2022-06-26 PROCEDURE — 82962 GLUCOSE BLOOD TEST: CPT

## 2022-06-26 RX ORDER — INSULIN LISPRO 100 [IU]/ML
0-6 INJECTION, SOLUTION INTRAVENOUS; SUBCUTANEOUS
Status: DISCONTINUED | OUTPATIENT
Start: 2022-06-26 | End: 2022-06-27

## 2022-06-26 RX ORDER — INSULIN GLARGINE 100 [IU]/ML
35 INJECTION, SOLUTION SUBCUTANEOUS NIGHTLY
Status: DISCONTINUED | OUTPATIENT
Start: 2022-06-26 | End: 2022-06-27

## 2022-06-26 RX ADMIN — GABAPENTIN 600 MG: 300 CAPSULE ORAL at 16:24

## 2022-06-26 RX ADMIN — PIPERACILLIN AND TAZOBACTAM 3375 MG: 3; .375 INJECTION, POWDER, LYOPHILIZED, FOR SOLUTION INTRAVENOUS at 13:09

## 2022-06-26 RX ADMIN — SODIUM CHLORIDE, PRESERVATIVE FREE 11 ML: 5 INJECTION INTRAVENOUS at 20:54

## 2022-06-26 RX ADMIN — LINEZOLID 600 MG: 600 INJECTION, SOLUTION INTRAVENOUS at 05:28

## 2022-06-26 RX ADMIN — INSULIN LISPRO 20 UNITS: 100 INJECTION, SOLUTION INTRAVENOUS; SUBCUTANEOUS at 17:21

## 2022-06-26 RX ADMIN — HYDROCODONE BITARTRATE AND ACETAMINOPHEN 1 TABLET: 5; 325 TABLET ORAL at 20:47

## 2022-06-26 RX ADMIN — SODIUM CHLORIDE, PRESERVATIVE FREE 10 ML: 5 INJECTION INTRAVENOUS at 08:03

## 2022-06-26 RX ADMIN — LEVOTHYROXINE SODIUM 112 MCG: 0.11 TABLET ORAL at 06:15

## 2022-06-26 RX ADMIN — HYDROCODONE BITARTRATE AND ACETAMINOPHEN 1 TABLET: 5; 325 TABLET ORAL at 10:21

## 2022-06-26 RX ADMIN — ATORVASTATIN CALCIUM 40 MG: 40 TABLET, FILM COATED ORAL at 20:47

## 2022-06-26 RX ADMIN — HYDROMORPHONE HYDROCHLORIDE 0.5 MG: 1 INJECTION, SOLUTION INTRAMUSCULAR; INTRAVENOUS; SUBCUTANEOUS at 09:02

## 2022-06-26 RX ADMIN — CLOPIDOGREL BISULFATE 75 MG: 75 TABLET ORAL at 08:02

## 2022-06-26 RX ADMIN — INSULIN GLARGINE 35 UNITS: 100 INJECTION, SOLUTION SUBCUTANEOUS at 20:49

## 2022-06-26 RX ADMIN — LINEZOLID 600 MG: 600 INJECTION, SOLUTION INTRAVENOUS at 17:19

## 2022-06-26 RX ADMIN — HYDROMORPHONE HYDROCHLORIDE 0.5 MG: 1 INJECTION, SOLUTION INTRAMUSCULAR; INTRAVENOUS; SUBCUTANEOUS at 17:18

## 2022-06-26 RX ADMIN — PIPERACILLIN AND TAZOBACTAM 3375 MG: 3; .375 INJECTION, POWDER, LYOPHILIZED, FOR SOLUTION INTRAVENOUS at 19:36

## 2022-06-26 RX ADMIN — INSULIN LISPRO 2 UNITS: 100 INJECTION, SOLUTION INTRAVENOUS; SUBCUTANEOUS at 20:48

## 2022-06-26 RX ADMIN — GABAPENTIN 600 MG: 300 CAPSULE ORAL at 20:47

## 2022-06-26 RX ADMIN — PIPERACILLIN AND TAZOBACTAM 3375 MG: 3; .375 INJECTION, POWDER, LYOPHILIZED, FOR SOLUTION INTRAVENOUS at 06:34

## 2022-06-26 RX ADMIN — INSULIN LISPRO 2 UNITS: 100 INJECTION, SOLUTION INTRAVENOUS; SUBCUTANEOUS at 17:21

## 2022-06-26 RX ADMIN — INSULIN LISPRO 20 UNITS: 100 INJECTION, SOLUTION INTRAVENOUS; SUBCUTANEOUS at 08:10

## 2022-06-26 RX ADMIN — HYDROMORPHONE HYDROCHLORIDE 0.5 MG: 1 INJECTION, SOLUTION INTRAMUSCULAR; INTRAVENOUS; SUBCUTANEOUS at 05:23

## 2022-06-26 RX ADMIN — HYDROMORPHONE HYDROCHLORIDE 0.5 MG: 1 INJECTION, SOLUTION INTRAMUSCULAR; INTRAVENOUS; SUBCUTANEOUS at 13:05

## 2022-06-26 RX ADMIN — ENOXAPARIN SODIUM 40 MG: 100 INJECTION SUBCUTANEOUS at 08:02

## 2022-06-26 RX ADMIN — HYDROMORPHONE HYDROCHLORIDE 0.5 MG: 1 INJECTION, SOLUTION INTRAMUSCULAR; INTRAVENOUS; SUBCUTANEOUS at 01:05

## 2022-06-26 RX ADMIN — PIPERACILLIN AND TAZOBACTAM 3375 MG: 3; .375 INJECTION, POWDER, LYOPHILIZED, FOR SOLUTION INTRAVENOUS at 01:22

## 2022-06-26 RX ADMIN — GABAPENTIN 600 MG: 300 CAPSULE ORAL at 08:02

## 2022-06-26 RX ADMIN — INSULIN LISPRO 2 UNITS: 100 INJECTION, SOLUTION INTRAVENOUS; SUBCUTANEOUS at 03:10

## 2022-06-26 ASSESSMENT — PAIN DESCRIPTION - DESCRIPTORS
DESCRIPTORS: SHARP
DESCRIPTORS: PINS AND NEEDLES
DESCRIPTORS: ACHING;BURNING
DESCRIPTORS: ACHING;PINS AND NEEDLES
DESCRIPTORS: ACHING;SHARP
DESCRIPTORS: PINS AND NEEDLES
DESCRIPTORS: ACHING;NAGGING

## 2022-06-26 ASSESSMENT — PAIN SCALES - GENERAL
PAINLEVEL_OUTOF10: 7
PAINLEVEL_OUTOF10: 8
PAINLEVEL_OUTOF10: 10
PAINLEVEL_OUTOF10: 9
PAINLEVEL_OUTOF10: 6
PAINLEVEL_OUTOF10: 4
PAINLEVEL_OUTOF10: 10
PAINLEVEL_OUTOF10: 8
PAINLEVEL_OUTOF10: 8
PAINLEVEL_OUTOF10: 3

## 2022-06-26 ASSESSMENT — PAIN SCALES - WONG BAKER
WONGBAKER_NUMERICALRESPONSE: 2
WONGBAKER_NUMERICALRESPONSE: 2
WONGBAKER_NUMERICALRESPONSE: 8

## 2022-06-26 ASSESSMENT — PAIN DESCRIPTION - LOCATION
LOCATION: FOOT

## 2022-06-26 ASSESSMENT — PAIN DESCRIPTION - ORIENTATION
ORIENTATION: RIGHT

## 2022-06-26 ASSESSMENT — PAIN DESCRIPTION - PAIN TYPE: TYPE: SURGICAL PAIN;CHRONIC PAIN

## 2022-06-26 ASSESSMENT — PAIN - FUNCTIONAL ASSESSMENT: PAIN_FUNCTIONAL_ASSESSMENT: PREVENTS OR INTERFERES SOME ACTIVE ACTIVITIES AND ADLS

## 2022-06-26 NOTE — CARE COORDINATION
3:20 pm. LSW reviewed patient medical record. LSW attempted to look at the status of patient wound vac. The [facility] website is down. Case management to follow up with [facility] on Monday.

## 2022-06-26 NOTE — PROGRESS NOTES
Hospitalist Progress Note      Name:  Avelina Cuenca /Age/Sex: 1960  (60 y.o. male)   MRN & CSN:  [id number] & [id number] Admission Date/Time: 2022 12:29 AM   Location:  [address] [place] [address] PCP: Javon Kumari MD         Hospital Day: 3    Assessment and Plan:       60 y.o.  male  who presents with Diabetic foot ([address]     Diabetic foot infection, right foot gangrene  · Status post right foot wound debridement with wound VAC placement   · Afebrile, WBC 11.2   · Today labs still pending  · Follow surgical culture  · Plan for wound VAC change tomorrow  · Right foot x-ray on admission with soft tissue ulcer with soft tissue gas adjacent to the proximal right fifth metatarsal  · Continue Zosyn and linezolid  · Surgery and infectious disease following     Type 2 Diabetes  · Continue Lantus  · Continue Prandial insulin  · Sliding scale. · Hypoglycemia protocol. Accucheck.      Coronary Artery Disease:   · S/P CABG/PCI     Hypothyroidism:   · continue Synthroid.      Diabetic neuropathy: Continue gabapentin.     Tobacco abuse: Counseled    Diet ADULT DIET; Regular; 5 carb choices (75 gm/meal); Low Fat/Low Chol/High Fiber/2 gm Na   DVT Prophylaxis [x] Lovenox, []  Heparin, [] SCDs, [] Ambulation   GI Prophylaxis [] PPI,  [] H2 Blocker,  [] Carafate,  [] Diet/Tube Feeds   Code Status Full Code   Disposition Patient requires continued admission due to    MDM [] Low, [] Moderate,[]  High  Patient's risk as above due to      History of Present Illness: The patient was seen and examined at the bedside  Denies chest pain or shortness of breath  Afebrile    Objective: Intake/Output Summary (Last 24 hours) at 2022 0816  Last data filed at 2022 0755  Gross per 24 hour   Intake 1200 ml   Output 800 ml   Net 400 ml      Vitals:   Vitals:    22 0745   BP: 109/60   Pulse: 56   Resp: 13   Temp: 97.7 °F (36.5 °C)   SpO2: 97%     Physical Exam:   GEN Awake.  Alert , not in respiratory distress, not in pain  HEENT: PEERLA, , supple neck,   Chest: air entry equal bilaterally, no wheezing or crepitation  Heart: S1 and S2 heard, no murmur, no gallop or rub, regular rate  Abdomen: soft, ND , Nt, +BS  Extremities: no cyanosis, tenderness or erythema, peripheral pulses audible  Right foot wound VAC noted  Neurology: alert, oriented x3, able to move 4 limbs    Medications:   Medications:    insulin glargine  35 Units SubCUTAneous Nightly    insulin lispro  0-6 Units SubCUTAneous 2 times per day    insulin lispro  20 Units SubCUTAneous TID WC    gabapentin  600 mg Oral TID    atorvastatin  40 mg Oral Nightly    clopidogrel  75 mg Oral Daily    nicotine  1 patch TransDERmal Daily    sodium chloride flush  5-40 mL IntraVENous 2 times per day    enoxaparin  40 mg SubCUTAneous Daily    insulin lispro  0-6 Units SubCUTAneous TID WC    piperacillin-tazobactam  3,375 mg IntraVENous Q6H    levothyroxine  112 mcg Oral Daily    linezolid  600 mg IntraVENous Q12H      Infusions:    sodium chloride      dextrose       PRN Meds: lactulose, 20 g, BID PRN  sodium chloride flush, 5-40 mL, PRN  sodium chloride, , PRN  ondansetron, 4 mg, Q8H PRN   Or  ondansetron, 4 mg, Q6H PRN  polyethylene glycol, 17 g, Daily PRN  acetaminophen, 650 mg, Q6H PRN   Or  acetaminophen, 650 mg, Q6H PRN  potassium chloride, 40 mEq, PRN   Or  potassium alternative oral replacement, 40 mEq, PRN   Or  potassium chloride, 10 mEq, PRN  magnesium sulfate, 2,000 mg, PRN  glucose, 4 tablet, PRN  dextrose bolus, 125 mL, PRN   Or  dextrose bolus, 250 mL, PRN  glucagon (rDNA), 1 mg, PRN  dextrose, 100 mL/hr, PRN  HYDROmorphone, 0.5 mg, Q4H PRN  HYDROcodone-acetaminophen, 1 tablet, Q6H PRN          Electronically signed by Carolina Jacobs MD on 6/26/2022 at 8:16 AM

## 2022-06-26 NOTE — PROGRESS NOTES
Physical Therapy  Facility/Department: [address] [place] [address]  Physical Therapy Initial Assessment    Name: Alfonso Goodman  : 1960  MRN: [id number]  Date of Service: 2022    Discharge Recommendations:  Home with assist PRN,Home with Home health PT (home health aides to assist pt and his SO)   PT Equipment Recommendations  Equipment Needed: Yes  Mobility Devices: Damari Nailer: Rolling      Patient Diagnosis(es): The primary encounter diagnosis was Diabetic foot infection ([facility] A diagnosis of Abscess of right foot was also pertinent to this visit. Past Medical History:  has a past medical history of Anesthesia, Anxiety, CAD (coronary artery disease), COPD (chronic obstructive pulmonary disease) ([facility] Degenerative disc disease, Diabetes mellitus ([facility] Gall bladder stones, H/O cardiovascular stress test, H/O echocardiogram, [facility] ([facility] Hx MRSA infection, Hyperlipidemia, Hypertension, Low back pain, Migraine, Pancreatitis, S/P CABG x 4, and Shortness of breath on exertion. Past Surgical History:  has a past surgical history that includes Hand surgery (15 yrs ago); Dental surgery (); Coronary artery bypass graft (13); Toe amputation (Right, 3/31/2020); Toe amputation (Right, 2021); and Foot Debridement (Right, 2022). Assessment   Body Structures, Functions, Activity Limitations Requiring Skilled Therapeutic Intervention: Decreased strength;Decreased functional mobility ; Increased pain;Decreased posture;Decreased balance;Decreased sensation;Decreased endurance;Decreased safe awareness  Assessment: Pt presents 2 days s/p admission for inc R lateral foot swelling, redness and developing ulceration; underwent I & D on  w/ vac, now POD # 2, assumed WBAT in post-op shoe (no restrictions per EMR). Pt presents from home, w/ SO, ramped entry, ind PLOF, no AD prior, DME available is used by his girlfriend; placed order for walker via [facility].  Pt demonstrates the above listed deficits, however, he is primarily limited by pain at this time. No physical assist for mobility, no assist to attend to balance, good quality mobility, dec safety awareness and lacking insight into severity of his deficits, pain affecting all mobility, self-limiting performance to minimum volume necessary, lacking social support at home, operating near baseline w/ use of appropriate AD. Recommending home w/ assist PRN + HH PT + HH aide to assist in home care/alleviate caregiver burdens. Therapy Prognosis:  (fair +)  Decision Making: Medium Complexity  Requires PT Follow-Up: Yes  Activity Tolerance  Activity Tolerance: Patient tolerated treatment well; Other (comment)  Activity Tolerance Comments: self-limiting     Treatment:  Therapeutic Activity Training:   Therapeutic activity training was instructed today. Cues were given for safety, sequence, UE/LE placement, awareness, and balance. Activities performed today included bed mobility training, sup-sit, sit-stand, SPT.   Functional mobility training, short distance gait, safety education, DC planning this session    Plan   Plan  Plan:  (4+)  Plan weeks: 1  Current Treatment Recommendations: Strengthening,Balance training,Endurance training,Functional mobility training,Transfer training,Gait training,ADL/Self-care training,Stair training,Patient/Caregiver education & training,Safety education & training,Pain management,Equipment evaluation, education, & procurement,Modalities,Positioning,Therapeutic activities  Safety Devices  Type of Devices: Call light within reach,Gait belt,Patient at risk for falls,Left in chair,Chair alarm in place  Restraints  Restraints Initially in Place: No     Restrictions  Restrictions/Precautions  Restrictions/Precautions: Fall Risk,General Precautions,Weight Bearing (wound vac)  Lower Extremity Weight Bearing Restrictions  Right Lower Extremity Weight Bearing: Weight Bearing As Tolerated (in post-op shoe)     Subjective   General  Chart Reviewed: Yes  Patient assessed for rehabilitation services?: Yes  Family / Caregiver Present: No  Follows Commands: Within Functional Limits  General Comment  Comments: 8-9/10 R foot pain  Subjective  Subjective: I just want to go home         Social/Functional History  Social/Functional History  Lives With: Significant other (caregiver for all ADLs and transfers and mobility)  Type of Home: Trailer  Home Access: Ramped entrance  Bathroom Shower/Tub: Tub/Shower unit  Bathroom Toilet: Handicap height  Bathroom Equipment: Tub transfer bench,Grab bars in shower  Home Equipment: Cane,Walker, rolling,Reacher  Has the patient had two or more falls in the past year or any fall with injury in the past year?: No  ADL Assistance: [address] [place] [address] Avenue: Independent  Homemaking Responsibilities: Yes  Ambulation Assistance: Independent  Transfer Assistance: Independent  Active : Yes  Occupation: On disability  Vision/Hearing  Vision  Vision: Within Functional Limits  Hearing  Hearing: Within functional limits    Cognition   Orientation  Overall Orientation Status: Within Normal Limits  Orientation Level: Oriented X4  Cognition  Overall Cognitive Status: Exceptions  Arousal/Alertness: Appropriate responses to stimuli  Following Commands:  Follows all commands without difficulty  Attention Span: Appears intact  Memory: Appears intact  Safety Judgement: Decreased awareness of need for safety;Decreased awareness of need for assistance  Problem Solving: Decreased awareness of errors  Insights: Decreased awareness of deficits  Initiation: Does not require cues  Sequencing: Requires cues for some  Cognition Comment: decreased safety awareness, self limiting, decreased insight into needs at home and safe d/c planning     Objective   Heart Rate: 75  Heart Rate Source: Monitor  BP: 133/64  BP Location: Right upper arm  BP Method: Automatic  Patient Position: Semi fowlers  MAP (Calculated): 87  Resp: 18  SpO2: 97 %  O2 Device: None (Room air)     Observation/Palpation  Posture: Fair (fwd head and shoulders)  Observation: Semifowlers, awake, alert, agreeable. He is lacking insight into need for safety during wound healing; wants to return home to being primary care of his SO, denying needs for outside assist. Tele, pulse ox, BP cuff, wound vac, PICC line in place.         AROM RLE (degrees)  RLE AROM: WFL  AROM LLE (degrees)  LLE AROM : WFL  Strength RLE  Strength RLE: WFL  Comment: grossly 4-/5 ; ankle not tested against resistance 2/2 pain  Strength LLE  Strength LLE: WFL  Comment: grossly 4/5           Bed mobility  Supine to Sit: Stand by assistance  Scooting: Stand by assistance  Transfers  Sit to Stand: Stand by assistance  Stand to sit: Stand by assistance  Bed to Chair: Stand by assistance  Ambulation  Surface: level tile  Device: Rolling Walker  Assistance: Stand by assistance  Gait Deviations: Slow Amanda;Decreased step length  Distance: 20 ft  Comments: grossly stable, good usage of BL UE support to assist in weight acceptance through R LE, w/o noted abnormalities ; could perform longer distance mobility, however, self-limiting behaviors are limiting further participation  More Ambulation?: No  Stairs/Curb  Stairs?: No     Balance  Sitting - Static: Good  Sitting - Dynamic: Good  Standing - Static: Good  Standing - Dynamic: Fair;+    AM-PAC Score  AM-PAC Inpatient Mobility Raw Score : 23 (06/26/22 1239)  AM-PAC Inpatient T-Scale Score : 56.93 (06/26/22 1239)  Mobility Inpatient CMS 0-100% Score: 11.2 (06/26/22 1239)  Mobility Inpatient CMS G-Code Modifier : CI (06/26/22 1239)    Goals  Short Term Goals  Time Frame for Short term goals: 1 week  Short term goal 1: pt will complete bed mobility at mod ind  Short term goal 2: pt will complete transfers at mod ind  Short term goal 3: pt will ambulate 150 ft using FWW at mod ind  Patient Goals   Patient goals : return home       Therapy Time   Individual Concurrent Group Co-treatment Time In 0949         Time Out 1017         Minutes 28         Timed Code Treatment Minutes: 18 Minutes (GT)       Amanda Villegas, PT, DPT
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
## 2022-06-27 LAB
ALBUMIN SERPL-MCNC: 2.5 GM/DL (ref 3.4–5)
ALP BLD-CCNC: 105 IU/L (ref 40–129)
ALT SERPL-CCNC: 25 U/L (ref 10–40)
ANION GAP SERPL CALCULATED.3IONS-SCNC: 7 MMOL/L (ref 4–16)
AST SERPL-CCNC: 52 IU/L (ref 15–37)
BILIRUB SERPL-MCNC: 0.2 MG/DL (ref 0–1)
BUN BLDV-MCNC: 22 MG/DL (ref 6–23)
CALCIUM SERPL-MCNC: 8.3 MG/DL (ref 8.3–10.6)
CHLORIDE BLD-SCNC: 104 MMOL/L (ref 99–110)
CO2: 27 MMOL/L (ref 21–32)
CREAT SERPL-MCNC: 0.9 MG/DL (ref 0.9–1.3)
GFR AFRICAN AMERICAN: >60 ML/MIN/1.73M2
GFR NON-AFRICAN AMERICAN: >60 ML/MIN/1.73M2
GLUCOSE BLD-MCNC: 105 MG/DL (ref 70–99)
GLUCOSE BLD-MCNC: 110 MG/DL (ref 70–99)
GLUCOSE BLD-MCNC: 271 MG/DL (ref 70–99)
GLUCOSE BLD-MCNC: 276 MG/DL (ref 70–99)
GLUCOSE BLD-MCNC: 81 MG/DL (ref 70–99)
GLUCOSE BLD-MCNC: 93 MG/DL (ref 70–99)
HCT VFR BLD CALC: 33.6 % (ref 42–52)
HEMOGLOBIN: 11.3 GM/DL (ref 13.5–18)
HIGH SENSITIVE C-REACTIVE PROTEIN: 6 MG/L
MCH RBC QN AUTO: 29.8 PG (ref 27–31)
MCHC RBC AUTO-ENTMCNC: 33.6 % (ref 32–36)
MCV RBC AUTO: 88.7 FL (ref 78–100)
PDW BLD-RTO: 14 % (ref 11.7–14.9)
PLATELET # BLD: 133 K/CU MM (ref 140–440)
PMV BLD AUTO: 10.5 FL (ref 7.5–11.1)
POTASSIUM SERPL-SCNC: 4.3 MMOL/L (ref 3.5–5.1)
PROCALCITONIN: 0.31
RBC # BLD: 3.79 M/CU MM (ref 4.6–6.2)
SODIUM BLD-SCNC: 138 MMOL/L (ref 135–145)
TOTAL PROTEIN: 6.1 GM/DL (ref 6.4–8.2)
WBC # BLD: 8.2 K/CU MM (ref 4–10.5)

## 2022-06-27 PROCEDURE — 36415 COLL VENOUS BLD VENIPUNCTURE: CPT

## 2022-06-27 PROCEDURE — 2580000003 HC RX 258: Performed by: HOSPITALIST

## 2022-06-27 PROCEDURE — 6370000000 HC RX 637 (ALT 250 FOR IP): Performed by: INTERNAL MEDICINE

## 2022-06-27 PROCEDURE — 97605 NEG PRS WND THER DME<=50SQCM: CPT

## 2022-06-27 PROCEDURE — 6370000000 HC RX 637 (ALT 250 FOR IP): Performed by: HOSPITALIST

## 2022-06-27 PROCEDURE — 6360000002 HC RX W HCPCS: Performed by: HOSPITALIST

## 2022-06-27 PROCEDURE — 94761 N-INVAS EAR/PLS OXIMETRY MLT: CPT

## 2022-06-27 PROCEDURE — 84145 PROCALCITONIN (PCT): CPT

## 2022-06-27 PROCEDURE — 6360000002 HC RX W HCPCS: Performed by: INTERNAL MEDICINE

## 2022-06-27 PROCEDURE — 85027 COMPLETE CBC AUTOMATED: CPT

## 2022-06-27 PROCEDURE — 86141 C-REACTIVE PROTEIN HS: CPT

## 2022-06-27 PROCEDURE — 99233 SBSQ HOSP IP/OBS HIGH 50: CPT | Performed by: INTERNAL MEDICINE

## 2022-06-27 PROCEDURE — 80053 COMPREHEN METABOLIC PANEL: CPT

## 2022-06-27 PROCEDURE — 82962 GLUCOSE BLOOD TEST: CPT

## 2022-06-27 PROCEDURE — 1200000000 HC SEMI PRIVATE

## 2022-06-27 RX ORDER — INSULIN LISPRO 100 [IU]/ML
0-12 INJECTION, SOLUTION INTRAVENOUS; SUBCUTANEOUS
Status: DISCONTINUED | OUTPATIENT
Start: 2022-06-27 | End: 2022-07-02 | Stop reason: HOSPADM

## 2022-06-27 RX ORDER — INSULIN GLARGINE 100 [IU]/ML
40 INJECTION, SOLUTION SUBCUTANEOUS NIGHTLY
Status: DISCONTINUED | OUTPATIENT
Start: 2022-06-27 | End: 2022-07-02

## 2022-06-27 RX ADMIN — INSULIN LISPRO 6 UNITS: 100 INJECTION, SOLUTION INTRAVENOUS; SUBCUTANEOUS at 13:28

## 2022-06-27 RX ADMIN — PIPERACILLIN AND TAZOBACTAM 3375 MG: 3; .375 INJECTION, POWDER, LYOPHILIZED, FOR SOLUTION INTRAVENOUS at 00:32

## 2022-06-27 RX ADMIN — PIPERACILLIN AND TAZOBACTAM 3375 MG: 3; .375 INJECTION, POWDER, LYOPHILIZED, FOR SOLUTION INTRAVENOUS at 07:14

## 2022-06-27 RX ADMIN — HYDROMORPHONE HYDROCHLORIDE 0.5 MG: 1 INJECTION, SOLUTION INTRAMUSCULAR; INTRAVENOUS; SUBCUTANEOUS at 22:35

## 2022-06-27 RX ADMIN — SODIUM CHLORIDE, PRESERVATIVE FREE 10 ML: 5 INJECTION INTRAVENOUS at 08:29

## 2022-06-27 RX ADMIN — HYDROMORPHONE HYDROCHLORIDE 0.5 MG: 1 INJECTION, SOLUTION INTRAMUSCULAR; INTRAVENOUS; SUBCUTANEOUS at 00:30

## 2022-06-27 RX ADMIN — HYDROMORPHONE HYDROCHLORIDE 0.5 MG: 1 INJECTION, SOLUTION INTRAMUSCULAR; INTRAVENOUS; SUBCUTANEOUS at 10:31

## 2022-06-27 RX ADMIN — ENOXAPARIN SODIUM 40 MG: 100 INJECTION SUBCUTANEOUS at 08:29

## 2022-06-27 RX ADMIN — SODIUM CHLORIDE, PRESERVATIVE FREE 10 ML: 5 INJECTION INTRAVENOUS at 20:13

## 2022-06-27 RX ADMIN — HYDROCODONE BITARTRATE AND ACETAMINOPHEN 1 TABLET: 5; 325 TABLET ORAL at 17:03

## 2022-06-27 RX ADMIN — HYDROCODONE BITARTRATE AND ACETAMINOPHEN 1 TABLET: 5; 325 TABLET ORAL at 08:29

## 2022-06-27 RX ADMIN — GABAPENTIN 600 MG: 300 CAPSULE ORAL at 20:13

## 2022-06-27 RX ADMIN — HYDROMORPHONE HYDROCHLORIDE 0.5 MG: 1 INJECTION, SOLUTION INTRAMUSCULAR; INTRAVENOUS; SUBCUTANEOUS at 06:09

## 2022-06-27 RX ADMIN — GABAPENTIN 600 MG: 300 CAPSULE ORAL at 08:28

## 2022-06-27 RX ADMIN — PIPERACILLIN AND TAZOBACTAM 3375 MG: 3; .375 INJECTION, POWDER, LYOPHILIZED, FOR SOLUTION INTRAVENOUS at 13:38

## 2022-06-27 RX ADMIN — HYDROMORPHONE HYDROCHLORIDE 0.5 MG: 1 INJECTION, SOLUTION INTRAMUSCULAR; INTRAVENOUS; SUBCUTANEOUS at 18:33

## 2022-06-27 RX ADMIN — LEVOTHYROXINE SODIUM 112 MCG: 0.11 TABLET ORAL at 07:10

## 2022-06-27 RX ADMIN — LINEZOLID 600 MG: 600 INJECTION, SOLUTION INTRAVENOUS at 06:13

## 2022-06-27 RX ADMIN — PIPERACILLIN AND TAZOBACTAM 3375 MG: 3; .375 INJECTION, POWDER, LYOPHILIZED, FOR SOLUTION INTRAVENOUS at 20:17

## 2022-06-27 RX ADMIN — ATORVASTATIN CALCIUM 40 MG: 40 TABLET, FILM COATED ORAL at 20:13

## 2022-06-27 RX ADMIN — INSULIN LISPRO 3 UNITS: 100 INJECTION, SOLUTION INTRAVENOUS; SUBCUTANEOUS at 02:10

## 2022-06-27 RX ADMIN — CLOPIDOGREL BISULFATE 75 MG: 75 TABLET ORAL at 08:28

## 2022-06-27 RX ADMIN — LINEZOLID 600 MG: 600 INJECTION, SOLUTION INTRAVENOUS at 18:39

## 2022-06-27 RX ADMIN — INSULIN LISPRO 20 UNITS: 100 INJECTION, SOLUTION INTRAVENOUS; SUBCUTANEOUS at 13:28

## 2022-06-27 RX ADMIN — GABAPENTIN 600 MG: 300 CAPSULE ORAL at 14:28

## 2022-06-27 RX ADMIN — HYDROMORPHONE HYDROCHLORIDE 0.5 MG: 1 INJECTION, SOLUTION INTRAMUSCULAR; INTRAVENOUS; SUBCUTANEOUS at 14:28

## 2022-06-27 ASSESSMENT — PAIN DESCRIPTION - ONSET
ONSET: ON-GOING

## 2022-06-27 ASSESSMENT — PAIN DESCRIPTION - ORIENTATION
ORIENTATION: RIGHT

## 2022-06-27 ASSESSMENT — PAIN DESCRIPTION - LOCATION
LOCATION: FOOT

## 2022-06-27 ASSESSMENT — PAIN DESCRIPTION - FREQUENCY
FREQUENCY: CONTINUOUS

## 2022-06-27 ASSESSMENT — PAIN SCALES - GENERAL
PAINLEVEL_OUTOF10: 10
PAINLEVEL_OUTOF10: 8
PAINLEVEL_OUTOF10: 10
PAINLEVEL_OUTOF10: 6
PAINLEVEL_OUTOF10: 3
PAINLEVEL_OUTOF10: 2
PAINLEVEL_OUTOF10: 2
PAINLEVEL_OUTOF10: 10
PAINLEVEL_OUTOF10: 5
PAINLEVEL_OUTOF10: 7
PAINLEVEL_OUTOF10: 6
PAINLEVEL_OUTOF10: 8
PAINLEVEL_OUTOF10: 6
PAINLEVEL_OUTOF10: 10
PAINLEVEL_OUTOF10: 10
PAINLEVEL_OUTOF10: 6
PAINLEVEL_OUTOF10: 8
PAINLEVEL_OUTOF10: 10

## 2022-06-27 ASSESSMENT — PAIN DESCRIPTION - DESCRIPTORS
DESCRIPTORS: BURNING
DESCRIPTORS: ACHING;SHARP
DESCRIPTORS: ACHING
DESCRIPTORS: BURNING;SHARP;STABBING
DESCRIPTORS: PRESSURE;PINS AND NEEDLES
DESCRIPTORS: ACHING;SHARP;PINS AND NEEDLES
DESCRIPTORS: STABBING;PINS AND NEEDLES;SHARP

## 2022-06-27 ASSESSMENT — PAIN - FUNCTIONAL ASSESSMENT
PAIN_FUNCTIONAL_ASSESSMENT: PREVENTS OR INTERFERES SOME ACTIVE ACTIVITIES AND ADLS
PAIN_FUNCTIONAL_ASSESSMENT: ACTIVITIES ARE NOT PREVENTED

## 2022-06-27 ASSESSMENT — PAIN SCALES - WONG BAKER
WONGBAKER_NUMERICALRESPONSE: 2
WONGBAKER_NUMERICALRESPONSE: 4
WONGBAKER_NUMERICALRESPONSE: 2
WONGBAKER_NUMERICALRESPONSE: 4

## 2022-06-27 ASSESSMENT — PAIN DESCRIPTION - PAIN TYPE
TYPE: ACUTE PAIN;SURGICAL PAIN

## 2022-06-27 NOTE — PROGRESS NOTES
Progress Note( Dr. Pereira [facility])  6/26/2022  Subjective:   Admit Date: 6/24/2022  PCP: Joyce Reinoso MD    Admitted For : Foot infection cellulitis    Consulted For: Better control of blood glucose    Interval History: Patient had debridement done and wound vacuum was placed on 6/24/2022    Denies any chest pains,   Denies SOB . Denies nausea or vomiting. No new bowel or bladder symptoms. Intake/Output Summary (Last 24 hours) at 6/26/2022 2223  Last data filed at 6/26/2022 1845  Gross per 24 hour   Intake 480 ml   Output 1300 ml   Net -820 ml       DATA    CBC:   Recent Labs     06/24/22  0125 06/25/22  0351   WBC 10.7* 11.2*   HGB 11.6* 11.0*    141    CMP:  Recent Labs     06/24/22  0125 06/25/22  0351   * 134*   K 3.7 4.7    102   CO2 24 24   BUN 19 21   CREATININE 0.7* 0.8*   CALCIUM 8.1* 7.9*   PROT 7.1 5.9*   LABALBU 2.4* 2.4*   BILITOT 0.4 0.3   ALKPHOS 114 109   AST 24 30   ALT 17 20     Lipids:   Lab Results   Component Value Date    CHOL 179 11/24/2021    HDL 95 11/24/2021    TRIG 80 11/24/2021     Glucose:  Recent Labs     06/26/22  1201 06/26/22  1721 06/26/22 2023   POCGLU 121* 217* 220*     [id number]:  Lab Results   Component Value Date    LABA1C 7.8 06/25/2022     High Sensitivity TSH:   Lab Results   Component Value Date    TSHHS 9.540 11/24/2021     Free T3: No results found for: FT3  Free T4:  Lab Results   Component Value Date    T4FREE 1.33 11/24/2021       XR FOOT RIGHT (MIN 3 VIEWS)   Final Result   Soft tissue ulcer with soft tissue gas adjacent to the proximal right 5th   metatarsal.  No definite evidence of acute osteomyelitis. Follow-up MRI may   be helpful. Other findings as above.               Scheduled Medicines   Medications:    insulin glargine  35 Units SubCUTAneous Nightly    insulin lispro  0-6 Units SubCUTAneous 2 times per day    insulin lispro  20 Units SubCUTAneous TID WC    gabapentin  600 mg Oral TID    atorvastatin  40 mg Oral Nightly    clopidogrel  75 mg Oral Daily    nicotine  1 patch TransDERmal Daily    sodium chloride flush  5-40 mL IntraVENous 2 times per day    enoxaparin  40 mg SubCUTAneous Daily    insulin lispro  0-6 Units SubCUTAneous TID     piperacillin-tazobactam  3,375 mg IntraVENous Q6H    levothyroxine  112 mcg Oral Daily    linezolid  600 mg IntraVENous Q12H      Infusions:    sodium chloride      dextrose           Objective:   Vitals: BP (!) 111/93   Pulse 69   Temp 98.2 °F (36.8 °C) (Oral)   Resp 16   Ht 5' 8\" (1.727 m)   Wt 140 lb (63.5 kg)   SpO2 98%   BMI 21.29 kg/m²   General appearance: alert and cooperative with exam  Neck: no JVD or bruit  Thyroid : Normal lobes   Lungs: Has Vesicular Breath sounds   Heart:  regular rate and rhythm  Abdomen: soft, non-tender; bowel sounds normal; no masses,  no organomegaly  Musculoskeletal: Normal  Extremities: extremities normal, , no edema right toe and right distal toe has foot ulcer debridement not done wound vacuum was placed on 6/24/2022  Neurologic:  Awake, alert, oriented to name, place and time. Cranial nerves II-XII are grossly intact. Motor is  intact. Sensory neuropathy. ,  and gait is abnormal.    Assessment:     Patient Active Problem List:     Diabetes mellitus     H/O echocardiogram     S/P CABG (coronary artery bypass graft)     Chest pain     Cellulitis     Heroin withdrawal (HCC)     CAD (coronary artery disease)     Polysubstance abuse (HCC)     Small bowel obstruction (HCC)     Narcotic abuse, continuous (HCC)     Hx of hepatitis     Epigastric pain     Diarrhea     Constipation with Ileus     Vomiting of fecal matter with nausea     Encephalopathy     Metabolic encephalopathy     Infectious gastroenteritis     Bilateral leg weakness     Gait disturbance     Left carotid stenosis     Hypothyroidism     Osteomyelitis (HCC)     Uncontrolled type II diabetes with peripheral autonomic neuropathy (HCC)     Gangrene of toe (HCC)     Acute hematogenous osteomyelitis of right foot (HCC)     Amputation of little toe (HCC)     PAD (peripheral artery disease) (HCC)     Uncontrolled pain     Generalized weakness     Simple chronic bronchitis (HCC)     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 ([address]     Skin ulcer with necrosis of muscle ([address]     Toe ulcer ([address]     Diabetic foot ([address]      Plan:     1. Reviewed POC blood glucose . Labs and X ray results   2. Reviewed Current Medicines   3. On meal/ Correction bolus Humalog/ Basal Lantus Insulin regime / and Oral Hypoglycemic drugs   4. Monitor Blood glucose frequently   5. Modified  the dose of Insulin/ other medicines as needed   6. Will follow     .      Tate Hartley MD, MD

## 2022-06-27 NOTE — CONSULTS
WOUND VAC PLACEMENT performed by Samreen Giralod DO at [facility] 73 HAND SURGERY  15 yrs ago    right thumb    TOE AMPUTATION Right 3/31/2020    RIGHT 5TH TOE AMPUTATION AND WOUND VAC PLACEMENT performed by Bruno Jones MD at [facility] Right 11/5/2021    RIGHT GREAT TOE AMPUTATION performed by Jaleesa Hernández MD at [address] [facility]    Family History   Problem Relation Age of Onset   [facility] Cancer Mother         breast    Other Father         parkinsons disease, CVA,HTN, heart disease       SOCIAL HISTORY    Social History     Tobacco Use    Smoking status: Current Some Day Smoker     Packs/day: 1.00     Years: 40.00     Pack years: 40.00     Types: Cigarettes    Smokeless tobacco: Current User     Types: Chew   Vaping Use    Vaping Use: Never used   Substance Use Topics    Alcohol use: No     Comment: \"quit 19 yrs ago, use to drink, pretty heavy\"    CAFFEINE: 1-16 oz cup coffee daily.  Drug use: Not Currently     Comment: heroin       ALLERGIES    No Known Allergies    MEDICATIONS    No current facility-administered medications on file prior to encounter.      Current Outpatient Medications on File Prior to Encounter   Medication Sig Dispense Refill    lactulose (CHRONULAC) 10 GM/15ML solution Take 30 mLs by mouth 2 times daily as needed (constipation) 450 mL 1    insulin glargine (LANTUS SOLOSTAR) 100 UNIT/ML injection pen 15 units twice a day 5 pen 3    insulin aspart (NOVOLOG FLEXPEN) 100 UNIT/ML injection pen 10 units before each meal 5 pen 3    Insulin Pen Needle (PEN NEEDLES 3/16\") 31G X 5 MM MISC 1 each by Does not apply route daily 100 each 3    Gauze Pads & Dressings 2\"X2\" PADS 1 each by Does not apply route daily as needed (for wound care) 30 each 1    Gauze Pads & Dressings (KERLIX GAUZE ROLL MEDIUM) MISC 1 each by Does not apply route daily as needed (wound care) 30 each 2    ondansetron (ZOFRAN-ODT) 4 MG disintegrating tablet Take 1 tablet by mouth 3 times daily as needed for Nausea or Vomiting 21 tablet 0    atorvastatin (LIPITOR) 40 MG tablet Take 1 tablet by mouth nightly 30 tablet 0    clopidogrel (PLAVIX) 75 MG tablet Take 1 tablet by mouth daily 30 tablet 0    nicotine (NICODERM CQ) 21 MG/24HR Place 1 patch onto the skin daily 30 patch 3    levothyroxine (SYNTHROID) 100 MCG tablet Take 1 tablet by mouth Daily 30 tablet 0    gabapentin (NEURONTIN) 600 MG tablet Take 1 tablet by mouth 3 times daily 90 tablet 3    Blood Glucose Monitoring Suppl (FREESTYLE LITE) MARGARITA Apply 1 Device topically three times daily. 1 Device 0    Lancets (STERILANCE TL) MISC USE AS DIRECTED TO TEST BLOOD SUGAR THREE TIMES DAILY BEFORE MEALS 100 each 11    glucose blood VI test strips (FREESTYLE LITE) strip Test 3 times daily as needed.  100 each 3         Objective:      BP (!) 142/63   Pulse 59   Temp 97.5 °F (36.4 °C) (Oral)   Resp 16   Ht 5' 8\" (1.727 m)   Wt 140 lb (63.5 kg)   SpO2 100%   BMI 21.29 kg/m²   Harjinder Risk Score: Harjinder Scale Score: 17    LABS    CBC:   Lab Results   Component Value Date    WBC 8.2 06/27/2022    RBC 3.79 06/27/2022    HGB 11.3 06/27/2022    HCT 33.6 06/27/2022    MCV 88.7 06/27/2022    MCH 29.8 06/27/2022    MCHC 33.6 06/27/2022    RDW 14.0 06/27/2022     06/27/2022    MPV 10.5 06/27/2022     CMP:    Lab Results   Component Value Date     06/27/2022    K 4.3 06/27/2022     06/27/2022    CO2 27 06/27/2022    BUN 22 06/27/2022    CREATININE 0.9 06/27/2022    GFRAA >60 06/27/2022    LABGLOM >60 06/27/2022    GLUCOSE 81 06/27/2022    PROT 6.1 06/27/2022    PROT 7.3 03/18/2013    LABALBU 2.5 06/27/2022    CALCIUM 8.3 06/27/2022    BILITOT 0.2 06/27/2022    ALKPHOS 105 06/27/2022    AST 52 06/27/2022    ALT 25 06/27/2022     Albumin:    Lab Results   Component Value Date    LABALBU 2.5 06/27/2022     PT/INR:    Lab Results   Component Value Date    PROTIME 13.0 08/28/2018    INR 1.14 08/28/2018     HgBA1c:    Lab Results   Component Value Date    LABA1C 7.8 06/25/2022         Assessment:     Patient Active Problem List   Diagnosis    Diabetes mellitus    H/O echocardiogram    S/P CABG (coronary artery bypass graft)    Chest pain    Cellulitis    Heroin withdrawal (HCC)    CAD (coronary artery disease)    Polysubstance abuse ([address]    Small bowel obstruction ([address]    Narcotic abuse, continuous ([address]    Hx of hepatitis    Epigastric pain    Diarrhea    Constipation with Ileus    Vomiting of fecal matter with nausea    Encephalopathy    Metabolic encephalopathy    Infectious gastroenteritis    Bilateral leg weakness    Gait disturbance    Left carotid stenosis    Hypothyroidism    Osteomyelitis (HCC)    Uncontrolled type II diabetes with peripheral autonomic neuropathy (HCC)    Gangrene of toe (HCC)    Acute hematogenous osteomyelitis of right foot (HCC)    Amputation of little toe (HCC)    PAD (peripheral artery disease) (HCC)    Uncontrolled pain    Generalized weakness    Simple chronic bronchitis (HCC)    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 (HCC)    Skin ulcer with necrosis of muscle (HCC)    Toe ulcer ([address]    Diabetic foot (HCC)       Measurements:  Negative Pressure Wound Therapy Foot Anterior;Right (Active)   Wound Type Surgical 06/27/22 1120   Unit Type kci ulta 06/27/22 1120   Dressing Type Other (Comment) 06/27/22 1120   Number of pieces used 2 06/27/22 1120   Number of pieces removed 2 06/27/22 1120   Cycle Continuous 06/27/22 1120   Target Pressure (mmHg) 125 06/27/22 1120   Intensity 3 06/27/22 1120   Canister changed?  No 06/27/22 1120   Dressing Status New dressing applied 06/27/22 1120   Dressing Changed Changed/New 06/27/22 1120   Drainage Amount Small 06/27/22 1120   Drainage Description Serosanguinous 06/27/22 1120   Output (ml) 0 ml 06/24/22 2045   Wound Assessment Granulation tissue 06/27/22 1120   Sindhu-wound Assessment Intact 06/27/22 1120   Shape irregular 06/27/22 1120   Odor None 06/27/22 1120   Number of days: 3       Wound 06/24/22 Sacrum (Active)   Wound Assessment Erythema 06/25/22 0930   Drainage Amount None 06/25/22 0930   Sindhu-wound Assessment Ecchymosis; Intact 06/25/22 0930   Margins Defined edges 06/25/22 0930   Number of days: 3       Wound 06/24/22 Foot Anterior;Right (Active)   Wound Image   06/27/22 1120   Wound Etiology Surgical 06/27/22 1120   Dressing Status New dressing applied 06/27/22 1120   Wound Cleansed Cleansed with saline 06/27/22 1120   Dressing/Treatment Negative pressure wound therapy 06/27/22 1120   Wound Length (cm) 6 cm 06/27/22 1120   Wound Width (cm) 6.2 cm 06/27/22 1120   Wound Depth (cm) 1 cm 06/27/22 1120   Wound Surface Area (cm^2) 37.2 cm^2 06/27/22 1120   Wound Volume (cm^3) 37.2 cm^3 06/27/22 1120   Distance Tunneling (cm) 0 cm 06/27/22 1120   Tunneling Position ___ O'Clock 0 06/27/22 1120   Undermining Starts ___ O'Clock 0 06/27/22 1120   Undermining Ends___ O'Clock 0 06/27/22 1120   Undermining Maxium Distance (cm) 0 06/27/22 1120   Wound Assessment Pink/red;Granulation tissue 06/27/22 1120   Drainage Amount Small 06/27/22 1120   Drainage Description Serosanguinous 06/27/22 1120   Odor None 06/27/22 1120   Sindhu-wound Assessment Intact 06/27/22 1120   Margins Defined edges 06/27/22 1120   Wound Thickness Description not for Pressure Injury Full thickness 06/27/22 1120   Number of days: 3       Response to treatment:  Well tolerated by patient., With complaints of pain. Pain Assessment:  Severity:  mild  Quality of pain: sharp  Wound Pain Timing/Severity: intermittent  Premedicated: no    Plan:     Plan of Care: Wound 06/24/22 Foot Anterior;Right-Dressing/Treatment: Negative pressure wound therapy     Pt in bed. Premedicated per nurse. Agreeable to dressing change. Right foot wound I/D with NPWT placement per Dr. Marlene Dunne on 6/24/22. Old dressing removed. Wound appears clean. Pictures and measurements taken. Sindhu wound lined with duoderm.  2 pieces silver foam( 1 to bridge to dorsal foot) applied followed by drape. Adequate seal obtained to 125 mmHg continuous suction. Tolerated well. Referral sent to outpatient wound clinic. Specialty Bed Required : yes  [] Low Air Loss   [x] Pressure Redistribution  [] Fluid Immersion  [] Bariatric  [] Total Pressure Relief  [] Other:     Discharge Plan:  Placement for patient upon discharge: tbd  Hospice Care: no  Patient appropriate for Outpatient [address] [facility] [address]: yes-referral sent    Patient/Caregiver Teaching:  Level of patient/caregiver understanding able to:   Needs reinforcement.         Electronically signed by Leana Wang RN, CWOCN on 6/27/2022 at 1:28 PM
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
## 2022-06-27 NOTE — PROGRESS NOTES
Hospitalist Progress Note      Name:  Yolanda Ibrahim /Age/Sex: 1960  (60 y.o. male)   MRN & CSN:  [id number] & [id number] Admission Date/Time: 2022 12:29 AM   Location:  [address] [place] [address] PCP: Silvio Jacome MD         Hospital Day: 4    Assessment and Plan:       60 y.o.  male  who presents with Diabetic foot ([address]     Diabetic foot infection, right foot gangrene  · Status post right foot wound debridement with wound VAC placement   · Afebrile, leukocytosis resolved  · Follow surgical culture, still pending  · Plan for wound VAC change today  · Right foot x-ray on admission with soft tissue ulcer with soft tissue gas adjacent to the proximal right fifth metatarsal  · Continue Zosyn and linezolid  · Surgery and infectious disease following     Type 2 Diabetes  · Continue Lantus  · Continue Prandial insulin  · Sliding scale. · Hypoglycemia protocol. Accucheck.      Coronary Artery Disease:   · S/P CABG/PCI     Hypothyroidism:   · continue Synthroid.      Diabetic neuropathy: Continue gabapentin.     Tobacco abuse: Counseled    Diet ADULT DIET; Regular; 5 carb choices (75 gm/meal); Low Fat/Low Chol/High Fiber/2 gm Na   DVT Prophylaxis [x] Lovenox, []  Heparin, [] SCDs, [] Ambulation   GI Prophylaxis [] PPI,  [] H2 Blocker,  [] Carafate,  [] Diet/Tube Feeds   Code Status Full Code   Disposition Patient requires continued admission due to    MDM [] Low, [] Moderate,[]  High  Patient's risk as above due to      History of Present Illness: The patient was seen and examined at the bedside  Denies chest pain or shortness of breath  Afebrile   Plan to change wound VAC today    Objective: Intake/Output Summary (Last 24 hours) at 2022 0815  Last data filed at 2022 1845  Gross per 24 hour   Intake 480 ml   Output 500 ml   Net -20 ml      Vitals:   Vitals:    22 0719   BP:    Pulse: 56   Resp: 12   Temp:    SpO2:      Physical Exam:   GEN Awake.  Alert , not in respiratory distress, not in pain  HEENT: PEERLA, , supple neck,   Chest: air entry equal bilaterally, no wheezing or crepitation  Heart: S1 and S2 heard, no murmur, no gallop or rub, regular rate  Abdomen: soft, ND , Nt, +BS  Extremities: no cyanosis, tenderness or erythema, peripheral pulses audible  Right foot wound VAC noted  Neurology: alert, oriented x3, able to move 4 limbs    Medications:   Medications:    insulin glargine  40 Units SubCUTAneous Nightly    insulin lispro  0-12 Units SubCUTAneous TID WC    insulin lispro  0-12 Units SubCUTAneous 2 times per day    insulin lispro  20 Units SubCUTAneous TID WC    gabapentin  600 mg Oral TID    atorvastatin  40 mg Oral Nightly    clopidogrel  75 mg Oral Daily    nicotine  1 patch TransDERmal Daily    sodium chloride flush  5-40 mL IntraVENous 2 times per day    enoxaparin  40 mg SubCUTAneous Daily    piperacillin-tazobactam  3,375 mg IntraVENous Q6H    levothyroxine  112 mcg Oral Daily    linezolid  600 mg IntraVENous Q12H      Infusions:    sodium chloride      dextrose       PRN Meds: lactulose, 20 g, BID PRN  sodium chloride flush, 5-40 mL, PRN  sodium chloride, , PRN  ondansetron, 4 mg, Q8H PRN   Or  ondansetron, 4 mg, Q6H PRN  polyethylene glycol, 17 g, Daily PRN  acetaminophen, 650 mg, Q6H PRN   Or  acetaminophen, 650 mg, Q6H PRN  potassium chloride, 40 mEq, PRN   Or  potassium alternative oral replacement, 40 mEq, PRN   Or  potassium chloride, 10 mEq, PRN  magnesium sulfate, 2,000 mg, PRN  glucose, 4 tablet, PRN  dextrose bolus, 125 mL, PRN   Or  dextrose bolus, 250 mL, PRN  glucagon (rDNA), 1 mg, PRN  dextrose, 100 mL/hr, PRN  HYDROmorphone, 0.5 mg, Q4H PRN  HYDROcodone-acetaminophen, 1 tablet, Q6H PRN          Electronically signed by Lisa Shook MD on 6/27/2022 at 8:15 AM

## 2022-06-27 NOTE — PROGRESS NOTES
Progress Note( Dr. Kailash Bailey)    Subjective:   Admit Date: 6/24/2022  PCP: Obey Larsen MD    Admitted For : Foot infection cellulitis    Consulted For: Better control of blood glucose    Interval History: Patient had debridement done and wound vacuum was placed on 6/24/2022    Denies any chest pains,   Denies SOB . Denies nausea or vomiting. No new bowel or bladder symptoms. Intake/Output Summary (Last 24 hours) at 6/26/2022 2225  Last data filed at 6/26/2022 1845  Gross per 24 hour   Intake 480 ml   Output 1300 ml   Net -820 ml       DATA    CBC:   Recent Labs     06/24/22  0125 06/25/22  0351   WBC 10.7* 11.2*   HGB 11.6* 11.0*    141    CMP:  Recent Labs     06/24/22  0125 06/25/22  0351   * 134*   K 3.7 4.7    102   CO2 24 24   BUN 19 21   CREATININE 0.7* 0.8*   CALCIUM 8.1* 7.9*   PROT 7.1 5.9*   LABALBU 2.4* 2.4*   BILITOT 0.4 0.3   ALKPHOS 114 109   AST 24 30   ALT 17 20     Lipids:   Lab Results   Component Value Date    CHOL 179 11/24/2021    HDL 95 11/24/2021    TRIG 80 11/24/2021     Glucose:  Recent Labs     06/26/22  1201 06/26/22  1721 06/26/22 2023   POCGLU 121* 217* 220*     [id number]:  Lab Results   Component Value Date    LABA1C 7.8 06/25/2022     High Sensitivity TSH:   Lab Results   Component Value Date    TSHHS 9.540 11/24/2021     Free T3: No results found for: FT3  Free T4:  Lab Results   Component Value Date    T4FREE 1.33 11/24/2021       XR FOOT RIGHT (MIN 3 VIEWS)   Final Result   Soft tissue ulcer with soft tissue gas adjacent to the proximal right 5th   metatarsal.  No definite evidence of acute osteomyelitis. Follow-up MRI may   be helpful. Other findings as above.               Scheduled Medicines   Medications:    insulin glargine  35 Units SubCUTAneous Nightly    insulin lispro  0-6 Units SubCUTAneous 2 times per day    insulin lispro  20 Units SubCUTAneous TID WC    gabapentin  600 mg Oral TID    atorvastatin  40 mg Oral Nightly    clopidogrel  75 mg Oral Daily    nicotine  1 patch TransDERmal Daily    sodium chloride flush  5-40 mL IntraVENous 2 times per day    enoxaparin  40 mg SubCUTAneous Daily    insulin lispro  0-6 Units SubCUTAneous TID     piperacillin-tazobactam  3,375 mg IntraVENous Q6H    levothyroxine  112 mcg Oral Daily    linezolid  600 mg IntraVENous Q12H      Infusions:    sodium chloride      dextrose           Objective:   Vitals: BP (!) 111/93   Pulse 69   Temp 98.2 °F (36.8 °C) (Oral)   Resp 16   Ht 5' 8\" (1.727 m)   Wt 140 lb (63.5 kg)   SpO2 98%   BMI 21.29 kg/m²   General appearance: alert and cooperative with exam  Neck: no JVD or bruit  Thyroid : Normal lobes   Lungs: Has Vesicular Breath sounds   Heart:  regular rate and rhythm  Abdomen: soft, non-tender; bowel sounds normal; no masses,  no organomegaly  Musculoskeletal: Normal  Extremities: extremities normal, , no edema right toe and right distal toe has foot ulcer debridement not done wound vacuum was placed on 6/24/2022  Neurologic:  Awake, alert, oriented to name, place and time. Cranial nerves II-XII are grossly intact. Motor is  intact. Sensory neuropathy. ,  and gait is abnormal.    Assessment:     Patient Active Problem List:     Diabetes mellitus     H/O echocardiogram     S/P CABG (coronary artery bypass graft)     Chest pain     Cellulitis     Heroin withdrawal (HCC)     CAD (coronary artery disease)     Polysubstance abuse (HCC)     Small bowel obstruction (HCC)     Narcotic abuse, continuous (HCC)     Hx of hepatitis     Epigastric pain     Diarrhea     Constipation with Ileus     Vomiting of fecal matter with nausea     Encephalopathy     Metabolic encephalopathy     Infectious gastroenteritis     Bilateral leg weakness     Gait disturbance     Left carotid stenosis     Hypothyroidism     Osteomyelitis (HCC)     Uncontrolled type II diabetes with peripheral autonomic neuropathy (HCC)     Gangrene of toe (HCC)     Acute hematogenous osteomyelitis of right foot ([address]     Amputation of little toe (HCC)     PAD (peripheral artery disease) (HCC)     Uncontrolled pain     Generalized weakness     Simple chronic bronchitis (HCC)     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 ([address]     Skin ulcer with necrosis of muscle ([address]     Toe ulcer ([address]     Diabetic foot ([facility]      Plan:     1. Reviewed POC blood glucose . Labs and X ray results   2. Reviewed Current Medicines   3. On meal/ Correction bolus Humalog/ Basal Lantus Insulin regime / and Oral Hypoglycemic drugs   4. Monitor Blood glucose frequently   5. Modified  the dose of Insulin/ other medicines as needed   6. Will follow     .      Ruben Taylor MD, MD

## 2022-06-27 NOTE — CARE COORDINATION
CM call to Franci/MARÍA to check on status of woundvac order. Order was placed this morning, Pt pending at this time.       Wound information faxed to [phone] at Franci's request.    CM following

## 2022-06-28 LAB
ALBUMIN SERPL-MCNC: 2.5 GM/DL (ref 3.4–5)
ALP BLD-CCNC: 126 IU/L (ref 40–128)
ALT SERPL-CCNC: 28 U/L (ref 10–40)
ANION GAP SERPL CALCULATED.3IONS-SCNC: 7 MMOL/L (ref 4–16)
AST SERPL-CCNC: 59 IU/L (ref 15–37)
BILIRUB SERPL-MCNC: 0.2 MG/DL (ref 0–1)
BUN BLDV-MCNC: 20 MG/DL (ref 6–23)
CALCIUM SERPL-MCNC: 8 MG/DL (ref 8.3–10.6)
CHLORIDE BLD-SCNC: 104 MMOL/L (ref 99–110)
CO2: 28 MMOL/L (ref 21–32)
CREAT SERPL-MCNC: 1 MG/DL (ref 0.9–1.3)
GFR AFRICAN AMERICAN: >60 ML/MIN/1.73M2
GFR NON-AFRICAN AMERICAN: >60 ML/MIN/1.73M2
GLUCOSE BLD-MCNC: 148 MG/DL (ref 70–99)
GLUCOSE BLD-MCNC: 160 MG/DL (ref 70–99)
GLUCOSE BLD-MCNC: 220 MG/DL (ref 70–99)
GLUCOSE BLD-MCNC: 47 MG/DL (ref 70–99)
GLUCOSE BLD-MCNC: 47 MG/DL (ref 70–99)
GLUCOSE BLD-MCNC: 75 MG/DL (ref 70–99)
GLUCOSE BLD-MCNC: 92 MG/DL (ref 70–99)
HCT VFR BLD CALC: 31.5 % (ref 42–52)
HEMOGLOBIN: 10.4 GM/DL (ref 13.5–18)
MCH RBC QN AUTO: 30.1 PG (ref 27–31)
MCHC RBC AUTO-ENTMCNC: 33 % (ref 32–36)
MCV RBC AUTO: 91 FL (ref 78–100)
PDW BLD-RTO: 14.2 % (ref 11.7–14.9)
PLATELET # BLD: 122 K/CU MM (ref 140–440)
PMV BLD AUTO: 10.7 FL (ref 7.5–11.1)
POTASSIUM SERPL-SCNC: 4.6 MMOL/L (ref 3.5–5.1)
RBC # BLD: 3.46 M/CU MM (ref 4.6–6.2)
SODIUM BLD-SCNC: 139 MMOL/L (ref 135–145)
TOTAL PROTEIN: 6 GM/DL (ref 6.4–8.2)
WBC # BLD: 7.2 K/CU MM (ref 4–10.5)

## 2022-06-28 PROCEDURE — 80053 COMPREHEN METABOLIC PANEL: CPT

## 2022-06-28 PROCEDURE — 6360000002 HC RX W HCPCS: Performed by: HOSPITALIST

## 2022-06-28 PROCEDURE — 36415 COLL VENOUS BLD VENIPUNCTURE: CPT

## 2022-06-28 PROCEDURE — 1200000000 HC SEMI PRIVATE

## 2022-06-28 PROCEDURE — 6370000000 HC RX 637 (ALT 250 FOR IP): Performed by: INTERNAL MEDICINE

## 2022-06-28 PROCEDURE — 6360000002 HC RX W HCPCS: Performed by: INTERNAL MEDICINE

## 2022-06-28 PROCEDURE — 6370000000 HC RX 637 (ALT 250 FOR IP): Performed by: HOSPITALIST

## 2022-06-28 PROCEDURE — 2580000003 HC RX 258: Performed by: HOSPITALIST

## 2022-06-28 PROCEDURE — 97530 THERAPEUTIC ACTIVITIES: CPT

## 2022-06-28 PROCEDURE — 97535 SELF CARE MNGMENT TRAINING: CPT

## 2022-06-28 PROCEDURE — 97168 OT RE-EVAL EST PLAN CARE: CPT

## 2022-06-28 PROCEDURE — 82962 GLUCOSE BLOOD TEST: CPT

## 2022-06-28 PROCEDURE — 99232 SBSQ HOSP IP/OBS MODERATE 35: CPT | Performed by: SURGERY

## 2022-06-28 PROCEDURE — 99232 SBSQ HOSP IP/OBS MODERATE 35: CPT | Performed by: INTERNAL MEDICINE

## 2022-06-28 PROCEDURE — 94761 N-INVAS EAR/PLS OXIMETRY MLT: CPT

## 2022-06-28 PROCEDURE — 6370000000 HC RX 637 (ALT 250 FOR IP): Performed by: PHYSICIAN ASSISTANT

## 2022-06-28 PROCEDURE — 85027 COMPLETE CBC AUTOMATED: CPT

## 2022-06-28 RX ORDER — HYDROCODONE BITARTRATE AND ACETAMINOPHEN 5; 325 MG/1; MG/1
2 TABLET ORAL EVERY 6 HOURS PRN
Status: DISCONTINUED | OUTPATIENT
Start: 2022-06-28 | End: 2022-07-02 | Stop reason: HOSPADM

## 2022-06-28 RX ORDER — LINEZOLID 600 MG/1
600 TABLET, FILM COATED ORAL EVERY 12 HOURS SCHEDULED
Status: DISCONTINUED | OUTPATIENT
Start: 2022-06-28 | End: 2022-06-28

## 2022-06-28 RX ADMIN — INSULIN LISPRO 4 UNITS: 100 INJECTION, SOLUTION INTRAVENOUS; SUBCUTANEOUS at 16:32

## 2022-06-28 RX ADMIN — INSULIN LISPRO 20 UNITS: 100 INJECTION, SOLUTION INTRAVENOUS; SUBCUTANEOUS at 16:35

## 2022-06-28 RX ADMIN — GABAPENTIN 600 MG: 300 CAPSULE ORAL at 14:15

## 2022-06-28 RX ADMIN — INSULIN LISPRO 20 UNITS: 100 INJECTION, SOLUTION INTRAVENOUS; SUBCUTANEOUS at 09:04

## 2022-06-28 RX ADMIN — LEVOTHYROXINE SODIUM 112 MCG: 0.11 TABLET ORAL at 06:29

## 2022-06-28 RX ADMIN — GABAPENTIN 600 MG: 300 CAPSULE ORAL at 08:38

## 2022-06-28 RX ADMIN — HYDROCODONE BITARTRATE AND ACETAMINOPHEN 2 TABLET: 5; 325 TABLET ORAL at 15:24

## 2022-06-28 RX ADMIN — Medication 16 G: at 12:11

## 2022-06-28 RX ADMIN — HYDROCODONE BITARTRATE AND ACETAMINOPHEN 1 TABLET: 5; 325 TABLET ORAL at 01:26

## 2022-06-28 RX ADMIN — INSULIN LISPRO 2 UNITS: 100 INJECTION, SOLUTION INTRAVENOUS; SUBCUTANEOUS at 09:07

## 2022-06-28 RX ADMIN — HYDROCODONE BITARTRATE AND ACETAMINOPHEN 2 TABLET: 5; 325 TABLET ORAL at 08:37

## 2022-06-28 RX ADMIN — LINEZOLID 600 MG: 600 INJECTION, SOLUTION INTRAVENOUS at 07:35

## 2022-06-28 RX ADMIN — PIPERACILLIN AND TAZOBACTAM 3375 MG: 3; .375 INJECTION, POWDER, LYOPHILIZED, FOR SOLUTION INTRAVENOUS at 01:32

## 2022-06-28 RX ADMIN — PIPERACILLIN AND TAZOBACTAM 3375 MG: 3; .375 INJECTION, POWDER, LYOPHILIZED, FOR SOLUTION INTRAVENOUS at 06:34

## 2022-06-28 RX ADMIN — PIPERACILLIN AND TAZOBACTAM 3375 MG: 3; .375 INJECTION, POWDER, LYOPHILIZED, FOR SOLUTION INTRAVENOUS at 21:04

## 2022-06-28 RX ADMIN — HYDROMORPHONE HYDROCHLORIDE 0.5 MG: 1 INJECTION, SOLUTION INTRAMUSCULAR; INTRAVENOUS; SUBCUTANEOUS at 03:35

## 2022-06-28 RX ADMIN — ENOXAPARIN SODIUM 40 MG: 100 INJECTION SUBCUTANEOUS at 08:38

## 2022-06-28 RX ADMIN — PIPERACILLIN AND TAZOBACTAM 3375 MG: 3; .375 INJECTION, POWDER, LYOPHILIZED, FOR SOLUTION INTRAVENOUS at 13:24

## 2022-06-28 RX ADMIN — INSULIN LISPRO 2 UNITS: 100 INJECTION, SOLUTION INTRAVENOUS; SUBCUTANEOUS at 01:57

## 2022-06-28 RX ADMIN — CLOPIDOGREL BISULFATE 75 MG: 75 TABLET ORAL at 08:38

## 2022-06-28 ASSESSMENT — PAIN SCALES - GENERAL
PAINLEVEL_OUTOF10: 10
PAINLEVEL_OUTOF10: 8
PAINLEVEL_OUTOF10: 10
PAINLEVEL_OUTOF10: 10
PAINLEVEL_OUTOF10: 7
PAINLEVEL_OUTOF10: 6
PAINLEVEL_OUTOF10: 7
PAINLEVEL_OUTOF10: 8

## 2022-06-28 ASSESSMENT — PAIN DESCRIPTION - LOCATION
LOCATION: LEG;FOOT
LOCATION: FOOT
LOCATION: FOOT;LEG
LOCATION: FOOT
LOCATION: FOOT
LOCATION: FOOT;LEG
LOCATION: FOOT

## 2022-06-28 ASSESSMENT — PAIN DESCRIPTION - PAIN TYPE
TYPE: ACUTE PAIN;SURGICAL PAIN

## 2022-06-28 ASSESSMENT — PAIN DESCRIPTION - FREQUENCY
FREQUENCY: CONTINUOUS

## 2022-06-28 ASSESSMENT — PAIN DESCRIPTION - ONSET
ONSET: ON-GOING

## 2022-06-28 ASSESSMENT — PAIN SCALES - WONG BAKER: WONGBAKER_NUMERICALRESPONSE: 6

## 2022-06-28 ASSESSMENT — PAIN DESCRIPTION - DESCRIPTORS
DESCRIPTORS: ACHING
DESCRIPTORS: ACHING;DISCOMFORT
DESCRIPTORS: ACHING;DISCOMFORT
DESCRIPTORS: ACHING
DESCRIPTORS: ACHING;DISCOMFORT
DESCRIPTORS: ACHING;DISCOMFORT
DESCRIPTORS: ACHING
DESCRIPTORS: ACHING;DISCOMFORT

## 2022-06-28 ASSESSMENT — PAIN - FUNCTIONAL ASSESSMENT
PAIN_FUNCTIONAL_ASSESSMENT: ACTIVITIES ARE NOT PREVENTED

## 2022-06-28 ASSESSMENT — PAIN DESCRIPTION - ORIENTATION
ORIENTATION: RIGHT

## 2022-06-28 NOTE — PROGRESS NOTES
Progress Note( Dr. Kelly Perera)  6/28/2022  Subjective:   Admit Date: 6/24/2022  PCP: Jeff Garvey MD    Admitted For : Foot infection cellulitis    Consulted For: Better control of blood glucose    Interval History: Patient had debridement done and wound vacuum was placed on 6/24/2022    Denies any chest pains,   Denies SOB . Denies nausea or vomiting. No new bowel or bladder symptoms. Intake/Output Summary (Last 24 hours) at 6/28/2022 0853  Last data filed at 6/28/2022 0636  Gross per 24 hour   Intake 560 ml   Output 1650 ml   Net -1090 ml       DATA    CBC:   Recent Labs     06/27/22  0750 06/28/22  0736   WBC 8.2 7.2   HGB 11.3* 10.4*   * 122*    CMP:  Recent Labs     06/27/22  0750 06/28/22  0736    139   K 4.3 4.6    104   CO2 27 28   BUN 22 20   CREATININE 0.9 1.0   CALCIUM 8.3 8.0*   PROT 6.1* 6.0*   LABALBU 2.5* 2.5*   BILITOT 0.2 0.2   ALKPHOS 105 126   AST 52* 59*   ALT 25 28     Lipids:   Lab Results   Component Value Date    CHOL 179 11/24/2021    HDL 95 11/24/2021    TRIG 80 11/24/2021     Glucose:  Recent Labs     06/27/22  1647 06/27/22 2011 06/28/22  0153   POCGLU 93 110* 148*     [id number]:  Lab Results   Component Value Date    LABA1C 7.8 06/25/2022     High Sensitivity TSH:   Lab Results   Component Value Date    TSHHS 9.540 11/24/2021     Free T3: No results found for: FT3  Free T4:  Lab Results   Component Value Date    T4FREE 1.33 11/24/2021       XR FOOT RIGHT (MIN 3 VIEWS)   Final Result   Soft tissue ulcer with soft tissue gas adjacent to the proximal right 5th   metatarsal.  No definite evidence of acute osteomyelitis. Follow-up MRI may   be helpful. Other findings as above.               Scheduled Medicines   Medications:    insulin glargine  40 Units SubCUTAneous Nightly    insulin lispro  0-12 Units SubCUTAneous TID WC    insulin lispro  0-12 Units SubCUTAneous 2 times per day    insulin lispro  20 Units SubCUTAneous TID WC    gabapentin 600 mg Oral TID    atorvastatin  40 mg Oral Nightly    clopidogrel  75 mg Oral Daily    nicotine  1 patch TransDERmal Daily    sodium chloride flush  5-40 mL IntraVENous 2 times per day    enoxaparin  40 mg SubCUTAneous Daily    piperacillin-tazobactam  3,375 mg IntraVENous Q6H    levothyroxine  112 mcg Oral Daily    linezolid  600 mg IntraVENous Q12H      Infusions:    sodium chloride 100 mL/hr (06/27/22 1334)    dextrose           Objective:   Vitals: BP (!) 131/59   Pulse 57   Temp 97.6 °F (36.4 °C) (Oral)   Resp 21   Ht 5' 8\" (1.727 m)   Wt 140 lb (63.5 kg)   SpO2 100%   BMI 21.29 kg/m²   General appearance: alert and cooperative with exam  Neck: no JVD or bruit  Thyroid : Normal lobes   Lungs: Has Vesicular Breath sounds   Heart:  regular rate and rhythm  Abdomen: soft, non-tender; bowel sounds normal; no masses,  no organomegaly  Musculoskeletal: Normal  Extremities: extremities normal, , no edema right toe and right distal toe has foot ulcer debridement not done wound vacuum was placed on 6/24/2022  Neurologic:  Awake, alert, oriented to name, place and time. Cranial nerves II-XII are grossly intact. Motor is  intact. Sensory neuropathy. ,  and gait is abnormal.    Assessment:     Patient Active Problem List:     Diabetes mellitus     H/O echocardiogram     S/P CABG (coronary artery bypass graft)     Chest pain     Cellulitis     Heroin withdrawal (HCC)     CAD (coronary artery disease)     Polysubstance abuse (HCC)     Small bowel obstruction (HCC)     Narcotic abuse, continuous (HCC)     Hx of hepatitis     Epigastric pain     Diarrhea     Constipation with Ileus     Vomiting of fecal matter with nausea     Encephalopathy     Metabolic encephalopathy     Infectious gastroenteritis     Bilateral leg weakness     Gait disturbance     Left carotid stenosis     Hypothyroidism     Osteomyelitis (HCC)     Uncontrolled type II diabetes with peripheral autonomic neuropathy (HCC)     Gangrene of toe ([address]     Acute hematogenous osteomyelitis of right foot (HCC)     Amputation of little toe (HCC)     PAD (peripheral artery disease) (HCC)     Uncontrolled pain     Generalized weakness     Simple chronic bronchitis (HCC)     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 ([address]     Skin ulcer with necrosis of muscle ([address]     Toe ulcer ([address]     Diabetic foot ([address]      Plan:     1. Reviewed POC blood glucose . Labs and X ray results   2. Reviewed Current Medicines   3. On meal/ Correction bolus Humalog/ Basal Lantus Insulin regime / and Oral Hypoglycemic drugs   4. Monitor Blood glucose frequently   5. Modified  the dose of Insulin/ other medicines as needed   6. Will follow     .      Tate Hartley MD, MD

## 2022-06-28 NOTE — PROGRESS NOTES
Lab notified this nurse that Patient's blood culture x1 came back with Staph. This nurse did perfect serve Dr. Patrick Temple. N.O.'s received  to repeat cultures x2. This nurse notified lab. Lab will draw repeat cultures x2.

## 2022-06-28 NOTE — PROGRESS NOTES
Progress Note( Dr. Huynh November)  6/27/2022  Subjective:   Admit Date: 6/24/2022  PCP: Javon Kumari MD    Admitted For : Foot infection cellulitis    Consulted For: Better control of blood glucose    Interval History: Patient had debridement done and wound vacuum was placed on 6/24/2022    Denies any chest pains,   Denies SOB . Denies nausea or vomiting. No new bowel or bladder symptoms. Intake/Output Summary (Last 24 hours) at 6/27/2022 2212  Last data filed at 6/27/2022 1330  Gross per 24 hour   Intake 800 ml   Output --   Net 800 ml       DATA    CBC:   Recent Labs     06/25/22  0351 06/27/22  0750   WBC 11.2* 8.2   HGB 11.0* 11.3*    133*    CMP:  Recent Labs     06/25/22  0351 06/27/22  0750   * 138   K 4.7 4.3    104   CO2 24 27   BUN 21 22   CREATININE 0.8* 0.9   CALCIUM 7.9* 8.3   PROT 5.9* 6.1*   LABALBU 2.4* 2.5*   BILITOT 0.3 0.2   ALKPHOS 109 105   AST 30 52*   ALT 20 25     Lipids:   Lab Results   Component Value Date    CHOL 179 11/24/2021    HDL 95 11/24/2021    TRIG 80 11/24/2021     Glucose:  Recent Labs     06/27/22  1323 06/27/22  1647 06/27/22 2011   POCGLU 271* 93 110*     [id number]:  Lab Results   Component Value Date    LABA1C 7.8 06/25/2022     High Sensitivity TSH:   Lab Results   Component Value Date    TSHHS 9.540 11/24/2021     Free T3: No results found for: FT3  Free T4:  Lab Results   Component Value Date    T4FREE 1.33 11/24/2021       XR FOOT RIGHT (MIN 3 VIEWS)   Final Result   Soft tissue ulcer with soft tissue gas adjacent to the proximal right 5th   metatarsal.  No definite evidence of acute osteomyelitis. Follow-up MRI may   be helpful. Other findings as above.               Scheduled Medicines   Medications:    insulin glargine  40 Units SubCUTAneous Nightly    insulin lispro  0-12 Units SubCUTAneous TID WC    insulin lispro  0-12 Units SubCUTAneous 2 times per day    insulin lispro  20 Units SubCUTAneous TID WC    gabapentin  600 mg Oral TID    atorvastatin  40 mg Oral Nightly    clopidogrel  75 mg Oral Daily    nicotine  1 patch TransDERmal Daily    sodium chloride flush  5-40 mL IntraVENous 2 times per day    enoxaparin  40 mg SubCUTAneous Daily    piperacillin-tazobactam  3,375 mg IntraVENous Q6H    levothyroxine  112 mcg Oral Daily    linezolid  600 mg IntraVENous Q12H      Infusions:    sodium chloride 100 mL/hr (06/27/22 1334)    dextrose           Objective:   Vitals: /63   Pulse 60   Temp 98.2 °F (36.8 °C) (Oral)   Resp 14   Ht 5' 8\" (1.727 m)   Wt 140 lb (63.5 kg)   SpO2 99%   BMI 21.29 kg/m²   General appearance: alert and cooperative with exam  Neck: no JVD or bruit  Thyroid : Normal lobes   Lungs: Has Vesicular Breath sounds   Heart:  regular rate and rhythm  Abdomen: soft, non-tender; bowel sounds normal; no masses,  no organomegaly  Musculoskeletal: Normal  Extremities: extremities normal, , no edema right toe and right distal toe has foot ulcer debridement not done wound vacuum was placed on 6/24/2022  Neurologic:  Awake, alert, oriented to name, place and time. Cranial nerves II-XII are grossly intact. Motor is  intact. Sensory neuropathy. ,  and gait is abnormal.    Assessment:     Patient Active Problem List:     Diabetes mellitus     H/O echocardiogram     S/P CABG (coronary artery bypass graft)     Chest pain     Cellulitis     Heroin withdrawal (HCC)     CAD (coronary artery disease)     Polysubstance abuse (HCC)     Small bowel obstruction (HCC)     Narcotic abuse, continuous (HCC)     Hx of hepatitis     Epigastric pain     Diarrhea     Constipation with Ileus     Vomiting of fecal matter with nausea     Encephalopathy     Metabolic encephalopathy     Infectious gastroenteritis     Bilateral leg weakness     Gait disturbance     Left carotid stenosis     Hypothyroidism     Osteomyelitis (HCC)     Uncontrolled type II diabetes with peripheral autonomic neuropathy (HCC)     Gangrene of toe ([address] Acute hematogenous osteomyelitis of right foot (HCC)     Amputation of little toe (HCC)     PAD (peripheral artery disease) (HCC)     Uncontrolled pain     Generalized weakness     Simple chronic bronchitis (HCC)     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 ([address]     Skin ulcer with necrosis of muscle ([address]     Toe ulcer ([address]     Diabetic foot ([address]      Plan:     1. Reviewed POC blood glucose . Labs and X ray results   2. Reviewed Current Medicines   3. On meal/ Correction bolus Humalog/ Basal Lantus Insulin regime / and Oral Hypoglycemic drugs   4. Monitor Blood glucose frequently   5. Modified  the dose of Insulin/ other medicines as needed   6. Will follow     .      Jerzy Mcmahon MD, MD
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
## 2022-06-28 NOTE — PROGRESS NOTES
With: Significant other (caregiver for all ADLs and transfers and mobility)  Type of Home: Trailer  Home Access: Ramped entrance  Bathroom Shower/Tub: Tub/Shower unit  Bathroom Toilet: Handicap height  Bathroom Equipment: Tub transfer bench,Grab bars in shower  Home Equipment: Cane,Walker, rolling,Reacher  Has the patient had two or more falls in the past year or any fall with injury in the past year?: No  ADL Assistance: [address] [place] [address] Avenue: Independent  Homemaking Responsibilities: Yes  Ambulation Assistance: Independent  Transfer Assistance: Independent  Active : Yes  Occupation: On disability  Short Term Goals  Time Frame for Short term goals: 1 week  Short term goal 1: pt will [facility]  Short term goal 2: pt will [facility] at [facility]  Short term goal 3: pt will ambulate 150 ft using FWW at [facility]       Electronically signed by:     Jeaneth Tolbert, IRWIN  6/28/2022, 11:51 AM

## 2022-06-28 NOTE — PROGRESS NOTES
GENERAL SURGERY PROGRESS NOTE    Michelle Duarte is a 60 y.o. male status post debridement of right foot wound 6/24 with placement of wound VAC. Subjective:    Complains of pain over foot with weightbearing. No acute events overnight. Denies shortness of breath, chest pain, fevers/chills, nausea/vomiting. Objective:    Vitals: VITALS:  BP (!) 131/59   Pulse 57   Temp 97.6 °F (36.4 °C) (Oral)   Resp 13   Ht 5' 8\" (1.727 m)   Wt 140 lb (63.5 kg)   SpO2 100%   BMI 21.29 kg/m²     I/O: 06/27 0701 - 06/28 0700  In: 800 [P.O.:800]  Out: 1650 [Urine:1650]    Labs/Imaging Results:   Lab Results   Component Value Date     06/28/2022    K 4.6 06/28/2022     06/28/2022    CO2 28 06/28/2022    BUN 20 06/28/2022    CREATININE 1.0 06/28/2022    GLUCOSE 92 06/28/2022    CALCIUM 8.0 06/28/2022      Lab Results   Component Value Date    WBC 7.2 06/28/2022    HGB 10.4 (L) 06/28/2022    HCT 31.5 (L) 06/28/2022    MCV 91.0 06/28/2022     (L) 06/28/2022          IV Fluids: sodium chloride Last Rate: 100 mL/hr (06/27/22 1334)    dextrose    Scheduled Meds: linezolid, 600 mg, Oral, 2 times per day    insulin glargine, 40 Units, SubCUTAneous, Nightly    insulin lispro, 0-12 Units, SubCUTAneous, TID WC    insulin lispro, 0-12 Units, SubCUTAneous, 2 times per day    insulin lispro, 20 Units, SubCUTAneous, TID WC    gabapentin, 600 mg, Oral, TID    atorvastatin, 40 mg, Oral, Nightly    clopidogrel, 75 mg, Oral, Daily    nicotine, 1 patch, TransDERmal, Daily    sodium chloride flush, 5-40 mL, IntraVENous, 2 times per day    enoxaparin, 40 mg, SubCUTAneous, Daily    piperacillin-tazobactam, 3,375 mg, IntraVENous, Q6H    levothyroxine, 112 mcg, Oral, Daily    Physical Exam:  General: A&O x 3, no distress. HEENT: Anicteric sclerae, MMM. Extremities: No edema bilat LE.   Abdomen: Soft, nondistended, nontender  Right foot with wound VAC in place      Assessment and Plan:     Patient Active Problem List:     Diabetes mellitus     H/O echocardiogram     S/P CABG (coronary artery bypass graft)     Chest pain     Cellulitis     Heroin withdrawal (HCC)     CAD (coronary artery disease)     Polysubstance abuse (HCC)     Small bowel obstruction (HCC)     Narcotic abuse, continuous (HCC)     Hx of hepatitis     Epigastric pain     Diarrhea     Constipation with Ileus     Vomiting of fecal matter with nausea     Encephalopathy     Metabolic encephalopathy     Infectious gastroenteritis     Bilateral leg weakness     Gait disturbance     Left carotid stenosis     Hypothyroidism     Osteomyelitis (HCC)     Uncontrolled type II diabetes with peripheral autonomic neuropathy (HCC)     Gangrene of toe (HCC)     Acute hematogenous osteomyelitis of right foot (HCC)     Amputation of little toe (HCC)     PAD (peripheral artery disease) (HCC)     Uncontrolled pain     Generalized weakness     Simple chronic bronchitis (HCC)     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 (HCC)     Skin ulcer with necrosis of muscle ([address]     Toe ulcer ([address]     Diabetic foot ([address]     Diabetic foot infection ([address]     Abscess of right foot      Principal Problem:    Diabetic foot ([address]  Plan: Active Problems:    Diabetic foot infection ([address]  Plan:       Abscess of right foot  Plan:       Continue wound VAC. Continue management and wound VAC changes per wound care nursing. Will see as needed for further debridement based on wound care evaluation. Please call with any questions or concerns.          Laila Mccray,

## 2022-06-28 NOTE — PROGRESS NOTES
Hospitalist Progress Note      Name:  Jayden Nathan /Age/Sex: 1960  (60 y.o. male)   MRN & CSN:  [id number] & [id number] Admission Date/Time: 2022 12:29 AM   [facility] PCP: Nevin Veronica MD         Hospital Day: 5                                               Attending Physician Dr. Marvin Thayer and Plan:   Ella Doan is a 60 y.o.  male  who presents with Diabetic foot ([address]        Right foot gangrene 2/2 polymycrobial infection s/p debridement and wound vac placement, complicated by DM II  o XR on  Admission with soft tissue ulcer and gas. Surgery , now with wound vac  o  wound culture with heavy Morganella Morganii and proteus vulgaris growth, light strep agalactiae, rare staphylococcus simulans, sensitivities pending. Deescalate antibiotics as able per ID  o Wound nurse following  o Wound vac management per general surgery  o Continue zyvox and zosyn  o Appreciate ID and general surgery recommendations  o PT/OT recommended SNF on DC  o Discontinued IV dilaudid on , attempt pain control with oral narcotics and wean down as able, especially given history of heroin addiction     DM II   o Monitor BS and cover with medium dose SSI. Continue lantus 40 units  o Hold PO medications while inpatient  o Hypoglycemic protocol  o Endocrinology on consult for improved blood sugar control     CAD s/p CABG in   o Continue plavix,lipitor     Diabetic neuropathy  o Continue gabapentin     Tobacco abuse     Hypertension, controlled  o monitor     Hypothyroidism  o Continue synthroid     Hyperlipidemia  o Continue lipitor     Degenerative disc disease     COPD, not in exacerbation     History of heroin and ETOH abuse  o In remission     History of multiple right foot toe amputations between  and  and       Diet ADULT DIET; Regular; 5 carb choices (75 gm/meal);  Low Fat/Low Chol/High Fiber/2 gm Na   DVT Prophylaxis [x] Lovenox, []  Heparin, [] SCDs, [] Ambulation   GI Prophylaxis [] PPI,  [] H2 Blocker,  [] Carafate,  [] Diet/Tube Feeds   Code Status Full Code   Disposition Patient requires continued admission due to potential SNF needs and ongoing IV antibiotics     -Patient assessment and plan discussed with supervising physician-  Overnight events:     Jessie Mendoza is a 60 y.o.  male, who presented with Wound Check (right, reports infection)      Patient was seen and evaluated at bedside by myself. No acute overnight events. Patient reports feeling pain in his right foot. Objective: Intake/Output Summary (Last 24 hours) at 6/28/2022 1224  Last data filed at 6/28/2022 0636  Gross per 24 hour   Intake 240 ml   Output 1650 ml   Net -1410 ml      Vitals:   Vitals:    06/28/22 0156 06/28/22 0308 06/28/22 0745 06/28/22 0808   BP:  (!) 154/64 (!) 131/59    Pulse: 63  57 57   Resp: 21  13    Temp:  97.6 °F (36.4 °C) 97.6 °F (36.4 °C)    TempSrc:  Oral Oral    SpO2:  100% 100%    Weight:       Height:         Physical Exam: 06/28/22     General: NAD  Eyes: EOMI  ENT: neck supple  Cardiovascular: Regular rate  Respiratory: Clear to auscultation  Gastrointestinal: Soft, non tender  Genitourinary: no suprapubic tenderness  Musculoskeletal: No edema. Right foot wound vac in place with minimal bloody drainage, normal ROM of BLLE  Skin: warm, dry  Neuro: Alert. Psych: Mood appropriate.      Medications:   Medications:    linezolid  600 mg Oral 2 times per day    insulin glargine  40 Units SubCUTAneous Nightly    insulin lispro  0-12 Units SubCUTAneous TID     insulin lispro  0-12 Units SubCUTAneous 2 times per day    insulin lispro  20 Units SubCUTAneous TID     gabapentin  600 mg Oral TID    atorvastatin  40 mg Oral Nightly    clopidogrel  75 mg Oral Daily    nicotine  1 patch TransDERmal Daily    sodium chloride flush  5-40 mL IntraVENous 2 times per day    enoxaparin  40 mg SubCUTAneous Daily    piperacillin-tazobactam  3,375 mg the right 5th metatarsal.  There is chronic deformity of the right 4th metatarsal head and adjacent proximal phalanx similar to the prior study. There are vascular calcifications suggesting diabetes. Soft tissue ulcer with soft tissue gas adjacent to the proximal right 5th metatarsal.  No definite evidence of acute osteomyelitis. Follow-up MRI may be helpful. Other findings as above.        Roger Castano PA-C  Hospitalist  6/28/2022, 12:24 PM

## 2022-06-28 NOTE — CARE COORDINATION
CM in to see Pt to follow up on discharge planning. Pt not agreeable to therapy recommendation of home with home health at this time. Pt states continued pain and weakness and that his SO is not able to assist as needed. Pt states they have an aggressive dog that he does not feel would tolerate home health visits. Pt will require a precert if SNF is dc plan. Whiteboard to PT/OT requesting updated notes. CM call to Franci/MARÍA to follow up on woundvac and update of possible SNF placement.      CM following

## 2022-06-28 NOTE — PROGRESS NOTES
Occupational Therapy  [facility] ACUTE CARE OCCUPATIONAL THERAPY RE-EVALUATION  Avelina Cuenca, 1960, 3020/3020-A, 6/28/2022    History  [place]:  The primary encounter diagnosis was Diabetic foot infection ([address] Diagnoses of Abscess of right foot and Diabetic foot ([address] were also pertinent to this visit. Patient  has a past medical history of Anesthesia, Anxiety, CAD (coronary artery disease), COPD (chronic obstructive pulmonary disease) ([address] Degenerative disc disease, Diabetes mellitus ([address] Gall bladder stones, H/O cardiovascular stress test, H/O echocardiogram, Heroin abuse ([address] Hx MRSA infection, Hyperlipidemia, Hypertension, Low back pain, Migraine, Pancreatitis, S/P CABG x 4, and Shortness of breath on exertion. Patient  has a past surgical history that includes Hand surgery (15 yrs ago); Dental surgery (2010); Coronary artery bypass graft (1/6/13); Toe amputation (Right, 3/31/2020); Toe amputation (Right, 11/5/2021); Foot Debridement (Right, 06/24/2022); and Foot Debridement (Right, 6/24/2022). Subjective:  Patient states: \"I'm willing to do whatever it takes to get this foot better\". Pain:  7/10 in RLE, r/t surgery.   Pain intervention: Increased movement, repositioned     Communication with other providers:  Handoff to RN  Restrictions: General Precautions, Fall Risk, RLE WBAT    Home Setup/Prior level of function  Social/Functional History  Lives With: Significant other (caregiver for all ADLs and transfers and mobility)  Type of Home: Trailer  Home Access: Ramped entrance  Bathroom Shower/Tub: Tub/Shower unit  Bathroom Toilet: Handicap height  Bathroom Equipment: Tub transfer bench,Grab bars in [facility] Kendall Nieves [place]: Cane,Walker, rolling,Reacher  Has the patient had two or more falls in the past year or any fall with injury in the past year?: No  ADL Assistance: [address]: Independent  Homemaking Responsibilities: Yes  Ambulation Assistance: Independent  Transfer Assistance: Independent  Active : Yes  Occupation: On disability    Examination of body systems (includes body structures/functions, activity/participation limitations):  · Observation:  Supine in bed upon arrival, agreeable to therapy   · Vision: Glasses   · Hearing:  [facility]  · Cardiopulmonary:  On room air, tele, vitals remained stable throughout session       Body Systems and functions:  · ROM R/L: BUE WFL    · Strength R/L:  BUE 4/5,   4/5  · Sensation: Pt reports numbness d/t neuropathy in BUE from fingertips to elbows. · Tone: Normal  · Coordination: WFL  · Perception: WNL    Activities of Daily Living (ADLs):  · Feeding: Independent   · Grooming: SBA (pt washed face w/ wet cloth while semi-fowlers in bed)  · UB bathing: SBA  · LB bathing: Hilary   · UB dressing: SBA  · LB dressing: Hilary   · Toileting: SBA     Pt ADL function inferred from observation of gross motor function, and assessment of mobility, balance, posture, safety awareness, cognition, and activity tolerance. Cognitive and Psychosocial Functioning:  · Overall cognitive status:  Pt is A&Ox4, attention appears intact, and is able to follow multi-step commands w/o difficulty. · Affect: Pleasant, pained        Mobility:  · Supine to sit: SBA  · Sit to stand: CGA (from EOB)  · Stand to sit: CGA (to EOB)  · Sit to supine: SBA  · Sitting balance:  Good (pt sat EOB for approx 5 minutes).     · Functional Mobility: DNT d/t pt report of increased pain in RLE r/t surgical site  · Toilet Transfers: CGA (Pt completed SPT from EOB to [facility])   · SPT: CGA (to/from BS and EOB)      Pt educated on role of therapy and therapy POC       AM-PAC Daily Activity Inpatient   How much help for putting on and taking off regular lower body clothing?: A Little  How much help for Bathing?: A Little  How much help for Toileting?: A Little  How much help for putting on and taking off regular upper body clothing?: A Little  How much help for

## 2022-06-28 NOTE — CARE COORDINATION
CM in to see Pt to follow up on discharge planning. Pt agreeable to therapy recommendation of SNF. Pt choice of \"any[facility] but [facility]". Referral made to [facility]. Pt agreeable.      CM following

## 2022-06-29 LAB
CULTURE: ABNORMAL
CULTURE: NORMAL
GLUCOSE BLD-MCNC: 106 MG/DL (ref 70–99)
GLUCOSE BLD-MCNC: 160 MG/DL (ref 70–99)
GLUCOSE BLD-MCNC: 192 MG/DL (ref 70–99)
GLUCOSE BLD-MCNC: 196 MG/DL (ref 70–99)
GLUCOSE BLD-MCNC: 305 MG/DL (ref 70–99)
GLUCOSE BLD-MCNC: 57 MG/DL (ref 70–99)
Lab: ABNORMAL
Lab: ABNORMAL
Lab: NORMAL
PROCALCITONIN: 0.46
SPECIMEN: ABNORMAL
SPECIMEN: ABNORMAL
SPECIMEN: NORMAL

## 2022-06-29 PROCEDURE — 6370000000 HC RX 637 (ALT 250 FOR IP): Performed by: INTERNAL MEDICINE

## 2022-06-29 PROCEDURE — 2580000003 HC RX 258: Performed by: HOSPITALIST

## 2022-06-29 PROCEDURE — 36415 COLL VENOUS BLD VENIPUNCTURE: CPT

## 2022-06-29 PROCEDURE — 94761 N-INVAS EAR/PLS OXIMETRY MLT: CPT

## 2022-06-29 PROCEDURE — 1200000000 HC SEMI PRIVATE

## 2022-06-29 PROCEDURE — 6370000000 HC RX 637 (ALT 250 FOR IP): Performed by: HOSPITALIST

## 2022-06-29 PROCEDURE — 6360000002 HC RX W HCPCS: Performed by: HOSPITALIST

## 2022-06-29 PROCEDURE — 82962 GLUCOSE BLOOD TEST: CPT

## 2022-06-29 PROCEDURE — 6370000000 HC RX 637 (ALT 250 FOR IP): Performed by: FAMILY MEDICINE

## 2022-06-29 PROCEDURE — 97605 NEG PRS WND THER DME<=50SQCM: CPT

## 2022-06-29 PROCEDURE — 84145 PROCALCITONIN (PCT): CPT

## 2022-06-29 PROCEDURE — 6370000000 HC RX 637 (ALT 250 FOR IP): Performed by: PHYSICIAN ASSISTANT

## 2022-06-29 RX ORDER — ACETAMINOPHEN 325 MG/1
325 TABLET ORAL EVERY 8 HOURS PRN
Status: DISCONTINUED | OUTPATIENT
Start: 2022-06-29 | End: 2022-07-02 | Stop reason: HOSPADM

## 2022-06-29 RX ORDER — MIRTAZAPINE 15 MG/1
15 TABLET, FILM COATED ORAL NIGHTLY PRN
Status: DISCONTINUED | OUTPATIENT
Start: 2022-06-29 | End: 2022-07-02 | Stop reason: HOSPADM

## 2022-06-29 RX ORDER — MORPHINE SULFATE 4 MG/ML
4 INJECTION, SOLUTION INTRAMUSCULAR; INTRAVENOUS ONCE
Status: DISCONTINUED | OUTPATIENT
Start: 2022-06-30 | End: 2022-06-29

## 2022-06-29 RX ORDER — OXYCODONE HYDROCHLORIDE 5 MG/1
5 TABLET ORAL DAILY PRN
Status: DISCONTINUED | OUTPATIENT
Start: 2022-06-29 | End: 2022-06-30

## 2022-06-29 RX ORDER — LACTOBACILLUS RHAMNOSUS GG 10B CELL
1 CAPSULE ORAL
Status: DISCONTINUED | OUTPATIENT
Start: 2022-06-29 | End: 2022-07-02 | Stop reason: HOSPADM

## 2022-06-29 RX ORDER — INSULIN LISPRO 100 [IU]/ML
15 INJECTION, SOLUTION INTRAVENOUS; SUBCUTANEOUS
Status: DISCONTINUED | OUTPATIENT
Start: 2022-06-29 | End: 2022-07-01

## 2022-06-29 RX ORDER — FAMOTIDINE 20 MG/1
20 TABLET, FILM COATED ORAL DAILY
Status: DISCONTINUED | OUTPATIENT
Start: 2022-06-30 | End: 2022-07-02 | Stop reason: HOSPADM

## 2022-06-29 RX ADMIN — CLOPIDOGREL BISULFATE 75 MG: 75 TABLET ORAL at 09:11

## 2022-06-29 RX ADMIN — LEVOTHYROXINE SODIUM 112 MCG: 0.11 TABLET ORAL at 06:20

## 2022-06-29 RX ADMIN — GABAPENTIN 600 MG: 300 CAPSULE ORAL at 09:11

## 2022-06-29 RX ADMIN — Medication 1 CAPSULE: at 17:47

## 2022-06-29 RX ADMIN — INSULIN LISPRO 2 UNITS: 100 INJECTION, SOLUTION INTRAVENOUS; SUBCUTANEOUS at 09:12

## 2022-06-29 RX ADMIN — INSULIN LISPRO 20 UNITS: 100 INJECTION, SOLUTION INTRAVENOUS; SUBCUTANEOUS at 09:13

## 2022-06-29 RX ADMIN — OXYCODONE HYDROCHLORIDE 5 MG: 5 TABLET ORAL at 17:47

## 2022-06-29 RX ADMIN — Medication 1 CAPSULE: at 13:14

## 2022-06-29 RX ADMIN — ENOXAPARIN SODIUM 40 MG: 100 INJECTION SUBCUTANEOUS at 09:11

## 2022-06-29 RX ADMIN — HYDROCODONE BITARTRATE AND ACETAMINOPHEN 2 TABLET: 5; 325 TABLET ORAL at 20:47

## 2022-06-29 RX ADMIN — INSULIN GLARGINE 40 UNITS: 100 INJECTION, SOLUTION SUBCUTANEOUS at 21:44

## 2022-06-29 RX ADMIN — SODIUM CHLORIDE, PRESERVATIVE FREE 10 ML: 5 INJECTION INTRAVENOUS at 09:13

## 2022-06-29 RX ADMIN — PIPERACILLIN AND TAZOBACTAM 3375 MG: 3; .375 INJECTION, POWDER, LYOPHILIZED, FOR SOLUTION INTRAVENOUS at 00:43

## 2022-06-29 RX ADMIN — GABAPENTIN 600 MG: 300 CAPSULE ORAL at 13:13

## 2022-06-29 RX ADMIN — ATORVASTATIN CALCIUM 40 MG: 40 TABLET, FILM COATED ORAL at 20:47

## 2022-06-29 RX ADMIN — INSULIN LISPRO 8 UNITS: 100 INJECTION, SOLUTION INTRAVENOUS; SUBCUTANEOUS at 21:44

## 2022-06-29 RX ADMIN — HYDROCODONE BITARTRATE AND ACETAMINOPHEN 2 TABLET: 5; 325 TABLET ORAL at 11:19

## 2022-06-29 RX ADMIN — PIPERACILLIN AND TAZOBACTAM 3375 MG: 3; .375 INJECTION, POWDER, LYOPHILIZED, FOR SOLUTION INTRAVENOUS at 12:25

## 2022-06-29 RX ADMIN — PIPERACILLIN AND TAZOBACTAM 3375 MG: 3; .375 INJECTION, POWDER, LYOPHILIZED, FOR SOLUTION INTRAVENOUS at 06:24

## 2022-06-29 RX ADMIN — Medication 16 G: at 12:22

## 2022-06-29 RX ADMIN — PIPERACILLIN AND TAZOBACTAM 3375 MG: 3; .375 INJECTION, POWDER, LYOPHILIZED, FOR SOLUTION INTRAVENOUS at 18:34

## 2022-06-29 RX ADMIN — HYDROCODONE BITARTRATE AND ACETAMINOPHEN 2 TABLET: 5; 325 TABLET ORAL at 05:17

## 2022-06-29 RX ADMIN — GABAPENTIN 600 MG: 300 CAPSULE ORAL at 20:47

## 2022-06-29 ASSESSMENT — PAIN SCALES - GENERAL
PAINLEVEL_OUTOF10: 10
PAINLEVEL_OUTOF10: 9
PAINLEVEL_OUTOF10: 10
PAINLEVEL_OUTOF10: 7
PAINLEVEL_OUTOF10: 8
PAINLEVEL_OUTOF10: 9
PAINLEVEL_OUTOF10: 8
PAINLEVEL_OUTOF10: 8

## 2022-06-29 ASSESSMENT — PAIN DESCRIPTION - LOCATION
LOCATION: FOOT

## 2022-06-29 ASSESSMENT — PAIN DESCRIPTION - DESCRIPTORS
DESCRIPTORS: ACHING;DISCOMFORT
DESCRIPTORS: ACHING;STABBING
DESCRIPTORS: ACHING;SHARP
DESCRIPTORS: ACHING
DESCRIPTORS: ACHING;SHARP

## 2022-06-29 ASSESSMENT — PAIN DESCRIPTION - FREQUENCY
FREQUENCY: CONTINUOUS
FREQUENCY: CONTINUOUS

## 2022-06-29 ASSESSMENT — PAIN - FUNCTIONAL ASSESSMENT
PAIN_FUNCTIONAL_ASSESSMENT: ACTIVITIES ARE NOT PREVENTED

## 2022-06-29 ASSESSMENT — PAIN DESCRIPTION - ONSET
ONSET: ON-GOING
ONSET: ON-GOING

## 2022-06-29 ASSESSMENT — PAIN DESCRIPTION - PAIN TYPE
TYPE: ACUTE PAIN;SURGICAL PAIN
TYPE: ACUTE PAIN

## 2022-06-29 ASSESSMENT — PAIN DESCRIPTION - ORIENTATION
ORIENTATION: RIGHT

## 2022-06-29 ASSESSMENT — PAIN SCALES - WONG BAKER: WONGBAKER_NUMERICALRESPONSE: 8

## 2022-06-29 NOTE — PROGRESS NOTES
Progress Note( Dr. Jose Alfredo Ruiz)  6/29/2022  Subjective:   Admit Date: 6/24/2022  PCP: Janae Christy MD    Admitted For : Foot infection cellulitis    Consulted For: Better control of blood glucose    Interval History: Patient had debridement done and wound vacuum was placed on 6/24/2022  According to the nursing notes, patient refused all his medicines including blood glucose measurement last night except he took some antibiotics  Seem to be more sober this morning we talk about the issues of last night    Denies any chest pains,   Denies SOB . Denies nausea or vomiting. No new bowel or bladder symptoms. Intake/Output Summary (Last 24 hours) at 6/29/2022 0655  Last data filed at 6/29/2022 0626  Gross per 24 hour   Intake --   Output 1300 ml   Net -1300 ml       DATA    CBC:   Recent Labs     06/27/22  0750 06/28/22  0736   WBC 8.2 7.2   HGB 11.3* 10.4*   * 122*    CMP:  Recent Labs     06/27/22  0750 06/28/22  0736    139   K 4.3 4.6    104   CO2 27 28   BUN 22 20   CREATININE 0.9 1.0   CALCIUM 8.3 8.0*   PROT 6.1* 6.0*   LABALBU 2.5* 2.5*   BILITOT 0.2 0.2   ALKPHOS 105 126   AST 52* 59*   ALT 25 28     Lipids:   Lab Results   Component Value Date    CHOL 179 11/24/2021    HDL 95 11/24/2021    TRIG 80 11/24/2021     Glucose:  Recent Labs     06/28/22  1202 06/28/22  1230 06/28/22  1552   POCGLU 47* 75 220*     [id number]:  Lab Results   Component Value Date    LABA1C 7.8 06/25/2022     High Sensitivity TSH:   Lab Results   Component Value Date    TSHHS 9.540 11/24/2021     Free T3: No results found for: FT3  Free T4:  Lab Results   Component Value Date    T4FREE 1.33 11/24/2021       XR FOOT RIGHT (MIN 3 VIEWS)   Final Result   Soft tissue ulcer with soft tissue gas adjacent to the proximal right 5th   metatarsal.  No definite evidence of acute osteomyelitis. Follow-up MRI may   be helpful. Other findings as above.               Scheduled Medicines   Medications:    insulin glargine  40 Units SubCUTAneous Nightly    insulin lispro  0-12 Units SubCUTAneous TID     insulin lispro  0-12 Units SubCUTAneous 2 times per day    insulin lispro  20 Units SubCUTAneous TID     gabapentin  600 mg Oral TID    atorvastatin  40 mg Oral Nightly    clopidogrel  75 mg Oral Daily    nicotine  1 patch TransDERmal Daily    sodium chloride flush  5-40 mL IntraVENous 2 times per day    enoxaparin  40 mg SubCUTAneous Daily    piperacillin-tazobactam  3,375 mg IntraVENous Q6H    levothyroxine  112 mcg Oral Daily      Infusions:    sodium chloride 100 mL/hr (06/27/22 1334)    dextrose           Objective:   Vitals: BP (!) 131/59   Pulse 63   Temp 97.8 °F (36.6 °C) (Oral)   Resp 10   Ht 5' 8\" (1.727 m)   Wt 140 lb (63.5 kg)   SpO2 100%   BMI 21.29 kg/m²   General appearance: alert and cooperative with exam  Neck: no JVD or bruit  Thyroid : Normal lobes   Lungs: Has Vesicular Breath sounds   Heart:  regular rate and rhythm  Abdomen: soft, non-tender; bowel sounds normal; no masses,  no organomegaly  Musculoskeletal: Normal  Extremities: extremities normal, , no edema right toe and right distal toe has foot ulcer debridement not done wound vacuum was placed on 6/24/2022  Neurologic:  Awake, alert, oriented to name, place and time. Cranial nerves II-XII are grossly intact. Motor is  intact. Sensory neuropathy. ,  and gait is abnormal.    Assessment:     Patient Active Problem List:     Diabetes mellitus     H/O echocardiogram     S/P CABG (coronary artery bypass graft)     Chest pain     Cellulitis     Heroin withdrawal (HCC)     CAD (coronary artery disease)     Polysubstance abuse (HCC)     Small bowel obstruction (HCC)     Narcotic abuse, continuous (HCC)     Hx of hepatitis     Epigastric pain     Diarrhea     Constipation with Ileus     Vomiting of fecal matter with nausea     Encephalopathy     Metabolic encephalopathy     Infectious gastroenteritis     Bilateral leg weakness Gait disturbance     Left carotid stenosis     Hypothyroidism     Osteomyelitis (HCC)     Uncontrolled type II diabetes with peripheral autonomic neuropathy (HCC)     Gangrene of toe (HCC)     Acute hematogenous osteomyelitis of right foot (HCC)     Amputation of little toe (HCC)     PAD (peripheral artery disease) (HCC)     Uncontrolled pain     Generalized weakness     Simple chronic bronchitis (HCC)     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 ([address]     Skin ulcer with necrosis of muscle ([address]     Toe ulcer ([address]     Diabetic foot ([address]      Plan:     1. Reviewed POC blood glucose . Labs and X ray results   2. Reviewed Current Medicines   3. On meal/ Correction bolus Humalog/ Basal Lantus Insulin regime / and Oral Hypoglycemic drugs   4. Monitor Blood glucose frequently   5. Modified  the dose of Insulin/ other medicines as needed   6. Will follow     .      Thomas Silveira MD, MD
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
## 2022-06-29 NOTE — PLAN OF CARE
Problem: Discharge Planning  Goal: Discharge to home or other facility with appropriate resources  Outcome: Progressing  Flowsheets (Taken 6/29/2022 1202)  Discharge to home or other facility with appropriate resources: Identify barriers to discharge with patient and caregiver

## 2022-06-29 NOTE — CARE COORDINATION
CM contacted by [facility], they are unable to accept the Pt at this time due to history of drug use.       CM call to Jennifer/Wilmer for referral    CM following

## 2022-06-29 NOTE — CONSULTS
HAND SURGERY  15 yrs ago    right thumb    TOE AMPUTATION Right 3/31/2020    RIGHT 5TH TOE AMPUTATION AND WOUND VAC PLACEMENT performed by Orlin Carl MD at [facility] 73 TOE AMPUTATION Right 11/5/2021    RIGHT GREAT TOE AMPUTATION performed by Mary Hastings MD at [facility]    Family History   Problem Relation Age of Onset   Sina Petties Cancer Mother         breast    Other Father         parkinsons disease, CVA,HTN, heart disease       SOCIAL HISTORY    Social History     Tobacco Use    Smoking status: Current Some Day Smoker     Packs/day: 1.00     Years: 40.00     Pack years: 40.00     Types: Cigarettes    Smokeless tobacco: Current User     Types: Chew   Vaping Use    Vaping Use: Never used   Substance Use Topics    Alcohol use: No     Comment: \"quit 19 yrs ago, use to drink, pretty heavy\"    CAFFEINE: 1-16 oz cup coffee daily.  Drug use: Not Currently     Comment: heroin       ALLERGIES    No Known Allergies    MEDICATIONS    No current facility-administered medications on file prior to encounter.      Current Outpatient Medications on File Prior to Encounter   Medication Sig Dispense Refill    lactulose (CHRONULAC) 10 GM/15ML solution Take 30 mLs by mouth 2 times daily as needed (constipation) 450 mL 1    insulin glargine (LANTUS SOLOSTAR) 100 UNIT/ML injection pen 15 units twice a day 5 pen 3    insulin aspart (NOVOLOG FLEXPEN) 100 UNIT/ML injection pen 10 units before each meal 5 pen 3    Insulin Pen Needle (PEN NEEDLES 3/16\") 31G X 5 MM MISC 1 each by Does not apply route daily 100 each 3    Gauze Pads & Dressings 2\"X2\" PADS 1 each by Does not apply route daily as needed (for wound care) 30 each 1    Gauze Pads & Dressings (KERLIX GAUZE ROLL MEDIUM) MISC 1 each by Does not apply route daily as needed (wound care) 30 each 2    ondansetron (ZOFRAN-ODT) 4 MG disintegrating tablet Take 1 tablet by mouth 3 times daily as needed for Nausea or Vomiting 21 tablet 0    atorvastatin (LIPITOR) 40 MG tablet Take 1 tablet by mouth nightly 30 tablet 0    clopidogrel (PLAVIX) 75 MG tablet Take 1 tablet by mouth daily 30 tablet 0    nicotine (NICODERM CQ) 21 MG/24HR Place 1 patch onto the skin daily 30 patch 3    levothyroxine (SYNTHROID) 100 MCG tablet Take 1 tablet by mouth Daily 30 tablet 0    gabapentin (NEURONTIN) 600 MG tablet Take 1 tablet by mouth 3 times daily 90 tablet 3    Blood Glucose Monitoring Suppl (FREESTYLE LITE) MARGARITA Apply 1 Device topically three times daily. 1 Device 0    Lancets (STERILANCE TL) MISC USE AS DIRECTED TO TEST BLOOD SUGAR THREE TIMES DAILY BEFORE MEALS 100 each 11    glucose blood VI test strips (FREESTYLE LITE) strip Test 3 times daily as needed.  100 each 3         Objective:      BP (!) 110/58   Pulse 67   Temp 98.4 °F (36.9 °C) (Oral)   Resp 12   Ht 5' 8\" (1.727 m)   Wt 151 lb 7.3 oz (68.7 kg)   SpO2 98%   BMI 23.03 kg/m²   Harjinder Risk Score: Harjinder Scale Score: 19    LABS    CBC:   Lab Results   Component Value Date    WBC 7.2 06/28/2022    RBC 3.46 06/28/2022    HGB 10.4 06/28/2022    HCT 31.5 06/28/2022    MCV 91.0 06/28/2022    MCH 30.1 06/28/2022    MCHC 33.0 06/28/2022    RDW 14.2 06/28/2022     06/28/2022    MPV 10.7 06/28/2022     CMP:    Lab Results   Component Value Date     06/28/2022    K 4.6 06/28/2022     06/28/2022    CO2 28 06/28/2022    BUN 20 06/28/2022    CREATININE 1.0 06/28/2022    GFRAA >60 06/28/2022    LABGLOM >60 06/28/2022    GLUCOSE 92 06/28/2022    PROT 6.0 06/28/2022    PROT 7.3 03/18/2013    LABALBU 2.5 06/28/2022    CALCIUM 8.0 06/28/2022    BILITOT 0.2 06/28/2022    ALKPHOS 126 06/28/2022    AST 59 06/28/2022    ALT 28 06/28/2022     Albumin:    Lab Results   Component Value Date    LABALBU 2.5 06/28/2022     PT/INR:    Lab Results   Component Value Date    PROTIME 13.0 08/28/2018    INR 1.14 08/28/2018     HgBA1c:    Lab Results   Component Value Date    LABA1C 7.8 06/25/2022         Assessment: Patient Active Problem List   Diagnosis    Diabetes mellitus    H/O echocardiogram    S/P CABG (coronary artery bypass graft)    Chest pain    Cellulitis    Heroin withdrawal (HCC)    CAD (coronary artery disease)    [address]ysubstance abuse ([address]    Small bowel obstruction (HCC)    Narcotic abuse, continuous ([address]    Hx of hepatitis    Epigastric pain    Diarrhea    Constipation with Ileus    Vomiting of fecal matter with nausea    Encephalopathy    Metabolic encephalopathy    Infectious gastroenteritis    Bilateral leg weakness    Gait disturbance    Left carotid stenosis    Hypothyroidism    Osteomyelitis (HCC)    Uncontrolled type II diabetes with peripheral autonomic neuropathy (HCC)    Gangrene of toe (HCC)    Acute hematogenous osteomyelitis of right foot (HCC)    Amputation of little toe (HCC)    PAD (peripheral artery disease) (HCC)    Uncontrolled pain    Generalized weakness    Simple chronic bronchitis (HCC)    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 (HCC)    Skin ulcer with necrosis of muscle (HCC)    Toe ulcer ([address]    Diabetic foot ([address]    Diabetic foot infection ([address]    Abscess of right foot       Measurements:  Negative Pressure Wound Therapy Foot Anterior;Right (Active)   Wound Type Surgical 06/29/22 1400   Unit Type kci 06/29/22 1400   Dressing Type Other (Comment) 06/29/22 1400   Number of pieces used 3 06/29/22 1400   Number of pieces removed 2 06/29/22 1400   Cycle Continuous 06/29/22 1400   Target Pressure (mmHg) 125 06/29/22 1400   Intensity 3 06/27/22 1120   Canister changed?  No 06/29/22 1400   Dressing Status New dressing applied 06/29/22 1400   Dressing Changed Changed/New 06/29/22 1400   Drainage Amount Moderate 06/29/22 1400   Drainage Description Serosanguinous 06/29/22 1400   Dressing Change Due 07/01/22 06/29/22 1400   Output (ml) 0 ml 06/24/22 2045   Wound Assessment Granulation tissue 06/27/22 1120   Sindhu-wound Assessment Maceration 06/29/22 1400   Shape irregular 06/27/22 1120   Odor None 06/29/22 1400   Number of days: 5       Wound 06/24/22 Sacrum (Active)   Wound Assessment Erythema 06/25/22 0930   Drainage Amount None 06/25/22 0930   Sindhu-wound Assessment Ecchymosis; Intact 06/25/22 0930   Margins Defined edges 06/25/22 0930   Number of days: 5       Wound 06/24/22 Foot Anterior;Right (Active)   Wound Image   06/27/22 1120   Wound Etiology Non-Healing Surgical 06/29/22 1400   Dressing Status New dressing applied 06/29/22 1400   Wound Cleansed Cleansed with saline 06/29/22 1400   Dressing/Treatment Negative pressure wound therapy 06/29/22 1400   Dressing Change Due 06/27/22 06/27/22 0830   Wound Length (cm) 6 cm 06/27/22 1120   Wound Width (cm) 6.2 cm 06/27/22 1120   Wound Depth (cm) 1 cm 06/27/22 1120   Wound Surface Area (cm^2) 37.2 cm^2 06/27/22 1120   Wound Volume (cm^3) 37.2 cm^3 06/27/22 1120   Distance Tunneling (cm) 0 cm 06/29/22 1400   Tunneling Position ___ O'Clock 0 06/29/22 1400   Undermining Starts ___ O'Clock 0 06/29/22 1400   Undermining Ends___ O'Clock 0 06/29/22 1400   Undermining Maxium Distance (cm) 0 06/29/22 1400   Wound Assessment Pink/red 06/29/22 1400   Drainage Amount Moderate 06/29/22 1400   Drainage Description Serosanguinous 06/29/22 1400   Odor None 06/29/22 1400   Sindhu-wound Assessment Maceration 06/29/22 1400   Margins Defined edges 06/29/22 1400   Wound Thickness Description not for Pressure Injury Full thickness 06/29/22 1400   Number of days: 5       Response to treatment:  With complaints of pain. Pain Assessment:  Severity:  moderate  Quality of pain:  sore  Wound Pain Timing/Severity: dressing change   Premedicated: no    Plan:     Plan of Care: Wound 06/24/22 Foot Anterior;Right-Dressing/Treatment: Negative pressure wound therapy (silver foam x 3 )     Patient in bed at first said he didn't want it changed but then agreed. Dressing removed from rt foot diabetic/surgical. Cleansed with NS.  Sindhu wound macerated. Applied new vac dressing with silver foam x 3 pieces with adequate seal obtained @ 125mmhg continuous. Wound care to change again on Friday. Specialty Bed Required : no  [] Low Air Loss   [] Pressure Redistribution  [] Fluid Immersion  [] Bariatric  [] Total Pressure Relief  [] Other:     Discharge Plan:  Placement for patient upon discharge: tbd  Hospice Care:  no  Patient appropriate for Outpatient [address]: [facility]    Patient/Caregiver Teaching:  Level of patient/caregiver understanding able to:   Pt voiced understanding. Electronically signed by Khoa Winter.  MELISSA Tavarez,  on 6/29/2022 at 3:31 PM

## 2022-06-30 LAB
ALBUMIN SERPL-MCNC: 3 GM/DL (ref 3.4–5)
ALP BLD-CCNC: 157 IU/L (ref 40–128)
ALT SERPL-CCNC: 37 U/L (ref 10–40)
ANION GAP SERPL CALCULATED.3IONS-SCNC: 9 MMOL/L (ref 4–16)
AST SERPL-CCNC: 72 IU/L (ref 15–37)
BASOPHILS ABSOLUTE: 0 K/CU MM
BASOPHILS RELATIVE PERCENT: 0.4 % (ref 0–1)
BILIRUB SERPL-MCNC: 0.3 MG/DL (ref 0–1)
BUN BLDV-MCNC: 26 MG/DL (ref 6–23)
CALCIUM SERPL-MCNC: 8.4 MG/DL (ref 8.3–10.6)
CHLORIDE BLD-SCNC: 102 MMOL/L (ref 99–110)
CO2: 24 MMOL/L (ref 21–32)
CREAT SERPL-MCNC: 1.1 MG/DL (ref 0.9–1.3)
DIFFERENTIAL TYPE: ABNORMAL
EOSINOPHILS ABSOLUTE: 0.1 K/CU MM
EOSINOPHILS RELATIVE PERCENT: 0.9 % (ref 0–3)
GFR AFRICAN AMERICAN: >60 ML/MIN/1.73M2
GFR NON-AFRICAN AMERICAN: >60 ML/MIN/1.73M2
GLUCOSE BLD-MCNC: 136 MG/DL (ref 70–99)
GLUCOSE BLD-MCNC: 147 MG/DL (ref 70–99)
GLUCOSE BLD-MCNC: 174 MG/DL (ref 70–99)
GLUCOSE BLD-MCNC: 177 MG/DL (ref 70–99)
GLUCOSE BLD-MCNC: 208 MG/DL (ref 70–99)
GLUCOSE BLD-MCNC: 51 MG/DL (ref 70–99)
GLUCOSE BLD-MCNC: 88 MG/DL (ref 70–99)
HCT VFR BLD CALC: 32.3 % (ref 42–52)
HEMOGLOBIN: 10.8 GM/DL (ref 13.5–18)
IMMATURE NEUTROPHIL %: 0.5 % (ref 0–0.43)
LYMPHOCYTES ABSOLUTE: 1.3 K/CU MM
LYMPHOCYTES RELATIVE PERCENT: 15.4 % (ref 24–44)
MCH RBC QN AUTO: 30.5 PG (ref 27–31)
MCHC RBC AUTO-ENTMCNC: 33.4 % (ref 32–36)
MCV RBC AUTO: 91.2 FL (ref 78–100)
MONOCYTES ABSOLUTE: 0.3 K/CU MM
MONOCYTES RELATIVE PERCENT: 3.9 % (ref 0–4)
NUCLEATED RBC %: 0 %
PDW BLD-RTO: 13.7 % (ref 11.7–14.9)
PLATELET # BLD: 120 K/CU MM (ref 140–440)
PMV BLD AUTO: 11 FL (ref 7.5–11.1)
POTASSIUM SERPL-SCNC: 4.5 MMOL/L (ref 3.5–5.1)
RBC # BLD: 3.54 M/CU MM (ref 4.6–6.2)
SEGMENTED NEUTROPHILS ABSOLUTE COUNT: 6.5 K/CU MM
SEGMENTED NEUTROPHILS RELATIVE PERCENT: 78.9 % (ref 36–66)
SODIUM BLD-SCNC: 135 MMOL/L (ref 135–145)
TOTAL IMMATURE NEUTOROPHIL: 0.04 K/CU MM
TOTAL NUCLEATED RBC: 0 K/CU MM
TOTAL PROTEIN: 6.8 GM/DL (ref 6.4–8.2)
WBC # BLD: 8.2 K/CU MM (ref 4–10.5)

## 2022-06-30 PROCEDURE — 36415 COLL VENOUS BLD VENIPUNCTURE: CPT

## 2022-06-30 PROCEDURE — 1200000000 HC SEMI PRIVATE

## 2022-06-30 PROCEDURE — 6370000000 HC RX 637 (ALT 250 FOR IP): Performed by: FAMILY MEDICINE

## 2022-06-30 PROCEDURE — 6360000002 HC RX W HCPCS: Performed by: HOSPITALIST

## 2022-06-30 PROCEDURE — 97530 THERAPEUTIC ACTIVITIES: CPT

## 2022-06-30 PROCEDURE — 80053 COMPREHEN METABOLIC PANEL: CPT

## 2022-06-30 PROCEDURE — 6360000002 HC RX W HCPCS: Performed by: NURSE PRACTITIONER

## 2022-06-30 PROCEDURE — 99232 SBSQ HOSP IP/OBS MODERATE 35: CPT | Performed by: INTERNAL MEDICINE

## 2022-06-30 PROCEDURE — 6370000000 HC RX 637 (ALT 250 FOR IP): Performed by: PHYSICIAN ASSISTANT

## 2022-06-30 PROCEDURE — 94761 N-INVAS EAR/PLS OXIMETRY MLT: CPT

## 2022-06-30 PROCEDURE — 87040 BLOOD CULTURE FOR BACTERIA: CPT

## 2022-06-30 PROCEDURE — 2580000003 HC RX 258: Performed by: HOSPITALIST

## 2022-06-30 PROCEDURE — 82962 GLUCOSE BLOOD TEST: CPT

## 2022-06-30 PROCEDURE — 97116 GAIT TRAINING THERAPY: CPT

## 2022-06-30 PROCEDURE — 6370000000 HC RX 637 (ALT 250 FOR IP): Performed by: INTERNAL MEDICINE

## 2022-06-30 PROCEDURE — 6370000000 HC RX 637 (ALT 250 FOR IP): Performed by: HOSPITALIST

## 2022-06-30 PROCEDURE — 85025 COMPLETE CBC W/AUTO DIFF WBC: CPT

## 2022-06-30 PROCEDURE — 6360000002 HC RX W HCPCS: Performed by: INTERNAL MEDICINE

## 2022-06-30 RX ORDER — OXYCODONE HYDROCHLORIDE 5 MG/1
5 TABLET ORAL 2 TIMES DAILY PRN
Status: DISCONTINUED | OUTPATIENT
Start: 2022-06-30 | End: 2022-07-01

## 2022-06-30 RX ADMIN — PIPERACILLIN AND TAZOBACTAM 3375 MG: 3; .375 INJECTION, POWDER, LYOPHILIZED, FOR SOLUTION INTRAVENOUS at 20:55

## 2022-06-30 RX ADMIN — HYDROCODONE BITARTRATE AND ACETAMINOPHEN 2 TABLET: 5; 325 TABLET ORAL at 11:54

## 2022-06-30 RX ADMIN — GABAPENTIN 600 MG: 300 CAPSULE ORAL at 20:50

## 2022-06-30 RX ADMIN — Medication 1 CAPSULE: at 08:47

## 2022-06-30 RX ADMIN — SODIUM CHLORIDE, PRESERVATIVE FREE 10 ML: 5 INJECTION INTRAVENOUS at 08:48

## 2022-06-30 RX ADMIN — GABAPENTIN 600 MG: 300 CAPSULE ORAL at 15:05

## 2022-06-30 RX ADMIN — PIPERACILLIN AND TAZOBACTAM 3375 MG: 3; .375 INJECTION, POWDER, LYOPHILIZED, FOR SOLUTION INTRAVENOUS at 00:16

## 2022-06-30 RX ADMIN — CLOPIDOGREL BISULFATE 75 MG: 75 TABLET ORAL at 08:46

## 2022-06-30 RX ADMIN — HYDROMORPHONE HYDROCHLORIDE 0.5 MG: 1 INJECTION, SOLUTION INTRAMUSCULAR; INTRAVENOUS; SUBCUTANEOUS at 00:15

## 2022-06-30 RX ADMIN — ATORVASTATIN CALCIUM 40 MG: 40 TABLET, FILM COATED ORAL at 20:50

## 2022-06-30 RX ADMIN — PIPERACILLIN AND TAZOBACTAM 3375 MG: 3; .375 INJECTION, POWDER, LYOPHILIZED, FOR SOLUTION INTRAVENOUS at 11:57

## 2022-06-30 RX ADMIN — ENOXAPARIN SODIUM 40 MG: 100 INJECTION SUBCUTANEOUS at 08:47

## 2022-06-30 RX ADMIN — GABAPENTIN 600 MG: 300 CAPSULE ORAL at 08:46

## 2022-06-30 RX ADMIN — HYDROMORPHONE HYDROCHLORIDE 0.5 MG: 1 INJECTION, SOLUTION INTRAMUSCULAR; INTRAVENOUS; SUBCUTANEOUS at 07:05

## 2022-06-30 RX ADMIN — SODIUM CHLORIDE, PRESERVATIVE FREE 10 ML: 5 INJECTION INTRAVENOUS at 20:50

## 2022-06-30 RX ADMIN — LEVOTHYROXINE SODIUM 112 MCG: 0.11 TABLET ORAL at 06:40

## 2022-06-30 RX ADMIN — Medication 1 CAPSULE: at 11:54

## 2022-06-30 RX ADMIN — HYDROCODONE BITARTRATE AND ACETAMINOPHEN 2 TABLET: 5; 325 TABLET ORAL at 23:17

## 2022-06-30 RX ADMIN — PIPERACILLIN AND TAZOBACTAM 3375 MG: 3; .375 INJECTION, POWDER, LYOPHILIZED, FOR SOLUTION INTRAVENOUS at 06:42

## 2022-06-30 RX ADMIN — INSULIN LISPRO 15 UNITS: 100 INJECTION, SOLUTION INTRAVENOUS; SUBCUTANEOUS at 11:58

## 2022-06-30 RX ADMIN — Medication 1 CAPSULE: at 16:42

## 2022-06-30 RX ADMIN — HYDROMORPHONE HYDROCHLORIDE 0.25 MG: 1 INJECTION, SOLUTION INTRAMUSCULAR; INTRAVENOUS; SUBCUTANEOUS at 23:49

## 2022-06-30 RX ADMIN — FAMOTIDINE 20 MG: 20 TABLET ORAL at 08:46

## 2022-06-30 RX ADMIN — OXYCODONE HYDROCHLORIDE 5 MG: 5 TABLET ORAL at 08:46

## 2022-06-30 RX ADMIN — INSULIN LISPRO 4 UNITS: 100 INJECTION, SOLUTION INTRAVENOUS; SUBCUTANEOUS at 11:58

## 2022-06-30 RX ADMIN — OXYCODONE HYDROCHLORIDE 5 MG: 5 TABLET ORAL at 21:01

## 2022-06-30 ASSESSMENT — PAIN DESCRIPTION - LOCATION
LOCATION: FOOT

## 2022-06-30 ASSESSMENT — PAIN DESCRIPTION - ORIENTATION
ORIENTATION: RIGHT

## 2022-06-30 ASSESSMENT — PAIN - FUNCTIONAL ASSESSMENT
PAIN_FUNCTIONAL_ASSESSMENT: ACTIVITIES ARE NOT PREVENTED
PAIN_FUNCTIONAL_ASSESSMENT: ACTIVITIES ARE NOT PREVENTED

## 2022-06-30 ASSESSMENT — PAIN SCALES - WONG BAKER
WONGBAKER_NUMERICALRESPONSE: 8
WONGBAKER_NUMERICALRESPONSE: 8

## 2022-06-30 ASSESSMENT — PAIN SCALES - GENERAL
PAINLEVEL_OUTOF10: 8
PAINLEVEL_OUTOF10: 10
PAINLEVEL_OUTOF10: 9
PAINLEVEL_OUTOF10: 10

## 2022-06-30 ASSESSMENT — PAIN DESCRIPTION - DESCRIPTORS
DESCRIPTORS: SHOOTING;BURNING
DESCRIPTORS: SHOOTING

## 2022-06-30 NOTE — PLAN OF CARE
Problem: Discharge Planning  Goal: Discharge to home or other facility with appropriate resources  Outcome: Progressing     Problem: Skin/Tissue Integrity  Goal: Absence of new skin breakdown  Description: 1. Monitor for areas of redness and/or skin breakdown  2. Assess vascular access sites hourly  3. Every 4-6 hours minimum:  Change oxygen saturation probe site  4. Every 4-6 hours:  If on nasal continuous positive airway pressure, respiratory therapy assess nares and determine need for appliance change or resting period.   Outcome: Progressing     Problem: Safety - Adult  Goal: Free from fall injury  Outcome: Progressing  Flowsheets (Taken 6/30/2022 0047 by Abdi Rodriguez RN)  Free From Fall Injury:   Instruct family/caregiver on patient safety   Based on caregiver fall risk screen, instruct family/caregiver to ask for assistance with transferring infant if caregiver noted to have fall risk factors     Problem: Chronic Conditions and Co-morbidities  Goal: Patient's chronic conditions and co-morbidity symptoms are monitored and maintained or improved  Outcome: Progressing     Problem: Pain  Goal: Verbalizes/displays adequate comfort level or baseline comfort level  Outcome: Progressing  Flowsheets (Taken 6/30/2022 0315 by Abdi Rodriguez RN)  Verbalizes/displays adequate comfort level or baseline comfort level:   Encourage patient to monitor pain and request assistance   Assess pain using appropriate pain scale   Implement non-pharmacological measures as appropriate and evaluate response     Problem: ABCDS Injury Assessment  Goal: Absence of physical injury  Outcome: Progressing  Flowsheets (Taken 6/30/2022 0047 by Abdi Rodriguez RN)  Absence of Physical Injury: Implement safety measures based on patient assessment     Problem: Skin/Tissue Integrity - Adult  Goal: Incisions, wounds, or drain sites healing without S/S of infection  Outcome: Progressing     Problem: Infection - Adult  Goal: Absence of infection at discharge  Outcome: Progressing  Goal: Absence of infection during hospitalization  Outcome: Progressing     Problem: Nutrition Deficit:  Goal: Optimize nutritional status  Outcome: Progressing

## 2022-06-30 NOTE — PROGRESS NOTES
Patient states pain is currently a 10/10, but refusing PRN pain medication. Patient claiming that PRN pain regimen has not been working to resolve pain. Patient offered PRN medication at this time, and told that if pain medication does not work this RN will contact physician for further management. Patient agreeable.

## 2022-06-30 NOTE — PROGRESS NOTES
Physical Therapy    Physical Therapy Treatment Note  Name: Jerome Curry MRN: [id number] :   1960   Date:  2022   Admission Date: 2022 Room:  [address] [place] [address]   Restrictions/Precautions:  Restrictions/Precautions  Restrictions/Precautions: Fall Risk,General Precautions,Weight Bearing (wound vac)   Lower Extremity Weight Bearing Restrictions  Right Lower Extremity Weight Bearing: Weight Bearing As Tolerated (in post-op shoe)     Communication with other providers:    Subjective:  Patient states:  Pt needs education and encouragement to participate in tx session. Pt refused wearing post-op shoe and states \" I never put my foot on the floor\". Pain:   Location, Type, Intensity (0/10 to 10/10):  Rates pain 10 in right foot and pt will contact nurse as needed for pain meds when due. Objective:    Observation:  Alert and oriented with wound vac on right foot  Treatment, including education/measures:  Sup<=>sit Mod I  Sit<=>stand sba and cues to slow down for safety. amb 15' sba/cga and cues to slow down for safety with rw and pt tends to amb hopping with NWB thru RLE. Pt only placed foot lightly on floor when turning for direction changes. Pt sat EOB and performed deep breathing ex with trunk stretches followed by aps and LAQs 5 reps with cues. Safety  Patient left safely in the bed, with call light/phone in reach with alarm applied. Gait belt and mask were used for transfers and gait. Assessment / Impression:      Patient's tolerance of treatment:  fair  Adverse Reaction: na  Significant change in status and impact:  na  Barriers to improvement:  Pt is self limiting, c/o pain, strength and safety  Plan for Next Session:    Cont. POC  Time in:  0900  Time out:  925  Timed treatment minutes:  25  Total treatment time:  25    Previously filed items:  Social/Functional History  Lives With: Significant other (caregiver for all ADLs and transfers and mobility)  Type of Home: Trailer  Home Access: Ramped entrance  Bathroom Shower/Tub: Tub/Shower unit  Bathroom Toilet: Handicap height  Bathroom Equipment: Tub transfer bench,Grab bars in shower  Home Equipment: Cane,Walker, rolling,Reacher  Has the patient had two or more falls in the past year or any fall with injury in the past year?: No  ADL Assistance: [address] [place] [address] Avenue: Independent  Homemaking Responsibilities: Yes  Ambulation Assistance: Independent  Transfer Assistance: Independent  Active : Yes  Occupation: On disability  Patient Goals   Patient goals : return home     Short Term Goals  Time Frame for Short term goals: 1 week  Short term goal 1: pt will complete bed mobility at [facility]  Short term goal 2: pt will complete transfers at [facility]  Short term goal 3: pt will ambulate 150 ft using FWW at [facility]    Electronically signed by:    Laura Vogel PTA  6/30/2022, 8:44 AM

## 2022-06-30 NOTE — PROGRESS NOTES
Infectious Disease Progress Note  2022   Patient Name: Yolanda Ibrahim : 1960       Reason for visit: F/u right foot diabetic infection ? History:? Interval history noted. Right foot wound debridement, wound VAC placement. Right foot pain present. Denies nausea, vomiting or diarrhea. Physical Exam:  Vital Signs: BP (!) 107/57   Pulse 64   Temp 98.5 °F (36.9 °C) (Oral)   Resp 12   Ht 5' 8\" (1.727 m)   Wt 149 lb 4 oz (67.7 kg)   SpO2 97%   BMI 22.69 kg/m²     Gen: alert and oriented X3, no distress  Skin: no stigmata of endocarditis  Wounds: Right foot wound VAC on plantar surface of foot. HEMT: AT/NC Oropharynx pink, moist, and without lesions or exudates; dentition in good state of repair  Eyes: PERRLA, EOMI, conjunctiva pink, sclera anicteric. Neck: Supple. Trachea midline. No LAD. Chest: no distress and CTA. Good air movement. Heart: RRR and no MRG. Abd: soft, non-distended, no tenderness, no hepatomegaly. Normoactive bowel sounds. Ext: no clubbing, cyanosis, or edema  Catheter Site: without erythema or tenderness  LDA:   Neuro: Mental status intact. CN 2-12 intact and no focal sensory or motor deficits     Radiologic / Imaging / TESTING  No results found.      Labs:    Recent Results (from the past 24 hour(s))   POCT Glucose    Collection Time: 22  3:15 PM   Result Value Ref Range    POC Glucose 106 (H) 70 - 99 MG/DL   POCT Glucose    Collection Time: 22  9:22 PM   Result Value Ref Range    POC Glucose 305 (H) 70 - 99 MG/DL   POCT Glucose    Collection Time: 22  3:41 AM   Result Value Ref Range    POC Glucose 147 (H) 70 - 99 MG/DL   POCT Glucose    Collection Time: 22  8:36 AM   Result Value Ref Range    POC Glucose 136 (H) 70 - 99 MG/DL   POCT Glucose    Collection Time: 22 11:53 AM   Result Value Ref Range    POC Glucose 208 (H) 70 - 99 MG/DL   CBC with Auto Differential    Collection Time: 22 12:29 PM   Result Value Ref Range    WBC 8.2 4.0 - 10.5 K/CU MM    RBC 3.54 (L) 4.6 - 6.2 M/CU MM    Hemoglobin 10.8 (L) 13.5 - 18.0 GM/DL    Hematocrit 32.3 (L) 42 - 52 %    MCV 91.2 78 - 100 FL    MCH 30.5 27 - 31 PG    MCHC 33.4 32.0 - 36.0 %    RDW 13.7 11.7 - 14.9 %    Platelets 488 (L) 705 - 440 K/CU MM    MPV 11.0 7.5 - 11.1 FL    Differential Type AUTOMATED DIFFERENTIAL     Segs Relative 78.9 (H) 36 - 66 %    Lymphocytes % 15.4 (L) 24 - 44 %    Monocytes % 3.9 0 - 4 %    Eosinophils % 0.9 0 - 3 %    Basophils % 0.4 0 - 1 %    Segs Absolute 6.5 K/CU MM    Lymphocytes Absolute 1.3 K/CU MM    Monocytes Absolute 0.3 K/CU MM    Eosinophils Absolute 0.1 K/CU MM    Basophils Absolute 0.0 K/CU MM    Nucleated RBC % 0.0 %    Total Nucleated RBC 0.0 K/CU MM    Total Immature Neutrophil 0.04 K/CU MM    Immature Neutrophil % 0.5 (H) 0 - 0.43 %   Comprehensive Metabolic Panel w/ Reflex to MG    Collection Time: 06/30/22 12:29 PM   Result Value Ref Range    Sodium 135 135 - 145 MMOL/L    Potassium 4.5 3.5 - 5.1 MMOL/L    Chloride 102 99 - 110 mMol/L    CO2 24 21 - 32 MMOL/L    BUN 26 (H) 6 - 23 MG/DL    CREATININE 1.1 0.9 - 1.3 MG/DL    Glucose 174 (H) 70 - 99 MG/DL    Calcium 8.4 8.3 - 10.6 MG/DL    Albumin 3.0 (L) 3.4 - 5.0 GM/DL    Total Protein 6.8 6.4 - 8.2 GM/DL    Total Bilirubin 0.3 0.0 - 1.0 MG/DL    ALT 37 10 - 40 U/L    AST 72 (H) 15 - 37 IU/L    Alkaline Phosphatase 157 (H) 40 - 128 IU/L    GFR Non-African American >60 >60 mL/min/1.73m2    GFR African American >60 >60 mL/min/1.73m2    Anion Gap 9 4 - 16     CULTURE results: Invalid input(s): BLOOD CULTURE,  URINE CULTURE, SURGICAL CULTURE    Diagnosis:  Past Medical History:   Diagnosis Date    Anesthesia     Difficulty waking up    Anxiety     \"came into the er last month with chest pain, everything tested out ok, decided it was just anxiety- alot of stress in my life\"    CAD (coronary artery disease)     COPD (chronic obstructive pulmonary disease) (HCC)     Degenerative disc disease     neck, back and 6/24/2022. Cultures positive for Morganella morganii, Streptococcus agalactiae, Proteus vulgaris, Staphylococcus simulans. Possibility of osteomyelitis exists. Hence, treat for 2 weeks but with close follow-up. Susceptibility testing available. Plan to discharge on Bactrim double strength 1 tablet p.o. twice daily and cefuroxime 500 mg p.o. twice daily for total of 14 days.  Clinical status: Improving, 1 out of 4 bottles positive for Staphylococcus species is a contaminant   Therapeutic: Discontinue linezolid 6/24-, continue Zosyn 6/24-. Continue Zosyn while in the hospital.  If blood culture is a contaminant, then will be discharged on cefuroxime and Bactrim for 14 days.  Diagnostic: Trend CRP   F/u:    Other: Follow-up as an outpatient, has a patient may need extended antimicrobial therapy.   BMP 1 week to assess creatinine and potassium        Electronically signed by: Electronically signed by Kiara Alberto MD on 6/30/2022 at 2:01 PM

## 2022-06-30 NOTE — PROGRESS NOTES
Occupational Therapy    Occupational Therapy Treatment Note    Name: Yolanda Ibrahim MRN: [id number] :   1960   Date:  2022   Admission Date: 2022 Room:  [address] [place] [address]       Restrictions/Precautions:  Restrictions/Precautions  Restrictions/Precautions: Fall Risk,General Precautions,Weight Bearing (wound vac)   Lower Extremity Weight Bearing Restrictions  Right Lower Extremity Weight Bearing: Weight Bearing As Tolerated (in post-op shoe)       Communication with other providers: handoff to RN    Subjective:  Patient states:  I can't do anything until I get a shower from the nurse  Pain:   Location, Type, Intensity (0/10 to 10/10):  Did not rate    Objective:    Observation: supine in bed, unwilling to participate   Objective Measures:  Vitals WNL on room air    Treatment, including education:    Therapeutic Activity Training:   Extensive education on therapy POC, functional goals, importance of participation and benefits of tx. Educated on necessity of OOB activity, continued to adamantly refuse \"until I get a shower. \" Refused to participate in ADLs or functional OOB activity until nurse comes to shower him, stated \"no way you're going to convince me\". Assessment / Impression:    Patient's tolerance of treatment: did not participate  Adverse Reaction: none  Significant change in status and impact:  none  Barriers to improvement: self limiting      Plan for Next Session:    Continue w/ OT POC, reports d/c plan to SNF at this time.     Time in:  940  Time out:  949  Timed treatment minutes:  9  Total treatment time:  9      Electronically signed by:    Elba Young OT,   2022, 3:03 PM

## 2022-06-30 NOTE — PROGRESS NOTES
Patient states that PRN medication has not worked for pain control. Physician contacted for further management. Orders for one time dose of Dilaudid ordered at this time. See MAR for administration details.

## 2022-06-30 NOTE — PLAN OF CARE
Problem: Discharge Planning  Goal: Discharge to home or other facility with appropriate resources  6/29/2022 2353 by Hemant Bell RN  Outcome: Progressing  Flowsheets (Taken 6/29/2022 2047)  Discharge to home or other facility with appropriate resources:   Identify barriers to discharge with patient and caregiver   Arrange for needed discharge resources and transportation as appropriate   Identify discharge learning needs (meds, wound care, etc)   Refer to discharge planning if patient needs post-hospital services based on physician order or complex needs related to functional status, cognitive ability or social support system  6/29/2022 1448 by Ricky Blanton RN  Outcome: Progressing  Flowsheets (Taken 6/29/2022 1202)  Discharge to home or other facility with appropriate resources: Identify barriers to discharge with patient and caregiver     Problem: Skin/Tissue Integrity  Goal: Absence of new skin breakdown  Description: 1. Monitor for areas of redness and/or skin breakdown  2. Assess vascular access sites hourly  3. Every 4-6 hours minimum:  Change oxygen saturation probe site  4. Every 4-6 hours:  If on nasal continuous positive airway pressure, respiratory therapy assess nares and determine need for appliance change or resting period.   6/29/2022 2353 by Hemant Bell RN  Outcome: Progressing  6/29/2022 1448 by Ricky Blanton RN  Outcome: Progressing     Problem: Safety - Adult  Goal: Free from fall injury  6/29/2022 2353 by Hemant Bell RN  Outcome: Progressing  6/29/2022 1448 by Ricky Blanton RN  Outcome: Progressing     Problem: Chronic Conditions and Co-morbidities  Goal: Patient's chronic conditions and co-morbidity symptoms are monitored and maintained or improved  6/29/2022 2353 by Hemant Bell RN  Outcome: Progressing  Flowsheets (Taken 6/29/2022 2047)  Care Plan - Patient's Chronic Conditions and Co-Morbidity Symptoms are Monitored and Maintained or Improved:   Monitor and assess patient's chronic conditions and comorbid symptoms for stability, deterioration, or improvement   Collaborate with multidisciplinary team to address chronic and comorbid conditions and prevent exacerbation or deterioration   Update acute care plan with appropriate goals if chronic or comorbid symptoms are exacerbated and prevent overall improvement and discharge  6/29/2022 1448 by Damon Dao RN  Outcome: Progressing  Flowsheets (Taken 6/29/2022 1202)  Care Plan - Patient's Chronic Conditions and Co-Morbidity Symptoms are Monitored and Maintained or Improved: Monitor and assess patient's chronic conditions and comorbid symptoms for stability, deterioration, or improvement     Problem: Pain  Goal: Verbalizes/displays adequate comfort level or baseline comfort level  6/29/2022 2353 by Jose M Coleman RN  Outcome: Progressing  Flowsheets (Taken 6/29/2022 2047)  Verbalizes/displays adequate comfort level or baseline comfort level:   Encourage patient to monitor pain and request assistance   Assess pain using appropriate pain scale   Implement non-pharmacological measures as appropriate and evaluate response  6/29/2022 1448 by Damon Dao RN  Outcome: Progressing     Problem: ABCDS Injury Assessment  Goal: Absence of physical injury  6/29/2022 2353 by Jose M Coleman RN  Outcome: Progressing  6/29/2022 1448 by Damon Dao RN  Outcome: Progressing     Problem: Skin/Tissue Integrity - Adult  Goal: Incisions, wounds, or drain sites healing without S/S of infection  6/29/2022 2353 by Jose M Coleman RN  Outcome: Progressing  6/29/2022 1448 by Damon Dao RN  Outcome: Progressing     Problem: Infection - Adult  Goal: Absence of infection at discharge  6/29/2022 2353 by Jose M Coleman RN  Outcome: Progressing  Flowsheets (Taken 6/29/2022 2047)  Absence of infection at discharge:   Assess and monitor for signs and symptoms of infection   Monitor lab/diagnostic results   Monitor all insertion sites

## 2022-06-30 NOTE — PROGRESS NOTES
Supplement  Nutrition Education/Counseling: No recommendation at this time  Coordination of Nutrition Care: Continue to monitor while inpatient  Plan of Care discussed with: patient    Goals:     Goals: PO intake 75% or greater,by next RD assessment     Nutrition Monitoring and Evaluation:   Behavioral-Environmental Outcomes: None Identified  Food/Nutrient Intake Outcomes: Food and Nutrient Intake,Supplement Intake  Physical Signs/Symptoms Outcomes: Biochemical Data,GI Status,Fluid Status or Edema,Hemodynamic Status,Weight,Skin    Discharge Planning:     Too soon to determine     Rudolph Christine Leon[address], [address] [place] [address],   Contact: [phone]

## 2022-06-30 NOTE — PROGRESS NOTES
Patient states that PRN dilaudid helped with pain management at this time, and that pain is manageable.

## 2022-06-30 NOTE — PROGRESS NOTES
Pt called to have his blood sugar checked because it \"felt low\". BS 51. 8 oz of orange juice given per the request of the pt.  Hospitalist notified of low blood sugar even though pt ate 100% of his meal.

## 2022-06-30 NOTE — PROGRESS NOTES
V2.0  [facility] [place]      Name:  Gabbie Álvarez /Age/Sex: 1960  (60 y.o. male)   MRN & CSN:  [id number] & [id number] Encounter Date/Time: 2022 12:42 PM EDT    Location:  [address] [place], [place] [address] PCP: Eddie Whittaker MD         Hospital Day: 7    Assessment and Plan:   Gabbie Álvarez is a 60 y.o.  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 XR on  Admission with soft tissue ulcer and gas. Surgery , now with wound vac  ?  wound culture with heavy Morganella Morganii and proteus vulgaris growth, light strep agalactiae, rare staphylococcus simulans, sensitivities pending. Deescalate antibiotics as able per ID  ? Wound nurse following  ? Wound vac management per general surgery  ? Continue zyvox and zosyn  ? Appreciate ID and general surgery recommendations  ? Repeat blood culture pending from   ? PT/OT recommended SNF on DC  ? Discontinued IV dilaudid on , attempt pain control with oral narcotics and wean down as able, especially given history of heroin addiction     DM II   ? Monitor BS and cover with medium dose SSI. Continue lantus 40 units  ? Reduced meal time insulin to 15 units. Hypoglycemia to day down to 57.  ? Hold PO medications while inpatient  ? Hypoglycemic protocol  ? Endocrinology on consult for improved blood sugar control     CAD s/p CABG in   ? Continue plavix,lipitor     Diabetic neuropathy  ? Continue gabapentin     Tobacco abuse     Hypertension, controlled  ? monitor     Hypothyroidism  ? Continue synthroid     Hyperlipidemia  ? Continue lipitor     Degenerative disc disease     COPD, not in exacerbation     History of heroin and ETOH abuse  ? In remission     History of multiple right foot toe       Diet ADULT DIET; Regular; 5 carb choices (75 gm/meal);  Low Fat/Low Chol/High Fiber/2 gm Na   DVT Prophylaxis [x] Lovenox, []  Heparin, [] SCDs, [] Ambulation,  [] Eliquis, [] Xarelto  [] Coumadin   GI Prophylaxis [] PPI,  [x] H2 Blocker,  [] Carafate,  [] Diet,  [] No GI prophylaxis, N/A: patient is not under significant medical stress, non-ICU or is receiving a diet/tube feeds   Code Status Full Code   Disposition From: Home  Expected Disposition: SNF  Estimated Date of Discharge: 2 days  Patient requires continued admission due to Pending Blood cx, final abx regimen & placement   Surrogate Decision Maker/ POA Fermin VASQUEZ [] Low, [] Moderate,[x]  High  Patient's risk as above due to:      [] One or more chronic illnesses with mild exacerbation progression      [] Two or more stable chronic illnesses      [x] Undiagnosed new problem with uncertain prognosis      [] Elective major surgery      [x]Prescription drug management       Subjective:   Chief Complaint:  Wound Check (right, reports infection)     Admitted for: Diabetic foot ([facility]     Chilo Pierson is a 60 y.o.  male  who presents with Diabetic foot ([facility]     Patient seen and examined bedside in room [id number]. Patient voiced no complaints during encounter. Reruired IV pain medication overnight. Pain remains well controlled today. States the orals are working at this time. Glycemic control improved. No recurrent hypoglycemia. Labs & Vitals reviewed. Review of Systems:    Review of Systems  Gen: No fever, chills  GI: No n/v/d  MSK: no pain    Ten point ROS reviewed negative, unless as noted above  Objective: Intake/Output Summary (Last 24 hours) at 6/30/2022 0924  Last data filed at 6/30/2022 0547  Gross per 24 hour   Intake 1221.67 ml   Output 1000 ml   Net 221.67 ml      Vitals:   Vitals:    06/30/22 0841   BP: (!) 107/57   Pulse: 64   Resp: 12   Temp: 98.5 °F (36.9 °C)   SpO2: 97%     Physical Exam:     General: NAD  Eyes: EOMI  ENT: neck supple  Cardiovascular: Regular rate.   Respiratory: Clear to auscultation  Gastrointestinal: Soft, non tender  Genitourinary: no years: 40.00     Types: Cigarettes    Smokeless tobacco: Current User     Types: Chew   Vaping Use    Vaping Use: Never used   Substance and Sexual Activity    Alcohol use: No     Comment: \"quit 19 yrs ago, use to drink, pretty heavy\"    CAFFEINE: 1-16 oz cup coffee daily.  Drug use: Not Currently     Comment: heroin    Sexual activity: None     Comment:    Other Topics Concern    None   Social History Narrative    None     Social Determinants of Health     Financial Resource Strain:     Difficulty of Paying Living Expenses: Not on file   Food Insecurity:     Worried About Running Out of Food in the Last Year: Not on file    Nathan of Food in the Last Year: Not on file   Transportation Needs:     Lack of Transportation (Medical): Not on file    Lack of Transportation (Non-Medical):  Not on file   Physical Activity:     Days of Exercise per Week: Not on file    Minutes of Exercise per Session: Not on file   Stress:     Feeling of Stress : Not on file   Social Connections:     Frequency of Communication with Friends and Family: Not on file    Frequency of Social Gatherings with Friends and Family: Not on file    Attends Temple Services: Not on file    Active Member of [address] [place] [address] [employer] or Organizations: Not on file    Attends Club or Organization Meetings: Not on file    Marital Status: Not on file   Intimate Partner Violence:     Fear of Current or Ex-Partner: Not on file    Emotionally Abused: Not on file    Physically Abused: Not on file    Sexually Abused: Not on file   Housing Stability:     Unable to Pay for Housing in the Last Year: Not on file    Number of [place] in the Last Year: Not on file    Unstable Housing in the Last Year: Not on file       Medications:   Medications:    insulin lispro  15 Units SubCUTAneous TID WC    lactobacillus  1 capsule Oral TID WC    famotidine  20 mg Oral Daily    insulin glargine  40 Units SubCUTAneous Nightly    insulin lispro  0-12 Units SubCUTAneous TID     insulin lispro  0-12 Units SubCUTAneous 2 times per day    gabapentin  600 mg Oral TID    atorvastatin  40 mg Oral Nightly    clopidogrel  75 mg Oral Daily    nicotine  1 patch TransDERmal Daily    sodium chloride flush  5-40 mL IntraVENous 2 times per day    enoxaparin  40 mg SubCUTAneous Daily    piperacillin-tazobactam  3,375 mg IntraVENous Q6H    levothyroxine  112 mcg Oral Daily      Infusions:    sodium chloride 100 mL/hr (06/27/22 1334)    dextrose       PRN Meds: mirtazapine, 15 mg, Nightly PRN  acetaminophen, 325 mg, Q8H PRN  oxyCODONE, 5 mg, Daily PRN  HYDROcodone-acetaminophen, 2 tablet, Q6H PRN  lactulose, 20 g, BID PRN  sodium chloride flush, 5-40 mL, PRN  sodium chloride, , PRN  ondansetron, 4 mg, Q8H PRN   Or  ondansetron, 4 mg, Q6H PRN  polyethylene glycol, 17 g, Daily PRN  potassium chloride, 40 mEq, PRN   Or  potassium alternative oral replacement, 40 mEq, PRN   Or  potassium chloride, 10 mEq, PRN  magnesium sulfate, 2,000 mg, PRN  glucose, 4 tablet, PRN  dextrose bolus, 125 mL, PRN   Or  dextrose bolus, 250 mL, PRN  glucagon (rDNA), 1 mg, PRN  dextrose, 100 mL/hr, PRN          Labs      Recent Results (from the past 24 hour(s))   POCT Glucose    Collection Time: 06/29/22 11:22 AM   Result Value Ref Range    POC Glucose 160 (H) 70 - 99 MG/DL   POCT Glucose    Collection Time: 06/29/22 12:18 PM   Result Value Ref Range    POC Glucose 57 (L) 70 - 99 MG/DL   POCT Glucose    Collection Time: 06/29/22 12:42 PM   Result Value Ref Range    POC Glucose 196 (H) 70 - 99 MG/DL   POCT Glucose    Collection Time: 06/29/22  3:15 PM   Result Value Ref Range    POC Glucose 106 (H) 70 - 99 MG/DL   POCT Glucose    Collection Time: 06/29/22  9:22 PM   Result Value Ref Range    POC Glucose 305 (H) 70 - 99 MG/DL   POCT Glucose    Collection Time: 06/30/22  3:41 AM   Result Value Ref Range    POC Glucose 147 (H) 70 - 99 MG/DL   POCT Glucose    Collection Time: 06/30/22  8:36 AM Result Value Ref Range    POC Glucose 136 (H) 70 - 99 MG/DL        Imaging/Diagnostics Last 24 Hours   No results found.       Electronically signed by Abby Rodrigues DO on 6/30/2022 at 9:24 AM

## 2022-07-01 LAB
BASOPHILS ABSOLUTE: 0.1 K/CU MM
BASOPHILS RELATIVE PERCENT: 0.9 % (ref 0–1)
CULTURE: ABNORMAL
CULTURE: ABNORMAL
DIFFERENTIAL TYPE: ABNORMAL
EOSINOPHILS ABSOLUTE: 0.1 K/CU MM
EOSINOPHILS RELATIVE PERCENT: 1.2 % (ref 0–3)
GLUCOSE BLD-MCNC: 150 MG/DL (ref 70–99)
GLUCOSE BLD-MCNC: 166 MG/DL (ref 70–99)
GLUCOSE BLD-MCNC: 180 MG/DL (ref 70–99)
GLUCOSE BLD-MCNC: 182 MG/DL (ref 70–99)
GLUCOSE BLD-MCNC: 196 MG/DL (ref 70–99)
GLUCOSE BLD-MCNC: 284 MG/DL (ref 70–99)
GLUCOSE BLD-MCNC: 302 MG/DL (ref 70–99)
GLUCOSE BLD-MCNC: 362 MG/DL (ref 70–99)
HCT VFR BLD CALC: 32.9 % (ref 42–52)
HEMOGLOBIN: 11.1 GM/DL (ref 13.5–18)
IMMATURE NEUTROPHIL %: 0.3 % (ref 0–0.43)
LYMPHOCYTES ABSOLUTE: 0.9 K/CU MM
LYMPHOCYTES RELATIVE PERCENT: 15.6 % (ref 24–44)
Lab: ABNORMAL
MCH RBC QN AUTO: 30.3 PG (ref 27–31)
MCHC RBC AUTO-ENTMCNC: 33.7 % (ref 32–36)
MCV RBC AUTO: 89.9 FL (ref 78–100)
MONOCYTES ABSOLUTE: 0.5 K/CU MM
MONOCYTES RELATIVE PERCENT: 8 % (ref 0–4)
NUCLEATED RBC %: 0 %
PDW BLD-RTO: 14 % (ref 11.7–14.9)
PLATELET # BLD: 111 K/CU MM (ref 140–440)
PMV BLD AUTO: 10.4 FL (ref 7.5–11.1)
RBC # BLD: 3.66 M/CU MM (ref 4.6–6.2)
SEGMENTED NEUTROPHILS ABSOLUTE COUNT: 4.4 K/CU MM
SEGMENTED NEUTROPHILS RELATIVE PERCENT: 74 % (ref 36–66)
SPECIMEN: ABNORMAL
TOTAL IMMATURE NEUTOROPHIL: 0.02 K/CU MM
TOTAL NUCLEATED RBC: 0 K/CU MM
WBC # BLD: 5.9 K/CU MM (ref 4–10.5)

## 2022-07-01 PROCEDURE — 6370000000 HC RX 637 (ALT 250 FOR IP): Performed by: INTERNAL MEDICINE

## 2022-07-01 PROCEDURE — 82962 GLUCOSE BLOOD TEST: CPT

## 2022-07-01 PROCEDURE — 6370000000 HC RX 637 (ALT 250 FOR IP): Performed by: FAMILY MEDICINE

## 2022-07-01 PROCEDURE — 1200000000 HC SEMI PRIVATE

## 2022-07-01 PROCEDURE — 6360000002 HC RX W HCPCS: Performed by: HOSPITALIST

## 2022-07-01 PROCEDURE — 36415 COLL VENOUS BLD VENIPUNCTURE: CPT

## 2022-07-01 PROCEDURE — 6370000000 HC RX 637 (ALT 250 FOR IP): Performed by: PHYSICIAN ASSISTANT

## 2022-07-01 PROCEDURE — 2580000003 HC RX 258: Performed by: HOSPITALIST

## 2022-07-01 PROCEDURE — 97605 NEG PRS WND THER DME<=50SQCM: CPT

## 2022-07-01 PROCEDURE — 99232 SBSQ HOSP IP/OBS MODERATE 35: CPT | Performed by: INTERNAL MEDICINE

## 2022-07-01 PROCEDURE — 94761 N-INVAS EAR/PLS OXIMETRY MLT: CPT

## 2022-07-01 PROCEDURE — 85025 COMPLETE CBC W/AUTO DIFF WBC: CPT

## 2022-07-01 PROCEDURE — 6370000000 HC RX 637 (ALT 250 FOR IP): Performed by: HOSPITALIST

## 2022-07-01 RX ORDER — OXYCODONE HYDROCHLORIDE AND ACETAMINOPHEN 5; 325 MG/1; MG/1
2 TABLET ORAL EVERY 6 HOURS PRN
Status: DISCONTINUED | OUTPATIENT
Start: 2022-07-01 | End: 2022-07-02 | Stop reason: HOSPADM

## 2022-07-01 RX ORDER — MIRTAZAPINE 15 MG/1
15 TABLET, FILM COATED ORAL NIGHTLY
Status: DISCONTINUED | OUTPATIENT
Start: 2022-07-01 | End: 2022-07-02 | Stop reason: HOSPADM

## 2022-07-01 RX ORDER — HYDROXYZINE HYDROCHLORIDE 25 MG/1
25 TABLET, FILM COATED ORAL 4 TIMES DAILY PRN
Status: DISCONTINUED | OUTPATIENT
Start: 2022-07-01 | End: 2022-07-02 | Stop reason: HOSPADM

## 2022-07-01 RX ORDER — INSULIN LISPRO 100 [IU]/ML
10 INJECTION, SOLUTION INTRAVENOUS; SUBCUTANEOUS
Status: DISCONTINUED | OUTPATIENT
Start: 2022-07-01 | End: 2022-07-02

## 2022-07-01 RX ADMIN — OXYCODONE AND ACETAMINOPHEN 2 TABLET: 5; 325 TABLET ORAL at 16:16

## 2022-07-01 RX ADMIN — OXYCODONE AND ACETAMINOPHEN 2 TABLET: 5; 325 TABLET ORAL at 10:02

## 2022-07-01 RX ADMIN — PIPERACILLIN AND TAZOBACTAM 3375 MG: 3; .375 INJECTION, POWDER, LYOPHILIZED, FOR SOLUTION INTRAVENOUS at 01:24

## 2022-07-01 RX ADMIN — Medication 1 CAPSULE: at 12:19

## 2022-07-01 RX ADMIN — MIRTAZAPINE 15 MG: 15 TABLET, FILM COATED ORAL at 21:04

## 2022-07-01 RX ADMIN — OXYCODONE AND ACETAMINOPHEN 2 TABLET: 5; 325 TABLET ORAL at 22:40

## 2022-07-01 RX ADMIN — GABAPENTIN 600 MG: 300 CAPSULE ORAL at 13:05

## 2022-07-01 RX ADMIN — CLOPIDOGREL BISULFATE 75 MG: 75 TABLET ORAL at 10:02

## 2022-07-01 RX ADMIN — HYDROXYZINE HYDROCHLORIDE 25 MG: 25 TABLET, FILM COATED ORAL at 10:02

## 2022-07-01 RX ADMIN — HYDROXYZINE HYDROCHLORIDE 25 MG: 25 TABLET, FILM COATED ORAL at 22:40

## 2022-07-01 RX ADMIN — SODIUM CHLORIDE, PRESERVATIVE FREE 10 ML: 5 INJECTION INTRAVENOUS at 21:07

## 2022-07-01 RX ADMIN — GABAPENTIN 600 MG: 300 CAPSULE ORAL at 10:02

## 2022-07-01 RX ADMIN — Medication 1 CAPSULE: at 16:16

## 2022-07-01 RX ADMIN — HYDROCODONE BITARTRATE AND ACETAMINOPHEN 2 TABLET: 5; 325 TABLET ORAL at 06:37

## 2022-07-01 RX ADMIN — ATORVASTATIN CALCIUM 40 MG: 40 TABLET, FILM COATED ORAL at 21:04

## 2022-07-01 RX ADMIN — HYDROXYZINE HYDROCHLORIDE 25 MG: 25 TABLET, FILM COATED ORAL at 16:16

## 2022-07-01 RX ADMIN — LEVOTHYROXINE SODIUM 112 MCG: 0.11 TABLET ORAL at 06:34

## 2022-07-01 RX ADMIN — INSULIN LISPRO 6 UNITS: 100 INJECTION, SOLUTION INTRAVENOUS; SUBCUTANEOUS at 11:28

## 2022-07-01 RX ADMIN — INSULIN LISPRO 10 UNITS: 100 INJECTION, SOLUTION INTRAVENOUS; SUBCUTANEOUS at 16:26

## 2022-07-01 RX ADMIN — HYDROCODONE BITARTRATE AND ACETAMINOPHEN 2 TABLET: 5; 325 TABLET ORAL at 13:05

## 2022-07-01 RX ADMIN — SODIUM CHLORIDE, PRESERVATIVE FREE 10 ML: 5 INJECTION INTRAVENOUS at 11:07

## 2022-07-01 RX ADMIN — Medication 1 CAPSULE: at 06:34

## 2022-07-01 RX ADMIN — ENOXAPARIN SODIUM 40 MG: 100 INJECTION SUBCUTANEOUS at 10:03

## 2022-07-01 RX ADMIN — GABAPENTIN 600 MG: 300 CAPSULE ORAL at 21:04

## 2022-07-01 RX ADMIN — FAMOTIDINE 20 MG: 20 TABLET ORAL at 10:02

## 2022-07-01 ASSESSMENT — PAIN - FUNCTIONAL ASSESSMENT
PAIN_FUNCTIONAL_ASSESSMENT: ACTIVITIES ARE NOT PREVENTED

## 2022-07-01 ASSESSMENT — PAIN DESCRIPTION - LOCATION
LOCATION: FOOT;GENERALIZED
LOCATION: FOOT;GENERALIZED
LOCATION: FOOT

## 2022-07-01 ASSESSMENT — PAIN SCALES - GENERAL
PAINLEVEL_OUTOF10: 6
PAINLEVEL_OUTOF10: 10
PAINLEVEL_OUTOF10: 8
PAINLEVEL_OUTOF10: 8
PAINLEVEL_OUTOF10: 9

## 2022-07-01 ASSESSMENT — PAIN DESCRIPTION - ORIENTATION
ORIENTATION: RIGHT

## 2022-07-01 ASSESSMENT — PAIN DESCRIPTION - DESCRIPTORS
DESCRIPTORS: ACHING;SHOOTING;THROBBING
DESCRIPTORS: ACHING
DESCRIPTORS: ACHING

## 2022-07-01 ASSESSMENT — PAIN DESCRIPTION - PAIN TYPE: TYPE: SURGICAL PAIN

## 2022-07-01 ASSESSMENT — PAIN SCALES - WONG BAKER
WONGBAKER_NUMERICALRESPONSE: 2
WONGBAKER_NUMERICALRESPONSE: 2

## 2022-07-01 ASSESSMENT — PAIN DESCRIPTION - FREQUENCY: FREQUENCY: CONTINUOUS

## 2022-07-01 ASSESSMENT — PAIN DESCRIPTION - ONSET: ONSET: ON-GOING

## 2022-07-01 NOTE — DISCHARGE INSTR - COC
Continuity of Care Form    Patient Name: Ira Prince   :  1960  MRN:  [id number]    Admit date:  2022  Discharge date:  ***    Code Status Order: Full Code   Advance Directives:      Admitting Physician:  No admitting provider for patient encounter. PCP: Masoud Obrien MD    Discharging Nurse: [facility] Unit/Room#: 3020/3020-A  Discharging Unit Phone Number: ***    Emergency Contact:   Extended Emergency Contact Information  Primary Emergency Contact: Mylene Orellana  Address: [address] [place] [address] Phone: [phone]  Relation: Domestic Partner  Secondary Emergency Contact: Brandt Wilson Phone: [phone]  Relation: Child    Past Surgical History:  Past Surgical History:   Procedure Laterality Date    CORONARY ARTERY BYPASS GRAFT  13    DENTAL SURGERY      All upper teeth and some teeth on the bottom extracted.     FOOT DEBRIDEMENT Right 2022    RIGHT FOOT WOUND DEBRIDEMENT, WOUND VAC PLACEMENT with Dr. Nataly Flaherty at [address] [place] [address] Right 2022    RIGHT FOOT WOUND DEBRIDEMENT, WOUND VAC PLACEMENT performed by Aries Dixon DO at [phone]  15 yrs ago    right thumb    TOE AMPUTATION Right 3/31/2020    RIGHT 5TH TOE AMPUTATION AND WOUND VAC PLACEMENT performed by Ulises Negrete MD at [address] Right 2021    RIGHT GREAT TOE AMPUTATION performed by Kiara Del Castillo MD at [facility] OR       Immunization History:   Immunization History   Administered Date(s) Administered    Influenza Virus Vaccine 2013    Pneumococcal Polysaccharide ([id number]) 2013       Active Problems:  Patient Active Problem List   Diagnosis Code    Diabetes mellitus E11.9    H/O echocardiogram Z92.89    S/P CABG (coronary artery bypass graft) Z95.1    Chest pain R07.9    Cellulitis L03.90    Heroin withdrawal (HCC) F11.23    CAD (coronary artery disease) I25.10 Polysubstance abuse ([facility]) F19.10    Small bowel obstruction ([address] K56.609    Narcotic abuse, continuous ([facility]) F11.10    Hx of hepatitis Z86.19    Epigastric pain R10.13    Diarrhea R19.7    Constipation with Ileus K59.00    Vomiting of fecal matter with nausea R11.13    Encephalopathy B72.99    Metabolic encephalopathy Y19.94    Infectious gastroenteritis A09    Bilateral leg weakness R29.898    Gait disturbance R26.9    Left carotid stenosis I65.22    Hypothyroidism E03.9    Osteomyelitis ([facility]) M86.9    Uncontrolled type II diabetes with peripheral autonomic neuropathy ([facility]) E11.43, E11.65    Gangrene of toe ([facility]) I96    Acute hematogenous osteomyelitis of right foot ([facility]) M86.071    Amputation of little toe ([facility]) A10.697U    PAD (peripheral artery disease) ([facility]) I73.9    Uncontrolled pain R52    Generalized weakness R53.1    Simple chronic bronchitis ([facility]) J41.0    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 ([facility]) Z89.421    Skin ulcer with necrosis of muscle ([facility]) L98.493    Toe ulcer ([facility]) L97.509    Diabetic foot ([facility]) E11.8    Diabetic foot infection ([facility]) E11.628, L08.9    Abscess of right foot L02.611       Isolation/Infection:   Isolation            No Isolation          Patient Infection Status       None to display            Nurse Assessment:  Last Vital Signs: /73   Pulse 65   Temp 98.3 °F (36.8 °C) (Oral)   Resp 17   Ht 5' 7.99\" (1.727 m)   Wt 149 lb 4 oz (67.7 kg)   SpO2 98%   BMI 22.70 kg/m²     Last documented pain score (0-10 scale): Pain Level: 10  Last Weight:   Wt Readings from Last 1 Encounters:   06/30/22 149 lb 4 oz (67.7 kg)     Mental Status:  {IP PT MENTAL STATUS:20030}    IV Access:  { MARCIAL IV ACCESS:236413582}    Nursing Mobility/ADLs:  Walking   {CHP DME XQWJ:347337553}  Transfer  {CHP DME GMDH:574550169}  Bathing  {CHP DME XPDH:217220117}  Dressing  {CHP DME HFAC:654898723}  Toileting  {CHP DME FNLZ:661063925}  Feeding  {BETH DALY GLSD:243930826}  Med Admin  {BETH DME Distance (cm) 0 22 1400   Wound Assessment Pink/red 22 1400   Drainage Amount Moderate 22 1400   Drainage Description Serosanguinous 22 0842   Odor None 22 0842   Sindhu-wound Assessment Maceration 22 1400   Margins Defined edges 22 1400   Wound Thickness Description not for Pressure Injury Full thickness 22 1400   Number of days: 7        Elimination:  Continence: Bowel: {YES / AA:00385}  Bladder: {YES / GW:02825}  Urinary Catheter: {Urinary Catheter:022910125}   Colostomy/Ileostomy/Ileal Conduit: {YES / IV:42832}       Date of Last BM: ***    Intake/Output Summary (Last 24 hours) at 2022 0915  Last data filed at 2022 0646  Gross per 24 hour   Intake --   Output 700 ml   Net -700 ml     I/O last 3 completed shifts:   In: 1041.7 [IV Piggyback:1041.7]  Out: 1700 [Urine:1700]    Safety Concerns:     [employer] Safety Concerns:526672846}    Impairments/Disabilities:      [employer] Impairments/Disabilities:991246470}    Patient's personal belongings (please select all that are sent with patient):  {CHP DME Belongings:490054178}    RN SIGNATURE:  {Esignature:571917637}      PHYSICIAN SECTION    Nutrition Therapy:  Current Nutrition Therapy:   [employer] Diet List:311029453}    Routes of Feeding: {CHP DME Other Feedings:744959097}  Liquids: {Slp liquid thickness:95100}  Daily Fluid Restriction: {CHP DME Yes amt example:343026429}  Last Modified Barium Swallow with Video (Video Swallowing Test): {Done Not Done SWUI:370536961}    Treatments at the Time of Hospital Discharge:   Respiratory Treatments: ***  Oxygen Therapy:  {Therapy; copd oxygen:46126}  Ventilator:    { CC Vent LQYH:260798307}    Rehab Therapies: {THERAPEUTIC INTERVENTION:1131513134}  Weight Bearing Status/Restrictions: [employer] Weight Bearin}  Other Medical Equipment (for information only, NOT a DME order):  {EQUIPMENT:529696186}  Other Treatments: ***      Prognosis: {Prognosis:2247738407}    Condition at Discharge: Beatris Carter Patient Condition:775915398}    Rehab Potential (if transferring to Rehab): {Prognosis:2011480447}    Recommended Labs or Other Treatments After Discharge: ***    Physician Certification: I certify the above information and transfer of Jorge Montano  is necessary for the continuing treatment of the diagnosis listed and that he requires {Admit to Appropriate Level of Care:04430} for {GREATER/LESS:176850660} 30 days.      Update Admission H&P: {CHP DME Changes in SZXJP:144199834}    PHYSICIAN SIGNATURE:  {Esignature:141301761}
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
## 2022-07-01 NOTE — PROGRESS NOTES
Progress Note( Dr. Monie Vargas)  6/30/2022  Subjective:   Admit Date: 6/24/2022  PCP: Masoud Obrien MD    Admitted For : Foot infection cellulitis    Consulted For: Better control of blood glucose    Interval History: Patient had debridement done and wound vacuum was placed on 6/24/2022  According to the nursing notes, patient refused all his medicines including blood glucose measurement last night except he took some antibiotics  Seem to be more sober this morning we talk about the issues of last night    Denies any chest pains,   Denies SOB . Denies nausea or vomiting. No new bowel or bladder symptoms. Intake/Output Summary (Last 24 hours) at 6/30/2022 2233  Last data filed at 6/30/2022 0547  Gross per 24 hour   Intake --   Output 1000 ml   Net -1000 ml       DATA    CBC:   Recent Labs     06/28/22  0736 06/30/22  1229   WBC 7.2 8.2   HGB 10.4* 10.8*   * 120*    CMP:  Recent Labs     06/28/22  0736 06/30/22  1229    135   K 4.6 4.5    102   CO2 28 24   BUN 20 26*   CREATININE 1.0 1.1   CALCIUM 8.0* 8.4   PROT 6.0* 6.8   LABALBU 2.5* 3.0*   BILITOT 0.2 0.3   ALKPHOS 126 157*   AST 59* 72*   ALT 28 37     Lipids:   Lab Results   Component Value Date/Time    CHOL 179 11/24/2021 08:49 AM    HDL 95 11/24/2021 08:49 AM    TRIG 80 11/24/2021 08:49 AM     Glucose:  Recent Labs     06/30/22  1612 06/30/22  1639 06/30/22 2049   POCGLU 51* 88 177*     [id number]:  Lab Results   Component Value Date/Time    LABA1C 7.8 06/25/2022 03:51 AM     High Sensitivity TSH:   Lab Results   Component Value Date/Time    TSHHS 9.540 11/24/2021 08:49 AM     Free T3: No results found for: FT3  Free T4:  Lab Results   Component Value Date/Time    T4FREE 1.33 11/24/2021 08:49 AM       XR FOOT RIGHT (MIN 3 VIEWS)   Final Result   Soft tissue ulcer with soft tissue gas adjacent to the proximal right 5th   metatarsal.  No definite evidence of acute osteomyelitis. Follow-up MRI may   be helpful.       Other findings as above. Scheduled Medicines   Medications:    insulin lispro  15 Units SubCUTAneous TID WC    lactobacillus  1 capsule Oral TID WC    famotidine  20 mg Oral Daily    insulin glargine  40 Units SubCUTAneous Nightly    insulin lispro  0-12 Units SubCUTAneous TID WC    insulin lispro  0-12 Units SubCUTAneous 2 times per day    gabapentin  600 mg Oral TID    atorvastatin  40 mg Oral Nightly    clopidogrel  75 mg Oral Daily    nicotine  1 patch TransDERmal Daily    sodium chloride flush  5-40 mL IntraVENous 2 times per day    enoxaparin  40 mg SubCUTAneous Daily    piperacillin-tazobactam  3,375 mg IntraVENous Q6H    levothyroxine  112 mcg Oral Daily      Infusions:    sodium chloride 100 mL/hr (06/27/22 1334)    dextrose           Objective:   Vitals: /67   Pulse 64   Temp 98.7 °F (37.1 °C) (Oral)   Resp 15   Ht 5' 7.99\" (1.727 m)   Wt 149 lb 4 oz (67.7 kg)   SpO2 98%   BMI 22.70 kg/m²   General appearance: alert and cooperative with exam  Neck: no JVD or bruit  Thyroid : Normal lobes   Lungs: Has Vesicular Breath sounds   Heart:  regular rate and rhythm  Abdomen: soft, non-tender; bowel sounds normal; no masses,  no organomegaly  Musculoskeletal: Normal  Extremities: extremities normal, , no edema right toe and right distal toe has foot ulcer debridement not done wound vacuum was placed on 6/24/2022  Neurologic:  Awake, alert, oriented to name, place and time. Cranial nerves II-XII are grossly intact. Motor is  intact. Sensory neuropathy. ,  and gait is abnormal.    Assessment:     Patient Active Problem List:     Diabetes mellitus     H/O echocardiogram     S/P CABG (coronary artery bypass graft)     Chest pain     Cellulitis     Heroin withdrawal (HCC)     CAD (coronary artery disease)     Polysubstance abuse (HCC)     Small bowel obstruction (HCC)     Narcotic abuse, continuous (HCC)     Hx of hepatitis     Epigastric pain     Diarrhea     Constipation with Ileus     Vomiting of fecal matter with nausea     Encephalopathy     Metabolic encephalopathy     Infectious gastroenteritis     Bilateral leg weakness     Gait disturbance     Left carotid stenosis     Hypothyroidism     Osteomyelitis (HCC)     Uncontrolled type II diabetes with peripheral autonomic neuropathy (HCC)     Gangrene of toe (HCC)     Acute hematogenous osteomyelitis of right foot (HCC)     Amputation of little toe (HCC)     PAD (peripheral artery disease) (HCC)     Uncontrolled pain     Generalized weakness     Simple chronic bronchitis (HCC)     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 ([address]     Skin ulcer with necrosis of muscle ([address]     Toe ulcer ([address]     Diabetic foot ([address]      Plan:     1. Reviewed POC blood glucose . Labs and X ray results   2. Reviewed Current Medicines   3. On meal/ Correction bolus Humalog/ Basal Lantus Insulin regime / and Oral Hypoglycemic drugs   4. Monitor Blood glucose frequently   5. Modified  the dose of Insulin/ other medicines as needed   6. Will follow     .      Kacy Bauer MD, MD

## 2022-07-01 NOTE — PLAN OF CARE
Problem: Discharge Planning  Goal: Discharge to home or other facility with appropriate resources  7/1/2022 1150 by Sasha Da Silva RN  Outcome: Progressing  Flowsheets (Taken 7/1/2022 0945)  Discharge to home or other facility with appropriate resources:   Identify barriers to discharge with patient and caregiver   Arrange for needed discharge resources and transportation as appropriate   Identify discharge learning needs (meds, wound care, etc)   Refer to discharge planning if patient needs post-hospital services based on physician order or complex needs related to functional status, cognitive ability or social support system  6/30/2022 2221 by Samantha Bhatti RN  Outcome: Progressing     Problem: Skin/Tissue Integrity  Goal: Absence of new skin breakdown  Description: 1. Monitor for areas of redness and/or skin breakdown  2. Assess vascular access sites hourly  3. Every 4-6 hours minimum:  Change oxygen saturation probe site  4. Every 4-6 hours:  If on nasal continuous positive airway pressure, respiratory therapy assess nares and determine need for appliance change or resting period.   7/1/2022 1150 by Sasha Da Silva RN  Outcome: Progressing  6/30/2022 2221 by Samantha Bhatti RN  Outcome: Progressing     Problem: Safety - Adult  Goal: Free from fall injury  7/1/2022 1150 by Sasha Da Silva RN  Outcome: Progressing  Flowsheets (Taken 7/1/2022 0945)  Free From Fall Injury:   Based on caregiver fall risk screen, instruct family/caregiver to ask for assistance with transferring infant if caregiver noted to have fall risk factors   Instruct family/caregiver on patient safety  6/30/2022 2221 by Samantha Bhatti RN  Outcome: Progressing     Problem: Chronic Conditions and Co-morbidities  Goal: Patient's chronic conditions and co-morbidity symptoms are monitored and maintained or improved  7/1/2022 1150 by Sasha Da Silva RN  Outcome: Progressing  Flowsheets (Taken 7/1/2022 0945)  Care Plan - Patient's Chronic Conditions and Co-Morbidity Symptoms are Monitored and Maintained or Improved:   Monitor and assess patient's chronic conditions and comorbid symptoms for stability, deterioration, or improvement   Collaborate with multidisciplinary team to address chronic and comorbid conditions and prevent exacerbation or deterioration   Update acute care plan with appropriate goals if chronic or comorbid symptoms are exacerbated and prevent overall improvement and discharge  6/30/2022 2221 by Ata Jackson RN  Outcome: Progressing     Problem: Pain  Goal: Verbalizes/displays adequate comfort level or baseline comfort level  7/1/2022 1150 by Ravi Dickens RN  Outcome: Progressing  Flowsheets (Taken 7/1/2022 0945)  Verbalizes/displays adequate comfort level or baseline comfort level:   Encourage patient to monitor pain and request assistance   Assess pain using appropriate pain scale   Administer analgesics based on type and severity of pain and evaluate response   Implement non-pharmacological measures as appropriate and evaluate response   Consider cultural and social influences on pain and pain management   Notify Licensed Independent Practitioner if interventions unsuccessful or patient reports new pain  6/30/2022 2221 by Ata Jackson RN  Outcome: Progressing     Problem: ABCDS Injury Assessment  Goal: Absence of physical injury  7/1/2022 1150 by Ravi Dickens RN  Outcome: Progressing  Flowsheets (Taken 7/1/2022 0945)  Absence of Physical Injury: Implement safety measures based on patient assessment  6/30/2022 2221 by Ata Jackson RN  Outcome: Progressing     Problem: Skin/Tissue Integrity - Adult  Goal: Incisions, wounds, or drain sites healing without S/S of infection  7/1/2022 1150 by Ravi Dickens RN  Outcome: Progressing  Flowsheets (Taken 7/1/2022 0945)  Incisions, Wounds, or Drain Sites Healing Without Sign and Symptoms of Infection:   ADMISSION and DAILY: Assess and document risk factors for pressure ulcer development   TWICE DAILY: Assess and document skin integrity   TWICE DAILY: Assess and document dressing/incision, wound bed, drain sites and surrounding tissue   Implement wound care per orders  6/30/2022 2221 by Yakelin Salgado RN  Outcome: Progressing     Problem: Infection - Adult  Goal: Absence of infection at discharge  7/1/2022 1150 by Paige Gutierrez RN  Outcome: Progressing  Flowsheets (Taken 7/1/2022 0945)  Absence of infection at discharge:   Assess and monitor for signs and symptoms of infection   Monitor lab/diagnostic results   Monitor all insertion sites i.e., indwelling lines, tubes and drains   Monitor endotracheal (as able) and nasal secretions for changes in amount and color   Administer medications as ordered   Instruct and encourage patient and family to use good hand hygiene technique  6/30/2022 2221 by Yakelin Salgado RN  Outcome: Progressing  Goal: Absence of infection during hospitalization  7/1/2022 1150 by Paige Gutierrez RN  Outcome: Progressing  Flowsheets (Taken 7/1/2022 0945)  Absence of infection during hospitalization:   Assess and monitor for signs and symptoms of infection   Monitor lab/diagnostic results   Monitor all insertion sites i.e., indwelling lines, tubes and drains   Monitor endotracheal (as able) and nasal secretions for changes in amount and color   Administer medications as ordered   Instruct and encourage patient and family to use good hand hygiene technique  6/30/2022 2221 by Yakelin Salgado RN  Outcome: Progressing     Problem: Nutrition Deficit:  Goal: Optimize nutritional status  7/1/2022 1150 by Paige Gutierrez RN  Outcome: Progressing  6/30/2022 2221 by Yakelin Salgado RN  Outcome: Progressing
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
## 2022-07-01 NOTE — PROGRESS NOTES
Pt is wanting to go AMA, states his pain isn't under control because the MD will not give him a one time dose of dilaudid. Educated pt on wound vac and infection risk if he decided to go home AMA. Pt is A&O and states understanding of risk. States will make final decision by midnight. MD notified of pt statements.

## 2022-07-01 NOTE — PLAN OF CARE
Problem: Discharge Planning  Goal: Discharge to home or other facility with appropriate resources  6/30/2022 2221 by Maame Scott RN  Outcome: Progressing  6/30/2022 1443 by Julianna Haynes  Outcome: Progressing  6/30/2022 1442 by Julianna Haynes  Outcome: Progressing     Problem: Skin/Tissue Integrity  Goal: Absence of new skin breakdown  Description: 1. Monitor for areas of redness and/or skin breakdown  2. Assess vascular access sites hourly  3. Every 4-6 hours minimum:  Change oxygen saturation probe site  4. Every 4-6 hours:  If on nasal continuous positive airway pressure, respiratory therapy assess nares and determine need for appliance change or resting period.   6/30/2022 2221 by Maame Scott RN  Outcome: Progressing  6/30/2022 1443 by Julianna Haynes  Outcome: Progressing  6/30/2022 1442 by Julianna Haynes  Outcome: Progressing     Problem: Safety - Adult  Goal: Free from fall injury  6/30/2022 2221 by Maame Scott RN  Outcome: Progressing  6/30/2022 1443 by Julianna Haynes  Outcome: Progressing  6/30/2022 1442 by Julianna Haynes  Outcome: Progressing  Flowsheets (Taken 6/30/2022 0047 by Yunier Navas RN)  Free From Fall Injury:   Instruct family/caregiver on patient safety   Based on caregiver fall risk screen, instruct family/caregiver to ask for assistance with transferring infant if caregiver noted to have fall risk factors     Problem: Chronic Conditions and Co-morbidities  Goal: Patient's chronic conditions and co-morbidity symptoms are monitored and maintained or improved  6/30/2022 2221 by Maame Scott RN  Outcome: Progressing  6/30/2022 1443 by Julianna Haynes  Outcome: Progressing  6/30/2022 1442 by Julianna Haynes  Outcome: Progressing     Problem: Pain  Goal: Verbalizes/displays adequate comfort level or baseline comfort level  6/30/2022 2221 by Maame Scott RN  Outcome: Progressing  6/30/2022 1443 by Zachary Cline  Outcome: Progressing  6/30/2022 1442 by Zachary Cline  Outcome: Progressing  Flowsheets (Taken 6/30/2022 0315 by Viji Gutiérrez RN)  Verbalizes/displays adequate comfort level or baseline comfort level:   Encourage patient to monitor pain and request assistance   Assess pain using appropriate pain scale   Implement non-pharmacological measures as appropriate and evaluate response     Problem: ABCDS Injury Assessment  Goal: Absence of physical injury  6/30/2022 2221 by Kayy Urena RN  Outcome: Progressing  6/30/2022 1443 by Zacahry Cline  Outcome: Progressing  6/30/2022 1442 by Zachary Cline  Outcome: Progressing  [address] (Taken 6/30/2022 0047 by Viji Gutiérrez, MELISSA)  Absence of Physical Injury: Implement safety measures based on patient assessment     Problem: Skin/Tissue Integrity - Adult  Goal: Incisions, wounds, or drain sites healing without S/S of infection  6/30/2022 2221 by Kayy Urena RN  Outcome: Progressing  6/30/2022 1443 by Zachary Cline  Outcome: Progressing  6/30/2022 1442 by Zachary Cline  Outcome: Progressing     Problem: Infection - Adult  Goal: Absence of infection at discharge  6/30/2022 2221 by Kayy Urena RN  Outcome: Progressing  6/30/2022 1443 by Zachary Cline  Outcome: Progressing  6/30/2022 1442 by Zachary Cline  Outcome: Progressing  Goal: Absence of infection during hospitalization  6/30/2022 2221 by Kayy Urena RN  Outcome: Progressing  6/30/2022 1443 by Zachary Cline  Outcome: Progressing  6/30/2022 1442 by Zachary Cline  Outcome: Progressing     Problem: Nutrition Deficit:  Goal: Optimize nutritional status  6/30/2022 2221 by Kayy Urena RN  Outcome: Progressing  6/30/2022 1443 by Zachary Cline  Outcome: Progressing  6/30/2022 1442 by Zachary Cline  Outcome: Progressing

## 2022-07-01 NOTE — PROGRESS NOTES
Progress Note( Dr. Nohemi Lind)  7/1/2022  Subjective:   Admit Date: 6/24/2022  PCP: Maye Lim MD    Admitted For : Foot infection cellulitis    Consulted For: Better control of blood glucose    Interval History: Patient had debridement done and wound vacuum was placed on 6/24/2022  According to the nursing notes, patient not very happy as he not getting his pain medicine multiple times intention to sign out AMA  Seem to be more sober this morning we talk about the issues of last night    Denies any chest pains,   Denies SOB . Denies nausea or vomiting. No new bowel or bladder symptoms. Intake/Output Summary (Last 24 hours) at 7/1/2022 1936  Last data filed at 7/1/2022 0646  Gross per 24 hour   Intake --   Output 700 ml   Net -700 ml       DATA    CBC:   Recent Labs     06/30/22  1229 07/01/22  0919   WBC 8.2 5.9   HGB 10.8* 11.1*   * 111*    CMP:  Recent Labs     06/30/22  1229      K 4.5      CO2 24   BUN 26*   CREATININE 1.1   CALCIUM 8.4   PROT 6.8   LABALBU 3.0*   BILITOT 0.3   ALKPHOS 157*   AST 72*   ALT 37     Lipids:   Lab Results   Component Value Date/Time    CHOL 179 11/24/2021 08:49 AM    HDL 95 11/24/2021 08:49 AM    TRIG 80 11/24/2021 08:49 AM     Glucose:  Recent Labs     07/01/22  1112 07/01/22  1615 07/01/22  1816   POCGLU 284* 362* 196*     [id number]:  Lab Results   Component Value Date/Time    LABA1C 7.8 06/25/2022 03:51 AM     High Sensitivity TSH:   Lab Results   Component Value Date/Time    TSHHS 9.540 11/24/2021 08:49 AM     Free T3: No results found for: FT3  Free T4:  Lab Results   Component Value Date/Time    T4FREE 1.33 11/24/2021 08:49 AM       XR FOOT RIGHT (MIN 3 VIEWS)   Final Result   Soft tissue ulcer with soft tissue gas adjacent to the proximal right 5th   metatarsal.  No definite evidence of acute osteomyelitis. Follow-up MRI may   be helpful. Other findings as above.               Scheduled Medicines   Medications:    insulin lispro 10 Units SubCUTAneous TID     mirtazapine  15 mg Oral Nightly    lactobacillus  1 capsule Oral TID WC    famotidine  20 mg Oral Daily    insulin glargine  40 Units SubCUTAneous Nightly    insulin lispro  0-12 Units SubCUTAneous TID     insulin lispro  0-12 Units SubCUTAneous 2 times per day    gabapentin  600 mg Oral TID    atorvastatin  40 mg Oral Nightly    clopidogrel  75 mg Oral Daily    nicotine  1 patch TransDERmal Daily    sodium chloride flush  5-40 mL IntraVENous 2 times per day    enoxaparin  40 mg SubCUTAneous Daily    levothyroxine  112 mcg Oral Daily      Infusions:    sodium chloride 100 mL/hr (06/27/22 1334)    dextrose           Objective:   Vitals: /68   Pulse 75   Temp 98.1 °F (36.7 °C) (Oral)   Resp 15   Ht 5' 7.99\" (1.727 m)   Wt 149 lb 4 oz (67.7 kg)   SpO2 98%   BMI 22.70 kg/m²   General appearance: alert and cooperative with exam  Neck: no JVD or bruit  Thyroid : Normal lobes   Lungs: Has Vesicular Breath sounds   Heart:  regular rate and rhythm  Abdomen: soft, non-tender; bowel sounds normal; no masses,  no organomegaly  Musculoskeletal: Normal  Extremities: extremities normal, , no edema right toe and right distal toe has foot ulcer debridement not done wound vacuum was placed on 6/24/2022  Neurologic:  Awake, alert, oriented to name, place and time. Cranial nerves II-XII are grossly intact. Motor is  intact. Sensory neuropathy. ,  and gait is abnormal.    Assessment:     Patient Active Problem List:     Diabetes mellitus     H/O echocardiogram     S/P CABG (coronary artery bypass graft)     Chest pain     Cellulitis     Heroin withdrawal (HCC)     CAD (coronary artery disease)     Polysubstance abuse (HCC)     Small bowel obstruction (HCC)     Narcotic abuse, continuous (HCC)     Hx of hepatitis     Epigastric pain     Diarrhea     Constipation with Ileus     Vomiting of fecal matter with nausea     Encephalopathy     Metabolic encephalopathy     Infectious gastroenteritis     Bilateral leg weakness     Gait disturbance     Left carotid stenosis     Hypothyroidism     Osteomyelitis (HCC)     Uncontrolled type II diabetes with peripheral autonomic neuropathy (HCC)     Gangrene of toe (HCC)     Acute hematogenous osteomyelitis of right foot (HCC)     Amputation of little toe (HCC)     PAD (peripheral artery disease) (HCC)     Uncontrolled pain     Generalized weakness     Simple chronic bronchitis (HCC)     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 ([address]     Skin ulcer with necrosis of muscle ([address]     Toe ulcer ([address]     Diabetic foot ([address]      Plan:     1. Reviewed POC blood glucose . Labs and X ray results   2. Reviewed Current Medicines   3. On meal/ Correction bolus Humalog/ Basal Lantus Insulin regime / and Oral Hypoglycemic drugs   4. Monitor Blood glucose frequently   5. Modified  the dose of Insulin/ other medicines as needed   6. Will follow     .      Minda Choi MD, MD
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
## 2022-07-02 VITALS
HEART RATE: 87 BPM | HEIGHT: 68 IN | RESPIRATION RATE: 14 BRPM | DIASTOLIC BLOOD PRESSURE: 84 MMHG | OXYGEN SATURATION: 96 % | SYSTOLIC BLOOD PRESSURE: 177 MMHG | TEMPERATURE: 98.2 F | BODY MASS INDEX: 22.62 KG/M2 | WEIGHT: 149.25 LBS

## 2022-07-02 LAB
ANION GAP SERPL CALCULATED.3IONS-SCNC: 10 MMOL/L (ref 4–16)
BASOPHILS ABSOLUTE: 0 K/CU MM
BASOPHILS RELATIVE PERCENT: 0.5 % (ref 0–1)
BUN BLDV-MCNC: 38 MG/DL (ref 6–23)
CALCIUM SERPL-MCNC: 8.7 MG/DL (ref 8.3–10.6)
CHLORIDE BLD-SCNC: 100 MMOL/L (ref 99–110)
CO2: 26 MMOL/L (ref 21–32)
CREAT SERPL-MCNC: 1 MG/DL (ref 0.9–1.3)
DIFFERENTIAL TYPE: ABNORMAL
EOSINOPHILS ABSOLUTE: 0.1 K/CU MM
EOSINOPHILS RELATIVE PERCENT: 1.2 % (ref 0–3)
GFR AFRICAN AMERICAN: >60 ML/MIN/1.73M2
GFR NON-AFRICAN AMERICAN: >60 ML/MIN/1.73M2
GLUCOSE BLD-MCNC: 121 MG/DL (ref 70–99)
GLUCOSE BLD-MCNC: 200 MG/DL (ref 70–99)
GLUCOSE BLD-MCNC: 208 MG/DL (ref 70–99)
GLUCOSE BLD-MCNC: 219 MG/DL (ref 70–99)
HCT VFR BLD CALC: 33.8 % (ref 42–52)
HEMOGLOBIN: 11 GM/DL (ref 13.5–18)
HIGH SENSITIVE C-REACTIVE PROTEIN: 5.8 MG/L
IMMATURE NEUTROPHIL %: 1 % (ref 0–0.43)
LYMPHOCYTES ABSOLUTE: 0.9 K/CU MM
LYMPHOCYTES RELATIVE PERCENT: 15.2 % (ref 24–44)
MCH RBC QN AUTO: 30.4 PG (ref 27–31)
MCHC RBC AUTO-ENTMCNC: 32.5 % (ref 32–36)
MCV RBC AUTO: 93.4 FL (ref 78–100)
MONOCYTES ABSOLUTE: 0.5 K/CU MM
MONOCYTES RELATIVE PERCENT: 9.3 % (ref 0–4)
NUCLEATED RBC %: 0 %
PDW BLD-RTO: 13.7 % (ref 11.7–14.9)
PLATELET # BLD: 102 K/CU MM (ref 140–440)
PMV BLD AUTO: 10.2 FL (ref 7.5–11.1)
POTASSIUM SERPL-SCNC: 4.5 MMOL/L (ref 3.5–5.1)
PROCALCITONIN: 0.56
RBC # BLD: 3.62 M/CU MM (ref 4.6–6.2)
SEGMENTED NEUTROPHILS ABSOLUTE COUNT: 4.2 K/CU MM
SEGMENTED NEUTROPHILS RELATIVE PERCENT: 72.8 % (ref 36–66)
SODIUM BLD-SCNC: 136 MMOL/L (ref 135–145)
TOTAL IMMATURE NEUTOROPHIL: 0.06 K/CU MM
TOTAL NUCLEATED RBC: 0 K/CU MM
WBC # BLD: 5.7 K/CU MM (ref 4–10.5)

## 2022-07-02 PROCEDURE — 6370000000 HC RX 637 (ALT 250 FOR IP): Performed by: FAMILY MEDICINE

## 2022-07-02 PROCEDURE — 85025 COMPLETE CBC W/AUTO DIFF WBC: CPT

## 2022-07-02 PROCEDURE — 6370000000 HC RX 637 (ALT 250 FOR IP): Performed by: INTERNAL MEDICINE

## 2022-07-02 PROCEDURE — 86141 C-REACTIVE PROTEIN HS: CPT

## 2022-07-02 PROCEDURE — 82962 GLUCOSE BLOOD TEST: CPT

## 2022-07-02 PROCEDURE — 94761 N-INVAS EAR/PLS OXIMETRY MLT: CPT

## 2022-07-02 PROCEDURE — 36415 COLL VENOUS BLD VENIPUNCTURE: CPT

## 2022-07-02 PROCEDURE — 2580000003 HC RX 258: Performed by: HOSPITALIST

## 2022-07-02 PROCEDURE — 80048 BASIC METABOLIC PNL TOTAL CA: CPT

## 2022-07-02 PROCEDURE — 84145 PROCALCITONIN (PCT): CPT

## 2022-07-02 PROCEDURE — 6370000000 HC RX 637 (ALT 250 FOR IP): Performed by: HOSPITALIST

## 2022-07-02 RX ORDER — CEFUROXIME AXETIL 500 MG/1
500 TABLET ORAL 2 TIMES DAILY
Qty: 28 TABLET | Refills: 0 | Status: SHIPPED | OUTPATIENT
Start: 2022-07-02 | End: 2022-08-01 | Stop reason: SDUPTHER

## 2022-07-02 RX ORDER — HYDROXYZINE HYDROCHLORIDE 25 MG/1
25 TABLET, FILM COATED ORAL 4 TIMES DAILY PRN
Qty: 15 TABLET | Refills: 0 | Status: SHIPPED | OUTPATIENT
Start: 2022-07-02 | End: 2022-07-12

## 2022-07-02 RX ORDER — SULFAMETHOXAZOLE AND TRIMETHOPRIM 800; 160 MG/1; MG/1
1 TABLET ORAL 2 TIMES DAILY
Qty: 28 TABLET | Refills: 0 | Status: SHIPPED | OUTPATIENT
Start: 2022-07-02 | End: 2022-08-01 | Stop reason: SDUPTHER

## 2022-07-02 RX ORDER — INSULIN LISPRO 100 [IU]/ML
15 INJECTION, SOLUTION INTRAVENOUS; SUBCUTANEOUS
Status: DISCONTINUED | OUTPATIENT
Start: 2022-07-02 | End: 2022-07-02 | Stop reason: HOSPADM

## 2022-07-02 RX ORDER — HYDROCODONE BITARTRATE AND ACETAMINOPHEN 5; 325 MG/1; MG/1
2 TABLET ORAL EVERY 6 HOURS PRN
Qty: 15 TABLET | Refills: 0 | Status: SHIPPED | OUTPATIENT
Start: 2022-07-02 | End: 2022-07-05

## 2022-07-02 RX ORDER — FAMOTIDINE 20 MG/1
20 TABLET, FILM COATED ORAL DAILY
Qty: 60 TABLET | Refills: 3 | Status: SHIPPED | OUTPATIENT
Start: 2022-07-03 | End: 2022-07-25

## 2022-07-02 RX ORDER — INSULIN GLARGINE 100 [IU]/ML
45 INJECTION, SOLUTION SUBCUTANEOUS NIGHTLY
Status: DISCONTINUED | OUTPATIENT
Start: 2022-07-02 | End: 2022-07-02 | Stop reason: HOSPADM

## 2022-07-02 RX ADMIN — OXYCODONE AND ACETAMINOPHEN 2 TABLET: 5; 325 TABLET ORAL at 13:34

## 2022-07-02 RX ADMIN — LEVOTHYROXINE SODIUM 112 MCG: 0.11 TABLET ORAL at 06:47

## 2022-07-02 RX ADMIN — INSULIN LISPRO 4 UNITS: 100 INJECTION, SOLUTION INTRAVENOUS; SUBCUTANEOUS at 08:20

## 2022-07-02 RX ADMIN — Medication 1 CAPSULE: at 11:47

## 2022-07-02 RX ADMIN — GABAPENTIN 600 MG: 300 CAPSULE ORAL at 13:34

## 2022-07-02 RX ADMIN — OXYCODONE AND ACETAMINOPHEN 2 TABLET: 5; 325 TABLET ORAL at 06:47

## 2022-07-02 RX ADMIN — SODIUM CHLORIDE, PRESERVATIVE FREE 10 ML: 5 INJECTION INTRAVENOUS at 08:17

## 2022-07-02 RX ADMIN — CLOPIDOGREL BISULFATE 75 MG: 75 TABLET ORAL at 08:16

## 2022-07-02 RX ADMIN — INSULIN LISPRO 15 UNITS: 100 INJECTION, SOLUTION INTRAVENOUS; SUBCUTANEOUS at 08:20

## 2022-07-02 RX ADMIN — INSULIN LISPRO 4 UNITS: 100 INJECTION, SOLUTION INTRAVENOUS; SUBCUTANEOUS at 02:08

## 2022-07-02 RX ADMIN — FAMOTIDINE 20 MG: 20 TABLET ORAL at 08:16

## 2022-07-02 RX ADMIN — Medication 1 CAPSULE: at 06:47

## 2022-07-02 RX ADMIN — GABAPENTIN 600 MG: 300 CAPSULE ORAL at 08:16

## 2022-07-02 ASSESSMENT — PAIN DESCRIPTION - LOCATION
LOCATION: FOOT
LOCATION: FOOT

## 2022-07-02 ASSESSMENT — PAIN DESCRIPTION - PAIN TYPE: TYPE: SURGICAL PAIN

## 2022-07-02 ASSESSMENT — PAIN DESCRIPTION - DESCRIPTORS
DESCRIPTORS: ACHING
DESCRIPTORS: ACHING

## 2022-07-02 ASSESSMENT — PAIN DESCRIPTION - ORIENTATION
ORIENTATION: RIGHT
ORIENTATION: RIGHT

## 2022-07-02 ASSESSMENT — PAIN SCALES - GENERAL
PAINLEVEL_OUTOF10: 8
PAINLEVEL_OUTOF10: 5

## 2022-07-02 ASSESSMENT — PAIN SCALES - WONG BAKER: WONGBAKER_NUMERICALRESPONSE: 0

## 2022-07-02 ASSESSMENT — PAIN - FUNCTIONAL ASSESSMENT: PAIN_FUNCTIONAL_ASSESSMENT: ACTIVITIES ARE NOT PREVENTED

## 2022-07-02 NOTE — DISCHARGE SUMMARY
Discharge Summary      Admission Date: 6/24/2022  Discharge Date:     Discharge Diagnosis :     Patient Active Problem List   Diagnosis    Diabetes mellitus    H/O echocardiogram    S/P CABG (coronary artery bypass graft)    Chest pain    Cellulitis    Heroin withdrawal (HCC)    CAD (coronary artery disease)    Polysubstance abuse ([address]    Small bowel obstruction (HCC)    Narcotic abuse, continuous ([address]    Hx of hepatitis    Epigastric pain    Diarrhea    Constipation with Ileus    Vomiting of fecal matter with nausea    Encephalopathy    Metabolic encephalopathy    Infectious gastroenteritis    Bilateral leg weakness    Gait disturbance    Left carotid stenosis    Hypothyroidism    Osteomyelitis (HCC)    Uncontrolled type II diabetes with peripheral autonomic neuropathy (HCC)    Gangrene of toe (HCC)    Acute hematogenous osteomyelitis of right foot (HCC)    Amputation of little toe (HCC)    PAD (peripheral artery disease) (HCC)    Uncontrolled pain    Generalized weakness    Simple chronic bronchitis (HCC)    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 (HCC)    Skin ulcer with necrosis of muscle (HCC)    Toe ulcer ([address]    Diabetic foot ([address]    Diabetic foot infection ([address]    Abscess of right foot     1. Right foot gangrene polymicrobial infection debridement complicated. 2.  2 diabetes on insulin    3. Diabetic neuropathy  4. Hypertension      HPI:    Jacquie Starr is a 60 y.o.  male  who presents with Diabetic foot ([address]  Patient presented to the ED with complaints of redness and swelling of the right lateral foot. Patient has a history of longstanding diabetes with complications. Hospital Course:   Jacuqie Starr is a 60 y.o.  male  who presents with Diabetic foot ([address] patient was admitted to the acute floor surgical consult was requested which resulted in under anesthesia debridement of the foot including cultures.   ID consult was also requested patient was started on broad-spectrum antibiotics. Patient was placed on wound VAC. Patient will continue all other medications and management. Stay in the hospital has been unremarkable. Initially patient was scheduled to go to SNF to continue with physical therapy and wound VAC however patient refused SNF initially and then changed his mind again. Apparently he also has an aggressive dog at home and has difficulty in finding any home of the agency to come and work on his wound VAC. It seems like you have been educated about the wound VAC he has a wound VAC sitting at home also is well aware of wet and dry dressing and all other care which he requires. At the present time since the wound home health cannot be arranged and during the long weekend patient is quite upset and has decided to leave AMA. Lengthy discussion was taken place with the patient however patient insisted on going home. I plan to continue to give him the medications with oral antibiotics to continue for 2 weeks. It has been explained that he needs to see his primary care provider as well and get a prescription for diabetic boots to prevent any future injury to his feet  Patient is well aware and understands. The present time clinically patient is stable.       Consults: General surgery infectious disease endocrinology    Inpatient Procedures: Debridement of the necrotic bone    Discharge Medications:    Current Facility-Administered Medications: insulin glargine (LANTUS) injection vial 45 Units, 45 Units, SubCUTAneous, Nightly  insulin lispro (HUMALOG) injection vial 15 Units, 15 Units, SubCUTAneous, TID WC  oxyCODONE-acetaminophen (PERCOCET) 5-325 MG per tablet 2 tablet, 2 tablet, Oral, Q6H PRN  mirtazapine (REMERON) tablet 15 mg, 15 mg, Oral, Nightly  hydrOXYzine HCl (ATARAX) tablet 25 mg, 25 mg, Oral, 4x Daily PRN  lactobacillus (CULTURELLE) capsule 1 capsule, 1 capsule, Oral, TID WC  mirtazapine (REMERON) tablet 15 mg, 15 mg, Oral, Nightly PRN  acetaminophen (TYLENOL) tablet 325 mg, 325 mg, Oral, Q8H PRN  famotidine (PEPCID) tablet 20 mg, 20 mg, Oral, Daily  HYDROcodone-acetaminophen (NORCO) 5-325 MG per tablet 2 tablet, 2 tablet, Oral, Q6H PRN  insulin lispro (HUMALOG) injection vial 0-12 Units, 0-12 Units, SubCUTAneous, TID WC  insulin lispro (HUMALOG) injection vial 0-12 Units, 0-12 Units, SubCUTAneous, 2 times per day  gabapentin (NEURONTIN) capsule 600 mg, 600 mg, Oral, TID  atorvastatin (LIPITOR) tablet 40 mg, 40 mg, Oral, Nightly  clopidogrel (PLAVIX) tablet 75 mg, 75 mg, Oral, Daily  nicotine (NICODERM CQ) 21 MG/24HR 1 patch, 1 patch, TransDERmal, Daily  lactulose (CHRONULAC) 10 GM/15ML solution 20 g, 20 g, Oral, BID PRN  sodium chloride flush 0.9 % injection 5-40 mL, 5-40 mL, IntraVENous, 2 times per day  sodium chloride flush 0.9 % injection 5-40 mL, 5-40 mL, IntraVENous, PRN  0.9 % sodium chloride infusion, , IntraVENous, PRN  enoxaparin (LOVENOX) injection 40 mg, 40 mg, SubCUTAneous, Daily  ondansetron (ZOFRAN-ODT) disintegrating tablet 4 mg, 4 mg, Oral, Q8H PRN **OR** ondansetron (ZOFRAN) injection 4 mg, 4 mg, IntraVENous, Q6H PRN  polyethylene glycol (GLYCOLAX) packet 17 g, 17 g, Oral, Daily PRN  potassium chloride (KLOR-CON M) extended release tablet 40 mEq, 40 mEq, Oral, PRN **OR** potassium bicarb-citric acid (EFFER-K) effervescent tablet 40 mEq, 40 mEq, Oral, PRN **OR** potassium chloride 10 mEq/100 mL IVPB (Peripheral Line), 10 mEq, IntraVENous, PRN  magnesium sulfate 2000 mg in 50 mL IVPB premix, 2,000 mg, IntraVENous, PRN  glucose chewable tablet 16 g, 4 tablet, Oral, PRN  dextrose bolus 10% 125 mL, 125 mL, IntraVENous, PRN **OR** dextrose bolus 10% 250 mL, 250 mL, IntraVENous, PRN  glucagon (rDNA) injection 1 mg, 1 mg, IntraMUSCular, PRN  dextrose 5 % solution, 100 mL/hr, IntraVENous, PRN  levothyroxine (SYNTHROID) tablet 112 mcg, 112 mcg, Oral, Daily    Physical Exam:07/02/22   Physical Exam:   CONSTITUTIONAL: awake, alert, cooperative, no apparent distress, and appears stated age  LUNGS:  no increased work of breathing and clear to auscultation, no crackles or wheezing  CARDIOVASCULAR:  normal S1 and S2 and no JVD  ABDOMEN:  normal bowel sounds, non-distended and non-tender to palpation  EXT: Foot with amputation of the toe and necrotic wounds with wound VAC on it. NEUROLOGIC:  Mental Status Exam:  Level of Alertness:   awake  Orientation:   person, place, time.  Non focal  SKIN:  normal skin color, texture, turgor, no redness, warmth, or swelling at IV sites    Most Recent Lab Values:   CBC with Differential:    Lab Results   Component Value Date/Time    WBC 5.7 07/02/2022 02:20 AM    RBC 3.62 07/02/2022 02:20 AM    HGB 11.0 07/02/2022 02:20 AM    HCT 33.8 07/02/2022 02:20 AM     07/02/2022 02:20 AM    MCV 93.4 07/02/2022 02:20 AM    MCH 30.4 07/02/2022 02:20 AM    MCHC 32.5 07/02/2022 02:20 AM    RDW 13.7 07/02/2022 02:20 AM    SEGSPCT 72.8 07/02/2022 02:20 AM    BANDSPCT 5 11/28/2014 03:08 AM    LYMPHOPCT 15.2 07/02/2022 02:20 AM    MONOPCT 9.3 07/02/2022 02:20 AM    BASOPCT 0.5 07/02/2022 02:20 AM    MONOSABS 0.5 07/02/2022 02:20 AM    LYMPHSABS 0.9 07/02/2022 02:20 AM    EOSABS 0.1 07/02/2022 02:20 AM    BASOSABS 0.0 07/02/2022 02:20 AM    DIFFTYPE AUTOMATED DIFFERENTIAL 07/02/2022 02:20 AM     BMP:    Lab Results   Component Value Date/Time     07/02/2022 02:20 AM    K 4.5 07/02/2022 02:20 AM     07/02/2022 02:20 AM    CO2 26 07/02/2022 02:20 AM    BUN 38 07/02/2022 02:20 AM    CREATININE 1.0 07/02/2022 02:20 AM    CALCIUM 8.7 07/02/2022 02:20 AM    GFRAA >60 07/02/2022 02:20 AM    LABGLOM >60 07/02/2022 02:20 AM    GLUCOSE 200 07/02/2022 02:20 AM     Hepatic Function Panel:    Lab Results   Component Value Date/Time    ALKPHOS 157 06/30/2022 12:29 PM    ALT 37 06/30/2022 12:29 PM    AST 72 06/30/2022 12:29 PM    PROT 6.8 06/30/2022 12:29 PM    PROT 7.3 03/18/2013 07:54 AM    BILITOT 0.3 06/30/2022 12:29 PM    BILIDIR 0.2 11/24/2021 08:49 AM    IBILI 0.4 11/24/2021 08:49 AM     ABG:    Lab Results   Component Value Date/Time    KBB4OQY 29.2 08/27/2018 08:30 AM    BEART 3 06/11/2013 10:54 AM    IWN6GZH 44.0 08/27/2018 08:30 AM    PO2ART 86 08/27/2018 08:30 AM     Culture Results:        Discharge Instruction:   Follow up appointments:   Primary care physician:  within 2 weeks    Diet:  diabetic diet   Activity: activity as tolerated  Disposition: Discharged to:   [x]Home, []C, []SNF, []Acute Rehab, []Hospice   Condition on discharge: Stable  Patient to have a wound VAC which she already has at home. In between if the wound VAC is not available brought to dry dressing patient is well aware of that. Suppository given to patient.     @Esingn@    Time Spent >30 Minutes

## 2022-07-02 NOTE — PROGRESS NOTES
Progress Note( Dr. Santhosh Pérez)  7/2/2022  Subjective:   Admit Date: 6/24/2022  PCP: Donta Saldana MD    Admitted For : Foot infection cellulitis    Consulted For: Better control of blood glucose    Interval History: Patient had debridement done and wound vacuum was placed on 6/24/2022    Seem to be more sober this morning we talk about the issues of last night    Denies any chest pains,   Denies SOB . Denies nausea or vomiting. No new bowel or bladder symptoms. No intake or output data in the 24 hours ending 07/02/22 1241    DATA    CBC:   Recent Labs     06/30/22  1229 07/01/22  0919 07/02/22  0220   WBC 8.2 5.9 5.7   HGB 10.8* 11.1* 11.0*   * 111* 102*    CMP:  Recent Labs     06/30/22  1229 07/02/22  0220    136   K 4.5 4.5    100   CO2 24 26   BUN 26* 38*   CREATININE 1.1 1.0   CALCIUM 8.4 8.7   PROT 6.8  --    LABALBU 3.0*  --    BILITOT 0.3  --    ALKPHOS 157*  --    AST 72*  --    ALT 37  --      Lipids:   Lab Results   Component Value Date/Time    CHOL 179 11/24/2021 08:49 AM    HDL 95 11/24/2021 08:49 AM    TRIG 80 11/24/2021 08:49 AM     Glucose:  Recent Labs     07/02/22  0204 07/02/22  0649 07/02/22  1127   POCGLU 208* 219* 121*     [id number]:  Lab Results   Component Value Date/Time    LABA1C 7.8 06/25/2022 03:51 AM     High Sensitivity TSH:   Lab Results   Component Value Date/Time    TSHHS 9.540 11/24/2021 08:49 AM     Free T3: No results found for: FT3  Free T4:  Lab Results   Component Value Date/Time    T4FREE 1.33 11/24/2021 08:49 AM       XR FOOT RIGHT (MIN 3 VIEWS)   Final Result   Soft tissue ulcer with soft tissue gas adjacent to the proximal right 5th   metatarsal.  No definite evidence of acute osteomyelitis. Follow-up MRI may   be helpful. Other findings as above.               Scheduled Medicines   Medications:    insulin glargine  45 Units SubCUTAneous Nightly    insulin lispro  15 Units SubCUTAneous TID WC    mirtazapine  15 mg Oral Nightly    lactobacillus  1 capsule Oral TID     famotidine  20 mg Oral Daily    insulin lispro  0-12 Units SubCUTAneous TID     insulin lispro  0-12 Units SubCUTAneous 2 times per day    gabapentin  600 mg Oral TID    atorvastatin  40 mg Oral Nightly    clopidogrel  75 mg Oral Daily    nicotine  1 patch TransDERmal Daily    sodium chloride flush  5-40 mL IntraVENous 2 times per day    enoxaparin  40 mg SubCUTAneous Daily    levothyroxine  112 mcg Oral Daily      Infusions:    sodium chloride 100 mL/hr (06/27/22 1334)    dextrose           Objective:   Vitals: BP (!) 177/84   Pulse 87   Temp 98.2 °F (36.8 °C) (Oral)   Resp 14   Ht 5' 7.99\" (1.727 m)   Wt 149 lb 4 oz (67.7 kg)   SpO2 96%   BMI 22.70 kg/m²   General appearance: alert and cooperative with exam  Neck: no JVD or bruit  Thyroid : Normal lobes   Lungs: Has Vesicular Breath sounds   Heart:  regular rate and rhythm  Abdomen: soft, non-tender; bowel sounds normal; no masses,  no organomegaly  Musculoskeletal: Normal  Extremities: extremities normal, , no edema right toe and right distal toe has foot ulcer debridement not done wound vacuum was placed on 6/24/2022  Neurologic:  Awake, alert, oriented to name, place and time. Cranial nerves II-XII are grossly intact. Motor is  intact. Sensory neuropathy. ,  and gait is abnormal.    Assessment:     Patient Active Problem List:     Diabetes mellitus     H/O echocardiogram     S/P CABG (coronary artery bypass graft)     Chest pain     Cellulitis     Heroin withdrawal (HCC)     CAD (coronary artery disease)     Polysubstance abuse (HCC)     Small bowel obstruction (HCC)     Narcotic abuse, continuous ([facility])     Hx of hepatitis     Epigastric pain     Diarrhea     Constipation with Ileus     Vomiting of fecal matter with nausea     Encephalopathy     Metabolic encephalopathy     Infectious gastroenteritis     Bilateral leg weakness     Gait disturbance     Left carotid stenosis     Hypothyroidism Osteomyelitis ([address]     Uncontrolled type II diabetes with peripheral autonomic neuropathy (HCC)     Gangrene of toe (HCC)     Acute hematogenous osteomyelitis of right foot (HCC)     Amputation of little toe (HCC)     PAD (peripheral artery disease) (HCC)     Uncontrolled pain     Generalized weakness     Simple chronic bronchitis (HCC)     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 ([address]     Skin ulcer with necrosis of muscle ([address]     Toe ulcer ([address]     Diabetic foot ([address]      Plan:     1. Reviewed POC blood glucose . Labs and X ray results   2. Reviewed Current Medicines   3. On meal/ Correction bolus Humalog/ Basal Lantus Insulin regime / and Oral Hypoglycemic drugs   4. Monitor Blood glucose frequently   5. Modified  the dose of Insulin/ other medicines as needed   6. Will follow     .      Tate Hartley MD, MD

## 2022-07-02 NOTE — CARE COORDINATION
LSW received a call from patient RN. Patient is wanting to return home with the wound vac and home care. LSW reviewed patient medical record. Patient wound vac was submitted last week (6/25). On 6/28 patient informed CM/LIZW Alvanavjot Booker that he would go to SNF as he has an aggressive dog and he did not feel they would tolerate home health visits. Referral was made to [address] [place] [address] and patient is currently pending precert. LSW spoke to the patient via phone. LSW explained that patient home vac was not processed as patient was going to SNF. LSW can not get the process restarted due to [employer] being closed for the weekend and they will be closed on Monday for the 4th of July holiday. LSW will not be able to do anything until at least Tuesday. Patient did not like that answer and suggested that he could use is wife wound vac. LSW explained how that was impossible. Patient then handed the phone back to the RN.     LSW checked the [facility] website wound vac status and it stated that wound vac had been submitted. It should be noted that it will be difficult to find a home care agency to make home visits with the aggressive dog in the home.

## 2022-07-02 NOTE — PLAN OF CARE
Problem: Discharge Planning  Goal: Discharge to home or other facility with appropriate resources  7/1/2022 2211 by Anderson Stock RN  Outcome: Progressing  7/1/2022 1150 by Lisa Arroyo RN  Outcome: Progressing  Flowsheets (Taken 7/1/2022 0945)  Discharge to home or other facility with appropriate resources:   Identify barriers to discharge with patient and caregiver   Arrange for needed discharge resources and transportation as appropriate   Identify discharge learning needs (meds, wound care, etc)   Refer to discharge planning if patient needs post-hospital services based on physician order or complex needs related to functional status, cognitive ability or social support system     Problem: Skin/Tissue Integrity  Goal: Absence of new skin breakdown  Description: 1. Monitor for areas of redness and/or skin breakdown  2. Assess vascular access sites hourly  3. Every 4-6 hours minimum:  Change oxygen saturation probe site  4. Every 4-6 hours:  If on nasal continuous positive airway pressure, respiratory therapy assess nares and determine need for appliance change or resting period.   7/1/2022 2211 by Anderson Stock RN  Outcome: Progressing  7/1/2022 1150 by Lsia Arroyo RN  Outcome: Progressing     Problem: Safety - Adult  Goal: Free from fall injury  7/1/2022 2211 by Anderson Stock RN  Outcome: Progressing  7/1/2022 1150 by Lisa Arroyo RN  Outcome: Progressing  Flowsheets (Taken 7/1/2022 0945)  Free From Fall Injury:   Based on caregiver fall risk screen, instruct family/caregiver to ask for assistance with transferring infant if caregiver noted to have fall risk factors   Instruct family/caregiver on patient safety     Problem: Chronic Conditions and Co-morbidities  Goal: Patient's chronic conditions and co-morbidity symptoms are monitored and maintained or improved  7/1/2022 2211 by Anderson Stock RN  Outcome: Progressing  7/1/2022 1150 by Lisa Arroyo RN  Outcome: Progressing  Flowsheets (Taken 7/1/2022 0945)  Care Plan - Patient's Chronic Conditions and Co-Morbidity Symptoms are Monitored and Maintained or Improved:   Monitor and assess patient's chronic conditions and comorbid symptoms for stability, deterioration, or improvement   Collaborate with multidisciplinary team to address chronic and comorbid conditions and prevent exacerbation or deterioration   Update acute care plan with appropriate goals if chronic or comorbid symptoms are exacerbated and prevent overall improvement and discharge     Problem: Pain  Goal: Verbalizes/displays adequate comfort level or baseline comfort level  7/1/2022 2211 by Sánchez Cr RN  Outcome: Progressing  7/1/2022 1150 by Maricel Lee RN  Outcome: Progressing  Flowsheets (Taken 7/1/2022 0945)  Verbalizes/displays adequate comfort level or baseline comfort level:   Encourage patient to monitor pain and request assistance   Assess pain using appropriate pain scale   Administer analgesics based on type and severity of pain and evaluate response   Implement non-pharmacological measures as appropriate and evaluate response   Consider cultural and social influences on pain and pain management   Notify Licensed Independent Practitioner if interventions unsuccessful or patient reports new pain     Problem: ABCDS Injury Assessment  Goal: Absence of physical injury  7/1/2022 2211 by Sánchez Cr RN  Outcome: Progressing  7/1/2022 1150 by Maricel Lee RN  Outcome: Progressing  Flowsheets (Taken 7/1/2022 0945)  Absence of Physical Injury: Implement safety measures based on patient assessment     Problem: Skin/Tissue Integrity - Adult  Goal: Incisions, wounds, or drain sites healing without S/S of infection  7/1/2022 2211 by Sánchez Cr RN  Outcome: Progressing  7/1/2022 1150 by Maricel Lee RN  Outcome: Progressing  Flowsheets (Taken 7/1/2022 0945)  Incisions, Wounds, or Drain Sites Healing Without Sign and Symptoms of Infection:   ADMISSION and DAILY: Assess and document risk factors for pressure ulcer development   TWICE DAILY: Assess and document skin integrity   TWICE DAILY: Assess and document dressing/incision, wound bed, drain sites and surrounding tissue   Implement wound care per orders     Problem: Infection - Adult  Goal: Absence of infection at discharge  7/1/2022 2211 by Cyndi Carranza RN  Outcome: Progressing  7/1/2022 1150 by Josephine Shearer RN  Outcome: Progressing  Flowsheets (Taken 7/1/2022 0945)  Absence of infection at discharge:   Assess and monitor for signs and symptoms of infection   Monitor lab/diagnostic results   Monitor all insertion sites i.e., indwelling lines, tubes and drains   Monitor endotracheal (as able) and nasal secretions for changes in amount and color   Administer medications as ordered   Instruct and encourage patient and family to use good hand hygiene technique  Goal: Absence of infection during hospitalization  7/1/2022 2211 by Cyndi Carranza RN  Outcome: Progressing  7/1/2022 1150 by Josephine Shearer RN  Outcome: Progressing  Flowsheets (Taken 7/1/2022 0945)  Absence of infection during hospitalization:   Assess and monitor for signs and symptoms of infection   Monitor lab/diagnostic results   Monitor all insertion sites i.e., indwelling lines, tubes and drains   Monitor endotracheal (as able) and nasal secretions for changes in amount and color   Administer medications as ordered   Instruct and encourage patient and family to use good hand hygiene technique     Problem: Nutrition Deficit:  Goal: Optimize nutritional status  7/1/2022 2211 by Cyndi Carranza RN  Outcome: Progressing  7/1/2022 1150 by Josephine Shearer RN  Outcome: Progressing

## 2022-07-02 NOTE — PROGRESS NOTES
Internal Medicine Daily Progress Note @todate@                    Date of Admission: 6/24/2022  Allergies  Patient has no known allergies. Assessment/Plan    1. Gangrene of right foot at the present time clinically stable on multiple IV antibiotics patient continues to improve awaits SNF health PT OT recommendations. .  Repeat blood cultures have been negative no growth for the last 3 to 4 days. At the time of discharge patient would need 10 days of oral antibiotics cefuroxime and Bactrim. 2. Insulin-dependent diabetes continue present management  3. Diabetic neuropathy continue gabapentin for the present time pain seems to be well controlled. 4.  Coronary artery disease patient is on medications pain-free ambulates. Overall patient seems to be doing well has been ambulating with walker patient states and is very eager to go home however per PT OT assessment patient would benefit with SNF if facility pre-CERT is not available then patient secondary to refused to go home.     Patient Active Problem List    Diagnosis Date Noted    Small bowel obstruction ([address] 05/11/2016    Narcotic abuse, continuous ([address] 05/11/2016    Epigastric pain 05/11/2016    Diabetic foot infection ([address]     Abscess of right foot     Diabetic foot ([address] 06/24/2022    Polysubstance abuse ([address] 05/11/2016    CAD (coronary artery disease)     S/P CABG (coronary artery bypass graft) 07/17/2013    Diabetes mellitus 05/24/2013    Hx of hepatitis 05/11/2016    Toe ulcer ([address] 10/31/2021    Skin ulcer with necrosis of muscle ([address] 05/20/2020    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 ([address] 04/29/2020    Amputation of little toe (HCC)     PAD (peripheral artery disease) (HCC)     Uncontrolled pain     Generalized weakness     Simple chronic bronchitis (HCC)     Acute hematogenous osteomyelitis of right foot ([address] 04/01/2020    Gangrene of toe ([address]     Uncontrolled type II diabetes with peripheral autonomic neuropathy (HCC)     Osteomyelitis ([facility] 2020    Left carotid stenosis 2018    Hypothyroidism     Metabolic encephalopathy     Infectious gastroenteritis 2018    Bilateral leg weakness 2018    Gait disturbance 2018    Encephalopathy 2018    Constipation with Ileus 2016    Vomiting of fecal matter with nausea     Diarrhea     Heroin withdrawal ([facility] 2014    Cellulitis 2014    Chest pain 2013    H/O echocardiogram 2013                      Subjective:     Chief Complaint   Patient presents with    Wound Check     right, reports infection     Mr. Lillian Solomon of I am doing I wish I could go home today. Denies any chest pain nausea vomiting diarrhea any fever chills palpitation or any other complaints. Objective:   TEMPERATURE:  Current - Temp: 98.2 °F (36.8 °C); Max - Temp  Av.5 °F (36.9 °C)  Min: 98.2 °F (36.8 °C)  Max: 98.9 °F (37.2 °C)  RESPIRATIONS RANGE: Resp  Avg: 15.9  Min: 13  Max: 17  PULSE RANGE: Pulse  Av  Min: 71  Max: 87  BLOOD PRESSURE RANGE:  Systolic (16QUE), BRIDGETTE:063 , Min:150 , ZJP:061   ; Diastolic (14RUO), KQY:45, Min:70, Max:84    PULSE OXIMETRY RANGE: SpO2  Av %  Min: 96 %  Max: 99 %  24HR INTAKE/OUTPUT:  No intake or output data in the 24 hours ending 22 1018  No intake or output data in the 24 hours ending 22 1018           Physical Exam:  eneral appearance: Sitting comfortably no acute distress alert oriented and cooperative. HEENT PERRLA EOMI     neck: Neck supple no JVD no carotid bruit     lungs: Clear to ascultation, bilaterally without Rales/Wheezes/Rhonchi with good respiratory effort.   Heart: Regular rate and rhythm with Normal S1/S2 without  murmurs, rubs or gallops, point of maximum impulse non-displaced  Abdomen: Soft, non-tender or non-distended without rigidity or guarding and positive bowel sounds all four quadrants. Extremities: Wound and dressing on foot on discharge   skin: Skin color, texture, turgor normal. No rashes or lesions. Neurologic: Alert and oriented X 3,  neurovascularly intact with sensory/motor intact upper extremities/lower extremities, bilaterally. Cranial nerves:II-XII intact, grossly non-focal.  Mental status: Alert, oriented, thought content appropriate. Labs:  CBC:   Lab Results   Component Value Date/Time    WBC 5.7 07/02/2022 02:20 AM    RBC 3.62 07/02/2022 02:20 AM    HGB 11.0 07/02/2022 02:20 AM    HCT 33.8 07/02/2022 02:20 AM    MCV 93.4 07/02/2022 02:20 AM    MCH 30.4 07/02/2022 02:20 AM    MCHC 32.5 07/02/2022 02:20 AM    RDW 13.7 07/02/2022 02:20 AM     07/02/2022 02:20 AM    MPV 10.2 07/02/2022 02:20 AM     CMP:    Lab Results   Component Value Date/Time     07/02/2022 02:20 AM    K 4.5 07/02/2022 02:20 AM     07/02/2022 02:20 AM    CO2 26 07/02/2022 02:20 AM    BUN 38 07/02/2022 02:20 AM    CREATININE 1.0 07/02/2022 02:20 AM    GFRAA >60 07/02/2022 02:20 AM    LABGLOM >60 07/02/2022 02:20 AM    GLUCOSE 200 07/02/2022 02:20 AM    PROT 6.8 06/30/2022 12:29 PM    PROT 7.3 03/18/2013 07:54 AM    LABALBU 3.0 06/30/2022 12:29 PM    CALCIUM 8.7 07/02/2022 02:20 AM    BILITOT 0.3 06/30/2022 12:29 PM    ALKPHOS 157 06/30/2022 12:29 PM    AST 72 06/30/2022 12:29 PM    ALT 37 06/30/2022 12:29 PM     No results for input(s): TROPONINT in the last 72 hours.   Lab Results   Component Value Date/Time    TSHHS 9.540 11/24/2021 08:49 AM        Scheduled Med:   insulin glargine  45 Units SubCUTAneous Nightly    insulin lispro  15 Units SubCUTAneous TID     mirtazapine  15 mg Oral Nightly    lactobacillus  1 capsule Oral TID WC    famotidine  20 mg Oral Daily    insulin lispro  0-12 Units SubCUTAneous TID WC    insulin lispro  0-12 Units SubCUTAneous 2 times per day    gabapentin  600 mg Oral TID    atorvastatin  40 mg Oral Nightly    clopidogrel  75 mg Oral Daily    nicotine  1 patch TransDERmal Daily    sodium chloride flush  5-40 mL IntraVENous 2 times per day    enoxaparin  40 mg SubCUTAneous Daily    levothyroxine  112 mcg Oral Daily         Infusion :   sodium chloride 100 mL/hr (06/27/22 6784)    dextrose                 Consultants:    IP CONSULT TO GENERAL SURGERY  IP CONSULT TO HOSPITALIST  IP CONSULT TO GENERAL SURGERY  IP CONSULT TO INFECTIOUS DISEASES  IP CONSULT TO ENDOCRINOLOGY    Code Status: Full Code      I have explained to the patient and discussed with him/her the treatment plan. Electronically signed by Belal Jacobs MD on 7/2/2022 at 10:18 AM    Time spent roughly >30 minute in interviewing patient performing medical examination, communicating with relevant medical staff and consultants including documentation .

## 2022-07-02 NOTE — PROGRESS NOTES
Pt walked around unit with Radha MOLINA. Pt tolerated well with walker. Pt eager to leave and keeps asking if able to go. Pt seems to understand and educated by this nurse about keeping up on insulin and monitoring glucose unlike pt was before and expressed. Will continue to monitor.

## 2022-07-02 NOTE — PLAN OF CARE
Problem: Discharge Planning  Goal: Discharge to home or other facility with appropriate resources  7/2/2022 0943 by Kianna Ford RN  Outcome: Adequate for Discharge  7/1/2022 2211 by Maureen Ingram RN  Outcome: Progressing     Problem: Skin/Tissue Integrity  Goal: Absence of new skin breakdown  Description: 1. Monitor for areas of redness and/or skin breakdown  2. Assess vascular access sites hourly  3. Every 4-6 hours minimum:  Change oxygen saturation probe site  4. Every 4-6 hours:  If on nasal continuous positive airway pressure, respiratory therapy assess nares and determine need for appliance change or resting period.   7/2/2022 0943 by Kianna Ford RN  Outcome: Adequate for Discharge  7/1/2022 2211 by Maureen Ingram RN  Outcome: Progressing     Problem: Safety - Adult  Goal: Free from fall injury  7/2/2022 0943 by Kianna Ford RN  Outcome: Adequate for Discharge  7/1/2022 2211 by Maureen Ingram RN  Outcome: Progressing     Problem: Chronic Conditions and Co-morbidities  Goal: Patient's chronic conditions and co-morbidity symptoms are monitored and maintained or improved  7/2/2022 0943 by Kianna Ford RN  Outcome: Adequate for Discharge  7/1/2022 2211 by Maureen Ingram RN  Outcome: Progressing     Problem: Pain  Goal: Verbalizes/displays adequate comfort level or baseline comfort level  7/2/2022 0943 by Kianna Ford RN  Outcome: Adequate for Discharge  7/1/2022 2211 by Maureen Ingram RN  Outcome: Progressing     Problem: ABCDS Injury Assessment  Goal: Absence of physical injury  7/2/2022 0943 by Kianna Ford RN  Outcome: Adequate for Discharge  7/1/2022 2211 by Maureen Ingram RN  Outcome: Progressing     Problem: Skin/Tissue Integrity - Adult  Goal: Incisions, wounds, or drain sites healing without S/S of infection  7/2/2022 0943 by Kianna Ford RN  Outcome: Adequate for Discharge  7/1/2022 2211 by Maureen Ingram RN  Outcome: Progressing     Problem: Infection - Adult  Goal: Absence of infection at discharge  7/2/2022 0943 by Seema Patterson RN  Outcome: Adequate for Discharge  7/1/2022 2211 by Samy Suazo RN  Outcome: Progressing  Goal: Absence of infection during hospitalization  7/2/2022 0943 by Seema Patterson RN  Outcome: Adequate for Discharge  7/1/2022 2211 by Samy Suazo RN  Outcome: Progressing     Problem: Nutrition Deficit:  Goal: Optimize nutritional status  7/2/2022 0943 by Seema Patterson RN  Outcome: Adequate for Discharge  7/1/2022 2211 by Samy Suazo RN  Outcome: Progressing

## 2022-07-05 LAB
CULTURE: NORMAL
Lab: NORMAL
SPECIMEN: NORMAL

## 2022-07-24 ENCOUNTER — APPOINTMENT (OUTPATIENT)
Dept: GENERAL RADIOLOGY | Age: 62
End: 2022-07-24
Payer: MEDICARE

## 2022-07-24 ENCOUNTER — HOSPITAL ENCOUNTER (EMERGENCY)
Age: 62
Discharge: HOME | End: 2022-07-24
Attending: EMERGENCY MEDICINE
Payer: MEDICARE

## 2022-07-24 VITALS
HEART RATE: 73 BPM | DIASTOLIC BLOOD PRESSURE: 71 MMHG | SYSTOLIC BLOOD PRESSURE: 167 MMHG | OXYGEN SATURATION: 100 % | RESPIRATION RATE: 18 BRPM | TEMPERATURE: 98.1 F

## 2022-07-24 DIAGNOSIS — M79.671 RIGHT FOOT PAIN: Primary | ICD-10-CM

## 2022-07-24 DIAGNOSIS — L97.513 DIABETIC ULCER OF RIGHT FOOT ASSOCIATED WITH TYPE 2 DIABETES MELLITUS, WITH NECROSIS OF MUSCLE, UNSPECIFIED PART OF FOOT (HCC): ICD-10-CM

## 2022-07-24 DIAGNOSIS — E11.621 DIABETIC ULCER OF RIGHT FOOT ASSOCIATED WITH TYPE 2 DIABETES MELLITUS, WITH NECROSIS OF MUSCLE, UNSPECIFIED PART OF FOOT (HCC): ICD-10-CM

## 2022-07-24 PROCEDURE — 73630 X-RAY EXAM OF FOOT: CPT

## 2022-07-24 PROCEDURE — 99283 EMERGENCY DEPT VISIT LOW MDM: CPT

## 2022-07-24 PROCEDURE — 6370000000 HC RX 637 (ALT 250 FOR IP): Performed by: EMERGENCY MEDICINE

## 2022-07-24 RX ORDER — HYDROCODONE BITARTRATE AND ACETAMINOPHEN 5; 325 MG/1; MG/1
1 TABLET ORAL EVERY 8 HOURS PRN
Qty: 6 TABLET | Refills: 0 | Status: SHIPPED | OUTPATIENT
Start: 2022-07-24 | End: 2022-07-25 | Stop reason: SDUPTHER

## 2022-07-24 RX ORDER — OXYCODONE HYDROCHLORIDE AND ACETAMINOPHEN 5; 325 MG/1; MG/1
2 TABLET ORAL ONCE
Status: COMPLETED | OUTPATIENT
Start: 2022-07-24 | End: 2022-07-24

## 2022-07-24 RX ADMIN — OXYCODONE AND ACETAMINOPHEN 2 TABLET: 5; 325 TABLET ORAL at 06:29

## 2022-07-24 ASSESSMENT — LIFESTYLE VARIABLES
HOW OFTEN DO YOU HAVE A DRINK CONTAINING ALCOHOL: NEVER
HOW MANY STANDARD DRINKS CONTAINING ALCOHOL DO YOU HAVE ON A TYPICAL DAY: PATIENT DOES NOT DRINK

## 2022-07-24 ASSESSMENT — PAIN SCALES - GENERAL: PAINLEVEL_OUTOF10: 9
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
## 2022-07-24 NOTE — ED TRIAGE NOTES
Patient had foot surgery 3 weeks ago. Was unable to get pain medications after surgery. Patient states he believes there is something wrong with the foot due to the feeling his \"bones are burning\" in his foot.

## 2022-07-25 ENCOUNTER — HOSPITAL ENCOUNTER (OUTPATIENT)
Dept: WOUND CARE | Age: 62
Discharge: HOME | End: 2022-07-25
Payer: MEDICARE

## 2022-07-25 DIAGNOSIS — L97.415 DIABETIC ULCER OF RIGHT MIDFOOT ASSOCIATED WITH TYPE 2 DIABETES MELLITUS, WITH MUSCLE INVOLVEMENT WITHOUT EVIDENCE OF NECROSIS (HCC): ICD-10-CM

## 2022-07-25 DIAGNOSIS — E11.621 DIABETIC ULCER OF RIGHT MIDFOOT ASSOCIATED WITH TYPE 2 DIABETES MELLITUS, WITH MUSCLE INVOLVEMENT WITHOUT EVIDENCE OF NECROSIS (HCC): ICD-10-CM

## 2022-07-25 DIAGNOSIS — E11.621 DIABETIC ULCER OF RIGHT FOOT ASSOCIATED WITH TYPE 2 DIABETES MELLITUS, WITH NECROSIS OF MUSCLE, UNSPECIFIED PART OF FOOT (HCC): ICD-10-CM

## 2022-07-25 DIAGNOSIS — M79.671 RIGHT FOOT PAIN: ICD-10-CM

## 2022-07-25 DIAGNOSIS — L97.513 DIABETIC ULCER OF RIGHT FOOT ASSOCIATED WITH TYPE 2 DIABETES MELLITUS, WITH NECROSIS OF MUSCLE, UNSPECIFIED PART OF FOOT (HCC): ICD-10-CM

## 2022-07-25 PROCEDURE — 11043 DBRDMT MUSC&/FSCA 1ST 20/<: CPT

## 2022-07-25 PROCEDURE — 11045 DBRDMT SUBQ TISS EACH ADDL: CPT | Performed by: NURSE PRACTITIONER

## 2022-07-25 PROCEDURE — 99213 OFFICE O/P EST LOW 20 MIN: CPT

## 2022-07-25 PROCEDURE — 11042 DBRDMT SUBQ TIS 1ST 20SQCM/<: CPT | Performed by: NURSE PRACTITIONER

## 2022-07-25 PROCEDURE — 11046 DBRDMT MUSC&/FSCA EA ADDL: CPT

## 2022-07-25 RX ORDER — LIDOCAINE HYDROCHLORIDE 40 MG/ML
SOLUTION TOPICAL ONCE
Status: CANCELLED | OUTPATIENT
Start: 2022-07-25 | End: 2022-07-25

## 2022-07-25 RX ORDER — BACITRACIN, NEOMYCIN, POLYMYXIN B 400; 3.5; 5 [USP'U]/G; MG/G; [USP'U]/G
OINTMENT TOPICAL ONCE
Status: CANCELLED | OUTPATIENT
Start: 2022-07-25 | End: 2022-07-25

## 2022-07-25 RX ORDER — HYDROCODONE BITARTRATE AND ACETAMINOPHEN 5; 325 MG/1; MG/1
1 TABLET ORAL NIGHTLY PRN
Qty: 7 TABLET | Refills: 0 | Status: SHIPPED | OUTPATIENT
Start: 2022-07-25 | End: 2022-08-01 | Stop reason: SDUPTHER

## 2022-07-25 RX ORDER — BACITRACIN ZINC AND POLYMYXIN B SULFATE 500; 1000 [USP'U]/G; [USP'U]/G
OINTMENT TOPICAL ONCE
Status: CANCELLED | OUTPATIENT
Start: 2022-07-25 | End: 2022-07-25

## 2022-07-25 RX ORDER — GENTAMICIN SULFATE 1 MG/G
OINTMENT TOPICAL ONCE
Status: CANCELLED | OUTPATIENT
Start: 2022-07-25 | End: 2022-07-25

## 2022-07-25 RX ORDER — BETAMETHASONE DIPROPIONATE 0.05 %
OINTMENT (GRAM) TOPICAL ONCE
Status: CANCELLED | OUTPATIENT
Start: 2022-07-25 | End: 2022-07-25

## 2022-07-25 RX ORDER — LIDOCAINE 40 MG/G
CREAM TOPICAL ONCE
Status: CANCELLED | OUTPATIENT
Start: 2022-07-25 | End: 2022-07-25

## 2022-07-25 RX ORDER — CLOBETASOL PROPIONATE 0.5 MG/G
OINTMENT TOPICAL ONCE
Status: CANCELLED | OUTPATIENT
Start: 2022-07-25 | End: 2022-07-25

## 2022-07-25 RX ORDER — LIDOCAINE 50 MG/G
OINTMENT TOPICAL ONCE
Status: CANCELLED | OUTPATIENT
Start: 2022-07-25 | End: 2022-07-25

## 2022-07-25 RX ORDER — GINSENG 100 MG
CAPSULE ORAL ONCE
Status: CANCELLED | OUTPATIENT
Start: 2022-07-25 | End: 2022-07-25

## 2022-07-25 ASSESSMENT — PAIN SCALES - GENERAL: PAINLEVEL_OUTOF10: 10

## 2022-07-25 ASSESSMENT — PAIN DESCRIPTION - ORIENTATION: ORIENTATION: RIGHT

## 2022-07-25 ASSESSMENT — PAIN DESCRIPTION - DESCRIPTORS: DESCRIPTORS: BURNING;SHARP

## 2022-07-25 ASSESSMENT — PAIN DESCRIPTION - LOCATION: LOCATION: FOOT
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
## 2022-07-25 NOTE — PROGRESS NOTES
Multilayer Compression Wrap   (Not Unna) Below the Knee    NAME:  Lynette De Paz OF BIRTH:  1960  MEDICAL RECORD NUMBER:  [id number]  DATE:  7/25/2022    Multilayer compression wrap: Removed old Multilayer wrap if indicated and wash leg with mild soap/water. Applied moisturizing agent to dry skin as needed. Applied primary and secondary dressing as ordered. Applied multilayered dressing below the knee to right lower leg. Instructed patient/caregiver not to remove dressing and to keep it clean and dry. Instructed patient/caregiver on complications to report to provider, such as pain, numbness in toes, heavy drainage, and slippage of dressing. Instructed patient on purpose of compression dressing and on activity and exercise recommendations.       Electronically signed by Ira Samuel LPN on 7/95/6745 at 85:41 AM

## 2022-07-25 NOTE — DISCHARGE INSTRUCTIONS
PHYSICIAN ORDERS AND DISCHARGE INSTRUCTIONS    NOTE: Upon discharge from the [address], you will receive a patient experience survey. We would be grateful if you would take the time to fill this survey out. Wound care order history:     MICHAEL's   Right       Left    Date:   Cultures:     Grafts:     Antibiotics:        Continuing wound care orders and information:                Residence:                Continue home health care with:    Your wound-care supplies will be provided by: Wound cleansing:     Do not scrub or use excessive force. Wash hands with soap and water before and after dressing changes. Prior to applying a clean dressing, cleanse wound with normal saline, wound cleanser, or mild soap and water. Ask the physician or nurse before getting the wound(s) wet in a shower    Daily Wound management:   Keep weight off wounds and reposition every 2 hours. Avoid standing for long periods of time. Apply wraps/stockings in AM and remove at bedtime. If swelling is present, elevate legs to the level of the heart or above for 30 minutes 4-5 times a day and/or when sitting. When taking antibiotics take entire prescription as ordered by physician do not stop taking until medicine is all gone. Orders for this week: 7/25/22       Rx: [facility] on [address] [facility] in [address] [place] [address] ordered 7/25/22      Right Plantar Foot - wash with soap and water, pat dry. Apply nickel thick layer of Gentamicin, Sulfamylon and stimulen powder. Cover with two Sorbex placed horizontally. Wrap with Coban 2 Lite. Leave in place until nurse visit on Thursday. Apply wound vac as outlined below when available (if wound vac is available by Thursday's appt, bring vac and supplies with you). WOUND VAC THERAPY:    DUODERM/EXUDERM TO PERIWOUND FOR PROTECTION. APPLY ANASEPT, STIMULEN AND BLACK FOAM TO WOUND. SECURE VAC DRESSING WITH DRAPE. SET WOUND VAC  CONTINUOUS SUCTION.  CANISTER CHANGE WEEKLY OR ACCORDING TO VOLUME OF DRAINAGE. WOUND VAC DRESSING TO BE CHANGED MONDAYS AND [employer]. Heel offloader given in clinic on 7/25/22      Nurse visit on Thursday 7/28/22  Follow up with Yariel Nesbitt CNP in 1 week in the wound care center. Call [phone] for any questions or concerns.   Date__________   Time____________

## 2022-07-28 ENCOUNTER — HOSPITAL ENCOUNTER (OUTPATIENT)
Dept: WOUND CARE | Age: 62
Discharge: HOME | End: 2022-07-28
Payer: MEDICARE

## 2022-07-28 VITALS
DIASTOLIC BLOOD PRESSURE: 83 MMHG | RESPIRATION RATE: 16 BRPM | HEART RATE: 72 BPM | TEMPERATURE: 97.8 F | SYSTOLIC BLOOD PRESSURE: 158 MMHG

## 2022-07-28 DIAGNOSIS — E11.621 DIABETIC ULCER OF RIGHT MIDFOOT ASSOCIATED WITH TYPE 2 DIABETES MELLITUS, WITH MUSCLE INVOLVEMENT WITHOUT EVIDENCE OF NECROSIS (HCC): Primary | ICD-10-CM

## 2022-07-28 DIAGNOSIS — Z89.421 STATUS POST AMPUTATION OF LESSER TOE OF RIGHT FOOT (HCC): ICD-10-CM

## 2022-07-28 DIAGNOSIS — L97.415 DIABETIC ULCER OF RIGHT MIDFOOT ASSOCIATED WITH TYPE 2 DIABETES MELLITUS, WITH MUSCLE INVOLVEMENT WITHOUT EVIDENCE OF NECROSIS (HCC): Primary | ICD-10-CM

## 2022-07-28 PROCEDURE — 29581 APPL MULTLAYER CMPRN SYS LEG: CPT

## 2022-07-28 PROCEDURE — 6370000000 HC RX 637 (ALT 250 FOR IP): Performed by: NURSE PRACTITIONER

## 2022-07-28 RX ORDER — BETAMETHASONE DIPROPIONATE 0.05 %
OINTMENT (GRAM) TOPICAL ONCE
Status: CANCELLED | OUTPATIENT
Start: 2022-07-28 | End: 2022-07-28

## 2022-07-28 RX ORDER — BACITRACIN ZINC AND POLYMYXIN B SULFATE 500; 1000 [USP'U]/G; [USP'U]/G
OINTMENT TOPICAL ONCE
Status: CANCELLED | OUTPATIENT
Start: 2022-07-28 | End: 2022-07-28

## 2022-07-28 RX ORDER — BACITRACIN, NEOMYCIN, POLYMYXIN B 400; 3.5; 5 [USP'U]/G; MG/G; [USP'U]/G
OINTMENT TOPICAL ONCE
Status: CANCELLED | OUTPATIENT
Start: 2022-07-28 | End: 2022-07-28

## 2022-07-28 RX ORDER — GENTAMICIN SULFATE 1 MG/G
OINTMENT TOPICAL ONCE
Status: CANCELLED | OUTPATIENT
Start: 2022-07-28 | End: 2022-07-28

## 2022-07-28 RX ORDER — LIDOCAINE HYDROCHLORIDE 40 MG/ML
SOLUTION TOPICAL ONCE
Status: CANCELLED | OUTPATIENT
Start: 2022-07-28 | End: 2022-07-28

## 2022-07-28 RX ORDER — LIDOCAINE 40 MG/G
CREAM TOPICAL ONCE
Status: CANCELLED | OUTPATIENT
Start: 2022-07-28 | End: 2022-07-28

## 2022-07-28 RX ORDER — GINSENG 100 MG
CAPSULE ORAL ONCE
Status: CANCELLED | OUTPATIENT
Start: 2022-07-28 | End: 2022-07-28

## 2022-07-28 RX ORDER — CLOBETASOL PROPIONATE 0.5 MG/G
OINTMENT TOPICAL ONCE
Status: CANCELLED | OUTPATIENT
Start: 2022-07-28 | End: 2022-07-28

## 2022-07-28 RX ORDER — GENTAMICIN SULFATE 1 MG/G
OINTMENT TOPICAL ONCE
Status: COMPLETED | OUTPATIENT
Start: 2022-07-28 | End: 2022-07-28

## 2022-07-28 RX ORDER — LIDOCAINE 50 MG/G
OINTMENT TOPICAL ONCE
Status: CANCELLED | OUTPATIENT
Start: 2022-07-28 | End: 2022-07-28

## 2022-07-28 RX ADMIN — GENTAMICIN SULFATE: 1 OINTMENT TOPICAL at 09:54

## 2022-07-28 ASSESSMENT — PAIN DESCRIPTION - PAIN TYPE: TYPE: CHRONIC PAIN

## 2022-07-28 ASSESSMENT — PAIN SCALES - GENERAL: PAINLEVEL_OUTOF10: 10

## 2022-07-28 ASSESSMENT — PAIN DESCRIPTION - LOCATION: LOCATION: FOOT

## 2022-07-28 ASSESSMENT — PAIN DESCRIPTION - DESCRIPTORS: DESCRIPTORS: BURNING;SHARP

## 2022-07-28 ASSESSMENT — PAIN DESCRIPTION - FREQUENCY: FREQUENCY: CONTINUOUS

## 2022-07-28 ASSESSMENT — PAIN - FUNCTIONAL ASSESSMENT: PAIN_FUNCTIONAL_ASSESSMENT: PREVENTS OR INTERFERES SOME ACTIVE ACTIVITIES AND ADLS

## 2022-07-28 ASSESSMENT — PAIN DESCRIPTION - ORIENTATION: ORIENTATION: RIGHT

## 2022-07-28 ASSESSMENT — PAIN DESCRIPTION - ONSET: ONSET: ON-GOING

## 2022-07-28 NOTE — PROGRESS NOTES
Multilayer Compression Wrap   (Not Unna) Below the Knee    NAME:  Matthew Alejandre OF BIRTH:  1960  MEDICAL RECORD NUMBER:  [id number]  DATE:  7/28/2022    Multilayer compression wrap: Removed old Multilayer wrap if indicated and wash leg with mild soap/water. Applied moisturizing agent to dry skin as needed. Applied primary and secondary dressing as ordered. Applied multilayered dressing below the knee to right lower leg. Instructed patient/caregiver not to remove dressing and to keep it clean and dry. Instructed patient/caregiver on complications to report to provider, such as pain, numbness in toes, heavy drainage, and slippage of dressing. Instructed patient on purpose of compression dressing and on activity and exercise recommendations.       Electronically signed by Katerina Dave RN on 7/28/2022 at 10:01 AM

## 2022-07-28 NOTE — DISCHARGE INSTRUCTIONS
PHYSICIAN ORDERS AND DISCHARGE INSTRUCTIONS     NOTE: Upon discharge from the [address], you will receive a patient experience survey. We would be grateful if you would take the time to fill this survey out. Wound care order history:                 MICHAEL's   Right       Left               Date:              Cultures:                Grafts:                Antibiotics:           Continuing wound care orders and information:                 Residence:                Continue home health care with:              Your wound-care supplies will be provided by: Wound cleansing:              Do not scrub or use excessive force. Wash hands with soap and water before and after dressing changes. Prior to applying a clean dressing, cleanse wound with normal saline, wound cleanser, or mild soap and water. Ask the physician or nurse before getting the wound(s) wet in a shower     Daily Wound management:              Keep weight off wounds and reposition every 2 hours. Avoid standing for long periods of time. Apply wraps/stockings in AM and remove at bedtime. If swelling is present, elevate legs to the level of the heart or above for 30 minutes 4-5 times a day and/or when sitting. When taking antibiotics take entire prescription as ordered by physician do not stop taking until medicine is all gone. Orders for this week: 7/28/22                  [facility]: [facility] [address] [facility] in [address] [place] [address] ordered 7/25/22        Right Plantar Foot - wash with soap and water, pat dry. Apply nickel thick layer of Gentamicin, Sulfamylon and stimulen powder. Cover with two Sorbex placed horizontally. Wrap with Coban 2 Lite. Leave in place until nurse visit on Thursday.  Apply wound vac as outlined below when available (if wound vac is available by Thursday's appt, bring vac and supplies with you). WOUND VAC THERAPY:     DUODERM/EXUDERM TO PERIWOUND FOR PROTECTION. APPLY ANASEPT, STIMULEN AND BLACK FOAM TO WOUND. SECURE VAC DRESSING WITH DRAPE. SET WOUND VAC  CONTINUOUS SUCTION. CANISTER CHANGE WEEKLY OR ACCORDING TO VOLUME OF DRAINAGE. WOUND VAC DRESSING TO BE CHANGED MONDAYS AND Marea Lia. Heel offloader given in clinic on 7/25/22        Nurse visit on Thursday 7/28/22  Follow up with Varsha Shepherd CNP in 1 week in the wound care center. Call [phone] for any questions or concerns.   Date__________   Time____________

## 2022-08-01 ENCOUNTER — HOSPITAL ENCOUNTER (OUTPATIENT)
Dept: WOUND CARE | Age: 62
Discharge: HOME | End: 2022-08-01
Payer: MEDICARE

## 2022-08-01 VITALS
SYSTOLIC BLOOD PRESSURE: 170 MMHG | TEMPERATURE: 97.6 F | DIASTOLIC BLOOD PRESSURE: 68 MMHG | RESPIRATION RATE: 18 BRPM | HEART RATE: 75 BPM

## 2022-08-01 DIAGNOSIS — M79.671 RIGHT FOOT PAIN: ICD-10-CM

## 2022-08-01 DIAGNOSIS — E11.621 DIABETIC ULCER OF RIGHT FOOT ASSOCIATED WITH TYPE 2 DIABETES MELLITUS, WITH NECROSIS OF MUSCLE, UNSPECIFIED PART OF FOOT (HCC): ICD-10-CM

## 2022-08-01 DIAGNOSIS — E11.621 DIABETIC ULCER OF RIGHT MIDFOOT ASSOCIATED WITH TYPE 2 DIABETES MELLITUS, WITH MUSCLE INVOLVEMENT WITHOUT EVIDENCE OF NECROSIS (HCC): Primary | ICD-10-CM

## 2022-08-01 DIAGNOSIS — Z89.421 STATUS POST AMPUTATION OF LESSER TOE OF RIGHT FOOT (HCC): ICD-10-CM

## 2022-08-01 DIAGNOSIS — L97.513 DIABETIC ULCER OF RIGHT FOOT ASSOCIATED WITH TYPE 2 DIABETES MELLITUS, WITH NECROSIS OF MUSCLE, UNSPECIFIED PART OF FOOT (HCC): ICD-10-CM

## 2022-08-01 DIAGNOSIS — L97.415 DIABETIC ULCER OF RIGHT MIDFOOT ASSOCIATED WITH TYPE 2 DIABETES MELLITUS, WITH MUSCLE INVOLVEMENT WITHOUT EVIDENCE OF NECROSIS (HCC): Primary | ICD-10-CM

## 2022-08-01 PROCEDURE — 6370000000 HC RX 637 (ALT 250 FOR IP): Performed by: NURSE PRACTITIONER

## 2022-08-01 PROCEDURE — 11045 DBRDMT SUBQ TISS EACH ADDL: CPT | Performed by: NURSE PRACTITIONER

## 2022-08-01 PROCEDURE — 97605 NEG PRS WND THER DME<=50SQCM: CPT

## 2022-08-01 PROCEDURE — 11042 DBRDMT SUBQ TIS 1ST 20SQCM/<: CPT | Performed by: NURSE PRACTITIONER

## 2022-08-01 PROCEDURE — 11045 DBRDMT SUBQ TISS EACH ADDL: CPT

## 2022-08-01 PROCEDURE — 11042 DBRDMT SUBQ TIS 1ST 20SQCM/<: CPT

## 2022-08-01 RX ORDER — SULFAMETHOXAZOLE AND TRIMETHOPRIM 800; 160 MG/1; MG/1
1 TABLET ORAL 2 TIMES DAILY
Qty: 28 TABLET | Refills: 0 | Status: SHIPPED | OUTPATIENT
Start: 2022-08-01 | End: 2022-08-01 | Stop reason: SDUPTHER

## 2022-08-01 RX ORDER — CEFUROXIME AXETIL 500 MG/1
500 TABLET ORAL 2 TIMES DAILY
Qty: 28 TABLET | Refills: 0 | Status: SHIPPED | OUTPATIENT
Start: 2022-08-01 | End: 2022-08-01 | Stop reason: SDUPTHER

## 2022-08-01 RX ORDER — BETAMETHASONE DIPROPIONATE 0.05 %
OINTMENT (GRAM) TOPICAL ONCE
OUTPATIENT
Start: 2022-08-01 | End: 2022-08-01

## 2022-08-01 RX ORDER — LIDOCAINE HYDROCHLORIDE 40 MG/ML
SOLUTION TOPICAL ONCE
OUTPATIENT
Start: 2022-08-01 | End: 2022-08-01

## 2022-08-01 RX ORDER — LIDOCAINE 50 MG/G
OINTMENT TOPICAL ONCE
OUTPATIENT
Start: 2022-08-01 | End: 2022-08-01

## 2022-08-01 RX ORDER — LIDOCAINE 40 MG/G
CREAM TOPICAL ONCE
OUTPATIENT
Start: 2022-08-01 | End: 2022-08-01

## 2022-08-01 RX ORDER — HYDROCODONE BITARTRATE AND ACETAMINOPHEN 5; 325 MG/1; MG/1
1 TABLET ORAL EVERY 12 HOURS PRN
Qty: 7 TABLET | Refills: 0 | Status: SHIPPED | OUTPATIENT
Start: 2022-08-01 | End: 2022-08-01 | Stop reason: SDUPTHER

## 2022-08-01 RX ORDER — BACITRACIN ZINC AND POLYMYXIN B SULFATE 500; 1000 [USP'U]/G; [USP'U]/G
OINTMENT TOPICAL ONCE
OUTPATIENT
Start: 2022-08-01 | End: 2022-08-01

## 2022-08-01 RX ORDER — HYDROCODONE BITARTRATE AND ACETAMINOPHEN 5; 325 MG/1; MG/1
1 TABLET ORAL EVERY 12 HOURS PRN
Qty: 7 TABLET | Refills: 0 | Status: SHIPPED | OUTPATIENT
Start: 2022-08-01 | End: 2022-08-11

## 2022-08-01 RX ORDER — CEFUROXIME AXETIL 500 MG/1
500 TABLET ORAL 2 TIMES DAILY
Qty: 28 TABLET | Refills: 0 | Status: SHIPPED | OUTPATIENT
Start: 2022-08-01 | End: 2022-08-15

## 2022-08-01 RX ORDER — SULFAMETHOXAZOLE AND TRIMETHOPRIM 800; 160 MG/1; MG/1
1 TABLET ORAL 2 TIMES DAILY
Qty: 28 TABLET | Refills: 0 | Status: SHIPPED | OUTPATIENT
Start: 2022-08-01 | End: 2022-08-15

## 2022-08-01 RX ORDER — GENTAMICIN SULFATE 1 MG/G
OINTMENT TOPICAL ONCE
OUTPATIENT
Start: 2022-08-01 | End: 2022-08-01

## 2022-08-01 RX ORDER — LIDOCAINE HYDROCHLORIDE 40 MG/ML
SOLUTION TOPICAL ONCE
Status: COMPLETED | OUTPATIENT
Start: 2022-08-01 | End: 2022-08-01

## 2022-08-01 RX ORDER — GINSENG 100 MG
CAPSULE ORAL ONCE
OUTPATIENT
Start: 2022-08-01 | End: 2022-08-01

## 2022-08-01 RX ORDER — CLOBETASOL PROPIONATE 0.5 MG/G
OINTMENT TOPICAL ONCE
OUTPATIENT
Start: 2022-08-01 | End: 2022-08-01

## 2022-08-01 RX ORDER — BACITRACIN, NEOMYCIN, POLYMYXIN B 400; 3.5; 5 [USP'U]/G; MG/G; [USP'U]/G
OINTMENT TOPICAL ONCE
OUTPATIENT
Start: 2022-08-01 | End: 2022-08-01

## 2022-08-01 RX ADMIN — LIDOCAINE HYDROCHLORIDE: 40 SOLUTION TOPICAL at 10:06

## 2022-08-01 ASSESSMENT — PAIN DESCRIPTION - LOCATION: LOCATION: FOOT

## 2022-08-01 ASSESSMENT — PAIN DESCRIPTION - FREQUENCY: FREQUENCY: CONTINUOUS

## 2022-08-01 ASSESSMENT — PAIN SCALES - GENERAL: PAINLEVEL_OUTOF10: 10

## 2022-08-01 ASSESSMENT — PAIN - FUNCTIONAL ASSESSMENT: PAIN_FUNCTIONAL_ASSESSMENT: PREVENTS OR INTERFERES SOME ACTIVE ACTIVITIES AND ADLS

## 2022-08-01 ASSESSMENT — PAIN DESCRIPTION - ORIENTATION: ORIENTATION: RIGHT

## 2022-08-01 ASSESSMENT — PAIN DESCRIPTION - PAIN TYPE: TYPE: CHRONIC PAIN

## 2022-08-01 ASSESSMENT — PAIN DESCRIPTION - DESCRIPTORS: DESCRIPTORS: BURNING;SHARP

## 2022-08-01 ASSESSMENT — PAIN DESCRIPTION - ONSET: ONSET: ON-GOING
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
## 2022-08-01 NOTE — PROGRESS NOTES
Negative Pressure Wound Therapy    NAME:  Ira Prince  YOB: 1960  MEDICAL RECORD NUMBER:  [id number]  DATE:  8/1/2022    Applied Negative Pressure to Right Plantar Foot wound(s)/ulcer(s). [x] Applied skin barrier prep to vicki-wound. [x] Cut strips of plastic drape to picture frame wound so that vicki-wound is     covered with the drape. [x] If bridging dressing to less prominent site, cover any intact skin that will come in contact with the Negative Pressure Therapy sponge, gauze or channel drain with plastic drape. The sponge should never touch intact skin. [x] Cut sponge, gauze or channel drain to size which will fit into the wound/ulcer bed without being forced. [x] Be sure the sponge is large enough to hold the entire round plastic flange which is attached to the tubing. Never allow flange to be larger than the sponge or it will produce suction damaging intact skin. Total number of individual pieces of foam used within the wound bed: 2    [x] If bridging the dressing away from the primary site, be sure the bridge leads to a piece of sponge large enough to hold the entire flange without allowing any of the flange to overlap onto intact skin. [x] Covered sponge, gauze or channel drain with plastic drape. [x] Cut a hole in this plastic drape directly over the sponge the same size as the plastic drain tubing. [x] Removed plastic liner from flange and apply it directly over the hole you cut. [x] Removed the plastic cover from the flange. [x] Attached the tubing to the wound/ulcer Negative Pressure Therapy and turn it on to be sure a vacuum is created and that there are no leaks. [x] If air leaks occur, use plastic drape to patch them. [x] Secured Negative Pressure Therapy dressing with ace wrap loosely if located on an extremity. Maintain tubing outside of ace wrap. Tubing must not exert pressure on intact skin.     Applied per  Guidelines      Electronically signed by Debborah Nissen, RN on 8/1/2022 at 11:11 AM

## 2022-08-01 NOTE — DISCHARGE INSTRUCTIONS
PHYSICIAN ORDERS AND DISCHARGE INSTRUCTIONS     NOTE: Upon discharge from the [address], you will receive a patient experience survey. We would be grateful if you would take the time to fill this survey out. Wound care order history:                 MICHAEL's   Right       Left               Date:              Cultures:                Grafts:                Antibiotics:           Continuing wound care orders and information:                 Residence:                Continue home health care with:              Your wound-care supplies will be provided by: Wound cleansing:              Do not scrub or use excessive force. Wash hands with soap and water before and after dressing changes. Prior to applying a clean dressing, cleanse wound with normal saline, wound cleanser, or mild soap and water. Ask the physician or nurse before getting the wound(s) wet in a shower     Daily Wound management:              Keep weight off wounds and reposition every 2 hours. Avoid standing for long periods of time. Apply wraps/stockings in AM and remove at bedtime. If swelling is present, elevate legs to the level of the heart or above for 30 minutes 4-5 times a day and/or when sitting. When taking antibiotics take entire prescription as ordered by physician do not stop taking until medicine is all gone. Wound Care Notes:   [facility] in [facility] ordered 7/25/22; Applied to Right Lateral foot 08/01/22  Heel offloader given in clinic on 7/25/22                            Orders for this week: 8/1/22     Right Plantar Foot - wash with soap and water, pat dry. WOUND VAC THERAPY:  DUODERM/EXUDERM TO PERIWOUND FOR PROTECTION. APPLY ANASEPT, STIMULEN AND BLACK FOAM TO WOUND. (Include undermining)   SECURE VAC DRESSING WITH DRAPE.   SET WOUND VAC  CONTINUOUS SUCTION. CANISTER CHANGE WEEKLY OR ACCORDING TO VOLUME OF DRAINAGE. WOUND VAC DRESSING TO BE CHANGED MONDAYS AND Miquel Stamp. Nurse Visit Thursday   Follow up with Moshe Landau, CNP in 1 week in the wound care center. Call [phone] for any questions or concerns.

## 2022-08-19 NOTE — DISCHARGE INSTRUCTIONS
PHYSICIAN ORDERS AND DISCHARGE INSTRUCTIONS     NOTE: Upon discharge from the [address], you will receive a patient experience survey. We would be grateful if you would take the time to fill this survey out. Wound care order history:                 MICHAEL's   Right       Left               Date:              Cultures:                Grafts:                Antibiotics:           Continuing wound care orders and information:                 Residence:                Continue home health care with:              Your wound-care supplies will be provided by: Wound cleansing:              Do not scrub or use excessive force. Wash hands with soap and water before and after dressing changes. Prior to applying a clean dressing, cleanse wound with normal saline, wound cleanser, or mild soap and water. Ask the physician or nurse before getting the wound(s) wet in a shower     Daily Wound management:              Keep weight off wounds and reposition every 2 hours. Avoid standing for long periods of time. Apply wraps/stockings in AM and remove at bedtime. If swelling is present, elevate legs to the level of the heart or above for 30 minutes 4-5 times a day and/or when sitting. When taking antibiotics take entire prescription as ordered by physician do not stop taking until medicine is all gone. Wound Care Notes:   [facility] in [facility]  [address] ordered 7/25/22; Applied to Right Lateral foot 08/01/22  Heel offloader given in clinic on 7/25/22                             Orders for this week: 8/22/22     Right Plantar Foot - wash with soap and water, pat dry. WOUND VAC THERAPY:  DUODERM/EXUDERM TO PERIWOUND FOR PROTECTION. APPLY ANASEPT, STIMULEN AND BLACK FOAM TO WOUND. (Include undermining)   SECURE VAC DRESSING WITH DRAPE.   SET WOUND VAC  CONTINUOUS SUCTION. CANISTER CHANGE WEEKLY OR ACCORDING TO VOLUME OF DRAINAGE. WOUND VAC DRESSING TO BE CHANGED MONDAYS AND Leslie Gamer. Nurse Visit Thursday   Follow up with James iFsh CNP in 1 week in the wound care center. Call [phone] for any questions or concerns.

## 2022-08-22 ENCOUNTER — HOSPITAL ENCOUNTER (OUTPATIENT)
Dept: WOUND CARE | Age: 62
Discharge: HOME | End: 2022-08-22

## 2022-08-26 NOTE — DISCHARGE INSTRUCTIONS
PHYSICIAN ORDERS AND DISCHARGE INSTRUCTIONS     NOTE: Upon discharge from the [address], you will receive a patient experience survey. We would be grateful if you would take the time to fill this survey out. Wound care order history:                 MICHAEL's   Right       Left               Date:              Cultures:                Grafts:                Antibiotics:           Continuing wound care orders and information:                 Residence:                Continue home health care with:              Your wound-care supplies will be provided by: Wound cleansing:              Do not scrub or use excessive force. Wash hands with soap and water before and after dressing changes. Prior to applying a clean dressing, cleanse wound with normal saline, wound cleanser, or mild soap and water. Ask the physician or nurse before getting the wound(s) wet in a shower     Daily Wound management:              Keep weight off wounds and reposition every 2 hours. Avoid standing for long periods of time. Apply wraps/stockings in AM and remove at bedtime. If swelling is present, elevate legs to the level of the heart or above for 30 minutes 4-5 times a day and/or when sitting. When taking antibiotics take entire prescription as ordered by physician do not stop taking until medicine is all gone. Wound Care Notes:   [facility] in [facility]  [address] ordered 7/25/22; Applied to Right Lateral foot 08/01/22  Heel offloader given in clinic on 7/25/22                             Orders for this week: 8/29/22     Right Plantar Foot - wash with soap and water, pat dry. WOUND VAC THERAPY:  DUODERM/EXUDERM TO PERIWOUND FOR PROTECTION. APPLY ANASEPT, STIMULEN AND BLACK FOAM TO WOUND. (Include undermining)   SECURE VAC DRESSING WITH DRAPE.   SET WOUND VAC  CONTINUOUS

## 2022-08-29 ENCOUNTER — ANESTHESIA EVENT (OUTPATIENT)
Dept: OPERATING ROOM | Age: 62
DRG: 853 | End: 2022-08-29
Payer: MEDICARE

## 2022-08-29 ENCOUNTER — HOSPITAL ENCOUNTER (INPATIENT)
Age: 62
LOS: 8 days | Discharge: NURSING, REHAB OR LONG-TERM CARE | DRG: 853 | End: 2022-09-06
Attending: EMERGENCY MEDICINE | Admitting: INTERNAL MEDICINE
Payer: MEDICARE

## 2022-08-29 ENCOUNTER — ANESTHESIA (OUTPATIENT)
Dept: OPERATING ROOM | Age: 62
DRG: 853 | End: 2022-08-29
Payer: MEDICARE

## 2022-08-29 ENCOUNTER — APPOINTMENT (OUTPATIENT)
Dept: GENERAL RADIOLOGY | Age: 62
DRG: 853 | End: 2022-08-29
Payer: MEDICARE

## 2022-08-29 ENCOUNTER — HOSPITAL ENCOUNTER (OUTPATIENT)
Dept: WOUND CARE | Age: 62
Discharge: HOME | End: 2022-08-29

## 2022-08-29 DIAGNOSIS — E11.621 DIABETIC ULCER OF RIGHT MIDFOOT ASSOCIATED WITH TYPE 2 DIABETES MELLITUS, WITH MUSCLE INVOLVEMENT WITHOUT EVIDENCE OF NECROSIS (HCC): ICD-10-CM

## 2022-08-29 DIAGNOSIS — I96 GANGRENE OF RIGHT FOOT (HCC): Primary | ICD-10-CM

## 2022-08-29 DIAGNOSIS — L97.415 DIABETIC ULCER OF RIGHT MIDFOOT ASSOCIATED WITH TYPE 2 DIABETES MELLITUS, WITH MUSCLE INVOLVEMENT WITHOUT EVIDENCE OF NECROSIS (HCC): ICD-10-CM

## 2022-08-29 DIAGNOSIS — M72.6 NECROTIZING FASCIITIS (HCC): ICD-10-CM

## 2022-08-29 DIAGNOSIS — L08.9 INFECTION OF RIGHT FOOT: ICD-10-CM

## 2022-08-29 LAB
ALBUMIN SERPL-MCNC: 2.1 GM/DL (ref 3.4–5)
ALP BLD-CCNC: 168 IU/L (ref 40–129)
ALT SERPL-CCNC: 10 U/L (ref 10–40)
ANION GAP SERPL CALCULATED.3IONS-SCNC: 9 MMOL/L (ref 4–16)
APTT: 36.3 SECONDS (ref 25.1–37.1)
AST SERPL-CCNC: 17 IU/L (ref 15–37)
BASOPHILS ABSOLUTE: 0.1 K/CU MM
BASOPHILS RELATIVE PERCENT: 0.2 % (ref 0–1)
BILIRUB SERPL-MCNC: 0.4 MG/DL (ref 0–1)
BUN BLDV-MCNC: 22 MG/DL (ref 6–23)
CALCIUM SERPL-MCNC: 7.6 MG/DL (ref 8.3–10.6)
CHLORIDE BLD-SCNC: 93 MMOL/L (ref 99–110)
CO2: 23 MMOL/L (ref 21–32)
CREAT SERPL-MCNC: 0.8 MG/DL (ref 0.9–1.3)
DIFFERENTIAL TYPE: ABNORMAL
EOSINOPHILS ABSOLUTE: 0 K/CU MM
EOSINOPHILS RELATIVE PERCENT: 0.1 % (ref 0–3)
GFR AFRICAN AMERICAN: >60 ML/MIN/1.73M2
GFR NON-AFRICAN AMERICAN: >60 ML/MIN/1.73M2
GLUCOSE BLD-MCNC: 239 MG/DL (ref 70–99)
GLUCOSE BLD-MCNC: 244 MG/DL (ref 70–99)
GLUCOSE BLD-MCNC: 385 MG/DL (ref 70–99)
GLUCOSE BLD-MCNC: 387 MG/DL (ref 70–99)
GLUCOSE BLD-MCNC: 399 MG/DL (ref 70–99)
GLUCOSE BLD-MCNC: 429 MG/DL (ref 70–99)
HCT VFR BLD CALC: 29.1 % (ref 42–52)
HEMOGLOBIN: 9.9 GM/DL (ref 13.5–18)
IMMATURE NEUTROPHIL %: 1.3 % (ref 0–0.43)
INR BLD: 1.19 INDEX
LACTATE: 2 MMOL/L (ref 0.4–2)
LYMPHOCYTES ABSOLUTE: 0.9 K/CU MM
LYMPHOCYTES RELATIVE PERCENT: 3.4 % (ref 24–44)
MCH RBC QN AUTO: 28 PG (ref 27–31)
MCHC RBC AUTO-ENTMCNC: 34 % (ref 32–36)
MCV RBC AUTO: 82.4 FL (ref 78–100)
MONOCYTES ABSOLUTE: 1.4 K/CU MM
MONOCYTES RELATIVE PERCENT: 5.3 % (ref 0–4)
NUCLEATED RBC %: 0 %
PDW BLD-RTO: 13.8 % (ref 11.7–14.9)
PLATELET # BLD: 218 K/CU MM (ref 140–440)
PMV BLD AUTO: 9.9 FL (ref 7.5–11.1)
POTASSIUM SERPL-SCNC: 3.5 MMOL/L (ref 3.5–5.1)
PROTHROMBIN TIME: 15.4 SECONDS (ref 11.7–14.5)
RBC # BLD: 3.53 M/CU MM (ref 4.6–6.2)
SEGMENTED NEUTROPHILS ABSOLUTE COUNT: 23.2 K/CU MM
SEGMENTED NEUTROPHILS RELATIVE PERCENT: 89.7 % (ref 36–66)
SODIUM BLD-SCNC: 125 MMOL/L (ref 135–145)
TOTAL IMMATURE NEUTOROPHIL: 0.33 K/CU MM
TOTAL NUCLEATED RBC: 0 K/CU MM
TOTAL PROTEIN: 7 GM/DL (ref 6.4–8.2)
WBC # BLD: 25.9 K/CU MM (ref 4–10.5)

## 2022-08-29 PROCEDURE — 6370000000 HC RX 637 (ALT 250 FOR IP): Performed by: PHYSICIAN ASSISTANT

## 2022-08-29 PROCEDURE — 1200000000 HC SEMI PRIVATE

## 2022-08-29 PROCEDURE — 87186 SC STD MICRODIL/AGAR DIL: CPT

## 2022-08-29 PROCEDURE — 7100000001 HC PACU RECOVERY - ADDTL 15 MIN: Performed by: SURGERY

## 2022-08-29 PROCEDURE — 96375 TX/PRO/DX INJ NEW DRUG ADDON: CPT

## 2022-08-29 PROCEDURE — 2500000003 HC RX 250 WO HCPCS: Performed by: PHYSICIAN ASSISTANT

## 2022-08-29 PROCEDURE — 6360000002 HC RX W HCPCS: Performed by: PHYSICIAN ASSISTANT

## 2022-08-29 PROCEDURE — 87040 BLOOD CULTURE FOR BACTERIA: CPT

## 2022-08-29 PROCEDURE — 80053 COMPREHEN METABOLIC PANEL: CPT

## 2022-08-29 PROCEDURE — 87077 CULTURE AEROBIC IDENTIFY: CPT

## 2022-08-29 PROCEDURE — 88311 DECALCIFY TISSUE: CPT | Performed by: PATHOLOGY

## 2022-08-29 PROCEDURE — 2500000003 HC RX 250 WO HCPCS

## 2022-08-29 PROCEDURE — 85610 PROTHROMBIN TIME: CPT

## 2022-08-29 PROCEDURE — 2580000003 HC RX 258: Performed by: PHYSICIAN ASSISTANT

## 2022-08-29 PROCEDURE — 96365 THER/PROPH/DIAG IV INF INIT: CPT

## 2022-08-29 PROCEDURE — 2780000010 HC IMPLANT OTHER: Performed by: SURGERY

## 2022-08-29 PROCEDURE — 85025 COMPLETE CBC W/AUTO DIFF WBC: CPT

## 2022-08-29 PROCEDURE — 2580000003 HC RX 258

## 2022-08-29 PROCEDURE — 85730 THROMBOPLASTIN TIME PARTIAL: CPT

## 2022-08-29 PROCEDURE — 6360000002 HC RX W HCPCS

## 2022-08-29 PROCEDURE — 3700000001 HC ADD 15 MINUTES (ANESTHESIA): Performed by: SURGERY

## 2022-08-29 PROCEDURE — 3700000000 HC ANESTHESIA ATTENDED CARE: Performed by: SURGERY

## 2022-08-29 PROCEDURE — 6360000002 HC RX W HCPCS: Performed by: ANESTHESIOLOGY

## 2022-08-29 PROCEDURE — 3600000003 HC SURGERY LEVEL 3 BASE: Performed by: SURGERY

## 2022-08-29 PROCEDURE — 88307 TISSUE EXAM BY PATHOLOGIST: CPT | Performed by: PATHOLOGY

## 2022-08-29 PROCEDURE — 0Y6J0Z1 DETACHMENT AT LEFT LOWER LEG, HIGH, OPEN APPROACH: ICD-10-PCS | Performed by: SURGERY

## 2022-08-29 PROCEDURE — 99222 1ST HOSP IP/OBS MODERATE 55: CPT | Performed by: SURGERY

## 2022-08-29 PROCEDURE — 82962 GLUCOSE BLOOD TEST: CPT

## 2022-08-29 PROCEDURE — 87150 DNA/RNA AMPLIFIED PROBE: CPT

## 2022-08-29 PROCEDURE — 27880 AMPUTATION OF LOWER LEG: CPT | Performed by: SURGERY

## 2022-08-29 PROCEDURE — 3600000013 HC SURGERY LEVEL 3 ADDTL 15MIN: Performed by: SURGERY

## 2022-08-29 PROCEDURE — 83605 ASSAY OF LACTIC ACID: CPT

## 2022-08-29 PROCEDURE — 73630 X-RAY EXAM OF FOOT: CPT

## 2022-08-29 PROCEDURE — 99285 EMERGENCY DEPT VISIT HI MDM: CPT

## 2022-08-29 PROCEDURE — 2709999900 HC NON-CHARGEABLE SUPPLY: Performed by: SURGERY

## 2022-08-29 PROCEDURE — 7100000000 HC PACU RECOVERY - FIRST 15 MIN: Performed by: SURGERY

## 2022-08-29 RX ORDER — SODIUM CHLORIDE 0.9 % (FLUSH) 0.9 %
5-40 SYRINGE (ML) INJECTION EVERY 12 HOURS SCHEDULED
Status: DISCONTINUED | OUTPATIENT
Start: 2022-08-29 | End: 2022-09-06 | Stop reason: HOSPADM

## 2022-08-29 RX ORDER — SODIUM CHLORIDE 0.9 % (FLUSH) 0.9 %
5-40 SYRINGE (ML) INJECTION PRN
Status: DISCONTINUED | OUTPATIENT
Start: 2022-08-29 | End: 2022-09-06 | Stop reason: HOSPADM

## 2022-08-29 RX ORDER — ASPIRIN 81 MG/1
81 TABLET, CHEWABLE ORAL DAILY
Status: DISCONTINUED | OUTPATIENT
Start: 2022-08-30 | End: 2022-09-06 | Stop reason: HOSPADM

## 2022-08-29 RX ORDER — SODIUM CHLORIDE 0.9 % (FLUSH) 0.9 %
5-40 SYRINGE (ML) INJECTION PRN
Status: DISCONTINUED | OUTPATIENT
Start: 2022-08-29 | End: 2022-08-29 | Stop reason: HOSPADM

## 2022-08-29 RX ORDER — ASPIRIN 81 MG/1
81 TABLET, CHEWABLE ORAL DAILY
COMMUNITY

## 2022-08-29 RX ORDER — FENTANYL CITRATE 50 UG/ML
50 INJECTION, SOLUTION INTRAMUSCULAR; INTRAVENOUS EVERY 5 MIN PRN
Status: DISCONTINUED | OUTPATIENT
Start: 2022-08-29 | End: 2022-08-29 | Stop reason: HOSPADM

## 2022-08-29 RX ORDER — MORPHINE SULFATE 4 MG/ML
4 INJECTION, SOLUTION INTRAMUSCULAR; INTRAVENOUS EVERY 4 HOURS PRN
Status: DISCONTINUED | OUTPATIENT
Start: 2022-08-29 | End: 2022-08-30

## 2022-08-29 RX ORDER — OXYCODONE HYDROCHLORIDE 5 MG/1
5 TABLET ORAL
Status: DISCONTINUED | OUTPATIENT
Start: 2022-08-29 | End: 2022-08-29 | Stop reason: HOSPADM

## 2022-08-29 RX ORDER — ONDANSETRON 2 MG/ML
4 INJECTION INTRAMUSCULAR; INTRAVENOUS EVERY 6 HOURS PRN
Status: DISCONTINUED | OUTPATIENT
Start: 2022-08-29 | End: 2022-09-06 | Stop reason: HOSPADM

## 2022-08-29 RX ORDER — ONDANSETRON 2 MG/ML
4 INJECTION INTRAMUSCULAR; INTRAVENOUS
Status: DISCONTINUED | OUTPATIENT
Start: 2022-08-29 | End: 2022-08-29 | Stop reason: HOSPADM

## 2022-08-29 RX ORDER — INSULIN GLARGINE 100 [IU]/ML
10 INJECTION, SOLUTION SUBCUTANEOUS NIGHTLY
Status: DISCONTINUED | OUTPATIENT
Start: 2022-08-29 | End: 2022-09-01

## 2022-08-29 RX ORDER — MORPHINE SULFATE 4 MG/ML
4 INJECTION, SOLUTION INTRAMUSCULAR; INTRAVENOUS ONCE
Status: COMPLETED | OUTPATIENT
Start: 2022-08-29 | End: 2022-08-29

## 2022-08-29 RX ORDER — ACETAMINOPHEN 325 MG/1
650 TABLET ORAL EVERY 6 HOURS PRN
Status: DISCONTINUED | OUTPATIENT
Start: 2022-08-29 | End: 2022-08-31

## 2022-08-29 RX ORDER — POLYETHYLENE GLYCOL 3350 17 G/17G
17 POWDER, FOR SOLUTION ORAL DAILY PRN
Status: DISCONTINUED | OUTPATIENT
Start: 2022-08-29 | End: 2022-09-06 | Stop reason: HOSPADM

## 2022-08-29 RX ORDER — ACETAMINOPHEN 650 MG/1
650 SUPPOSITORY RECTAL EVERY 6 HOURS PRN
Status: DISCONTINUED | OUTPATIENT
Start: 2022-08-29 | End: 2022-08-30

## 2022-08-29 RX ORDER — ENOXAPARIN SODIUM 100 MG/ML
40 INJECTION SUBCUTANEOUS DAILY
Status: DISCONTINUED | OUTPATIENT
Start: 2022-08-30 | End: 2022-09-06 | Stop reason: HOSPADM

## 2022-08-29 RX ORDER — ROCURONIUM BROMIDE 10 MG/ML
INJECTION, SOLUTION INTRAVENOUS PRN
Status: DISCONTINUED | OUTPATIENT
Start: 2022-08-29 | End: 2022-08-29 | Stop reason: SDUPTHER

## 2022-08-29 RX ORDER — SODIUM CHLORIDE 9 MG/ML
INJECTION, SOLUTION INTRAVENOUS CONTINUOUS
Status: DISPENSED | OUTPATIENT
Start: 2022-08-29 | End: 2022-08-30

## 2022-08-29 RX ORDER — HYDRALAZINE HYDROCHLORIDE 20 MG/ML
10 INJECTION INTRAMUSCULAR; INTRAVENOUS
Status: DISCONTINUED | OUTPATIENT
Start: 2022-08-29 | End: 2022-08-29 | Stop reason: HOSPADM

## 2022-08-29 RX ORDER — SODIUM CHLORIDE 9 MG/ML
INJECTION, SOLUTION INTRAVENOUS PRN
Status: DISCONTINUED | OUTPATIENT
Start: 2022-08-29 | End: 2022-09-06 | Stop reason: HOSPADM

## 2022-08-29 RX ORDER — DEXTROSE MONOHYDRATE 100 MG/ML
INJECTION, SOLUTION INTRAVENOUS CONTINUOUS PRN
Status: DISCONTINUED | OUTPATIENT
Start: 2022-08-29 | End: 2022-08-29 | Stop reason: HOSPADM

## 2022-08-29 RX ORDER — FENTANYL CITRATE 50 UG/ML
INJECTION, SOLUTION INTRAMUSCULAR; INTRAVENOUS PRN
Status: DISCONTINUED | OUTPATIENT
Start: 2022-08-29 | End: 2022-08-29 | Stop reason: SDUPTHER

## 2022-08-29 RX ORDER — ONDANSETRON 2 MG/ML
INJECTION INTRAMUSCULAR; INTRAVENOUS PRN
Status: DISCONTINUED | OUTPATIENT
Start: 2022-08-29 | End: 2022-08-29 | Stop reason: SDUPTHER

## 2022-08-29 RX ORDER — SODIUM CHLORIDE 0.9 % (FLUSH) 0.9 %
5-40 SYRINGE (ML) INJECTION EVERY 12 HOURS SCHEDULED
Status: DISCONTINUED | OUTPATIENT
Start: 2022-08-29 | End: 2022-08-29 | Stop reason: HOSPADM

## 2022-08-29 RX ORDER — MEPERIDINE HYDROCHLORIDE 25 MG/ML
12.5 INJECTION INTRAMUSCULAR; INTRAVENOUS; SUBCUTANEOUS ONCE
Status: DISCONTINUED | OUTPATIENT
Start: 2022-08-29 | End: 2022-08-29 | Stop reason: HOSPADM

## 2022-08-29 RX ORDER — SODIUM CHLORIDE 9 MG/ML
INJECTION, SOLUTION INTRAVENOUS CONTINUOUS
Status: DISCONTINUED | OUTPATIENT
Start: 2022-08-29 | End: 2022-08-29

## 2022-08-29 RX ORDER — ONDANSETRON 2 MG/ML
4 INJECTION INTRAMUSCULAR; INTRAVENOUS EVERY 30 MIN PRN
Status: DISCONTINUED | OUTPATIENT
Start: 2022-08-29 | End: 2022-08-29 | Stop reason: SDUPTHER

## 2022-08-29 RX ORDER — SODIUM CHLORIDE, SODIUM LACTATE, POTASSIUM CHLORIDE, CALCIUM CHLORIDE 600; 310; 30; 20 MG/100ML; MG/100ML; MG/100ML; MG/100ML
INJECTION, SOLUTION INTRAVENOUS CONTINUOUS
Status: DISCONTINUED | OUTPATIENT
Start: 2022-08-29 | End: 2022-08-29

## 2022-08-29 RX ORDER — SODIUM CHLORIDE 9 MG/ML
25 INJECTION, SOLUTION INTRAVENOUS PRN
Status: DISCONTINUED | OUTPATIENT
Start: 2022-08-29 | End: 2022-08-29 | Stop reason: HOSPADM

## 2022-08-29 RX ORDER — FAMOTIDINE 20 MG/1
20 TABLET, FILM COATED ORAL DAILY
Status: DISCONTINUED | OUTPATIENT
Start: 2022-08-30 | End: 2022-09-06 | Stop reason: HOSPADM

## 2022-08-29 RX ORDER — SUCCINYLCHOLINE/SOD CL,ISO/PF 100 MG/5ML
SYRINGE (ML) INTRAVENOUS PRN
Status: DISCONTINUED | OUTPATIENT
Start: 2022-08-29 | End: 2022-08-29 | Stop reason: SDUPTHER

## 2022-08-29 RX ORDER — LIDOCAINE HYDROCHLORIDE 20 MG/ML
INJECTION, SOLUTION EPIDURAL; INFILTRATION; INTRACAUDAL; PERINEURAL PRN
Status: DISCONTINUED | OUTPATIENT
Start: 2022-08-29 | End: 2022-08-29 | Stop reason: SDUPTHER

## 2022-08-29 RX ORDER — DIPHENHYDRAMINE HYDROCHLORIDE 50 MG/ML
12.5 INJECTION INTRAMUSCULAR; INTRAVENOUS
Status: DISCONTINUED | OUTPATIENT
Start: 2022-08-29 | End: 2022-08-29 | Stop reason: HOSPADM

## 2022-08-29 RX ORDER — ONDANSETRON 4 MG/1
4 TABLET, ORALLY DISINTEGRATING ORAL EVERY 8 HOURS PRN
Status: DISCONTINUED | OUTPATIENT
Start: 2022-08-29 | End: 2022-09-06 | Stop reason: HOSPADM

## 2022-08-29 RX ORDER — FAMOTIDINE 20 MG/1
20 TABLET, FILM COATED ORAL DAILY
COMMUNITY
Start: 2022-08-28

## 2022-08-29 RX ORDER — INSULIN LISPRO 100 [IU]/ML
0-8 INJECTION, SOLUTION INTRAVENOUS; SUBCUTANEOUS EVERY 4 HOURS
Status: DISCONTINUED | OUTPATIENT
Start: 2022-08-29 | End: 2022-08-30

## 2022-08-29 RX ORDER — PROCHLORPERAZINE EDISYLATE 5 MG/ML
5 INJECTION INTRAMUSCULAR; INTRAVENOUS
Status: DISCONTINUED | OUTPATIENT
Start: 2022-08-29 | End: 2022-08-29 | Stop reason: HOSPADM

## 2022-08-29 RX ORDER — INSULIN GLARGINE 100 [IU]/ML
15 INJECTION, SOLUTION SUBCUTANEOUS NIGHTLY
Status: CANCELLED | OUTPATIENT
Start: 2022-08-29

## 2022-08-29 RX ORDER — LORAZEPAM 2 MG/ML
0.5 INJECTION INTRAMUSCULAR
Status: DISCONTINUED | OUTPATIENT
Start: 2022-08-29 | End: 2022-08-29 | Stop reason: HOSPADM

## 2022-08-29 RX ORDER — SODIUM CHLORIDE 9 MG/ML
INJECTION, SOLUTION INTRAVENOUS CONTINUOUS PRN
Status: DISCONTINUED | OUTPATIENT
Start: 2022-08-29 | End: 2022-08-29 | Stop reason: SDUPTHER

## 2022-08-29 RX ORDER — 0.9 % SODIUM CHLORIDE 0.9 %
1000 INTRAVENOUS SOLUTION INTRAVENOUS ONCE
Status: COMPLETED | OUTPATIENT
Start: 2022-08-29 | End: 2022-08-29

## 2022-08-29 RX ORDER — PROPOFOL 10 MG/ML
INJECTION, EMULSION INTRAVENOUS PRN
Status: DISCONTINUED | OUTPATIENT
Start: 2022-08-29 | End: 2022-08-29 | Stop reason: SDUPTHER

## 2022-08-29 RX ORDER — NICOTINE 21 MG/24HR
1 PATCH, TRANSDERMAL 24 HOURS TRANSDERMAL DAILY
Status: DISCONTINUED | OUTPATIENT
Start: 2022-08-29 | End: 2022-09-06 | Stop reason: HOSPADM

## 2022-08-29 RX ORDER — DEXTROSE MONOHYDRATE 100 MG/ML
INJECTION, SOLUTION INTRAVENOUS CONTINUOUS PRN
Status: DISCONTINUED | OUTPATIENT
Start: 2022-08-29 | End: 2022-08-30

## 2022-08-29 RX ORDER — IPRATROPIUM BROMIDE AND ALBUTEROL SULFATE 2.5; .5 MG/3ML; MG/3ML
1 SOLUTION RESPIRATORY (INHALATION)
Status: DISCONTINUED | OUTPATIENT
Start: 2022-08-29 | End: 2022-08-29 | Stop reason: HOSPADM

## 2022-08-29 RX ADMIN — DEXMEDETOMIDINE 4 MCG: 100 INJECTION, SOLUTION, CONCENTRATE INTRAVENOUS at 14:21

## 2022-08-29 RX ADMIN — MORPHINE SULFATE 4 MG: 4 INJECTION INTRAVENOUS at 11:23

## 2022-08-29 RX ADMIN — FENTANYL CITRATE 25 MCG: 50 INJECTION, SOLUTION INTRAMUSCULAR; INTRAVENOUS at 14:21

## 2022-08-29 RX ADMIN — INSULIN GLARGINE 10 UNITS: 100 INJECTION, SOLUTION SUBCUTANEOUS at 21:27

## 2022-08-29 RX ADMIN — ROCURONIUM BROMIDE 45 MG: 10 INJECTION INTRAVENOUS at 13:49

## 2022-08-29 RX ADMIN — MORPHINE SULFATE 4 MG: 4 INJECTION, SOLUTION INTRAMUSCULAR; INTRAVENOUS at 22:15

## 2022-08-29 RX ADMIN — CLINDAMYCIN PHOSPHATE 900 MG: 900 INJECTION, SOLUTION INTRAVENOUS at 12:38

## 2022-08-29 RX ADMIN — SODIUM CHLORIDE: 9 INJECTION, SOLUTION INTRAVENOUS at 13:55

## 2022-08-29 RX ADMIN — DEXMEDETOMIDINE 4 MCG: 100 INJECTION, SOLUTION, CONCENTRATE INTRAVENOUS at 14:31

## 2022-08-29 RX ADMIN — Medication 900 MG: at 20:23

## 2022-08-29 RX ADMIN — VANCOMYCIN HYDROCHLORIDE 1250 MG: 1.25 INJECTION, POWDER, LYOPHILIZED, FOR SOLUTION INTRAVENOUS at 12:41

## 2022-08-29 RX ADMIN — ACETAMINOPHEN 650 MG: 325 TABLET ORAL at 20:33

## 2022-08-29 RX ADMIN — ROCURONIUM BROMIDE 5 MG: 10 INJECTION INTRAVENOUS at 13:44

## 2022-08-29 RX ADMIN — FENTANYL CITRATE 25 MCG: 50 INJECTION, SOLUTION INTRAMUSCULAR; INTRAVENOUS at 13:42

## 2022-08-29 RX ADMIN — SODIUM CHLORIDE, PRESERVATIVE FREE 10 ML: 5 INJECTION INTRAVENOUS at 20:25

## 2022-08-29 RX ADMIN — FENTANYL CITRATE 50 MCG: 50 INJECTION, SOLUTION INTRAMUSCULAR; INTRAVENOUS at 15:32

## 2022-08-29 RX ADMIN — FENTANYL CITRATE 50 MCG: 50 INJECTION, SOLUTION INTRAMUSCULAR; INTRAVENOUS at 14:15

## 2022-08-29 RX ADMIN — SODIUM CHLORIDE: 9 INJECTION, SOLUTION INTRAVENOUS at 16:15

## 2022-08-29 RX ADMIN — PROPOFOL 80 MG: 10 INJECTION, EMULSION INTRAVENOUS at 13:44

## 2022-08-29 RX ADMIN — PIPERACILLIN AND TAZOBACTAM 3375 MG: 3; .375 INJECTION, POWDER, FOR SOLUTION INTRAVENOUS at 21:25

## 2022-08-29 RX ADMIN — SODIUM CHLORIDE 1000 ML: 9 INJECTION, SOLUTION INTRAVENOUS at 11:35

## 2022-08-29 RX ADMIN — PIPERACILLIN AND TAZOBACTAM 3375 MG: 3; .375 INJECTION, POWDER, LYOPHILIZED, FOR SOLUTION INTRAVENOUS at 11:35

## 2022-08-29 RX ADMIN — INSULIN LISPRO 2 UNITS: 100 INJECTION, SOLUTION INTRAVENOUS; SUBCUTANEOUS at 20:37

## 2022-08-29 RX ADMIN — Medication 120 MG: at 13:44

## 2022-08-29 RX ADMIN — HYDROMORPHONE HYDROCHLORIDE 0.25 MG: 1 INJECTION, SOLUTION INTRAMUSCULAR; INTRAVENOUS; SUBCUTANEOUS at 16:03

## 2022-08-29 RX ADMIN — HYDROMORPHONE HYDROCHLORIDE 0.5 MG: 1 INJECTION, SOLUTION INTRAMUSCULAR; INTRAVENOUS; SUBCUTANEOUS at 14:31

## 2022-08-29 RX ADMIN — SUGAMMADEX 200 MG: 100 INJECTION, SOLUTION INTRAVENOUS at 15:15

## 2022-08-29 RX ADMIN — HYDROMORPHONE HYDROCHLORIDE 0.25 MG: 1 INJECTION, SOLUTION INTRAMUSCULAR; INTRAVENOUS; SUBCUTANEOUS at 15:51

## 2022-08-29 RX ADMIN — SODIUM CHLORIDE: 9 INJECTION, SOLUTION INTRAVENOUS at 17:38

## 2022-08-29 RX ADMIN — Medication 100 MG: at 13:44

## 2022-08-29 RX ADMIN — MORPHINE SULFATE 4 MG: 4 INJECTION, SOLUTION INTRAMUSCULAR; INTRAVENOUS at 18:03

## 2022-08-29 RX ADMIN — ONDANSETRON 4 MG: 2 INJECTION INTRAMUSCULAR; INTRAVENOUS at 11:25

## 2022-08-29 RX ADMIN — INSULIN LISPRO 8 UNITS: 100 INJECTION, SOLUTION INTRAVENOUS; SUBCUTANEOUS at 16:12

## 2022-08-29 RX ADMIN — ONDANSETRON 4 MG: 2 INJECTION INTRAMUSCULAR; INTRAVENOUS at 14:45

## 2022-08-29 RX ADMIN — SODIUM CHLORIDE: 9 INJECTION, SOLUTION INTRAVENOUS at 13:35

## 2022-08-29 RX ADMIN — DEXMEDETOMIDINE 8 MCG: 100 INJECTION, SOLUTION, CONCENTRATE INTRAVENOUS at 14:26

## 2022-08-29 RX ADMIN — DEXMEDETOMIDINE 4 MCG: 100 INJECTION, SOLUTION, CONCENTRATE INTRAVENOUS at 14:23

## 2022-08-29 RX ADMIN — PROPOFOL 20 MG: 10 INJECTION, EMULSION INTRAVENOUS at 13:49

## 2022-08-29 ASSESSMENT — ENCOUNTER SYMPTOMS
SORE THROAT: 0
BLOOD IN STOOL: 0
ABDOMINAL DISTENTION: 0
CHOKING: 0
COLOR CHANGE: 1
STRIDOR: 0
COUGH: 0
NAUSEA: 0
TROUBLE SWALLOWING: 0
ABDOMINAL PAIN: 0
SHORTNESS OF BREATH: 0
BACK PAIN: 0
VOMITING: 0

## 2022-08-29 ASSESSMENT — PAIN DESCRIPTION - ORIENTATION
ORIENTATION: RIGHT

## 2022-08-29 ASSESSMENT — LIFESTYLE VARIABLES: SMOKING_STATUS: 1

## 2022-08-29 ASSESSMENT — PAIN DESCRIPTION - DESCRIPTORS
DESCRIPTORS: ACHING;DISCOMFORT
DESCRIPTORS: ACHING
DESCRIPTORS: ACHING
DESCRIPTORS: DISCOMFORT
DESCRIPTORS: ACHING;DISCOMFORT
DESCRIPTORS: ACHING;BURNING;STABBING
DESCRIPTORS: PENETRATING;THROBBING

## 2022-08-29 ASSESSMENT — PAIN DESCRIPTION - LOCATION
LOCATION: FOOT
LOCATION: LEG
LOCATION: FOOT
LOCATION: LEG

## 2022-08-29 ASSESSMENT — PAIN DESCRIPTION - FREQUENCY
FREQUENCY: CONTINUOUS
FREQUENCY: INTERMITTENT
FREQUENCY: CONTINUOUS

## 2022-08-29 ASSESSMENT — PAIN DESCRIPTION - PAIN TYPE
TYPE: SURGICAL PAIN
TYPE: ACUTE PAIN;CHRONIC PAIN
TYPE: ACUTE PAIN
TYPE: SURGICAL PAIN

## 2022-08-29 ASSESSMENT — PAIN SCALES - GENERAL
PAINLEVEL_OUTOF10: 0
PAINLEVEL_OUTOF10: 10
PAINLEVEL_OUTOF10: 10
PAINLEVEL_OUTOF10: 8
PAINLEVEL_OUTOF10: 10
PAINLEVEL_OUTOF10: 9
PAINLEVEL_OUTOF10: 10
PAINLEVEL_OUTOF10: 7

## 2022-08-29 ASSESSMENT — PAIN DESCRIPTION - ONSET
ONSET: ON-GOING
ONSET: AWAKENED FROM SLEEP
ONSET: ON-GOING
ONSET: GRADUAL
ONSET: ON-GOING
ONSET: ON-GOING

## 2022-08-29 ASSESSMENT — PAIN - FUNCTIONAL ASSESSMENT
PAIN_FUNCTIONAL_ASSESSMENT: PREVENTS OR INTERFERES SOME ACTIVE ACTIVITIES AND ADLS
PAIN_FUNCTIONAL_ASSESSMENT: PREVENTS OR INTERFERES WITH MANY ACTIVE NOT PASSIVE ACTIVITIES
PAIN_FUNCTIONAL_ASSESSMENT: PREVENTS OR INTERFERES SOME ACTIVE ACTIVITIES AND ADLS
PAIN_FUNCTIONAL_ASSESSMENT: PREVENTS OR INTERFERES SOME ACTIVE ACTIVITIES AND ADLS
PAIN_FUNCTIONAL_ASSESSMENT: 0-10

## 2022-08-29 NOTE — ED NOTES
Report given to [facility], nurse for [phone] [id number].       Gee Armendariz RN  08/29/22 5913

## 2022-08-29 NOTE — ED PROVIDER NOTES
tenderness    Extremities: Patient has had previous amputation of the right third fourth and fifth toes as well as portion of the great toe. Has area of necrosis on the medial right foot.   No gross deformities, no edema, no tenderness    Neurologic:  Normal motor function, Normal sensory function, No focal deficits    Skin:  Warm, Dry, No erythema, No rash, No cyanosis, No mottling    Lymphatic:  No lymphadenopathy in the following location(s): cervical    Psychiatric:  Alert and oriented x3, Affect normal          RADIOLOGY/PROCEDURES/LABS/MEDICATIONS ADMINISTERED:    I have reviewed and interpreted all of the currently available lab results from this visit (if applicable):  Results for orders placed or performed during the hospital encounter of 08/29/22   CBC with Auto Differential   Result Value Ref Range    WBC 25.9 (H) 4.0 - 10.5 K/CU MM    RBC 3.53 (L) 4.6 - 6.2 M/CU MM    Hemoglobin 9.9 (L) 13.5 - 18.0 GM/DL    Hematocrit 29.1 (L) 42 - 52 %    MCV 82.4 78 - 100 FL    MCH 28.0 27 - 31 PG    MCHC 34.0 32.0 - 36.0 %    RDW 13.8 11.7 - 14.9 %    Platelets 823 687 - 148 K/CU MM    MPV 9.9 7.5 - 11.1 FL    Differential Type AUTOMATED DIFFERENTIAL     Segs Relative 89.7 (H) 36 - 66 %    Lymphocytes % 3.4 (L) 24 - 44 %    Monocytes % 5.3 (H) 0 - 4 %    Eosinophils % 0.1 0 - 3 %    Basophils % 0.2 0 - 1 %    Segs Absolute 23.2 K/CU MM    Lymphocytes Absolute 0.9 K/CU MM    Monocytes Absolute 1.4 K/CU MM    Eosinophils Absolute 0.0 K/CU MM    Basophils Absolute 0.1 K/CU MM    Nucleated RBC % 0.0 %    Total Nucleated RBC 0.0 K/CU MM    Total Immature Neutrophil 0.33 K/CU MM    Immature Neutrophil % 1.3 (H) 0 - 0.43 %   Comprehensive Metabolic Panel   Result Value Ref Range    Sodium 125 (L) 135 - 145 MMOL/L    Potassium 3.5 3.5 - 5.1 MMOL/L    Chloride 93 (L) 99 - 110 mMol/L    CO2 23 21 - 32 MMOL/L    BUN 22 6 - 23 MG/DL    Creatinine 0.8 (L) 0.9 - 1.3 MG/DL    Glucose 429 (HH) 70 - 99 MG/DL    Calcium 7.6 (L) 8.3 - 10.6 MG/DL    Albumin 2.1 (L) 3.4 - 5.0 GM/DL    Total Protein 7.0 6.4 - 8.2 GM/DL    Total Bilirubin 0.4 0.0 - 1.0 MG/DL    ALT 10 10 - 40 U/L    AST 17 15 - 37 IU/L    Alkaline Phosphatase 168 (H) 40 - 129 IU/L    GFR Non-African American >60 >60 mL/min/1.73m2    GFR African American >60 >60 mL/min/1.73m2    Anion Gap 9 4 - 16   Lactic Acid   Result Value Ref Range    Lactate 2.0 0.4 - 2.0 mMOL/L   Protime-INR   Result Value Ref Range    Protime 15.4 (H) 11.7 - 14.5 SECONDS    INR 1.19 INDEX   PTT   Result Value Ref Range    aPTT 36.3 25.1 - 37.1 SECONDS   POCT Glucose   Result Value Ref Range    POC Glucose 399 (H) 70 - 99 MG/DL   POCT Glucose   Result Value Ref Range    POC Glucose 387 (H) 70 - 99 MG/DL   POCT Glucose   Result Value Ref Range    POC Glucose 385 (H) 70 - 99 MG/DL          ABNORMAL LABS:  Labs Reviewed   CBC WITH AUTO DIFFERENTIAL - Abnormal; Notable for the following components:       Result Value    WBC 25.9 (*)     RBC 3.53 (*)     Hemoglobin 9.9 (*)     Hematocrit 29.1 (*)     Segs Relative 89.7 (*)     Lymphocytes % 3.4 (*)     Monocytes % 5.3 (*)     Immature Neutrophil % 1.3 (*)     All other components within normal limits   COMPREHENSIVE METABOLIC PANEL - Abnormal; Notable for the following components:    Sodium 125 (*)     Chloride 93 (*)     Creatinine 0.8 (*)     Glucose 429 (*)     Calcium 7.6 (*)     Albumin 2.1 (*)     Alkaline Phosphatase 168 (*)     All other components within normal limits   PROTIME-INR - Abnormal; Notable for the following components:    Protime 15.4 (*)     All other components within normal limits   POCT GLUCOSE - Abnormal; Notable for the following components:    POC Glucose 399 (*)     All other components within normal limits   POCT GLUCOSE - Abnormal; Notable for the following components:    POC Glucose 387 (*)     All other components within normal limits   POCT GLUCOSE - Abnormal; Notable for the following components:    POC Glucose 385 (*)     All other components within normal limits   CULTURE, BLOOD 1   CULTURE, BLOOD 2   CULTURE, WOUND   LACTIC ACID   APTT   URINALYSIS WITH MICROSCOPIC   SURGICAL PATHOLOGY   HEMOGLOBIN A1C   COMPREHENSIVE METABOLIC PANEL W/ REFLEX TO MG FOR LOW K   CBC WITH AUTO DIFFERENTIAL   VANCOMYCIN LEVEL, RANDOM   POCT GLUCOSE   POCT GLUCOSE         IMAGING STUDIES ORDERED:  XR FOOT RIGHT (MIN 3 VIEWS)  PHARMACY TO DOSE VANCOMYCIN  IP CONSULT TO GENERAL SURGERY  IP CONSULT TO HOSPITALIST  IP CONSULT TO INFECTIOUS DISEASES  IP CONSULT TO DIETITIAN  PHARMACY TO DOSE VANCOMYCIN  TELEMETRY MONITORING  PULSE OXIMETRY  VITAL SIGNS  NOTIFY PHYSICIAN (SPECIFY)  NOTIFY PHYSICIAN (SPECIFY)  BEDREST  TURN PATIENT  HEAD OF BED 60 DEGREES OR LESS  NURSING COMMUNICATION  ELEVATE HEELS OFF OF BED  NURSING COMMUNICATION  NURSING COMMUNICATION  SALINE LOCK IV  ADMIT TO INPATIENT  FULL CODE  ADULT DIET  HYPOGLYCEMIA TREATMENT: BG LESS THAN 70 MG/DL AND PATIENT ALERT  HYPOGLYCEMIA TREATMENT: BG LESS THAN 70 MG/DL AND PATIENT NOT ALERT    I have personally viewed the imaging studies. The radiologist interpretation is:   XR FOOT RIGHT (MIN 3 VIEWS)   Final Result   Findings suspicious for necrotizing fasciitis. Findings suspicious for osteomyelitis on the lateral view possibly in the   region of the base of the 5th metatarsal which is partially amputated. Findings were discussed with Dr. Maryuri Munroe on 08/29/2022 at 9:50 a.m.                MEDICATIONS ADMINISTERED:  Medications   famotidine (PEPCID) tablet 20 mg (has no administration in time range)   nicotine (NICODERM CQ) 21 MG/24HR 1 patch (1 patch TransDERmal Patch Applied 8/29/22 1803)   aspirin chewable tablet 81 mg (has no administration in time range)   sodium chloride flush 0.9 % injection 5-40 mL (has no administration in time range)   sodium chloride flush 0.9 % injection 5-40 mL (has no administration in time range)   0.9 % sodium chloride infusion (has no administration in time range)   enoxaparin (LOVENOX) injection 40 mg (has no administration in time range)   ondansetron (ZOFRAN-ODT) disintegrating tablet 4 mg (has no administration in time range)     Or   ondansetron (ZOFRAN) injection 4 mg (has no administration in time range)   polyethylene glycol (GLYCOLAX) packet 17 g (has no administration in time range)   acetaminophen (TYLENOL) tablet 650 mg (has no administration in time range)     Or   acetaminophen (TYLENOL) suppository 650 mg (has no administration in time range)   glucose chewable tablet 16 g (has no administration in time range)   dextrose bolus 10% 125 mL (has no administration in time range)     Or   dextrose bolus 10% 250 mL (has no administration in time range)   glucagon (rDNA) injection 1 mg (has no administration in time range)   dextrose 10 % infusion (has no administration in time range)   insulin lispro (HUMALOG) injection vial 0-8 Units (8 Units SubCUTAneous Given 8/29/22 1612)   morphine sulfate (PF) injection 4 mg (4 mg IntraVENous Given 8/29/22 1803)   insulin glargine (LANTUS) injection vial 10 Units (has no administration in time range)   clindamycin (CLEOCIN) 900 mg in dextrose 5% 50 mL IVPB (has no administration in time range)   vancomycin (VANCOCIN) 1,000 mg in dextrose 5 % 250 mL IVPB ([id number]) (has no administration in time range)   piperacillin-tazobactam (ZOSYN) 3,375 mg in dextrose 5 % 50 mL IVPB (mini-bag) (has no administration in time range)   0.9 % sodium chloride infusion ( IntraVENous New Bag 8/29/22 1738)   0.9 % sodium chloride bolus (1,000 mLs IntraVENous New Bag 8/29/22 1135)   morphine sulfate (PF) injection 4 mg (4 mg IntraVENous Given 8/29/22 1123)   vancomycin (VANCOCIN) 1,250 mg in dextrose 5 % 250 mL IVPB ([id number]) (1,250 mg IntraVENous New Bag 8/29/22 1241)   piperacillin-tazobactam (ZOSYN) 3,375 mg in dextrose 5 % 50 mL IVPB (mini-bag) (0 mg IntraVENous Stopped 8/29/22 1214)   clindamycin (CLEOCIN) 900 mg in dextrose 5% 50 mL IVPB (900 mg IntraVENous New Bag 8/29/22 1238)   HYDROmorphone (DILAUDID) injection 0.25 mg (0.25 mg IntraVENous Given 8/29/22 1603)         PLAN/ED COURSE:  Last Vitals: BP (!) 127/58   Pulse 70   Temp 98.5 °F (36.9 °C) (Oral)   Resp 21   Ht 5' 8\" (1.727 m)   Wt 145 lb (65.8 kg)   SpO2 97%   BMI 22.05 kg/m²     50-year-old male with concern for necrotizing fasciitis and osteomyelitis of the right foot. Additional workup and treatment in the ED as documented above in the physician assistant's note. Case was discussed with surgery who is going to plan for amputation. Clinical Impression:  1. Gangrene of right foot ([address]    2. Infection of right foot        Disposition referral (if applicable):  No follow-up provider specified.     Disposition medications (if applicable):  Current Discharge Medication List          ED Provider Disposition Time  DISPOSITION Admitted 08/29/2022 12:13:32 PM            Electronically signed by Emma Farrell MD on 8/29/2022 at 6:15 PM        Emma Farrell MD  08/29/22 [id number]
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
## 2022-08-29 NOTE — PROGRESS NOTES
1605 Report hand off received from [facility].    1615 Patient dozing off and on. States his knee immobilizer is too tight and loosens the straps slightly. Encouraged and reminded patient to not remove. Verbalized understanding. 1625 Patient is more relaxed and resting with eyes closed. Declined ice chips. Report called to [facility]  [phone] Patient transported to room [id number] with all belongings.

## 2022-08-29 NOTE — ED NOTES
Below foot wound mottled, purple, cold, decreased sensation. Directly above wound red, warm, very tender.       Fatou Ahumada RN  08/29/22 3254

## 2022-08-29 NOTE — CONSULTS
BYPASS GRAFT  1/6/13    DENTAL SURGERY  2010    All upper teeth and some teeth on the bottom extracted.     FOOT DEBRIDEMENT Right 06/24/2022    RIGHT FOOT WOUND DEBRIDEMENT, WOUND VAC PLACEMENT with Dr. Deondre Chapman at [address] [place] [address] Right 6/24/2022    RIGHT FOOT WOUND DEBRIDEMENT, [address] performed by Ankur Galvez DO at [address]  15 yrs ago    right thumb    TOE AMPUTATION Right 3/31/2020    RIGHT 5TH TOE AMPUTATION AND WOUND VAC PLACEMENT performed by Norma Dooley MD at [address] Right 11/5/2021    RIGHT GREAT TOE AMPUTATION performed by Qi Cervantes MD at [facility] OR       Current Medications:   Current Facility-Administered Medications   Medication Dose Route Frequency Provider Last Rate Last Admin    [START ON 8/30/2022] famotidine (PEPCID) tablet 20 mg  20 mg Oral Daily Benita Leonor, PA-C        nicotine (NICODERM CQ) 21 MG/24HR 1 patch  1 patch TransDERmal Daily Benita Leonor, PA-C   1 patch at 08/29/22 1803    [START ON 8/30/2022] aspirin chewable tablet 81 mg  81 mg Oral Daily Benita Leonor, PA-C        sodium chloride flush 0.9 % injection 5-40 mL  5-40 mL IntraVENous 2 times per day Benita Leonor, PA-C        sodium chloride flush 0.9 % injection 5-40 mL  5-40 mL IntraVENous PRN Benita Leonor, PA-C        0.9 % sodium chloride infusion   IntraVENous PRN Benita Leonor, PA-C        [START ON 8/30/2022] enoxaparin (LOVENOX) injection 40 mg  40 mg SubCUTAneous Daily Benita Leonor, PA-C        ondansetron (ZOFRAN-ODT) disintegrating tablet 4 mg  4 mg Oral Q8H PRN Benita Leonor, PA-C        Or    ondansetron (ZOFRAN) injection 4 mg  4 mg IntraVENous Q6H PRN Benita Leonor, PA-C        polyethylene glycol (GLYCOLAX) packet 17 g  17 g Oral Daily PRN Benita Leonor, PA-C        acetaminophen (TYLENOL) tablet 650 mg  650 mg Oral Q6H PRN Benita Leonor, PA-C        Or    acetaminophen (TYLENOL) suppository 650 mg  650 mg Rectal Q6H PRN Benita Leonor, PA-C        glucose chewable tablet 16 g  4 tablet Oral PRN CHARANJIT Esteban-C        dextrose bolus 10% 125 mL  125 mL IntraVENous PRN CHARANJIT Esteban-C        Or    dextrose bolus 10% 250 mL  250 mL IntraVENous PRN Cleveland Rangel, PA-C        glucagon (rDNA) injection 1 mg  1 mg SubCUTAneous PRN Cleveland Rangel, PA-C        dextrose 10 % infusion   IntraVENous Continuous PRN Cleveland Rangel PA-C        insulin lispro (HUMALOG) injection vial 0-8 Units  0-8 Units SubCUTAneous Q4H Cleveland Rangel, PA-C   8 Units at 08/29/22 1612    morphine sulfate (PF) injection 4 mg  4 mg IntraVENous Q4H PRN Cleveland Rangel, PA-C   4 mg at 08/29/22 1803    insulin glargine (LANTUS) injection vial 10 Units  10 Units SubCUTAneous Nightly CHARANJIT Esteban-C        clindamycin (CLEOCIN) 900 mg in dextrose 5% 50 mL IVPB  900 mg IntraVENous Q8H CHARANJIT Esteban-C        [START ON 8/30/2022] vancomycin (VANCOCIN) 1,000 mg in dextrose 5 % 250 mL IVPB ([id number])  1,000 mg IntraVENous Q12H CHARANJIT Esteban-C        piperacillin-tazobactam (ZOSYN) 3,375 mg in dextrose 5 % 50 mL IVPB (mini-bag)  3,375 mg IntraVENous Q6H CHARANJIT Esteban-C        0.9 % sodium chloride infusion   IntraVENous Continuous CHARANJIT Esteban-C 125 mL/hr at 08/29/22 1738 New Bag at 08/29/22 1738       Allergies:  Patient has no known allergies.     Social History:   Social History     Socioeconomic History    Marital status: Single     Spouse name: None    Number of children: 6    Years of education: None    Highest education level: None   Tobacco Use    Smoking status: Some Days     Packs/day: 1.00     Years: 40.00     Pack years: 40.00     Types: Cigarettes    Smokeless tobacco: Current     Types: Chew    Tobacco comments:     Educated that smoking and DM slow wound healing, patient states understands that is why he has slowed down   Vaping Use    Vaping Use: Never used   Substance and Sexual Activity    Alcohol use: No     Comment: \"quit 19 yrs ago, use to drink, pretty heavy\"    CAFFEINE: 1-16 oz cup coffee daily. Drug use: Yes     Types: Marijuana Kelli Postin)     Comment: joanna russell       Family History:   Family History   Problem Relation Age of Onset    Cancer Mother         breast    Other Father         parkinsons disease, CVA,HTN, heart disease       REVIEW OFSYSTEMS:    Review of Systems   Constitutional:  Negative for chills, fatigue, fever and unexpected weight change. HENT:  Negative for sore throat and trouble swallowing. Respiratory:  Negative for cough, choking, shortness of breath and stridor. Cardiovascular:  Negative for chest pain, palpitations and leg swelling. Gastrointestinal:  Negative for abdominal distention, abdominal pain, blood in stool, nausea and vomiting. Musculoskeletal:  Negative for back pain, gait problem and joint swelling. Skin:  Positive for color change and wound. Negative for rash. Allergic/Immunologic: Negative for immunocompromised state. Neurological:  Negative for dizziness, speech difficulty, weakness and light-headedness. Hematological:  Negative for adenopathy. Does not bruise/bleed easily. Psychiatric/Behavioral:  Negative for agitation and confusion. The patient is not nervous/anxious. PHYSICAL EXAM:  Vitals:    08/29/22 1630 08/29/22 1635 08/29/22 1657 08/29/22 1729   BP: (!) 146/67  (!) 127/58    Pulse: 73 74 70    Resp: 17 24 21    Temp:   98.5 °F (36.9 °C)    TempSrc:   Oral    SpO2: 97%  97%    Weight:    145 lb (65.8 kg)   Height:    5' 8\" (1.727 m)       Physical Exam  General: No acute distress  Neck: No JVD, supple  Lungs: Chest rise equal bilaterally  Cardiac: Appears well perfused   Abdomen: Soft, nontender, nondistended, no rebound or guarding  Extremities: Right foot with chronic wound on plantar aspect down to bone with dry gangrenous changes. Wound from tarsals extending over metatarsals. He has gangrenous wound to dorsum of right foot. There is crepitus. There is erythema surrounding extending to just above ankle. There is crepitus up to just above right ankle medially.   Skin: No rashes or breakdown  Neurologic: cranial nerves II - XII grossly intact    DATA:    CBC:   Lab Results   Component Value Date/Time    WBC 25.9 08/29/2022 10:35 AM    RBC 3.53 08/29/2022 10:35 AM    HGB 9.9 08/29/2022 10:35 AM    HCT 29.1 08/29/2022 10:35 AM    MCV 82.4 08/29/2022 10:35 AM    MCH 28.0 08/29/2022 10:35 AM    MCHC 34.0 08/29/2022 10:35 AM    RDW 13.8 08/29/2022 10:35 AM     08/29/2022 10:35 AM    MPV 9.9 08/29/2022 10:35 AM       IMPRESSION:        Patient Active Problem List:     Diabetes mellitus     H/O echocardiogram     S/P CABG (coronary artery bypass graft)     Chest pain     Cellulitis     Heroin withdrawal (HCC)     CAD (coronary artery disease)     Polysubstance abuse (HCC)     Small bowel obstruction (HCC)     Narcotic abuse, continuous (HCC)     Hx of hepatitis     Epigastric pain     Diarrhea     Constipation with Ileus     Vomiting of fecal matter with nausea     Encephalopathy     Metabolic encephalopathy     Infectious gastroenteritis     Bilateral leg weakness     Gait disturbance     Left carotid stenosis     Hypothyroidism     Osteomyelitis (HCC)     Uncontrolled type II diabetes with peripheral autonomic neuropathy (HCC)     Gangrene of toe (HCC)     Acute hematogenous osteomyelitis of right foot (HCC)     Amputation of little toe (HCC)     PAD (peripheral artery disease) (HCC)     Uncontrolled pain     Generalized weakness     Simple chronic bronchitis (HCC)     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 ([address]     Skin ulcer with necrosis of muscle (HCC)     Toe ulcer ([address]     Diabetic foot ([address]     Diabetic foot infection ([address]     Abscess of right foot     WD-Diabetic ulcer of right midfoot associated with type 2 diabetes mellitus, with muscle involvement without evidence of necrosis ([address]     Necrotizing fasciitis ([address]    A/P:  Chronic right foot wound with dry gangrene  New wound to dorsum of right foot with necrotizing fasciitis with surrounding erythema and crepitus. At this point the foot is clearly not salvageable. He has necrotizing fasciitis over the remaining soft tissue of his foot extending to just above ankle. The calf at this point appears to be spared. We will proceed with BKA. Discussed risks and benefits with patient including, but not limited to risk of anesthesia including cardiopulmonary compromise, bleeding, infection, wound healing problems, need for additional procedure, including revisions. Patient also understands that if it appears infection is spread to tissues of calf we will have to do a guillotine amputation with revision to BKA or AKA later. Patient agrees to proceed with procedure.         Hugo Burkett, DO

## 2022-08-29 NOTE — PROGRESS NOTES
4 Eyes Skin Assessment     NAME:  Abhijit Agustin  YOB: 1960  MEDICAL RECORD NUMBER:  [id number]    The patient is being assess for  Admission    I agree that 2 RN's have performed a thorough Head to Toe Skin Assessment on the patient. ALL assessment sites listed below have been assessed. Areas assessed by both nurses:    Head, Face, Ears, Shoulders, Back, Chest, Arms, Elbows, Hands, Sacrum. Buttock, Coccyx, Ischium, and Legs. Feet and Heels        Does the Patient have a Wound? Yes wound(s) were present on assessment.  LDA wound assessment was Initiated and completed        Harjinder Prevention initiated:  Yes   Wound Care Orders initiated:  Yes    Pressure Injury (Stage 3,4, Unstageable, DTI, NWPT, and Complex wounds) if present place referral/consult order under [de-identified] No    New and Established Ostomies if present place consult order under : No      Nurse 1 eSignature: Electronically signed by Erik To RN on 8/29/22 at 6:22 PM EDT    **SHARE this note so that the co-signing nurse is able to place an eSignature**    Nurse 2 eSignature: Electronically signed by Eloise Dunham RN on 8/29/22 at 6:33 PM EDT
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
## 2022-08-29 NOTE — ANESTHESIA POSTPROCEDURE EVALUATION
Department of Anesthesiology  Postprocedure Note    Patient: Mannie Andino  MRN: [id number]  YOB: 1960  Date of evaluation: 8/29/2022      Procedure Summary     Date: 08/29/22 Room / Location: [address]    Anesthesia Start: 3844 Anesthesia Stop: 9107    Procedure: LEG AMPUTATION BELOW KNEE (Right: Leg Lower) Diagnosis:       Infection of right foot      (Infection of left foot [L08.9])    Surgeons: Raymundo Roblero DO Responsible Provider: Berta Carney MD    Anesthesia Type: general ASA Status: 4          Anesthesia Type: No value filed.     Carmen Phase I:      Carmen Phase II:        Anesthesia Post Evaluation    Patient location during evaluation: PACU  Patient participation: complete - patient participated  Level of consciousness: awake and alert  Pain score: 1  Airway patency: patent  Nausea & Vomiting: no nausea and no vomiting  Complications: no  Cardiovascular status: hemodynamically stable  Respiratory status: acceptable, spontaneous ventilation and face mask  Hydration status: stable

## 2022-08-29 NOTE — OP NOTE
Operative Note      Patient: Ira Prince  YOB: 1960  MRN: [id number]    Date of Procedure: 8/29/2022    Pre-Op Diagnosis: Necrotizing fasciitis of right foot [L08.9]    Post-Op Diagnosis: Same       Procedure(s):  LEG AMPUTATION BELOW KNEE    Surgeon(s):  Aries Dixon DO    Assistant:   * No surgical staff found *    Anesthesia: General    Estimated Blood Loss (mL): 882DL    Complications: None    Specimens:   ID Type Source Tests Collected by Time Destination   A : right lower leg Specimen Leg SURGICAL PATHOLOGY Aries Dixon DO 8/29/2022 1422        Implants:  * No implants in log *      Drains:   [REMOVED] Negative Pressure Wound Therapy Foot Anterior;Right (Removed)   Unit Type KCI 08/01/22 1110   Dressing Type Black Foam 08/01/22 1110   Number of pieces used 2 08/01/22 1110   Cycle Continuous 08/01/22 1110   Target Pressure (mmHg) 125 08/01/22 1110   Canister changed? Yes 08/01/22 1110   Dressing Status Clean, dry & intact 08/01/22 1110   Dressing Changed Changed/New 08/01/22 1110   Drainage Amount Moderate 08/01/22 1110   Drainage Description Serosanguinous 08/01/22 1110   Dressing Change Due 08/04/22 08/01/22 1110       Findings: Healthy tissues at level of BKA    Detailed Description of Procedure: Indication: Necrotizing fasciitis of foot. Procedure:   Patient was taken to the OR and laid in supine position. After induction of general, endotracheal anesthesia patient was prepped and draped in usual sterile fashion. An occlusive dressing was applied to the foot up to the level of the ankle. Anterior and posterior skin incisions were outlined with a marking pen. The anterior skin was incised about 12 cm below the tibial tuberosity and extended medially and laterally toward the edge of the gastrocnemius muscle. The skin incision was then extended distally on either side parallel to the tibia for 12-15 cm, creating a posterior flap.  The skin and subcutaneous tissues were incised down to the fascia. The greater and short saphenous veins on the medial and posterior aspects of the leg, respectively, were ligated and divided. The fascia and the muscles were then divided with the electrocautery at the same level of the anterior skin incision. The muscles and the anterior and lateral compartment were divided, exposing the anterior tibial vessels, which were ligated and divided. The intraosseous membrane was then incised. The tibial periosteum incised circumferentially with the cautery at the same level of the skin and muscle division. Using a periosteal elevator, the tibial periosteum was stripped proximally 2 cm. The tibia was then transected with an electric saw. The fibula was then exposed, dissected circumferentially, and transected with an electric saw. The amputation was then completed with an amputation knife, transecting the soleus muscle obliquely and the gastrocnemius muscle at the same level as the posterior flap. Bleeding soleus veins and posterior tibial and peroneal vessels were clamped and ligated with 2-0 silk sutures. Sharp bony edges were then filed, eliminating any bony prominences over the anterior aspect of the tibia. The fascia of the anterior and posterior muscle flaps were approximated with interrupted absorbable sutures. The skin was approximated with staples. 4 x 4 dressings with Kerlix and Ace bandage were applied to complete the dressings. Instrument and lap counts were correct. Knee immobilizer was placed. The patient tolerated the procedure well and was transferred to recovery room in stable condition.       Electronically signed by Martine Rico DO on 8/29/2022 at 7:01 PM

## 2022-08-29 NOTE — ED NOTES
Dr. Jason Ruiz at bedside to discuss plan of care with patient. Surgery today per Dr. Jason Ruiz.       Eamon Saab, RN  08/29/22 2002

## 2022-08-29 NOTE — ED NOTES
Medication History  [facility]    Patient Name: José Rossi 1960     Medication history has been completed by: Kayla Park Nori    Source(s) of information: patient [place] and insurance claimw     Primary Care Physician: Heather Morel MD     Pharmacy:     Allergies as of 08/29/2022    (No Known Allergies)        Prior to Admission medications    Medication Sig Start Date End Date Taking? Authorizing Provider   famotidine (PEPCID) 20 MG tablet Take 20 mg by mouth daily 8/28/22   Historical Provider, MD   insulin glargine (LANTUS SOLOSTAR) 100 UNIT/ML injection pen 15 units twice a day 11/7/21   Eric Ralph MD   insulin aspart (NOVOLOG FLEXPEN) 100 UNIT/ML injection pen 10 units before each meal 11/7/21   Eric Ralph MD   Insulin Pen Needle (PEN NEEDLES 3/16\") 31G X 5 MM MISC 1 each by Does not apply route daily 11/7/21   Eric Ralph MD   Gauze Pads & Dressings 2\"X2\" PADS 1 each by Does not apply route daily as needed (for wound care) 6/10/20   Tanisha Camargo MD   Gauze Pads & Dressings (KERLIX GAUZE ROLL MEDIUM) MISC 1 each by Does not apply route daily as needed (wound care) 6/10/20   Tanisha Camargo MD   ondansetron (ZOFRAN-ODT) 4 MG disintegrating tablet Take 1 tablet by mouth 3 times daily as needed for Nausea or Vomiting 4/23/20 8/29/22  Shari Stern MD   atorvastatin (LIPITOR) 40 MG tablet Take 1 tablet by mouth nightly 4/9/20   C Devi Neely MD   clopidogrel (PLAVIX) 75 MG tablet Take 1 tablet by mouth daily 4/10/20   C Devi Neely MD   nicotine (NICODERM CQ) 21 MG/24HR Place 1 patch onto the skin daily 4/10/20   C Devi Neely MD   levothyroxine (SYNTHROID) 100 MCG tablet Take 1 tablet by mouth Daily 9/9/18   Collette Bellini, MD   Blood Glucose Monitoring Suppl (FREESTYLE LITE) MARGARITA Apply 1 Device topically three times daily.  7/8/14   Tamra Mendiola MD   Lancets (STERILANCE TL) MISC USE AS DIRECTED TO TEST BLOOD SUGAR THREE TIMES DAILY BEFORE MEALS 6/3/14   Luis Benoit MD   glucose blood VI test strips (FREESTYLE LITE) strip Test 3 times daily as needed. 5/28/14   Luis Benoit MD     Medications added or changed (ex. new medication, dosage change, interval change, formulation change): Famotidine (added)    Medications removed from list (include reason, ex. noncompliance, medication cost, therapy complete etc.):   Gabapentin last fill date 11/24/2021 per [place]  Atorvastatin last fill date 05/27/2020 flagged for review  Clopidogrel last fill date 05/27/2020 flagged for review  Levothyroxine last fill date 05/27/2020 flagged for review    Comments:  Patient states he uses insulin at home can not inform interviewer of dosage, states he has no idea. Last known fill date for Novolog 05/27/2020 and for Lantus 11/09/2021. Patient states he takes gabapentin when he is in the hospital but does not take it at home. Patient states he does not take medications at home. Only active script is for famotidine, unsure if patient taking. Do not feel I can assess patient appropriately. As patient is non compliant with medications.     To my knowledge the above medication history is accurate as of 8/29/2022 9:40 AM.   Chris Balderrama CPhT   8/29/2022 9:40 AM

## 2022-08-29 NOTE — CONSULTS
[facility]  consulted by SHAMIKA Knott for monitoring and adjustment. Indication for treatment: Bone and Joint infection  Goal trough: [] 10-15 mcg/mL or [x] 15-20 mcg/ml  AUC/SUHA: [] <500 or [x] 400-600    Pertinent Laboratory Values:   Temp Readings from Last 3 Encounters:   08/29/22 98.5 °F (36.9 °C) (Oral)   08/01/22 97.6 °F (36.4 °C) (Temporal)   07/28/22 97.8 °F (36.6 °C)     Recent Labs     08/29/22  1035   WBC 25.9*   LACTATE 2.0     Recent Labs     08/29/22  1035   BUN 22   CREATININE 0.8*     Estimated Creatinine Clearance: 88 mL/min (A) (based on SCr of 0.8 mg/dL (L)). Intake/Output Summary (Last 24 hours) at 8/29/2022 1733  Last data filed at 8/29/2022 1628  Gross per 24 hour   Intake 1200 ml   Output 20 ml   Net 1180 ml       Vancomycin level:   TROUGH:  No results for input(s): VANCOTROUGH in the last 72 hours. RANDOM:  No results for input(s): VANCORANDOM in the last 72 hours. Assessment:  SCr, BUN, and urine output: SCR close to baseline, limited UOP data  Day(s) of therapy: 1 (duration to be determined)  Vancomycin concentration:   8/30 - random @06:00    Plan:  The patient received vancomycin 1250mg ivpb x1 dose earlier today. Start vancomycin 1000mg ivpb q12h for a predicted AUC of 561 at steady state  Check the vanco level tomorrow am.  Pharmacy will continue to monitor patient and adjust therapy as indicated    Sonal 3 8/30 @06:00    Thank you for the consult. Jhon Ferris, [facility]  8/29/2022 5:33 PM

## 2022-08-29 NOTE — CONSULTS
[address] [place] [address]    Alisha Gamboa is a 60 y.o. male started on vancomycin for surgical site infection, SSTI. Pharmacy consulted by ED provider CHIQUITA Andrew to order a dose of vancomycin in the emergency department. Other antimicrobials: cefepime    Ht Readings from Last 1 Encounters:   06/30/22 5' 7.99\" (1.727 m)     Wt Readings from Last 3 Encounters:   08/29/22 145 lb (65.8 kg)   06/30/22 149 lb 4 oz (67.7 kg)   11/24/21 131 lb 1.6 oz (59.5 kg)        Pertinent Laboratory Values:   Temp Readings from Last 3 Encounters:   08/29/22 98.8 °F (37.1 °C) (Oral)   08/01/22 97.6 °F (36.4 °C) (Temporal)   07/28/22 97.8 °F (36.6 °C)     No results for input(s): WBC, LACTATE in the last 72 hours. No results for input(s): BUN, CREATININE in the last 72 hours. CrCl cannot be calculated (Patient's most recent lab result is older than the maximum 30 days allowed. ). No intake or output data in the 24 hours ending 08/29/22 6210    Assessment/Plan:  Pharmacy will order vancomycin 1250 mg (20 mg/kg). Please note, pharmacy will order a one-time dose of vancomycin for the Emergency Department. The consult will need to be re-ordered if vancomycin is to continue upon admission. Thank you for the consult.   Rimma Limon, [facility], [address]  8/29/2022 9:04 AM Medication/Dose: oxycodone  Patient last seen by PCP: 12/3/19  Next office visit with PCP: none      Above information requires contacting patient: No    PDMP needs to be reviewed by Provider    Sent to provider to approve or deny

## 2022-08-29 NOTE — Clinical Note
Discharge Plan[de-identified] Other/[facility])   Telemetry/Cardiac Monitoring Required?: Yes

## 2022-08-30 LAB
ALBUMIN SERPL-MCNC: 1.6 GM/DL (ref 3.4–5)
ALP BLD-CCNC: 121 IU/L (ref 40–129)
ALT SERPL-CCNC: 8 U/L (ref 10–40)
ANION GAP SERPL CALCULATED.3IONS-SCNC: 8 MMOL/L (ref 4–16)
AST SERPL-CCNC: 16 IU/L (ref 15–37)
BASOPHILS ABSOLUTE: 0.1 K/CU MM
BASOPHILS RELATIVE PERCENT: 0.4 % (ref 0–1)
BILIRUB SERPL-MCNC: 0.3 MG/DL (ref 0–1)
BUN BLDV-MCNC: 18 MG/DL (ref 6–23)
CALCIUM IONIZED: 4.2 MG/DL (ref 4.48–5.28)
CALCIUM SERPL-MCNC: 6.9 MG/DL (ref 8.3–10.6)
CHLORIDE BLD-SCNC: 102 MMOL/L (ref 99–110)
CO2: 22 MMOL/L (ref 21–32)
CREAT SERPL-MCNC: 0.8 MG/DL (ref 0.9–1.3)
DIFFERENTIAL TYPE: ABNORMAL
DOSE AMOUNT: NORMAL
DOSE TIME: NORMAL
EOSINOPHILS ABSOLUTE: 0.2 K/CU MM
EOSINOPHILS RELATIVE PERCENT: 1 % (ref 0–3)
ESTIMATED AVERAGE GLUCOSE: 200 MG/DL
GFR AFRICAN AMERICAN: >60 ML/MIN/1.73M2
GFR NON-AFRICAN AMERICAN: >60 ML/MIN/1.73M2
GLUCOSE BLD-MCNC: 157 MG/DL (ref 70–99)
GLUCOSE BLD-MCNC: 184 MG/DL (ref 70–99)
GLUCOSE BLD-MCNC: 186 MG/DL (ref 70–99)
GLUCOSE BLD-MCNC: 191 MG/DL (ref 70–99)
GLUCOSE BLD-MCNC: 193 MG/DL (ref 70–99)
GLUCOSE BLD-MCNC: 201 MG/DL (ref 70–99)
GLUCOSE BLD-MCNC: 316 MG/DL (ref 70–99)
HBA1C MFR BLD: 8.6 % (ref 4.2–6.3)
HCT VFR BLD CALC: 25.8 % (ref 42–52)
HEMOGLOBIN: 8.6 GM/DL (ref 13.5–18)
IMMATURE NEUTROPHIL %: 1.5 % (ref 0–0.43)
IONIZED CA: 1.05 MMOL/L (ref 1.12–1.32)
LYMPHOCYTES ABSOLUTE: 1.3 K/CU MM
LYMPHOCYTES RELATIVE PERCENT: 7.9 % (ref 24–44)
MAGNESIUM: 1.8 MG/DL (ref 1.8–2.4)
MCH RBC QN AUTO: 28.3 PG (ref 27–31)
MCHC RBC AUTO-ENTMCNC: 33.3 % (ref 32–36)
MCV RBC AUTO: 84.9 FL (ref 78–100)
MONOCYTES ABSOLUTE: 1.6 K/CU MM
MONOCYTES RELATIVE PERCENT: 9.8 % (ref 0–4)
NUCLEATED RBC %: 0 %
PDW BLD-RTO: 13.9 % (ref 11.7–14.9)
PLATELET # BLD: 173 K/CU MM (ref 140–440)
PMV BLD AUTO: 10 FL (ref 7.5–11.1)
POTASSIUM SERPL-SCNC: 3.3 MMOL/L (ref 3.5–5.1)
RBC # BLD: 3.04 M/CU MM (ref 4.6–6.2)
SEGMENTED NEUTROPHILS ABSOLUTE COUNT: 12.8 K/CU MM
SEGMENTED NEUTROPHILS RELATIVE PERCENT: 79.4 % (ref 36–66)
SODIUM BLD-SCNC: 132 MMOL/L (ref 135–145)
TOTAL IMMATURE NEUTOROPHIL: 0.24 K/CU MM
TOTAL NUCLEATED RBC: 0 K/CU MM
TOTAL PROTEIN: 5.7 GM/DL (ref 6.4–8.2)
VANCOMYCIN RANDOM: 20.9 UG/ML
WBC # BLD: 16.1 K/CU MM (ref 4–10.5)

## 2022-08-30 PROCEDURE — 2500000003 HC RX 250 WO HCPCS: Performed by: PHYSICIAN ASSISTANT

## 2022-08-30 PROCEDURE — 83735 ASSAY OF MAGNESIUM: CPT

## 2022-08-30 PROCEDURE — 6360000002 HC RX W HCPCS: Performed by: PHYSICIAN ASSISTANT

## 2022-08-30 PROCEDURE — 36415 COLL VENOUS BLD VENIPUNCTURE: CPT

## 2022-08-30 PROCEDURE — 85025 COMPLETE CBC W/AUTO DIFF WBC: CPT

## 2022-08-30 PROCEDURE — 83036 HEMOGLOBIN GLYCOSYLATED A1C: CPT

## 2022-08-30 PROCEDURE — 2580000003 HC RX 258: Performed by: PHYSICIAN ASSISTANT

## 2022-08-30 PROCEDURE — 99223 1ST HOSP IP/OBS HIGH 75: CPT | Performed by: INTERNAL MEDICINE

## 2022-08-30 PROCEDURE — 80053 COMPREHEN METABOLIC PANEL: CPT

## 2022-08-30 PROCEDURE — 82330 ASSAY OF CALCIUM: CPT

## 2022-08-30 PROCEDURE — 94761 N-INVAS EAR/PLS OXIMETRY MLT: CPT

## 2022-08-30 PROCEDURE — 6360000002 HC RX W HCPCS: Performed by: SURGERY

## 2022-08-30 PROCEDURE — 80202 ASSAY OF VANCOMYCIN: CPT

## 2022-08-30 PROCEDURE — 6370000000 HC RX 637 (ALT 250 FOR IP): Performed by: SURGERY

## 2022-08-30 PROCEDURE — 6370000000 HC RX 637 (ALT 250 FOR IP): Performed by: PHYSICIAN ASSISTANT

## 2022-08-30 PROCEDURE — 82962 GLUCOSE BLOOD TEST: CPT

## 2022-08-30 PROCEDURE — 1200000000 HC SEMI PRIVATE

## 2022-08-30 PROCEDURE — 99024 POSTOP FOLLOW-UP VISIT: CPT | Performed by: SURGERY

## 2022-08-30 PROCEDURE — 87040 BLOOD CULTURE FOR BACTERIA: CPT

## 2022-08-30 PROCEDURE — 6370000000 HC RX 637 (ALT 250 FOR IP): Performed by: INTERNAL MEDICINE

## 2022-08-30 RX ORDER — OXYCODONE HYDROCHLORIDE 5 MG/1
5 TABLET ORAL
Status: DISCONTINUED | OUTPATIENT
Start: 2022-08-30 | End: 2022-09-02

## 2022-08-30 RX ORDER — INSULIN LISPRO 100 [IU]/ML
0.08 INJECTION, SOLUTION INTRAVENOUS; SUBCUTANEOUS
Status: DISCONTINUED | OUTPATIENT
Start: 2022-08-31 | End: 2022-09-01

## 2022-08-30 RX ORDER — METHOCARBAMOL 500 MG/1
500 TABLET, FILM COATED ORAL 4 TIMES DAILY PRN
Status: DISCONTINUED | OUTPATIENT
Start: 2022-08-30 | End: 2022-09-06 | Stop reason: HOSPADM

## 2022-08-30 RX ORDER — CALCIUM CARBONATE 200(500)MG
500 TABLET,CHEWABLE ORAL 2 TIMES DAILY
Status: COMPLETED | OUTPATIENT
Start: 2022-08-30 | End: 2022-09-01

## 2022-08-30 RX ORDER — DEXTROSE MONOHYDRATE 100 MG/ML
INJECTION, SOLUTION INTRAVENOUS CONTINUOUS PRN
Status: DISCONTINUED | OUTPATIENT
Start: 2022-08-30 | End: 2022-09-06 | Stop reason: HOSPADM

## 2022-08-30 RX ORDER — HYDROCODONE BITARTRATE AND ACETAMINOPHEN 5; 325 MG/1; MG/1
1 TABLET ORAL EVERY 4 HOURS PRN
Status: DISCONTINUED | OUTPATIENT
Start: 2022-08-30 | End: 2022-08-30

## 2022-08-30 RX ADMIN — ASPIRIN 81 MG CHEWABLE TABLET 81 MG: 81 TABLET CHEWABLE at 10:09

## 2022-08-30 RX ADMIN — FAMOTIDINE 20 MG: 20 TABLET ORAL at 20:26

## 2022-08-30 RX ADMIN — ACETAMINOPHEN 650 MG: 325 TABLET ORAL at 23:16

## 2022-08-30 RX ADMIN — HYDROMORPHONE HYDROCHLORIDE 0.5 MG: 1 INJECTION, SOLUTION INTRAMUSCULAR; INTRAVENOUS; SUBCUTANEOUS at 22:32

## 2022-08-30 RX ADMIN — HYDROMORPHONE HYDROCHLORIDE 0.5 MG: 1 INJECTION, SOLUTION INTRAMUSCULAR; INTRAVENOUS; SUBCUTANEOUS at 14:47

## 2022-08-30 RX ADMIN — INSULIN LISPRO 6 UNITS: 100 INJECTION, SOLUTION INTRAVENOUS; SUBCUTANEOUS at 16:34

## 2022-08-30 RX ADMIN — HYDROMORPHONE HYDROCHLORIDE 0.5 MG: 1 INJECTION, SOLUTION INTRAMUSCULAR; INTRAVENOUS; SUBCUTANEOUS at 12:17

## 2022-08-30 RX ADMIN — PIPERACILLIN AND TAZOBACTAM 3375 MG: 3; .375 INJECTION, POWDER, FOR SOLUTION INTRAVENOUS at 14:54

## 2022-08-30 RX ADMIN — HYDROMORPHONE HYDROCHLORIDE 0.5 MG: 1 INJECTION, SOLUTION INTRAMUSCULAR; INTRAVENOUS; SUBCUTANEOUS at 20:27

## 2022-08-30 RX ADMIN — ENOXAPARIN SODIUM 40 MG: 100 INJECTION SUBCUTANEOUS at 10:09

## 2022-08-30 RX ADMIN — VANCOMYCIN HYDROCHLORIDE 1000 MG: 1 INJECTION, POWDER, LYOPHILIZED, FOR SOLUTION INTRAVENOUS at 00:43

## 2022-08-30 RX ADMIN — SODIUM CHLORIDE, PRESERVATIVE FREE 10 ML: 5 INJECTION INTRAVENOUS at 10:10

## 2022-08-30 RX ADMIN — PIPERACILLIN AND TAZOBACTAM 3375 MG: 3; .375 INJECTION, POWDER, FOR SOLUTION INTRAVENOUS at 03:05

## 2022-08-30 RX ADMIN — HYDROMORPHONE HYDROCHLORIDE 0.5 MG: 1 INJECTION, SOLUTION INTRAMUSCULAR; INTRAVENOUS; SUBCUTANEOUS at 16:42

## 2022-08-30 RX ADMIN — INSULIN LISPRO 2 UNITS: 100 INJECTION, SOLUTION INTRAVENOUS; SUBCUTANEOUS at 20:27

## 2022-08-30 RX ADMIN — METHOCARBAMOL 500 MG: 500 TABLET ORAL at 23:13

## 2022-08-30 RX ADMIN — CALCIUM CARBONATE 500 MG: 500 TABLET, CHEWABLE ORAL at 20:26

## 2022-08-30 RX ADMIN — MORPHINE SULFATE 4 MG: 4 INJECTION, SOLUTION INTRAMUSCULAR; INTRAVENOUS at 01:45

## 2022-08-30 RX ADMIN — SODIUM CHLORIDE: 9 INJECTION, SOLUTION INTRAVENOUS at 00:40

## 2022-08-30 RX ADMIN — VANCOMYCIN HYDROCHLORIDE 750 MG: 750 INJECTION, POWDER, LYOPHILIZED, FOR SOLUTION INTRAVENOUS at 12:24

## 2022-08-30 RX ADMIN — Medication 900 MG: at 04:48

## 2022-08-30 RX ADMIN — PIPERACILLIN AND TAZOBACTAM 3375 MG: 3; .375 INJECTION, POWDER, FOR SOLUTION INTRAVENOUS at 10:08

## 2022-08-30 RX ADMIN — INSULIN GLARGINE 10 UNITS: 100 INJECTION, SOLUTION SUBCUTANEOUS at 20:27

## 2022-08-30 RX ADMIN — AMPICILLIN SODIUM 2000 MG: 2 INJECTION, POWDER, FOR SOLUTION INTRAVENOUS at 19:05

## 2022-08-30 RX ADMIN — Medication 900 MG: at 11:44

## 2022-08-30 RX ADMIN — OXYCODONE HYDROCHLORIDE 5 MG: 5 TABLET ORAL at 17:50

## 2022-08-30 RX ADMIN — MORPHINE SULFATE 4 MG: 4 INJECTION, SOLUTION INTRAMUSCULAR; INTRAVENOUS at 06:03

## 2022-08-30 RX ADMIN — HYDROMORPHONE HYDROCHLORIDE 0.5 MG: 1 INJECTION, SOLUTION INTRAMUSCULAR; INTRAVENOUS; SUBCUTANEOUS at 10:02

## 2022-08-30 RX ADMIN — OXYCODONE HYDROCHLORIDE 5 MG: 5 TABLET ORAL at 21:18

## 2022-08-30 ASSESSMENT — PAIN DESCRIPTION - DESCRIPTORS
DESCRIPTORS: ACHING
DESCRIPTORS: ACHING
DESCRIPTORS: ACHING;BURNING
DESCRIPTORS: ACHING
DESCRIPTORS: BURNING
DESCRIPTORS: ACHING
DESCRIPTORS: ACHING

## 2022-08-30 ASSESSMENT — PAIN DESCRIPTION - FREQUENCY
FREQUENCY: CONTINUOUS

## 2022-08-30 ASSESSMENT — PAIN DESCRIPTION - ORIENTATION
ORIENTATION: RIGHT

## 2022-08-30 ASSESSMENT — PAIN SCALES - GENERAL
PAINLEVEL_OUTOF10: 3
PAINLEVEL_OUTOF10: 8
PAINLEVEL_OUTOF10: 6
PAINLEVEL_OUTOF10: 10
PAINLEVEL_OUTOF10: 10
PAINLEVEL_OUTOF10: 8
PAINLEVEL_OUTOF10: 10
PAINLEVEL_OUTOF10: 10
PAINLEVEL_OUTOF10: 8
PAINLEVEL_OUTOF10: 8
PAINLEVEL_OUTOF10: 9
PAINLEVEL_OUTOF10: 8
PAINLEVEL_OUTOF10: 8
PAINLEVEL_OUTOF10: 10
PAINLEVEL_OUTOF10: 0
PAINLEVEL_OUTOF10: 2

## 2022-08-30 ASSESSMENT — PAIN DESCRIPTION - ONSET
ONSET: ON-GOING

## 2022-08-30 ASSESSMENT — PAIN - FUNCTIONAL ASSESSMENT
PAIN_FUNCTIONAL_ASSESSMENT: PREVENTS OR INTERFERES SOME ACTIVE ACTIVITIES AND ADLS
PAIN_FUNCTIONAL_ASSESSMENT: PREVENTS OR INTERFERES SOME ACTIVE ACTIVITIES AND ADLS
PAIN_FUNCTIONAL_ASSESSMENT: PREVENTS OR INTERFERES WITH MANY ACTIVE NOT PASSIVE ACTIVITIES
PAIN_FUNCTIONAL_ASSESSMENT: PREVENTS OR INTERFERES SOME ACTIVE ACTIVITIES AND ADLS

## 2022-08-30 ASSESSMENT — PAIN DESCRIPTION - LOCATION
LOCATION: LEG

## 2022-08-30 ASSESSMENT — PAIN SCALES - WONG BAKER
WONGBAKER_NUMERICALRESPONSE: 0

## 2022-08-30 ASSESSMENT — PAIN DESCRIPTION - PAIN TYPE
TYPE: SURGICAL PAIN

## 2022-08-30 NOTE — PROGRESS NOTES
[id number] [facility]  consulted by SHAMIKA Martinez for monitoring and adjustment. Indication for treatment: Bone and Joint infection  Goal trough: [] 10-15 mcg/mL or [x] 15-20 mcg/ml  AUC/SUHA: [] <500 or [x] 400-600    Pertinent Laboratory Values:   Temp Readings from Last 3 Encounters:   08/30/22 98 °F (36.7 °C) (Oral)   08/01/22 97.6 °F (36.4 °C) (Temporal)   07/28/22 97.8 °F (36.6 °C)     Recent Labs     08/29/22  1035 08/30/22  0624   WBC 25.9* 16.1*   LACTATE 2.0  --      Recent Labs     08/29/22  1035 08/30/22 0624   BUN 22 18   CREATININE 0.8* 0.8*     Estimated Creatinine Clearance: 88 mL/min (A) (based on SCr of 0.8 mg/dL (L)). Intake/Output Summary (Last 24 hours) at 8/30/2022 0833  Last data filed at 8/29/2022 1628  Gross per 24 hour   Intake 1200 ml   Output 20 ml   Net 1180 ml       Vancomycin level:   TROUGH:  No results for input(s): VANCOTROUGH in the last 72 hours. RANDOM:    Recent Labs     08/30/22 0624   VANCORANDOM 20.9       Assessment:  SCr, BUN, and urine output: SCR close to baseline, limited UOP data  Day(s) of therapy: 2 (duration to be determined)  Vancomycin concentration:   8/30 - 20.9 ()    Plan:  Reduce vancomycin from 1000mg IVPB every 12 hours to 750mg IVPB every 12 hours  Predicted   Pharmacy will continue to monitor patient and adjust therapy as indicated    Sonal 3 9/01 @06:00    Thank you for the consult.   Selene Navarro [facility]  8/30/2022 8:33 AM
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
## 2022-08-30 NOTE — PROGRESS NOTES
Comprehensive Nutrition Assessment    Type and Reason for Visit:  Initial, Consult, Patient Education    Nutrition Recommendations/Plan:   Continue carb controlled diet with cardiac diet at this time  Will offer high protein oral nutrition supplement during stay  Encourage consistent intake   Will continue to follow up during stay, monitor intake and diet ed needs      Malnutrition Assessment:  Malnutrition Status: At risk for malnutrition (Comment) (08/30/22 1149)    Context:  Chronic Illness       Nutrition Assessment:    Admit with wound on foot, infection with nectrotizing fasciitis now s/p left BKA. Currently on carb controlled diet with cardiac diet. Reports intake and appetite improving. Requested oral nutrition supplement with meals. Brief discussion regarding carb controlled diet, understands concept of meal plan. Not feeling well on visit today, sad about current situation and painful. Will follow at moderate nutrition risk at this time. Nutrition Related Findings:    sitting up bed slowly eating late breakfast, hx DM HbA1c 7.8%, CAD with CABG, COPD, PAD Wound Type: Surgical Incision, Diabetic Ulcer       Current Nutrition Intake & Therapies:    Average Meal Intake: Unable to assess  Average Supplements Intake: None Ordered  ADULT DIET; Regular; 4 carb choices (60 gm/meal); Low Fat/Low Chol/High Fiber/MELISSA    Anthropometric Measures:  Height: 5' 8\" (172.7 cm)  Ideal Body Weight (IBW): 154 lbs (70 kg)       Current Body Weight: 145 lb 1 oz (65.8 kg), 94.2 % IBW.  Weight Source: Stated  Current BMI (kg/m2): 22.1  Usual Body Weight: 150 lb (68 kg) (per hx and patient stated)  % Weight Change (Calculated): -3.3  Weight Adjustment For: Amputation  Total Adjusted Percentage (Calculated): 5.9  Adjusted Ideal Body Weight (lbs) (Calculated): 144.9 lbs  Adjusted Ideal Body Weight (kg) (Calculated): 65.86 kg  Adjusted % Ideal Body Weight (Calculated): 100.1  Adjusted BMI (kg/m2) (Calculated): 23.4  BMI Categories: Normal Weight (BMI 18.5-24. 9)    Estimated Daily Nutrient Needs:  Energy Requirements Based On: Kcal/kg  Weight Used for Energy Requirements: Current  Energy (kcal/day): 5808-3170 (30-35 pramod/kg)  Weight Used for Protein Requirements: Current  Protein (g/day): 78-92 (1.2-1.4 g/kg)  Method Used for Fluid Requirements: 1 ml/kcal  Fluid (ml/day): 2000    Nutrition Diagnosis:   Predicted inadequate energy intake related to increase demand for energy/nutrients as evidenced by wounds    Nutrition Interventions:   Food and/or Nutrient Delivery: Continue Current Diet, Start Oral Nutrition Supplement  Nutrition Education/Counseling: Education initiated  Coordination of Nutrition Care: Continue to monitor while inpatient  Plan of Care discussed with: patient    Goals:     Goals: PO intake 75% or greater, by next RD assessment       Nutrition Monitoring and Evaluation:   Behavioral-Environmental Outcomes: None Identified  Food/Nutrient Intake Outcomes: Food and Nutrient Intake, Supplement Intake  Physical Signs/Symptoms Outcomes: Biochemical Data, Meal Time Behavior, Skin, Weight    Discharge Planning:    Continue current diet, Recommend pursue outpatient diabetes education     Rosio Romero RD, LD  Contact: [phone]

## 2022-08-30 NOTE — PROGRESS NOTES
Constipation with Ileus     Vomiting of fecal matter with nausea     Encephalopathy     Metabolic encephalopathy     Infectious gastroenteritis     Bilateral leg weakness     Gait disturbance     Left carotid stenosis     Hypothyroidism     Osteomyelitis (HCC)     Uncontrolled type II diabetes with peripheral autonomic neuropathy (HCC)     Gangrene of toe (HCC)     Acute hematogenous osteomyelitis of right foot (HCC)     Amputation of little toe (HCC)     PAD (peripheral artery disease) (HCC)     Uncontrolled pain     Generalized weakness     Simple chronic bronchitis (HCC)     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 (HCC)     Skin ulcer with necrosis of muscle (HCC)     Toe ulcer ([address]     Diabetic foot ([address]     Diabetic foot infection ([address]     Abscess of right foot     WD-Diabetic ulcer of right midfoot associated with type 2 diabetes mellitus, with muscle involvement without evidence of necrosis ([address]     Necrotizing fasciitis ([address]      Principal Problem:    Necrotizing fasciitis ([address]  Plan:     Increased pain regimen. Continue abx. Continue medical mgmt  RLE in immobilizer.        Kranthi Agosto, DO

## 2022-08-30 NOTE — CARE COORDINATION
CM reviewed notes. CM discussed in IDR. Will need PT/OT orders when appropriate, Dr Elvira Marmolejo aware. LH    CM into see pt to initiate a safe discharge plan. Cm introduced self and explained role of CM. Pt is kind, alert and oriented. Pt lives pt is from home with his girlfriend. Pt lives in one floor mobile home with four steps to enter. Pt DME includes a cane, shower seat. Pt has a PCP and insurance to obtain his medications. Pt shared that he can not go home. His girlfriend is not well and he would not have any help. CM informed that CM team will wait for the recommendations for PT/OT. Pt is agreeable if needed for SNF. He does not want to go to [facility] or to [facility]. He has no other preferences for SNF placement if needed. CM provided card and encouraged to call for any needs or concern. CM is available if any needs arise.

## 2022-08-30 NOTE — PROGRESS NOTES
V2.0  [facility] Hospitalist Progress Note      Name:  Amadeo Hernandez /Age/Sex: 1960  (60 y.o. male)   MRN & CSN:  [id number] & [id number] Encounter Date/Time: 2022 3:41 PM EDT    Location:  [address] [place] [address] PCP: Krystal Aguirre MD       Hospital Day: 2    Assessment and Plan:   Amadeo Hernandez is a 60 y.o. male with PMH of type 2 diabetes, COPD, CAD, hypertension, hyperlipidemia who presents with Necrotizing fasciitis ([facility]    #Right diabetic foot wound with suspected necrotizing fasciitis and right metatarsal osteomyelitis s/p BKA  -X-ray of the right foot suspicious for necrotizing fasciitis and osteomyelitis of fifth metatarsal    Plan:  Continue vancomycin and Zosyn  Pain control with Dilaudid and oxycodone    #uncontrolled type 2 diabetes  -A1c 7.8 in 2022    Plan:  Continue Lantus 10 units with sliding scale    #Hyponatremia-resolving  -Was initially in the setting of hypoglycemia  -Possibly secondary to pain as well? Plan:  Repeat BMP in the morning    Chronic medical problems:    #PAD  Plan: Continue aspirin    #COPD, not in exacerbation    #HLD  -Not on statin    #Hypertension  -Not on any medication at home  -Blood pressure under control while she is here, will not start any medications at this time    #Tobacco use disorder  Plan: Nicotine patch      Diet ADULT DIET; Regular; 4 carb choices (60 gm/meal); Low Fat/Low Chol/High Fiber/MELISSA  ADULT ORAL NUTRITION SUPPLEMENT; Breakfast, Lunch, Dinner;  Low Calorie/High Protein Oral Supplement   DVT Prophylaxis [x] Lovenox, []  Heparin, [] SCDs, [] Ambulation,  [] Eliquis, [] Xarelto  [] Coumadin   Code Status Full Code   Disposition From: Home  Expected Disposition: Pending PT and OT eval  Estimated Date of Discharge: Greater than 24-hour  Patient requires continued admission due to BKA requiring PT and OT   Surrogate Decision Maker/ RIAZ Green     Subjective:     Chief Complaint: Foot Injury (Patient states a can of beans fell from 6' onto his right foot. The bruise is now black and he is worried because he has diabetes and a chronic wound on the bottom of the same foot. )       Patient did note some discomfort with his right lower extremity which had recently underwent BKA. Patient, overall was in good spirits regarding his situation. Denies any fever or chills. Review of Systems:    General: No fever, no chills  Heart: No chest pain, no palpitations, no PND, no orthopnea  Lungs: No shortness of breath, no cough  Abdomen: No abdominal pain, no nausea, no vomiting, no constipation, no diarrhea  : No frequency, no dysuria, no urgency, no decreased urination  MSK: No lower extremity swelling, +right leg pain  Neuro: No confusion, no weakness  Skin: No rashes      Objective:      Intake/Output Summary (Last 24 hours) at 8/30/2022 1541  Last data filed at 8/30/2022 1002  Gross per 24 hour   Intake 200 ml   Output 500 ml   Net -300 ml        Vitals:   Vitals:    08/30/22 1217   BP:    Pulse:    Resp: 16   Temp:    SpO2:        Physical Exam:     General: NAD, AAO x3-4  Eyes: EOMI  HENT: Atraumatic  CVS: Normal S1 and S2,  RRR  Respiratory: Normal breath sounds, clear to auscultation bilaterally, no respiratory distress  GI: Normal bowel sounds, soft, nondistended, nontender  MSK: Right leg BKA wrapped in Ace bandage and in immobilizer  Skin: Intact, dry, warm, no rashes  Neuro: CN II to XII grossly intact  Psych: Normal mood    Medications:   Medications:    vancomycin  750 mg IntraVENous Q12H    famotidine  20 mg Oral Daily    nicotine  1 patch TransDERmal Daily    aspirin  81 mg Oral Daily    sodium chloride flush  5-40 mL IntraVENous 2 times per day    enoxaparin  40 mg SubCUTAneous Daily    insulin lispro  0-8 Units SubCUTAneous Q4H    insulin glargine  10 Units SubCUTAneous Nightly    piperacillin-tazobactam  3,375 mg IntraVENous Q6H      Infusions:    sodium chloride      dextrose       PRN Meds: oxyCODONE, 5 mg, Q3H PRN  HYDROmorphone, 0.5 mg, Q2H PRN  methocarbamol, 500 mg, 4x Daily PRN  sodium chloride flush, 5-40 mL, PRN  sodium chloride, , PRN  ondansetron, 4 mg, Q8H PRN   Or  ondansetron, 4 mg, Q6H PRN  polyethylene glycol, 17 g, Daily PRN  acetaminophen, 650 mg, Q6H PRN  glucose, 4 tablet, PRN  dextrose bolus, 125 mL, PRN   Or  dextrose bolus, 250 mL, PRN  glucagon (rDNA), 1 mg, PRN  dextrose, , Continuous PRN        Labs      Recent Results (from the past 24 hour(s))   POCT Glucose    Collection Time: 08/29/22  6:29 PM   Result Value Ref Range    POC Glucose 244 (H) 70 - 99 MG/DL   POCT Glucose    Collection Time: 08/29/22  8:36 PM   Result Value Ref Range    POC Glucose 239 (H) 70 - 99 MG/DL   POCT Glucose    Collection Time: 08/30/22 12:46 AM   Result Value Ref Range    POC Glucose 193 (H) 70 - 99 MG/DL   POCT Glucose    Collection Time: 08/30/22  4:54 AM   Result Value Ref Range    POC Glucose 191 (H) 70 - 99 MG/DL   Comprehensive Metabolic Panel w/ Reflex to MG    Collection Time: 08/30/22  6:24 AM   Result Value Ref Range    Sodium 132 (L) 135 - 145 MMOL/L    Potassium 3.3 (L) 3.5 - 5.1 MMOL/L    Chloride 102 99 - 110 mMol/L    CO2 22 21 - 32 MMOL/L    BUN 18 6 - 23 MG/DL    Creatinine 0.8 (L) 0.9 - 1.3 MG/DL    Glucose 157 (H) 70 - 99 MG/DL    Calcium 6.9 (LL) 8.3 - 10.6 MG/DL    Albumin 1.6 (L) 3.4 - 5.0 GM/DL    Total Protein 5.7 (L) 6.4 - 8.2 GM/DL    Total Bilirubin 0.3 0.0 - 1.0 MG/DL    ALT 8 (L) 10 - 40 U/L    AST 16 15 - 37 IU/L    Alkaline Phosphatase 121 40 - 129 IU/L    GFR Non-African American >60 >60 mL/min/1.73m2    GFR African American >60 >60 mL/min/1.73m2    Anion Gap 8 4 - 16   CBC with Auto Differential    Collection Time: 08/30/22  6:24 AM   Result Value Ref Range    WBC 16.1 (H) 4.0 - 10.5 K/CU MM    RBC 3.04 (L) 4.6 - 6.2 M/CU MM    Hemoglobin 8.6 (L) 13.5 - 18.0 GM/DL    Hematocrit 25.8 (L) 42 - 52 %    MCV 84.9 78 - 100 FL    MCH 28.3 27 - 31 PG    MCHC 33.3 32.0 - 36.0 %    RDW 13.9 11.7 - 14.9 %    Platelets 173 140 - 440 K/CU MM    MPV 10.0 7.5 - 11.1 FL    Differential Type AUTOMATED DIFFERENTIAL     Segs Relative 79.4 (H) 36 - 66 %    Lymphocytes % 7.9 (L) 24 - 44 %    Monocytes % 9.8 (H) 0 - 4 %    Eosinophils % 1.0 0 - 3 %    Basophils % 0.4 0 - 1 %    Segs Absolute 12.8 K/CU MM    Lymphocytes Absolute 1.3 K/CU MM    Monocytes Absolute 1.6 K/CU MM    Eosinophils Absolute 0.2 K/CU MM    Basophils Absolute 0.1 K/CU MM    Nucleated RBC % 0.0 %    Total Nucleated RBC 0.0 K/CU MM    Total Immature Neutrophil 0.24 K/CU MM    Immature Neutrophil % 1.5 (H) 0 - 0.43 %   Vancomycin Level, Random    Collection Time: 08/30/22  6:24 AM   Result Value Ref Range    Vancomycin Rm 20.9 UG/ML    DOSE AMOUNT DOSE AMT. GIVEN - 1000MG     DOSE TIME DOSE TIME GIVEN - 0000    Magnesium    Collection Time: 08/30/22  6:24 AM   Result Value Ref Range    Magnesium 1.8 1.8 - 2.4 mg/dl   POCT Glucose    Collection Time: 08/30/22  6:45 AM   Result Value Ref Range    POC Glucose 184 (H) 70 - 99 MG/DL   Calcium, Ionized    Collection Time: 08/30/22  9:36 AM   Result Value Ref Range    Ionized Ca 1.05 (L) 1.12 - 1.32 mMOL/L    Calcium, Ionized 4.20 (L) 4.48 - 5.28 MG/DL   POCT Glucose    Collection Time: 08/30/22 10:50 AM   Result Value Ref Range    POC Glucose 186 (H) 70 - 99 MG/DL        Imaging/Diagnostics Last 24 Hours   XR FOOT RIGHT (MIN 3 VIEWS)    Result Date: 8/29/2022  EXAMINATION: THREE XRAY VIEWS OF THE RIGHT FOOT 8/29/2022 9:05 am COMPARISON: None.  HISTORY: ORDERING SYSTEM PROVIDED HISTORY: drop can of beans of foot, history of DM ulcer, black necrotic tissue present TECHNOLOGIST PROVIDED HISTORY: Reason for exam:->drop can of beans of foot, history of DM ulcer, black necrotic tissue present Reason for Exam: pain-dropped can of beans on foot FINDINGS: There is air in the subcutaneous soft tissues of the midfoot extending slightly into the forefoot and extending proximal into the ankle and there is a soft tissue ulcer on the plantar

## 2022-08-31 PROBLEM — I96 GANGRENE OF RIGHT FOOT (HCC): Status: ACTIVE | Noted: 2022-08-31

## 2022-08-31 PROBLEM — R78.81 BACTEREMIA DUE TO GROUP B STREPTOCOCCUS: Status: ACTIVE | Noted: 2022-08-31

## 2022-08-31 PROBLEM — B95.1 BACTEREMIA DUE TO GROUP B STREPTOCOCCUS: Status: ACTIVE | Noted: 2022-08-31

## 2022-08-31 LAB
CREAT SERPL-MCNC: 0.8 MG/DL (ref 0.9–1.3)
ESTIMATED AVERAGE GLUCOSE: 214 MG/DL
GFR AFRICAN AMERICAN: >60 ML/MIN/1.73M2
GFR NON-AFRICAN AMERICAN: >60 ML/MIN/1.73M2
GLUCOSE BLD-MCNC: 158 MG/DL (ref 70–99)
GLUCOSE BLD-MCNC: 192 MG/DL (ref 70–99)
GLUCOSE BLD-MCNC: 224 MG/DL (ref 70–99)
GLUCOSE BLD-MCNC: 273 MG/DL (ref 70–99)
HBA1C MFR BLD: 9.1 % (ref 4.2–6.3)
HIGH SENSITIVE C-REACTIVE PROTEIN: 65.7 MG/L
PROCALCITONIN: 0.77

## 2022-08-31 PROCEDURE — 94761 N-INVAS EAR/PLS OXIMETRY MLT: CPT

## 2022-08-31 PROCEDURE — 86141 C-REACTIVE PROTEIN HS: CPT

## 2022-08-31 PROCEDURE — 1200000000 HC SEMI PRIVATE

## 2022-08-31 PROCEDURE — 97166 OT EVAL MOD COMPLEX 45 MIN: CPT

## 2022-08-31 PROCEDURE — 99232 SBSQ HOSP IP/OBS MODERATE 35: CPT | Performed by: INTERNAL MEDICINE

## 2022-08-31 PROCEDURE — 2580000003 HC RX 258: Performed by: PHYSICIAN ASSISTANT

## 2022-08-31 PROCEDURE — 6360000002 HC RX W HCPCS: Performed by: PHYSICIAN ASSISTANT

## 2022-08-31 PROCEDURE — 97530 THERAPEUTIC ACTIVITIES: CPT

## 2022-08-31 PROCEDURE — 6360000002 HC RX W HCPCS: Performed by: SURGERY

## 2022-08-31 PROCEDURE — 6370000000 HC RX 637 (ALT 250 FOR IP): Performed by: SURGERY

## 2022-08-31 PROCEDURE — 6370000000 HC RX 637 (ALT 250 FOR IP): Performed by: INTERNAL MEDICINE

## 2022-08-31 PROCEDURE — 76937 US GUIDE VASCULAR ACCESS: CPT

## 2022-08-31 PROCEDURE — 6360000002 HC RX W HCPCS: Performed by: INTERNAL MEDICINE

## 2022-08-31 PROCEDURE — 6370000000 HC RX 637 (ALT 250 FOR IP): Performed by: PHYSICIAN ASSISTANT

## 2022-08-31 PROCEDURE — 82565 ASSAY OF CREATININE: CPT

## 2022-08-31 PROCEDURE — 82962 GLUCOSE BLOOD TEST: CPT

## 2022-08-31 PROCEDURE — 36415 COLL VENOUS BLD VENIPUNCTURE: CPT

## 2022-08-31 PROCEDURE — 97162 PT EVAL MOD COMPLEX 30 MIN: CPT

## 2022-08-31 PROCEDURE — 83036 HEMOGLOBIN GLYCOSYLATED A1C: CPT

## 2022-08-31 PROCEDURE — 6370000000 HC RX 637 (ALT 250 FOR IP): Performed by: HOSPITALIST

## 2022-08-31 PROCEDURE — 99024 POSTOP FOLLOW-UP VISIT: CPT | Performed by: SURGERY

## 2022-08-31 PROCEDURE — 84145 PROCALCITONIN (PCT): CPT

## 2022-08-31 RX ORDER — ACETAMINOPHEN 325 MG/1
650 TABLET ORAL EVERY 6 HOURS
Status: DISCONTINUED | OUTPATIENT
Start: 2022-08-31 | End: 2022-09-06

## 2022-08-31 RX ADMIN — HYDROMORPHONE HYDROCHLORIDE 0.5 MG: 1 INJECTION, SOLUTION INTRAMUSCULAR; INTRAVENOUS; SUBCUTANEOUS at 06:44

## 2022-08-31 RX ADMIN — OXYCODONE HYDROCHLORIDE 5 MG: 5 TABLET ORAL at 00:45

## 2022-08-31 RX ADMIN — HYDROMORPHONE HYDROCHLORIDE 1 MG: 1 INJECTION, SOLUTION INTRAMUSCULAR; INTRAVENOUS; SUBCUTANEOUS at 16:02

## 2022-08-31 RX ADMIN — ACETAMINOPHEN 650 MG: 325 TABLET ORAL at 12:27

## 2022-08-31 RX ADMIN — AMPICILLIN SODIUM 2000 MG: 2 INJECTION, POWDER, FOR SOLUTION INTRAVENOUS at 11:30

## 2022-08-31 RX ADMIN — HYDROMORPHONE HYDROCHLORIDE 0.5 MG: 1 INJECTION, SOLUTION INTRAMUSCULAR; INTRAVENOUS; SUBCUTANEOUS at 04:34

## 2022-08-31 RX ADMIN — METHOCARBAMOL 500 MG: 500 TABLET ORAL at 09:16

## 2022-08-31 RX ADMIN — INSULIN LISPRO 5 UNITS: 100 INJECTION, SOLUTION INTRAVENOUS; SUBCUTANEOUS at 17:01

## 2022-08-31 RX ADMIN — OXYCODONE HYDROCHLORIDE 5 MG: 5 TABLET ORAL at 08:29

## 2022-08-31 RX ADMIN — SODIUM CHLORIDE: 9 INJECTION, SOLUTION INTRAVENOUS at 09:28

## 2022-08-31 RX ADMIN — AMPICILLIN SODIUM 2000 MG: 2 INJECTION, POWDER, FOR SOLUTION INTRAVENOUS at 23:43

## 2022-08-31 RX ADMIN — AMPICILLIN SODIUM 2000 MG: 2 INJECTION, POWDER, FOR SOLUTION INTRAVENOUS at 20:20

## 2022-08-31 RX ADMIN — AMPICILLIN SODIUM 2000 MG: 2 INJECTION, POWDER, FOR SOLUTION INTRAVENOUS at 08:30

## 2022-08-31 RX ADMIN — AMPICILLIN SODIUM 2000 MG: 2 INJECTION, POWDER, FOR SOLUTION INTRAVENOUS at 04:33

## 2022-08-31 RX ADMIN — HYDROMORPHONE HYDROCHLORIDE 0.5 MG: 1 INJECTION, SOLUTION INTRAMUSCULAR; INTRAVENOUS; SUBCUTANEOUS at 00:21

## 2022-08-31 RX ADMIN — AMPICILLIN SODIUM 2000 MG: 2 INJECTION, POWDER, FOR SOLUTION INTRAVENOUS at 00:21

## 2022-08-31 RX ADMIN — HYDROMORPHONE HYDROCHLORIDE 1 MG: 1 INJECTION, SOLUTION INTRAMUSCULAR; INTRAVENOUS; SUBCUTANEOUS at 22:50

## 2022-08-31 RX ADMIN — AMPICILLIN SODIUM 2000 MG: 2 INJECTION, POWDER, FOR SOLUTION INTRAVENOUS at 15:24

## 2022-08-31 RX ADMIN — ACETAMINOPHEN 650 MG: 325 TABLET ORAL at 23:41

## 2022-08-31 RX ADMIN — METHOCARBAMOL 500 MG: 500 TABLET ORAL at 20:10

## 2022-08-31 RX ADMIN — ENOXAPARIN SODIUM 40 MG: 100 INJECTION SUBCUTANEOUS at 09:16

## 2022-08-31 RX ADMIN — SODIUM CHLORIDE, PRESERVATIVE FREE 10 ML: 5 INJECTION INTRAVENOUS at 20:12

## 2022-08-31 RX ADMIN — FAMOTIDINE 20 MG: 20 TABLET ORAL at 20:10

## 2022-08-31 RX ADMIN — CALCIUM CARBONATE 500 MG: 500 TABLET, CHEWABLE ORAL at 09:16

## 2022-08-31 RX ADMIN — CALCIUM CARBONATE 500 MG: 500 TABLET, CHEWABLE ORAL at 20:10

## 2022-08-31 RX ADMIN — INSULIN LISPRO 5 UNITS: 100 INJECTION, SOLUTION INTRAVENOUS; SUBCUTANEOUS at 11:32

## 2022-08-31 RX ADMIN — OXYCODONE HYDROCHLORIDE 5 MG: 5 TABLET ORAL at 14:20

## 2022-08-31 RX ADMIN — HYDROMORPHONE HYDROCHLORIDE 1 MG: 1 INJECTION, SOLUTION INTRAMUSCULAR; INTRAVENOUS; SUBCUTANEOUS at 12:49

## 2022-08-31 RX ADMIN — SODIUM CHLORIDE, PRESERVATIVE FREE 10 ML: 5 INJECTION INTRAVENOUS at 09:16

## 2022-08-31 RX ADMIN — INSULIN GLARGINE 10 UNITS: 100 INJECTION, SOLUTION SUBCUTANEOUS at 20:13

## 2022-08-31 RX ADMIN — HYDROMORPHONE HYDROCHLORIDE 1 MG: 1 INJECTION, SOLUTION INTRAMUSCULAR; INTRAVENOUS; SUBCUTANEOUS at 18:41

## 2022-08-31 RX ADMIN — ASPIRIN 81 MG CHEWABLE TABLET 81 MG: 81 TABLET CHEWABLE at 09:15

## 2022-08-31 RX ADMIN — OXYCODONE HYDROCHLORIDE 5 MG: 5 TABLET ORAL at 20:10

## 2022-08-31 RX ADMIN — HYDROMORPHONE HYDROCHLORIDE 0.5 MG: 1 INJECTION, SOLUTION INTRAMUSCULAR; INTRAVENOUS; SUBCUTANEOUS at 02:28

## 2022-08-31 RX ADMIN — HYDROMORPHONE HYDROCHLORIDE 0.5 MG: 1 INJECTION, SOLUTION INTRAMUSCULAR; INTRAVENOUS; SUBCUTANEOUS at 10:20

## 2022-08-31 ASSESSMENT — PAIN SCALES - GENERAL
PAINLEVEL_OUTOF10: 1
PAINLEVEL_OUTOF10: 2
PAINLEVEL_OUTOF10: 10
PAINLEVEL_OUTOF10: 9
PAINLEVEL_OUTOF10: 8
PAINLEVEL_OUTOF10: 4
PAINLEVEL_OUTOF10: 9
PAINLEVEL_OUTOF10: 0
PAINLEVEL_OUTOF10: 0
PAINLEVEL_OUTOF10: 3
PAINLEVEL_OUTOF10: 8
PAINLEVEL_OUTOF10: 10
PAINLEVEL_OUTOF10: 9

## 2022-08-31 ASSESSMENT — PAIN DESCRIPTION - LOCATION
LOCATION: LEG

## 2022-08-31 ASSESSMENT — PAIN SCALES - WONG BAKER
WONGBAKER_NUMERICALRESPONSE: 0
WONGBAKER_NUMERICALRESPONSE: 8
WONGBAKER_NUMERICALRESPONSE: 0
WONGBAKER_NUMERICALRESPONSE: 0

## 2022-08-31 ASSESSMENT — PAIN DESCRIPTION - DESCRIPTORS
DESCRIPTORS: ACHING;BURNING
DESCRIPTORS: ACHING
DESCRIPTORS: ACHING;DULL;SHARP
DESCRIPTORS: ACHING

## 2022-08-31 ASSESSMENT — PAIN DESCRIPTION - ORIENTATION
ORIENTATION: RIGHT

## 2022-08-31 NOTE — PROGRESS NOTES
Physical Therapy  [place] [id number] ACUTE CARE PHYSICAL THERAPY EVALUATION  Gianfranco Galarza, 1960, 1124/1124-A, 8/31/2022    History  [place]:  The primary encounter diagnosis was Gangrene of right foot ([facility] [address] A diagnosis of Infection of right foot was also pertinent to this visit. Patient  has a past medical history of Anesthesia, Anxiety, CAD (coronary artery disease), COPD (chronic obstructive pulmonary disease) ([facility] [address] Degenerative disc disease, Diabetes mellitus ([facility] [address] Gall bladder stones, H/O cardiovascular stress test, H/O echocardiogram, Heroin abuse ([facility] [address] Hx MRSA infection, Hyperlipidemia, Hypertension, Low back pain, Migraine, Pancreatitis, S/P CABG x 4, and Shortness of breath on exertion. Patient  has a past surgical history that includes Hand surgery (15 yrs ago); Dental surgery (2010); Coronary artery bypass graft (1/6/13); Toe amputation (Right, 3/31/2020); Toe amputation (Right, 11/5/2021); Foot Debridement (Right, 06/24/2022); and Foot Debridement (Right, 6/24/2022). Assessment:  Pt presents 2 days s/p admission for poor wound healing ; imaging demonstrates signs of OM, BKA on 8/30, no POD 1, NWB on R LE. He is from home w/ spouse, 4 ALCON their 1 level home, ind PLOF, cane for ambulation PTA, primary caregiver for his wife. Pt presents w/ noted impairments in strength/power affecting transfers/transitions, dec endurance limiting ability to complete household distances, dec safety awareness requiring skilled cueing and education, pain impacting all functional mobility, dec safety awareness, impaired functional mobility. Operating well below his baseline. Recommending ARU.     Complexity: Moderate  Prognosis: Fair +, pain and comorbid conditions may limit prognosis  Plan 5+/week, 1 week  Equipment: pend to next LOC    Recommendations for NURSING mobility: stand step using FWW    Subjective:  Patient states:  \"I can't do any walking today, I just can't\"    Pain:  10/10 R residual LE pain  Communication with other providers:  Handoff to RN, co-eval with OT Donna  Restrictions: fall risk; general precautions; NWB on R residual limb; knee immobilizer when [address] level of function  Social/Functional History  Lives With: Spouse  Type of Home: House  Home Layout: One level  Home Access: Stairs to enter with rails  Entrance Stairs - Number of Steps: 4  Entrance Stairs - Rails: Both  Bathroom Shower/Tub: Tub/Shower unit, Shower chair with back  Bathroom Toilet: Standard  Bathroom Accessibility: [place] Covert accessible  Has the patient had two or more falls in the past year or any fall with injury in the past year?: No  ADL Assistance: Independent  Homemaking Assistance: Independent  Homemaking Responsibilities: Yes  Meal Prep Responsibility: Primary  Laundry Responsibility: Primary  Cleaning Responsibility: Primary  Bill Paying/Finance Responsibility: Primary  Shopping Responsibility: Primary  Ambulation Assistance: Independent  Transfer Assistance: Independent  Active : Yes (but unsure now with DANNI GARLAND)  Occupation: [employer] [address] [place]: ride 4 wheelers    Examination of body systems (includes body structures/functions, activity/participation limitations):  Observation:  Supine, awake, alert, painful on arrival. He is agreeable to limited treatment, pain affecting receptiveness this session ; RN alerted, pt motivated to return to near PLOF. Tele, bed alarm, R LE in knee immobilizer upon arrival.  Posture: WFL  Vision:  WFL  Hearing:  WFL  Cardiopulmonary:  slightly tachy during OOB ; otherwise WFL  Cognition: A&O x 4 ; alert, follows commands w/ min cues, attention intact, memory appears intact, fair + safety awareness, mod cues needed for sequencing/setup, min cues needed for initiation, good insight into deficits    Musculoskeletal  ROM R/L:  WFL.     Strength L:  grossly 4/5 ; R LE NT 2/2 POD 1 s/p R BKA  Sensation: partial sensation loss on LLE  Tone:

## 2022-08-31 NOTE — PROGRESS NOTES
GENERAL SURGERY PROGRESS NOTE                     Subjective:    Pain improved. Dressing change at bedside today. Objective:    Vitals: VITALS:  /63   Pulse 64   Temp 98.1 °F (36.7 °C)   Resp 18   Ht 5' 8\" (1.727 m)   Wt 145 lb (65.8 kg)   SpO2 97%   BMI 22.05 kg/m²     I/O: 08/30 0701 - 08/31 0700  In: -   Out: 5316 [Urine:1125]    Labs/Imaging Results:   Lab Results   Component Value Date/Time     08/30/2022 06:24 AM    K 3.3 08/30/2022 06:24 AM     08/30/2022 06:24 AM    CO2 22 08/30/2022 06:24 AM    BUN 18 08/30/2022 06:24 AM    CREATININE 0.8 08/31/2022 02:27 AM    GLUCOSE 157 08/30/2022 06:24 AM    CALCIUM 6.9 08/30/2022 06:24 AM      Lab Results   Component Value Date    WBC 16.1 (H) 08/30/2022    HGB 8.6 (L) 08/30/2022    HCT 25.8 (L) 08/30/2022    MCV 84.9 08/30/2022     08/30/2022          IV Fluids:   dextrose    sodium chloride Last Rate: 25 mL/hr at 08/31/22 7451    Scheduled Meds:   acetaminophen, 650 mg, Oral, Q6H    calcium carbonate, 500 mg, Oral, BID    ampicillin IV, 2,000 mg, IntraVENous, 6 times per day    insulin lispro, 0.08 Units/kg, SubCUTAneous, TID WC    famotidine, 20 mg, Oral, Daily    nicotine, 1 patch, TransDERmal, Daily    aspirin, 81 mg, Oral, Daily    sodium chloride flush, 5-40 mL, IntraVENous, 2 times per day    enoxaparin, 40 mg, SubCUTAneous, Daily    insulin glargine, 10 Units, SubCUTAneous, Nightly    Physical Exam:  General: A&O x 3, no distress. HEENT: Anicteric sclerae, MMM. Extremities: Right BKA stump without erythema, crepitus or drainage. Incision clean/dry/intact with staples in place.   Abdomen: Soft, nondistended, nontender       Assessment and Plan:     Patient Active Problem List:     Diabetes mellitus     H/O echocardiogram     S/P CABG (coronary artery bypass graft)     Chest pain     Cellulitis     Heroin withdrawal (HCC)     CAD (coronary artery disease)     Polysubstance abuse ([address]     Small bowel obstruction ([address] Narcotic abuse, continuous (HCC)     Hx of hepatitis     Epigastric pain     Diarrhea     Constipation with Ileus     Vomiting of fecal matter with nausea     Encephalopathy     Metabolic encephalopathy     Infectious gastroenteritis     Bilateral leg weakness     Gait disturbance     Left carotid stenosis     Hypothyroidism     Osteomyelitis (HCC)     Uncontrolled type II diabetes with peripheral autonomic neuropathy (HCC)     Gangrene of toe (HCC)     Acute hematogenous osteomyelitis of right foot (HCC)     Amputation of little toe (HCC)     PAD (peripheral artery disease) (HCC)     Uncontrolled pain     Generalized weakness     Simple chronic bronchitis (HCC)     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 (HCC)     Skin ulcer with necrosis of muscle (HCC)     Toe ulcer ([address]     Diabetic foot ([address]     Diabetic foot infection ([address]     Abscess of right foot     WD-Diabetic ulcer of right midfoot associated with type 2 diabetes mellitus, with muscle involvement without evidence of necrosis ([address]     Necrotizing fasciitis ([address]      Principal Problem:    Necrotizing fasciitis ([address]  Plan:     Continue pain control with p.o. meds first  Antibiotics per ID  Dressing changes daily        Dante Ramirez DO

## 2022-08-31 NOTE — PROGRESS NOTES
08/31/22  2:27 AM   Result Value Ref Range    CRP, High Sensitivity 65.7 mg/L   Procalcitonin    Collection Time: 08/31/22  2:27 AM   Result Value Ref Range    Procalcitonin 0.774    Hemoglobin A1c    Collection Time: 08/31/22  2:27 AM   Result Value Ref Range    Hemoglobin A1C 9.1 (H) 4.2 - 6.3 %    eAG 214 mg/dL   POCT Glucose    Collection Time: 08/31/22  6:46 AM   Result Value Ref Range    POC Glucose 158 (H) 70 - 99 MG/DL   POCT Glucose    Collection Time: 08/31/22 11:15 AM   Result Value Ref Range    POC Glucose 224 (H) 70 - 99 MG/DL     CULTURE results: Invalid input(s): BLOOD CULTURE,  URINE CULTURE, SURGICAL CULTURE    Diagnosis:  Patient Active Problem List   Diagnosis    Diabetes mellitus    H/O echocardiogram    S/P CABG (coronary artery bypass graft)    Chest pain    Cellulitis    Heroin withdrawal (HCC)    CAD (coronary artery disease)    Polysubstance abuse (HCC)    Small bowel obstruction (HCC)    Narcotic abuse, continuous (HCC)    Hx of hepatitis    Epigastric pain    Diarrhea    Constipation with Ileus    Vomiting of fecal matter with nausea    Encephalopathy    Metabolic encephalopathy    Infectious gastroenteritis    Bilateral leg weakness    Gait disturbance    Left carotid stenosis    Hypothyroidism    Osteomyelitis (HCC)    Uncontrolled type II diabetes with peripheral autonomic neuropathy (HCC)    Gangrene of toe (HCC)    Acute hematogenous osteomyelitis of right foot (HCC)    Amputation of little toe (HCC)    PAD (peripheral artery disease) (HCC)    Uncontrolled pain    Generalized weakness    Simple chronic bronchitis (HCC)    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 (HCC)    Skin ulcer with necrosis of muscle (HCC)    Toe ulcer ([address]    Diabetic foot ([address]    Diabetic foot infection ([address]    Abscess of right foot    WD-Diabetic ulcer of right midfoot associated with type 2 diabetes mellitus, with muscle involvement without evidence of necrosis (HCC)    Necrotizing fasciitis [facility])       Active Problems  Principal Problem:    Necrotizing fasciitis ([facility] [address]  Resolved Problems:    * No resolved hospital problems.  *              Electronically signed by: Electronically signed by Brenden Hi MD on 8/31/2022 at 2:34 PM

## 2022-08-31 NOTE — CONSULTS
Geoffrey 78, 1960, 1124/1124-A, 8/31/2022      Discharge Recommendation: ARU     History:  [place]:  The primary encounter diagnosis was Gangrene of right foot ([facility] [address] A diagnosis of Infection of right foot was also pertinent to this visit. Pt  has a past medical history of Anesthesia, Anxiety, CAD (coronary artery disease), COPD (chronic obstructive pulmonary disease) ([facility] [address] Degenerative disc disease, Diabetes mellitus ([facility] [address] Gall bladder stones, H/O cardiovascular stress test, H/O echocardiogram, Heroin abuse ([facility] [address] Hx MRSA infection, Hyperlipidemia, Hypertension, Low back pain, Migraine, Pancreatitis, S/P CABG x 4, and Shortness of breath on exertion. Subjective:  Patient states: \"I was taking care of my wife all this time and now I just can't\"  Pain: 10/10 (RLE)  Communication with other providers: Coeval with PT for pt safety and tolerance, RN to notify of pain, RN handoff   Restrictions: general precautions, fall risk, R BKA   No one at bedside    Home Setup/Prior level of function:  Social/Functional History  Lives With: Spouse  Type of Home: House  Home Layout: One level  Home Access: Stairs to enter with rails  Entrance Stairs - Number of Steps: 4  Entrance Stairs - Rails: Both  Bathroom Shower/Tub: Tub/Shower unit, Shower chair with back  Bathroom Toilet: Standard  Bathroom Accessibility: [facility]  Has the patient had two or more falls in the past year or any fall with injury in the past year?: No  ADL Assistance: Independent  Homemaking Assistance: Independent  Homemaking Responsibilities: Yes  Meal Prep Responsibility: Primary  Laundry Responsibility: Primary  Cleaning Responsibility: Primary  Bill Paying/Finance Responsibility: Primary  Shopping Responsibility: Primary  Ambulation Assistance: Independent  Transfer Assistance: Independent  Active : Yes (but unsure now with DANNI GARLAND)  Occupation:  On disability  Leisure & Hobbies: ride 4 wheelers    Examination:  Observation: Pt was semifowlers upon arrival, agreeable to session  Vision: WFL  Hearing: WFL  Objective Measures: Vitals remained stable throughout     Body Systems and functions:  ROM: WFL in BUE  Strength: 4/5 in BUE  Sensation: WFL  Tone: normotonic in BUE  Coordination: movements fluid and coordinated  Posture: normal posture  Activity Tolerance: Fair     Activities of Daily Living (ADLs):  Feeding: SetupA   Grooming: SetupA   Toileting: MaxA   UB dressing: Jesusita   LB dressing: MaxA   UB bathing: Jesusita   LB bathing: ModA       *pt ADL function inferred from gross functional assessment of mobility, balance, posture, safety awareness, activity tolerance (unless otherwise indicated)    Cognitive and Psychosocial Functioning:  Overall cognitive status: WFL   Affect: normal     Balance:   Sitting: good, SBA   Standing: CGA with 2WW     Functional Mobility:  Rolling Laterally in Bed: NT   Supine to Sit: Jesusita with inc time and cues   Sit to Stand: CGA with 2WW     Ambulation: NT       AM-PAC 6 click short form for inpatient daily activity:   How much help from another person does the patient currently need. .. Unable  Dep A Lot  Max A A Lot   Mod A A Little  Min A A Little   CGA  SBA None   Mod I  Indep  Sup   1. Putting on and taking off regular lower body clothing? [] 1    [x] 2   [] 2   [] 3   [] 3   [] 4      2. Bathing (including washing, rinsing, drying)? [] 1   [] 2   [x] 2 [] 3 [] 3 [] 4   3. Toileting, which includes using toilet, bedpan, or urinal? [] 1    [x] 2   [] 2   [] 3   [] 3   [] 4     4. Putting on and taking off regular upper body clothing? [] 1   [] 2   [] 2   [x] 3   [] 3    [] 4      5. Taking care of personal grooming such as brushing teeth? [] 1   [] 2    [] 2 [] 3    [x] 3   [] 4      6. Eating meals?    [] 1   [] 2   [] 2   [] 3   [x] 3   [] 4        Raw Score:  15      24/24 = unimpaired  23/24 = 1-20% impaired   20/24-22/24 = 21-40% impaired  15/24-19/24 = 41-59% impaired   10/24-14/24 = 60%-79% impaired  7/24-9/24 = 80%-99% impaired  6/24 = 100% impaired     Treatment:  Therapeutic Activity Training:   Therapeutic activity training was instructed today. Cues were given for safety, sequence, UE/LE placement, visual cues, and balance. Activities performed today included:    Bed mobility:  Pt completed sup to sit with Jesusita for trunk advancement. Pt completed scooting to R with SBA. Pt tolerated EOB without evidence of lean and provided SBA for safety. Pt c/o pain in RLE while seated EOB. STS:  Pt completed partial stand from EOB with CGA. Pt completed full stand from EOB with CGA and use of 2WW. SPT:  After standing, pt completed SPT from EOB to recliner with CGA and use of 2WW. Pt complained of significant pain with this transfer in RLE and RN notified. Self Care Training:   Self care training was performed today. Cues were given for safety, sequence, UE/LE placement, visual cues, and balance. Activities performed today included:    LB dressing: Pt dependently donned L nonslip sock seated EOB in prep for safe transfer. Education: Role of OT, OT POC, discharge needs, safety, benefits of EOB/OOB activity, AE needs, desensitization plans, importance of OOB activity, value of therapy   Safety Measures: Gait belt used for safety of pt and therapist, Left in recliner, Alarm in place, call light and phone within reach, lines managed, needs met     Assessment:  Pt is a 60 yr old male from home who presents with R BKA. Prior to admission, pt was grossly independent and caretaking for his s/o. Pt currently appeared to be functioning below baseline with impaired transfers, impaired mobility, significant pain, and impaired occupational performance. Pt would benefit from IP rehab to address safely returning pt to baseline and addressing deficits. Pt would benefit from OP OT to address prosthesis needs when appropriate.  Pt would require to be independent in all ADLs prior to return home d/t s/o not of good health to assist. Pt would benefit from continued IP OT services during their stay, and discharge to ARU. Complexity: Moderate   Prognosis: Good   Barriers: strength, endurance, R BKA, phantom pain, caregiver at baseline     Plan:  Plan: 3+/week    Treatment to include: Strengthening, ROM, Balance Training, Functional Mobility Training, Endurance Training, Gait Training, Pain Management, Safety Education and Training, Patient+Caregiver Education and Training, Equipment Evaluation Education and Procurement, Positioning, Self Care Training, Home Management Training, Coordination Training    Pt would benefit from continued edu on: desensitization techniques and ADL techniques with recent BKA   Adaptive Equipment Recommendations: Grab bars near commode and shower. Will continue to monitor/ defer to next LOC.      Goals:  Time frame for goals: 2 weeks  Pt will complete feeding tasks with independence at seated level   Pt will complete grooming tasks with CGA at standing level with chair nearby for breaks PRN   Pt will complete toileting tasks with CGA using elevated commode and grab bars   Pt will complete UB dressing tasks with SetupA seated EOB   Pt will complete LB dressing tasks with Jesusita seated EOB   Pt will complete UB bathing tasks with Supervision while seated and AE PRN   Pt will complete LB bathing tasks with Supervision while seated and AE PRN   Pt will complete therapeutic exercise/activity to increase independence in ADL/IADL function  Pt will practice functional transfers and mobility with AD for increased safety and independence    Time:   Time in: 1345  Time out: 1403  Treatment Minutes: 8  Evaluation Minutes: 10  Total time: 18    Electronically signed by:    ARVIND Mcnamara/L   License: [id number]  1/79/0189, 1:50 PM

## 2022-08-31 NOTE — PROGRESS NOTES
V2.0  [facility] Hospitalist Progress Note      Name:  Synetta Schirmer /Age/Sex: 1960  (60 y.o. male)   MRN & CSN:  [id number] & [id number] Encounter Date/Time: 2022 3:41 PM EDT    Location:  [facility] PCP: Miranda Nieto MD       Hospital Day: 3    Assessment and Plan:   Synetta Schirmer is a 60 y.o. male with PMH of type 2 diabetes, COPD, CAD, hypertension, hyperlipidemia who presents with Necrotizing fasciitis ([facility] [address]    #Right diabetic foot wound with suspected necrotizing fasciitis and right metatarsal osteomyelitis s/p BKA  -X-ray of the right foot suspicious for necrotizing fasciitis and osteomyelitis of fifth metatarsal    Plan:  Continue vancomycin and Zosyn  Pain control with Dilaudid and oxycodone  ID on consult, appreciate recommendation    #uncontrolled type 2 diabetes  -A1c 7.8 in 2022    Plan:  Continue Lantus 10 units with sliding scale    #Hyponatremia-resolving  -Was initially in the setting of hypoglycemia  -Possibly secondary to pain as well? Plan:  Repeat BMP in the morning    Chronic medical problems:    #PAD  Plan: Continue aspirin    #COPD, not in exacerbation    #HLD  -Not on statin    #Hypertension  -Not on any medication at home  -Blood pressure under control while she is here, will not start any medications at this time    #Tobacco use disorder  Plan: Nicotine patch      Diet ADULT DIET; Regular; 4 carb choices (60 gm/meal); Low Fat/Low Chol/High Fiber/MELISSA  ADULT ORAL NUTRITION SUPPLEMENT; Breakfast, Lunch, Dinner;  Low Calorie/High Protein Oral Supplement   DVT Prophylaxis [x] Lovenox, []  Heparin, [] SCDs, [] Ambulation,  [] Eliquis, [] Xarelto  [] Coumadin   Code Status Full Code   Disposition From: Home  Expected Disposition: Pending PT and OT eval  Estimated Date of Discharge: Greater than 24-hour  Patient requires continued admission due to [address] requiring PT and OT   Surrogate Decision Maker/ RIAZ Fu     Subjective:     Chief Complaint: Foot Injury (Patient states a can of beans fell from 6' onto his right foot. The bruise is now black and he is worried because he has diabetes and a chronic wound on the bottom of the same foot. )       Patient endorses some pain of his lower extremity when the BKA site has. He has chosen to go to a SNF after discharge. No complaints this morning aside from pain. Review of Systems:    General: No fever, no chills  Heart: No chest pain, no palpitations  Lungs: No shortness of breath, no cough  Abdomen: No abdominal pain, no nausea, no vomiting, no constipation, no diarrhea  : No frequency, no dysuria, no urgency, no decreased urination  MSK: No lower extremity swelling, +right leg pain  Neuro: No confusion, no weakness  Skin: No rashes      Objective:      Intake/Output Summary (Last 24 hours) at 8/31/2022 1454  Last data filed at 8/30/2022 2300  Gross per 24 hour   Intake --   Output 625 ml   Net -625 ml        Vitals:   Vitals:    08/31/22 0830   BP: 138/63   Pulse: 64   Resp: 18   Temp: 98.1 °F (36.7 °C)   SpO2: 97%       Physical Exam:     General: NAD, AAO x3-4  Eyes: EOMI  HENT: Atraumatic  Respiratory: no respiratory distress  GI: Normal bowel sounds, soft, nondistended, nontender  MSK: Right leg BKA wrapped in Ace bandage and in immobilizer  Skin: Intact, dry, warm, no rashes  Neuro: CN II to XII grossly intact  Psych: Normal mood    Medications:   Medications:    acetaminophen  650 mg Oral Q6H    calcium carbonate  500 mg Oral BID    ampicillin IV  2,000 mg IntraVENous 6 times per day    insulin lispro  0.08 Units/kg SubCUTAneous TID     famotidine  20 mg Oral Daily    nicotine  1 patch TransDERmal Daily    aspirin  81 mg Oral Daily    sodium chloride flush  5-40 mL IntraVENous 2 times per day    enoxaparin  40 mg SubCUTAneous Daily    insulin glargine  10 Units SubCUTAneous Nightly      Infusions:    dextrose      sodium chloride 25 mL/hr at 08/31/22 0928     PRN Meds: HYDROmorphone, 1 mg, Q2H PRN  oxyCODONE, 5 mg, Q3H PRN  methocarbamol, 500 mg, 4x Daily PRN  glucose, 4 tablet, PRN  dextrose bolus, 125 mL, PRN   Or  dextrose bolus, 250 mL, PRN  glucagon (rDNA), 1 mg, PRN  dextrose, , Continuous PRN  sodium chloride flush, 5-40 mL, PRN  sodium chloride, , PRN  ondansetron, 4 mg, Q8H PRN   Or  ondansetron, 4 mg, Q6H PRN  polyethylene glycol, 17 g, Daily PRN      Labs      Recent Results (from the past 24 hour(s))   Hemoglobin A1c    Collection Time: 08/30/22  3:26 PM   Result Value Ref Range    Hemoglobin A1C 8.6 (H) 4.2 - 6.3 %    eAG 200 mg/dL   POCT Glucose    Collection Time: 08/30/22  3:54 PM   Result Value Ref Range    POC Glucose 316 (H) 70 - 99 MG/DL   POCT Glucose    Collection Time: 08/30/22  8:09 PM   Result Value Ref Range    POC Glucose 201 (H) 70 - 99 MG/DL   Creatinine    Collection Time: 08/31/22  2:27 AM   Result Value Ref Range    Creatinine 0.8 (L) 0.9 - 1.3 MG/DL    GFR Non-African American >60 >60 mL/min/1.73m2    GFR African American >60 >60 mL/min/1.73m2   C-Reactive Protein    Collection Time: 08/31/22  2:27 AM   Result Value Ref Range    CRP, High Sensitivity 65.7 mg/L   Procalcitonin    Collection Time: 08/31/22  2:27 AM   Result Value Ref Range    Procalcitonin 0.774    Hemoglobin A1c    Collection Time: 08/31/22  2:27 AM   Result Value Ref Range    Hemoglobin A1C 9.1 (H) 4.2 - 6.3 %    eAG 214 mg/dL   POCT Glucose    Collection Time: 08/31/22  6:46 AM   Result Value Ref Range    POC Glucose 158 (H) 70 - 99 MG/DL   POCT Glucose    Collection Time: 08/31/22 11:15 AM   Result Value Ref Range    POC Glucose 224 (H) 70 - 99 MG/DL        Imaging/Diagnostics Last 24 Hours   XR FOOT RIGHT (MIN 3 VIEWS)    Result Date: 8/29/2022  EXAMINATION: THREE XRAY VIEWS OF THE RIGHT FOOT 8/29/2022 9:05 am COMPARISON: None.  HISTORY: ORDERING SYSTEM PROVIDED HISTORY: drop can of beans of foot, history of DM ulcer, black necrotic tissue present TECHNOLOGIST PROVIDED HISTORY: Reason for exam:->drop can of beans of foot, history of DM ulcer, black necrotic tissue present Reason for Exam: pain-dropped can of beans on foot FINDINGS: There is air in the subcutaneous soft tissues of the midfoot extending slightly into the forefoot and extending proximal into the ankle and there is a soft tissue ulcer on the plantar aspect of the junction of the midfoot and forefoot. There is cortical loss of the bone adjacent to the ulcer on the lateral view which most likely represents residual base of the 5th metatarsal suspicious for osteomyelitis. Findings suspicious for necrotizing fasciitis. Findings suspicious for osteomyelitis on the lateral view possibly in the region of the base of the 5th metatarsal which is partially amputated. Findings were discussed with Dr. Maryuri Munroe on 08/29/2022 at 9:50 a.m.        Electronically signed by Columba Martinez MD on 8/31/2022 at 2:54 PM

## 2022-09-01 LAB
CREAT SERPL-MCNC: 0.6 MG/DL (ref 0.9–1.3)
GFR AFRICAN AMERICAN: >60 ML/MIN/1.73M2
GFR NON-AFRICAN AMERICAN: >60 ML/MIN/1.73M2
GLUCOSE BLD-MCNC: 194 MG/DL (ref 70–99)
GLUCOSE BLD-MCNC: 216 MG/DL (ref 70–99)
GLUCOSE BLD-MCNC: 258 MG/DL (ref 70–99)
GLUCOSE BLD-MCNC: 291 MG/DL (ref 70–99)
HIGH SENSITIVE C-REACTIVE PROTEIN: 45.5 MG/L

## 2022-09-01 PROCEDURE — 6360000002 HC RX W HCPCS: Performed by: INTERNAL MEDICINE

## 2022-09-01 PROCEDURE — 6370000000 HC RX 637 (ALT 250 FOR IP): Performed by: INTERNAL MEDICINE

## 2022-09-01 PROCEDURE — 6370000000 HC RX 637 (ALT 250 FOR IP): Performed by: SURGERY

## 2022-09-01 PROCEDURE — 82962 GLUCOSE BLOOD TEST: CPT

## 2022-09-01 PROCEDURE — 6370000000 HC RX 637 (ALT 250 FOR IP): Performed by: HOSPITALIST

## 2022-09-01 PROCEDURE — 2580000003 HC RX 258: Performed by: PHYSICIAN ASSISTANT

## 2022-09-01 PROCEDURE — 99232 SBSQ HOSP IP/OBS MODERATE 35: CPT | Performed by: INTERNAL MEDICINE

## 2022-09-01 PROCEDURE — 99024 POSTOP FOLLOW-UP VISIT: CPT | Performed by: SURGERY

## 2022-09-01 PROCEDURE — 6360000002 HC RX W HCPCS: Performed by: PHYSICIAN ASSISTANT

## 2022-09-01 PROCEDURE — 6370000000 HC RX 637 (ALT 250 FOR IP): Performed by: PHYSICIAN ASSISTANT

## 2022-09-01 PROCEDURE — 82565 ASSAY OF CREATININE: CPT

## 2022-09-01 PROCEDURE — 36415 COLL VENOUS BLD VENIPUNCTURE: CPT

## 2022-09-01 PROCEDURE — 1200000000 HC SEMI PRIVATE

## 2022-09-01 PROCEDURE — 94761 N-INVAS EAR/PLS OXIMETRY MLT: CPT

## 2022-09-01 PROCEDURE — 86141 C-REACTIVE PROTEIN HS: CPT

## 2022-09-01 RX ORDER — INSULIN LISPRO 100 [IU]/ML
0-4 INJECTION, SOLUTION INTRAVENOUS; SUBCUTANEOUS
Status: DISCONTINUED | OUTPATIENT
Start: 2022-09-01 | End: 2022-09-06 | Stop reason: HOSPADM

## 2022-09-01 RX ORDER — INSULIN GLARGINE 100 [IU]/ML
12 INJECTION, SOLUTION SUBCUTANEOUS NIGHTLY
Status: DISCONTINUED | OUTPATIENT
Start: 2022-09-01 | End: 2022-09-03

## 2022-09-01 RX ORDER — INSULIN LISPRO 100 [IU]/ML
0-4 INJECTION, SOLUTION INTRAVENOUS; SUBCUTANEOUS NIGHTLY
Status: DISCONTINUED | OUTPATIENT
Start: 2022-09-01 | End: 2022-09-06 | Stop reason: HOSPADM

## 2022-09-01 RX ORDER — INSULIN LISPRO 100 [IU]/ML
6 INJECTION, SOLUTION INTRAVENOUS; SUBCUTANEOUS
Status: DISCONTINUED | OUTPATIENT
Start: 2022-09-01 | End: 2022-09-03

## 2022-09-01 RX ADMIN — HYDROMORPHONE HYDROCHLORIDE 1 MG: 1 INJECTION, SOLUTION INTRAMUSCULAR; INTRAVENOUS; SUBCUTANEOUS at 06:38

## 2022-09-01 RX ADMIN — OXYCODONE HYDROCHLORIDE 5 MG: 5 TABLET ORAL at 23:27

## 2022-09-01 RX ADMIN — ENOXAPARIN SODIUM 40 MG: 100 INJECTION SUBCUTANEOUS at 08:56

## 2022-09-01 RX ADMIN — AMPICILLIN SODIUM 2000 MG: 2 INJECTION, POWDER, FOR SOLUTION INTRAVENOUS at 20:41

## 2022-09-01 RX ADMIN — HYDROMORPHONE HYDROCHLORIDE 1 MG: 1 INJECTION, SOLUTION INTRAMUSCULAR; INTRAVENOUS; SUBCUTANEOUS at 15:30

## 2022-09-01 RX ADMIN — OXYCODONE HYDROCHLORIDE 5 MG: 5 TABLET ORAL at 16:17

## 2022-09-01 RX ADMIN — SODIUM CHLORIDE, PRESERVATIVE FREE 10 ML: 5 INJECTION INTRAVENOUS at 20:33

## 2022-09-01 RX ADMIN — INSULIN LISPRO 5 UNITS: 100 INJECTION, SOLUTION INTRAVENOUS; SUBCUTANEOUS at 09:03

## 2022-09-01 RX ADMIN — HYDROMORPHONE HYDROCHLORIDE 1 MG: 1 INJECTION, SOLUTION INTRAMUSCULAR; INTRAVENOUS; SUBCUTANEOUS at 10:52

## 2022-09-01 RX ADMIN — HYDROMORPHONE HYDROCHLORIDE 1 MG: 1 INJECTION, SOLUTION INTRAMUSCULAR; INTRAVENOUS; SUBCUTANEOUS at 08:56

## 2022-09-01 RX ADMIN — INSULIN LISPRO 0 UNITS: 100 INJECTION, SOLUTION INTRAVENOUS; SUBCUTANEOUS at 16:19

## 2022-09-01 RX ADMIN — ACETAMINOPHEN 650 MG: 325 TABLET ORAL at 06:38

## 2022-09-01 RX ADMIN — INSULIN LISPRO 5 UNITS: 100 INJECTION, SOLUTION INTRAVENOUS; SUBCUTANEOUS at 12:16

## 2022-09-01 RX ADMIN — FAMOTIDINE 20 MG: 20 TABLET ORAL at 20:33

## 2022-09-01 RX ADMIN — OXYCODONE HYDROCHLORIDE 5 MG: 5 TABLET ORAL at 00:16

## 2022-09-01 RX ADMIN — HYDROMORPHONE HYDROCHLORIDE 1 MG: 1 INJECTION, SOLUTION INTRAMUSCULAR; INTRAVENOUS; SUBCUTANEOUS at 04:33

## 2022-09-01 RX ADMIN — OXYCODONE HYDROCHLORIDE 5 MG: 5 TABLET ORAL at 12:12

## 2022-09-01 RX ADMIN — INSULIN LISPRO 6 UNITS: 100 INJECTION, SOLUTION INTRAVENOUS; SUBCUTANEOUS at 16:19

## 2022-09-01 RX ADMIN — INSULIN GLARGINE 12 UNITS: 100 INJECTION, SOLUTION SUBCUTANEOUS at 20:43

## 2022-09-01 RX ADMIN — ACETAMINOPHEN 650 MG: 325 TABLET ORAL at 20:33

## 2022-09-01 RX ADMIN — CALCIUM CARBONATE 500 MG: 500 TABLET, CHEWABLE ORAL at 08:56

## 2022-09-01 RX ADMIN — AMPICILLIN SODIUM 2000 MG: 2 INJECTION, POWDER, FOR SOLUTION INTRAVENOUS at 12:21

## 2022-09-01 RX ADMIN — ACETAMINOPHEN 650 MG: 325 TABLET ORAL at 12:13

## 2022-09-01 RX ADMIN — AMPICILLIN SODIUM 2000 MG: 2 INJECTION, POWDER, FOR SOLUTION INTRAVENOUS at 15:33

## 2022-09-01 RX ADMIN — SODIUM CHLORIDE: 9 INJECTION, SOLUTION INTRAVENOUS at 20:39

## 2022-09-01 RX ADMIN — SODIUM CHLORIDE, PRESERVATIVE FREE 10 ML: 5 INJECTION INTRAVENOUS at 13:26

## 2022-09-01 RX ADMIN — HYDROMORPHONE HYDROCHLORIDE 1 MG: 1 INJECTION, SOLUTION INTRAMUSCULAR; INTRAVENOUS; SUBCUTANEOUS at 13:26

## 2022-09-01 RX ADMIN — HYDROMORPHONE HYDROCHLORIDE 1 MG: 1 INJECTION, SOLUTION INTRAMUSCULAR; INTRAVENOUS; SUBCUTANEOUS at 21:52

## 2022-09-01 RX ADMIN — OXYCODONE HYDROCHLORIDE 5 MG: 5 TABLET ORAL at 20:33

## 2022-09-01 RX ADMIN — AMPICILLIN SODIUM 2000 MG: 2 INJECTION, POWDER, FOR SOLUTION INTRAVENOUS at 09:03

## 2022-09-01 RX ADMIN — HYDROMORPHONE HYDROCHLORIDE 1 MG: 1 INJECTION, SOLUTION INTRAMUSCULAR; INTRAVENOUS; SUBCUTANEOUS at 01:52

## 2022-09-01 RX ADMIN — AMPICILLIN SODIUM 2000 MG: 2 INJECTION, POWDER, FOR SOLUTION INTRAVENOUS at 04:34

## 2022-09-01 ASSESSMENT — PAIN SCALES - GENERAL
PAINLEVEL_OUTOF10: 9
PAINLEVEL_OUTOF10: 10
PAINLEVEL_OUTOF10: 8
PAINLEVEL_OUTOF10: 9
PAINLEVEL_OUTOF10: 0
PAINLEVEL_OUTOF10: 7
PAINLEVEL_OUTOF10: 10
PAINLEVEL_OUTOF10: 9
PAINLEVEL_OUTOF10: 10
PAINLEVEL_OUTOF10: 7
PAINLEVEL_OUTOF10: 9
PAINLEVEL_OUTOF10: 10
PAINLEVEL_OUTOF10: 6
PAINLEVEL_OUTOF10: 3
PAINLEVEL_OUTOF10: 9
PAINLEVEL_OUTOF10: 8
PAINLEVEL_OUTOF10: 4
PAINLEVEL_OUTOF10: 2
PAINLEVEL_OUTOF10: 7

## 2022-09-01 ASSESSMENT — PAIN DESCRIPTION - DESCRIPTORS
DESCRIPTORS: ACHING;SHARP
DESCRIPTORS: BURNING
DESCRIPTORS: ACHING;THROBBING
DESCRIPTORS: ACHING;BURNING
DESCRIPTORS: ACHING;THROBBING
DESCRIPTORS: ACHING;CRUSHING
DESCRIPTORS: ACHING;BURNING

## 2022-09-01 ASSESSMENT — PAIN DESCRIPTION - ORIENTATION
ORIENTATION: RIGHT

## 2022-09-01 ASSESSMENT — PAIN SCALES - WONG BAKER
WONGBAKER_NUMERICALRESPONSE: 0
WONGBAKER_NUMERICALRESPONSE: 0

## 2022-09-01 ASSESSMENT — PAIN DESCRIPTION - LOCATION
LOCATION: LEG

## 2022-09-01 ASSESSMENT — PAIN DESCRIPTION - FREQUENCY: FREQUENCY: CONTINUOUS

## 2022-09-01 ASSESSMENT — PAIN DESCRIPTION - PAIN TYPE
TYPE: SURGICAL PAIN
TYPE: SURGICAL PAIN

## 2022-09-01 ASSESSMENT — PAIN DESCRIPTION - ONSET: ONSET: ON-GOING

## 2022-09-01 NOTE — CARE COORDINATION
Chart reviewed. CM met with the patient at bedside and he is in agreement with a referral to ARU. Referral made. He is open to a SNF if ARU denies and states the only place he does not want to go is [facility]. Cm is following.

## 2022-09-01 NOTE — PROGRESS NOTES
GENERAL SURGERY PROGRESS NOTE                     Subjective:    Pain improving    Objective:    Vitals: VITALS:  BP (!) 141/68   Pulse 67   Temp 98.6 °F (37 °C) (Oral)   Resp 12   Ht 5' 8\" (1.727 m)   Wt 145 lb (65.8 kg)   SpO2 98%   BMI 22.05 kg/m²     I/O: No intake/output data recorded. Labs/Imaging Results:   Lab Results   Component Value Date/Time     08/30/2022 06:24 AM    K 3.3 08/30/2022 06:24 AM     08/30/2022 06:24 AM    CO2 22 08/30/2022 06:24 AM    BUN 18 08/30/2022 06:24 AM    CREATININE 0.6 09/01/2022 05:33 AM    GLUCOSE 157 08/30/2022 06:24 AM    CALCIUM 6.9 08/30/2022 06:24 AM      Lab Results   Component Value Date    WBC 16.1 (H) 08/30/2022    HGB 8.6 (L) 08/30/2022    HCT 25.8 (L) 08/30/2022    MCV 84.9 08/30/2022     08/30/2022          IV Fluids:   dextrose    sodium chloride Last Rate: 25 mL/hr at 08/31/22 0001    Scheduled Meds:   acetaminophen, 650 mg, Oral, Q6H    ampicillin IV, 2,000 mg, IntraVENous, 6 times per day    insulin lispro, 0.08 Units/kg, SubCUTAneous, TID WC    famotidine, 20 mg, Oral, Daily    nicotine, 1 patch, TransDERmal, Daily    aspirin, 81 mg, Oral, Daily    sodium chloride flush, 5-40 mL, IntraVENous, 2 times per day    enoxaparin, 40 mg, SubCUTAneous, Daily    insulin glargine, 10 Units, SubCUTAneous, Nightly    Physical Exam:  General: A&O x 3, no distress. HEENT: Anicteric sclerae, MMM. Extremities: Right BKA stump without erythema, crepitus or drainage. Incision clean/dry/intact with staples in place.   Abdomen: Soft, nondistended, nontender       Assessment and Plan:     Patient Active Problem List:     Diabetes mellitus     H/O echocardiogram     S/P CABG (coronary artery bypass graft)     Chest pain     Cellulitis     Heroin withdrawal (HCC)     CAD (coronary artery disease)     Polysubstance abuse (HCC)     Small bowel obstruction (HCC)     Narcotic abuse, continuous (HCC)     Hx of hepatitis     Epigastric pain     Diarrhea Constipation with Ileus     Vomiting of fecal matter with nausea     Encephalopathy     Metabolic encephalopathy     Infectious gastroenteritis     Bilateral leg weakness     Gait disturbance     Left carotid stenosis     Hypothyroidism     Osteomyelitis (HCC)     Uncontrolled type II diabetes with peripheral autonomic neuropathy (HCC)     Gangrene of toe (HCC)     Acute hematogenous osteomyelitis of right foot (HCC)     Amputation of little toe (HCC)     PAD (peripheral artery disease) (HCC)     Uncontrolled pain     Generalized weakness     Simple chronic bronchitis (HCC)     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 (HCC)     Skin ulcer with necrosis of muscle (HCC)     Toe ulcer ([address]     Diabetic foot ([address]     Diabetic foot infection ([address]     Abscess of right foot     WD-Diabetic ulcer of right midfoot associated with type 2 diabetes mellitus, with muscle involvement without evidence of necrosis ([address]     Necrotizing fasciitis ([address]      Principal Problem:    Necrotizing fasciitis ([address]  Plan:     Continue pain control with p.o. meds first  Antibiotics per ID  Dressing changes daily        Manny Mitchell DO

## 2022-09-01 NOTE — PROGRESS NOTES
Physical Therapy  Att to see for therapy but pt refusing at this time. Pt reports not sleeping last night. Pt educated on importance of participating in tx but still declined. Pt advised therapy will try tomorrow.

## 2022-09-01 NOTE — PROGRESS NOTES
Infectious Disease Progress Note  2022   Patient Name: Synetta Schirmer : 1960     Assessment  Polymicrobial bacteremia- GBS, M morganii, P vulgaris isolated, secondary to right DFI with gangrene. S/p right BKA. Wound cx was not collected. Feels better, CRP on dwt  T2DM  COPD    Plan  Therapeutic: continue IV ampicillin (-)  Diagnostic:trend CRP  F/u: blood cx from  is ngtd  Other:     Reason for visit: F/u GBS, M morganii, P vulgaris bacteremia? History:? Interval history noted  Denies n/v/d/f or untoward effects of antimicrobials  Physical Exam:  Vital Signs: BP (!) 141/68   Pulse 67   Temp 98.6 °F (37 °C) (Oral)   Resp 12   Ht 5' 8\" (1.727 m)   Wt 145 lb (65.8 kg)   SpO2 98%   BMI 22.05 kg/m²     Gen: alert and oriented X3, no distress  Skin: no stigmata of endocarditis  Wounds: right BA C/D/I  HEMT: AT/NC Oropharynx pink, moist, and without lesions or exudates;   Eyes: PERRLA, EOMI, conjunctiva pink, sclera anicteric. Neck: Supple. Trachea midline. No LAD. Chest: no distress and CTA. Good air movement. Heart: RRR and no MRG. Abd: soft, non-distended, no tenderness, no hepatomegaly. Normoactive bowel sounds. Ext: no clubbing, cyanosis, or edema  Catheter Site: without erythema or tenderness  LDA:   Neuro: Mental status intact. CN 2-12 intact and no focal sensory or motor deficits     Radiologic / Imaging / TESTING  No results found.      Labs:    Recent Results (from the past 24 hour(s))   POCT Glucose    Collection Time: 22  4:41 PM   Result Value Ref Range    POC Glucose 273 (H) 70 - 99 MG/DL   POCT Glucose    Collection Time: 22  8:27 PM   Result Value Ref Range    POC Glucose 192 (H) 70 - 99 MG/DL   Creatinine    Collection Time: 22  5:33 AM   Result Value Ref Range    Creatinine 0.6 (L) 0.9 - 1.3 MG/DL    GFR Non-African American >60 >60 mL/min/1.73m2    GFR African American >60 >60 mL/min/1.73m2   C-Reactive Protein    Collection Time: 22  5:33 AM Problems:    * No resolved hospital problems.  *              Electronically signed by: Electronically signed by Santosh Corado MD on 9/1/2022 at 1:59 PM

## 2022-09-01 NOTE — CONSULTS
Consult completed. Nexiva 22g 1.75 inch catheter inserted via ultrasound in patient's LFA. Brisk blood return noted and catheter flushes with ease. Patient tolerated well. Gisselle Hernandez, primary RN notified. Consult IV/PICC team if patient's needs change.

## 2022-09-01 NOTE — PROGRESS NOTES
Physician Progress Note      Gauri Guzman  CSN #:                  [id number]  :                       1960  ADMIT DATE:       2022 8:20 AM  DISCH DATE:  RESPONDING  PROVIDER #:        Sonja De Guzman          QUERY TEXT:    Hospitalists,    Pt admitted with DFU w/ gangrene , osteomyelitis and necrotizing fasciitis. Pt   noted to have SIRS w/ group B srep bacteremia. If possible, please document   in the progress notes and discharge summary if you are evaluating and /or   treating any of the following: The medical record reflects the following:  Risk Factors: DFU w/ infection  Clinical Indicators: strep bacteremia, WBC 16.1-25.9, lactate 2.0, CRP 65,   tachypnea with RR up to 37,  Treatment: labs, imaging, IV atb, surgical consult with JIMBO    Thank you,  Alex Velasquez RN CDS  [phone]  Options provided:  -- Sepsis, present on admission  -- Sepsis, present on admission, now resolved  -- Sepsis, not present on admission  -- Sepsis was ruled out  -- Other - I will add my own diagnosis  -- Disagree - Not applicable / Not valid  -- Disagree - Clinically unable to determine / Unknown  -- Refer to Clinical Documentation Reviewer    PROVIDER RESPONSE TEXT:    This patient has sepsis which was present on admission.     Query created by: Monica Ortiz on 2022 11:32 AM      Electronically signed by:  Sonja De Guzman 2022 8:25 AM
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
## 2022-09-02 LAB
BACTERIA: NEGATIVE /HPF
BILIRUBIN URINE: NEGATIVE MG/DL
BLOOD, URINE: NEGATIVE
CLARITY: CLEAR
COLOR: YELLOW
CULTURE: ABNORMAL
GLUCOSE BLD-MCNC: 109 MG/DL (ref 70–99)
GLUCOSE BLD-MCNC: 122 MG/DL (ref 70–99)
GLUCOSE BLD-MCNC: 138 MG/DL (ref 70–99)
GLUCOSE BLD-MCNC: 205 MG/DL (ref 70–99)
GLUCOSE BLD-MCNC: 234 MG/DL (ref 70–99)
GLUCOSE, URINE: NEGATIVE MG/DL
HIGH SENSITIVE C-REACTIVE PROTEIN: 36.8 MG/L
KETONES, URINE: NEGATIVE MG/DL
LEUKOCYTE ESTERASE, URINE: NEGATIVE
Lab: ABNORMAL
Lab: ABNORMAL
MUCUS: ABNORMAL HPF
NITRITE URINE, QUANTITATIVE: NEGATIVE
PH, URINE: 5.5 (ref 5–8)
PROCALCITONIN: 0.38
PROTEIN UA: 100 MG/DL
RBC URINE: ABNORMAL /HPF (ref 0–3)
SPECIFIC GRAVITY UA: 1.02 (ref 1–1.03)
SPECIMEN: ABNORMAL
SPECIMEN: ABNORMAL
SQUAMOUS EPITHELIAL: 1 /HPF
TRICHOMONAS: ABNORMAL /HPF
UROBILINOGEN, URINE: NORMAL MG/DL (ref 0.2–1)
WBC UA: ABNORMAL /HPF (ref 0–2)

## 2022-09-02 PROCEDURE — 97116 GAIT TRAINING THERAPY: CPT

## 2022-09-02 PROCEDURE — 6360000002 HC RX W HCPCS: Performed by: PHYSICIAN ASSISTANT

## 2022-09-02 PROCEDURE — APPNB15 APP NON BILLABLE TIME 0-15 MINS: Performed by: NURSE PRACTITIONER

## 2022-09-02 PROCEDURE — 6370000000 HC RX 637 (ALT 250 FOR IP): Performed by: INTERNAL MEDICINE

## 2022-09-02 PROCEDURE — 84145 PROCALCITONIN (PCT): CPT

## 2022-09-02 PROCEDURE — 86141 C-REACTIVE PROTEIN HS: CPT

## 2022-09-02 PROCEDURE — 6370000000 HC RX 637 (ALT 250 FOR IP): Performed by: PHYSICIAN ASSISTANT

## 2022-09-02 PROCEDURE — 97530 THERAPEUTIC ACTIVITIES: CPT

## 2022-09-02 PROCEDURE — 94761 N-INVAS EAR/PLS OXIMETRY MLT: CPT

## 2022-09-02 PROCEDURE — 2580000003 HC RX 258: Performed by: PHYSICIAN ASSISTANT

## 2022-09-02 PROCEDURE — 99024 POSTOP FOLLOW-UP VISIT: CPT | Performed by: NURSE PRACTITIONER

## 2022-09-02 PROCEDURE — 36415 COLL VENOUS BLD VENIPUNCTURE: CPT

## 2022-09-02 PROCEDURE — 6370000000 HC RX 637 (ALT 250 FOR IP): Performed by: SURGERY

## 2022-09-02 PROCEDURE — 99232 SBSQ HOSP IP/OBS MODERATE 35: CPT | Performed by: INTERNAL MEDICINE

## 2022-09-02 PROCEDURE — 97535 SELF CARE MNGMENT TRAINING: CPT

## 2022-09-02 PROCEDURE — 1200000000 HC SEMI PRIVATE

## 2022-09-02 PROCEDURE — 82962 GLUCOSE BLOOD TEST: CPT

## 2022-09-02 PROCEDURE — 6360000002 HC RX W HCPCS: Performed by: INTERNAL MEDICINE

## 2022-09-02 PROCEDURE — 81001 URINALYSIS AUTO W/SCOPE: CPT

## 2022-09-02 RX ORDER — SULFAMETHOXAZOLE AND TRIMETHOPRIM 800; 160 MG/1; MG/1
1 TABLET ORAL EVERY 12 HOURS SCHEDULED
Status: DISCONTINUED | OUTPATIENT
Start: 2022-09-02 | End: 2022-09-06 | Stop reason: HOSPADM

## 2022-09-02 RX ORDER — OXYCODONE HYDROCHLORIDE 10 MG/1
10 TABLET ORAL EVERY 4 HOURS PRN
Status: DISCONTINUED | OUTPATIENT
Start: 2022-09-02 | End: 2022-09-06

## 2022-09-02 RX ORDER — AMOXICILLIN 500 MG/1
500 CAPSULE ORAL EVERY 8 HOURS SCHEDULED
Status: DISCONTINUED | OUTPATIENT
Start: 2022-09-02 | End: 2022-09-06 | Stop reason: HOSPADM

## 2022-09-02 RX ADMIN — AMPICILLIN SODIUM 2000 MG: 2 INJECTION, POWDER, FOR SOLUTION INTRAVENOUS at 11:06

## 2022-09-02 RX ADMIN — ACETAMINOPHEN 650 MG: 325 TABLET ORAL at 01:15

## 2022-09-02 RX ADMIN — FAMOTIDINE 20 MG: 20 TABLET ORAL at 22:27

## 2022-09-02 RX ADMIN — OXYCODONE HYDROCHLORIDE 10 MG: 10 TABLET ORAL at 22:26

## 2022-09-02 RX ADMIN — SODIUM CHLORIDE: 9 INJECTION, SOLUTION INTRAVENOUS at 01:19

## 2022-09-02 RX ADMIN — INSULIN LISPRO 6 UNITS: 100 INJECTION, SOLUTION INTRAVENOUS; SUBCUTANEOUS at 16:59

## 2022-09-02 RX ADMIN — AMPICILLIN SODIUM 2000 MG: 2 INJECTION, POWDER, FOR SOLUTION INTRAVENOUS at 04:53

## 2022-09-02 RX ADMIN — INSULIN LISPRO 6 UNITS: 100 INJECTION, SOLUTION INTRAVENOUS; SUBCUTANEOUS at 07:43

## 2022-09-02 RX ADMIN — ASPIRIN 81 MG CHEWABLE TABLET 81 MG: 81 TABLET CHEWABLE at 07:42

## 2022-09-02 RX ADMIN — HYDROMORPHONE HYDROCHLORIDE 1 MG: 1 INJECTION, SOLUTION INTRAMUSCULAR; INTRAVENOUS; SUBCUTANEOUS at 20:09

## 2022-09-02 RX ADMIN — ONDANSETRON 4 MG: 2 INJECTION INTRAMUSCULAR; INTRAVENOUS at 09:38

## 2022-09-02 RX ADMIN — OXYCODONE HYDROCHLORIDE 10 MG: 10 TABLET ORAL at 11:01

## 2022-09-02 RX ADMIN — AMOXICILLIN 500 MG: 500 CAPSULE ORAL at 15:04

## 2022-09-02 RX ADMIN — ACETAMINOPHEN 650 MG: 325 TABLET ORAL at 07:40

## 2022-09-02 RX ADMIN — SODIUM CHLORIDE, PRESERVATIVE FREE 10 ML: 5 INJECTION INTRAVENOUS at 09:38

## 2022-09-02 RX ADMIN — HYDROMORPHONE HYDROCHLORIDE 1 MG: 1 INJECTION, SOLUTION INTRAMUSCULAR; INTRAVENOUS; SUBCUTANEOUS at 06:26

## 2022-09-02 RX ADMIN — HYDROMORPHONE HYDROCHLORIDE 1 MG: 1 INJECTION, SOLUTION INTRAMUSCULAR; INTRAVENOUS; SUBCUTANEOUS at 16:58

## 2022-09-02 RX ADMIN — INSULIN LISPRO 1 UNITS: 100 INJECTION, SOLUTION INTRAVENOUS; SUBCUTANEOUS at 16:59

## 2022-09-02 RX ADMIN — OXYCODONE HYDROCHLORIDE 10 MG: 10 TABLET ORAL at 15:04

## 2022-09-02 RX ADMIN — HYDROMORPHONE HYDROCHLORIDE 1 MG: 1 INJECTION, SOLUTION INTRAMUSCULAR; INTRAVENOUS; SUBCUTANEOUS at 01:16

## 2022-09-02 RX ADMIN — SULFAMETHOXAZOLE AND TRIMETHOPRIM 1 TABLET: 800; 160 TABLET ORAL at 22:26

## 2022-09-02 RX ADMIN — SODIUM CHLORIDE, PRESERVATIVE FREE 10 ML: 5 INJECTION INTRAVENOUS at 07:45

## 2022-09-02 RX ADMIN — INSULIN LISPRO 6 UNITS: 100 INJECTION, SOLUTION INTRAVENOUS; SUBCUTANEOUS at 12:04

## 2022-09-02 RX ADMIN — AMPICILLIN SODIUM 2000 MG: 2 INJECTION, POWDER, FOR SOLUTION INTRAVENOUS at 01:20

## 2022-09-02 RX ADMIN — HYDROMORPHONE HYDROCHLORIDE 1 MG: 1 INJECTION, SOLUTION INTRAMUSCULAR; INTRAVENOUS; SUBCUTANEOUS at 09:38

## 2022-09-02 RX ADMIN — ACETAMINOPHEN 650 MG: 325 TABLET ORAL at 18:03

## 2022-09-02 RX ADMIN — AMOXICILLIN 500 MG: 500 CAPSULE ORAL at 22:26

## 2022-09-02 RX ADMIN — INSULIN LISPRO 1 UNITS: 100 INJECTION, SOLUTION INTRAVENOUS; SUBCUTANEOUS at 07:43

## 2022-09-02 RX ADMIN — OXYCODONE HYDROCHLORIDE 5 MG: 5 TABLET ORAL at 07:42

## 2022-09-02 RX ADMIN — OXYCODONE HYDROCHLORIDE 5 MG: 5 TABLET ORAL at 04:54

## 2022-09-02 RX ADMIN — ACETAMINOPHEN 650 MG: 325 TABLET ORAL at 11:01

## 2022-09-02 RX ADMIN — HYDROMORPHONE HYDROCHLORIDE 1 MG: 1 INJECTION, SOLUTION INTRAMUSCULAR; INTRAVENOUS; SUBCUTANEOUS at 13:21

## 2022-09-02 RX ADMIN — ENOXAPARIN SODIUM 40 MG: 100 INJECTION SUBCUTANEOUS at 07:40

## 2022-09-02 ASSESSMENT — PAIN DESCRIPTION - ONSET
ONSET: ON-GOING
ONSET: PROGRESSIVE

## 2022-09-02 ASSESSMENT — PAIN DESCRIPTION - LOCATION
LOCATION: LEG

## 2022-09-02 ASSESSMENT — PAIN DESCRIPTION - ORIENTATION
ORIENTATION: RIGHT

## 2022-09-02 ASSESSMENT — PAIN DESCRIPTION - PAIN TYPE
TYPE: SURGICAL PAIN

## 2022-09-02 ASSESSMENT — PAIN - FUNCTIONAL ASSESSMENT
PAIN_FUNCTIONAL_ASSESSMENT: PREVENTS OR INTERFERES SOME ACTIVE ACTIVITIES AND ADLS
PAIN_FUNCTIONAL_ASSESSMENT: PREVENTS OR INTERFERES WITH MANY ACTIVE NOT PASSIVE ACTIVITIES

## 2022-09-02 ASSESSMENT — PAIN DESCRIPTION - DESCRIPTORS
DESCRIPTORS: ACHING;DISCOMFORT
DESCRIPTORS: ACHING;DISCOMFORT
DESCRIPTORS: THROBBING
DESCRIPTORS: ACHING;DISCOMFORT
DESCRIPTORS: ACHING
DESCRIPTORS: ACHING;DISCOMFORT
DESCRIPTORS: ACHING;DISCOMFORT

## 2022-09-02 ASSESSMENT — PAIN SCALES - GENERAL
PAINLEVEL_OUTOF10: 9
PAINLEVEL_OUTOF10: 9
PAINLEVEL_OUTOF10: 5
PAINLEVEL_OUTOF10: 8
PAINLEVEL_OUTOF10: 9
PAINLEVEL_OUTOF10: 9
PAINLEVEL_OUTOF10: 10
PAINLEVEL_OUTOF10: 9
PAINLEVEL_OUTOF10: 5
PAINLEVEL_OUTOF10: 10
PAINLEVEL_OUTOF10: 8
PAINLEVEL_OUTOF10: 10
PAINLEVEL_OUTOF10: 7
PAINLEVEL_OUTOF10: 9
PAINLEVEL_OUTOF10: 8
PAINLEVEL_OUTOF10: 8
PAINLEVEL_OUTOF10: 10
PAINLEVEL_OUTOF10: 6
PAINLEVEL_OUTOF10: 6

## 2022-09-02 NOTE — PROGRESS NOTES
Physical Therapy    Physical Therapy Treatment Note  Name: Gianfranco Galarza MRN: [id number] :   1960   Date:  2022   Admission Date: 2022 Room:  [address] [place], [place] [address]   Restrictions/Precautions:           fall risk; general precautions; right BKA NWB on R residual limb; knee immobilizer when OOB  Communication with other providers:  nurse gave IV pain meds and encouragement and education on importance of participating in tx session. Co-tx with Donna OT for safety and energy conservation as pt fatigues quickly and would most likely refuse second tx session. Subjective:  Patient states:  pt refused tx at first c/o pain and feeling depressed but after nurse giving pain meds pt was agreeable to tx and demeanor change and pt thanking therapist many times for being patient. Pain:   Location, Type, Intensity (0/10 to 10/10):  pt did not give rating but did appear to be in distress and c/o pain at start of tx. Objective:    Observation:  alert and oriented to self with right knee immobilizer on but very loose and pt requesting not to tighten it when re-applied. Treatment, including education/measures:     Pt given encouragement and education before, during and at end of tx session. Pt c/o muscle spasms in upper thigh/quad area. Knee immobilizer was removed and pt encouraged to bend and straighten leg and to rub muscles to encourage relaxation and pt did well with this. Immobilizer left off for dressing and ex. With OT assist pt transferred sup to sit with mod assist needing increased time and effort using bed features. Sit to sup sba. Sit<=>stand at walker to pull pants up min/cga assist of 2 for safety and balance and to hold rw in place. Pt declined using walker to transfer to chair and requested for therapist to let him do a half stand pivot bed<=>wc and pt did well with this needing only sba for safety. Pt was able to propel wc 120' x 2 with sba and cues for brakes and safety.  Pt did need min assist and cues to back wc up ramp into // bars. Pt was able to transfer sit<=> // bars sba and amb 4-5' forward and back gonzalez with sba/cga. Pt stood in // and working on hip flex/ext st strengthen and stretch out hip and reps were as tolerated by pt. Safety  Patient left safely in the bed, with call light/phone in reach with alarm applied. Gait belt and mask were used for transfers and gait. Assessment / Impression:      Patient's tolerance of treatment:  good   Adverse Reaction: na  Significant change in status and impact:  na  Barriers to improvement:  pt needs increased time and encouragement to participate in tx session but once he is up he does well. Plan for Next Session:    Cont.  POC  Time in:  0930  Time out:  1018  Timed treatment minutes:  48  Total treatment time:  50    Previously filed items:  Social/Functional History  Lives With: Spouse  Type of Home: House  Home Layout: One level  Home Access: Stairs to enter with rails  Entrance Stairs - Number of Steps: 4  Entrance Stairs - Rails: Both  Bathroom Shower/Tub: Tub/Shower unit, Shower chair with back  Bathroom Toilet: Standard  Bathroom Accessibility: [place] accessible  Has the patient had two or more falls in the past year or any fall with injury in the past year?: No  ADL Assistance: Independent  Homemaking Assistance: Independent  Homemaking Responsibilities: Yes  Meal Prep Responsibility: Primary  Laundry Responsibility: Primary  Cleaning Responsibility: Primary  Bill Paying/Finance Responsibility: Primary  Shopping Responsibility: Primary  Ambulation Assistance: Independent  Transfer Assistance: Independent  Active : Yes (but unsure now with DANNI GARLAND)  Occupation: On disability  Leisure & Hobbies: ride 4 wheelers  Patient Goals   Patient goals : return to [facility]  Time Frame for Short term goals: 1 week  Short term goal 1: pt will complete bed mobility at mod ind  Short term goal 2: pt will complete transfers at sup level  Short term goal 3: pt will ambulate 75 ft using FWW at min A  Short term goal 4: pt will demonstrate understanding of need for NWB R residual limb compliance and understanding of need for knee immobilizer    Electronically signed by:    Benita Valladares PTA  9/2/2022, 8:28 AM

## 2022-09-02 NOTE — PROGRESS NOTES
Comprehensive Nutrition Assessment    Type and Reason for Visit:  Reassess    Nutrition Recommendations/Plan:   Continue carb controlled diet  Will continue to provide low calorie, high protein oral supplement with meals to provide 160 kcal and 16 grams of protein each. Will continue to follow up during stay     Malnutrition Assessment:  Malnutrition Status: At risk for malnutrition (Comment) (08/30/22 0908)    Context:  Chronic Illness       Nutrition Assessment:    Remains on carb controlled diet with low calorie, high protein oral nutrition supplement offered at all meals. No po intake documented in past days but patient reports eating more at meals and will drink supplements. Feeling better today, not feeling as down/sad as before. Will continue to follow at moderate nutrition risk at this time. Nutrition Related Findings:    resting in bed, leg painful from therapy, gluocse POCT over 200 mg/dL Wound Type: Surgical Incision, Diabetic Ulcer       Current Nutrition Intake & Therapies:    Average Meal Intake: % (per pateint)  Average Supplements Intake: %  ADULT DIET; Regular; 4 carb choices (60 gm/meal); Low Fat/Low Chol/High Fiber/MELISSA  ADULT ORAL NUTRITION SUPPLEMENT; Breakfast, Lunch, Dinner; Low Calorie/High Protein Oral Supplement    Anthropometric Measures:  Height: 5' 8\" (172.7 cm)  Ideal Body Weight (IBW): 154 lbs (70 kg)       Current Body Weight: 145 lb 1 oz (65.8 kg), 94.2 % IBW. Weight Source: Stated  Current BMI (kg/m2): 22.1  Usual Body Weight: 150 lb (68 kg) (per hx and patient stated)  % Weight Change (Calculated): -3.3  Weight Adjustment For: Amputation  Total Adjusted Percentage (Calculated): 5.9  Adjusted Ideal Body Weight (lbs) (Calculated): 144.9 lbs  Adjusted Ideal Body Weight (kg) (Calculated): 65.86 kg  Adjusted % Ideal Body Weight (Calculated): 100.1  Adjusted BMI (kg/m2) (Calculated): 23.4  BMI Categories: Normal Weight (BMI 18.5-24. 9)    Estimated Daily Nutrient Needs:  Energy Requirements Based On: Kcal/kg  Weight Used for Energy Requirements: Current  Energy (kcal/day): 8362-3052 (30-35 pramod/kg)  Weight Used for Protein Requirements: Current  Protein (g/day): 78-92 (1.2-1.4 g/kg)  Method Used for Fluid Requirements: 1 ml/kcal  Fluid (ml/day): 2000    Nutrition Diagnosis:   Predicted inadequate energy intake related to increase demand for energy/nutrients as evidenced by wounds    Nutrition Interventions:   Food and/or Nutrient Delivery: Continue Current Diet, Continue Oral Nutrition Supplement  Nutrition Education/Counseling: Education initiated  Coordination of Nutrition Care: Continue to monitor while inpatient  Plan of Care discussed with: patient    Goals:  Previous Goal Met: Progressing toward Goal(s)  Goals: PO intake 75% or greater, by next RD assessment       Nutrition Monitoring and Evaluation:   Behavioral-Environmental Outcomes: None Identified  Food/Nutrient Intake Outcomes: Food and Nutrient Intake, Supplement Intake  Physical Signs/Symptoms Outcomes: Biochemical Data, Meal Time Behavior, Skin, Weight    Discharge Planning:    Continue current diet, Continue Oral Nutrition Supplement, Recommend pursue outpatient diabetes education     Frantz Moreno RD, LD  Contact: [phone]

## 2022-09-02 NOTE — CARE COORDINATION
Met with patient  regarding pre-cert process and discussed ARU. Explained to patient the required 3 hours of therapy a day. Also explained the average length of stay is 11 days, could be longer or shorter depending on recommendations of therapy and Dr. Genevieve Cardenas. Patient expresses his understanding and states he's agreeable to admit to ARU. Per patient his goal is to return home at discharge from ARU. Patients primary insurance is [employer], pre-cert needed. Pre-cert initiated and pending at this time. Will continue to follow for determination.

## 2022-09-02 NOTE — PROGRESS NOTES
V2.0  [facility] Hospitalist Progress Note      Name:  Alisha Gamboa /Age/Sex: 1960  (60 y.o. male)   MRN & CSN:  [id number] & [id number] Encounter Date/Time: 2022 3:41 PM EDT    Location:  [address] [place] [address] PCP: Rigo Carlson MD       Hospital Day: 5    Assessment and Plan:   Alisha Gamboa is a 60 y.o. male with PMH of type 2 diabetes, COPD, CAD, hypertension, hyperlipidemia who presents with Necrotizing fasciitis ([facility] [address]    #Right diabetic foot wound with suspected necrotizing fasciitis and right metatarsal osteomyelitis s/p BKA  -X-ray of the right foot suspicious for necrotizing fasciitis and osteomyelitis of fifth metatarsal    Plan:  Continue ampicillin  Pain control with Dilaudid and oxycodone  ID on consult, appreciate recommendations    #uncontrolled type 2 diabetes  -A1c 7.8 in 2022    Plan:  Lantus increased to 12 units + 6U lispro + low dose sliding scale    #Hyponatremia-resolving  -Was initially in the setting of hypoglycemia  -Possibly secondary to pain as well? Plan:  Repeat BMP in the morning    Chronic medical problems:    #PAD  Plan: Continue aspirin    #COPD, not in exacerbation    #HLD  -Not on statin    #Hypertension  -Not on any medication at home  -Blood pressure under control while she is here, will not start any medications at this time    #Tobacco use disorder  Plan: Nicotine patch      Diet ADULT DIET; Regular; 4 carb choices (60 gm/meal); Low Fat/Low Chol/High Fiber/MELISSA  ADULT ORAL NUTRITION SUPPLEMENT; Breakfast, Lunch, Dinner;  Low Calorie/High Protein Oral Supplement   DVT Prophylaxis [x] Lovenox, []  Heparin, [] SCDs, [] Ambulation,  [] Eliquis, [] Xarelto  [] Coumadin   Code Status Full Code   Disposition From: Home  Expected Disposition: Pending PT and OT eval  Estimated Date of Discharge: Greater than 24-hour  Patient requires continued admission due to BKA requiring PT and OT, referral sent to ARU   Surrogate Decision Maker/ RIAZ Fitch     Subjective: Chief Complaint: Foot Injury (Patient states a can of beans fell from 6' onto his right foot. The bruise is now black and he is worried because he has diabetes and a chronic wound on the bottom of the same foot. )       Patient complaining of RLE soreness. No no other complaints this morning. Review of Systems:    General: No fever, no chills  Heart: No chest pain, no palpitations  Lungs: No shortness of breath, no cough  Abdomen: No abdominal pain, no nausea, no vomiting, no constipation, no diarrhea  : No frequency, no dysuria, no urgency, no decreased urination  MSK: No lower extremity swelling, +right leg pain  Neuro: No confusion, no weakness  Skin: No rashes      Objective:      Intake/Output Summary (Last 24 hours) at 9/2/2022 1252  Last data filed at 9/2/2022 0748  Gross per 24 hour   Intake 5 ml   Output 600 ml   Net -595 ml        Vitals:   Vitals:    09/02/22 0651   BP: (!) 149/68   Pulse: 63   Resp: 16   Temp: 98.4 °F (36.9 °C)   SpO2: 98%       Physical Exam:     General: NAD, AAO x3-4  Eyes: EOMI  HENT: Atraumatic  Respiratory: no respiratory distress  GI: Normal bowel sounds, soft, nondistended, nontender  MSK: Right leg BKA wrapped in Ace bandage and in immobilizer  Skin: Intact, dry, warm, no rashes  Neuro: CN II to XII grossly intact  Psych: Normal mood    Medications:   Medications:    insulin glargine  12 Units SubCUTAneous Nightly    insulin lispro  6 Units SubCUTAneous TID WC    insulin lispro  0-4 Units SubCUTAneous TID WC    insulin lispro  0-4 Units SubCUTAneous Nightly    acetaminophen  650 mg Oral Q6H    ampicillin IV  2,000 mg IntraVENous 6 times per day    famotidine  20 mg Oral Daily    nicotine  1 patch TransDERmal Daily    aspirin  81 mg Oral Daily    sodium chloride flush  5-40 mL IntraVENous 2 times per day    enoxaparin  40 mg SubCUTAneous Daily      Infusions:    dextrose      sodium chloride 100 mL/hr at 09/02/22 1105     PRN Meds: oxyCODONE, 10 mg, Q4H PRN  HYDROmorphone, 1 mg, Q3H PRN  methocarbamol, 500 mg, 4x Daily PRN  glucose, 4 tablet, PRN  dextrose bolus, 125 mL, PRN   Or  dextrose bolus, 250 mL, PRN  glucagon (rDNA), 1 mg, PRN  dextrose, , Continuous PRN  sodium chloride flush, 5-40 mL, PRN  sodium chloride, , PRN  ondansetron, 4 mg, Q8H PRN   Or  ondansetron, 4 mg, Q6H PRN  polyethylene glycol, 17 g, Daily PRN      Labs      Recent Results (from the past 24 hour(s))   POCT Glucose    Collection Time: 09/01/22  8:30 PM   Result Value Ref Range    POC Glucose 216 (H) 70 - 99 MG/DL   C-Reactive Protein    Collection Time: 09/02/22  5:06 AM   Result Value Ref Range    CRP, High Sensitivity 36.8 mg/L   Procalcitonin    Collection Time: 09/02/22  5:06 AM   Result Value Ref Range    Procalcitonin 0.382    POCT Glucose    Collection Time: 09/02/22  6:34 AM   Result Value Ref Range    POC Glucose 205 (H) 70 - 99 MG/DL   POCT Glucose    Collection Time: 09/02/22 10:59 AM   Result Value Ref Range    POC Glucose 138 (H) 70 - 99 MG/DL        Imaging/Diagnostics Last 24 Hours   XR FOOT RIGHT (MIN 3 VIEWS)    Result Date: 8/29/2022  EXAMINATION: THREE XRAY VIEWS OF THE RIGHT FOOT 8/29/2022 9:05 am COMPARISON: None. HISTORY: ORDERING SYSTEM PROVIDED HISTORY: drop can of beans of foot, history of DM ulcer, black necrotic tissue present TECHNOLOGIST PROVIDED HISTORY: Reason for exam:->drop can of beans of foot, history of DM ulcer, black necrotic tissue present Reason for Exam: pain-dropped can of beans on foot FINDINGS: There is air in the subcutaneous soft tissues of the midfoot extending slightly into the forefoot and extending proximal into the ankle and there is a soft tissue ulcer on the plantar aspect of the junction of the midfoot and forefoot. There is cortical loss of the bone adjacent to the ulcer on the lateral view which most likely represents residual base of the 5th metatarsal suspicious for osteomyelitis. Findings suspicious for necrotizing fasciitis. Findings suspicious for osteomyelitis on the lateral view possibly in the region of the base of the 5th metatarsal which is partially amputated. Findings were discussed with Dr. Desirae Smiley on 08/29/2022 at 9:50 a.m.        Electronically signed by Nirmal Donald MD on 9/2/2022 at 12:52 PM

## 2022-09-02 NOTE — PROGRESS NOTES
Infectious Disease Progress Note  2022   Patient Name: Marcelo Null : 1960     Assessment  Polymicrobial bacteremia- GBS, M morganii, P vulgaris isolated, secondary to right DFI with gangrene. S/p right BKA. Wound cx was not collected. Feels better, CRP on dwt  T2DM  COPD    Plan  Therapeutic: discontinue IV ampicillin (-), start amoxicillin 500 mg po tid  and Bactrim DS one tab po bid throug 2022  Diagnostic:trend CRP  F/u: blood cx from  is ngtd  Other:     Reason for visit: F/u GBS, M morganii, P vulgaris bacteremia? History:? Interval history noted  Denies n/v/d/f or untoward effects of antimicrobials  Physical Exam:  Vital Signs: BP (!) 149/68   Pulse 63   Temp 98.4 °F (36.9 °C) (Oral)   Resp 16   Ht 5' 8\" (1.727 m)   Wt 145 lb (65.8 kg)   SpO2 98%   BMI 22.05 kg/m²     Gen: alert and oriented X3, no distress  Skin: no stigmata of endocarditis  Wounds: right BA C/D/I  HEMT: AT/NC Oropharynx pink, moist, and without lesions or exudates;   Eyes: PERRLA, EOMI, conjunctiva pink, sclera anicteric. Neck: Supple. Trachea midline. No LAD. Chest: no distress and CTA. Good air movement. Heart: RRR and no MRG. Abd: soft, non-distended, no tenderness, no hepatomegaly. Normoactive bowel sounds. Ext: no clubbing, cyanosis, or edema  Catheter Site: without erythema or tenderness  LDA:   Neuro: Mental status intact. CN 2-12 intact and no focal sensory or motor deficits     Radiologic / Imaging / TESTING  No results found.      Labs:    Recent Results (from the past 24 hour(s))   POCT Glucose    Collection Time: 22  8:30 PM   Result Value Ref Range    POC Glucose 216 (H) 70 - 99 MG/DL   C-Reactive Protein    Collection Time: 22  5:06 AM   Result Value Ref Range    CRP, High Sensitivity 36.8 mg/L   Procalcitonin    Collection Time: 22  5:06 AM   Result Value Ref Range    Procalcitonin 0.382    POCT Glucose    Collection Time: 22  6:34 AM   Result Value Ref Range POC Glucose 205 (H) 70 - 99 MG/DL   POCT Glucose    Collection Time: 09/02/22 10:59 AM   Result Value Ref Range    POC Glucose 138 (H) 70 - 99 MG/DL     CULTURE results: Invalid input(s): BLOOD CULTURE,  URINE CULTURE, SURGICAL CULTURE    Diagnosis:  Patient Active Problem List   Diagnosis    Diabetes mellitus    H/O echocardiogram    S/P CABG (coronary artery bypass graft)    Chest pain    Cellulitis    Heroin withdrawal (HCC)    CAD (coronary artery disease)    Polysubstance abuse ([facility])    Small bowel obstruction (HCC)    Narcotic abuse, continuous (HCC)    Hx of hepatitis    Epigastric pain    Diarrhea    Constipation with Ileus    Vomiting of fecal matter with nausea    Encephalopathy    Metabolic encephalopathy    Infectious gastroenteritis    Bilateral leg weakness    Gait disturbance    Left carotid stenosis    Hypothyroidism    Osteomyelitis ([facility])    Uncontrolled type II diabetes with peripheral autonomic neuropathy ([facility])    Gangrene of toe ([facility])    Acute hematogenous osteomyelitis of right foot ([facility])    Amputation of little toe ([facility])    PAD (peripheral artery disease) ([facility])    Uncontrolled pain    Generalized weakness    Simple chronic bronchitis ([facility])    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 ([facility])    Skin ulcer with necrosis of muscle (HCC)    Toe ulcer ([address]    Diabetic foot (HCC)    Diabetic foot infection ([address]    Abscess of right foot    WD-Diabetic ulcer of right midfoot associated with type 2 diabetes mellitus, with muscle involvement without evidence of necrosis ([address]    Necrotizing fasciitis ([address]    Bacteremia due to group B Streptococcus    Gangrene of right foot ([address]       Active Problems  Principal Problem:    Necrotizing fasciitis ([address]  Active Problems:    Bacteremia due to group B Streptococcus    Gangrene of right foot ([address]  Resolved Problems:    * No resolved hospital problems.  *              Electronically signed by: Electronically signed by aSntosh Corado MD on 9/2/2022 at 1:16 PM

## 2022-09-02 NOTE — PROGRESS NOTES
GENERAL SURGERY PROGRESS NOTE            Subjective:    Patient complains of left extremity pain, nurse notified. Objective:    Vitals: VITALS:  BP (!) 149/68   Pulse 63   Temp 98.4 °F (36.9 °C) (Oral)   Resp 16   Ht 5' 8\" (1.727 m)   Wt 145 lb (65.8 kg)   SpO2 98%   BMI 22.05 kg/m²     I/O: 09/01 0701 - 09/02 0700  In: 5 [I.V.:5]  Out: 300 [Urine:300]    Labs/Imaging Results:   Lab Results   Component Value Date/Time     08/30/2022 06:24 AM    K 3.3 08/30/2022 06:24 AM     08/30/2022 06:24 AM    CO2 22 08/30/2022 06:24 AM    BUN 18 08/30/2022 06:24 AM    CREATININE 0.6 09/01/2022 05:33 AM    GLUCOSE 157 08/30/2022 06:24 AM    CALCIUM 6.9 08/30/2022 06:24 AM      Lab Results   Component Value Date    WBC 16.1 (H) 08/30/2022    HGB 8.6 (L) 08/30/2022    HCT 25.8 (L) 08/30/2022    MCV 84.9 08/30/2022     08/30/2022          IV Fluids:   dextrose    sodium chloride Last Rate: Stopped (09/02/22 0200)    Scheduled Meds:   insulin glargine, 12 Units, SubCUTAneous, Nightly    insulin lispro, 6 Units, SubCUTAneous, TID WC    insulin lispro, 0-4 Units, SubCUTAneous, TID WC    insulin lispro, 0-4 Units, SubCUTAneous, Nightly    acetaminophen, 650 mg, Oral, Q6H    ampicillin IV, 2,000 mg, IntraVENous, 6 times per day    famotidine, 20 mg, Oral, Daily    nicotine, 1 patch, TransDERmal, Daily    aspirin, 81 mg, Oral, Daily    sodium chloride flush, 5-40 mL, IntraVENous, 2 times per day    enoxaparin, 40 mg, SubCUTAneous, Daily    Physical Exam:  General: A&O x 3, no distress. HEENT: Anicteric sclerae, MMM. Extremities: Right BKA stump without erythema, crepitus or drainage. Incision clean/dry/intact with staples in place.   Abdomen: Soft, nondistended, nontender       Assessment and Plan:     Patient Active Problem List:     Diabetes mellitus     H/O echocardiogram     S/P CABG (coronary artery bypass graft)     Chest pain     Cellulitis     Heroin withdrawal (HCC)     CAD (coronary artery disease)     Polysubstance abuse ([address]     Small bowel obstruction (HCC)     Narcotic abuse, continuous (HCC)     Hx of hepatitis     Epigastric pain     Diarrhea     Constipation with Ileus     Vomiting of fecal matter with nausea     Encephalopathy     Metabolic encephalopathy     Infectious gastroenteritis     Bilateral leg weakness     Gait disturbance     Left carotid stenosis     Hypothyroidism     Osteomyelitis (HCC)     Uncontrolled type II diabetes with peripheral autonomic neuropathy (HCC)     Gangrene of toe (HCC)     Acute hematogenous osteomyelitis of right foot (HCC)     Amputation of little toe (HCC)     PAD (peripheral artery disease) (HCC)     Uncontrolled pain     Generalized weakness     Simple chronic bronchitis (HCC)     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 (HCC)     Skin ulcer with necrosis of muscle (HCC)     Toe ulcer ([address]     Diabetic foot ([address]     Diabetic foot infection ([address]     Abscess of right foot     WD-Diabetic ulcer of right midfoot associated with type 2 diabetes mellitus, with muscle involvement without evidence of necrosis ([address]     Necrotizing fasciitis ([address]      Principal Problem:    Necrotizing fasciitis ([address]  Plan:     Continue pain control with p.o. meds first  Antibiotics per ID  Dressing changes daily  ARU pending, awaiting PT/OT maye Castaneda, APRN - CNP

## 2022-09-02 NOTE — PROGRESS NOTES
manual WC. Pt demo'd good control to self-propel and navigated backwards with MinAx1 with cues for safety. See PT note for further details. Parallel bars:   Pt transferred <-> WC in parallel bars with SBA. PT ambulated ~5' forward and backward with CGA to SBA provided. Pt encouraged to remain standing to work on hip flexion/extension for strengthening and stretching. See PT note for further details. Self Care Training:   Self care training was performed today. Cues were given for safety, sequence, UE/LE placement, visual cues, and balance. Activities performed today included:    Pt complained of immobilizer during bed mobility and immobilizer was removed for bed mobility and EOB/OOB activity. Pt encouraged to move and stretch R BKA and encouraged to provide light touch for desensitization purposes. LB dressing: Pt donned personal underwear while seated EOB with SetupA. Pt donned shorts with ModA to thread RLE. Pt completed STS with MinAx2 and use of 2WW to hike up. UB dressing: Pt doffed gown with SEtupA and donned personal Tshirt with SetupA seated EOB. UB bathing: Pt completed washing hands, BUE, and face with SetupA while seated in WC. Oral hygiene: Pt completed with setupA while seated in WC. Education: Role of OT, OT POC, safety, benefits of EOB/OOB activity, rationale for treatment  Safety Measures: Gait belt used for safety of pt and therapist, Left in bed per pt request, Alarm in place, call light and phone within reach, lines managed, needs met     Assessment / Impression:    Pt demo'd depressive symptoms regarding recent amputation and benefited from max encouragement to engage in OOB activity, as well as collaboration with RN for pain medications.      Patient's tolerance of treatment: Fair   Adverse Reaction: none   Significant change in status and impact:  none   Barriers to improvement: strength, endurance, pain management, BKA     Plan for Next Session:    Continue OT POC    Time in:  0930  Time out:  1018  Timed treatment minutes:  48  Total treatment time:  48    Electronically signed by:    ARVIND Phillip/L  License: [id number]  5/4/5735, 10:24 AM

## 2022-09-03 LAB
GLUCOSE BLD-MCNC: 112 MG/DL (ref 70–99)
GLUCOSE BLD-MCNC: 169 MG/DL (ref 70–99)
GLUCOSE BLD-MCNC: 173 MG/DL (ref 70–99)
GLUCOSE BLD-MCNC: 246 MG/DL (ref 70–99)

## 2022-09-03 PROCEDURE — 6370000000 HC RX 637 (ALT 250 FOR IP): Performed by: INTERNAL MEDICINE

## 2022-09-03 PROCEDURE — 6360000002 HC RX W HCPCS: Performed by: PHYSICIAN ASSISTANT

## 2022-09-03 PROCEDURE — 82962 GLUCOSE BLOOD TEST: CPT

## 2022-09-03 PROCEDURE — 6370000000 HC RX 637 (ALT 250 FOR IP): Performed by: PHYSICIAN ASSISTANT

## 2022-09-03 PROCEDURE — 6360000002 HC RX W HCPCS: Performed by: INTERNAL MEDICINE

## 2022-09-03 PROCEDURE — 94761 N-INVAS EAR/PLS OXIMETRY MLT: CPT

## 2022-09-03 PROCEDURE — 97530 THERAPEUTIC ACTIVITIES: CPT

## 2022-09-03 PROCEDURE — 2580000003 HC RX 258: Performed by: PHYSICIAN ASSISTANT

## 2022-09-03 PROCEDURE — 97116 GAIT TRAINING THERAPY: CPT

## 2022-09-03 PROCEDURE — 1200000000 HC SEMI PRIVATE

## 2022-09-03 RX ORDER — INSULIN GLARGINE 100 [IU]/ML
10 INJECTION, SOLUTION SUBCUTANEOUS NIGHTLY
Status: DISCONTINUED | OUTPATIENT
Start: 2022-09-03 | End: 2022-09-04

## 2022-09-03 RX ORDER — INSULIN LISPRO 100 [IU]/ML
7 INJECTION, SOLUTION INTRAVENOUS; SUBCUTANEOUS
Status: DISCONTINUED | OUTPATIENT
Start: 2022-09-03 | End: 2022-09-04

## 2022-09-03 RX ADMIN — ENOXAPARIN SODIUM 40 MG: 100 INJECTION SUBCUTANEOUS at 08:10

## 2022-09-03 RX ADMIN — HYDROMORPHONE HYDROCHLORIDE 1 MG: 1 INJECTION, SOLUTION INTRAMUSCULAR; INTRAVENOUS; SUBCUTANEOUS at 11:47

## 2022-09-03 RX ADMIN — OXYCODONE HYDROCHLORIDE 10 MG: 10 TABLET ORAL at 05:46

## 2022-09-03 RX ADMIN — INSULIN LISPRO 6 UNITS: 100 INJECTION, SOLUTION INTRAVENOUS; SUBCUTANEOUS at 11:50

## 2022-09-03 RX ADMIN — OXYCODONE HYDROCHLORIDE 10 MG: 10 TABLET ORAL at 23:51

## 2022-09-03 RX ADMIN — OXYCODONE HYDROCHLORIDE 10 MG: 10 TABLET ORAL at 14:25

## 2022-09-03 RX ADMIN — OXYCODONE HYDROCHLORIDE 10 MG: 10 TABLET ORAL at 10:24

## 2022-09-03 RX ADMIN — HYDROMORPHONE HYDROCHLORIDE 1 MG: 1 INJECTION, SOLUTION INTRAMUSCULAR; INTRAVENOUS; SUBCUTANEOUS at 00:14

## 2022-09-03 RX ADMIN — HYDROMORPHONE HYDROCHLORIDE 1 MG: 1 INJECTION, SOLUTION INTRAMUSCULAR; INTRAVENOUS; SUBCUTANEOUS at 03:42

## 2022-09-03 RX ADMIN — HYDROMORPHONE HYDROCHLORIDE 1 MG: 1 INJECTION, SOLUTION INTRAMUSCULAR; INTRAVENOUS; SUBCUTANEOUS at 21:44

## 2022-09-03 RX ADMIN — SULFAMETHOXAZOLE AND TRIMETHOPRIM 1 TABLET: 800; 160 TABLET ORAL at 08:11

## 2022-09-03 RX ADMIN — SODIUM CHLORIDE, PRESERVATIVE FREE 10 ML: 5 INJECTION INTRAVENOUS at 21:46

## 2022-09-03 RX ADMIN — AMOXICILLIN 500 MG: 500 CAPSULE ORAL at 21:38

## 2022-09-03 RX ADMIN — AMOXICILLIN 500 MG: 500 CAPSULE ORAL at 13:12

## 2022-09-03 RX ADMIN — SODIUM CHLORIDE, PRESERVATIVE FREE 10 ML: 5 INJECTION INTRAVENOUS at 08:15

## 2022-09-03 RX ADMIN — FAMOTIDINE 20 MG: 20 TABLET ORAL at 21:37

## 2022-09-03 RX ADMIN — INSULIN LISPRO 1 UNITS: 100 INJECTION, SOLUTION INTRAVENOUS; SUBCUTANEOUS at 11:50

## 2022-09-03 RX ADMIN — SODIUM CHLORIDE, PRESERVATIVE FREE 10 ML: 5 INJECTION INTRAVENOUS at 00:16

## 2022-09-03 RX ADMIN — ASPIRIN 81 MG CHEWABLE TABLET 81 MG: 81 TABLET CHEWABLE at 08:11

## 2022-09-03 RX ADMIN — SULFAMETHOXAZOLE AND TRIMETHOPRIM 1 TABLET: 800; 160 TABLET ORAL at 21:37

## 2022-09-03 RX ADMIN — HYDROMORPHONE HYDROCHLORIDE 1 MG: 1 INJECTION, SOLUTION INTRAMUSCULAR; INTRAVENOUS; SUBCUTANEOUS at 08:11

## 2022-09-03 RX ADMIN — ACETAMINOPHEN 650 MG: 325 TABLET ORAL at 00:14

## 2022-09-03 RX ADMIN — HYDROMORPHONE HYDROCHLORIDE 1 MG: 1 INJECTION, SOLUTION INTRAMUSCULAR; INTRAVENOUS; SUBCUTANEOUS at 18:21

## 2022-09-03 RX ADMIN — HYDROMORPHONE HYDROCHLORIDE 1 MG: 1 INJECTION, SOLUTION INTRAMUSCULAR; INTRAVENOUS; SUBCUTANEOUS at 15:23

## 2022-09-03 RX ADMIN — INSULIN GLARGINE 10 UNITS: 100 INJECTION, SOLUTION SUBCUTANEOUS at 21:40

## 2022-09-03 ASSESSMENT — PAIN SCALES - GENERAL
PAINLEVEL_OUTOF10: 10
PAINLEVEL_OUTOF10: 7
PAINLEVEL_OUTOF10: 10
PAINLEVEL_OUTOF10: 9
PAINLEVEL_OUTOF10: 9
PAINLEVEL_OUTOF10: 0
PAINLEVEL_OUTOF10: 9
PAINLEVEL_OUTOF10: 10
PAINLEVEL_OUTOF10: 10

## 2022-09-03 ASSESSMENT — PAIN DESCRIPTION - DESCRIPTORS
DESCRIPTORS: ACHING
DESCRIPTORS: THROBBING
DESCRIPTORS: ACHING
DESCRIPTORS: ACHING;THROBBING
DESCRIPTORS: THROBBING

## 2022-09-03 ASSESSMENT — PAIN - FUNCTIONAL ASSESSMENT
PAIN_FUNCTIONAL_ASSESSMENT: ACTIVITIES ARE NOT PREVENTED
PAIN_FUNCTIONAL_ASSESSMENT: PREVENTS OR INTERFERES SOME ACTIVE ACTIVITIES AND ADLS

## 2022-09-03 ASSESSMENT — PAIN DESCRIPTION - ORIENTATION
ORIENTATION: RIGHT
ORIENTATION: RIGHT;LOWER
ORIENTATION: RIGHT

## 2022-09-03 ASSESSMENT — PAIN DESCRIPTION - LOCATION
LOCATION: LEG

## 2022-09-03 ASSESSMENT — PAIN DESCRIPTION - FREQUENCY: FREQUENCY: CONTINUOUS

## 2022-09-03 ASSESSMENT — PAIN DESCRIPTION - ONSET: ONSET: ON-GOING

## 2022-09-03 ASSESSMENT — PAIN DESCRIPTION - PAIN TYPE: TYPE: SURGICAL PAIN;ACUTE PAIN;NEUROPATHIC PAIN

## 2022-09-03 NOTE — PLAN OF CARE
Problem: Chronic Conditions and Co-morbidities  Goal: Patient's chronic conditions and co-morbidity symptoms are monitored and maintained or improved  9/3/2022 1114 by Nayla Harrington RN  Outcome: Progressing  Flowsheets (Taken 9/3/2022 1114)  Care Plan - Patient's Chronic Conditions and Co-Morbidity Symptoms are Monitored and Maintained or Improved:   Monitor and assess patient's chronic conditions and comorbid symptoms for stability, deterioration, or improvement   Collaborate with multidisciplinary team to address chronic and comorbid conditions and prevent exacerbation or deterioration   Update acute care plan with appropriate goals if chronic or comorbid symptoms are exacerbated and prevent overall improvement and discharge  9/3/2022 1110 by Nayla Harrington RN  Outcome: Progressing     Problem: Pain  Goal: Verbalizes/displays adequate comfort level or baseline comfort level  9/3/2022 1114 by Nayla Harrington RN  Outcome: Progressing  Flowsheets (Taken 9/3/2022 1114)  Verbalizes/displays adequate comfort level or baseline comfort level:   Encourage patient to monitor pain and request assistance   Assess pain using appropriate pain scale   Administer analgesics based on type and severity of pain and evaluate response   Implement non-pharmacological measures as appropriate and evaluate response  9/3/2022 1110 by Nayla Harrington RN  Outcome: Progressing     Problem: Safety - Adult  Goal: Free from fall injury  9/3/2022 1114 by Nayla Harrington RN  Outcome: Progressing  9/3/2022 1110 by Nayla Harrington RN  Outcome: Progressing

## 2022-09-03 NOTE — PROGRESS NOTES
V2.0  [facility] Hospitalist Progress Note      Name:  Avelina Cuenca /Age/Sex: 1960  (60 y.o. male)   MRN & CSN:  [id number] & [id number] Encounter Date/Time: 9/3/2022 3:41 PM EDT    Location:  [facility] PCP: Javon Kumari MD       Hospital Day: 6    Assessment and Plan:   Avelina Cuenca is a 60 y.o. male with PMH of type 2 diabetes, COPD, CAD, hypertension, hyperlipidemia who presents with Necrotizing fasciitis ([facility] [address]    #Right diabetic foot wound with suspected necrotizing fasciitis and right metatarsal osteomyelitis s/p BKA  -X-ray of the right foot suspicious for necrotizing fasciitis and osteomyelitis of fifth metatarsal    Plan:  Continue ampicillin  Pain control with Dilaudid and oxycodone  ID on consult, appreciate recommendations    #uncontrolled type 2 diabetes  -A1c 7.8 in 2022    Plan:  Lantus decreased to 10 units + 6U lispro + low dose sliding scale    #Hyponatremia-resolving  -Was initially in the setting of hypoglycemia  -Possibly secondary to pain as well? Plan:  Repeat BMP in the morning    Chronic medical problems:    #PAD  Plan: Continue aspirin    #COPD, not in exacerbation    #HLD  -Not on statin    #Hypertension  -Not on any medication at home  -Blood pressure under control while she is here, will not start any medications at this time    #Tobacco use disorder  Plan: Nicotine patch      Diet ADULT DIET; Regular; 4 carb choices (60 gm/meal); Low Fat/Low Chol/High Fiber/MELISSA  ADULT ORAL NUTRITION SUPPLEMENT; Breakfast, Lunch, Dinner;  Low Calorie/High Protein Oral Supplement   DVT Prophylaxis [x] Lovenox, []  Heparin, [] SCDs, [] Ambulation,  [] Eliquis, [] Xarelto  [] Coumadin   Code Status Full Code   Disposition From: Home  Expected Disposition: Pending PT and OT eval  Estimated Date of Discharge: Greater than 24-hour  Patient requires continued admission due to BKA requiring PT and OT, referral sent to ARU   Surrogate Decision Maker/ RIAZ Galindo     Subjective: Chief Complaint: Foot Injury (Patient states a can of beans fell from 6' onto his right foot. The bruise is now black and he is worried because he has diabetes and a chronic wound on the bottom of the same foot. )       Patient states that the change in pain medications helped a lot. He overall feels okay. Review of Systems:    General: No fever, no chills  Heart: No chest pain, no palpitations  Lungs: No shortness of breath, no cough  Abdomen: No abdominal pain, no nausea, no vomiting, no constipation, no diarrhea  : No frequency, no dysuria, no urgency, no decreased urination  MSK: No lower extremity swelling, +right leg pain  Neuro: No confusion, no weakness  Skin: No rashes      Objective:      Intake/Output Summary (Last 24 hours) at 9/3/2022 1320  Last data filed at 9/2/2022 2038  Gross per 24 hour   Intake --   Output 300 ml   Net -300 ml        Vitals:   Vitals:    09/03/22 1024   BP:    Pulse:    Resp: 18   Temp:    SpO2:        Physical Exam:     General: NAD, AAO x3-4  Eyes: EOMI  HENT: Atraumatic  Respiratory: no respiratory distress  GI: Normal bowel sounds, soft, nondistended, nontender  MSK: Right leg BKA wrapped in Ace bandage and in immobilizer  Skin: Intact, dry, warm, no rashes  Neuro: CN II to XII grossly intact  Psych: Normal mood    Medications:   Medications:    amoxicillin  500 mg Oral 3 times per day    sulfamethoxazole-trimethoprim  1 tablet Oral 2 times per day    insulin glargine  12 Units SubCUTAneous Nightly    insulin lispro  6 Units SubCUTAneous TID WC    insulin lispro  0-4 Units SubCUTAneous TID WC    insulin lispro  0-4 Units SubCUTAneous Nightly    acetaminophen  650 mg Oral Q6H    famotidine  20 mg Oral Daily    nicotine  1 patch TransDERmal Daily    aspirin  81 mg Oral Daily    sodium chloride flush  5-40 mL IntraVENous 2 times per day    enoxaparin  40 mg SubCUTAneous Daily      Infusions:    dextrose      sodium chloride 100 mL/hr at 09/02/22 1105     PRN Meds: oxyCODONE, 10 mg, Q4H PRN  HYDROmorphone, 1 mg, Q3H PRN  methocarbamol, 500 mg, 4x Daily PRN  glucose, 4 tablet, PRN  dextrose bolus, 125 mL, PRN   Or  dextrose bolus, 250 mL, PRN  glucagon (rDNA), 1 mg, PRN  dextrose, , Continuous PRN  sodium chloride flush, 5-40 mL, PRN  sodium chloride, , PRN  ondansetron, 4 mg, Q8H PRN   Or  ondansetron, 4 mg, Q6H PRN  polyethylene glycol, 17 g, Daily PRN      Labs      Recent Results (from the past 24 hour(s))   POCT Glucose    Collection Time: 09/02/22  3:54 PM   Result Value Ref Range    POC Glucose 234 (H) 70 - 99 MG/DL   POCT Glucose    Collection Time: 09/02/22  7:37 PM   Result Value Ref Range    POC Glucose 122 (H) 70 - 99 MG/DL   Urinalysis with Microscopic    Collection Time: 09/02/22  7:50 PM   Result Value Ref Range    Color, UA YELLOW YELLOW    Clarity, UA CLEAR CLEAR    Glucose, Urine NEGATIVE NEGATIVE MG/DL    Bilirubin Urine NEGATIVE NEGATIVE MG/DL    Ketones, Urine NEGATIVE NEGATIVE MG/DL    Specific Gravity, UA 1.025 1.001 - 1.035    Blood, Urine NEGATIVE NEGATIVE    pH, Urine 5.5 5.0 - 8.0    Protein,  (A) NEGATIVE MG/DL    Urobilinogen, Urine NORMAL 0.2 - 1.0 MG/DL    Nitrite Urine, Quantitative NEGATIVE NEGATIVE    Leukocyte Esterase, Urine NEGATIVE NEGATIVE    RBC, UA NONE SEEN 0 - 3 /HPF    WBC, UA NONE SEEN 0 - 2 /HPF    Bacteria, UA NEGATIVE NEGATIVE /HPF    Squam Epithel, UA 1 /HPF    Mucus, UA RARE (A) NEGATIVE HPF    Trichomonas, UA NONE SEEN NONE SEEN /HPF   POCT Glucose    Collection Time: 09/02/22  8:32 PM   Result Value Ref Range    POC Glucose 109 (H) 70 - 99 MG/DL   POCT Glucose    Collection Time: 09/03/22  6:56 AM   Result Value Ref Range    POC Glucose 112 (H) 70 - 99 MG/DL   POCT Glucose    Collection Time: 09/03/22 11:37 AM   Result Value Ref Range    POC Glucose 246 (H) 70 - 99 MG/DL        Imaging/Diagnostics Last 24 Hours   XR FOOT RIGHT (MIN 3 VIEWS)    Result Date: 8/29/2022  EXAMINATION: THREE XRAY VIEWS OF THE RIGHT FOOT

## 2022-09-03 NOTE — PROGRESS NOTES
Physical Therapy    Physical Therapy Treatment Note  Name: Jayden Nathan MRN: [id number] :   1960   Date:  9/3/2022   Admission Date: 2022 Room:  [address] [place] [address]   Restrictions/Precautions:  fall risk ; general precautions ; NWB on R residual limb ; knee immobilizer for OOB  Communication with other providers:  RN clears for participation ; unable to locate RN for handoff  Subjective:  Patient states:  \"I don't think I'm nearly as strong today\"  Pain:   Location, Type, Intensity (0/10 to 10/10):  8/10 R residual limb pain  Objective:    Observation:  Supine, awake, agitated, agreeable. He is limited by pain today. Treatment, including education/measures:  Therapeutic Activity Training:   Therapeutic activity training was instructed today. Cues were given for safety, sequence, UE/LE placement, awareness, and balance. Activities performed today included bed mobility training, sup-sit, sit-stand, SPT. Supine <-> sit: SBA  Seated balance: good  Sit <-> stand: min A  Standing balance: fair - ; min A to steady  Stand <-> sit: min A  Refusing further mobility, agreeable to light seated therapeutic activities ; pt requiring mod cues to be redirected to task, limiting during completion  - Initiated education and performance of 2 sets of 10 reps of: quad sets, SLR, glute sets  - plus 2 x 15 sec hold hamstring stretch and hip flexor stretch  Positioned for comfort and pressure relief following ; left in chair, chair alarmed, all needs met, gait belt for OOB, call light in reach, attempting to reach RN for handoff - unavailable by phone and unable to locate ; pt requesting doffing of knee immobilizer to allow for pt dry sweaty residual limb    Gait Training:  Cues were given for safety, sequence, device management, balance, posture, awareness, path.     3 x 10 ft using FWW at CGA to min A ; dec sage, hop-to, dec step height/length, inc postural sway requiring intermittent steady, intermittently inconsistent patterning ; seated rest for ~ 2 mins between trails x 3    Assessment / Impression:    Attempted to further progress mobility this session, pain limiting participation in in ambulation, strength, ROM activities. He is resistant and requires max encouragement for participation. Will cont to follow as pain and pt disposition allows.     Patient's tolerance of treatment:  fair   Barriers to improvement:  self-limiting ; pain  Plan for Next Session:    Cont w/ est POC    Time in: 1520  Time out:  1546  Timed treatment minutes:  23  Total treatment time:  26    Previously filed items:  Social/Functional History  Lives With: Spouse  Type of Home: House  Home Layout: One level  Home Access: Stairs to enter with rails  Entrance Stairs - Number of Steps: 4  Entrance Stairs - Rails: Both  Bathroom Shower/Tub: Tub/Shower unit, Shower chair with back  Bathroom Toilet: Standard  Bathroom Accessibility: Renny Spore accessible  Has the patient had two or more falls in the past year or any fall with injury in the past year?: No  ADL Assistance: Independent  Homemaking Assistance: Independent  Homemaking Responsibilities: Yes  Meal Prep Responsibility: Primary  Laundry Responsibility: Primary  Cleaning Responsibility: Primary  Bill Paying/Finance Responsibility: Primary  Shopping Responsibility: Primary  Ambulation Assistance: Independent  Transfer Assistance: Independent  Active : Yes (but unsure now with R GILL)  Occupation: On disability  Leisure & Hobbies: ride 4 wheelers  Patient Goals   Patient goals : return to [facility]  Time Frame for Short term goals: 1 week  Short term goal 1: pt will complete bed mobility at mod ind  Short term goal 2: pt will complete transfers at sup level  Short term goal 3: pt will ambulate 75 ft using FWW at min A  Short term goal 4: pt will demonstrate understanding of need for NWB R residual limb compliance and understanding of need for knee immobilizer    Electronically signed by:    Isai Garay, PT, DPT  9/3/2022, 4:20 PM

## 2022-09-04 LAB
CULTURE: NORMAL
CULTURE: NORMAL
GLUCOSE BLD-MCNC: 106 MG/DL (ref 70–99)
GLUCOSE BLD-MCNC: 136 MG/DL (ref 70–99)
GLUCOSE BLD-MCNC: 226 MG/DL (ref 70–99)
GLUCOSE BLD-MCNC: 251 MG/DL (ref 70–99)
Lab: NORMAL
Lab: NORMAL
PROCALCITONIN: 0.25
SPECIMEN: NORMAL
SPECIMEN: NORMAL

## 2022-09-04 PROCEDURE — 36415 COLL VENOUS BLD VENIPUNCTURE: CPT

## 2022-09-04 PROCEDURE — 6370000000 HC RX 637 (ALT 250 FOR IP): Performed by: INTERNAL MEDICINE

## 2022-09-04 PROCEDURE — 1200000000 HC SEMI PRIVATE

## 2022-09-04 PROCEDURE — 2580000003 HC RX 258: Performed by: PHYSICIAN ASSISTANT

## 2022-09-04 PROCEDURE — 6370000000 HC RX 637 (ALT 250 FOR IP): Performed by: PHYSICIAN ASSISTANT

## 2022-09-04 PROCEDURE — 6360000002 HC RX W HCPCS: Performed by: PHYSICIAN ASSISTANT

## 2022-09-04 PROCEDURE — 84145 PROCALCITONIN (PCT): CPT

## 2022-09-04 PROCEDURE — 6360000002 HC RX W HCPCS: Performed by: INTERNAL MEDICINE

## 2022-09-04 PROCEDURE — 94761 N-INVAS EAR/PLS OXIMETRY MLT: CPT

## 2022-09-04 PROCEDURE — 82962 GLUCOSE BLOOD TEST: CPT

## 2022-09-04 RX ORDER — GABAPENTIN 300 MG/1
600 CAPSULE ORAL 3 TIMES DAILY
Status: DISCONTINUED | OUTPATIENT
Start: 2022-09-04 | End: 2022-09-06 | Stop reason: HOSPADM

## 2022-09-04 RX ORDER — INSULIN GLARGINE 100 [IU]/ML
8 INJECTION, SOLUTION SUBCUTANEOUS NIGHTLY
Status: DISCONTINUED | OUTPATIENT
Start: 2022-09-04 | End: 2022-09-06 | Stop reason: HOSPADM

## 2022-09-04 RX ADMIN — ENOXAPARIN SODIUM 40 MG: 100 INJECTION SUBCUTANEOUS at 08:24

## 2022-09-04 RX ADMIN — AMOXICILLIN 500 MG: 500 CAPSULE ORAL at 06:41

## 2022-09-04 RX ADMIN — SODIUM CHLORIDE, PRESERVATIVE FREE 10 ML: 5 INJECTION INTRAVENOUS at 04:55

## 2022-09-04 RX ADMIN — SULFAMETHOXAZOLE AND TRIMETHOPRIM 1 TABLET: 800; 160 TABLET ORAL at 21:37

## 2022-09-04 RX ADMIN — OXYCODONE HYDROCHLORIDE 10 MG: 10 TABLET ORAL at 21:37

## 2022-09-04 RX ADMIN — HYDROMORPHONE HYDROCHLORIDE 1 MG: 1 INJECTION, SOLUTION INTRAMUSCULAR; INTRAVENOUS; SUBCUTANEOUS at 01:45

## 2022-09-04 RX ADMIN — SODIUM CHLORIDE, PRESERVATIVE FREE 10 ML: 5 INJECTION INTRAVENOUS at 21:38

## 2022-09-04 RX ADMIN — AMOXICILLIN 500 MG: 500 CAPSULE ORAL at 21:37

## 2022-09-04 RX ADMIN — OXYCODONE HYDROCHLORIDE 10 MG: 10 TABLET ORAL at 08:24

## 2022-09-04 RX ADMIN — GABAPENTIN 600 MG: 300 CAPSULE ORAL at 21:37

## 2022-09-04 RX ADMIN — INSULIN LISPRO 7 UNITS: 100 INJECTION, SOLUTION INTRAVENOUS; SUBCUTANEOUS at 11:43

## 2022-09-04 RX ADMIN — INSULIN LISPRO 2 UNITS: 100 INJECTION, SOLUTION INTRAVENOUS; SUBCUTANEOUS at 16:37

## 2022-09-04 RX ADMIN — SODIUM CHLORIDE, PRESERVATIVE FREE 10 ML: 5 INJECTION INTRAVENOUS at 08:24

## 2022-09-04 RX ADMIN — SULFAMETHOXAZOLE AND TRIMETHOPRIM 1 TABLET: 800; 160 TABLET ORAL at 08:24

## 2022-09-04 RX ADMIN — GABAPENTIN 600 MG: 300 CAPSULE ORAL at 13:58

## 2022-09-04 RX ADMIN — GABAPENTIN 600 MG: 300 CAPSULE ORAL at 09:45

## 2022-09-04 RX ADMIN — FAMOTIDINE 20 MG: 20 TABLET ORAL at 21:37

## 2022-09-04 RX ADMIN — HYDROMORPHONE HYDROCHLORIDE 1 MG: 1 INJECTION, SOLUTION INTRAMUSCULAR; INTRAVENOUS; SUBCUTANEOUS at 04:54

## 2022-09-04 RX ADMIN — AMOXICILLIN 500 MG: 500 CAPSULE ORAL at 13:58

## 2022-09-04 RX ADMIN — OXYCODONE HYDROCHLORIDE 10 MG: 10 TABLET ORAL at 17:45

## 2022-09-04 RX ADMIN — ASPIRIN 81 MG CHEWABLE TABLET 81 MG: 81 TABLET CHEWABLE at 08:24

## 2022-09-04 RX ADMIN — INSULIN GLARGINE 8 UNITS: 100 INJECTION, SOLUTION SUBCUTANEOUS at 21:38

## 2022-09-04 RX ADMIN — OXYCODONE HYDROCHLORIDE 10 MG: 10 TABLET ORAL at 12:40

## 2022-09-04 RX ADMIN — HYDROMORPHONE HYDROCHLORIDE 1 MG: 1 INJECTION, SOLUTION INTRAMUSCULAR; INTRAVENOUS; SUBCUTANEOUS at 22:37

## 2022-09-04 ASSESSMENT — PAIN DESCRIPTION - DESCRIPTORS
DESCRIPTORS: BURNING
DESCRIPTORS: BURNING
DESCRIPTORS: ACHING
DESCRIPTORS: ACHING;BURNING;THROBBING
DESCRIPTORS: THROBBING
DESCRIPTORS: THROBBING
DESCRIPTORS: BURNING;DISCOMFORT

## 2022-09-04 ASSESSMENT — PAIN DESCRIPTION - PAIN TYPE
TYPE: SURGICAL PAIN;NEUROPATHIC PAIN
TYPE: SURGICAL PAIN
TYPE: NEUROPATHIC PAIN;SURGICAL PAIN

## 2022-09-04 ASSESSMENT — PAIN SCALES - GENERAL
PAINLEVEL_OUTOF10: 9
PAINLEVEL_OUTOF10: 1
PAINLEVEL_OUTOF10: 9
PAINLEVEL_OUTOF10: 8
PAINLEVEL_OUTOF10: 10
PAINLEVEL_OUTOF10: 9
PAINLEVEL_OUTOF10: 6
PAINLEVEL_OUTOF10: 6
PAINLEVEL_OUTOF10: 9

## 2022-09-04 ASSESSMENT — PAIN DESCRIPTION - LOCATION
LOCATION: KNEE
LOCATION: LEG
LOCATION: LEG
LOCATION: KNEE;LEG
LOCATION: LEG

## 2022-09-04 ASSESSMENT — PAIN - FUNCTIONAL ASSESSMENT
PAIN_FUNCTIONAL_ASSESSMENT: ACTIVITIES ARE NOT PREVENTED
PAIN_FUNCTIONAL_ASSESSMENT: PREVENTS OR INTERFERES SOME ACTIVE ACTIVITIES AND ADLS
PAIN_FUNCTIONAL_ASSESSMENT: ACTIVITIES ARE NOT PREVENTED
PAIN_FUNCTIONAL_ASSESSMENT: PREVENTS OR INTERFERES SOME ACTIVE ACTIVITIES AND ADLS

## 2022-09-04 ASSESSMENT — PAIN DESCRIPTION - ORIENTATION
ORIENTATION: RIGHT

## 2022-09-04 ASSESSMENT — PAIN DESCRIPTION - FREQUENCY: FREQUENCY: CONTINUOUS

## 2022-09-04 ASSESSMENT — PAIN DESCRIPTION - ONSET: ONSET: ON-GOING

## 2022-09-04 NOTE — PROGRESS NOTES
V2.0  [facility] Hospitalist Progress Note      Name:  Avelina Cuenca /Age/Sex: 1960  (60 y.o. male)   MRN & CSN:  [id number] & [id number] Encounter Date/Time: 2022 3:41 PM EDT    Location:  [facility] PCP: Javon Kumari MD       Hospital Day: 7    Assessment and Plan:   Avelina Cuenca is a 60 y.o. male with PMH of type 2 diabetes, COPD, CAD, hypertension, hyperlipidemia who presents with Necrotizing fasciitis ([facility] [address]    #Right diabetic foot wound with suspected necrotizing fasciitis and right metatarsal osteomyelitis s/p BKA  -X-ray of the right foot suspicious for necrotizing fasciitis and osteomyelitis of fifth metatarsal    Plan:  Continue ampicillin  Pain control with Dilaudid and oxycodone  ID on consult, appreciate recommendations    #uncontrolled type 2 diabetes  -A1c 7.8 in 2022    Plan:  Lantus decreased to 8 units + low dose sliding scale, mealtime scheduled insulin stopped, patient's BG on the lower side today    #Hyponatremia-resolving  -Was initially in the setting of hypoglycemia  -Possibly secondary to pain as well? Plan:  Repeat BMP in the morning    Chronic medical problems:    #PAD  Plan: Continue aspirin    #COPD, not in exacerbation    #HLD  -Not on statin    #Hypertension  -Not on any medication at home  -Blood pressure under control while she is here, will not start any medications at this time    #Tobacco use disorder  Plan: Nicotine patch      Diet ADULT DIET; Regular; 4 carb choices (60 gm/meal); Low Fat/Low Chol/High Fiber/MELISSA  ADULT ORAL NUTRITION SUPPLEMENT; Breakfast, Lunch, Dinner;  Low Calorie/High Protein Oral Supplement   DVT Prophylaxis [x] Lovenox, []  Heparin, [] SCDs, [] Ambulation,  [] Eliquis, [] Xarelto  [] Coumadin   Code Status Full Code   Disposition From: Home  Expected Disposition: Pending PT and OT eval  Estimated Date of Discharge: Greater than 24-hour  Patient requires continued admission due to BKA requiring PT and OT, referral sent to ARU Surrogate Decision Maker/ RIAZ Honeycutt     Subjective:     Chief Complaint: Foot Injury (Patient states a can of beans fell from 6' onto his right foot. The bruise is now black and he is worried because he has diabetes and a chronic wound on the bottom of the same foot. )     Patient still complaining of soreness today. He also states he feels depressed however does not want to see psych. Review of Systems:    General: No fever, no chills  Heart: No chest pain, no palpitations  Lungs: No shortness of breath, no cough  Abdomen: No abdominal pain, no nausea, no vomiting, no constipation, no diarrhea  : No frequency, no dysuria, no urgency, no decreased urination  MSK: No lower extremity swelling, +right leg pain  Neuro: No confusion, no weakness  Skin: No rashes  Psych: +depressed      Objective:      Intake/Output Summary (Last 24 hours) at 9/4/2022 1315  Last data filed at 9/4/2022 3708  Gross per 24 hour   Intake 10 ml   Output 1550 ml   Net -1540 ml        Vitals:   Vitals:    09/04/22 0821   BP: 136/60   Pulse: 64   Resp: 17   Temp: 98.3 °F (36.8 °C)   SpO2: 96%       Physical Exam:     General: NAD, AAO x3-4  Eyes: EOMI  HENT: Atraumatic  Respiratory: no respiratory distress  GI: Normal bowel sounds, soft, nondistended, nontender  MSK: Right leg BKA wrapped in Ace bandage and in immobilizer  Skin: Intact, dry, warm, no rashes  Neuro: CN II to XII grossly intact  Psych: flat affect    Medications:   Medications:    gabapentin  600 mg Oral TID    insulin glargine  10 Units SubCUTAneous Nightly    insulin lispro  7 Units SubCUTAneous TID WC    amoxicillin  500 mg Oral 3 times per day    sulfamethoxazole-trimethoprim  1 tablet Oral 2 times per day    insulin lispro  0-4 Units SubCUTAneous TID WC    insulin lispro  0-4 Units SubCUTAneous Nightly    acetaminophen  650 mg Oral Q6H    famotidine  20 mg Oral Daily    nicotine  1 patch TransDERmal Daily    aspirin  81 mg Oral Daily    sodium chloride flush adjacent to the ulcer on the lateral view which most likely represents residual base of the 5th metatarsal suspicious for osteomyelitis. Findings suspicious for necrotizing fasciitis. Findings suspicious for osteomyelitis on the lateral view possibly in the region of the base of the 5th metatarsal which is partially amputated. Findings were discussed with Dr. Helena Cardenas on 08/29/2022 at 9:50 a.m.        Electronically signed by Nai Cho MD on 9/4/2022 at 1:15 PM

## 2022-09-04 NOTE — PLAN OF CARE
Problem: Chronic Conditions and Co-morbidities  Goal: Patient's chronic conditions and co-morbidity symptoms are monitored and maintained or improved  9/3/2022 2325 by Carmen Sherman RN  Outcome: Progressing  9/3/2022 1114 by Irineo Harvey RN  Outcome: Progressing  Flowsheets (Taken 9/3/2022 1114)  Care Plan - Patient's Chronic Conditions and Co-Morbidity Symptoms are Monitored and Maintained or Improved:   Monitor and assess patient's chronic conditions and comorbid symptoms for stability, deterioration, or improvement   Collaborate with multidisciplinary team to address chronic and comorbid conditions and prevent exacerbation or deterioration   Update acute care plan with appropriate goals if chronic or comorbid symptoms are exacerbated and prevent overall improvement and discharge  9/3/2022 1110 by Irineo Harvey RN  Outcome: Progressing     Problem: Discharge Planning  Goal: Discharge to home or other facility with appropriate resources  Outcome: Progressing     Problem: Pain  Goal: Verbalizes/displays adequate comfort level or baseline comfort level  9/3/2022 2325 by Carmen Sherman RN  Outcome: Progressing  9/3/2022 1114 by Irineo Harvey RN  Outcome: Progressing  Flowsheets (Taken 9/3/2022 1114)  Verbalizes/displays adequate comfort level or baseline comfort level:   Encourage patient to monitor pain and request assistance   Assess pain using appropriate pain scale   Administer analgesics based on type and severity of pain and evaluate response   Implement non-pharmacological measures as appropriate and evaluate response  9/3/2022 1110 by Irineo Harvey RN  Outcome: Progressing     Problem: Skin/Tissue Integrity  Goal: Absence of new skin breakdown  Description: 1. Monitor for areas of redness and/or skin breakdown  2. Assess vascular access sites hourly  3. Every 4-6 hours minimum:  Change oxygen saturation probe site  4.   Every 4-6 hours:  If on nasal continuous positive airway pressure, respiratory therapy assess nares and determine need for appliance change or resting period.   Outcome: Progressing     Problem: Safety - Adult  Goal: Free from fall injury  9/3/2022 2325 by Chun Tam RN  Outcome: Progressing  9/3/2022 1114 by Segundo Schulte RN  Outcome: Progressing  9/3/2022 1110 by Segundo Schulte RN  Outcome: Progressing     Problem: Nutrition Deficit:  Goal: Optimize nutritional status  Outcome: Progressing

## 2022-09-04 NOTE — PLAN OF CARE
Problem: Chronic Conditions and Co-morbidities  Goal: Patient's chronic conditions and co-morbidity symptoms are monitored and maintained or improved  9/4/2022 1437 by Madeleine Ireland LPN  Outcome: Progressing  9/4/2022 1437 by Madeleine Ireland LPN  Outcome: Progressing     Problem: Discharge Planning  Goal: Discharge to home or other facility with appropriate resources  9/4/2022 1437 by Madeleine Ireland LPN  Outcome: Progressing  9/4/2022 1437 by Madeleine Ireland LPN  Outcome: Progressing     Problem: Pain  Goal: Verbalizes/displays adequate comfort level or baseline comfort level  9/4/2022 1437 by Madeleine Ireland LPN  Outcome: Progressing  9/4/2022 1437 by Madeleine Ireland LPN  Outcome: Progressing     Problem: Skin/Tissue Integrity  Goal: Absence of new skin breakdown  Description: 1. Monitor for areas of redness and/or skin breakdown  2. Assess vascular access sites hourly  3. Every 4-6 hours minimum:  Change oxygen saturation probe site  4. Every 4-6 hours:  If on nasal continuous positive airway pressure, respiratory therapy assess nares and determine need for appliance change or resting period.   9/4/2022 1437 by Madeleine Ireland LPN  Outcome: Progressing  9/4/2022 1437 by Madeleine Ireland LPN  Outcome: Progressing     Problem: Safety - Adult  Goal: Free from fall injury  9/4/2022 1437 by Madeleine Ireland LPN  Outcome: Progressing  9/4/2022 1437 by Madeleine Ireland LPN  Outcome: Progressing     Problem: Nutrition Deficit:  Goal: Optimize nutritional status  9/4/2022 1437 by Madeleine Ireland LPN  Outcome: Progressing  9/4/2022 1437 by Madeleine Ireland LPN  Outcome: Progressing

## 2022-09-05 LAB
ANION GAP SERPL CALCULATED.3IONS-SCNC: 4 MMOL/L (ref 4–16)
ANION GAP SERPL CALCULATED.3IONS-SCNC: 5 MMOL/L (ref 4–16)
BASOPHILS ABSOLUTE: 0.1 K/CU MM
BASOPHILS RELATIVE PERCENT: 0.6 % (ref 0–1)
BUN BLDV-MCNC: 24 MG/DL (ref 6–23)
BUN BLDV-MCNC: 25 MG/DL (ref 6–23)
CALCIUM SERPL-MCNC: 7.5 MG/DL (ref 8.3–10.6)
CALCIUM SERPL-MCNC: 7.7 MG/DL (ref 8.3–10.6)
CHLORIDE BLD-SCNC: 101 MMOL/L (ref 99–110)
CHLORIDE BLD-SCNC: 101 MMOL/L (ref 99–110)
CO2: 25 MMOL/L (ref 21–32)
CO2: 25 MMOL/L (ref 21–32)
CREAT SERPL-MCNC: 1.1 MG/DL (ref 0.9–1.3)
CREAT SERPL-MCNC: 1.1 MG/DL (ref 0.9–1.3)
DIFFERENTIAL TYPE: ABNORMAL
EOSINOPHILS ABSOLUTE: 0.1 K/CU MM
EOSINOPHILS RELATIVE PERCENT: 1.1 % (ref 0–3)
GFR AFRICAN AMERICAN: >60 ML/MIN/1.73M2
GFR AFRICAN AMERICAN: >60 ML/MIN/1.73M2
GFR NON-AFRICAN AMERICAN: >60 ML/MIN/1.73M2
GFR NON-AFRICAN AMERICAN: >60 ML/MIN/1.73M2
GLUCOSE BLD-MCNC: 128 MG/DL (ref 70–99)
GLUCOSE BLD-MCNC: 141 MG/DL (ref 70–99)
GLUCOSE BLD-MCNC: 154 MG/DL (ref 70–99)
GLUCOSE BLD-MCNC: 184 MG/DL (ref 70–99)
GLUCOSE BLD-MCNC: 263 MG/DL (ref 70–99)
GLUCOSE BLD-MCNC: 325 MG/DL (ref 70–99)
HCT VFR BLD CALC: 24.7 % (ref 42–52)
HEMOGLOBIN: 8 GM/DL (ref 13.5–18)
IMMATURE NEUTROPHIL %: 1.5 % (ref 0–0.43)
LYMPHOCYTES ABSOLUTE: 1.5 K/CU MM
LYMPHOCYTES RELATIVE PERCENT: 17.9 % (ref 24–44)
MCH RBC QN AUTO: 27.9 PG (ref 27–31)
MCHC RBC AUTO-ENTMCNC: 32.4 % (ref 32–36)
MCV RBC AUTO: 86.1 FL (ref 78–100)
MONOCYTES ABSOLUTE: 0.8 K/CU MM
MONOCYTES RELATIVE PERCENT: 9.7 % (ref 0–4)
NUCLEATED RBC %: 0 %
PDW BLD-RTO: 14.4 % (ref 11.7–14.9)
PLATELET # BLD: 186 K/CU MM (ref 140–440)
PMV BLD AUTO: 10.1 FL (ref 7.5–11.1)
POTASSIUM SERPL-SCNC: 5.3 MMOL/L (ref 3.5–5.1)
POTASSIUM SERPL-SCNC: 5.5 MMOL/L (ref 3.5–5.1)
RBC # BLD: 2.87 M/CU MM (ref 4.6–6.2)
SEGMENTED NEUTROPHILS ABSOLUTE COUNT: 5.7 K/CU MM
SEGMENTED NEUTROPHILS RELATIVE PERCENT: 69.2 % (ref 36–66)
SODIUM BLD-SCNC: 130 MMOL/L (ref 135–145)
SODIUM BLD-SCNC: 131 MMOL/L (ref 135–145)
TOTAL IMMATURE NEUTOROPHIL: 0.12 K/CU MM
TOTAL NUCLEATED RBC: 0 K/CU MM
WBC # BLD: 8.2 K/CU MM (ref 4–10.5)

## 2022-09-05 PROCEDURE — 6360000002 HC RX W HCPCS: Performed by: INTERNAL MEDICINE

## 2022-09-05 PROCEDURE — 6370000000 HC RX 637 (ALT 250 FOR IP): Performed by: STUDENT IN AN ORGANIZED HEALTH CARE EDUCATION/TRAINING PROGRAM

## 2022-09-05 PROCEDURE — 36415 COLL VENOUS BLD VENIPUNCTURE: CPT

## 2022-09-05 PROCEDURE — 2580000003 HC RX 258: Performed by: PHYSICIAN ASSISTANT

## 2022-09-05 PROCEDURE — 2580000003 HC RX 258: Performed by: INTERNAL MEDICINE

## 2022-09-05 PROCEDURE — 6360000002 HC RX W HCPCS: Performed by: PHYSICIAN ASSISTANT

## 2022-09-05 PROCEDURE — 82962 GLUCOSE BLOOD TEST: CPT

## 2022-09-05 PROCEDURE — 2580000003 HC RX 258: Performed by: STUDENT IN AN ORGANIZED HEALTH CARE EDUCATION/TRAINING PROGRAM

## 2022-09-05 PROCEDURE — 80048 BASIC METABOLIC PNL TOTAL CA: CPT

## 2022-09-05 PROCEDURE — 85025 COMPLETE CBC W/AUTO DIFF WBC: CPT

## 2022-09-05 PROCEDURE — 6370000000 HC RX 637 (ALT 250 FOR IP): Performed by: PHYSICIAN ASSISTANT

## 2022-09-05 PROCEDURE — 97535 SELF CARE MNGMENT TRAINING: CPT

## 2022-09-05 PROCEDURE — 6370000000 HC RX 637 (ALT 250 FOR IP): Performed by: INTERNAL MEDICINE

## 2022-09-05 PROCEDURE — 97530 THERAPEUTIC ACTIVITIES: CPT

## 2022-09-05 PROCEDURE — 1200000000 HC SEMI PRIVATE

## 2022-09-05 PROCEDURE — 94761 N-INVAS EAR/PLS OXIMETRY MLT: CPT

## 2022-09-05 RX ORDER — FUROSEMIDE 10 MG/ML
20 INJECTION INTRAMUSCULAR; INTRAVENOUS ONCE
Status: COMPLETED | OUTPATIENT
Start: 2022-09-05 | End: 2022-09-05

## 2022-09-05 RX ORDER — 0.9 % SODIUM CHLORIDE 0.9 %
1000 INTRAVENOUS SOLUTION INTRAVENOUS ONCE
Status: COMPLETED | OUTPATIENT
Start: 2022-09-05 | End: 2022-09-05

## 2022-09-05 RX ORDER — INSULIN LISPRO 100 [IU]/ML
2 INJECTION, SOLUTION INTRAVENOUS; SUBCUTANEOUS
Status: DISCONTINUED | OUTPATIENT
Start: 2022-09-05 | End: 2022-09-06 | Stop reason: HOSPADM

## 2022-09-05 RX ORDER — DEXTROSE MONOHYDRATE 100 MG/ML
INJECTION, SOLUTION INTRAVENOUS CONTINUOUS PRN
Status: DISCONTINUED | OUTPATIENT
Start: 2022-09-05 | End: 2022-09-06 | Stop reason: HOSPADM

## 2022-09-05 RX ADMIN — GABAPENTIN 600 MG: 300 CAPSULE ORAL at 13:55

## 2022-09-05 RX ADMIN — SODIUM CHLORIDE 1000 ML: 9 INJECTION, SOLUTION INTRAVENOUS at 16:08

## 2022-09-05 RX ADMIN — INSULIN HUMAN 10 UNITS: 100 INJECTION, SOLUTION PARENTERAL at 09:14

## 2022-09-05 RX ADMIN — HYDROMORPHONE HYDROCHLORIDE 1 MG: 1 INJECTION, SOLUTION INTRAMUSCULAR; INTRAVENOUS; SUBCUTANEOUS at 04:40

## 2022-09-05 RX ADMIN — AMOXICILLIN 500 MG: 500 CAPSULE ORAL at 05:22

## 2022-09-05 RX ADMIN — DEXTROSE MONOHYDRATE 250 ML: 100 INJECTION, SOLUTION INTRAVENOUS at 08:51

## 2022-09-05 RX ADMIN — SODIUM CHLORIDE, PRESERVATIVE FREE 10 ML: 5 INJECTION INTRAVENOUS at 09:53

## 2022-09-05 RX ADMIN — AMOXICILLIN 500 MG: 500 CAPSULE ORAL at 22:40

## 2022-09-05 RX ADMIN — HYDROMORPHONE HYDROCHLORIDE 1 MG: 1 INJECTION, SOLUTION INTRAMUSCULAR; INTRAVENOUS; SUBCUTANEOUS at 16:09

## 2022-09-05 RX ADMIN — OXYCODONE HYDROCHLORIDE 10 MG: 10 TABLET ORAL at 09:54

## 2022-09-05 RX ADMIN — FUROSEMIDE 20 MG: 10 INJECTION, SOLUTION INTRAVENOUS at 16:10

## 2022-09-05 RX ADMIN — GABAPENTIN 600 MG: 300 CAPSULE ORAL at 22:45

## 2022-09-05 RX ADMIN — INSULIN LISPRO 2 UNITS: 100 INJECTION, SOLUTION INTRAVENOUS; SUBCUTANEOUS at 11:42

## 2022-09-05 RX ADMIN — OXYCODONE HYDROCHLORIDE 10 MG: 10 TABLET ORAL at 20:49

## 2022-09-05 RX ADMIN — ASPIRIN 81 MG CHEWABLE TABLET 81 MG: 81 TABLET CHEWABLE at 09:54

## 2022-09-05 RX ADMIN — GABAPENTIN 600 MG: 300 CAPSULE ORAL at 09:53

## 2022-09-05 RX ADMIN — AMOXICILLIN 500 MG: 500 CAPSULE ORAL at 13:55

## 2022-09-05 RX ADMIN — INSULIN GLARGINE 8 UNITS: 100 INJECTION, SOLUTION SUBCUTANEOUS at 22:46

## 2022-09-05 RX ADMIN — SODIUM CHLORIDE, PRESERVATIVE FREE 10 ML: 5 INJECTION INTRAVENOUS at 22:53

## 2022-09-05 RX ADMIN — HYDROMORPHONE HYDROCHLORIDE 1 MG: 1 INJECTION, SOLUTION INTRAMUSCULAR; INTRAVENOUS; SUBCUTANEOUS at 23:57

## 2022-09-05 RX ADMIN — SODIUM ZIRCONIUM CYCLOSILICATE 10 G: 10 POWDER, FOR SUSPENSION ORAL at 09:58

## 2022-09-05 RX ADMIN — ENOXAPARIN SODIUM 40 MG: 100 INJECTION SUBCUTANEOUS at 09:53

## 2022-09-05 RX ADMIN — OXYCODONE HYDROCHLORIDE 10 MG: 10 TABLET ORAL at 13:55

## 2022-09-05 RX ADMIN — OXYCODONE HYDROCHLORIDE 10 MG: 10 TABLET ORAL at 03:24

## 2022-09-05 RX ADMIN — SULFAMETHOXAZOLE AND TRIMETHOPRIM 1 TABLET: 800; 160 TABLET ORAL at 09:54

## 2022-09-05 RX ADMIN — SULFAMETHOXAZOLE AND TRIMETHOPRIM 1 TABLET: 800; 160 TABLET ORAL at 22:45

## 2022-09-05 RX ADMIN — FAMOTIDINE 20 MG: 20 TABLET ORAL at 22:40

## 2022-09-05 ASSESSMENT — PAIN DESCRIPTION - ORIENTATION
ORIENTATION: RIGHT

## 2022-09-05 ASSESSMENT — PAIN DESCRIPTION - LOCATION
LOCATION: LEG
LOCATION: KNEE;LEG
LOCATION: LEG
LOCATION: LEG;KNEE
LOCATION: LEG
LOCATION: KNEE;LEG

## 2022-09-05 ASSESSMENT — PAIN DESCRIPTION - DESCRIPTORS
DESCRIPTORS: STABBING
DESCRIPTORS: ACHING;BURNING;THROBBING
DESCRIPTORS: ACHING;BURNING;THROBBING
DESCRIPTORS: ACHING;BURNING
DESCRIPTORS: STABBING;SHOOTING;SHARP
DESCRIPTORS: SHOOTING;SHARP;STABBING

## 2022-09-05 ASSESSMENT — PAIN SCALES - GENERAL
PAINLEVEL_OUTOF10: 9
PAINLEVEL_OUTOF10: 10
PAINLEVEL_OUTOF10: 10
PAINLEVEL_OUTOF10: 8
PAINLEVEL_OUTOF10: 9

## 2022-09-05 ASSESSMENT — PAIN - FUNCTIONAL ASSESSMENT
PAIN_FUNCTIONAL_ASSESSMENT: PREVENTS OR INTERFERES SOME ACTIVE ACTIVITIES AND ADLS

## 2022-09-05 ASSESSMENT — PAIN DESCRIPTION - PAIN TYPE: TYPE: SURGICAL PAIN;NEUROPATHIC PAIN

## 2022-09-05 ASSESSMENT — ENCOUNTER SYMPTOMS: SHORTNESS OF BREATH: 1

## 2022-09-05 NOTE — PROGRESS NOTES
Occupational Therapy    Occupational Therapy Treatment Note    Name: Oley Bosworth MRN: [id number] :   1960   Date:  2022   Admission Date: 2022 Room:  [address] [place] [address]-A     Primary Problem:  The primary encounter diagnosis was Gangrene of right foot ([address] A diagnosis of Infection of right foot was also pertinent to this visit. Restrictions/Precautions:   general, fall risk, R BKA    Communication with other providers: MELISSA Chen     Subjective:  Patient states: \"Anything I can do to get better. \"   Pain:   Location, Type, Intensity (0/10 to 10/10):  8/10 R LE     Objective:    Observation: Pt presented in semi-davey's, agreeable to session. Objective Measures:  Tele    Treatment, including education:  Therapeutic Activity Training:   Therapeutic activity training was instructed today. Cues were given for safety, sequence, UE/LE placement, awareness, and balance. Activities performed today included bed mobility training, sup-sit, sit-stand, SPT. Supine to Sit with HOB partially raised with SBA. Sit to Stand from EOB with CGA and use of FWW. SPT from EOB <> recliner with use of fWW with CGA and increased time. Self Care Training:   Cues were given for safety, sequence, UE/LE placement, visual cues, and balance. Activities performed today included dressing and hygiene. Pt washed face/hands with wash cloth while seated with Set-up A, pt declined to attempt in standing d/t pain. Pt doffed non-skid sock to don regular sock and on-skid over it all with SUP while seated in recliner. Therapeutic Exercise:  Cues were given for technique, safety, recruitment, and rationale. Cues were verbal and/or tactile. Pt provided with yellow thera-band for 2ex x20 reps including shoulder abduction and chest press for increased strength needed for functional transfers.      Activities performed today included bed mobility training, transfers, functional mobility  to increase strength, activity tolerance to facilitate IND c ADL tasks, func transfers / mobility with G safety awareness carryover. Patient left safely in recliner at end of session, with call light in reach, alarm on and nursing aware. Gait belt was used for func transfers / mobility. Assessment / Impression:    Patient's tolerance of treatment: Good  Adverse Reaction: none  Significant change in status and impact:  none  Barriers to improvement: Pain, balance       Plan for Next Session:    Cont OT POC     Time in:  1116  Time out:  1141  Timed treatment minutes:  25  Total treatment time:  25      Electronically signed by:     ANNE Taylor,   9/5/2022, 11:37 AM
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
## 2022-09-05 NOTE — PROGRESS NOTES
V2.0  [facility] Note      Name:  Kaushal Gilbert /Age/Sex: 1960  (60 y.o. male)   MRN & CSN:  [id number] & [id number] Encounter Date/Time: 2022 3:41 PM EDT    Location:  [address] [place], [place] [id number] PCP: Rudi Frankel MD       Hospital Day: 8    Assessment and Plan:   Kaushal Gilbert is a 60 y.o. male with PMH of type 2 diabetes, COPD, CAD, hypertension, hyperlipidemia who presents with Necrotizing fasciitis ([facility]    #Right diabetic foot wound with suspected necrotizing fasciitis and right metatarsal osteomyelitis s/p BKA  -X-ray of the right foot suspicious for necrotizing fasciitis and osteomyelitis of fifth metatarsal    Plan:  Continue ampicillin+ bactrim  Pain control with Dilaudid and oxycodone  ID on consult, appreciate recommendations    #uncontrolled type 2 diabetes  -A1c 7.8 in 2022    Plan:  Lantus decreased to 8 units + low dose sliding scale, +lispro 2U TID with meals    #Hyperkalemia  -K reported as 5.5--> 5.2    Plan:  Repeat BMP    #Hyponatremia-resolving  -Was initially in the setting of hypoglycemia  -Possibly secondary to pain as well? Plan:  Repeat BMP in the morning    Chronic medical problems:    #PAD  Plan: Continue aspirin    #COPD, not in exacerbation    #HLD  -Not on statin    #Hypertension  -Not on any medication at home  -Blood pressure under control while she is here, will not start any medications at this time    #Tobacco use disorder  Plan: Nicotine patch      Diet ADULT DIET; Regular; 4 carb choices (60 gm/meal); Low Fat/Low Chol/High Fiber/MELISSA  ADULT ORAL NUTRITION SUPPLEMENT; Breakfast, Lunch, Dinner;  Low Calorie/High Protein Oral Supplement   DVT Prophylaxis [x] Lovenox, []  Heparin, [] SCDs, [] Ambulation,  [] Eliquis, [] Xarelto  [] Coumadin   Code Status Full Code   Disposition From: Home  Expected Disposition: Pending PT and OT eval  Estimated Date of Discharge: Greater than 24-hour  Patient requires continued admission due to BKA requiring PT and OT, referral sent to ARU   Surrogate Decision Maker/ RIAZ Bryan Nickie     Subjective:     Chief Complaint: Foot Injury (Patient states a can of beans fell from 6' onto his right foot. The bruise is now black and he is worried because he has diabetes and a chronic wound on the bottom of the same foot. )     Patient complaining of soreness today. Patient states he's feeling a little down because of the weather today. Review of Systems:    General: No fever, no chills  Heart: No chest pain, no palpitations  Lungs: No shortness of breath, no cough  Abdomen: No abdominal pain, no nausea, no vomiting, no constipation, no diarrhea  : No frequency, no dysuria, no urgency, no decreased urination  MSK: No lower extremity swelling, +right leg pain  Neuro: No confusion, no weakness  Skin: No rashes  Psych: +depressed      Objective:      Intake/Output Summary (Last 24 hours) at 9/5/2022 0902  Last data filed at 9/4/2022 2041  Gross per 24 hour   Intake --   Output 900 ml   Net -900 ml        Vitals:   Vitals:    09/05/22 0849   BP: 129/61   Pulse: 66   Resp: 22   Temp: 98.6 °F (37 °C)   SpO2: 98%       Physical Exam:     General: NAD, AAO x3-4  Eyes: EOMI  HENT: Atraumatic  Respiratory: no respiratory distress  GI: Normal bowel sounds, soft, nondistended, nontender  MSK: Right leg BKA wrapped in Ace bandage and in immobilizer  Skin: Intact, dry, warm, no rashes  Neuro: CN II to XII grossly intact  Psych: flat affect    Medications:   Medications:    sodium zirconium cyclosilicate  10 g Oral Once    insulin regular  10 Units IntraVENous Once    And    dextrose bolus  250 mL IntraVENous Once    insulin lispro  2 Units SubCUTAneous TID     gabapentin  600 mg Oral TID    insulin glargine  8 Units SubCUTAneous Nightly    amoxicillin  500 mg Oral 3 times per day    sulfamethoxazole-trimethoprim  1 tablet Oral 2 times per day    insulin lispro  0-4 Units SubCUTAneous TID WC    insulin lispro  0-4 Units SubCUTAneous Nightly acetaminophen  650 mg Oral Q6H    famotidine  20 mg Oral Daily    nicotine  1 patch TransDERmal Daily    aspirin  81 mg Oral Daily    sodium chloride flush  5-40 mL IntraVENous 2 times per day    enoxaparin  40 mg SubCUTAneous Daily      Infusions:    dextrose      dextrose      sodium chloride 100 mL/hr at 09/02/22 1105     PRN Meds: glucose, 4 tablet, PRN  dextrose bolus, 125 mL, PRN   Or  dextrose bolus, 250 mL, PRN  glucagon (rDNA), 1 mg, PRN  dextrose, , Continuous PRN  oxyCODONE, 10 mg, Q4H PRN  HYDROmorphone, 1 mg, Q3H PRN  methocarbamol, 500 mg, 4x Daily PRN  dextrose, , Continuous PRN  sodium chloride flush, 5-40 mL, PRN  sodium chloride, , PRN  ondansetron, 4 mg, Q8H PRN   Or  ondansetron, 4 mg, Q6H PRN  polyethylene glycol, 17 g, Daily PRN      Labs      Recent Results (from the past 24 hour(s))   POCT Glucose    Collection Time: 09/04/22 11:18 AM   Result Value Ref Range    POC Glucose 136 (H) 70 - 99 MG/DL   POCT Glucose    Collection Time: 09/04/22  4:29 PM   Result Value Ref Range    POC Glucose 251 (H) 70 - 99 MG/DL   POCT Glucose    Collection Time: 09/04/22  7:59 PM   Result Value Ref Range    POC Glucose 226 (H) 70 - 99 MG/DL   Basic Metabolic Panel    Collection Time: 09/05/22  4:02 AM   Result Value Ref Range    Sodium 131 (L) 135 - 145 MMOL/L    Potassium 5.5 (HH) 3.5 - 5.1 MMOL/L    Chloride 101 99 - 110 mMol/L    CO2 25 21 - 32 MMOL/L    Anion Gap 5 4 - 16    BUN 25 (H) 6 - 23 MG/DL    Creatinine 1.1 0.9 - 1.3 MG/DL    Glucose 128 (H) 70 - 99 MG/DL    Calcium 7.7 (L) 8.3 - 10.6 MG/DL    GFR Non-African American >60 >60 mL/min/1.73m2    GFR African American >60 >60 mL/min/1.73m2   CBC with Auto Differential    Collection Time: 09/05/22  4:02 AM   Result Value Ref Range    WBC 8.2 4.0 - 10.5 K/CU MM    RBC 2.87 (L) 4.6 - 6.2 M/CU MM    Hemoglobin 8.0 (L) 13.5 - 18.0 GM/DL    Hematocrit 24.7 (L) 42 - 52 %    MCV 86.1 78 - 100 FL    MCH 27.9 27 - 31 PG    MCHC 32.4 32.0 - 36.0 %    RDW 14.4 11.7 - 14.9 %    Platelets 898 116 - 149 K/CU MM    MPV 10.1 7.5 - 11.1 FL    Differential Type AUTOMATED DIFFERENTIAL     Segs Relative 69.2 (H) 36 - 66 %    Lymphocytes % 17.9 (L) 24 - 44 %    Monocytes % 9.7 (H) 0 - 4 %    Eosinophils % 1.1 0 - 3 %    Basophils % 0.6 0 - 1 %    Segs Absolute 5.7 K/CU MM    Lymphocytes Absolute 1.5 K/CU MM    Monocytes Absolute 0.8 K/CU MM    Eosinophils Absolute 0.1 K/CU MM    Basophils Absolute 0.1 K/CU MM    Nucleated RBC % 0.0 %    Total Nucleated RBC 0.0 K/CU MM    Total Immature Neutrophil 0.12 K/CU MM    Immature Neutrophil % 1.5 (H) 0 - 0.43 %   POCT Glucose    Collection Time: 09/05/22  6:33 AM   Result Value Ref Range    POC Glucose 184 (H) 70 - 99 MG/DL        Imaging/Diagnostics Last 24 Hours   XR FOOT RIGHT (MIN 3 VIEWS)    Result Date: 8/29/2022  EXAMINATION: THREE XRAY VIEWS OF THE RIGHT FOOT 8/29/2022 9:05 am COMPARISON: None. HISTORY: ORDERING SYSTEM PROVIDED HISTORY: drop can of beans of foot, history of DM ulcer, black necrotic tissue present TECHNOLOGIST PROVIDED HISTORY: Reason for exam:->drop can of beans of foot, history of DM ulcer, black necrotic tissue present Reason for Exam: pain-dropped can of beans on foot FINDINGS: There is air in the subcutaneous soft tissues of the midfoot extending slightly into the forefoot and extending proximal into the ankle and there is a soft tissue ulcer on the plantar aspect of the junction of the midfoot and forefoot. There is cortical loss of the bone adjacent to the ulcer on the lateral view which most likely represents residual base of the 5th metatarsal suspicious for osteomyelitis. Findings suspicious for necrotizing fasciitis. Findings suspicious for osteomyelitis on the lateral view possibly in the region of the base of the 5th metatarsal which is partially amputated. Findings were discussed with Dr. Yoandy Church on 08/29/2022 at 9:50 a.m.        Electronically signed by Arpita Paredes MD on 9/5/2022 at 9:02

## 2022-09-06 ENCOUNTER — HOSPITAL ENCOUNTER (INPATIENT)
Age: 62
LOS: 9 days | Discharge: HOME HEALTH | DRG: 560 | End: 2022-09-15
Attending: PHYSICAL MEDICINE & REHABILITATION | Admitting: PHYSICAL MEDICINE & REHABILITATION
Payer: MEDICARE

## 2022-09-06 VITALS
DIASTOLIC BLOOD PRESSURE: 52 MMHG | WEIGHT: 145 LBS | BODY MASS INDEX: 21.98 KG/M2 | TEMPERATURE: 97.9 F | OXYGEN SATURATION: 97 % | SYSTOLIC BLOOD PRESSURE: 122 MMHG | RESPIRATION RATE: 15 BRPM | HEART RATE: 69 BPM | HEIGHT: 68 IN

## 2022-09-06 DIAGNOSIS — L97.415 DIABETIC ULCER OF RIGHT MIDFOOT ASSOCIATED WITH TYPE 2 DIABETES MELLITUS, WITH MUSCLE INVOLVEMENT WITHOUT EVIDENCE OF NECROSIS (HCC): Primary | ICD-10-CM

## 2022-09-06 DIAGNOSIS — S88.119A BELOW KNEE AMPUTATION (HCC): ICD-10-CM

## 2022-09-06 DIAGNOSIS — E11.621 DIABETIC ULCER OF RIGHT MIDFOOT ASSOCIATED WITH TYPE 2 DIABETES MELLITUS, WITH MUSCLE INVOLVEMENT WITHOUT EVIDENCE OF NECROSIS (HCC): Primary | ICD-10-CM

## 2022-09-06 PROBLEM — M86.9 OSTEOMYELITIS OF RIGHT LEG (HCC): Status: ACTIVE | Noted: 2022-09-06

## 2022-09-06 LAB
ANION GAP SERPL CALCULATED.3IONS-SCNC: 5 MMOL/L (ref 4–16)
BUN BLDV-MCNC: 30 MG/DL (ref 6–23)
CALCIUM SERPL-MCNC: 7.7 MG/DL (ref 8.3–10.6)
CHLORIDE BLD-SCNC: 103 MMOL/L (ref 99–110)
CO2: 24 MMOL/L (ref 21–32)
CREAT SERPL-MCNC: 1.1 MG/DL (ref 0.9–1.3)
GFR AFRICAN AMERICAN: >60 ML/MIN/1.73M2
GFR NON-AFRICAN AMERICAN: >60 ML/MIN/1.73M2
GLUCOSE BLD-MCNC: 192 MG/DL (ref 70–99)
GLUCOSE BLD-MCNC: 193 MG/DL (ref 70–99)
GLUCOSE BLD-MCNC: 205 MG/DL (ref 70–99)
GLUCOSE BLD-MCNC: 227 MG/DL (ref 70–99)
GLUCOSE BLD-MCNC: 234 MG/DL (ref 70–99)
POTASSIUM SERPL-SCNC: 5.2 MMOL/L (ref 3.5–5.1)
SARS-COV-2, NAAT: NOT DETECTED
SODIUM BLD-SCNC: 132 MMOL/L (ref 135–145)
SOURCE: NORMAL

## 2022-09-06 PROCEDURE — 1280000000 HC REHAB R&B

## 2022-09-06 PROCEDURE — 97110 THERAPEUTIC EXERCISES: CPT

## 2022-09-06 PROCEDURE — 97530 THERAPEUTIC ACTIVITIES: CPT

## 2022-09-06 PROCEDURE — 99024 POSTOP FOLLOW-UP VISIT: CPT | Performed by: SURGERY

## 2022-09-06 PROCEDURE — 99223 1ST HOSP IP/OBS HIGH 75: CPT | Performed by: PHYSICAL MEDICINE & REHABILITATION

## 2022-09-06 PROCEDURE — 2580000003 HC RX 258: Performed by: INTERNAL MEDICINE

## 2022-09-06 PROCEDURE — 6360000002 HC RX W HCPCS: Performed by: INTERNAL MEDICINE

## 2022-09-06 PROCEDURE — 6370000000 HC RX 637 (ALT 250 FOR IP): Performed by: INTERNAL MEDICINE

## 2022-09-06 PROCEDURE — 97116 GAIT TRAINING THERAPY: CPT

## 2022-09-06 PROCEDURE — 99232 SBSQ HOSP IP/OBS MODERATE 35: CPT | Performed by: INTERNAL MEDICINE

## 2022-09-06 PROCEDURE — 94761 N-INVAS EAR/PLS OXIMETRY MLT: CPT

## 2022-09-06 PROCEDURE — 80048 BASIC METABOLIC PNL TOTAL CA: CPT

## 2022-09-06 PROCEDURE — 82962 GLUCOSE BLOOD TEST: CPT

## 2022-09-06 PROCEDURE — 87635 SARS-COV-2 COVID-19 AMP PRB: CPT

## 2022-09-06 PROCEDURE — 6360000002 HC RX W HCPCS: Performed by: PHYSICIAN ASSISTANT

## 2022-09-06 PROCEDURE — 6370000000 HC RX 637 (ALT 250 FOR IP): Performed by: PHYSICIAN ASSISTANT

## 2022-09-06 PROCEDURE — 36415 COLL VENOUS BLD VENIPUNCTURE: CPT

## 2022-09-06 PROCEDURE — 2580000003 HC RX 258: Performed by: PHYSICIAN ASSISTANT

## 2022-09-06 RX ORDER — ASPIRIN 81 MG/1
81 TABLET, CHEWABLE ORAL DAILY
Status: DISCONTINUED | OUTPATIENT
Start: 2022-09-07 | End: 2022-09-15 | Stop reason: HOSPADM

## 2022-09-06 RX ORDER — ENOXAPARIN SODIUM 100 MG/ML
40 INJECTION SUBCUTANEOUS DAILY
Status: CANCELLED | OUTPATIENT
Start: 2022-09-07

## 2022-09-06 RX ORDER — INSULIN LISPRO 100 [IU]/ML
0-4 INJECTION, SOLUTION INTRAVENOUS; SUBCUTANEOUS NIGHTLY
Status: DISCONTINUED | OUTPATIENT
Start: 2022-09-06 | End: 2022-09-15 | Stop reason: HOSPADM

## 2022-09-06 RX ORDER — INSULIN GLARGINE 100 [IU]/ML
8 INJECTION, SOLUTION SUBCUTANEOUS NIGHTLY
Qty: 1 ADJUSTABLE DOSE PRE-FILLED PEN SYRINGE | Refills: 0 | Status: SHIPPED | OUTPATIENT
Start: 2022-09-06

## 2022-09-06 RX ORDER — GABAPENTIN 300 MG/1
600 CAPSULE ORAL 3 TIMES DAILY
Status: CANCELLED | OUTPATIENT
Start: 2022-09-06

## 2022-09-06 RX ORDER — DEXTROSE MONOHYDRATE 100 MG/ML
INJECTION, SOLUTION INTRAVENOUS CONTINUOUS PRN
Status: DISCONTINUED | OUTPATIENT
Start: 2022-09-06 | End: 2022-09-15 | Stop reason: HOSPADM

## 2022-09-06 RX ORDER — GABAPENTIN 300 MG/1
600 CAPSULE ORAL 3 TIMES DAILY
Status: DISCONTINUED | OUTPATIENT
Start: 2022-09-06 | End: 2022-09-15 | Stop reason: HOSPADM

## 2022-09-06 RX ORDER — INSULIN LISPRO 100 [IU]/ML
2 INJECTION, SOLUTION INTRAVENOUS; SUBCUTANEOUS
Status: CANCELLED | OUTPATIENT
Start: 2022-09-07

## 2022-09-06 RX ORDER — POLYETHYLENE GLYCOL 3350 17 G/17G
17 POWDER, FOR SOLUTION ORAL DAILY PRN
Status: DISCONTINUED | OUTPATIENT
Start: 2022-09-06 | End: 2022-09-15 | Stop reason: HOSPADM

## 2022-09-06 RX ORDER — SULFAMETHOXAZOLE AND TRIMETHOPRIM 800; 160 MG/1; MG/1
1 TABLET ORAL EVERY 12 HOURS SCHEDULED
Qty: 12 TABLET | Refills: 0 | Status: ON HOLD | OUTPATIENT
Start: 2022-09-06 | End: 2022-09-15 | Stop reason: HOSPADM

## 2022-09-06 RX ORDER — NICOTINE 21 MG/24HR
1 PATCH, TRANSDERMAL 24 HOURS TRANSDERMAL DAILY
Status: DISCONTINUED | OUTPATIENT
Start: 2022-09-07 | End: 2022-09-15 | Stop reason: HOSPADM

## 2022-09-06 RX ORDER — NICOTINE 21 MG/24HR
1 PATCH, TRANSDERMAL 24 HOURS TRANSDERMAL DAILY
Status: CANCELLED | OUTPATIENT
Start: 2022-09-07

## 2022-09-06 RX ORDER — ACETAMINOPHEN 325 MG/1
650 TABLET ORAL EVERY 4 HOURS PRN
Status: CANCELLED | OUTPATIENT
Start: 2022-09-06

## 2022-09-06 RX ORDER — METHOCARBAMOL 500 MG/1
500 TABLET, FILM COATED ORAL 4 TIMES DAILY PRN
Status: CANCELLED | OUTPATIENT
Start: 2022-09-06

## 2022-09-06 RX ORDER — INSULIN GLARGINE 100 [IU]/ML
8 INJECTION, SOLUTION SUBCUTANEOUS NIGHTLY
Status: CANCELLED | OUTPATIENT
Start: 2022-09-06

## 2022-09-06 RX ORDER — ACETAMINOPHEN 325 MG/1
650 TABLET ORAL EVERY 4 HOURS PRN
Status: DISCONTINUED | OUTPATIENT
Start: 2022-09-06 | End: 2022-09-07

## 2022-09-06 RX ORDER — ATORVASTATIN CALCIUM 40 MG/1
40 TABLET, FILM COATED ORAL NIGHTLY
Qty: 30 TABLET | Refills: 0
Start: 2022-09-06 | End: 2022-09-06 | Stop reason: SDUPTHER

## 2022-09-06 RX ORDER — ENOXAPARIN SODIUM 100 MG/ML
40 INJECTION SUBCUTANEOUS DAILY
Status: DISCONTINUED | OUTPATIENT
Start: 2022-09-07 | End: 2022-09-15

## 2022-09-06 RX ORDER — FAMOTIDINE 20 MG/1
20 TABLET, FILM COATED ORAL DAILY
Status: CANCELLED | OUTPATIENT
Start: 2022-09-06

## 2022-09-06 RX ORDER — INSULIN LISPRO 100 [IU]/ML
0-4 INJECTION, SOLUTION INTRAVENOUS; SUBCUTANEOUS NIGHTLY
Status: CANCELLED | OUTPATIENT
Start: 2022-09-06

## 2022-09-06 RX ORDER — ATORVASTATIN CALCIUM 40 MG/1
40 TABLET, FILM COATED ORAL NIGHTLY
Qty: 30 TABLET | Refills: 0 | Status: SHIPPED | OUTPATIENT
Start: 2022-09-06

## 2022-09-06 RX ORDER — ACETAMINOPHEN 325 MG/1
650 TABLET ORAL EVERY 4 HOURS PRN
Status: DISCONTINUED | OUTPATIENT
Start: 2022-09-06 | End: 2022-09-15 | Stop reason: HOSPADM

## 2022-09-06 RX ORDER — METHOCARBAMOL 500 MG/1
500 TABLET, FILM COATED ORAL 4 TIMES DAILY PRN
Status: DISCONTINUED | OUTPATIENT
Start: 2022-09-06 | End: 2022-09-15 | Stop reason: HOSPADM

## 2022-09-06 RX ORDER — DEXTROSE MONOHYDRATE 100 MG/ML
INJECTION, SOLUTION INTRAVENOUS CONTINUOUS PRN
Status: CANCELLED | OUTPATIENT
Start: 2022-09-06

## 2022-09-06 RX ORDER — ASPIRIN 81 MG/1
81 TABLET, CHEWABLE ORAL DAILY
Status: CANCELLED | OUTPATIENT
Start: 2022-09-07

## 2022-09-06 RX ORDER — METHOCARBAMOL 500 MG/1
500 TABLET, FILM COATED ORAL 4 TIMES DAILY PRN
Qty: 40 TABLET | Refills: 0 | Status: SHIPPED | OUTPATIENT
Start: 2022-09-06 | End: 2022-09-16

## 2022-09-06 RX ORDER — INSULIN LISPRO 100 [IU]/ML
0-4 INJECTION, SOLUTION INTRAVENOUS; SUBCUTANEOUS
Status: CANCELLED | OUTPATIENT
Start: 2022-09-07

## 2022-09-06 RX ORDER — AMOXICILLIN 500 MG/1
500 CAPSULE ORAL EVERY 8 HOURS SCHEDULED
Status: COMPLETED | OUTPATIENT
Start: 2022-09-06 | End: 2022-09-12

## 2022-09-06 RX ORDER — INSULIN GLARGINE 100 [IU]/ML
8 INJECTION, SOLUTION SUBCUTANEOUS NIGHTLY
Status: DISCONTINUED | OUTPATIENT
Start: 2022-09-06 | End: 2022-09-11

## 2022-09-06 RX ORDER — GABAPENTIN 300 MG/1
600 CAPSULE ORAL 3 TIMES DAILY
Qty: 180 CAPSULE | Refills: 0 | Status: SHIPPED | OUTPATIENT
Start: 2022-09-06 | End: 2022-10-06

## 2022-09-06 RX ORDER — AMOXICILLIN 500 MG/1
500 CAPSULE ORAL EVERY 8 HOURS SCHEDULED
Status: CANCELLED | OUTPATIENT
Start: 2022-09-06 | End: 2022-09-12

## 2022-09-06 RX ORDER — SULFAMETHOXAZOLE AND TRIMETHOPRIM 800; 160 MG/1; MG/1
1 TABLET ORAL EVERY 12 HOURS SCHEDULED
Status: CANCELLED | OUTPATIENT
Start: 2022-09-06 | End: 2022-09-12

## 2022-09-06 RX ORDER — HYDROCODONE BITARTRATE AND ACETAMINOPHEN 5; 325 MG/1; MG/1
1 TABLET ORAL EVERY 6 HOURS PRN
Status: DISCONTINUED | OUTPATIENT
Start: 2022-09-06 | End: 2022-09-06 | Stop reason: HOSPADM

## 2022-09-06 RX ORDER — ACETAMINOPHEN 325 MG/1
650 TABLET ORAL EVERY 4 HOURS PRN
Status: DISCONTINUED | OUTPATIENT
Start: 2022-09-06 | End: 2022-09-06 | Stop reason: HOSPADM

## 2022-09-06 RX ORDER — HYDROCODONE BITARTRATE AND ACETAMINOPHEN 5; 325 MG/1; MG/1
1 TABLET ORAL EVERY 6 HOURS PRN
Qty: 20 TABLET | Refills: 0 | Status: ON HOLD | OUTPATIENT
Start: 2022-09-06 | End: 2022-09-15 | Stop reason: HOSPADM

## 2022-09-06 RX ORDER — HYDROCODONE BITARTRATE AND ACETAMINOPHEN 5; 325 MG/1; MG/1
1 TABLET ORAL EVERY 6 HOURS PRN
Status: DISCONTINUED | OUTPATIENT
Start: 2022-09-06 | End: 2022-09-07

## 2022-09-06 RX ORDER — AMOXICILLIN 500 MG/1
500 CAPSULE ORAL EVERY 8 HOURS SCHEDULED
Qty: 19 CAPSULE | Refills: 0 | Status: ON HOLD | OUTPATIENT
Start: 2022-09-06 | End: 2022-09-15 | Stop reason: HOSPADM

## 2022-09-06 RX ORDER — INSULIN LISPRO 100 [IU]/ML
0-4 INJECTION, SOLUTION INTRAVENOUS; SUBCUTANEOUS
Status: DISCONTINUED | OUTPATIENT
Start: 2022-09-07 | End: 2022-09-15 | Stop reason: HOSPADM

## 2022-09-06 RX ORDER — SULFAMETHOXAZOLE AND TRIMETHOPRIM 800; 160 MG/1; MG/1
1 TABLET ORAL EVERY 12 HOURS SCHEDULED
Status: COMPLETED | OUTPATIENT
Start: 2022-09-06 | End: 2022-09-12

## 2022-09-06 RX ORDER — INSULIN ASPART 100 [IU]/ML
2 INJECTION, SOLUTION INTRAVENOUS; SUBCUTANEOUS
Qty: 1 ADJUSTABLE DOSE PRE-FILLED PEN SYRINGE | Refills: 0 | Status: SHIPPED | OUTPATIENT
Start: 2022-09-06

## 2022-09-06 RX ORDER — HYDROCODONE BITARTRATE AND ACETAMINOPHEN 5; 325 MG/1; MG/1
1 TABLET ORAL EVERY 6 HOURS PRN
Status: DISCONTINUED | OUTPATIENT
Start: 2022-09-06 | End: 2022-09-06

## 2022-09-06 RX ORDER — INSULIN LISPRO 100 [IU]/ML
2 INJECTION, SOLUTION INTRAVENOUS; SUBCUTANEOUS
Status: DISCONTINUED | OUTPATIENT
Start: 2022-09-07 | End: 2022-09-11

## 2022-09-06 RX ORDER — HYDROCODONE BITARTRATE AND ACETAMINOPHEN 5; 325 MG/1; MG/1
1 TABLET ORAL EVERY 6 HOURS PRN
Status: CANCELLED | OUTPATIENT
Start: 2022-09-06

## 2022-09-06 RX ORDER — FAMOTIDINE 20 MG/1
20 TABLET, FILM COATED ORAL DAILY
Status: DISCONTINUED | OUTPATIENT
Start: 2022-09-06 | End: 2022-09-15 | Stop reason: HOSPADM

## 2022-09-06 RX ADMIN — SODIUM CHLORIDE, PRESERVATIVE FREE 10 ML: 5 INJECTION INTRAVENOUS at 09:31

## 2022-09-06 RX ADMIN — AMOXICILLIN 500 MG: 500 CAPSULE ORAL at 14:52

## 2022-09-06 RX ADMIN — ASPIRIN 81 MG CHEWABLE TABLET 81 MG: 81 TABLET CHEWABLE at 09:30

## 2022-09-06 RX ADMIN — HYDROMORPHONE HYDROCHLORIDE 0.5 MG: 1 INJECTION, SOLUTION INTRAMUSCULAR; INTRAVENOUS; SUBCUTANEOUS at 09:30

## 2022-09-06 RX ADMIN — INSULIN LISPRO 1 UNITS: 100 INJECTION, SOLUTION INTRAVENOUS; SUBCUTANEOUS at 17:13

## 2022-09-06 RX ADMIN — HYDROMORPHONE HYDROCHLORIDE 1 MG: 1 INJECTION, SOLUTION INTRAMUSCULAR; INTRAVENOUS; SUBCUTANEOUS at 06:22

## 2022-09-06 RX ADMIN — OXYCODONE HYDROCHLORIDE 10 MG: 10 TABLET ORAL at 04:56

## 2022-09-06 RX ADMIN — GABAPENTIN 600 MG: 300 CAPSULE ORAL at 14:52

## 2022-09-06 RX ADMIN — AMOXICILLIN 500 MG: 500 CAPSULE ORAL at 22:34

## 2022-09-06 RX ADMIN — INSULIN LISPRO 1 UNITS: 100 INJECTION, SOLUTION INTRAVENOUS; SUBCUTANEOUS at 11:48

## 2022-09-06 RX ADMIN — ENOXAPARIN SODIUM 40 MG: 100 INJECTION SUBCUTANEOUS at 09:30

## 2022-09-06 RX ADMIN — INSULIN HUMAN 10 UNITS: 100 INJECTION, SOLUTION PARENTERAL at 09:47

## 2022-09-06 RX ADMIN — INSULIN LISPRO 2 UNITS: 100 INJECTION, SOLUTION INTRAVENOUS; SUBCUTANEOUS at 09:52

## 2022-09-06 RX ADMIN — FAMOTIDINE 20 MG: 20 TABLET ORAL at 21:45

## 2022-09-06 RX ADMIN — INSULIN LISPRO 2 UNITS: 100 INJECTION, SOLUTION INTRAVENOUS; SUBCUTANEOUS at 17:13

## 2022-09-06 RX ADMIN — SULFAMETHOXAZOLE AND TRIMETHOPRIM 1 TABLET: 800; 160 TABLET ORAL at 09:30

## 2022-09-06 RX ADMIN — SULFAMETHOXAZOLE AND TRIMETHOPRIM 1 TABLET: 800; 160 TABLET ORAL at 21:51

## 2022-09-06 RX ADMIN — GABAPENTIN 600 MG: 300 CAPSULE ORAL at 21:45

## 2022-09-06 RX ADMIN — DEXTROSE MONOHYDRATE 250 ML: 10 INJECTION, SOLUTION INTRAVENOUS at 09:46

## 2022-09-06 RX ADMIN — GABAPENTIN 600 MG: 300 CAPSULE ORAL at 09:30

## 2022-09-06 RX ADMIN — AMOXICILLIN 500 MG: 500 CAPSULE ORAL at 05:04

## 2022-09-06 RX ADMIN — HYDROMORPHONE HYDROCHLORIDE 0.5 MG: 1 INJECTION, SOLUTION INTRAMUSCULAR; INTRAVENOUS; SUBCUTANEOUS at 21:45

## 2022-09-06 RX ADMIN — INSULIN GLARGINE 8 UNITS: 100 INJECTION, SOLUTION SUBCUTANEOUS at 21:46

## 2022-09-06 RX ADMIN — INSULIN LISPRO 2 UNITS: 100 INJECTION, SOLUTION INTRAVENOUS; SUBCUTANEOUS at 11:48

## 2022-09-06 RX ADMIN — HYDROMORPHONE HYDROCHLORIDE 0.5 MG: 1 INJECTION, SOLUTION INTRAMUSCULAR; INTRAVENOUS; SUBCUTANEOUS at 15:26

## 2022-09-06 ASSESSMENT — PAIN SCALES - GENERAL
PAINLEVEL_OUTOF10: 8
PAINLEVEL_OUTOF10: 9
PAINLEVEL_OUTOF10: 7
PAINLEVEL_OUTOF10: 9
PAINLEVEL_OUTOF10: 5
PAINLEVEL_OUTOF10: 10

## 2022-09-06 ASSESSMENT — PAIN DESCRIPTION - DESCRIPTORS
DESCRIPTORS: ACHING;BURNING
DESCRIPTORS: ACHING

## 2022-09-06 ASSESSMENT — PAIN DESCRIPTION - ORIENTATION
ORIENTATION: RIGHT

## 2022-09-06 ASSESSMENT — PAIN DESCRIPTION - LOCATION
LOCATION: LEG

## 2022-09-06 ASSESSMENT — PAIN SCALES - WONG BAKER: WONGBAKER_NUMERICALRESPONSE: 0

## 2022-09-06 ASSESSMENT — PAIN DESCRIPTION - FREQUENCY: FREQUENCY: CONTINUOUS

## 2022-09-06 ASSESSMENT — PAIN DESCRIPTION - ONSET: ONSET: ON-GOING

## 2022-09-06 ASSESSMENT — PAIN - FUNCTIONAL ASSESSMENT: PAIN_FUNCTIONAL_ASSESSMENT: PREVENTS OR INTERFERES SOME ACTIVE ACTIVITIES AND ADLS

## 2022-09-06 NOTE — CARE COORDINATION
Received approval for admission to ARU. Auth #[id number]. Updated clinicals needed on 9/13. Notified MD and CM of approval and the need of a rapid COVID order. Patient meets criteria and is approved to come to ARU. Patient able to admit once medically stable and after ARU Medical Director and  sign the pre-admission screen (PAS), pending rapid COVID results.

## 2022-09-06 NOTE — DISCHARGE SUMMARY
Discharge Summary    Name:  Sanjeev Mandel /Age/Sex: 1960  (60 y.o. male)   MRN & CSN:  [id number] & [id number] Admission Date/Time: 2022  8:20 AM   Attending:  Mandy Amaya MD Discharging Physician: Mandy Amaya MD       Admission Diagnosis:   Diabetic Foot ulcer with Rt. 5th metatarsal Osteomyelitis, concern for necrotizing myelitis  Uncontrolled type II DM  Hyponatremia 2/2 #2, decreased oral intake   H/O PAD  H/O COPD  H/O HTN, HLD  Tobacco use disorder    Discharge Diagnosis:  Diabetic Foot ulcer with Rt. 5th metatarsal Osteomyelitis, concern for necrotizing fasciitis s/p Rt. BKA (2022)  Uncontrolled type II DM  Hyponatremia 2/2 #2, decreased oral intake   Hyperkalemia  H/O PAD  H/O COPD  H/O HTN, HLD  Tobacco use disorder      Discharge Exam  BP (!) 122/52   Pulse 69   Temp 97.9 °F (36.6 °C) (Oral)   Resp 15   Ht 5' 8\" (1.727 m)   Wt 145 lb (65.8 kg)   SpO2 97%   BMI 22.05 kg/m²   Physical Exam  Vitals and nursing note reviewed. Constitutional:       General: He is not in acute distress. Appearance: He is not ill-appearing, toxic-appearing or diaphoretic. HENT:      Head: Normocephalic and atraumatic. Right Ear: External ear normal.      Left Ear: External ear normal.      Nose: Nose normal.      Mouth/Throat:      Mouth: Mucous membranes are moist.      Pharynx: Oropharynx is clear. Eyes:      General: No scleral icterus. Right eye: No discharge. Left eye: No discharge. Conjunctiva/sclera: Conjunctivae normal.      Pupils: Pupils are equal, round, and reactive to light. Cardiovascular:      Rate and Rhythm: Normal rate and regular rhythm. Pulses: Normal pulses. Heart sounds: Normal heart sounds. No murmur heard. No friction rub. No gallop. Pulmonary:      Effort: Pulmonary effort is normal. No respiratory distress. Breath sounds: Normal breath sounds. No stridor. No wheezing, rhonchi or rales.    Abdominal: General: Bowel sounds are normal. There is no distension. Palpations: Abdomen is soft. There is no mass. Tenderness: There is no abdominal tenderness. There is no guarding or rebound. Hernia: No hernia is present. Musculoskeletal:      Right lower leg: No edema. Left lower leg: No edema. Right Lower Extremity: Right leg is amputated below knee. Feet:      Comments: Surgical site without any signs of infection  Skin:     Capillary Refill: Capillary refill takes less than 2 seconds. Neurological:      Mental Status: He is alert. Hospital Course:   Alfonso Goodman is a 60 y.o.  male  who presents with Necrotizing fasciitis [facility])    Patient was admitted on 8/29/2022. Per H&P:  Patient with a H/O COPD, HTN, Tobacco use disorder, Uncontrolled DM, PAD presented with complaints of Rt. Foot wound. Per patient, he dropped a can of beans on his Rt. Foot. He has had increased pain and states any little movement worsens the pain. He has had foul smelling serous discharge from the foot as well for the past few days. He is non-compliant with his insulin. Some loose stool over the 24 hours prior to admission . General surgery was consulted. Patient underwent left below-knee amputation on 8/29/2022. Wound culture from his right diabetic foot infection was positive for Streptococcus agalactiae - complicated by bacteremia of the same. Infectious disease was consulted    Patient was initially on clindamycin, Zosyn and vancomycin. Antibiotics were switched to IV ampicillin. Patient was subsequently switched to amoxicillin and Bactrim both to end on 9/12/2022. Patient also found to be hyponatremic and hypokalemic. His hyperkalemia on 9/6/2022 was treated. Patient will likely need a repeat BMP and a hemoglobin check once patient goes down to acute rehab. Patient was deemed stable enough to discharge to ARU.     The patient expressed appropriate understanding of and agreement with the discharge recommendations, medications, and plan. Consults this admission:  PHARMACY TO DOSE VANCOMYCIN  IP CONSULT TO GENERAL SURGERY  IP CONSULT TO HOSPITALIST  IP CONSULT TO INFECTIOUS DISEASES  IP CONSULT TO DIETITIAN  IP CONSULT TO IV TEAM      Discharge Instruction:   Handoff to PCP:     Follow up appointments: With PCP within 1 week of discharge. With ortho in 2 weeks      Diet:  diabetic diet   Activity: activity as tolerated  Disposition: Discharged to:   []Home, []C, []SNF, [x]Acute Rehab, []Hospice   Condition on discharge: Stable    Discharge Medications:        Medication List        START taking these medications      amoxicillin 500 MG capsule  Commonly known as: AMOXIL  Take 1 capsule by mouth every 8 hours for 19 doses     gabapentin 300 MG capsule  Commonly known as: NEURONTIN  Take 2 capsules by mouth 3 times daily for 30 days. HYDROcodone-acetaminophen 5-325 MG per tablet  Commonly known as: NORCO  Take 1 tablet by mouth every 6 hours as needed for Pain for up to 5 days.      methocarbamol 500 MG tablet  Commonly known as: ROBAXIN  Take 1 tablet by mouth 4 times daily as needed (Pain)     sulfamethoxazole-trimethoprim 800-160 MG per tablet  Commonly known as: BACTRIM DS;SEPTRA DS  Take 1 tablet by mouth every 12 hours for 12 doses            CHANGE how you take these medications      Lantus SoloStar 100 UNIT/ML injection pen  Generic drug: insulin glargine  Inject 8 Units into the skin nightly  What changed:   how much to take  how to take this  when to take this  additional instructions     NovoLOG FlexPen 100 UNIT/ML injection pen  Generic drug: insulin aspart  Inject 2 Units into the skin 3 times daily (before meals) 10 units before each meal  What changed:   how much to take  how to take this  when to take this            CONTINUE taking these medications      aspirin 81 MG chewable tablet     atorvastatin 40 MG tablet  Commonly known as: LIPITOR  Take 1 tablet by mouth nightly blood glucose test strips strip  Commonly known as: FREESTYLE LITE  Test 3 times daily as needed. clopidogrel 75 MG tablet  Commonly known as: PLAVIX  Take 1 tablet by mouth daily     famotidine 20 MG tablet  Commonly known as: PEPCID     FreeStyle Lite Rakel  Apply 1 Device topically three times daily. * Gauze Pads & Dressings 2\"X2\" Pads  1 each by Does not apply route daily as needed (for wound care)     * Kerlix Gauze Roll Medium Misc  1 each by Does not apply route daily as needed (wound care)     levothyroxine 100 MCG tablet  Commonly known as: Synthroid  Take 1 tablet by mouth Daily     nicotine 21 MG/24HR  Commonly known as: NICODERM CQ  Place 1 patch onto the skin daily     Pen Needles 3/16\" 31G X 5 MM Misc  1 each by Does not apply route daily     SteriLance TL Misc  USE AS DIRECTED TO TEST BLOOD SUGAR THREE TIMES DAILY BEFORE MEALS           * This list has 2 medication(s) that are the same as other medications prescribed for you. Read the directions carefully, and ask your doctor or other care provider to review them with you.                 STOP taking these medications      ondansetron 4 MG disintegrating tablet  Commonly known as: ZOFRAN-ODT               Where to Get Your Medications        These medications were sent to [address] [place] [address], [address]       Phone: [phone]   amoxicillin 500 MG capsule  atorvastatin 40 MG tablet  gabapentin 300 MG capsule  Lantus SoloStar 100 UNIT/ML injection pen  methocarbamol 500 MG tablet  NovoLOG FlexPen 100 UNIT/ML injection pen  sulfamethoxazole-trimethoprim 800-160 MG per tablet       You can get these medications from any pharmacy    Bring a paper prescription for each of these medications  HYDROcodone-acetaminophen 5-325 MG per tablet         Objective Findings at Discharge:       BMP/CBC  Recent Labs     09/05/22  6689 09/05/22  0920 09/06/22  0453   * 130* 132*   K 5.5* 5.3* 5.2*    101 103   CO2 25 25 24   BUN 25* 24* 30*   CREATININE 1.1 1.1 1.1   WBC 8.2  --   --    HCT 24.7*  --   --      --   --        IMAGING:  X-ray Rt. Foot 8/29/2022   Findings suspicious for necrotizing fasciitis. Findings suspicious for osteomyelitis on the lateral view possibly in the   region of the base of the 5th metatarsal which is partially amputated. Additional Information: Patient seen and examined day of discharge.  For more information regarding patient's care please contact [facility] records [phone]    Discharge Time of 45 minutes    Electronically signed by Syed Larson MD on 9/6/2022 at 5:52 PM

## 2022-09-06 NOTE — CARE COORDINATION
Left VM for [employer] requesting a call back ASAP with a determination on the pending ARU pre-cert. Will continue to follow.

## 2022-09-06 NOTE — DISCHARGE INSTRUCTIONS
Internal Medicine Discharge Instruction    Discharge to:  ARU  Diet: Diabetic  Activity: As tolerated       Be compliant with medications  Please take medications as prescribed        Electronically signed by Karthik Cooney MD on 9/6/2022 at 2:38 PM

## 2022-09-06 NOTE — PLAN OF CARE
Problem: Chronic Conditions and Co-morbidities  Goal: Patient's chronic conditions and co-morbidity symptoms are monitored and maintained or improved  9/6/2022 1738 by Gillian Logan RN  Outcome: Completed  9/6/2022 1500 by Gillian Logan RN  Outcome: Adequate for Discharge     Problem: Discharge Planning  Goal: Discharge to home or other facility with appropriate resources  9/6/2022 1738 by Gillian Logan RN  Outcome: Completed  9/6/2022 1500 by Gillian Logan RN  Outcome: Adequate for Discharge     Problem: Pain  Goal: Verbalizes/displays adequate comfort level or baseline comfort level  9/6/2022 1738 by Gillian Logan RN  Outcome: Completed  9/6/2022 1500 by Gillian Logan RN  Outcome: Adequate for Discharge     Problem: Skin/Tissue Integrity  Goal: Absence of new skin breakdown  Description: 1. Monitor for areas of redness and/or skin breakdown  2. Assess vascular access sites hourly  3. Every 4-6 hours minimum:  Change oxygen saturation probe site  4. Every 4-6 hours:  If on nasal continuous positive airway pressure, respiratory therapy assess nares and determine need for appliance change or resting period.   9/6/2022 1738 by Gillian Logan RN  Outcome: Completed  9/6/2022 1500 by Gillian Logan RN  Outcome: Adequate for Discharge     Problem: Safety - Adult  Goal: Free from fall injury  9/6/2022 1738 by Gillian Logan RN  Outcome: Completed  Flowsheets (Taken 9/6/2022 1738)  Free From Fall Injury: Dorys Kinney family/caregiver on patient safety  9/6/2022 1500 by Gillian Logan RN  Outcome: Adequate for Discharge     Problem: Nutrition Deficit:  Goal: Optimize nutritional status  9/6/2022 1738 by Gillian Logan RN  Outcome: Completed  9/6/2022 1500 by Gillian Logan RN  Outcome: Adequate for Discharge     Problem: ABCDS Injury Assessment  Goal: Absence of physical injury  Outcome: Completed

## 2022-09-06 NOTE — PROGRESS NOTES
GENERAL SURGERY PROGRESS NOTE                     Subjective:    No acute events overnight. Objective:    Vitals: VITALS:  /62   Pulse 67   Temp 98.4 °F (36.9 °C) (Oral)   Resp 18   Ht 5' 8\" (1.727 m)   Wt 145 lb (65.8 kg)   SpO2 97%   BMI 22.05 kg/m²     I/O: 09/05 0701 - 09/06 0700  In: -   Out: 5346 [Urine:1875]    Labs/Imaging Results:   Lab Results   Component Value Date/Time     09/06/2022 04:53 AM    K 5.2 09/06/2022 04:53 AM     09/06/2022 04:53 AM    CO2 24 09/06/2022 04:53 AM    BUN 30 09/06/2022 04:53 AM    CREATININE 1.1 09/06/2022 04:53 AM    GLUCOSE 193 09/06/2022 04:53 AM    CALCIUM 7.7 09/06/2022 04:53 AM      Lab Results   Component Value Date    WBC 8.2 09/05/2022    HGB 8.0 (L) 09/05/2022    HCT 24.7 (L) 09/05/2022    MCV 86.1 09/05/2022     09/05/2022          IV Fluids:   dextrose    dextrose    sodium chloride Last Rate: 100 mL/hr at 09/02/22 1105    Scheduled Meds:   insulin lispro, 2 Units, SubCUTAneous, TID WC    gabapentin, 600 mg, Oral, TID    insulin glargine, 8 Units, SubCUTAneous, Nightly    amoxicillin, 500 mg, Oral, 3 times per day    sulfamethoxazole-trimethoprim, 1 tablet, Oral, 2 times per day    insulin lispro, 0-4 Units, SubCUTAneous, TID WC    insulin lispro, 0-4 Units, SubCUTAneous, Nightly    famotidine, 20 mg, Oral, Daily    nicotine, 1 patch, TransDERmal, Daily    aspirin, 81 mg, Oral, Daily    sodium chloride flush, 5-40 mL, IntraVENous, 2 times per day    enoxaparin, 40 mg, SubCUTAneous, Daily    Physical Exam:  General: A&O x 3, no distress. HEENT: Anicteric sclerae, MMM. Extremities: Right BKA stump without erythema, crepitus or drainage. Incision clean/dry/intact with staples in place.   Abdomen: Soft, nondistended, nontender       Assessment and Plan:     Patient Active Problem List:     Diabetes mellitus     H/O echocardiogram     S/P CABG (coronary artery bypass graft)     Chest pain     Cellulitis     Heroin withdrawal (HCC) CAD (coronary artery disease)     Polysubstance abuse (HCC)     Small bowel obstruction (HCC)     Narcotic abuse, continuous (HCC)     Hx of hepatitis     Epigastric pain     Diarrhea     Constipation with Ileus     Vomiting of fecal matter with nausea     Encephalopathy     Metabolic encephalopathy     Infectious gastroenteritis     Bilateral leg weakness     Gait disturbance     Left carotid stenosis     Hypothyroidism     Osteomyelitis (HCC)     Uncontrolled type II diabetes with peripheral autonomic neuropathy (HCC)     Gangrene of toe (HCC)     Acute hematogenous osteomyelitis of right foot (HCC)     Amputation of little toe (HCC)     PAD (peripheral artery disease) (HCC)     Uncontrolled pain     Generalized weakness     Simple chronic bronchitis (HCC)     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 (HCC)     Skin ulcer with necrosis of muscle (HCC)     Toe ulcer ([address]     Diabetic foot ([address]     Diabetic foot infection ([address]     Abscess of right foot     WD-Diabetic ulcer of right midfoot associated with type 2 diabetes mellitus, with muscle involvement without evidence of necrosis ([address]     Necrotizing fasciitis ([address]      Principal Problem:    Necrotizing fasciitis ([address]  Plan:     Pain control with p.o. meds first  Dressing changes daily  Follow-up with me in 2 weeks after discharge.       Jocelynn Ascencio DO

## 2022-09-06 NOTE — PROGRESS NOTES
Infectious Disease Progress Note  2022   Patient Name: Mary James : 1960     Assessment  Polymicrobial bacteremia- GBS, M morganii, P vulgaris isolated, secondary to right DFI with gangrene. S/p right BKA. Wound cx was not collected. Ampicillin was discontinued. Amoxicillin and Bactrim was prescribed Feels better, CRP on dwt  T2DM  COPD    Plan  Therapeutic: continue amoxicillin 500 mg po tid  and Bactrim DS one tab po bid through 2022  Completed abx:  IV ampicillin (-),  Diagnostic:trend CRP  F/u: blood cx from  is ngtd  Other:     Reason for visit: F/u GBS, M morganii, P vulgaris bacteremia? History:? Interval history noted  Denies n/v/d/f or untoward effects of antimicrobials  Physical Exam:  Vital Signs: /62   Pulse 67   Temp 98.4 °F (36.9 °C) (Oral)   Resp 18   Ht 5' 8\" (1.727 m)   Wt 145 lb (65.8 kg)   SpO2 97%   BMI 22.05 kg/m²     Gen: alert and oriented X3, no distress  Skin: no stigmata of endocarditis  Wounds: right BA C/D/I  HEMT: AT/NC Oropharynx pink, moist, and without lesions or exudates;   Eyes: PERRLA, EOMI, conjunctiva pink, sclera anicteric. Neck: Supple. Trachea midline. No LAD. Chest: no distress and CTA. Good air movement. Heart: RRR and no MRG. Abd: soft, non-distended, no tenderness, no hepatomegaly. Normoactive bowel sounds. Ext: no clubbing, cyanosis, or edema  Catheter Site: without erythema or tenderness  LDA:   Neuro: Mental status intact. CN 2-12 intact and no focal sensory or motor deficits     Radiologic / Imaging / TESTING  No results found.      Labs:    Recent Results (from the past 24 hour(s))   POCT Glucose    Collection Time: 22  4:20 PM   Result Value Ref Range    POC Glucose 141 (H) 70 - 99 MG/DL   POCT Glucose    Collection Time: 22  9:20 PM   Result Value Ref Range    POC Glucose 263 (H) 70 - 99 MG/DL   Basic Metabolic Panel    Collection Time: 22  4:53 AM   Result Value Ref Range    Sodium 132 (L) 135 - 145 MMOL/L    Potassium 5.2 (H) 3.5 - 5.1 MMOL/L    Chloride 103 99 - 110 mMol/L    CO2 24 21 - 32 MMOL/L    Anion Gap 5 4 - 16    BUN 30 (H) 6 - 23 MG/DL    Creatinine 1.1 0.9 - 1.3 MG/DL    Glucose 193 (H) 70 - 99 MG/DL    Calcium 7.7 (L) 8.3 - 10.6 MG/DL    GFR Non-African American >60 >60 mL/min/1.73m2    GFR African American >60 >60 mL/min/1.73m2   POCT Glucose    Collection Time: 09/06/22  8:32 AM   Result Value Ref Range    POC Glucose 192 (H) 70 - 99 MG/DL   POCT Glucose    Collection Time: 09/06/22 11:15 AM   Result Value Ref Range    POC Glucose 234 (H) 70 - 99 MG/DL     CULTURE results: Invalid input(s): BLOOD CULTURE,  URINE CULTURE, SURGICAL CULTURE    Diagnosis:  Patient Active Problem List   Diagnosis    Diabetes mellitus    H/O echocardiogram    S/P CABG (coronary artery bypass graft)    Chest pain    Cellulitis    Heroin withdrawal (HCC)    CAD (coronary artery disease)    Polysubstance abuse (HCC)    Small bowel obstruction (HCC)    Narcotic abuse, continuous (HCC)    Hx of hepatitis    Epigastric pain    Diarrhea    Constipation with Ileus    Vomiting of fecal matter with nausea    Encephalopathy    Metabolic encephalopathy    Infectious gastroenteritis    Bilateral leg weakness    Gait disturbance    Left carotid stenosis    Hypothyroidism    Osteomyelitis (HCC)    Uncontrolled type II diabetes with peripheral autonomic neuropathy (HCC)    Gangrene of toe (HCC)    Acute hematogenous osteomyelitis of right foot (HCC)    Amputation of little toe (HCC)    PAD (peripheral artery disease) (HCC)    Uncontrolled pain    Generalized weakness    Simple chronic bronchitis (HCC)    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 (HCC)    Skin ulcer with necrosis of muscle (HCC)    Toe ulcer ([address]    Diabetic foot ([address]    Diabetic foot infection ([address]    Abscess of right foot    WD-Diabetic ulcer of right midfoot associated with type 2 diabetes mellitus, with muscle involvement without evidence of necrosis ([address]    Necrotizing fasciitis ([address]    Bacteremia due to group B Streptococcus    Gangrene of right foot ([address]       Active Problems  Principal Problem:    Necrotizing fasciitis ([address]  Active Problems:    Bacteremia due to group B Streptococcus    Gangrene of right foot ([address]  Resolved Problems:    * No resolved hospital problems.  *              Electronically signed by: Electronically signed by Ihsan Wick MD on 9/6/2022 at 2:30 PM

## 2022-09-06 NOTE — PROGRESS NOTES
Physical Therapy    Physical Therapy Treatment Note  Name: Synetta Schirmer MRN: [id number] :   1960   Date:  2022   Admission Date: 2022 Room:  [address] [place] [address]   Restrictions/Precautions:          fall risk ; general precautions ; NWB on R residual limb ; knee immobilizer for OOB  Communication with other providers:  per nurse ok to tx and reports doctor wants bandage off and get air. Pt requested leg be wrapped for tx and nurse ok with this. Pt also wanting to stay up in wc for lunch and nurse also ok with this. Subjective:  Patient states:  agreeable to tx  Pain:   Location, Type, Intensity (0/10 to 10/10):  no c/o during tx session and reported feeling good with ex. Objective:    Observation:  alert and oriented with knee immobilizer and bandage off at start of tx   Treatment, including education/measures:  Sup to sit sba but needing increased time and effort with bed rail and HOB up  Sit<=>stand from bed and wc min/cga and cues for safety. Pt was very unsteady with first stand at walker. Amb with rw 4-5 steps x 3 with min assist. Pt was very unsafe and unsteady with first amb bed to wc letting go of walker and attempting ti sit before turned around. pt amb from wc <=>commode with min assist and cues and did much better. Pt was able to stand at commode with rw for support to urinate but needing min assist for balance. Pt was able to propel wc to // bars and back to room ( 100' x 2) with sba. Pt educated on importance of always locking brakes for safety. Pt was able to back wc up small ramp with min assist.  Pt stood in // bars working on standing balance and ex:  Static standing and educated on importance of attempting to stand with one UE support and in future progress to no UE support. Ex in standing working on hip flex/ext and abd as tolerated and static standing with right leg extended as if he were standing on bilateral LE. Pt reported this felt good.    Pt left in wc eating lunch with chair alarm on wc and pt verbalized safety and will call nurse when ready to return to bed. Assessment / Impression:      Patient's tolerance of treatment:  good   Adverse Reaction: na  Significant change in status and impact:  na  Barriers to improvement:  strength and safety  Plan for Next Session:    Cont.  POC  Time in:  1055  Time out:  1150  Timed treatment minutes:  55  Total treatment time:  54    Previously filed items:  Social/Functional History  Lives With: Spouse  Type of Home: House  Home Layout: One level  Home Access: Stairs to enter with rails  Entrance Stairs - Number of Steps: 4  Entrance Stairs - Rails: Both  Bathroom Shower/Tub: Tub/Shower unit, Shower chair with back  Bathroom Toilet: Standard  Bathroom Accessibility: [address] accessible  Has the patient had two or more falls in the past year or any fall with injury in the past year?: No  ADL Assistance: Independent  Homemaking Assistance: Independent  Homemaking Responsibilities: Yes  Meal Prep Responsibility: Primary  Laundry Responsibility: Primary  Cleaning Responsibility: Primary  Bill Paying/Finance Responsibility: Primary  Shopping Responsibility: Primary  Ambulation Assistance: Independent  Transfer Assistance: Independent  Active : Yes (but unsure now with DANNI GARLAND)  Occupation: On disability  Leisure & Hobbies: ride 4 wheelers  Patient Goals   Patient goals : return to [facility]  Time Frame for Short term goals: 1 week  Short term goal 1: pt will complete bed mobility at mod ind  Short term goal 2: pt will complete transfers at sup level  Short term goal 3: pt will ambulate 75 ft using FWW at min A  Short term goal 4: pt will demonstrate understanding of need for NWB R residual limb compliance and understanding of need for knee immobilizer    Electronically signed by:    Galdino Rojo PTA  9/6/2022, 8:39 AM

## 2022-09-07 PROBLEM — D62 ACUTE BLOOD LOSS ANEMIA: Status: ACTIVE | Noted: 2022-09-07

## 2022-09-07 PROBLEM — F17.210 CIGARETTE NICOTINE DEPENDENCE WITHOUT COMPLICATION: Status: ACTIVE | Noted: 2022-09-07

## 2022-09-07 PROBLEM — E87.1 HYPONATREMIA: Status: ACTIVE | Noted: 2022-09-07

## 2022-09-07 PROBLEM — I10 ESSENTIAL HYPERTENSION: Status: ACTIVE | Noted: 2022-09-07

## 2022-09-07 LAB
GLUCOSE BLD-MCNC: 113 MG/DL (ref 70–99)
GLUCOSE BLD-MCNC: 208 MG/DL (ref 70–99)
GLUCOSE BLD-MCNC: 213 MG/DL (ref 70–99)
GLUCOSE BLD-MCNC: 217 MG/DL (ref 70–99)

## 2022-09-07 PROCEDURE — 99232 SBSQ HOSP IP/OBS MODERATE 35: CPT | Performed by: PHYSICAL MEDICINE & REHABILITATION

## 2022-09-07 PROCEDURE — 97166 OT EVAL MOD COMPLEX 45 MIN: CPT

## 2022-09-07 PROCEDURE — 97535 SELF CARE MNGMENT TRAINING: CPT

## 2022-09-07 PROCEDURE — 97163 PT EVAL HIGH COMPLEX 45 MIN: CPT

## 2022-09-07 PROCEDURE — 6370000000 HC RX 637 (ALT 250 FOR IP): Performed by: INTERNAL MEDICINE

## 2022-09-07 PROCEDURE — 6360000002 HC RX W HCPCS: Performed by: INTERNAL MEDICINE

## 2022-09-07 PROCEDURE — 1280000000 HC REHAB R&B

## 2022-09-07 PROCEDURE — 6370000000 HC RX 637 (ALT 250 FOR IP): Performed by: PHYSICAL MEDICINE & REHABILITATION

## 2022-09-07 PROCEDURE — 6360000002 HC RX W HCPCS: Performed by: PHYSICAL MEDICINE & REHABILITATION

## 2022-09-07 PROCEDURE — 94761 N-INVAS EAR/PLS OXIMETRY MLT: CPT

## 2022-09-07 PROCEDURE — 97116 GAIT TRAINING THERAPY: CPT

## 2022-09-07 PROCEDURE — 97530 THERAPEUTIC ACTIVITIES: CPT

## 2022-09-07 PROCEDURE — 97542 WHEELCHAIR MNGMENT TRAINING: CPT

## 2022-09-07 RX ORDER — OXYCODONE HYDROCHLORIDE 10 MG/1
10 TABLET ORAL EVERY 4 HOURS PRN
Status: DISCONTINUED | OUTPATIENT
Start: 2022-09-07 | End: 2022-09-15 | Stop reason: HOSPADM

## 2022-09-07 RX ORDER — ACETAMINOPHEN 325 MG/1
650 TABLET ORAL
Status: DISCONTINUED | OUTPATIENT
Start: 2022-09-07 | End: 2022-09-15 | Stop reason: HOSPADM

## 2022-09-07 RX ADMIN — ENOXAPARIN SODIUM 40 MG: 100 INJECTION SUBCUTANEOUS at 09:16

## 2022-09-07 RX ADMIN — INSULIN LISPRO 2 UNITS: 100 INJECTION, SOLUTION INTRAVENOUS; SUBCUTANEOUS at 13:05

## 2022-09-07 RX ADMIN — FAMOTIDINE 20 MG: 20 TABLET ORAL at 21:14

## 2022-09-07 RX ADMIN — SULFAMETHOXAZOLE AND TRIMETHOPRIM 1 TABLET: 800; 160 TABLET ORAL at 09:16

## 2022-09-07 RX ADMIN — SULFAMETHOXAZOLE AND TRIMETHOPRIM 1 TABLET: 800; 160 TABLET ORAL at 21:14

## 2022-09-07 RX ADMIN — INSULIN LISPRO 2 UNITS: 100 INJECTION, SOLUTION INTRAVENOUS; SUBCUTANEOUS at 17:54

## 2022-09-07 RX ADMIN — INSULIN LISPRO 1 UNITS: 100 INJECTION, SOLUTION INTRAVENOUS; SUBCUTANEOUS at 13:04

## 2022-09-07 RX ADMIN — INSULIN LISPRO 2 UNITS: 100 INJECTION, SOLUTION INTRAVENOUS; SUBCUTANEOUS at 09:21

## 2022-09-07 RX ADMIN — HYDROMORPHONE HYDROCHLORIDE 0.5 MG: 1 INJECTION, SOLUTION INTRAMUSCULAR; INTRAVENOUS; SUBCUTANEOUS at 15:57

## 2022-09-07 RX ADMIN — OXYCODONE HYDROCHLORIDE 10 MG: 10 TABLET ORAL at 23:25

## 2022-09-07 RX ADMIN — AMOXICILLIN 500 MG: 500 CAPSULE ORAL at 21:22

## 2022-09-07 RX ADMIN — OXYCODONE HYDROCHLORIDE 10 MG: 10 TABLET ORAL at 13:02

## 2022-09-07 RX ADMIN — INSULIN LISPRO 1 UNITS: 100 INJECTION, SOLUTION INTRAVENOUS; SUBCUTANEOUS at 17:54

## 2022-09-07 RX ADMIN — HYDROMORPHONE HYDROCHLORIDE 0.5 MG: 1 INJECTION, SOLUTION INTRAMUSCULAR; INTRAVENOUS; SUBCUTANEOUS at 03:45

## 2022-09-07 RX ADMIN — HYDROMORPHONE HYDROCHLORIDE 0.5 MG: 1 INJECTION, SOLUTION INTRAMUSCULAR; INTRAVENOUS; SUBCUTANEOUS at 09:18

## 2022-09-07 RX ADMIN — ASPIRIN 81 MG CHEWABLE TABLET 81 MG: 81 TABLET CHEWABLE at 09:16

## 2022-09-07 RX ADMIN — GABAPENTIN 600 MG: 300 CAPSULE ORAL at 09:16

## 2022-09-07 RX ADMIN — AMOXICILLIN 500 MG: 500 CAPSULE ORAL at 13:35

## 2022-09-07 RX ADMIN — INSULIN LISPRO 1 UNITS: 100 INJECTION, SOLUTION INTRAVENOUS; SUBCUTANEOUS at 09:25

## 2022-09-07 RX ADMIN — GABAPENTIN 600 MG: 300 CAPSULE ORAL at 21:14

## 2022-09-07 RX ADMIN — HYDROMORPHONE HYDROCHLORIDE 0.5 MG: 1 INJECTION, SOLUTION INTRAMUSCULAR; INTRAVENOUS; SUBCUTANEOUS at 21:14

## 2022-09-07 RX ADMIN — GABAPENTIN 600 MG: 300 CAPSULE ORAL at 13:35

## 2022-09-07 RX ADMIN — AMOXICILLIN 500 MG: 500 CAPSULE ORAL at 06:37

## 2022-09-07 RX ADMIN — INSULIN GLARGINE 8 UNITS: 100 INJECTION, SOLUTION SUBCUTANEOUS at 21:23

## 2022-09-07 RX ADMIN — ACETAMINOPHEN 650 MG: 325 TABLET ORAL at 21:13

## 2022-09-07 ASSESSMENT — PAIN DESCRIPTION - DESCRIPTORS
DESCRIPTORS: ACHING;THROBBING
DESCRIPTORS: ACHING
DESCRIPTORS: ACHING;THROBBING
DESCRIPTORS: ACHING
DESCRIPTORS: ACHING
DESCRIPTORS: ACHING;THROBBING;BURNING
DESCRIPTORS: ACHING
DESCRIPTORS: ACHING

## 2022-09-07 ASSESSMENT — PAIN - FUNCTIONAL ASSESSMENT: PAIN_FUNCTIONAL_ASSESSMENT: ACTIVITIES ARE NOT PREVENTED

## 2022-09-07 ASSESSMENT — PAIN SCALES - GENERAL
PAINLEVEL_OUTOF10: 6
PAINLEVEL_OUTOF10: 7
PAINLEVEL_OUTOF10: 9
PAINLEVEL_OUTOF10: 7
PAINLEVEL_OUTOF10: 8
PAINLEVEL_OUTOF10: 10
PAINLEVEL_OUTOF10: 7
PAINLEVEL_OUTOF10: 8
PAINLEVEL_OUTOF10: 8

## 2022-09-07 ASSESSMENT — PAIN DESCRIPTION - LOCATION
LOCATION: LEG
LOCATION: GENERALIZED
LOCATION: LEG
LOCATION: OTHER (COMMENT)
LOCATION: LEG
LOCATION: LEG

## 2022-09-07 ASSESSMENT — PAIN DESCRIPTION - ORIENTATION
ORIENTATION: RIGHT
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
## 2022-09-07 NOTE — CONSULTS
Comprehensive Nutrition Assessment    Type and Reason for Visit:  Patient Education, Consult, Initial    Nutrition Recommendations/Plan:   Liberalize diet to No concentrated sweets and low potassium diet   Continue Oral supplements   Monitor PO/supplement intakes, weight and lab values      Malnutrition Assessment:  Malnutrition Status: At risk for malnutrition (Comment) (due to increased calorie needs) (09/07/22 1327)    Context:  Chronic Illness     Findings of the 6 clinical characteristics of malnutrition:  Energy Intake:  Mild decrease in energy intake (Comment)  Weight Loss:  No significant weight loss     Body Fat Loss:  Unable to assess     Muscle Mass Loss:  Unable to assess    Fluid Accumulation:  No significant fluid accumulation     Strength:  Not Performed    Nutrition Assessment:    Admitted to rehab post below the knee amputation and necrotizing fasciitis. Continues on carb controlled-heart healthy diet, tolerating it well. PO intake from 9/6 documented % meal eaten. Pt reports that his appetite is increasing each day and he \"wants to get his strength back and gain 10lbs. \"  He enjoys the low calorie/ high protein shakes and wants to continue receiving them while he is here. No nausea/vomitting or constipation/diarrhea. Pt's labs show elevated potassium levels, discussed with pt foods high in potassium and why he needs to be cautious of them. Pt was receptive and appreciative of the handout containing this information. Continue to monitor pt a moderate nutrition risk. Nutrition Related Findings:    Glucose 193, Sodium 132, Potassium 5.2 Wound Type: Surgical Incision       Current Nutrition Intake & Therapies:    Average Meal Intake: %  Average Supplements Intake: Unable to assess  ADULT DIET; Regular; 4 carb choices (60 gm/meal); Low Fat/Low Chol/High Fiber/MELISSA  ADULT ORAL NUTRITION SUPPLEMENT; Breakfast, Lunch, Dinner;  Low Calorie/High Protein Oral Supplement    Anthropometric Measures:  Height: 5' 8\" (172.7 cm)  Ideal Body Weight (IBW): 154 lbs (70 kg)       Current Body Weight: 135 lb 9.3 oz (61.5 kg), 88 % IBW. Weight Source: Bed Scale  Current BMI (kg/m2): 20.6  Usual Body Weight: 151 lb (68.5 kg) (from wt hx 6/29/22)  % Weight Change (Calculated): -10.2  Weight Adjustment For: Amputation  Total Adjusted Percentage (Calculated): 5.9  Adjusted Ideal Body Weight (lbs) (Calculated): 144.9 lbs  Adjusted Ideal Body Weight (kg) (Calculated): 65.86 kg  Adjusted % Ideal Body Weight (Calculated): 93.6  Adjusted BMI (kg/m2) (Calculated): 21.8  BMI Categories: Normal Weight (BMI 18.5-24. 9)    Estimated Daily Nutrient Needs:  Energy Requirements Based On: Kcal/kg  Weight Used for Energy Requirements: Current  Energy (kcal/day): 7011-1205 (27-32 kcal/kg)  Weight Used for Protein Requirements: Current  Protein (g/day): 74-86 (1.2-1.4 g/kg)  Method Used for Fluid Requirements: 1 ml/kcal  Fluid (ml/day): 3183-2172    Nutrition Diagnosis:   Predicted inadequate energy intake related to increase demand for energy/nutrients as evidenced by wounds    Nutrition Interventions:   Food and/or Nutrient Delivery: Continue Oral Nutrition Supplement, Modify Current Diet  Nutrition Education/Counseling: Education initiated  Coordination of Nutrition Care: Continue to monitor while inpatient  Goals:  Previous Goal Met: Progressing toward Goal(s)    Nutrition Monitoring and Evaluation:   Behavioral-Environmental Outcomes: None Identified  Food/Nutrient Intake Outcomes: None Identified  Physical Signs/Symptoms Outcomes: Biochemical Data, Chewing or Swallowing, GI Status, Nausea or Vomiting, Fluid Status or Edema    Discharge Planning:    Continue Oral Nutrition Supplement, Continue current diet (Pt reports that wife handles his carb counting.  Education for the Pt and Wife on diabetes education recommended.)     Edward Perez, Dietetic Intern   Contact: [phone]

## 2022-09-07 NOTE — PLAN OF CARE
4 Eyes Skin Assessment     NAME:  Matthew Vazquez  YOB: 1960  MEDICAL RECORD NUMBER:  [id number]    The patient is being assess for  Admission    I agree that 2 RN's have performed a thorough Head to Toe Skin Assessment on the patient. ALL assessment sites listed below have been assessed. Areas assessed by both nurses:    Head, Face, Ears, Shoulders, Back, Chest, Arms, Elbows, Hands, Sacrum. Buttock, Coccyx, Ischium, Legs. Feet and Heels, and Other BKA        Does the Patient have a Wound? Yes wound(s) were present on assessment.  LDA wound assessment was Initiated and completed        Harjinder Prevention initiated:  Yes   Wound Care Orders initiated:  Yes    Pressure Injury (Stage 3,4, Unstageable, DTI, NWPT, and Complex wounds) if present place referral/consult order under [de-identified] No    New and Established Ostomies if present place consult order under : No      Nurse 1 eSignature: Electronically signed by Nayely Anne RN on 9/7/22 at 2:41 AM EDT    **SHARE this note so that the co-signing nurse is able to place an eSignature**    Nurse 2 eSignatureFlorin Reynolds RN 9/7/2022 [id number]

## 2022-09-07 NOTE — PROGRESS NOTES
Precautions, Weight Bearing  Required Braces or Orthoses?: Yes  Position Activity Restriction  Other position/activity restrictions: LUE IV access         Pain Level: 8  Pain Location: Leg    Objective:  Orientation  Overall Orientation Status: Within Normal Limits  Orientation Level: Oriented X4        Vision  Vision Exceptions: Wears glasses at all times  Hearing  Hearing: Exceptions to [facility]  Hearing Exceptions: Hard of hearing/hearing concerns, No hearing aid (Mild [place] to L ear.)    Sensation:  Sensation  Overall Sensation Status: Impaired (Constant numbness/tingling to L foot and B hands.)    Observation:   Observation/Palpation  Observation: Pt in semi-davey's position upon entrance, asleep. slightly difficult to rouse(recently had 10mg oxycodone) but pleasant and agreeable to therapy. Scar: Pt's incision site to R residual limb covered with bandage. Pt has orders for ace wrap. Therapist applied ace wrap in figure 8 technique for limb shaping.     ROM:   PROM RLE (degrees)  RLE General PROM: hip ext to 0 degrees, knee 0-95 degrees, ankle n/a due to BKA     PROM LLE (degrees)  LLE PROM: WFL                    Strength:    Strength RLE  Comment: grossly 4/5 in hip and knee, ankle n/a due to BKA  Strength LLE  Comment: grossly 4 to 4+/5              Bed Mobility:   Lying to Sitting on Side of Bed  Assistance Needed: Partial/moderate assistance  Comment: min assist for trunk  CARE Score: 3  Discharge Goal: Independent  Roll Left and Right  Assistance Needed: Supervision or touching assistance  Comment: supervision with effort  CARE Score: 4  Discharge Goal: Independent  Sit to Lying  Assistance Needed: Supervision or touching assistance  Comment: supervision, flat bed, no rails  CARE Score: 4    Transfers:    Sit to Stand  Assistance Needed: Partial/moderate assistance  Comment: min assist, heavily uses back of legs on bed for CG, but in w/c needs more assist  CARE Score: 3  Discharge Goal: Independent  Chair/Bed-to-Chair Transfer  Assistance Needed: Partial/moderate assistance  Comment: min assist using pivot or 2ww  CARE Score: 3  Discharge Goal: Independent     Car Transfer  Assistance Needed: Partial/moderate assistance  Comment: mod assist with unsafe technique, poor control  CARE Score: 3  Discharge Goal: Independent    Ambulation:   Device used PTA: Pt did not use device until past few months and progressed from using cane to 2ww   Walking Ability  Does the Patient Walk?: Yes     Walk 10 Feet  Assistance Needed: Supervision or touching assistance  Comment: CGA with 2ww. CARE Score: 4  Discharge Goal: Independent     Walk 50 Feet with Two Turns  Comment: 10' max distance due to pain.  Pt could not tolerate hopping  Reason if not Attempted: Not attempted due to medical condition or safety concerns  CARE Score: 88  Discharge Goal: Supervision or touching assistance     Walk 150 Feet  Comment: do not feel pt will be able to tolerate this distance at discharge due to chronic medical conditions  Reason if not Attempted: Not attempted due to medical condition or safety concerns  CARE Score: 88  Discharge Goal: Not Attempted     Walking 10 Feet on Uneven Surfaces  Reason if not Attempted: Not attempted due to medical condition or safety concerns  CARE Score: 88  Discharge Goal: Supervision or touching assistance     1 Step (Curb)  Reason if not Attempted: Not attempted due to medical condition or safety concerns  CARE Score: 88  Discharge Goal: Supervision or touching assistance     4 Steps  Reason if not Attempted: Not applicable  CARE Score: 9  Discharge Goal: Not Applicable     12 Steps  Reason if not Attempted: Not applicable  CARE Score: 9  Discharge Goal: Not Applicable    Gait Deviations: []None []Slow sage  [] Increased AYANA  [] Staggers []Deviated Path  [] Decreased step length  [] Decreased step height  []Decreased arm swing  [] Shuffles  [] Decreased head and trunk rotation  []other: Wheelchair:  w/c Ability: Wheelchair Ability  Uses a Wheelchair and/or Scooter?: Yes  Wheel 50 Feet with Two Turns  Assistance Needed: Supervision or touching assistance  Comment: Uses BUE  CARE Score: 4  Discharge Goal: Independent  Wheel 150 Feet  Assistance Needed: Supervision or touching assistance  CARE Score: 4  Discharge Goal: Independent          Balance:        Object: Picking Up Object  Comment: Pt not able to lift RUE off 2ww for more than 1-2 seconds and maintain balance  Reason if not Attempted: Not attempted due to medical condition or safety concerns  CARE Score: 88  Discharge Goal: Independent               Assessment:   The patient is a 60year old male admitted onto ARU after hospitalization for worsening of R foot wound. 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 Pt has comorbidities of COPD, CAD, anxiety, hx of heroin abuse, HTN, CABGx4 2013 that may impact rehabilitation. Prior to admission pt was independent in gait and transfers with gait progressing in use of device from nothing to cane to 2ww(borrows from his wife). Pt only performed small curb steps at baseline. Lives in mobile home with ramp entrance. This date pt was in pain and difficult to rouse upon entering room. Pt became more alert with movement, but gait and standing were limited by pain. Pt had Oxycodone about 40 minutes before eval start and was asking for Dilaudid(not due for another 1-1/2 hours at that point). Pt overall performed at min assist level for transfers and gait with safety concerns, supervision in w/c. Set up w/c with elevating leg rest for positioning. Feel pt will benefit from ARU-PT to address deficits as noted to return home at mod I level.       Therapy Prognosis: Good  Decision Making: High Complexity  Clinical Presentation: unpredicatable characteristics      Patient education:   ARU schedule, ARU expectations for participation, plan of care, time frame/events for attaining prosthetic   Treatment Initiated:  Functional mobility training, gait training, patient education, amputee exercises  Barriers to Improvement:  safety, pain  Discharge Recommendations:  home with [facility]  Equipment Recommendations:  lightweight w/c with antitippers and elevating leg rests, 2ww    Goals:  Patient Goals   Patient goals : return home moving on his own     Long Term Goals  Time Frame for Long term goals : 7-10 days STG=LTG  Long term goal 1: Pt will perform bed mobility with mod I  Long term goal 2: Pt will perform sit to stand, w/c<>bed and car transfers with mod I  Long term goal 3: pt will ambulate with 2ww 50' on level surfaces and 10' on unlevel surface with supervision  Long term goal 4: Pt will ascend/descend 4\" step with 2ww and CGA  Long term goal 5: Pt will propel w/c in household situations 250' with mod I  Additional Goals?: Yes  Long term goal 6: Pt will  light object from floor with 2ww and reacher with mod I  Plan:    Requires PT Follow-Up: Yes  Pt will be seen at least 60 minutes per day for a minimum of 5 days per week, plus group therapy as appropriate  Plan  Current Treatment Recommendations: Strengthening, ROM, Balance training, Endurance training, Wheelchair mobility training, Functional mobility training, Transfer training, Gait training, ADL/Self-care training, Cognitive reorientation, Patient/Caregiver education & training, Safety education & training, Home exercise program, Pain management, Equipment evaluation, education, & procurement, Modalities, Positioning, Therapeutic activities, Stair training, Neuromuscular re-education, IADL training, Manual Therapy - Soft Tissue Mobilization    PT Individual Minutes  Time In: 1350  Time Out: 1520  Minutes: 90           PT Evaluation 15   Gait Training 15   Therapeutic Exercise Neuro Re-Ed    Therapeutic Activity 45   Wheelchair Propulsion 15   Group    Other:    TOTAL 90       Electronically signed by:    Carrillo Dias PT,  9/7/2022,

## 2022-09-07 NOTE — PLAN OF CARE
ARU Interdisciplinary Plan of Care (IPOC)  [facility] [address] Galindo Kitchen [facility], [address]  [phone]  Fax: [phone]        Synetta Schirmer    : 1960  Acct #: [[id number]]  MRN: [id number]   PHYSICIAN:  Eveline Olivares MD  Primary Active Problems:   Active Hospital Problems    Diagnosis Date Noted    Osteomyelitis of right leg [facility]) [M86.9] 2022     Priority: Medium       Rehabilitation Diagnosis:     Necrotizing fasciitis [M72.6]  Osteomyelitis of right leg [facility]) [M86.9]          CARE PLAN     NURSING:  Jacejoel Mixonirmer while on this unit will:      Bowel and Bladder   [x] Be continent of bowel and bladder      [x] Have an adequate number of bowel movements   [x] Urinate with no urinary retention >300ml in bladder   [] Bladder Scan: (details)   [] Complete bladder protocol with quintanilla removal   [] Initiate Bladder Program to toilet every ___ hours   [] Initiate Bowel Program to toilet every ___hours   [] Bladder training    [] Bowel training  Pulmonary   [x] Maintain O2 SATs at 92% or greater  Pain Management   [x] Have pain managed while on ARU        [] Be pain free by discharge    [x] Medication Management and Education  Maintenance of Skin Integrity/Wound Management   [x] Have no skin breakdown while on ARU   [x] Have improved skin integrity via wound measurements   [x] Have no signs/symptoms of infection via infection protection and monitoring at the          wound site  Fall Prevention   [x] Be free from injury during hospitalization via fall prevention measures     [x] Disease management and Education  Precautions   [x] Weight Bearing Precautions   [] Swallowing Precautions   [x] Monitoring of Risks of Complications   [x] DVT Prophylaxis    [] Fluid/electrolyte/Nutrition Management    [x] Complete education with patient/family with understanding demonstrated for          in-room safety with transfers to bed, toilet, wheelchair, shower as well as                bathroom activities and hygiene. [x] Adjustment   [] Other:   Nursing interventions may include bowel/bladder training, education for medical assistive devices, medication education, O2 saturation management, energy conservation, stress management techniques, fall prevention, alarms protocol, seating and positioning, skin/wound care, pressure relief instruction,dressing changes,  infection protection, DVT prophylaxis, and/or assistance with in room safety with transfers to bed, toilet, wheelchair, shower as well as bathroom activities and hygiene. Patient/caregiver education for:   [x] Disease/sustained injury/management      [x] Medication Use   [x] Surgical intervention   [x] Safety/Precautions   [x] Body mechanics and or joint protection   [x] Health maintenance         PHYSICAL THERAPY:  Goals:                               Long Term Goals  Time Frame for Long term goals : 7-10 days STG=LTG  Long term goal 1: Pt will perform bed mobility with mod I  Long term goal 2: Pt will perform sit to stand, w/c<>bed and car transfers with mod I  Long term goal 3: pt will ambulate with 2ww 50' on level surfaces and 10' on unlevel surface with supervision  Long term goal 4: Pt will ascend/descend 4\" step with 2ww and CGA  Long term goal 5: Pt will propel w/c in household situations 250' with mod I  Additional Goals?: Yes  Long term goal 6: Pt will  light object from floor with 2ww and reacher with mod I  These goals were reviewed with this patient at the time of assessment and Ever Fu is in agreement. Plan of Care: Pt to be seen 5 days per week for a minimum of 60 minutes for 9 days.                 Current Treatment Recommendations: Strengthening, ROM, Balance training, Endurance training, Wheelchair mobility training, Functional mobility training, Transfer training, Gait training, ADL/Self-care training, Cognitive reorientation, Patient/Caregiver education & training, Safety education & training, Home exercise program, Pain management, Equipment evaluation, education, & procurement, Modalities, Positioning, Therapeutic activities, Stair training, Neuromuscular re-education, IADL training, Manual Therapy - Soft Tissue Mobilization community reintegration,animal assisted therapy, and concurrent/group therapy. PT IRF-GIANNI scores and goals for initial assessment:   Bed Mobility:   Sit to Lying  Assistance Needed: Supervision or touching assistance  Comment: supervision, flat bed, no rails  CARE Score: 4  Roll Left and Right  Assistance Needed: Supervision or touching assistance  Comment: supervision with effort  CARE Score: 4  Discharge Goal: Independent  Lying to Sitting on Side of Bed  Assistance Needed: Partial/moderate assistance  Comment: min assist for trunk  CARE Score: 3  Discharge Goal: Independent    Transfers:    Sit to Stand  Assistance Needed: Partial/moderate assistance  Comment: min assist, heavily uses back of legs on bed for CG, but in w/c needs more assist  CARE Score: 3  Discharge Goal: Independent  Chair/Bed-to-Chair Transfer  Assistance Needed: Partial/moderate assistance  Comment: min assist using pivot or 2ww  CARE Score: 3  Discharge Goal: Independent     Car Transfer  Assistance Needed: Partial/moderate assistance  Comment: mod assist with unsafe technique, poor control  CARE Score: 3  Discharge Goal: Independent    Ambulation:    Walking Ability  Does the Patient Walk?: Yes     Walk 10 Feet  Assistance Needed: Supervision or touching assistance  Comment: CGA with 2ww. CARE Score: 4  Discharge Goal: Independent     Walk 50 Feet with Two Turns  Comment: 10' max distance due to pain.  Pt could not tolerate hopping  Reason if not Attempted: Not attempted due to medical condition or safety concerns  CARE Score: 88  Discharge Goal: Supervision or touching assistance     Walk 150 Feet  Comment: do not feel pt will be able to tolerate this distance at discharge due to chronic medical conditions  Reason if not Attempted: Not attempted due to medical condition or safety concerns  CARE Score: 88  Discharge Goal: Not Attempted     Walking 10 Feet on Uneven Surfaces  Reason if not Attempted: Not attempted due to medical condition or safety concerns  CARE Score: 88  Discharge Goal: Supervision or touching assistance     1 Step (Curb)  Reason if not Attempted: Not attempted due to medical condition or safety concerns  CARE Score: 88  Discharge Goal: Supervision or touching assistance     4 Steps  Reason if not Attempted: Not applicable  CARE Score: 9  Discharge Goal: Not Applicable     12 Steps  Reason if not Attempted: Not applicable  CARE Score: 9  Discharge Goal: Not Applicable    Gait Deviations: []None []Slow sage  [] Increased AYANA  [] Staggers []Deviated Path  [] Decreased step length  [] Decreased step height  []Decreased arm swing  [] Shuffles  [] Decreased head and trunk rotation  []other:        Wheelchair:  w/c Ability: Wheelchair Ability  Uses a Wheelchair and/or Scooter?: Yes  Wheel 50 Feet with Two Turns  Assistance Needed: Supervision or touching assistance  Comment: Uses BUE  CARE Score: 4  Discharge Goal: Independent  Wheel 150 Feet  Assistance Needed: Supervision or touching assistance  CARE Score: 4  Discharge Goal: Independent          Balance:        Object: Picking Up Object  Comment: Pt not able to lift RUE off 2ww for more than 1-2 seconds and maintain balance  Reason if not Attempted: Not attempted due to medical condition or safety concerns  CARE Score: 88  Discharge Goal: Independent  OCCUPATIONAL THERAPY:  Goals:             Short Term Goals  Time Frame for Short term goals: STGs=LTGs :  Long Term Goals  Time Frame for Long term goals : 7-10 days or until d/c.   Long Term Goal 1: Pt will complete grooming tasks c Mod I.  Long Term Goal 2: Pt will complete total body bathing c AE PRN and Mod I.  Long Term Goal 3: Pt will complete UB dressing c IND.  Long Term Goal 4: Pt will complete LB dressing c AE PRN and Mod I.  Long Term Goal 5: Pt will doff/don footwear to LLE c IND. Additional Goals?: Yes  Long Term Goal 6: Pt will complete toileting c Mod I.  Long Term Goal 7: Pt will perform functional transfers (bed, chair, toilet, shower) c DME PRN and Mod I.  Long Term Goal 8: Pt will complete therex/therax to facilitate an increase in strength/endurance (c emphasis on dynamic standing balance/tolerance > 8 mins) c Mod I.  Long Term Goal 9: Pt will complete home management tasks c Mod I. :    These goals were reviewed with this patient at the time of assessment and Michelle Duarte is in agreement    Plan of Care:  Pt to be seen 5 days per week for a minimum of 60 minutes for 10 days. Plan  Times per Day: Daily  Current Treatment Recommendations: Strengthening, Balance training, Functional mobility training, Endurance training, Pain management, Safety education & training, Patient/Caregiver education & training, Equipment evaluation, education, & procurement, Positioning, Self-Care / ADL, Home management training, Coordination training         cognitive training, home management, energy conservation training, community reintegration, splint fabrication, patient/caregiver education and training, animal assisted therapy, and concurrent and/or group therapy. OT IRF-GIANNI scores and goals for initial assessment:    ADLs:    Eating: Eating  Assistance Needed: Independent  Comment: Per Pt report, Pt able to open packages and containers for meal this date and self feed IND. CARE Score: 6  Discharge Goal: Independent       Oral Hygiene: Oral Hygiene  Assistance Needed: Independent  Comment: Mod I seated to complete mouthwash routine. CARE Score: 6  Discharge Goal: Independent    UB/LB Bathing: Shower/Bathe Self  Assistance Needed: Supervision or touching assistance  Comment: SBA to complete full shower, seated for entirety.  Pt weight shifted to wash rear holding onto grab bar with one hand. CARE Score: 4  Discharge Goal: Independent    UB Dressing: Upper Body Dressing  Assistance Needed: Setup or clean-up assistance  Comment: Setup assist to doff/don pullover tshirt. CARE Score: 5  Discharge Goal: Independent         LB Dressing: Lower Body Dressing  Assistance Needed: Supervision or touching assistance  Comment: Pt able to don underwear and shorts with CGA/SBA in stance, Pt able to don RLE brace with setup assist at bed level. CARE Score: 4  Discharge Goal: Independent    Donning and [place] Footwear: Putting On/Taking Off Footwear  Assistance Needed: Setup or clean-up assistance  Comment: Setup assist to doff/don L sock. CARE Score: 5  Discharge Goal: Independent      Toiletin [address] needed: Partial/moderate assistance  Comment: Min A to manage clothing at toilet, Pt completed perineal hygiene after BM seated with SBA. CARE Score: 3  Discharge Goal: Independent      Toilet Transfers: Toilet Transfer  Assistance needed: Supervision or touching assistance  Comment: CGA with 2WW and use of grab bars, Pt required Max verbal cues for safety and correct hand/body/walker placement. CARE Score: 4  Discharge Goal: Independent      SPEECH THERAPY: (If ordered)  Plan of Care and Goals:   LTG                                                            LTG:                           Treatments may include speech/language/communication therapy, cognitive training, animal assisted therapy, group therapy, education, and/or dysphagia therapy based on the above goals. Co-treats where appropriate with PT or OT to facilitate patient goals in functional tasks. These goals were reviewed with this patient at the time of assessment and Jorge Montano is in agreement.     CASE MANAGEMENT:  Goals:   Assist patient/family with discharge planning, patient/family counseling, assistance in obtaining recommended equipment and other services, and coordination with insurance during ARU stay. Patient Goals:  Return to maximum level of independent function. Activities Prior to Admit:   Homemaking Responsibilities: Yes  Active : Yes  Mode of Transportation: Truck  Occupation: On disability  Leisure & Hobbies: Pt enjoys mechanics and outdoor work. Intensity of Therapy  Mannie Andino will be seen a minimum of 3 hours of therapy per day/a minimum of 5 out of 7 days per week. [] In this rare instance due to the nature of this patient's medical involvement, this patient will be seen 15 hours per week (900 minutes within a 7 day period). Treatments may include therapeutic exercises, gait training, neuromuscular re-ed, transfer training, community reintegration, bed mobility, w/c mobility and training, self care, home mgmt, cognitive training, energy conservation,dysphagia tx, speech/language/communication therapy, group therapy, and patient/family education. In addition, dietician/nutritionist may monitor calorie count as well as intake and collaboratively work with SLP on dietary upgrades. Neuropsychology/Psychology may evaluate and provide necessary support. Group therapy as appropriate to facilitate improved endurance, STR, COORD, function, safety, transfers, awareness and insight into deficits, problem solving, memory, and social interaction and engagement.     Medical issues being managed closely and that require 24 hour availability of a physician:   [] Swallowing Precautions                                     [x] Weight bearing precautions   [x] Wound Care                             [x] Infection Prevention   [x] DVT Prophylaxis/assessment              [x] Monitoring for complications    [x] Fall Precautions/Prevention                         [x] Fluid/Electrolyte/Nutrition Balance   [] Voice Protection                           [x] Medication Management   [x] Respiratory                   [x] Pain Mgmt   [x] Bowel/Bladder Fx    Medical Prognosis: [] Good  [x] Fair    [] Guarded   Total expected IRF days 12                                            Physician anticipated functional outcomes:  FWW and WC with [facility] OT/PT/RN and supervision. Rehab Goals:   [] Return to premorbid function of_______________________________.    [] Independent   [] Mod I  [x] Supervision  [] CGA   [] Min A   [] Mod A  Level for ambulation []without assistive device  [x] with assistive device        [] Independent   [] Mod I  [x] Supervision [] CGA   [] Min A   [] Mod A  Level for transfers []without assistive device  [x] with assistive device         [] Independent   [] Mod I  [x] Supervision [] CGA   [] Min A   [] Mod A Level with ADL's []without assistive device   [x] with assistive device     ___________________     Level with cognitive skills requiring [] No assist [x] Supervision  [] Active Assist/Cues     [] Maximize level of mobility and ADL's to decrease burden on caregiver    IPOC brief synthesis of Preadmission Screen, Post-Admission Evaluation, and Therapy Evaluations:  Acute inpatient rehabilitation with occupational and physical therapy 180 minutes 5 out of every 7 days. Will address basic and  advancing mobility with self-care instruction and adaptive equipment training. Caregiver education will be offered. Expected length of stay  prior to a supervised level of function for discharge home with a wheelchair/walker and [facility] OT/PT is 2 weeks. Additional recommendation:     Osteomyelitis of the right foot with BK amputation: Patient requires daily occupational and physical therapy. We must monitor his wound closely for signs of infection. Daily wound care, limb elevation and pain management. He needs DVT prophylaxis, aggressive pulmonary hygiene measures and nutritional support. We must maximize control of his blood sugar and blood pressure and provide a nicotine alternative to smoking.   He needs caregiver training, adaptive equipment education and introduction to a prosthetists. Outpatient follow-up with his surgeon. He is on oral amoxicillin for 17 more doses. DVT prophylaxis: Lovenox 40 mg subcu daily. I must monitor his hemoglobin and platelet count periodically while on this medication. Weightbearing activities through the left lower limb will be pursued daily. GI prophylaxis offered. Uncontrolled pain: Gabapentin, injectable Dilaudid and Norco are on the orders from the hospitalist.  The patient wants me to consider oxycodone for the hydrocodone. Bowel intervention while on the analgesics. Limb elevation to help control swelling. Local wound care. Progressive mobilization. Uncontrolled diabetes type 2 with peripheral neuropathy: Patient requires a diet modified for carbohydrates. He is on scheduled Lantus and a Humalog sliding scale. Neurontin for neuropathy symptoms. Blood sugars are checked at mealtime and bedtime. Encouraging consistent oral intake. Hypertension: Pain management is a significant part of the blood pressure control. Nicotine replacement. Monitoring his blood pressure to determine if there is need for medication. Target systolic blood pressures [id number]. Hyponatremia: Encouraging consistent oral intake. Periodic monitoring of his chemistries. Cautious use of any diuretics. COPD: Monitoring O2 saturations at rest and with activity. Aggressive pulmonary hygiene measures. Bronchodilators as needed. Anticipated discharge destination:    [] Home Independently   [x] Home with supervision    []SNF     [] Other       This plan has been reviewed with me in a language I understand.  I have had the opportunity to include my input with my therapy team.    ________________________________________________   ______________________  Patient/Significant Other      Date    I have reviewed this initial plan of care and agree with its contents:    Title   Name    Date    Time    Physician: Nieves Magallanes MD 9/8/2022 11:02 AM    Case Mgmt:  Tyshawn Huertas 9/8/2022 0945    OT: Landry 10, [address] [facility] YULIANA LindseyR/L #[id number] 09/07/22 [id number]    PT: Stormy Gilberto, [address] [place] [address], 9/7/22    RN: Elizabeth Zuluaga RN 9/6/2022 1273    ST:    Dietician:     STEPHY Scanlon

## 2022-09-07 NOTE — PATIENT CARE CONFERENCE
ACUTE REHAB TEAM CONFERENCE SUMMARY   [address] [facility]    NAME: Ira Prince  : 1960 ADMIT DATE: 2022    Rehab Admitting Dx:Necrotizing fasciitis [M72.6]  Osteomyelitis of right leg ([facility] [address] [M86.9]  Patient Comorbid Conditions: Active Hospital Problems    Diagnosis Date Noted    Acute blood loss anemia [D62] 2022     Priority: Medium    Uncontrolled type 2 diabetes mellitus with peripheral neuropathy ([address] [E11.42, E11.65] 2022     Priority: Medium    Essential hypertension [I10] 2022     Priority: Medium    Hyponatremia [E87.1] 2022     Priority: Medium    Cigarette nicotine dependence without complication [D16.314]      Priority: Medium    Osteomyelitis of right lower limb ([facility] [address] [M86.9] 2022     Priority: Medium     Date: 2022    CASE MANAGEMENT  Current issues/needs regarding patient and family discharge status:   patient plans dc 1-level home with spouse. 4 [place]. [place]/indep & drive/on disability    PHYSICAL THERAPY (Updated in QI)        Long Term Goals  Time Frame for Long term goals : 7-10 days STG=LTG  Long term goal 1: Pt will perform bed mobility with mod I  Long term goal 2: Pt will perform sit to stand, w/c<>bed and car transfers with mod I  Long term goal 3: pt will ambulate with 2ww 50' on level surfaces and 10' on unlevel surface with supervision  Long term goal 4: Pt will ascend/descend 4\" step with 2ww and CGA  Long term goal 5: Pt will propel w/c in household situations 250' with mod I  Additional Goals?: Yes  Long term goal 6: Pt will  light object from floor with 2ww and reacher with mod I    Impairments/deficits, barriers:     Body Structures, Functions, Activity Limitations Requiring Skilled Therapeutic Intervention: Decreased functional mobility , Decreased ROM, Decreased strength, Decreased safe awareness, Decreased endurance, Decreased sensation, Decreased balance, Decreased high-level IADLs, Increased pain Therapy Prognosis: Good  Decision Making: High Complexity  Clinical Presentation: unpredicatable characteristics  Equipment needed at discharge:lightweight w/c with antitippers and elevating leg rests, 2ww      PT IRF-GIANNI scores since initial assessment  Bed Mobility:   Sit to Lying  Assistance Needed: Supervision or touching assistance  Comment: supervision, flat bed, no rails  CARE Score: 4    Roll Left and Right  Assistance Needed: Supervision or touching assistance  Comment: supervision with effort  CARE Score: 4  Discharge Goal: Independent    Lying to Sitting on Side of Bed  Assistance Needed: Partial/moderate assistance  Comment: min assist for trunk  CARE Score: 3  Discharge Goal: Independent    Transfers:    Sit to Stand  Assistance Needed: Supervision or touching assistance  Comment: pt progressed to SBA using 2WW today  CARE Score: 4  Discharge Goal: Independent    Chair/Bed-to-Chair Transfer  Assistance Needed: Supervision or touching assistance  Comment: pt progressed to SBA using stand pivot with pt performing w/c set-up  CARE Score: 4  Discharge Goal: Independent         Car Transfer  Assistance Needed: Partial/moderate assistance  Comment: mod assist with unsafe technique, poor control  CARE Score: 3  Discharge Goal: Independent    Ambulation:    Walking Ability  Does the Patient Walk?: Yes     Walk 10 Feet  Assistance Needed: Supervision or touching assistance  Comment: CGA with 2ww. CARE Score: 4  Discharge Goal: Independent     Walk 50 Feet with Two Turns  Comment: 10' max distance due to pain.  Pt could not tolerate hopping  Reason if not Attempted: Not attempted due to medical condition or safety concerns  CARE Score: 88  Discharge Goal: Supervision or touching assistance     Walk 150 Feet  Comment: do not feel pt will be able to tolerate this distance at discharge due to chronic medical conditions  Reason if not Attempted: Not attempted due to medical condition or safety concerns  CARE Score: 88  Discharge Goal: Not Attempted     Walking 10 Feet on Uneven Surfaces  Reason if not Attempted: Not attempted due to medical condition or safety concerns  CARE Score: 88  Discharge Goal: Supervision or touching assistance     1 Step (Curb)  Reason if not Attempted: Not attempted due to medical condition or safety concerns  CARE Score: 88  Discharge Goal: Supervision or touching assistance     4 Steps  Reason if not Attempted: Not applicable  CARE Score: 9  Discharge Goal: Not Applicable     12 Steps  Reason if not Attempted: Not applicable  CARE Score: 9  Discharge Goal: Not Applicable           Wheelchair:  w/c Ability: Wheelchair Ability  Uses a Wheelchair and/or Scooter?: Yes    Wheel 50 Feet with Two Turns  Assistance Needed: Supervision or touching assistance  Comment: Uses BUE  CARE Score: 4  Discharge Goal: Independent    Wheel 150 Feet  Assistance Needed: Supervision or touching assistance  CARE Score: 4  Discharge Goal: Independent              Balance:        Object: Picking Up Object  Comment: Pt not able to lift RUE off 2ww for more than 1-2 seconds and maintain balance  Reason if not Attempted: Not attempted due to medical condition or safety concerns  CARE Score: 88  Discharge Goal: Independent    Fall Risk: [x]  Yes  []  No    OCCUPATIONAL THERAPY  (Updated in QI)  Short Term Goals  Time Frame for Short term goals: STGs=LTGs :   Long Term Goals  Time Frame for Long term goals : 7-10 days or until d/c. Long Term Goal 1: Pt will complete grooming tasks c Mod I.  Long Term Goal 2: Pt will complete total body bathing c AE PRN and Mod I.  Long Term Goal 3: Pt will complete UB dressing c IND. Long Term Goal 4: Pt will complete LB dressing c AE PRN and Mod I.  Long Term Goal 5: Pt will doff/don footwear to LLE c IND.   Additional Goals?: Yes  Long Term Goal 6: Pt will complete toileting c Mod I.  Long Term Goal 7: Pt will perform functional transfers (bed, chair, toilet, shower) c DME PRN and Mod I.  Long Term Goal 8: Pt will complete therex/therax to facilitate an increase in strength/endurance (c emphasis on dynamic standing balance/tolerance > 8 mins) c Mod I.  Long Term Goal 9: Pt will complete home management tasks c Mod I. :       OT IRF-GIANNI scores and goals since initial assessment:    ADLs:    Eating: Eating  Assistance Needed: Independent  Comment: Per Pt report, Pt able to open packages and containers for meal this date and self feed IND. CARE Score: 6  Discharge Goal: Independent       Oral Hygiene: Oral Hygiene  Assistance Needed: Independent  Comment: Mod I seated to complete mouthwash routine. CARE Score: 6  Discharge Goal: Independent    UB/LB Bathing: Shower/Bathe Self  Assistance Needed: Supervision or touching assistance  Comment: SBA to complete full shower, seated for entirety. Pt weight shifted to wash rear holding onto grab bar with one hand. CARE Score: 4  Discharge Goal: Independent    UB Dressing: Upper Body Dressing  Assistance Needed: Setup or clean-up assistance  Comment: Setup assist to doff/don pullover tshirt. CARE Score: 5  Discharge Goal: Independent         LB Dressing: Lower Body Dressing  Assistance Needed: Supervision or touching assistance  Comment: Pt able to don underwear and shorts with CGA/SBA in stance, Pt able to don RLE brace with setup assist at bed level. CARE Score: 4  Discharge Goal: Independent    Donning and [place] Footwear: Putting On/Taking Off Footwear  Assistance Needed: Setup or clean-up assistance  Comment: Setup assist to doff/don L sock. CARE Score: 5  Discharge Goal: Independent      Toiletin [address] needed: Partial/moderate assistance  Comment: Min A to manage clothing at toilet, Pt completed perineal hygiene after BM seated with SBA. CARE Score: 3  Discharge Goal: Independent      Toilet Transfers:    Toilet Transfer  Assistance needed: Supervision or touching assistance  Comment: CGA with 2WW and use of grab bars, Pt required Max verbal cues for safety and correct hand/body/walker placement. CARE Score: 4  Discharge Goal: Independent      Impairments/deficits, barriers:  Pain, safety awareness, functional balance/transfers/mobility, strength, endurance, and ADLs/IADLs. Assessment  Performance deficits / Impairments: Decreased functional mobility , Decreased strength, Decreased endurance, Decreased ADL status, Decreased safe awareness, Decreased sensation, Decreased high-level IADLs, Decreased balance, Decreased fine motor control, Decreased coordination  Decision Making: Medium Complexity  Equipment needed at discharge:None. COGNITIVE FUNCTION/SPEECH THERAPY (AS INDICATED)  LTG                                      Nursing Current Medical Status:   [x] Is continent of bowel and bladder     [] Is incontinent of bowel and bladder    [x] Has had an adequate number of bowel movements   [] Urinates with no urinary retention >300ml in bladder   [] Targeting bladder protocol with quintanilla removal   [x] Maintaining O2 SATs at 92% or greater   [x] Has pain managed while on ARU         [] Has had no skin breakdown while on ARU   [] Has improved skin integrity via wound measurements   [] Has no signs/symptoms of infection at the wound site   [] Pressure wounds Stage/Location:    [] Arrived on unit with pressure wound  [] Has been free from injury during hospitalization   [] Has experienced a fall during hospitalization  [] Ongoing education with patient/family with understanding demonstrated for:  [] Receives IV Fluids  [] Other:        NUTRITION  Weight: 135 lb 9.3 oz (61.5 kg) / Body mass index is 20.62 kg/m². Current diet: ADULT ORAL NUTRITION SUPPLEMENT; Breakfast, Lunch, Dinner; Low Calorie/High Protein Oral Supplement  ADULT DIET; Regular; Low Potassium (Less than 3000 mg/day);  No Concentrated sweets  Intake: Meal intake %, improving intake and willing to consume oral nutrition supplement      Medical improvements/barriers: reduced activity tolerance, bed level able to don brace, cues for safety, pain med adjustments        Team goals for next treatment period/Intervention for current barriers:   [x] Pt will increase activity tolerance for daily tasks. [] Pt will improve bed mobility with reduced assist.  [x] Pt will improve safety in fx tasks with reduced cues/assist  [x] Pt will improve transfers with reduced assist  [x] Pt will improve toileting with reduced assist  [x] Pt will improve ADL's with use of adaptive equipment with reduced assist  [] Pt will improve pain mgmt for maximum participation in tx program  [] Pt will improve communication to get basic needs met on unit  [] Pt will improve swallowing for safe diet advancement with use of strategies  []  Plan for discharge to home. Patient Strengths: Motivated, Cooperative, and Pleasant    Justification for Continued Stay  Based on my medical assessment of the patient and review of information from the interdisciplinary team as part of this weekly team conference, the patient continues to meet the following criteria for IRF level of care: The patient requires active and ongoing intervention of multiple therapy disciplines  The patient requires and intensive rehabilitation therapy program  The patient requires continued physician supervision by a rehabilitation physician  The patient requires 24 hours rehab nursing care  The patient requires an intensive and coordinated interdisciplinary team approach to the delivery of rehabilitative care.      Assessment/Plan   [x]  The patient is making good progression towards their long term goals and is actively participating in and has a reasonable expectation to continue to benefit from the intensive rehabilitation therapy program   []  The estimated discharge date has been changed from initial team conference due to:   []  The estimated discharge destination has been changed from initial team conference due to: Ongoing tx following discharge: [x]C OT, PT   []OUTPATIENT     [] No Further Treatment     [] Family/Caregiver Training  []  Transitional Living Arrangement   [] Home Assessment (date  )     [] Family Conference   []  Therapeutic Pass       []  Other: (specify)    Estimated Discharge Date: 9/15/22    Estimated Discharge Destination: []home alone   []home alone with assist prn  []Continuous supervision []Return home with s/o/spouse/family   [] Assisted living    []SNF     Team members participating in today's conference. [x] Merced Clay, Medical Director  [x] Fela Barron, DPT,       [] Darrell Palma, RN Nurse Supervisor     [x]  Madiha Basurto, PT  []  Tayo Naranjo, PT       [x] Alex Larsen, OT   [] Garnetta Denver, OT  [] Irlanda Oswald, OT     [x]  Geneva Boss, SLP    []  Raman Manzo, SLP   [] Jennifer Cooper, SLP      [x]Ayaka Ogden,   []Sruthi Schroeder MSW, LSW,      [] Vira Brantley, MELISSA   [x] Dirk Mayers, MELISSA    [] Saul Calle, MELISSA    [] Roseann Garcia, MELISSA    [] Skylar Balderrama, MELISSA   [] Jasmyn Bell, RN       I have led this Team Conference and agree with the plan, Merced Clay MD, 9/8/2022, 12:41 PM  Goals have been updated to reflect recent status.     Team conference note transcribed this date by: Laila Bliss MA, [id number] [facility], Therapy Coordinator
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
## 2022-09-07 NOTE — PROGRESS NOTES
Mary James    : 1960  Acct #: [de-identified]  MRN: [id number]              PM&R Progress Note      Admitting diagnosis: Right lower limb osteomyelitis ( [address])     Comorbid diagnoses impacting rehabilitation: Uncontrolled pain, generalized weakness, gait disturbance, status post right below-knee amputation on 2022, uncontrolled diabetes type 2 with peripheral neuropathy, essential hypertension, acute blood loss anemia, hyponatremia, COPD, nicotine addiction     Chief complaint: The oxycodone worked better than the Phillip Sin.    Prior (baseline) level of function: Independent. Current level of function:         Current  IRF-GIANNI and Goals:   Occupational Therapy:    Short Term Goals  Time Frame for Short term goals: STGs=LTGs :   Long Term Goals  Time Frame for Long term goals : 7-10 days or until d/c. Long Term Goal 1: Pt will complete grooming tasks c Mod I.  Long Term Goal 2: Pt will complete total body bathing c AE PRN and Mod I.  Long Term Goal 3: Pt will complete UB dressing c IND. Long Term Goal 4: Pt will complete LB dressing c AE PRN and Mod I.  Long Term Goal 5: Pt will doff/don footwear to LLE c IND. Additional Goals?: Yes  Long Term Goal 6: Pt will complete toileting c Mod I.  Long Term Goal 7: Pt will perform functional transfers (bed, chair, toilet, shower) c DME PRN and Mod I.  Long Term Goal 8: Pt will complete therex/therax to facilitate an increase in strength/endurance (c emphasis on dynamic standing balance/tolerance > 8 mins) c Mod I.  Long Term Goal 9: Pt will complete home management tasks c Mod I. :                                       Eating: Eating  Assistance Needed: Independent  Comment: Per Pt report, Pt able to open packages and containers for meal this date and self feed IND. CARE Score: 6  Discharge Goal: Independent       Oral Hygiene: Oral Hygiene  Assistance Needed: Independent  Comment: Mod I seated to complete mouthwash routine.   CARE Score: 6  Discharge Goal: Independent    UB/LB Bathing: Shower/Bathe Self  Assistance Needed: Supervision or touching assistance  Comment: SBA to complete full shower, seated for entirety. Pt weight shifted to wash rear holding onto grab bar with one hand. CARE Score: 4  Discharge Goal: Independent    UB Dressing: Upper Body Dressing  Assistance Needed: Setup or clean-up assistance  Comment: Setup assist to doff/don pullover tshirt. CARE Score: 5  Discharge Goal: Independent         LB Dressing: Lower Body Dressing  Assistance Needed: Supervision or touching assistance  Comment: Pt able to don underwear and shorts with CGA/SBA in stance, Pt able to don RLE brace with setup assist at bed level. CARE Score: 4  Discharge Goal: Independent    Donning and [place] Footwear: Putting On/Taking Off Footwear  Assistance Needed: Setup or clean-up assistance  Comment: Setup assist to doff/don L sock. CARE Score: 5  Discharge Goal: Independent      Toiletin [address] needed: Partial/moderate assistance  Comment: Min A to manage clothing at toilet, Pt completed perineal hygiene after BM seated with SBA. CARE Score: 3  Discharge Goal: Independent      Toilet Transfers: Toilet Transfer  Assistance needed: Supervision or touching assistance  Comment: CGA with 2WW and use of grab bars, Pt required Max verbal cues for safety and correct hand/body/walker placement.   CARE Score: 4  Discharge Goal: Independent    Physical Therapy:         Long Term Goals  Time Frame for Long term goals : 7-10 days STG=LTG  Long term goal 1: Pt will perform bed mobility with mod I  Long term goal 2: Pt will perform sit to stand, w/c<>bed and car transfers with mod I  Long term goal 3: pt will ambulate with 2ww 50' on level surfaces and 10' on unlevel surface with supervision  Long term goal 4: Pt will ascend/descend 4\" step with 2ww and CGA  Long term goal 5: Pt will propel w/c in household situations 250' with mod I  Additional Goals?: Yes  Long term goal 6: Pt will  light object from floor with 2ww and reacher with mod I      Bed Mobility:   Sit to Lying  Assistance Needed: Supervision or touching assistance  Comment: supervision, flat bed, no rails  CARE Score: 4  Roll Left and Right  Assistance Needed: Supervision or touching assistance  Comment: supervision with effort  CARE Score: 4  Discharge Goal: Independent  Lying to Sitting on Side of Bed  Assistance Needed: Partial/moderate assistance  Comment: min assist for trunk  CARE Score: 3  Discharge Goal: Independent    Transfers:    Sit to Stand  Assistance Needed: Partial/moderate assistance  Comment: min assist, heavily uses back of legs on bed for CG, but in w/c needs more assist  CARE Score: 3  Discharge Goal: Independent  Chair/Bed-to-Chair Transfer  Assistance Needed: Partial/moderate assistance  Comment: min assist using pivot or 2ww  CARE Score: 3  Discharge Goal: Independent     Car Transfer  Assistance Needed: Partial/moderate assistance  Comment: mod assist with unsafe technique, poor control  CARE Score: 3  Discharge Goal: Independent    Ambulation:    Walking Ability  Does the Patient Walk?: Yes     Walk 10 Feet  Assistance Needed: Supervision or touching assistance  Comment: CGA with 2ww. CARE Score: 4  Discharge Goal: Independent     Walk 50 Feet with Two Turns  Comment: 10' max distance due to pain.  Pt could not tolerate hopping  Reason if not Attempted: Not attempted due to medical condition or safety concerns  CARE Score: 88  Discharge Goal: Supervision or touching assistance     Walk 150 Feet  Comment: do not feel pt will be able to tolerate this distance at discharge due to chronic medical conditions  Reason if not Attempted: Not attempted due to medical condition or safety concerns  CARE Score: 88  Discharge Goal: Not Attempted     Walking 10 Feet on Uneven Surfaces  Reason if not Attempted: Not attempted due to medical condition or safety concerns  CARE Score: 88  Discharge Goal: Supervision or touching assistance     1 Step (Curb)  Reason if not Attempted: Not attempted due to medical condition or safety concerns  CARE Score: 88  Discharge Goal: Supervision or touching assistance     4 Steps  Reason if not Attempted: Not applicable  CARE Score: 9  Discharge Goal: Not Applicable     12 Steps  Reason if not Attempted: Not applicable  CARE Score: 9  Discharge Goal: Not Applicable       Wheelchair:  w/c Ability: Wheelchair Ability  Uses a Wheelchair and/or Scooter?: Yes  Wheel 50 Feet with Two Turns  Assistance Needed: Supervision or touching assistance  Comment: Uses BUE  CARE Score: 4  Discharge Goal: Independent  Wheel 150 Feet  Assistance Needed: Supervision or touching assistance  CARE Score: 4  Discharge Goal: Independent          Balance:        Object: Picking Up Object  Comment: Pt not able to lift RUE off 2ww for more than 1-2 seconds and maintain balance  Reason if not Attempted: Not attempted due to medical condition or safety concerns  CARE Score: 88  Discharge Goal: Independent    I      Exam:    Blood pressure (!) 123/52, pulse 72, temperature 98.2 °F (36.8 °C), resp. rate 18, height 5' 8\" (1.727 m), weight 135 lb 9.3 oz (61.5 kg), SpO2 98 %. General: Semiupright in bed. Preoccupied with his pain. Joking with the therapist.    Aliyah Rust: Gazing right and left. Neck supple. MMM. No adenopathy. Pulmonary: Clear and unlabored. Cardiac: RRR. Abdomen: Patient's abdomen is soft and nondistended. Bowel sounds were present throughout. There was no rebound, guarding or masses noted. Upper extremities: Moving both upper limbs though functional range of motion. He lacks reflexes and has diminished dexterity in the fingers but strength is functional.    Lower extremities: Right BKA stump is dressed and tender and swollen. Some scant drainage. Bruising. Left calf is soft. Skin intact about the left foot. Sitting balance was good.   Standing balance was

## 2022-09-08 LAB
GLUCOSE BLD-MCNC: 120 MG/DL (ref 70–99)
GLUCOSE BLD-MCNC: 123 MG/DL (ref 70–99)
GLUCOSE BLD-MCNC: 146 MG/DL (ref 70–99)
GLUCOSE BLD-MCNC: 264 MG/DL (ref 70–99)

## 2022-09-08 PROCEDURE — 6370000000 HC RX 637 (ALT 250 FOR IP): Performed by: INTERNAL MEDICINE

## 2022-09-08 PROCEDURE — 6370000000 HC RX 637 (ALT 250 FOR IP): Performed by: PHYSICAL MEDICINE & REHABILITATION

## 2022-09-08 PROCEDURE — 97530 THERAPEUTIC ACTIVITIES: CPT

## 2022-09-08 PROCEDURE — 94664 DEMO&/EVAL PT USE INHALER: CPT

## 2022-09-08 PROCEDURE — 97110 THERAPEUTIC EXERCISES: CPT

## 2022-09-08 PROCEDURE — 94150 VITAL CAPACITY TEST: CPT

## 2022-09-08 PROCEDURE — 1280000000 HC REHAB R&B

## 2022-09-08 PROCEDURE — 94761 N-INVAS EAR/PLS OXIMETRY MLT: CPT

## 2022-09-08 PROCEDURE — 6360000002 HC RX W HCPCS: Performed by: PHYSICAL MEDICINE & REHABILITATION

## 2022-09-08 PROCEDURE — 97116 GAIT TRAINING THERAPY: CPT

## 2022-09-08 PROCEDURE — 82962 GLUCOSE BLOOD TEST: CPT

## 2022-09-08 PROCEDURE — 97542 WHEELCHAIR MNGMENT TRAINING: CPT

## 2022-09-08 PROCEDURE — 99233 SBSQ HOSP IP/OBS HIGH 50: CPT | Performed by: PHYSICAL MEDICINE & REHABILITATION

## 2022-09-08 PROCEDURE — 6360000002 HC RX W HCPCS: Performed by: INTERNAL MEDICINE

## 2022-09-08 RX ADMIN — INSULIN LISPRO 2 UNITS: 100 INJECTION, SOLUTION INTRAVENOUS; SUBCUTANEOUS at 17:07

## 2022-09-08 RX ADMIN — ENOXAPARIN SODIUM 40 MG: 100 INJECTION SUBCUTANEOUS at 08:23

## 2022-09-08 RX ADMIN — OXYCODONE HYDROCHLORIDE 10 MG: 10 TABLET ORAL at 08:19

## 2022-09-08 RX ADMIN — ASPIRIN 81 MG CHEWABLE TABLET 81 MG: 81 TABLET CHEWABLE at 08:20

## 2022-09-08 RX ADMIN — GABAPENTIN 600 MG: 300 CAPSULE ORAL at 21:08

## 2022-09-08 RX ADMIN — GABAPENTIN 600 MG: 300 CAPSULE ORAL at 08:20

## 2022-09-08 RX ADMIN — OXYCODONE HYDROCHLORIDE 10 MG: 10 TABLET ORAL at 21:09

## 2022-09-08 RX ADMIN — HYDROMORPHONE HYDROCHLORIDE 0.5 MG: 1 INJECTION, SOLUTION INTRAMUSCULAR; INTRAVENOUS; SUBCUTANEOUS at 16:18

## 2022-09-08 RX ADMIN — OXYCODONE HYDROCHLORIDE 10 MG: 10 TABLET ORAL at 04:26

## 2022-09-08 RX ADMIN — AMOXICILLIN 500 MG: 500 CAPSULE ORAL at 14:22

## 2022-09-08 RX ADMIN — INSULIN GLARGINE 8 UNITS: 100 INJECTION, SOLUTION SUBCUTANEOUS at 21:14

## 2022-09-08 RX ADMIN — HYDROMORPHONE HYDROCHLORIDE 0.5 MG: 1 INJECTION, SOLUTION INTRAMUSCULAR; INTRAVENOUS; SUBCUTANEOUS at 10:07

## 2022-09-08 RX ADMIN — FAMOTIDINE 20 MG: 20 TABLET ORAL at 21:10

## 2022-09-08 RX ADMIN — OXYCODONE HYDROCHLORIDE 10 MG: 10 TABLET ORAL at 13:55

## 2022-09-08 RX ADMIN — SULFAMETHOXAZOLE AND TRIMETHOPRIM 1 TABLET: 800; 160 TABLET ORAL at 21:10

## 2022-09-08 RX ADMIN — AMOXICILLIN 500 MG: 500 CAPSULE ORAL at 21:08

## 2022-09-08 RX ADMIN — GABAPENTIN 600 MG: 300 CAPSULE ORAL at 13:55

## 2022-09-08 RX ADMIN — AMOXICILLIN 500 MG: 500 CAPSULE ORAL at 06:20

## 2022-09-08 RX ADMIN — SULFAMETHOXAZOLE AND TRIMETHOPRIM 1 TABLET: 800; 160 TABLET ORAL at 08:20

## 2022-09-08 ASSESSMENT — PAIN DESCRIPTION - DESCRIPTORS
DESCRIPTORS: ACHING
DESCRIPTORS: ACHING
DESCRIPTORS: ACHING;BURNING;THROBBING
DESCRIPTORS: ACHING;BURNING
DESCRIPTORS: ACHING
DESCRIPTORS: ACHING;SHOOTING

## 2022-09-08 ASSESSMENT — PAIN SCALES - GENERAL
PAINLEVEL_OUTOF10: 5
PAINLEVEL_OUTOF10: 3
PAINLEVEL_OUTOF10: 3
PAINLEVEL_OUTOF10: 9
PAINLEVEL_OUTOF10: 8
PAINLEVEL_OUTOF10: 7
PAINLEVEL_OUTOF10: 8
PAINLEVEL_OUTOF10: 2
PAINLEVEL_OUTOF10: 7
PAINLEVEL_OUTOF10: 8
PAINLEVEL_OUTOF10: 8

## 2022-09-08 ASSESSMENT — PAIN DESCRIPTION - ORIENTATION
ORIENTATION: RIGHT

## 2022-09-08 ASSESSMENT — PAIN - FUNCTIONAL ASSESSMENT: PAIN_FUNCTIONAL_ASSESSMENT: ACTIVITIES ARE NOT PREVENTED

## 2022-09-08 ASSESSMENT — PAIN DESCRIPTION - LOCATION
LOCATION: LEG

## 2022-09-08 NOTE — CARE COORDINATION
Patient reviewed at today's care conference. Patient will dc home with sig other 9/15. [facility] pt/ot recommended. Lt wt WC with elev legs & anti tippers/RW recommended. 1300:  case mgt updated patient in room. Patient pleased with dc date and plan. He inquired about a knee scooter, case mgt suggested he speak with PT. Patient stated that if we don't order one, he'll likely purchase one privately. Whiteboard updated. Patient isn't a . Patient will have transport available at discharge.

## 2022-09-08 NOTE — FLOWSHEET NOTE
[x] daily progress note       [] discharge       Patient Name:  Arnie Montes   :  1960 MRN: [id number]  Room:  [address] [place] [address] Date of Admission: 2022  Rehabilitation Diagnosis:   Necrotizing fasciitis [M72.6]  Osteomyelitis of right leg ([address] [M86.9]       Date 2022       Day of ARU Week:  3   Time IN/-945  [id number]   Individual Tx Minutes 120   TOTAL Tx Time Mins 120   Restrictions Restrictions/Precautions  Restrictions/Precautions: Fall Risk, General Precautions, Weight Bearing  Required Braces or Orthoses?: Yes  Position Activity Restriction  Other position/activity restrictions: LUE IV access   Communication with other providers: [x]   OK to see per nursing:     []   Spoke with team member regarding:      Subjective observations and cognitive status: AM session: pt seen in semi-davey's position in bed at beginning of treatment. Pt required some encouragement to participate. Pt stated \"I just ordered another breakfast and I don't want anything to interfere with that. \"   PM session: pt seen in semi-davey's position in bed at beginning of treatment. Agreeable to therapy.     Pain level/location:    6/10       Location: right residual limb   Discharge recommendations  TBD     Bed Mobility:           [x]   Pt received out of bed   Rolling R/L:  Independent   Lying --> Sit:  Setup assist with bed rails and bed features   Sit --> lying:  Setup assist with bed rails and bed features    Transfers:    Sit--> Stand:  SBA  Stand --> Sit:   SBA  Chair-->Bed/Bed --> Chair:   SBA  Assistive device required for transfer:   RW with sit<->stand    Gait:    Distance:  AM session: 13'+11'   Assistance:  SBA  Device:  RW  Gait Quality:  hop-to pattern     Wheelchair Propulsion:  Distance:  AM session: 380'+200'+200'; PM session: 200'+200'  Assistance:  mod I   Extremities Used:   BUEs   Type:    [x]  Manual        []  Electric    Additional Therapeutic activities/exercises completed this date:     [] [x]other: After AM session: pt left in semi-davey's position in bed with call light at end of treatment. After PM session: pt left in semi-davey's position in bed with call light at end of treatment. Number of Minutes/Billable Intervention  Gait Training 15   Therapeutic Exercise 45   Neuro Re-Ed    Therapeutic Activity 30   Wheelchair Propulsion 30   Group    Other:    TOTAL 120         Social History  Social/Functional History  Lives With: Spouse  Type of Home: Mobile home  Home Layout: One level  Home Access: Ramped entrance, Stairs to enter with rails (Pt uses ramped entrance in the front of the home, however does have 4 ALCON the back of the home.)  Entrance Stairs - Number of Steps: 4  Entrance Stairs - Rails: Right  Bathroom Shower/Tub: Tub/Shower unit, Shower chair with back  [employer]: Handicap height  Bathroom Equipment: Hand-held shower, Shower chair, Grab bars in shower  Bathroom Accessibility: [facility]: Cane  Has the patient had two or more falls in the past year or any fall with injury in the past year?: No  ADL Assistance: Independent  Homemaking Assistance: Independent  Homemaking Responsibilities: Yes  Meal Prep Responsibility: Primary (Shared responsibility with spouse.)  Laundry Responsibility: Primary (Shared responsibility with spouse.)  Cleaning Responsibility: Primary (Shared responsibility with spouse.)  Bill Paying/Finance Responsibility: Primary (Shared responsibility with spouse.)  Shopping Responsibility: Primary (Shared responsibility with spouse.)  Dependent Care Responsibility: Primary (Pt takes care of dog - Goob.)  Health Care Management: Secondary (Pt's wife organizes pills.)  Ambulation Assistance: Independent (Mod I. Pt was using wife's 2WW to get to bathroom and outside.  Pt had been using device for ~ 2 months, prior to this IND without device.)  Transfer Assistance: Independent (Pt would use SPC to get into truck.)  Active : Yes  Mode of Transportation: Truck  Occupation: On disability  Leisure & Hobbies: Pt enjoys mechanics and outdoor work. IADL Comments: Pt completes all yardwork at home. Additional Comments: Pt has regular flat bed at home with assistance from wife for bed mobility. Pt with no falls in the last year. Objective                                                                                    Goals:  (Update in navigator)   : Long Term Goals  Time Frame for Long term goals : 7-10 days STG=LTG  Long term goal 1: Pt will perform bed mobility with mod I  Long term goal 2: Pt will perform sit to stand, w/c<>bed and car transfers with mod I  Long term goal 3: pt will ambulate with 2ww 50' on level surfaces and 10' on unlevel surface with supervision  Long term goal 4: Pt will ascend/descend 4\" step with 2ww and CGA  Long term goal 5: Pt will propel w/c in household situations 250' with mod I  Additional Goals?: Yes  Long term goal 6: Pt will  light object from floor with 2ww and reacher with mod I:        Plan of Care                                                                              Times per week: 5 days per week for a minimum of 60 minutes/day plus group as appropriate for 60 minutes.   Treatment to include Current Treatment Recommendations: Strengthening, ROM, Balance training, Endurance training, Wheelchair mobility training, Functional mobility training, Transfer training, Gait training, ADL/Self-care training, Cognitive reorientation, Patient/Caregiver education & training, Safety education & training, Home exercise program, Pain management, Equipment evaluation, education, & procurement, Modalities, Positioning, Therapeutic activities, Stair training, Neuromuscular re-education, IADL training, Manual Therapy - Soft Tissue Mobilization    Electronically signed by   Martínez Osorio, [id number]  9/8/2022, 8:48 AM

## 2022-09-08 NOTE — PROGRESS NOTES
Physical Rehabilitation: OCCUPATIONAL THERAPY     [x] daily progress note       [] discharge       Patient Name:  Avelina Cuenca   :  1960 MRN: [id number]  Room:  [address] [place] [address] Date of Admission: 2022  Rehabilitation Diagnosis:   Necrotizing fasciitis [M72.6]  Osteomyelitis of right leg ([address] [M86.9]       Date 2022       Day of ARU Week:  3   Time IN/OUT 1330/1430   Individual Tx Minutes 60   Group Tx Minutes    Co-Treat Minutes    Concurrent Tx Minutes    TOTAL Tx Time Mins 60   Variance Time    Variance Time []   Refusal due to:     []   Medical hold/reason:    []   Illness   []   Off Unit for test/procedure  []   Extra time needed to complete task  []   Therapeutic need  []   Other (specify):   Restrictions Restrictions/Precautions: Fall Risk, General Precautions, Weight Bearing         Communication with other providers: [x]   OK to see per nursing:     []   Spoke with team member regarding:      Subjective observations and cognitive status: Patient supine in bed resting, somewhat pleasant and agreeable to therapy      Pain level/location:  8  /10       Location: R LE    Discharge recommendations  Anticipated discharge date:  9/15  Destination: []home alone   []home alone w assist prn   [x] home w/ family    [] Continuous supervision       []SNF    [] Assisted living     [] Other:   Continued therapy: [x]HHC OT  []OUTPATIENT  OT   [] No Further OT  Equipment needs: None       Donning and [place] Footwear: Putting On/Taking Off Footwear  Assistance Needed: Setup or clean-up assistance  Comment: Set up assist for donnoing sock and slip on shoe  CARE Score: 5  Discharge Goal: Independent      Toiletin Road needed: Partial/moderate assistance  Comment: Min A to manage clothing at toilet, Pt completed perineal hygiene after BM seated with SBA. CARE Score: 3  Discharge Goal: Independent    Denies need     Toilet Transfers:     Toilet Transfer  Assistance needed: Supervision or touching assistance  Comment: CGA with 2WW and use of grab bars, Pt required Max verbal cues for safety and correct hand/body/walker placement. CARE Score: 4  Discharge Goal: Independent  Device Used:    []   Standard Toilet         [x]   Grab Bars           []  Bedside Commode  frame over standards toilet      []   Elevated Toilet          []   Other:    NA      Bed Mobility:           []   Pt received out of bed   Rolling R/L:    Scooting:  Sup/SBA  Supine --> Sit:  Sup/SBA  Sit --> Supine:      Transfers:    Sit--> Stand:  Light Min A   Stand --> Sit:   CGA/Light Min A   Stand-Pivot:   Squat pivot bed to  CGA  Other:    Assistive device required for transfer:   RW      Functional Mobility:  throughout ARU focus on safety awareness, strength and endurance; from just outside doorway to bed  at RW level   Assistance:   Mod I wc mobility; hop to c RW 10 feet CGA/Light Min A at end of 10 feet   Device:   [x]   Rolling Walker     []   Standard Walker [x]   Wheelchair        []   [employer]       []   4-Wheeled Valentino Sings         []   Cardiac Walker       []   Other:        Additional Therapeutic activities/exercises completed this date:     [x]   ADL Training   [x]   Balance/Postural training     [x]   Bed/Transfer Training Patient instructed in wc pushups c 3 sets of 6 in prep of facilitating transfer training and using BUE to push during mobility tasks with RW    [x]   Endurance Training Patient instructed in bilateral reciprocal patterned movement for 12 minutes while seated with min/mod resistance and rest break at 6 minute lamberto to increase strength and endurance to facilitate ADL and mobility tasks    []   Neuromuscular Re-ed   []   Nu-step:  Time:        Level:         #Steps:       []   Rebounder:    []  Seated     []  Standing        []   Supine Ther Ex (reps/sets):     [x]   Seated Ther Ex (reps/sets): Patient instructed in BUE exercises c 3# dumbbell all planes and all joints to increase strength to facilitate transfer techniques    []   Standing Ther Ex (reps/sets):     []   Other:      Comments:      Patient/Caregiver Education and Training:   [x]   Adaptive Equipment Use  [x]   Bed Mobility/Transfer Technique/Safety  [x]   Energy Conservation Tips  []   Family training  [x]   Postural Awareness  [x]   Safety During Functional Activities  []   Reinforced Patient's Precautions   []   Progress was updated and reviewed in Rehabtracker with patient and/or family this         date.     Treatment Plan for Next Session: POC to continue as tolerated         Treatment/Activity Tolerance:   [x] Tolerated treatment with no adverse effects    [] Patient limited by fatigue  [] Patient limited by pain   [] Patient limited by medical complications:    [] Adverse reaction to Tx:   [] Significant change in status    Safety:       [x]  bed alarm set    []  chair alarm set    []  Pt refused alarms                []  Telesitter activated      [x]  Gait belt used during tx session      []other:       Number of Minutes/Billable Intervention  Therapeutic Exercise 15   ADL Self-care    Neuro Re-Ed    Therapeutic Activity 45   Group    Other:    TOTAL 60       Social History  Social/Functional History  Lives With: Spouse  Type of Home: Mobile home  Home Layout: One level  Home Access: Ramped entrance, Stairs to enter with rails (Pt uses ramped entrance in the front of the home, however does have 4 ALCON the back of the home.)  Entrance Stairs - Number of Steps: 4  Entrance Stairs - Rails: Right  Bathroom Shower/Tub: Tub/Shower unit, Shower chair with back  [employer]: Handicap height  Bathroom Equipment: Hand-held shower, Shower chair, Grab bars in shower  Bathroom Accessibility: Accessible  Home Equipment: [employer]  Has the patient had two or more falls in the past year or any fall with injury in the past year?: No  ADL Assistance: [address] [place] [address] Avenue: Independent  Homemaking Responsibilities: Yes  Meal Prep Responsibility: Primary (Shared responsibility with spouse.)  Laundry Responsibility: Primary (Shared responsibility with spouse.)  Cleaning Responsibility: Primary (Shared responsibility with spouse.)  Bill Paying/Finance Responsibility: Primary (Shared responsibility with spouse.)  Shopping Responsibility: Primary (Shared responsibility with spouse.)  Dependent Care Responsibility: Primary (Pt takes care of dog - Goob.)  Health Care Management: Secondary (Pt's wife organizes pills.)  Ambulation Assistance: Independent (Mod I. Pt was using wife's 2WW to get to bathroom and outside. Pt had been using device for ~ 2 months, prior to this IND without device.)  Transfer Assistance: Independent (Pt would use SPC to get into truck.)  Active : Yes  Mode of Transportation: Truck  Occupation: On disability  Leisure & Hobbies: Pt enjoys mechanics and outdoor work. IADL Comments: Pt completes all yardwork at home. Additional Comments: Pt has regular flat bed at home with assistance from wife for bed mobility. Pt with no falls in the last year. Objective                                                                                    Goals:  (Update in navigator)  Short Term Goals  Time Frame for Short term goals: STGs=LTGs:  Long Term Goals  Time Frame for Long term goals : 7-10 days or until d/c. Long Term Goal 1: Pt will complete grooming tasks c Mod I.  Long Term Goal 2: Pt will complete total body bathing c AE PRN and Mod I.  Long Term Goal 3: Pt will complete UB dressing c IND. Long Term Goal 4: Pt will complete LB dressing c AE PRN and Mod I.  Long Term Goal 5: Pt will doff/don footwear to LLE c IND.   Additional Goals?: Yes  Long Term Goal 6: Pt will complete toileting c Mod I.  Long Term Goal 7: Pt will perform functional transfers (bed, chair, toilet, shower) c DME PRN and Mod I.  Long Term Goal 8: Pt will complete therex/therax to facilitate an increase in strength/endurance (c emphasis on dynamic standing balance/tolerance > 8 mins) c Mod I.  Long Term Goal 9: Pt will complete home management tasks c Mod I.:        Plan of Care                                                                              Times per week: 5 days per week for a minimum of 60 minutes/day plus group as appropriate for 60 minutes.   Treatment to include Plan  Times per Day: Daily  Current Treatment Recommendations: Strengthening, Balance training, Functional mobility training, Endurance training, Pain management, Safety education & training, Patient/Caregiver education & training, Equipment evaluation, education, & procurement, Positioning, Self-Care / ADL, Home management training, Coordination training    Electronically signed by   ANNE Granado,  9/8/2022, 2:21 PM
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
## 2022-09-08 NOTE — PROGRESS NOTES
Pt upset about dinner, pt states I did not get everything I was supposed to eat. I did not witness dinner tray. However after glucometer was 113 pt then requested ice cream. Pt did not want peanut butter and crackers. Pt insisted on 2 vanilla ice creams. Wife also brought pt a subway wrap and chips. Pt is only eating the wrap at this time. Will cont to monitor.

## 2022-09-08 NOTE — PROGRESS NOTES
Patient instructed and educated on [facility]. Patient able to do 1000 ml. Vital capacity. Patient's goal is 2750ml.  Electronically signed by Shant Dunbar RCP on 9/8/2022 at 11:59 AM 1

## 2022-09-08 NOTE — PLAN OF CARE
Problem: Discharge Planning  Goal: Discharge to home or other facility with appropriate resources  9/7/2022 2314 by Valeria Hernandez RN  Outcome: Progressing  9/7/2022 1137 by Chon Peralta  Outcome: Progressing     Problem: Pain  Goal: Verbalizes/displays adequate comfort level or baseline comfort level  9/7/2022 2314 by Valeria Hernandez RN  Outcome: Progressing  9/7/2022 1137 by Chon Peralta  Outcome: Progressing     Problem: Safety - Adult  Goal: Free from fall injury  9/7/2022 2314 by Valeria Hernandez RN  Outcome: Progressing  9/7/2022 1137 by Chon Peralta  Outcome: Progressing     Problem: Skin/Tissue Integrity  Goal: Absence of new skin breakdown  Description: 1. Monitor for areas of redness and/or skin breakdown  2. Assess vascular access sites hourly  3. Every 4-6 hours minimum:  Change oxygen saturation probe site  4. Every 4-6 hours:  If on nasal continuous positive airway pressure, respiratory therapy assess nares and determine need for appliance change or resting period.   9/7/2022 2314 by Valeria Hernandez RN  Outcome: Progressing  9/7/2022 1139 by Chon Peralta  Outcome: Progressing     Problem: Nutrition Deficit:  Goal: Optimize nutritional status  Outcome: Progressing  Flowsheets (Taken 9/7/2022 1141 by Jennifer Bliss)  Nutrient intake appropriate for improving, restoring, or maintaining nutritional needs:   Monitor oral intake, labs, and treatment plans   Assess nutritional status and recommend course of action   Recommend appropriate diets, oral nutritional supplements, and vitamin/mineral supplements

## 2022-09-08 NOTE — PLAN OF CARE
Problem: Discharge Planning  Goal: Discharge to home or other facility with appropriate resources  9/8/2022 0941 by Devonte Hutchison LPN  Outcome: Progressing  9/7/2022 2314 by Isiah Mcgarry RN  Outcome: Progressing     Problem: Pain  Goal: Verbalizes/displays adequate comfort level or baseline comfort level  9/8/2022 0941 by Devonte Hutchison LPN  Outcome: Progressing  9/7/2022 2314 by Isiah Mcgarry RN  Outcome: Progressing     Problem: Safety - Adult  Goal: Free from fall injury  9/8/2022 0941 by Devonte Hutchison LPN  Outcome: Progressing  9/7/2022 2314 by Isiah Mcgarry RN  Outcome: Progressing     Problem: Skin/Tissue Integrity  Goal: Absence of new skin breakdown  Description: 1. Monitor for areas of redness and/or skin breakdown  2. Assess vascular access sites hourly  3. Every 4-6 hours minimum:  Change oxygen saturation probe site  4. Every 4-6 hours:  If on nasal continuous positive airway pressure, respiratory therapy assess nares and determine need for appliance change or resting period.   9/8/2022 0941 by Devonte Hutchison LPN  Outcome: Progressing  9/7/2022 2314 by Isiah Mcgarry RN  Outcome: Progressing     Problem: Nutrition Deficit:  Goal: Optimize nutritional status  9/8/2022 0941 by Devonte Hutchison LPN  Outcome: Progressing  9/7/2022 2314 by Isiah Mcgarry RN  Outcome: Progressing  Flowsheets (Taken 9/7/2022 1141 by Ye Mathias)  Nutrient intake appropriate for improving, restoring, or maintaining nutritional needs:   Monitor oral intake, labs, and treatment plans   Assess nutritional status and recommend course of action   Recommend appropriate diets, oral nutritional supplements, and vitamin/mineral supplements

## 2022-09-09 DIAGNOSIS — Z89.511 S/P BKA (BELOW KNEE AMPUTATION) UNILATERAL, RIGHT (HCC): Primary | ICD-10-CM

## 2022-09-09 LAB
GLUCOSE BLD-MCNC: 202 MG/DL (ref 70–99)
GLUCOSE BLD-MCNC: 207 MG/DL (ref 70–99)
GLUCOSE BLD-MCNC: 240 MG/DL (ref 70–99)
GLUCOSE BLD-MCNC: 96 MG/DL (ref 70–99)

## 2022-09-09 PROCEDURE — 6370000000 HC RX 637 (ALT 250 FOR IP): Performed by: INTERNAL MEDICINE

## 2022-09-09 PROCEDURE — 99232 SBSQ HOSP IP/OBS MODERATE 35: CPT | Performed by: PHYSICAL MEDICINE & REHABILITATION

## 2022-09-09 PROCEDURE — 6360000002 HC RX W HCPCS: Performed by: INTERNAL MEDICINE

## 2022-09-09 PROCEDURE — 1280000000 HC REHAB R&B

## 2022-09-09 PROCEDURE — 6360000002 HC RX W HCPCS: Performed by: PHYSICAL MEDICINE & REHABILITATION

## 2022-09-09 PROCEDURE — 97116 GAIT TRAINING THERAPY: CPT

## 2022-09-09 PROCEDURE — 6370000000 HC RX 637 (ALT 250 FOR IP): Performed by: PHYSICAL MEDICINE & REHABILITATION

## 2022-09-09 PROCEDURE — 82962 GLUCOSE BLOOD TEST: CPT

## 2022-09-09 PROCEDURE — 97110 THERAPEUTIC EXERCISES: CPT

## 2022-09-09 PROCEDURE — 97535 SELF CARE MNGMENT TRAINING: CPT

## 2022-09-09 PROCEDURE — 97530 THERAPEUTIC ACTIVITIES: CPT

## 2022-09-09 RX ADMIN — SULFAMETHOXAZOLE AND TRIMETHOPRIM 1 TABLET: 800; 160 TABLET ORAL at 08:26

## 2022-09-09 RX ADMIN — GABAPENTIN 600 MG: 300 CAPSULE ORAL at 08:26

## 2022-09-09 RX ADMIN — INSULIN LISPRO 1 UNITS: 100 INJECTION, SOLUTION INTRAVENOUS; SUBCUTANEOUS at 09:21

## 2022-09-09 RX ADMIN — OXYCODONE HYDROCHLORIDE 10 MG: 10 TABLET ORAL at 06:23

## 2022-09-09 RX ADMIN — AMOXICILLIN 500 MG: 500 CAPSULE ORAL at 20:41

## 2022-09-09 RX ADMIN — HYDROMORPHONE HYDROCHLORIDE 0.5 MG: 1 INJECTION, SOLUTION INTRAMUSCULAR; INTRAVENOUS; SUBCUTANEOUS at 01:49

## 2022-09-09 RX ADMIN — ENOXAPARIN SODIUM 40 MG: 100 INJECTION SUBCUTANEOUS at 08:25

## 2022-09-09 RX ADMIN — OXYCODONE HYDROCHLORIDE 10 MG: 10 TABLET ORAL at 17:33

## 2022-09-09 RX ADMIN — HYDROMORPHONE HYDROCHLORIDE 0.5 MG: 1 INJECTION, SOLUTION INTRAMUSCULAR; INTRAVENOUS; SUBCUTANEOUS at 20:41

## 2022-09-09 RX ADMIN — ASPIRIN 81 MG CHEWABLE TABLET 81 MG: 81 TABLET CHEWABLE at 08:26

## 2022-09-09 RX ADMIN — INSULIN GLARGINE 8 UNITS: 100 INJECTION, SOLUTION SUBCUTANEOUS at 20:47

## 2022-09-09 RX ADMIN — GABAPENTIN 600 MG: 300 CAPSULE ORAL at 20:41

## 2022-09-09 RX ADMIN — GABAPENTIN 600 MG: 300 CAPSULE ORAL at 14:25

## 2022-09-09 RX ADMIN — INSULIN LISPRO 1 UNITS: 100 INJECTION, SOLUTION INTRAVENOUS; SUBCUTANEOUS at 17:39

## 2022-09-09 RX ADMIN — SULFAMETHOXAZOLE AND TRIMETHOPRIM 1 TABLET: 800; 160 TABLET ORAL at 20:41

## 2022-09-09 RX ADMIN — FAMOTIDINE 20 MG: 20 TABLET ORAL at 20:41

## 2022-09-09 RX ADMIN — AMOXICILLIN 500 MG: 500 CAPSULE ORAL at 14:39

## 2022-09-09 RX ADMIN — INSULIN LISPRO 2 UNITS: 100 INJECTION, SOLUTION INTRAVENOUS; SUBCUTANEOUS at 17:36

## 2022-09-09 RX ADMIN — OXYCODONE HYDROCHLORIDE 10 MG: 10 TABLET ORAL at 11:02

## 2022-09-09 RX ADMIN — AMOXICILLIN 500 MG: 500 CAPSULE ORAL at 06:23

## 2022-09-09 RX ADMIN — INSULIN LISPRO 2 UNITS: 100 INJECTION, SOLUTION INTRAVENOUS; SUBCUTANEOUS at 13:30

## 2022-09-09 RX ADMIN — HYDROMORPHONE HYDROCHLORIDE 0.5 MG: 1 INJECTION, SOLUTION INTRAMUSCULAR; INTRAVENOUS; SUBCUTANEOUS at 08:24

## 2022-09-09 RX ADMIN — HYDROMORPHONE HYDROCHLORIDE 0.5 MG: 1 INJECTION, SOLUTION INTRAMUSCULAR; INTRAVENOUS; SUBCUTANEOUS at 14:25

## 2022-09-09 RX ADMIN — INSULIN LISPRO 2 UNITS: 100 INJECTION, SOLUTION INTRAVENOUS; SUBCUTANEOUS at 09:21

## 2022-09-09 ASSESSMENT — PAIN SCALES - GENERAL
PAINLEVEL_OUTOF10: 6
PAINLEVEL_OUTOF10: 4
PAINLEVEL_OUTOF10: 5
PAINLEVEL_OUTOF10: 7
PAINLEVEL_OUTOF10: 8
PAINLEVEL_OUTOF10: 9
PAINLEVEL_OUTOF10: 9
PAINLEVEL_OUTOF10: 6
PAINLEVEL_OUTOF10: 9
PAINLEVEL_OUTOF10: 8
PAINLEVEL_OUTOF10: 9
PAINLEVEL_OUTOF10: 10
PAINLEVEL_OUTOF10: 3
PAINLEVEL_OUTOF10: 8
PAINLEVEL_OUTOF10: 10
PAINLEVEL_OUTOF10: 8
PAINLEVEL_OUTOF10: 8

## 2022-09-09 ASSESSMENT — PAIN DESCRIPTION - LOCATION
LOCATION: LEG

## 2022-09-09 ASSESSMENT — PAIN DESCRIPTION - FREQUENCY
FREQUENCY: CONTINUOUS

## 2022-09-09 ASSESSMENT — PAIN DESCRIPTION - ORIENTATION
ORIENTATION: RIGHT

## 2022-09-09 ASSESSMENT — PAIN DESCRIPTION - PAIN TYPE
TYPE: SURGICAL PAIN

## 2022-09-09 ASSESSMENT — PAIN SCALES - WONG BAKER
WONGBAKER_NUMERICALRESPONSE: 8
WONGBAKER_NUMERICALRESPONSE: 2
WONGBAKER_NUMERICALRESPONSE: 6
WONGBAKER_NUMERICALRESPONSE: 6
WONGBAKER_NUMERICALRESPONSE: 4
WONGBAKER_NUMERICALRESPONSE: 4
WONGBAKER_NUMERICALRESPONSE: 6
WONGBAKER_NUMERICALRESPONSE: 0
WONGBAKER_NUMERICALRESPONSE: 8
WONGBAKER_NUMERICALRESPONSE: 8
WONGBAKER_NUMERICALRESPONSE: 6
WONGBAKER_NUMERICALRESPONSE: 0
WONGBAKER_NUMERICALRESPONSE: 4
WONGBAKER_NUMERICALRESPONSE: 10

## 2022-09-09 ASSESSMENT — PAIN DESCRIPTION - ONSET
ONSET: ON-GOING

## 2022-09-09 ASSESSMENT — PAIN DESCRIPTION - DESCRIPTORS
DESCRIPTORS: ACHING;THROBBING;DISCOMFORT
DESCRIPTORS: THROBBING
DESCRIPTORS: ACHING;THROBBING
DESCRIPTORS: ACHING
DESCRIPTORS: ACHING
DESCRIPTORS: ACHING;THROBBING
DESCRIPTORS: ACHING;BURNING;THROBBING
DESCRIPTORS: BURNING;ACHING;THROBBING
DESCRIPTORS: ACHING;BURNING;STABBING;THROBBING
DESCRIPTORS: ACHING

## 2022-09-09 ASSESSMENT — PAIN - FUNCTIONAL ASSESSMENT
PAIN_FUNCTIONAL_ASSESSMENT: ACTIVITIES ARE NOT PREVENTED

## 2022-09-09 NOTE — PROGRESS NOTES
Physical Therapy    [x] daily progress note       [] discharge       Patient Name:  Arias Mena   :  1960 MRN: [id number]  Room:  [address] [place] [address] Date of Admission: 2022  Rehabilitation Diagnosis:   Necrotizing fasciitis [M72.6]  Osteomyelitis of right leg ([address] [M86.9]       Date 2022       Day of ARU Week:  4   Time IN/OUT [id number]   Individual Tx Minutes 60   Group Tx Minutes    Co-Treat Minutes    Concurrent Tx Minutes    TOTAL Tx Time Mins 60   Variance Time    Variance Time []   Refusal due to:     []   Medical hold/reason:    []   Illness   []   Off Unit for test/procedure  []   Extra time needed to complete task  []   Therapeutic need  []   Other (specify):   Restrictions Restrictions/Precautions  Restrictions/Precautions: Fall Risk, General Precautions, Weight Bearing  Required Braces or Orthoses?: Yes  Position Activity Restriction  Other position/activity restrictions: LUE IV access   Communication with other providers: []   OK to see per nursing:     []   Spoke with team member regarding:      Subjective observations and cognitive status: Pt sitting up in semi davey position awake and drinking coffee.  He is agreeable to therapy     Pain level/location:  7  /10       Location: right  stump   Discharge recommendations  Anticipated discharge date:  tbd  Destination: []home alone   []home alone with assist PRN     [] home w/ family      [] Continuous supervision  []SNF    [] Assisted living     [] Other:   Continued therapy: []HHC PT  []OUTPATIENT  PT   [] No Further PT  Equipment needs: tbd         Bed Mobility:           []   Pt received out of bed   Rolling R/L:  ind  Scooting:  mod ind  Lying --> Sit:  min asst  Sit --> lying:  mod ind    Transfers:    Sit--> Stand:  cga  Stand --> Sit:   cga  Chair-->Bed/Bed --> Chair:   cga  Car Transfers:  cga with lob during  turn requiring mod asst briefly to stabilize  Toilet Transfer (if applicable): min asst  Other:    Assistive device required for transfer:   fww    Gait:    Distance:  23 ft, 27 ft, 34 ft then pain increases in right le and stops pt   Assistance:  cga  Device:  fww  Gait Quality:  hop to pattern    Wheelchair Propulsion:  Distance:  175 ft  x 2  Assistance:  sup  Extremities Used:   B ue + left le occassionally  Type:    [x]  Manual        []  Electric    Additional Therapeutic activities/exercises completed this date:     []   Nu-step:  Time:        Level:         #Steps:       []   Rebounder:    []  Seated     []  Standing        []   Balance training         []   Postural training    []   Supine ther ex (reps/sets):     []   Seated ther ex (reps/sets):     []   Standing ther ex (reps/sets):     []   Picking up object from floor (standing):                   []   Reacher used   [x]   Other: 2 inch curb step with fww in // bars for  increased safety with  cg/min asst + vc for seq x 2 reps   []   Other:    Comments:  Pt is impulsive when pain hits him and requires vc for  reaching back to  w/c vs plopping    Patient/Caregiver Education and Training:   [x]   Bed Mobility/Transfer technique/safety  [x]   Gait technique/sequencing  [x]   Proper use of assistive device  []   Advanced mobility safety and technique  []   Reinforced patient's precautions/mobility while maintaining precautions  []   Postural awareness  []   Family training  []   Progress was updated and reviewed in Rehabtracker with patient and/or family this date. Treatment Plan for Next Session: trasnfer training, gait training, curb step training      Assessment: This pt demonstrated a positive  response to today's treatment as evidenced by curb step training. The patient is making  progress toward established goals as evidenced by QI scores. Ongoing deficits are observed in the areas of strength and endurance and continued focus on  mobility safety is recommended.      Treatment/Activity Tolerance:   [] Tolerated treatment with no adverse effects    [x] Patient limited by fatigue  [x] Patient limited by pain   [] Patient limited by medical complications:    [] Adverse reaction to Tx:   [] Significant change in status    Safety:       [x]  bed alarm set    []  chair alarm set    []  Pt refused alarms                []  Telesitter activated      [x]  Gait belt used during tx session      []other:         Number of Minutes/Billable Intervention  Gait Training 30   Therapeutic Exercise    Neuro Re-Ed    Therapeutic Activity 30   Wheelchair Propulsion    Group    Other:    TOTAL 60         Social History  Social/Functional History  Lives With: Spouse  Type of Home: Mobile home  Home Layout: One level  Home Access: Ramped entrance, Stairs to enter with rails (Pt uses ramped entrance in the front of the home, however does have 4 ALCON the back of the home.)  Entrance Stairs - Number of Steps: 4  Entrance Stairs - Rails: Right  Bathroom Shower/Tub: Tub/Shower unit, Shower chair with back  [employer]: Handicap height  Bathroom Equipment: Hand-held shower, Shower chair, Grab bars in shower  Bathroom Accessibility: Accessible  Home Equipment: Cane  Has the patient had two or more falls in the past year or any fall with injury in the past year?: No  ADL Assistance: [facility] [address]: [address] [place] [address] Responsibilities: Yes  Meal Prep Responsibility: Primary (Shared responsibility with spouse.)  Laundry Responsibility: Primary (Shared responsibility with spouse.)  Cleaning Responsibility: Primary (Shared responsibility with spouse.)  Bill Paying/Finance Responsibility: Primary (Shared responsibility with spouse.)  Shopping Responsibility: Primary (Shared responsibility with spouse.)  Dependent Care Responsibility: Primary (Pt takes care of dog - Goob.)  Health Care Management: Secondary (Pt's wife organizes pills.)  Ambulation Assistance: Independent (Mod I. Pt was using wife's 2WW to get to bathroom and outside.  Pt had been using device for ~ 2 months, prior to this IND without device.)  Transfer Assistance: Independent (Pt would use SPC to get into truck.)  Active : Yes  Mode of Transportation: Truck  Occupation: On disability  Leisure & Hobbies: Pt enjoys mechanics and outdoor work. IADL Comments: Pt completes all yardwork at home. Additional Comments: Pt has regular flat bed at home with assistance from wife for bed mobility. Pt with no falls in the last year. Objective                                                                                    Goals:  (Update in navigator)   : Long Term Goals  Time Frame for Long term goals : 7-10 days STG=LTG  Long term goal 1: Pt will perform bed mobility with mod I  Long term goal 2: Pt will perform sit to stand, w/c<>bed and car transfers with mod I  Long term goal 3: pt will ambulate with 2ww 50' on level surfaces and 10' on unlevel surface with supervision  Long term goal 4: Pt will ascend/descend 4\" step with 2ww and CGA  Long term goal 5: Pt will propel w/c in household situations 250' with mod I  Additional Goals?: Yes  Long term goal 6: Pt will  light object from floor with 2ww and reacher with mod I:        Plan of Care                                                                              Times per week: 5 days per week for a minimum of 60 minutes/day plus group as appropriate for 60 minutes.   Treatment to include Current Treatment Recommendations: Strengthening, ROM, Balance training, Endurance training, Wheelchair mobility training, Functional mobility training, Transfer training, Gait training, ADL/Self-care training, Cognitive reorientation, Patient/Caregiver education & training, Safety education & training, Home exercise program, Pain management, Equipment evaluation, education, & procurement, Modalities, Positioning, Therapeutic activities, Stair training, Neuromuscular re-education, IADL training, Manual Therapy - Soft Tissue Mobilization    Electronically signed by   Daryn Hooper CAT,[id number]  9/9/2022, 8:21 AM

## 2022-09-09 NOTE — PROGRESS NOTES
Physical Rehabilitation: OCCUPATIONAL THERAPY     [x] daily progress note       [] discharge       Patient Name:  Ira Prince   :  1960 MRN: [id number]  Room:  [address] [place] [address] Date of Admission: 2022  Rehabilitation Diagnosis:   Necrotizing fasciitis [M72.6]  Osteomyelitis of right leg ([address] [M86.9]       Date 2022       Day of ARU Week:  4   Time IN//920   Individual Tx Minutes 65   Group Tx Minutes    Co-Treat Minutes    Concurrent Tx Minutes    TOTAL Tx Time Mins 65   Variance Time +5   Variance Time []   Refusal due to:     []   Medical hold/reason:    []   Illness   []   Off Unit for test/procedure  [x]   Extra time needed to complete task  []   Therapeutic need  []   Other (specify):   Restrictions Restrictions/Precautions: Fall Risk, General Precautions, Weight Bearing         Communication with other providers: [x]   OK to see per nursing:     []   Spoke with team member regarding:      Subjective observations and cognitive status: Patient finishing breakfast pleasant and agreeable to therapy      Pain level/location:    8/10       Location: RLE   Discharge recommendations  Anticipated discharge date:  9/15  Destination: []home alone   []home alone w assist prn   [x] home w/ family    [] Continuous supervision       []SNF    [] Assisted living     [] Other:   Continued therapy: [x]HHC OT  []OUTPATIENT  OT   [] No Further OT  Equipment needs: None        ADLs:    Eating: Eating  Assistance Needed: Independent  Comment: X  CARE Score: 6  Discharge Goal: Independent       Oral Hygiene: Oral Hygiene  Assistance Needed: Independent  Comment: X  CARE Score: 6  Discharge Goal: Independent    UB/LB Bathing: Shower/Bathe Self  Assistance Needed: Supervision or touching assistance  Comment: Sup for safety, seated entirety of shower  CARE Score: 4  Discharge Goal: Independent    UB Dressing: Upper Body Dressing  Assistance Needed: Independent  Comment: X  CARE Score: 6  Discharge Goal: Independent         LB Dressing: Lower Body Dressing  Assistance Needed: Supervision or touching assistance  Comment: Sup  CARE Score: 4  Discharge Goal: Independent    Donning and [place] Footwear: Putting On/Taking Off Footwear  Assistance Needed: Independent  Comment: Mod I  CARE Score: 6  Discharge Goal: Independent      Toiletin [address] needed: Supervision or touching assistance  Comment: SBA/CGA  CARE Score: 4  Discharge Goal: Independent      Toilet Transfers: Toilet Transfer  Assistance needed: Supervision or touching assistance  Comment: SB/CGA heavy use of grab bars  CARE Score: 4  Discharge Goal: Independent  Device Used:    [x]   Standard Toilet         [x]   Grab Bars           []  Bedside Commode       []   Elevated Toilet          []   Other:        Bed Mobility:           []   Pt received out of bed   Rolling R/L:    Scooting: Mod I   Supine --> Sit:  Mod I   Sit --> Supine: Mod I     Transfers:    Sit--> Stand:  CGA  Stand --> Sit:   SB/CGA  Stand-Pivot:   Squat pivot SBA  Other:    Assistive device required for transfer:   RW, grab bars       Functional Mobility:  throughout room/bathroom at  level   Assistance:   Mod I   Device:   []   Rolling Walker     []   Standard Walker [x]   Wheelchair        []   Steve Quick       []   4-Wheeled MetaModix         []   Cardiac MetaModix       []   Other:        Homemaking Tasks:   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       Additional Therapeutic activities/exercises completed this date:     [x]   ADL Training   [x]   Balance/Postural training     [x]   Bed/Transfer Training   []   Endurance Training   []   Neuromuscular Re-ed   []   Nu-step:  Time:        Level:         #Steps:       []   Rebounder:    []  Seated     []  Standing        []   Supine Ther Ex (reps/sets):     []   Seated Ther Ex (reps/sets):     []   Standing Ther Ex (reps/sets):     []   Other:      Comments:      Patient/Caregiver Education and Training: [x]   Adaptive Equipment Use  [x]   Bed Mobility/Transfer Technique/Safety  [x]   Energy Conservation Tips  []   Family training  [x]   Postural Awareness  [x]   Safety During Functional Activities  []   Reinforced Patient's Precautions   []   Progress was updated and reviewed in Rehabtracker with patient and/or family this         date.     Treatment Plan for Next Session: POC to continue as tolerated         Treatment/Activity Tolerance:   [x] Tolerated treatment with no adverse effects    [] Patient limited by fatigue  [] Patient limited by pain   [] Patient limited by medical complications:    [] Adverse reaction to Tx:   [] Significant change in status    Safety:       []  bed alarm set    []  chair alarm set    []  Pt refused alarms                []  Telesitter activated      [x]  Gait belt used during tx session      []other:       Number of Minutes/Billable Intervention  Therapeutic Exercise    ADL Self-care 45   Neuro Re-Ed    Therapeutic Activity 20   Group    Other:    TOTAL 65       Social History  Social/Functional History  Lives With: Spouse  Type of Home: Mobile home  Home Layout: One level  Home Access: Ramped entrance, Stairs to enter with rails (Pt uses ramped entrance in the front of the home, however does have 4 ALCON the back of the home.)  Entrance Stairs - Number of Steps: 4  Entrance Stairs - Rails: Right  Bathroom Shower/Tub: Tub/Shower unit, Shower chair with back  [employer]: Handicap height  Bathroom Equipment: Hand-held shower, Shower chair, Grab bars in shower  Bathroom Accessibility: Accessible  Home Equipment: [employer]  Has the patient had two or more falls in the past year or any fall with injury in the past year?: No  ADL Assistance: [facility] [address]: Independent  Homemaking Responsibilities: Yes  Meal Prep Responsibility: Primary (Shared responsibility with spouse.)  Laundry Responsibility: Primary (Shared responsibility with spouse.)  Cleaning Responsibility: Primary (Shared responsibility with spouse.)  Bill Paying/Finance Responsibility: Primary (Shared responsibility with spouse.)  Shopping Responsibility: Primary (Shared responsibility with spouse.)  Dependent Care Responsibility: Primary (Pt takes care of dog - Goob.)  Health Care Management: Secondary (Pt's wife organizes pills.)  Ambulation Assistance: Independent (Mod I. Pt was using wife's 2WW to get to bathroom and outside. Pt had been using device for ~ 2 months, prior to this IND without device.)  Transfer Assistance: Independent (Pt would use SPC to get into truck.)  Active : Yes  Mode of Transportation: Truck  Occupation: On disability  Leisure & Hobbies: Pt enjoys mechanics and outdoor work. IADL Comments: Pt completes all yardwork at home. Additional Comments: Pt has regular flat bed at home with assistance from wife for bed mobility. Pt with no falls in the last year. Objective                                                                                    Goals:  (Update in navigator)  Short Term Goals  Time Frame for Short term goals: STGs=LTGs:  Long Term Goals  Time Frame for Long term goals : 7-10 days or until d/c. Long Term Goal 1: Pt will complete grooming tasks c Mod I.  Long Term Goal 2: Pt will complete total body bathing c AE PRN and Mod I.  Long Term Goal 3: Pt will complete UB dressing c IND. Long Term Goal 4: Pt will complete LB dressing c AE PRN and Mod I.  Long Term Goal 5: Pt will doff/don footwear to LLE c IND.   Additional Goals?: Yes  Long Term Goal 6: Pt will complete toileting c Mod I.  Long Term Goal 7: Pt will perform functional transfers (bed, chair, toilet, shower) c DME PRN and Mod I.  Long Term Goal 8: Pt will complete therex/therax to facilitate an increase in strength/endurance (c emphasis on dynamic standing balance/tolerance > 8 mins) c Mod I.  Long Term Goal 9: Pt will complete home management tasks c Mod I.:        Plan of Care

## 2022-09-09 NOTE — PLAN OF CARE
Problem: Discharge Planning  Goal: Discharge to home or other facility with appropriate resources  Outcome: Progressing     Problem: Pain  Goal: Verbalizes/displays adequate comfort level or baseline comfort level  Outcome: Progressing     Problem: Safety - Adult  Goal: Free from fall injury  Outcome: Progressing     Problem: Nutrition Deficit:  Goal: Optimize nutritional status  Outcome: Progressing

## 2022-09-09 NOTE — PROGRESS NOTES
of bed. Additional Therapeutic activities/exercises completed this date:     []   ADL Training   []   Balance/Postural training     []   Bed/Transfer Training   [x]   Endurance Training   []   Neuromuscular Re-ed   []   Nu-step:  Time:        Level:         #Steps:       []   Rebounder:    []  Seated     []  Standing        [x]   Supine Ther Ex (reps/sets):  Pt completed 1 set x 15 reps of the following 6 exercises using green theraband: alternating shoulder presses, chest presses, shoulder horizontal abduction, elbow extension, biceps curls; and simultaneous shoulder horizontal abduction/adduction. Pt then performed BUE exercises with 3# dumbbell, 1 set of 15 reps, including bicep curls, chest press, overhead press, shoulder horizontal abduction, shoulder external/internal rotation, forearm pronation/supination, and wrist flexion/extension. Pt performed all exercise to increase BUE strength and endurance to facilitate safe performance of ADLs and functional transfers. []   Seated Ther Ex (reps/sets):     []   Standing Ther Ex (reps/sets):     []   Other:      Comments: All intervention performed to increase pt's strength, endurance, ax tolerance, and balance in prep for increased I c ADL/IADLs and functional transfers/mobility. Patient/Caregiver Education and Training:   []   [employer] Equipment Use  []   Bed Mobility/Transfer Technique/Safety  []   Energy Conservation Tips  []   Family training  []   Postural Awareness  []   Safety During Functional Activities  []   Reinforced Patient's Precautions   []   Progress was updated and reviewed in Rehabtracker with patient and/or family this         date. Treatment Plan for Next Session: Continue OT POC      Assessment: This pt demonstrated a negative response to today's treatment as evidenced by Pt not wanting to participate and only getting through bed exercise during session.   The patient is making good progress toward established goals as evidenced by QI scores. Ongoing deficits are observed in the areas of pain, endurance, strength, functional balance, and ADLs and continued focus on this is recommended.        Treatment/Activity Tolerance:   [] Tolerated treatment with no adverse effects    [] Patient limited by fatigue  [x] Patient limited by pain   [] Patient limited by medical complications:    [] Adverse reaction to Tx:   [] Significant change in status    Safety:       [x]  bed alarm set    []  chair alarm set    []  Pt refused alarms                []  Telesitter activated      []  Gait belt used during tx session      []other:       Number of Minutes/Billable Intervention  Therapeutic Exercise 55   ADL Self-care    Neuro Re-Ed    Therapeutic Activity    Group    Other:    TOTAL 55       Social History  Social/Functional History  Lives With: Spouse  Type of Home: Mobile home  Home Layout: One level  Home Access: Ramped entrance, Stairs to enter with rails (Pt uses ramped entrance in the front of the home, however does have 4 ALCON the back of the home.)  Entrance Stairs - Number of Steps: 4  Entrance Stairs - Rails: Right  Bathroom Shower/Tub: Tub/Shower unit, Shower chair with back  [employer]: Handicap height  Bathroom Equipment: Hand-held shower, Shower chair, Grab bars in shower  Bathroom Accessibility: Accessible  Home Equipment: Cane  Has the patient had two or more falls in the past year or any fall with injury in the past year?: No  ADL Assistance: [address] [place] [address] Avenue: Independent  Homemaking Responsibilities: Yes  Meal Prep Responsibility: Primary (Shared responsibility with spouse.)  Laundry Responsibility: Primary (Shared responsibility with spouse.)  Cleaning Responsibility: Primary (Shared responsibility with spouse.)  Bill Paying/Finance Responsibility: Primary (Shared responsibility with spouse.)  Shopping Responsibility: Primary (Shared responsibility with spouse.)  Dependent Care Responsibility: Primary (Pt takes care of dog - Goob.)  Health Care Management: Secondary (Pt's wife organizes pills.)  Ambulation Assistance: Independent (Mod I. Pt was using wife's 2WW to get to bathroom and outside. Pt had been using device for ~ 2 months, prior to this IND without device.)  Transfer Assistance: Independent (Pt would use SPC to get into truck.)  Active : Yes  Mode of Transportation: Truck  Occupation: On disability  Leisure & Hobbies: Pt enjoys mechanics and outdoor work. IADL Comments: Pt completes all yardwork at home. Additional Comments: Pt has regular flat bed at home with assistance from wife for bed mobility. Pt with no falls in the last year. Objective                                                                                    Goals:  (Update in navigator)  Short Term Goals  Time Frame for Short term goals: STGs=LTGs:  Long Term Goals  Time Frame for Long term goals : 7-10 days or until d/c. Long Term Goal 1: Pt will complete grooming tasks c Mod I.  Long Term Goal 2: Pt will complete total body bathing c AE PRN and Mod I.  Long Term Goal 3: Pt will complete UB dressing c IND. Long Term Goal 4: Pt will complete LB dressing c AE PRN and Mod I.  Long Term Goal 5: Pt will doff/don footwear to LLE c IND. Additional Goals?: Yes  Long Term Goal 6: Pt will complete toileting c Mod I.  Long Term Goal 7: Pt will perform functional transfers (bed, chair, toilet, shower) c DME PRN and Mod I.  Long Term Goal 8: Pt will complete therex/therax to facilitate an increase in strength/endurance (c emphasis on dynamic standing balance/tolerance > 8 mins) c Mod I.  Long Term Goal 9: Pt will complete home management tasks c Mod I.:        Plan of Care                                                                              Times per week: 5 days per week for a minimum of 60 minutes/day plus group as appropriate for 60 minutes.   Treatment to include Plan  Times per Day: Daily  Current Treatment Recommendations: Strengthening, Balance training, Functional mobility training, Endurance training, Pain management, Safety education & training, Patient/Caregiver education & training, Equipment evaluation, education, & procurement, Positioning, Self-Care / ADL, Home management training, Coordination training    Electronically signed by   NJ Rendon, OTR/L #[id number]  9/9/2022, 9:29 AM

## 2022-09-09 NOTE — PROGRESS NOTES
Siobhan Higuera    : 1960  Acct #: [de-identified]  MRN: [id number]              PM&R Progress Note      Admitting diagnosis: Right lower limb osteomyelitis ( [address])     Comorbid diagnoses impacting rehabilitation: Uncontrolled pain, generalized weakness, gait disturbance, status post right below-knee amputation on 2022, uncontrolled diabetes type 2 with peripheral neuropathy, essential hypertension, acute blood loss anemia, hyponatremia, COPD, nicotine addiction    Chief complaint: Right thigh tightness and stump pain. Prior (baseline) level of function: Independent. Current level of function:         Current  IRF-GIANNI and Goals:   Occupational Therapy:    Short Term Goals  Time Frame for Short term goals: STGs=LTGs :   Long Term Goals  Time Frame for Long term goals : 7-10 days or until d/c. Long Term Goal 1: Pt will complete grooming tasks c Mod I.  Long Term Goal 2: Pt will complete total body bathing c AE PRN and Mod I.  Long Term Goal 3: Pt will complete UB dressing c IND. Long Term Goal 4: Pt will complete LB dressing c AE PRN and Mod I.  Long Term Goal 5: Pt will doff/don footwear to LLE c IND.   Additional Goals?: Yes  Long Term Goal 6: Pt will complete toileting c Mod I.  Long Term Goal 7: Pt will perform functional transfers (bed, chair, toilet, shower) c DME PRN and Mod I.  Long Term Goal 8: Pt will complete therex/therax to facilitate an increase in strength/endurance (c emphasis on dynamic standing balance/tolerance > 8 mins) c Mod I.  Long Term Goal 9: Pt will complete home management tasks c Mod I. :   Eatin - Patient feeds self  Groomin - Able to perform 3-4 tasks with touching help  Bathin - Able to bathe 3-4 areas  Dressing-Upper: 3 - Requires assist with 2 tasks  Dressing-Lower: 2 - Requires assist with 4-5 parts of dressing  Toiletin - Able to perform 1 task only (e.g. hygiene)  Toilet Transfer: 2 - Requires 50-74% assist getting off toilet                 Eating: perform sit to stand, w/c<>bed and car transfers with mod I  Long term goal 3: pt will ambulate with 2ww 50' on level surfaces and 10' on unlevel surface with supervision  Long term goal 4: Pt will ascend/descend 4\" step with 2ww and CGA  Long term goal 5: Pt will propel w/c in household situations 250' with mod I  Additional Goals?: Yes  Long term goal 6: Pt will  light object from floor with 2ww and reacher with mod I      Bed Mobility:   Sit to Lying  Assistance Needed: Supervision or touching assistance  Comment: supervision, flat bed, no rails  CARE Score: 4  Roll Left and Right  Assistance Needed: Supervision or touching assistance  Comment: supervision with effort  CARE Score: 4  Discharge Goal: Independent  Lying to Sitting on Side of Bed  Assistance Needed: Partial/moderate assistance  Comment: min assist for trunk  CARE Score: 3  Discharge Goal: Independent    Transfers:    Sit to Stand  Assistance Needed: Supervision or touching assistance  Comment: pt progressed to SBA using 2WW today  CARE Score: 4  Discharge Goal: Independent  Chair/Bed-to-Chair Transfer  Assistance Needed: Supervision or touching assistance  Comment: pt progressed to SBA using stand pivot with pt performing w/c set-up  CARE Score: 4  Discharge Goal: Independent     Car Transfer  Assistance Needed: Partial/moderate assistance  Comment: mod assist with unsafe technique, poor control  CARE Score: 3  Discharge Goal: Independent    Ambulation:    Walking Ability  Does the Patient Walk?: Yes     Walk 10 Feet  Assistance Needed: Supervision or touching assistance  Comment: CGA with 2ww. CARE Score: 4  Discharge Goal: Independent     Walk 50 Feet with Two Turns  Comment: 10' max distance due to pain.  Pt could not tolerate hopping  Reason if not Attempted: Not attempted due to medical condition or safety concerns  CARE Score: 88  Discharge Goal: Supervision or touching assistance     Walk 150 Feet  Comment: do not feel pt will be able to tolerate this distance at discharge due to chronic medical conditions  Reason if not Attempted: Not attempted due to medical condition or safety concerns  CARE Score: 88  Discharge Goal: Not Attempted     Walking 10 Feet on Uneven Surfaces  Reason if not Attempted: Not attempted due to medical condition or safety concerns  CARE Score: 88  Discharge Goal: Supervision or touching assistance     1 Step (Curb)  Reason if not Attempted: Not attempted due to medical condition or safety concerns  CARE Score: 88  Discharge Goal: Supervision or touching assistance     4 Steps  Reason if not Attempted: Not applicable  CARE Score: 9  Discharge Goal: Not Applicable     12 Steps  Reason if not Attempted: Not applicable  CARE Score: 9  Discharge Goal: Not Applicable       Wheelchair:  w/c Ability: Wheelchair Ability  Uses a Wheelchair and/or Scooter?: Yes  Wheel 50 Feet with Two Turns  Assistance Needed: Supervision or touching assistance  Comment: Uses BUE  CARE Score: 4  Discharge Goal: Independent  Wheel 150 Feet  Assistance Needed: Supervision or touching assistance  CARE Score: 4  Discharge Goal: Independent          Balance:        Object: Picking Up Object  Comment: Pt not able to lift RUE off 2ww for more than 1-2 seconds and maintain balance  Reason if not Attempted: Not attempted due to medical condition or safety concerns  CARE Score: 88  Discharge Goal: Independent    I      Exam:    Blood pressure (!) 132/54, pulse 70, temperature 98.2 °F (36.8 °C), temperature source Oral, resp. rate 19, height 5' 8\" (1.727 m), weight 135 lb 9.3 oz (61.5 kg), SpO2 97 %. General: Semiupright in bed. Alert and talkative. Distracted by his leg pain. HEENT: Gazing right and left. Neck supple. MMM. No adenopathy. Pulmonary: Shallow respirations without wheezes or rales. No coughing. Cardiac: Regular rate and rhythm. Abdomen: Patient's abdomen is soft and nondistended. Bowel sounds were present throughout. There was no rebound, guarding or masses noted. Upper extremities: He was able to bring both hands up to meet mine. Diminished perception to touch in the fingers. Fair  strength. Lower extremities: His right BKA stump is tender, swollen and held in flexion. Incision was moist but dressed. Left calf was soft and the foot was nonswollen. Sitting balance was good. Standing balance was poor. Lab Results   Component Value Date    WBC 8.2 09/05/2022    HGB 8.0 (L) 09/05/2022    HCT 24.7 (L) 09/05/2022    MCV 86.1 09/05/2022     09/05/2022     Lab Results   Component Value Date    INR 1.19 08/29/2022    INR 1.14 08/28/2018    INR 1.09 08/17/2016    PROTIME 15.4 (H) 08/29/2022    PROTIME 13.0 (H) 08/28/2018    PROTIME 12.5 08/17/2016     Lab Results   Component Value Date    CREATININE 1.1 09/06/2022    BUN 30 (H) 09/06/2022     (L) 09/06/2022    K 5.2 (H) 09/06/2022     09/06/2022    CO2 24 09/06/2022     Lab Results   Component Value Date    ALT 8 (L) 08/30/2022    AST 16 08/30/2022    ALKPHOS 121 08/30/2022    BILITOT 0.3 08/30/2022       Expected length of stay  prior to a supervised level of function for discharge home with a wheelchair/walker and [facility] OT/PT is 9/15/2022. Recommendations:    Osteomyelitis of the right foot with BK amputation: He needs significant cueing to stay on task during his daily occupational and physical therapy. His wound shows no signs of infection. Daily wound care, limb elevation and pain management. He needs DVT prophylaxis, aggressive pulmonary hygiene measures and nutritional support. Ongoing attempts to control his blood sugar and blood pressure and provide a nicotine alternative to smoking. He needs caregiver training, adaptive equipment education and introduction to a prosthetists. Outpatient follow-up with his surgeon. He is on oral amoxicillin for a total of 17 more doses. Verbal cues and minimum to moderate physical assistance.   DVT prophylaxis: Lovenox 40 mg subcu daily. I must monitor his hemoglobin and platelet count periodically while on this medication. Weightbearing activities through the left lower limb are limited but tried daily. GI prophylaxis offered. No new bruising or swelling. Uncontrolled pain: Tolerating the gabapentin, injectable Dilaudid and oral oxycodone now. Bowel intervention while on the narcotics. No significant nausea with his medicines. Planning to reduce the strength of the Dilaudid soon. Limb elevation to help control swelling. Local wound care. Progressive mobilization. Uncontrolled diabetes type 2 with peripheral neuropathy: Patient requires a diet modified for carbohydrates. He is on scheduled Lantus and a Humalog sliding scale. Neurontin for neuropathy symptoms. Blood sugars are checked at mealtime and bedtime. Encouraging consistent oral intake. Hypertension: Pain management is a significant part of the blood pressure control. Nicotine replacement. Monitoring his blood pressure to determine if there is need for medication. Target systolic blood pressures [id number]. Blood pressure is within target range. Hyponatremia: Encouraging consistent oral intake. Periodic monitoring of his chemistries. Cautious use of any diuretics. COPD: Monitoring O2 saturations at rest and with activity. Aggressive pulmonary hygiene measures. Bronchodilators as needed. Counseling and Coordination of Care: In care conference today I met with the patient's OT, PT, RN and . We discussed the patient's problems, progress and prognosis. Disposition issues were clarified and plans were established for ongoing rehabilitation efforts beyond the ARU stay. I reviewed this information with the patient during a second distinct visit with the patient. More than half of the total time of 35 minutes spent with the patient involved counseling and coordination of care.

## 2022-09-10 LAB
GLUCOSE BLD-MCNC: 140 MG/DL (ref 70–99)
GLUCOSE BLD-MCNC: 157 MG/DL (ref 70–99)
GLUCOSE BLD-MCNC: 181 MG/DL (ref 70–99)

## 2022-09-10 PROCEDURE — 6370000000 HC RX 637 (ALT 250 FOR IP): Performed by: INTERNAL MEDICINE

## 2022-09-10 PROCEDURE — 6370000000 HC RX 637 (ALT 250 FOR IP): Performed by: PHYSICAL MEDICINE & REHABILITATION

## 2022-09-10 PROCEDURE — 97110 THERAPEUTIC EXERCISES: CPT

## 2022-09-10 PROCEDURE — 94761 N-INVAS EAR/PLS OXIMETRY MLT: CPT

## 2022-09-10 PROCEDURE — 97116 GAIT TRAINING THERAPY: CPT

## 2022-09-10 PROCEDURE — 97530 THERAPEUTIC ACTIVITIES: CPT

## 2022-09-10 PROCEDURE — 94150 VITAL CAPACITY TEST: CPT

## 2022-09-10 PROCEDURE — 82962 GLUCOSE BLOOD TEST: CPT

## 2022-09-10 PROCEDURE — 6360000002 HC RX W HCPCS: Performed by: PHYSICAL MEDICINE & REHABILITATION

## 2022-09-10 PROCEDURE — 1280000000 HC REHAB R&B

## 2022-09-10 PROCEDURE — 97542 WHEELCHAIR MNGMENT TRAINING: CPT

## 2022-09-10 PROCEDURE — 97535 SELF CARE MNGMENT TRAINING: CPT

## 2022-09-10 PROCEDURE — 6360000002 HC RX W HCPCS: Performed by: INTERNAL MEDICINE

## 2022-09-10 RX ADMIN — OXYCODONE HYDROCHLORIDE 10 MG: 10 TABLET ORAL at 05:33

## 2022-09-10 RX ADMIN — AMOXICILLIN 500 MG: 500 CAPSULE ORAL at 05:33

## 2022-09-10 RX ADMIN — HYDROMORPHONE HYDROCHLORIDE 0.5 MG: 1 INJECTION, SOLUTION INTRAMUSCULAR; INTRAVENOUS; SUBCUTANEOUS at 18:40

## 2022-09-10 RX ADMIN — ACETAMINOPHEN 650 MG: 325 TABLET ORAL at 11:53

## 2022-09-10 RX ADMIN — GABAPENTIN 600 MG: 300 CAPSULE ORAL at 11:50

## 2022-09-10 RX ADMIN — OXYCODONE HYDROCHLORIDE 10 MG: 10 TABLET ORAL at 09:24

## 2022-09-10 RX ADMIN — INSULIN LISPRO 1 UNITS: 100 INJECTION, SOLUTION INTRAVENOUS; SUBCUTANEOUS at 08:17

## 2022-09-10 RX ADMIN — INSULIN LISPRO 2 UNITS: 100 INJECTION, SOLUTION INTRAVENOUS; SUBCUTANEOUS at 08:18

## 2022-09-10 RX ADMIN — INSULIN GLARGINE 8 UNITS: 100 INJECTION, SOLUTION SUBCUTANEOUS at 21:54

## 2022-09-10 RX ADMIN — ASPIRIN 81 MG CHEWABLE TABLET 81 MG: 81 TABLET CHEWABLE at 11:50

## 2022-09-10 RX ADMIN — FAMOTIDINE 20 MG: 20 TABLET ORAL at 21:46

## 2022-09-10 RX ADMIN — HYDROMORPHONE HYDROCHLORIDE 0.5 MG: 1 INJECTION, SOLUTION INTRAMUSCULAR; INTRAVENOUS; SUBCUTANEOUS at 02:58

## 2022-09-10 RX ADMIN — OXYCODONE HYDROCHLORIDE 10 MG: 10 TABLET ORAL at 21:51

## 2022-09-10 RX ADMIN — HYDROMORPHONE HYDROCHLORIDE 0.5 MG: 1 INJECTION, SOLUTION INTRAMUSCULAR; INTRAVENOUS; SUBCUTANEOUS at 11:50

## 2022-09-10 RX ADMIN — OXYCODONE HYDROCHLORIDE 10 MG: 10 TABLET ORAL at 00:23

## 2022-09-10 RX ADMIN — SULFAMETHOXAZOLE AND TRIMETHOPRIM 1 TABLET: 800; 160 TABLET ORAL at 21:46

## 2022-09-10 RX ADMIN — OXYCODONE HYDROCHLORIDE 10 MG: 10 TABLET ORAL at 14:41

## 2022-09-10 RX ADMIN — AMOXICILLIN 500 MG: 500 CAPSULE ORAL at 21:47

## 2022-09-10 RX ADMIN — ACETAMINOPHEN 650 MG: 325 TABLET ORAL at 21:46

## 2022-09-10 RX ADMIN — GABAPENTIN 600 MG: 300 CAPSULE ORAL at 21:46

## 2022-09-10 RX ADMIN — INSULIN LISPRO 2 UNITS: 100 INJECTION, SOLUTION INTRAVENOUS; SUBCUTANEOUS at 13:25

## 2022-09-10 RX ADMIN — ENOXAPARIN SODIUM 40 MG: 100 INJECTION SUBCUTANEOUS at 11:50

## 2022-09-10 RX ADMIN — INSULIN LISPRO 2 UNITS: 100 INJECTION, SOLUTION INTRAVENOUS; SUBCUTANEOUS at 18:42

## 2022-09-10 RX ADMIN — AMOXICILLIN 500 MG: 500 CAPSULE ORAL at 14:37

## 2022-09-10 RX ADMIN — GABAPENTIN 600 MG: 300 CAPSULE ORAL at 14:34

## 2022-09-10 RX ADMIN — SULFAMETHOXAZOLE AND TRIMETHOPRIM 1 TABLET: 800; 160 TABLET ORAL at 11:50

## 2022-09-10 ASSESSMENT — PAIN DESCRIPTION - ORIENTATION
ORIENTATION: RIGHT

## 2022-09-10 ASSESSMENT — PAIN SCALES - GENERAL
PAINLEVEL_OUTOF10: 7
PAINLEVEL_OUTOF10: 9
PAINLEVEL_OUTOF10: 9
PAINLEVEL_OUTOF10: 8
PAINLEVEL_OUTOF10: 8
PAINLEVEL_OUTOF10: 0
PAINLEVEL_OUTOF10: 9
PAINLEVEL_OUTOF10: 7

## 2022-09-10 ASSESSMENT — PAIN DESCRIPTION - DESCRIPTORS
DESCRIPTORS: THROBBING
DESCRIPTORS: ACHING
DESCRIPTORS: ACHING
DESCRIPTORS: THROBBING
DESCRIPTORS: ACHING
DESCRIPTORS: THROBBING
DESCRIPTORS: THROBBING
DESCRIPTORS: ACHING

## 2022-09-10 ASSESSMENT — PAIN DESCRIPTION - LOCATION
LOCATION: LEG

## 2022-09-10 ASSESSMENT — PAIN DESCRIPTION - ONSET: ONSET: ON-GOING

## 2022-09-10 ASSESSMENT — PAIN DESCRIPTION - PAIN TYPE: TYPE: SURGICAL PAIN

## 2022-09-10 ASSESSMENT — PAIN DESCRIPTION - FREQUENCY: FREQUENCY: CONTINUOUS

## 2022-09-10 NOTE — PROGRESS NOTES
Occupational Therapy    Physical Rehabilitation: OCCUPATIONAL THERAPY     [x] daily progress note       [] discharge       Patient Name:  Alisha Gamboa   :  1960 MRN: [id number]  Room:  [address] [place] [address] Date of Admission: 2022  Rehabilitation Diagnosis:   Necrotizing fasciitis [M72.6]  Osteomyelitis of right leg ([address] [M86.9]       Date 9/10/2022       Day of ARU Week:  5   Time IN/OUT First session 08/0940  second session 1132/1210  Third session 1240/ 8273   Individual Tx Minutes 1st: 65 minutes 2nd 38 minutes 3rd 25 minutes    Group Tx Minutes    Co-Treat Minutes    Concurrent Tx Minutes    TOTAL Tx Time Mins 125 minutes    Variance Time + 5 minutes    Variance Time []   Refusal due to:     []   Medical hold/reason:    []   Illness   []   Off Unit for test/procedure  []   Extra time needed to complete task  []   Therapeutic need  []   Other (specify):   Restrictions Restrictions/Precautions: Fall Risk, General Precautions, Weight Bearing         Communication with other providers: []   OK to see per nursing:     [x]   Spoke with team member regarding:      Subjective observations and cognitive status: First session: Pt sitting WILLA upon arrival looking out the window. Pt reported \" I have some pain today, but it is a good day. \" At end of session Pt was limited by the pain.     Second session;      Pain level/location:   8 -9/10       Location: R residual limb   Discharge recommendations  Anticipated discharge date: 9/15  Destination: []home alone   []home alone w assist prn   [x] home w/ family    [] Continuous supervision       []SNF    [] Assisted living     [] Other:   Continued therapy: [x]HHC OT  []OUTPATIENT  OT   [] No Further OT  Equipment needs: none        ADLs: First session     Eating: Eating  Assistance Needed: Independent  Comment: finished eating on arrival  CARE Score: 6  Discharge Goal: Independent       Oral Hygiene: Oral Hygiene  Assistance Needed: Independent  Comment: completed seated at sink  CARE Score: 6  Discharge Goal: Independent    UB/LB Bathing: Shower/Bathe Self  Assistance Needed: Independent  Comment: S for safety, completed at sink  CARE Score: 6  Discharge Goal: Independent    UB Dressing: Upper Body Dressing  Assistance Needed: Independent  Comment: able to obtain clothing and don/ doff  CARE Score: 6  Discharge Goal: Independent         LB Dressing: Lower Body Dressing  Assistance Needed: Supervision or touching assistance  Comment: S for safety  CARE Score: 4  Discharge Goal: Independent    Donning and [place] Footwear: Putting On/Taking Off Footwear  Assistance Needed: Independent  Comment: c  CARE Score: 6  Discharge Goal: Independent      Toiletin [address] needed: Supervision or touching assistance  Comment: SBA  CARE Score: 4  Discharge Goal: Independent      Toilet Transfers: Toilet Transfer  Assistance needed: Supervision or touching assistance  Comment: SBA use of bilateral grab bars  CARE Score: 4  Discharge Goal: Independent  Device Used:    [x]   Standard Toilet         [x]   Grab Bars           []  Bedside Commode       []   Elevated Toilet          []   Other:        Bed Mobility:           [x]   Pt received out of bed     Scooting:  IND   Supine --> Sit:  MOD I  Sit --> Supine:  MOD I     Transfers:    Sit--> Stand:  SBA   Stand --> Sit:  SBA   Stand-Pivot:   SBA     Assistive device required for transfer:   bed rails and grab bars       Functional Mobility:    Assistance:  First session: to/ from bathroom   Device:   []   Rolling Walker     [x]   Standard Walker []   Wheelchair        []   [address]       []   4-Wheeled Leona Luke         []   Cardiac Walker       []   Other:        Homemaking Tasks:   First session: Pt completed doffing bed sheets and donning fresh sheets with MOD A.  Completed       Additional Therapeutic activities/exercises completed this date:     [x]   ADL Training   []   Balance/Postural training     []   Bed/Transfer Training   []   Endurance Training   []   Neuromuscular Re-ed   []   Nu-step:  Time:        Level:         #Steps:       []   Rebounder:    []  Seated     []  Standing        []   Supine Ther Ex (reps/sets):     [x]   Seated Ther Ex (reps/sets):  see below    []   Standing Ther Ex (reps/sets):     []   Other:      Comments:    First session: pt completed oral hygiene, toileting, bathing, and dressing. Vitals taken seated in w/c  at end of session: SpO2 95 HR 75 /56     Second session: seated EOB pt completed 1 set x 15 reps of the following 6 exercises using green theraband: shoulder horizontal abduction, elbow extension, biceps curls; chest presses, and shoulder abduction/adduction. Third session: pt completed propelling w/c from room to gym and outside to improve functional mobility and UB strength/ endurance. Pt completed w/c push-ups 5x   Rickshaw -2 sets of 12 reps  with 10 lbs both sides   10x bicep curls with 3 lbs     Patient/Caregiver Education and Training:   []   Adaptive Equipment Use  []   Bed Mobility/Transfer Technique/Safety  []   Energy Conservation Tips  []   Family training  []   Postural Awareness  [x]   Safety During Functional Activities  []   Reinforced Patient's Precautions   []   Progress was updated and reviewed in Rehabtracker with patient and/or family this         date. Treatment Plan for Next Session: Continue POC    Assessment: This pt demonstrated a positive response to today's treatment as evidenced by completing ADLs, however, was limited by pain. The patient is making  progress toward established goals as evidenced by QI scores. Ongoing deficits are observed in the areas of endurance and strength,  continued focus on is recommended.        Treatment/Activity Tolerance:   [] Tolerated treatment with no adverse effects    [] Patient limited by fatigue  [x] Patient limited by pain   [] Patient limited by medical complications:    [] Adverse reaction to Tx:   [] Significant change in status    Safety:       [x]  bed alarm set    []  chair alarm set    []  Pt refused alarms                []  Telesitter activated      [x]  Gait belt used during tx session      []other:       Number of Minutes/Billable Intervention  Therapeutic Exercise 38   ADL Self-care 65   Neuro Re-Ed    Therapeutic Activity 25   Group    Other:    TOTAL 128       Social History  Social/Functional History  Lives With: Spouse  Type of Home: Mobile home  Home Layout: One level  Home Access: Ramped entrance, Stairs to enter with rails (Pt uses ramped entrance in the front of the home, however does have 4 ALCON the back of the home.)  Entrance Stairs - Number of Steps: 4  Entrance Stairs - Rails: Right  Bathroom Shower/Tub: Tub/Shower unit, Shower chair with back  [employer]: Handicap height  Bathroom Equipment: Hand-held shower, Shower chair, Grab bars in shower  Bathroom Accessibility: Accessible  Home Equipment: Cane  Has the patient had two or more falls in the past year or any fall with injury in the past year?: No  ADL Assistance: [address] [place] [address] Avenue: Independent  Homemaking Responsibilities: Yes  Meal Prep Responsibility: Primary (Shared responsibility with spouse.)  Laundry Responsibility: Primary (Shared responsibility with spouse.)  Cleaning Responsibility: Primary (Shared responsibility with spouse.)  Bill Paying/Finance Responsibility: Primary (Shared responsibility with spouse.)  Shopping Responsibility: Primary (Shared responsibility with spouse.)  Dependent Care Responsibility: Primary (Pt takes care of dog - Goob.)  Health Care Management: Secondary (Pt's wife organizes pills.)  Ambulation Assistance: Independent (Mod I. Pt was using wife's 2WW to get to bathroom and outside.  Pt had been using device for ~ 2 months, prior to this IND without device.)  Transfer Assistance: Independent (Pt would use SPC to get into truck.)  Active : Yes  Mode of Transportation: Truck  Occupation: On disability  Leisure & Hobbies: Pt enjoys mechanics and outdoor work. IADL Comments: Pt completes all yardwork at home. Additional Comments: Pt has regular flat bed at home with assistance from wife for bed mobility. Pt with no falls in the last year. Objective                                                                                    Goals:  (Update in navigator)  Short Term Goals  Time Frame for Short term goals: STGs=LTGs:  Long Term Goals  Time Frame for Long term goals : 7-10 days or until d/c. Long Term Goal 1: Pt will complete grooming tasks c Mod I.  Long Term Goal 2: Pt will complete total body bathing c AE PRN and Mod I.  Long Term Goal 3: Pt will complete UB dressing c IND. Long Term Goal 4: Pt will complete LB dressing c AE PRN and Mod I.  Long Term Goal 5: Pt will doff/don footwear to LLE c IND. Additional Goals?: Yes  Long Term Goal 6: Pt will complete toileting c Mod I.  Long Term Goal 7: Pt will perform functional transfers (bed, chair, toilet, shower) c DME PRN and Mod I.  Long Term Goal 8: Pt will complete therex/therax to facilitate an increase in strength/endurance (c emphasis on dynamic standing balance/tolerance > 8 mins) c Mod I.  Long Term Goal 9: Pt will complete home management tasks c Mod I.:        Plan of Care                                                                              Times per week: 5 days per week for a minimum of 60 minutes/day plus group as appropriate for 60 minutes.   Treatment to include Plan  Times per Day: Daily  Current Treatment Recommendations: Strengthening, Balance training, Functional mobility training, Endurance training, Pain management, Safety education & training, Patient/Caregiver education & training, Equipment evaluation, education, & procurement, Positioning, Self-Care / ADL, Home management training, Coordination training    Electronically signed by   Jyotsna Shay OT,  9/10/2022, 8:55 AM

## 2022-09-10 NOTE — FLOWSHEET NOTE
[x] daily progress note       [] discharge       Patient Name:  Siobhan Higuera   :  1960 MRN: [id number]  Room:  [address] [place] [address] Date of Admission: 2022  Rehabilitation Diagnosis:   Necrotizing fasciitis [M72.6]  Osteomyelitis of right leg ([address] [M86.9]       Date 9/10/2022       Day of ARU Week:  5   Time IN/OUT 5414-5405   Individual Tx Minutes 60   TOTAL Tx Time Mins 60   Restrictions Restrictions/Precautions  Restrictions/Precautions: Fall Risk, General Precautions, Weight Bearing  Required Braces or Orthoses?: Yes  Position Activity Restriction  Other position/activity restrictions: LUE IV access   Communication with other providers: [x]   OK to see per nursing:     []   Spoke with team member regarding:      Subjective observations and cognitive status: Pt was sitting up in w/c at beginning of treatment session w/ OT in gym. Pt was alert and agreeable to treatment session. Pt stated he is better than this morning just sore and tired.      Pain level/location:    6/10       Location: [facility]   Discharge recommendations  Anticipated discharge date:  tbd  Destination: []home alone   []home alone with assist PRN     [] home w/ family      [] Continuous supervision  []SNF    [] Assisted living     [] Other:   Continued therapy: []C PT  []OUTPATIENT  PT   [] No Further PT  Equipment needs: tbd         Bed Mobility:            Sit --> lying:  SBA    Transfers:    Sit--> Stand:  CGA  Stand --> Sit:   CGA  Assistive device required for transfer:   2ww    Gait:    Distance:  26'+38'+30'   Assistance:  CGA  Device:  2ww  Gait Quality:  hop to gait pattern    Wheelchair Propulsion:  Distance:  150'+103'+obstacle course (included wheeling in figure 8 fwd/bwd)  Assistance:  sup  Extremities Used:   BUE and LLE  Type:    [x]  Manual        []  Electric    Uneven Surfaces:       Assistance:    sup  Device:    w/c  Surfaces Completed:   [x]  carpet mat with bean bags beneath      []  Throw rugs       []  Ramp       [] Outdoor pavements        []  Grass             []  Loose gravel        []  Other:         Additional Therapeutic activities/exercises completed this date:     []   Nu-step:  Time:        Level:         #Steps:       []   Rebounder:    []  Seated     []  Standing        []   Balance training         []   Postural training    []   Supine ther ex (reps/sets):     [x]   Seated ther ex (reps/sets): 2 x 10 LLE heel raises, toe raises, marches, hip add iso w/ ball and marching for strengthening    []   Standing ther ex (reps/sets):     []   Picking up object from floor (standing):                   []   Reacher used   []   Other:   []   Other:     Patient/Caregiver Education and Training:   [x]   Bed Mobility/Transfer technique/safety  [x]   Gait technique/sequencing  [x]   Proper use of assistive device  []   Advanced mobility safety and technique  []   Reinforced patient's precautions/mobility while maintaining precautions  []   Postural awareness  []   Family training  []   Progress was updated and reviewed in Rehabtracker with patient and/or family this date. Treatment Plan for Next Session: gait, advanced gait, LE strengthening, balance      Assessment: This pt demonstrated a positive response to today's treatment as evidenced by improved w/c mobility through/around obstacles. The patient is making good progress toward established goals as evidenced by QI scores. Ongoing deficits are observed in the areas of endurance, strength and continued focus on these is recommended.      Treatment/Activity Tolerance:   [x] Tolerated treatment with no adverse effects    [] Patient limited by fatigue  [] Patient limited by pain   [] Patient limited by medical complications:    [] Adverse reaction to Tx:   [] Significant change in status    Safety:       [x]  bed alarm set    []  chair alarm set    []  Pt refused alarms                []  Telesitter activated      [x]  Gait belt used during tx session      [x]other: Pt left seated in bed at end of treatment session w/ call light. Number of Minutes/Billable Intervention  Gait Training 30   Therapeutic Exercise 10   Neuro Re-Ed    Therapeutic Activity    Wheelchair Propulsion 20   Group    Other:    TOTAL 60         Social History  Social/Functional History  Lives With: Spouse  Type of Home: Mobile home  Home Layout: One level  Home Access: Ramped entrance, Stairs to enter with rails (Pt uses ramped entrance in the front of the home, however does have 4 ALCON the back of the home.)  Entrance Stairs - Number of Steps: 4  Entrance Stairs - Rails: Right  Bathroom Shower/Tub: Tub/Shower unit, Shower chair with back  [employer]: Handicap height  Bathroom Equipment: Hand-held shower, Shower chair, Grab bars in shower  Bathroom Accessibility: [place]: Cane  Has the patient had two or more falls in the past year or any fall with injury in the past year?: No  ADL Assistance: Independent  Homemaking Assistance: Independent  Homemaking Responsibilities: Yes  Meal Prep Responsibility: Primary (Shared responsibility with spouse.)  Laundry Responsibility: Primary (Shared responsibility with spouse.)  Cleaning Responsibility: Primary (Shared responsibility with spouse.)  Bill Paying/Finance Responsibility: Primary (Shared responsibility with spouse.)  Shopping Responsibility: Primary (Shared responsibility with spouse.)  Dependent Care Responsibility: Primary (Pt takes care of dog - Goob.)  Health Care Management: Secondary (Pt's wife organizes pills.)  Ambulation Assistance: Independent (Mod I. Pt was using wife's 2WW to get to bathroom and outside. Pt had been using device for ~ 2 months, prior to this IND without device.)  Transfer Assistance: Independent (Pt would use SPC to get into truck.)  Active : Yes  Mode of Transportation: Truck  Occupation: On disability  Leisure & Hobbies: Pt enjoys mechanics and outdoor work.   IADL Comments: Pt completes all yardwork at home.  Additional Comments: Pt has regular flat bed at home with assistance from wife for bed mobility. Pt with no falls in the last year. Objective                                                                                    Goals:  (Update in navigator)   : Long Term Goals  Time Frame for Long term goals : 7-10 days STG=LTG  Long term goal 1: Pt will perform bed mobility with mod I  Long term goal 2: Pt will perform sit to stand, w/c<>bed and car transfers with mod I  Long term goal 3: pt will ambulate with 2ww 50' on level surfaces and 10' on unlevel surface with supervision  Long term goal 4: Pt will ascend/descend 4\" step with 2ww and CGA  Long term goal 5: Pt will propel w/c in household situations 250' with mod I  Additional Goals?: Yes  Long term goal 6: Pt will  light object from floor with 2ww and reacher with mod I:        Plan of Care                                                                              Times per week: 5 days per week for a minimum of 60 minutes/day plus group as appropriate for 60 minutes.   Treatment to include Current Treatment Recommendations: Strengthening, ROM, Balance training, Endurance training, Wheelchair mobility training, Functional mobility training, Transfer training, Gait training, ADL/Self-care training, Cognitive reorientation, Patient/Caregiver education & training, Safety education & training, Home exercise program, Pain management, Equipment evaluation, education, & procurement, Modalities, Positioning, Therapeutic activities, Stair training, Neuromuscular re-education, IADL training, Manual Therapy - Soft Tissue Mobilization    Electronically signed by   Kaycee Tom PTA, [id number], 1:07 PM

## 2022-09-10 NOTE — PROGRESS NOTES
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 Still has pain but can sleep now. Prior (baseline) level of function: Independent. Current level of function:         Current  IRF-GIANNI and Goals:   Occupational Therapy:    Short Term Goals  Time Frame for Short term goals: STGs=LTGs :   Long Term Goals  Time Frame for Long term goals : 7-10 days or until d/c. Long Term Goal 1: Pt will complete grooming tasks c Mod I.  Long Term Goal 2: Pt will complete total body bathing c AE PRN and Mod I.  Long Term Goal 3: Pt will complete UB dressing c IND. Long Term Goal 4: Pt will complete LB dressing c AE PRN and Mod I.  Long Term Goal 5: Pt will doff/don footwear to LLE c IND.   Additional Goals?: Yes  Long Term Goal 6: Pt will complete toileting c Mod I.  Long Term Goal 7: Pt will perform functional transfers (bed, chair, toilet, shower) c DME PRN and Mod I.  Long Term Goal 8: Pt will complete therex/therax to facilitate an increase in strength/endurance (c emphasis on dynamic standing balance/tolerance > 8 mins) c Mod I.  Long Term Goal 9: Pt will complete home management tasks c Mod I. :   Eatin - Patient feeds self  Groomin - Able to perform 3-4 tasks with touching help  Bathin - Able to bathe 3-4 areas  Dressing-Upper: 3 - Requires assist with 2 tasks  Dressing-Lower: 2 - Requires assist with 4-5 parts of dressing  Toiletin - Able to perform 1 task only (e.g. hygiene)  Toilet Transfer: 2 - Requires 50-74% assist getting off toilet                 Eating: Eating  Assistance Needed: Independent  Comment: X  CARE Score: 6  Discharge Goal: Independent       Oral Hygiene: Oral Hygiene  Assistance Needed: Independent  Comment: X  CARE Score: 6  Discharge Goal: Independent    UB/LB Bathing: Shower/Bathe Self  Assistance Needed: Supervision or touching assistance  Comment: Sup for safety, seated entirety of shower  CARE Score: 4  Discharge Goal: Independent    UB Dressing: Upper Body Dressing  Assistance Needed: Independent  Comment: X  CARE Score: 6  Discharge Goal: Independent         LB Dressing: Lower Body Dressing  Assistance Needed: Supervision or touching assistance  Comment: Sup  CARE Score: 4  Discharge Goal: Independent    Donning and Pillow Footwear: Putting On/Taking Off Footwear  Assistance Needed: Independent  Comment: Mod I  CARE Score: 6  Discharge Goal: Independent      Toiletin [address] needed: Supervision or touching assistance  Comment: SBA/CGA  CARE Score: 4  Discharge Goal: Independent      Toilet Transfers:   Toilet Transfer  Assistance needed: Supervision or touching assistance  Comment: SB/CGA heavy use of grab bars  CARE Score: 4  Discharge Goal: Independent    Physical Therapy:         Long Term Goals  Time Frame for Long term goals : 7-10 days STG=LTG  Long term goal 1: Pt will perform bed mobility with mod I  Long term goal 2: Pt will perform sit to stand, w/c<>bed and car transfers with mod I  Long term goal 3: pt will ambulate with 2ww 50' on level surfaces and 10' on unlevel surface with supervision  Long term goal 4: Pt will ascend/descend 4\" step with 2ww and CGA  Long term goal 5: Pt will propel w/c in household situations 250' with mod I  Additional Goals?: Yes  Long term goal 6: Pt will  light object from floor with 2ww and reacher with mod I      Bed Mobility:   Sit to Lying  Assistance Needed: Supervision or touching assistance  Comment: supervision, flat bed, no rails  CARE Score: 4  Roll Left and Right  Assistance Needed: Supervision or touching assistance  Comment: supervision with effort  CARE Score: 4  Discharge Goal: Independent  Lying to Sitting on Side of Bed  Assistance Needed: Partial/moderate assistance  Comment: min assist for trunk  CARE Score: 3  Discharge Goal: Independent    Transfers:    Sit to Stand  Assistance Needed: Supervision or touching assistance  Comment: pt progressed to SBA using 2WW today  CARE Score: 4  Discharge Goal: Independent  Chair/Bed-to-Chair Transfer  Assistance Needed: Supervision or touching assistance  Comment: pt progressed to SBA using stand pivot with pt performing w/c set-up  CARE Score: 4  Discharge Goal: Independent     Car Transfer  Assistance Needed: Partial/moderate assistance  Comment: mod assist with unsafe technique, poor control  CARE Score: 3  Discharge Goal: Independent    Ambulation:    Walking Ability  Does the Patient Walk?: Yes     Walk 10 Feet  Assistance Needed: Supervision or touching assistance  Comment: CGA with 2ww. CARE Score: 4  Discharge Goal: Independent     Walk 50 Feet with Two Turns  Comment: 10' max distance due to pain.  Pt could not tolerate hopping  Reason if not Attempted: Not attempted due to medical condition or safety concerns  CARE Score: 88  Discharge Goal: Supervision or touching assistance     Walk 150 Feet  Comment: do not feel pt will be able to tolerate this distance at discharge due to chronic medical conditions  Reason if not Attempted: Not attempted due to medical condition or safety concerns  CARE Score: 88  Discharge Goal: Not Attempted     Walking 10 Feet on Uneven Surfaces  Reason if not Attempted: Not attempted due to medical condition or safety concerns  CARE Score: 88  Discharge Goal: Supervision or touching assistance     1 Step (Curb)  Reason if not Attempted: Not attempted due to medical condition or safety concerns  CARE Score: 88  Discharge Goal: Supervision or touching assistance     4 Steps  Reason if not Attempted: Not applicable  CARE Score: 9  Discharge Goal: Not Applicable     12 Steps  Reason if not Attempted: Not applicable  CARE Score: 9  Discharge Goal: Not Applicable       Wheelchair:  w/c Ability: Wheelchair Ability  Uses a Wheelchair and/or Scooter?: Yes  Wheel 50 Feet with Two Turns  Assistance Needed: Supervision or touching assistance  Comment: Uses BUE  CARE Score: 4  Discharge Goal: Independent  Wheel 150 Feet  Assistance Needed: Supervision or touching assistance  CARE Score: 4  Discharge Goal: Independent          Balance:        Object: Picking Up Object  Comment: Pt not able to lift RUE off 2ww for more than 1-2 seconds and maintain balance  Reason if not Attempted: Not attempted due to medical condition or safety concerns  CARE Score: 88  Discharge Goal: Independent    I      Exam:    Blood pressure (!) 142/56, pulse 71, temperature 98.1 °F (36.7 °C), temperature source Oral, resp. rate 18, height 5' 8\" (1.727 m), weight 129 lb 6.6 oz (58.7 kg), SpO2 97 %. General: Up in wheelchair. Propelling with his arms. Alert. HEENT: Neck supple. No adenopathy or JVD. Clear speech. Pulmonary: Symmetric air exchange without coughing or wheezing. Cardiac: Regular rate and rhythm. Abdomen: Patient's abdomen is soft and nondistended. Bowel sounds were present throughout. There was no rebound, guarding or masses noted. Upper extremities: Manipulating the wheelchair rims with confidence. No new bruising or swelling. Lower extremities: Right stump is elevated on leg rest.  Left calf is soft. Sitting balance was good. Standing balance was poor.     Lab Results   Component Value Date    WBC 8.2 09/05/2022    HGB 8.0 (L) 09/05/2022    HCT 24.7 (L) 09/05/2022    MCV 86.1 09/05/2022     09/05/2022     Lab Results   Component Value Date    INR 1.19 08/29/2022    INR 1.14 08/28/2018    INR 1.09 08/17/2016    PROTIME 15.4 (H) 08/29/2022 PROTIME 13.0 (H) 08/28/2018    PROTIME 12.5 08/17/2016     Lab Results   Component Value Date    CREATININE 1.1 09/06/2022    BUN 30 (H) 09/06/2022     (L) 09/06/2022    K 5.2 (H) 09/06/2022     09/06/2022    CO2 24 09/06/2022     Lab Results   Component Value Date    ALT 8 (L) 08/30/2022    AST 16 08/30/2022    ALKPHOS 121 08/30/2022    BILITOT 0.3 08/30/2022       Expected length of stay  prior to a supervised level of function for discharge home with a wheelchair/walker and [facility] OT/PT is 9/15/2022. Recommendations:    Osteomyelitis of the right foot with BK amputation: He is showing improved activity tolerance for the daily occupational and physical therapy. His wound shows no signs of infection. Continue with daily wound care, limb elevation and pain management. He benefits from DVT prophylaxis, aggressive pulmonary hygiene measures and nutritional support. Ongoing attempts to control his blood sugar and blood pressure and provide a nicotine alternative to smoking. He needs caregiver training, adaptive equipment education and introduction to a prosthetists. Outpatient follow-up with his surgeon. He is on oral amoxicillin for a total of 17 more doses. Verbal cues and minimum to moderate physical assistance again today. DVT prophylaxis: Lovenox 40 mg subcu daily. I must monitor his hemoglobin and platelet count periodically while on this medication. Weightbearing activities through the left lower limb are limited but tried daily. GI prophylaxis offered. No clinical signs of blood loss. Uncontrolled pain: Tolerating the gabapentin, injectable Dilaudid and oral oxycodone now. Bowel intervention while on the narcotics. No significant nausea with his medicines. Planning to reduce the strength of the Dilaudid soon. Limb elevation to help control swelling. Local wound care. Progressive mobilization.   Uncontrolled diabetes type 2 with peripheral neuropathy: Patient requires a diet modified for carbohydrates. He is on scheduled Lantus and a Humalog sliding scale. Neurontin for neuropathy symptoms. Blood sugars are checked at mealtime and bedtime. Encouraging consistent oral intake. Hypertension: Pain management is a significant part of the blood pressure control. Nicotine replacement. Monitoring his blood pressure to determine if there is need for medication. Target systolic blood pressures 930-170. Blood pressure is just above the target range. Monitoring closely. Hyponatremia: Encouraging consistent oral intake. Periodic monitoring of his chemistries. Cautious use of any diuretics. COPD: Monitoring O2 saturations at rest and with activity. Aggressive pulmonary hygiene measures. Bronchodilators as needed.

## 2022-09-10 NOTE — PLAN OF CARE
Problem: Discharge Planning  Goal: Discharge to home or other facility with appropriate resources  Outcome: Progressing     Problem: Pain  Goal: Verbalizes/displays adequate comfort level or baseline comfort level  Outcome: Progressing     Problem: Safety - Adult  Goal: Free from fall injury  Outcome: Progressing     Problem: Skin/Tissue Integrity  Goal: Absence of new skin breakdown  Description: 1. Monitor for areas of redness and/or skin breakdown  2. Assess vascular access sites hourly  3. Every 4-6 hours minimum:  Change oxygen saturation probe site  4. Every 4-6 hours:  If on nasal continuous positive airway pressure, respiratory therapy assess nares and determine need for appliance change or resting period.   Outcome: Progressing     Problem: Nutrition Deficit:  Goal: Optimize nutritional status  Outcome: Progressing

## 2022-09-11 LAB
GLUCOSE BLD-MCNC: 117 MG/DL (ref 70–99)
GLUCOSE BLD-MCNC: 151 MG/DL (ref 70–99)
GLUCOSE BLD-MCNC: 154 MG/DL (ref 70–99)
GLUCOSE BLD-MCNC: 203 MG/DL (ref 70–99)

## 2022-09-11 PROCEDURE — 6360000002 HC RX W HCPCS: Performed by: INTERNAL MEDICINE

## 2022-09-11 PROCEDURE — 6370000000 HC RX 637 (ALT 250 FOR IP): Performed by: PHYSICAL MEDICINE & REHABILITATION

## 2022-09-11 PROCEDURE — 82962 GLUCOSE BLOOD TEST: CPT

## 2022-09-11 PROCEDURE — 6360000002 HC RX W HCPCS: Performed by: PHYSICAL MEDICINE & REHABILITATION

## 2022-09-11 PROCEDURE — 1280000000 HC REHAB R&B

## 2022-09-11 PROCEDURE — 94150 VITAL CAPACITY TEST: CPT

## 2022-09-11 PROCEDURE — 94761 N-INVAS EAR/PLS OXIMETRY MLT: CPT

## 2022-09-11 PROCEDURE — 94664 DEMO&/EVAL PT USE INHALER: CPT

## 2022-09-11 PROCEDURE — 6370000000 HC RX 637 (ALT 250 FOR IP): Performed by: INTERNAL MEDICINE

## 2022-09-11 RX ADMIN — GABAPENTIN 600 MG: 300 CAPSULE ORAL at 08:35

## 2022-09-11 RX ADMIN — OXYCODONE HYDROCHLORIDE 10 MG: 10 TABLET ORAL at 12:25

## 2022-09-11 RX ADMIN — HYDROMORPHONE HYDROCHLORIDE 0.5 MG: 1 INJECTION, SOLUTION INTRAMUSCULAR; INTRAVENOUS; SUBCUTANEOUS at 23:17

## 2022-09-11 RX ADMIN — AMOXICILLIN 500 MG: 500 CAPSULE ORAL at 12:30

## 2022-09-11 RX ADMIN — HYDROMORPHONE HYDROCHLORIDE 0.5 MG: 1 INJECTION, SOLUTION INTRAMUSCULAR; INTRAVENOUS; SUBCUTANEOUS at 08:35

## 2022-09-11 RX ADMIN — ACETAMINOPHEN 650 MG: 325 TABLET ORAL at 21:09

## 2022-09-11 RX ADMIN — SULFAMETHOXAZOLE AND TRIMETHOPRIM 1 TABLET: 800; 160 TABLET ORAL at 08:35

## 2022-09-11 RX ADMIN — GABAPENTIN 600 MG: 300 CAPSULE ORAL at 21:09

## 2022-09-11 RX ADMIN — SULFAMETHOXAZOLE AND TRIMETHOPRIM 1 TABLET: 800; 160 TABLET ORAL at 21:09

## 2022-09-11 RX ADMIN — METHOCARBAMOL TABLETS 500 MG: 500 TABLET, COATED ORAL at 21:09

## 2022-09-11 RX ADMIN — OXYCODONE HYDROCHLORIDE 10 MG: 10 TABLET ORAL at 21:09

## 2022-09-11 RX ADMIN — AMOXICILLIN 500 MG: 500 CAPSULE ORAL at 21:13

## 2022-09-11 RX ADMIN — AMOXICILLIN 500 MG: 500 CAPSULE ORAL at 05:56

## 2022-09-11 RX ADMIN — ASPIRIN 81 MG CHEWABLE TABLET 81 MG: 81 TABLET CHEWABLE at 08:35

## 2022-09-11 RX ADMIN — ACETAMINOPHEN 650 MG: 325 TABLET ORAL at 15:32

## 2022-09-11 RX ADMIN — FAMOTIDINE 20 MG: 20 TABLET ORAL at 21:09

## 2022-09-11 RX ADMIN — METHOCARBAMOL TABLETS 500 MG: 500 TABLET, COATED ORAL at 12:25

## 2022-09-11 RX ADMIN — HYDROMORPHONE HYDROCHLORIDE 0.5 MG: 1 INJECTION, SOLUTION INTRAMUSCULAR; INTRAVENOUS; SUBCUTANEOUS at 15:29

## 2022-09-11 RX ADMIN — METHOCARBAMOL TABLETS 500 MG: 500 TABLET, COATED ORAL at 00:56

## 2022-09-11 RX ADMIN — OXYCODONE HYDROCHLORIDE 10 MG: 10 TABLET ORAL at 03:22

## 2022-09-11 RX ADMIN — HYDROMORPHONE HYDROCHLORIDE 0.5 MG: 1 INJECTION, SOLUTION INTRAMUSCULAR; INTRAVENOUS; SUBCUTANEOUS at 00:55

## 2022-09-11 RX ADMIN — ACETAMINOPHEN 650 MG: 325 TABLET ORAL at 05:56

## 2022-09-11 RX ADMIN — ENOXAPARIN SODIUM 40 MG: 100 INJECTION SUBCUTANEOUS at 08:37

## 2022-09-11 RX ADMIN — GABAPENTIN 600 MG: 300 CAPSULE ORAL at 12:25

## 2022-09-11 RX ADMIN — ACETAMINOPHEN 650 MG: 325 TABLET ORAL at 12:24

## 2022-09-11 ASSESSMENT — PAIN SCALES - GENERAL
PAINLEVEL_OUTOF10: 0
PAINLEVEL_OUTOF10: 5
PAINLEVEL_OUTOF10: 0
PAINLEVEL_OUTOF10: 10
PAINLEVEL_OUTOF10: 0
PAINLEVEL_OUTOF10: 10
PAINLEVEL_OUTOF10: 0
PAINLEVEL_OUTOF10: 9
PAINLEVEL_OUTOF10: 0
PAINLEVEL_OUTOF10: 8
PAINLEVEL_OUTOF10: 0
PAINLEVEL_OUTOF10: 10
PAINLEVEL_OUTOF10: 4
PAINLEVEL_OUTOF10: 10
PAINLEVEL_OUTOF10: 10
PAINLEVEL_OUTOF10: 0

## 2022-09-11 ASSESSMENT — PAIN DESCRIPTION - DESCRIPTORS
DESCRIPTORS: ACHING

## 2022-09-11 ASSESSMENT — PAIN - FUNCTIONAL ASSESSMENT
PAIN_FUNCTIONAL_ASSESSMENT: PREVENTS OR INTERFERES SOME ACTIVE ACTIVITIES AND ADLS

## 2022-09-11 ASSESSMENT — PAIN SCALES - WONG BAKER
WONGBAKER_NUMERICALRESPONSE: 0
WONGBAKER_NUMERICALRESPONSE: 10
WONGBAKER_NUMERICALRESPONSE: 0

## 2022-09-11 ASSESSMENT — PAIN DESCRIPTION - ONSET
ONSET: ON-GOING
ONSET: ON-GOING

## 2022-09-11 ASSESSMENT — PAIN DESCRIPTION - LOCATION
LOCATION: LEG

## 2022-09-11 ASSESSMENT — PAIN DESCRIPTION - FREQUENCY
FREQUENCY: CONTINUOUS
FREQUENCY: INTERMITTENT

## 2022-09-11 ASSESSMENT — PAIN DESCRIPTION - ORIENTATION
ORIENTATION: RIGHT

## 2022-09-11 ASSESSMENT — PAIN DESCRIPTION - PAIN TYPE
TYPE: SURGICAL PAIN

## 2022-09-12 LAB
GLUCOSE BLD-MCNC: 124 MG/DL (ref 70–99)
GLUCOSE BLD-MCNC: 198 MG/DL (ref 70–99)
GLUCOSE BLD-MCNC: 208 MG/DL (ref 70–99)
GLUCOSE BLD-MCNC: 239 MG/DL (ref 70–99)

## 2022-09-12 PROCEDURE — 97116 GAIT TRAINING THERAPY: CPT

## 2022-09-12 PROCEDURE — 97110 THERAPEUTIC EXERCISES: CPT

## 2022-09-12 PROCEDURE — 82962 GLUCOSE BLOOD TEST: CPT

## 2022-09-12 PROCEDURE — 99232 SBSQ HOSP IP/OBS MODERATE 35: CPT | Performed by: PHYSICAL MEDICINE & REHABILITATION

## 2022-09-12 PROCEDURE — 1280000000 HC REHAB R&B

## 2022-09-12 PROCEDURE — 6370000000 HC RX 637 (ALT 250 FOR IP): Performed by: INTERNAL MEDICINE

## 2022-09-12 PROCEDURE — 6360000002 HC RX W HCPCS: Performed by: INTERNAL MEDICINE

## 2022-09-12 PROCEDURE — 94761 N-INVAS EAR/PLS OXIMETRY MLT: CPT

## 2022-09-12 PROCEDURE — 97530 THERAPEUTIC ACTIVITIES: CPT

## 2022-09-12 PROCEDURE — 97150 GROUP THERAPEUTIC PROCEDURES: CPT

## 2022-09-12 PROCEDURE — 94150 VITAL CAPACITY TEST: CPT

## 2022-09-12 PROCEDURE — 6370000000 HC RX 637 (ALT 250 FOR IP): Performed by: PHYSICAL MEDICINE & REHABILITATION

## 2022-09-12 PROCEDURE — 6360000002 HC RX W HCPCS: Performed by: PHYSICAL MEDICINE & REHABILITATION

## 2022-09-12 RX ADMIN — HYDROMORPHONE HYDROCHLORIDE 0.5 MG: 1 INJECTION, SOLUTION INTRAMUSCULAR; INTRAVENOUS; SUBCUTANEOUS at 17:33

## 2022-09-12 RX ADMIN — OXYCODONE HYDROCHLORIDE 10 MG: 10 TABLET ORAL at 02:58

## 2022-09-12 RX ADMIN — ENOXAPARIN SODIUM 40 MG: 100 INJECTION SUBCUTANEOUS at 07:58

## 2022-09-12 RX ADMIN — ACETAMINOPHEN 650 MG: 325 TABLET ORAL at 05:23

## 2022-09-12 RX ADMIN — HYDROMORPHONE HYDROCHLORIDE 0.5 MG: 1 INJECTION, SOLUTION INTRAMUSCULAR; INTRAVENOUS; SUBCUTANEOUS at 11:34

## 2022-09-12 RX ADMIN — SULFAMETHOXAZOLE AND TRIMETHOPRIM 1 TABLET: 800; 160 TABLET ORAL at 07:57

## 2022-09-12 RX ADMIN — ACETAMINOPHEN 650 MG: 325 TABLET ORAL at 11:47

## 2022-09-12 RX ADMIN — ACETAMINOPHEN 650 MG: 325 TABLET ORAL at 17:22

## 2022-09-12 RX ADMIN — OXYCODONE HYDROCHLORIDE 10 MG: 10 TABLET ORAL at 13:45

## 2022-09-12 RX ADMIN — GABAPENTIN 600 MG: 300 CAPSULE ORAL at 13:45

## 2022-09-12 RX ADMIN — FAMOTIDINE 20 MG: 20 TABLET ORAL at 21:19

## 2022-09-12 RX ADMIN — OXYCODONE HYDROCHLORIDE 10 MG: 10 TABLET ORAL at 21:18

## 2022-09-12 RX ADMIN — GABAPENTIN 600 MG: 300 CAPSULE ORAL at 21:19

## 2022-09-12 RX ADMIN — HYDROMORPHONE HYDROCHLORIDE 0.5 MG: 1 INJECTION, SOLUTION INTRAMUSCULAR; INTRAVENOUS; SUBCUTANEOUS at 05:22

## 2022-09-12 RX ADMIN — OXYCODONE HYDROCHLORIDE 10 MG: 10 TABLET ORAL at 07:57

## 2022-09-12 RX ADMIN — INSULIN LISPRO 1 UNITS: 100 INJECTION, SOLUTION INTRAVENOUS; SUBCUTANEOUS at 17:20

## 2022-09-12 RX ADMIN — ACETAMINOPHEN 650 MG: 325 TABLET ORAL at 21:19

## 2022-09-12 RX ADMIN — AMOXICILLIN 500 MG: 500 CAPSULE ORAL at 05:24

## 2022-09-12 RX ADMIN — ASPIRIN 81 MG CHEWABLE TABLET 81 MG: 81 TABLET CHEWABLE at 07:58

## 2022-09-12 RX ADMIN — HYDROMORPHONE HYDROCHLORIDE 0.5 MG: 1 INJECTION, SOLUTION INTRAMUSCULAR; INTRAVENOUS; SUBCUTANEOUS at 23:38

## 2022-09-12 RX ADMIN — GABAPENTIN 600 MG: 300 CAPSULE ORAL at 07:57

## 2022-09-12 ASSESSMENT — PAIN DESCRIPTION - ORIENTATION
ORIENTATION: RIGHT
ORIENTATION: RIGHT
ORIENTATION: LEFT
ORIENTATION: LEFT
ORIENTATION: RIGHT
ORIENTATION: LEFT
ORIENTATION: RIGHT

## 2022-09-12 ASSESSMENT — PAIN DESCRIPTION - DESCRIPTORS
DESCRIPTORS: ACHING;BURNING
DESCRIPTORS: ACHING
DESCRIPTORS: ACHING;BURNING
DESCRIPTORS: ACHING;BURNING
DESCRIPTORS: ACHING;BURNING;DISCOMFORT
DESCRIPTORS: ACHING

## 2022-09-12 ASSESSMENT — PAIN SCALES - GENERAL
PAINLEVEL_OUTOF10: 9
PAINLEVEL_OUTOF10: 10
PAINLEVEL_OUTOF10: 4
PAINLEVEL_OUTOF10: 0
PAINLEVEL_OUTOF10: 7
PAINLEVEL_OUTOF10: 9
PAINLEVEL_OUTOF10: 0
PAINLEVEL_OUTOF10: 4
PAINLEVEL_OUTOF10: 0
PAINLEVEL_OUTOF10: 9
PAINLEVEL_OUTOF10: 8
PAINLEVEL_OUTOF10: 7

## 2022-09-12 ASSESSMENT — PAIN DESCRIPTION - LOCATION
LOCATION: LEG
LOCATION: KNEE;LEG
LOCATION: LEG

## 2022-09-12 ASSESSMENT — PAIN DESCRIPTION - ONSET
ONSET: ON-GOING

## 2022-09-12 ASSESSMENT — PAIN DESCRIPTION - PAIN TYPE
TYPE: SURGICAL PAIN
TYPE: SURGICAL PAIN
TYPE: SURGICAL PAIN;NEUROPATHIC PAIN
TYPE: SURGICAL PAIN;NEUROPATHIC PAIN
TYPE: SURGICAL PAIN

## 2022-09-12 ASSESSMENT — PAIN DESCRIPTION - FREQUENCY
FREQUENCY: CONTINUOUS

## 2022-09-12 ASSESSMENT — PAIN SCALES - WONG BAKER
WONGBAKER_NUMERICALRESPONSE: 8;10
WONGBAKER_NUMERICALRESPONSE: 10

## 2022-09-12 ASSESSMENT — PAIN - FUNCTIONAL ASSESSMENT
PAIN_FUNCTIONAL_ASSESSMENT: PREVENTS OR INTERFERES SOME ACTIVE ACTIVITIES AND ADLS

## 2022-09-12 NOTE — PROGRESS NOTES
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 No chills or cough. Prior (baseline) level of function: Independent. Current level of function:         Current  IRF-GIANNI and Goals:   Occupational Therapy:    Short Term Goals  Time Frame for Short term goals: STGs=LTGs :   Long Term Goals  Time Frame for Long term goals : 7-10 days or until d/c. Long Term Goal 1: Pt will complete grooming tasks c Mod I.  Long Term Goal 2: Pt will complete total body bathing c AE PRN and Mod I.  Long Term Goal 3: Pt will complete UB dressing c IND. Long Term Goal 4: Pt will complete LB dressing c AE PRN and Mod I.  Long Term Goal 5: Pt will doff/don footwear to LLE c IND.   Additional Goals?: Yes  Long Term Goal 6: Pt will complete toileting c Mod I.  Long Term Goal 7: Pt will perform functional transfers (bed, chair, toilet, shower) c DME PRN and Mod I.  Long Term Goal 8: Pt will complete therex/therax to facilitate an increase in strength/endurance (c emphasis on dynamic standing balance/tolerance > 8 mins) c Mod I.  Long Term Goal 9: Pt will complete home management tasks c Mod I. :   Eatin - Patient feeds self  Groomin - Able to perform 3-4 tasks with touching help  Bathin - Able to bathe 3-4 areas  Dressing-Upper: 3 - Requires assist with 2 tasks  Dressing-Lower: 2 - Requires assist with 4-5 parts of dressing  Toiletin - Able to perform 1 task only (e.g. hygiene)  Toilet Transfer: 2 - Requires 50-74% assist getting off toilet                 Eating: Eating  Assistance Needed: Independent  Comment: finished eating on arrival  CARE Score: 6  Discharge Goal: Independent       Oral Hygiene: Oral Hygiene  Assistance Needed: Independent  Comment: completed seated at sink  CARE Score: 6  Discharge Goal: Independent    UB/LB Bathing: Shower/Bathe Self  Assistance Needed: Independent  Comment: S for safety, completed at sink  CARE Score: 6  Discharge Goal: Independent    UB Dressing: Upper Body Dressing  Assistance Needed: Independent  Comment: able to obtain clothing and don/ doff  CARE Score: 6  Discharge Goal: Independent         LB Dressing: Lower Body Dressing  Assistance Needed: Supervision or touching assistance  Comment: S for safety  CARE Score: 4  Discharge Goal: Independent    Donning and [place] Footwear: Putting On/Taking Off Footwear  Assistance Needed: Independent  Comment: c  CARE Score: 6  Discharge Goal: Independent      Toiletin [address] needed: Supervision or touching assistance  Comment: SBA  CARE Score: 4  Discharge Goal: Independent      Toilet Transfers:   Toilet Transfer  Assistance needed: Supervision or touching assistance  Comment: SBA use of bilateral grab bars  CARE Score: 4  Discharge Goal: Independent    Physical Therapy:         Long Term Goals  Time Frame for Long term goals : 7-10 days STG=LTG  Long term goal 1: Pt will perform bed mobility with mod I  Long term goal 2: Pt will perform sit to stand, w/c<>bed and car transfers with mod I  Long term goal 3: pt will ambulate with 2ww 50' on level surfaces and 10' on unlevel surface with supervision  Long term goal 4: Pt will ascend/descend 4\" step with 2ww and CGA  Long term goal 5: Pt will propel w/c in household situations 250' with mod I  Additional Goals?: Yes  Long term goal 6: Pt will  light object from floor with 2ww and reacher with mod I      Bed Mobility:   Sit to Lying  Assistance Needed: Independent  Comment: Ind in regular bed  CARE Score: 6  Roll Left and Right  Assistance Needed: Independent  Comment: Ind in regular bed  CARE Score: 6  Discharge Goal: Independent  Lying to Sitting on Side of Bed  Assistance Needed: Independent  Comment: Ind in regular bed  CARE Score: 6  Discharge Goal: Independent    Transfers:    Sit to Stand  Assistance Needed: Supervision or touching assistance  Comment: SBA with 2WW/with external support  CARE Score: 4  Discharge Goal: Independent  Chair/Bed-to-Chair Transfer  Assistance Needed: Supervision or touching assistance  Comment: usually does stand pivot transfer with SBA; pt does w/c set-up  CARE Score: 4  Discharge Goal: Independent     Car Transfer  Assistance Needed: Supervision or touching assistance  Comment: SBA using stand pivot (used car as external support and completed transfer from and to w/c)  CARE Score: 4  Discharge Goal: Independent    Ambulation:    Walking Ability  Does the Patient Walk?: Yes     Walk 10 Feet  Assistance Needed: Supervision or touching assistance  Comment: SBA with 2WW  CARE Score: 4  Discharge Goal: Independent     Walk 50 Feet with Two Turns  Assistance Needed: Supervision or touching assistance  Comment: CGA with 2WW  Reason if not Attempted: Not attempted due to medical condition or safety concerns  CARE Score: 4  Discharge Goal: Supervision or touching assistance     Walk 150 Feet  Comment: do not feel pt will be able to tolerate this distance at discharge due to chronic medical conditions  Reason if not Attempted: Not attempted due to medical condition or safety concerns  CARE Score: 88  Discharge Goal: Not Attempted     Walking 10 Feet on Uneven Surfaces  Reason if not Attempted: Not attempted due to medical condition or safety concerns  CARE Score: 88  Discharge Goal: Supervision or touching assistance     1 Step (Curb)  Reason if not Attempted: Not attempted due to medical condition or safety concerns  CARE Score: 88  Discharge Goal: Supervision or touching assistance     4 Steps  Reason if not Attempted: Not applicable  CARE Score: 9  Discharge Goal: Not Applicable     12 Steps  Reason if not Attempted: Not applicable  CARE Score: 9  Discharge Goal: Not Applicable       Wheelchair:  w/c Ability: Wheelchair Ability  Uses a Wheelchair and/or Scooter?: Yes  Wheel 50 Feet with Two Turns  Assistance Needed: Independent  Comment: Mod Ind with BUE  CARE Score: 6  Discharge Goal: Independent  Wheel 150 Feet  Assistance Needed: Supervision or touching assistance  CARE Score: 4  Discharge Goal: Independent          Balance:        Object: Picking Up Object  Assistance Needed: Partial/moderate assistance  Comment: Min A with 2WW and reacher  Reason if not Attempted: Not attempted due to medical condition or safety concerns  CARE Score: 3  Discharge Goal: Independent    I      Exam:    Blood pressure (!) 125/57, pulse 68, temperature 98.2 °F (36.8 °C), temperature source Oral, resp. rate 18, height 5' 8\" (1.727 m), weight 134 lb 4.2 oz (60.9 kg), SpO2 97 %. General: Lying back in bed. Quiet. HEENT: Full visual field. Clear speech. Neck supple. Pulmonary: Clear and unlabored. Cardiac: Regular rate and rhythm. Abdomen: Patient's abdomen is soft and nondistended. Bowel sounds were present throughout. There was no rebound, guarding or masses noted. Upper extremities: No new bruising or swelling. Lower extremities: Stump is tender and swollen but the knee is moving more freely. Incision is clean and drying up some. Sitting balance was good. Standing balance was poor.     Lab Results   Component Value Date    WBC 8.2 09/05/2022    HGB 8.0 (L) 09/05/2022    HCT 24.7 (L) 09/05/2022    MCV 86.1 09/05/2022     09/05/2022     Lab Results   Component Value Date    INR 1.19 08/29/2022    INR 1.14 08/28/2018    INR 1.09 08/17/2016    PROTIME 15.4 (H) 08/29/2022    PROTIME 13.0 (H) 08/28/2018    PROTIME 12.5 08/17/2016     Lab Results   Component Value Date    CREATININE 1.1 09/06/2022    BUN 30 (H) 09/06/2022     (L) 09/06/2022    K 5.2 (H) 09/06/2022     09/06/2022    CO2 24 09/06/2022     Lab Results   Component Value Date    ALT 8 (L) 08/30/2022    AST 16 08/30/2022    ALKPHOS 121 08/30/2022    BILITOT 0.3 08/30/2022       Expected length of stay  prior to a supervised level of function for discharge home with a wheelchair/walker and [facility] 77 OT/PT is 9/15/2022. Recommendations:    Osteomyelitis of the right foot with BK amputation: Becoming a bit more consistent with engagement in the daily occupational and physical therapy. Still complaining of pain at rest and with activity. His wound shows no signs of infection. Continue with daily wound care, limb elevation and pain management. He benefits from DVT prophylaxis, aggressive pulmonary hygiene measures and nutritional support. Ongoing attempts to control his blood sugar and blood pressure and provide a nicotine alternative to smoking. He needs caregiver training, adaptive equipment education and introduction to a prosthetists. Outpatient follow-up with his surgeon. He is on oral amoxicillin for a total of 17 more doses. Verbal cues and minimum physical assistance today. DVT prophylaxis: Lovenox 40 mg subcu daily. I must monitor his hemoglobin and platelet count periodically while on this medication. Weightbearing activities through the left lower limb are limited but tried daily. GI prophylaxis offered. No new bruising or swelling. Uncontrolled pain: Tolerating the gabapentin, injectable Dilaudid and oral oxycodone now. Bowel intervention while on the narcotics. No significant nausea with his medicines. Planning to reduce the strength of the Dilaudid soon. Limb elevation to help control swelling. Local wound care. Progressive mobilization.   Uncontrolled diabetes type 2 with peripheral neuropathy: Patient requires a diet modified for carbohydrates. He is on scheduled Lantus and a Humalog sliding scale. Neurontin for neuropathy symptoms. Blood sugars are checked at mealtime and bedtime. Encouraging consistent oral intake. Hypertension: Pain management is a significant part of the blood pressure control. Nicotine replacement. Monitoring his blood pressure to determine if there is need for medication. Target systolic blood pressures 515-035. Blood pressure is back within the target range. Monitoring closely. Hyponatremia: Encouraging consistent oral intake. Periodic monitoring of his chemistries. Cautious use of any diuretics. COPD: Monitoring O2 saturations at rest and with activity. Aggressive pulmonary hygiene measures. Bronchodilators as needed.

## 2022-09-12 NOTE — PROGRESS NOTES
Comprehensive Nutrition Assessment    Type and Reason for Visit:  Reassess    Nutrition Recommendations/Plan:   Continue no concentrated sweets diet with low potassium diet as needed  Will continue to offer oral nutrition supplement to provide 160 kcal and 16 grams of protein each. Will continue to follow up during stay     Malnutrition Assessment:  Malnutrition Status: At risk for malnutrition (Comment) (due to increased calorie needs) (09/07/22 1327)    Context:  Chronic Illness       Nutrition Assessment:    Remains on low potassium diet with no concentrated sweets. Patient reports very good appetite, eating well at meals. Recent meal intake varying %. Likes oral nutrition supplements, willing to continue drinking with meals. Will continue to follow at moderate nutrition risk at this time. Nutrition Related Findings:    resting in bed, BM yesterday Wound Type: Surgical Incision       Current Nutrition Intake & Therapies:    Average Meal Intake: 26-50%, %  Average Supplements Intake: % (per patient)  ADULT ORAL NUTRITION SUPPLEMENT; Breakfast, Lunch, Dinner; Low Calorie/High Protein Oral Supplement  ADULT DIET; Regular; Low Potassium (Less than 3000 mg/day); No Concentrated sweets    Anthropometric Measures:  Height: 5' 8\" (172.7 cm)  Ideal Body Weight (IBW): 154 lbs (70 kg)    Admission Body Weight: 135 lb 9.3 oz (61.5 kg)  Current Body Weight: 134 lb 4.2 oz (60.9 kg), 88 % IBW. Weight Source: Bed Scale  Current BMI (kg/m2): 20.4  Usual Body Weight: 151 lb (68.5 kg) (from wt hx 6/29/22)  % Weight Change (Calculated): -10.2  Weight Adjustment For: Amputation  Total Adjusted Percentage (Calculated): 5.9  Adjusted Ideal Body Weight (lbs) (Calculated): 144.9 lbs  Adjusted Ideal Body Weight (kg) (Calculated): 65.86 kg  Adjusted % Ideal Body Weight (Calculated): 93.6  Adjusted BMI (kg/m2) (Calculated): 21.8  BMI Categories: Normal Weight (BMI 18.5-24. 9)    Estimated Daily Nutrient Needs:  Energy Requirements Based On: Kcal/kg  Weight Used for Energy Requirements: Current  Energy (kcal/day): 7867-0964 (27-32 kcal/kg)  Weight Used for Protein Requirements: Current  Protein (g/day): 74-86 (1.2-1.4 g/kg)  Method Used for Fluid Requirements: 1 ml/kcal  Fluid (ml/day): 5932-4886    Nutrition Diagnosis:   Predicted inadequate energy intake related to increase demand for energy/nutrients as evidenced by wounds    Nutrition Interventions:   Food and/or Nutrient Delivery: Continue Current Diet, Continue Oral Nutrition Supplement  Nutrition Education/Counseling: Education initiated  Coordination of Nutrition Care: No recommendation at this time       Goals:  Previous Goal Met: Progressing toward Goal(s)          Nutrition Monitoring and Evaluation:   Behavioral-Environmental Outcomes: None Identified  Food/Nutrient Intake Outcomes: Food and Nutrient Intake, Supplement Intake  Physical Signs/Symptoms Outcomes: Biochemical Data, Meal Time Behavior, Skin, Weight    Discharge Planning:    Continue Oral Nutrition Supplement     Yelitza Campos RD, LD  Contact: [phone]

## 2022-09-12 NOTE — PLAN OF CARE
Problem: Discharge Planning  Goal: Discharge to home or other facility with appropriate resources  Outcome: Progressing  Flowsheets (Taken 9/12/2022 1155)  Discharge to home or other facility with appropriate resources:   Identify barriers to discharge with patient and caregiver   Arrange for needed discharge resources and transportation as appropriate   Identify discharge learning needs (meds, wound care, etc)   Arrange for interpreters to assist at discharge as needed   Refer to discharge planning if patient needs post-hospital services based on physician order or complex needs related to functional status, cognitive ability or social support system     Problem: Pain  Goal: Verbalizes/displays adequate comfort level or baseline comfort level  Outcome: Progressing     Problem: Safety - Adult  Goal: Free from fall injury  Outcome: Progressing     Problem: Skin/Tissue Integrity  Goal: Absence of new skin breakdown  Description: 1. Monitor for areas of redness and/or skin breakdown  2. Assess vascular access sites hourly  3. Every 4-6 hours minimum:  Change oxygen saturation probe site  4. Every 4-6 hours:  If on nasal continuous positive airway pressure, respiratory therapy assess nares and determine need for appliance change or resting period.   Outcome: Progressing     Problem: Nutrition Deficit:  Goal: Optimize nutritional status  Outcome: Progressing pt's choice

## 2022-09-12 NOTE — PROGRESS NOTES
activities/exercises completed this date:     [x]   ADL Training - Pt donned Ampmatthewield on R residual limb with setup assist.   []   Balance/Postural training     [x]   Bed/Transfer Training   [x]   Endurance Training   []   Neuromuscular Re-ed   []   Nu-step:  Time:        Level:         #Steps:       []   Rebounder:    []  Seated     []  Standing        []   Supine Ther Ex (reps/sets):     [x]   Seated Ther Ex (reps/sets):  Pt completed 3 sets of 20 reps on Rickshaw with 20# total weight. Pt then completed 1 set of 15 reps BUE strengthening exercises including bicep curls, chest press, and overhead press with 3# dumbbell. Pt performed all exercise to increase BUE strength for safe performance of ADLs and functional transfers. []   Standing Ther Ex (reps/sets):     []   Other:      Comments: All intervention performed to increase pt's strength, endurance, ax tolerance, and balance in prep for increased I c ADL/IADLs and functional transfers/mobility. Patient/Caregiver Education and Training:   []   [employer] Equipment Use  []   Bed Mobility/Transfer Technique/Safety  []   Energy Conservation Tips  []   Family training  []   Postural Awareness  [x]   Safety During Functional Activities  []   Reinforced Patient's Precautions   []   Progress was updated and reviewed in Rehabtracker with patient and/or family this         date. Treatment Plan for Next Session: Continue OT POC      Assessment: This pt demonstrated a positive response to today's treatment as evidenced by active participation in therex. The patient is making good progress toward established goals as evidenced by QI scores. Ongoing deficits are observed in the areas of functional balance/mobility, strength, endurance, and ADLs and continued focus on this is recommended.        Treatment/Activity Tolerance:   [x] Tolerated treatment with no adverse effects    [] Patient limited by fatigue  [] Patient limited by pain   [] Patient limited by medical complications:    [] Adverse reaction to Tx:   [] Significant change in status    Safety:       [x]  bed alarm set    []  chair alarm set    []  Pt refused alarms                []  Telesitter activated      [x]  Gait belt used during tx session      []other:       Number of Minutes/Billable Intervention  Therapeutic Exercise 20   ADL Self-care    Neuro Re-Ed    Therapeutic Activity 10   Group    Other:    TOTAL 30       Social History  Social/Functional History  Lives With: Spouse  Type of Home: Mobile home  Home Layout: One level  Home Access: Ramped entrance, Stairs to enter with rails (Pt uses ramped entrance in the front of the home, however does have 4 ALCON the back of the home.)  Entrance Stairs - Number of Steps: 4  Entrance Stairs - Rails: Right  Bathroom Shower/Tub: Tub/Shower unit, Shower chair with back  [employer]: Handicap height  Bathroom Equipment: Hand-held shower, Shower chair, Grab bars in shower  Bathroom Accessibility: Accessible  Home Equipment: Cane  Has the patient had two or more falls in the past year or any fall with injury in the past year?: No  ADL Assistance: [address] [place] [address] Avenue: Independent  Homemaking Responsibilities: Yes  Meal Prep Responsibility: Primary (Shared responsibility with spouse.)  Laundry Responsibility: Primary (Shared responsibility with spouse.)  Cleaning Responsibility: Primary (Shared responsibility with spouse.)  Bill Paying/Finance Responsibility: Primary (Shared responsibility with spouse.)  Shopping Responsibility: Primary (Shared responsibility with spouse.)  Dependent Care Responsibility: Primary (Pt takes care of dog - Goob.)  Health Care Management: Secondary (Pt's wife organizes pills.)  Ambulation Assistance: Independent (Mod I. Pt was using wife's 2WW to get to bathroom and outside.  Pt had been using device for ~ 2 months, prior to this IND without device.)  Transfer Assistance: Independent (Pt would use SPC to get into truck.)  Active : Yes  Mode of Transportation: Truck  Occupation: On disability  Leisure & Hobbies: Pt enjoys mechanics and outdoor work. IADL Comments: Pt completes all yardwork at home. Additional Comments: Pt has regular flat bed at home with assistance from wife for bed mobility. Pt with no falls in the last year. Objective                                                                                    Goals:  (Update in navigator)  Short Term Goals  Time Frame for Short term goals: STGs=LTGs:  Long Term Goals  Time Frame for Long term goals : 7-10 days or until d/c. Long Term Goal 1: Pt will complete grooming tasks c Mod I.  Long Term Goal 2: Pt will complete total body bathing c AE PRN and Mod I.  Long Term Goal 3: Pt will complete UB dressing c IND. Long Term Goal 4: Pt will complete LB dressing c AE PRN and Mod I.  Long Term Goal 5: Pt will doff/don footwear to LLE c IND. Additional Goals?: Yes  Long Term Goal 6: Pt will complete toileting c Mod I.  Long Term Goal 7: Pt will perform functional transfers (bed, chair, toilet, shower) c DME PRN and Mod I.  Long Term Goal 8: Pt will complete therex/therax to facilitate an increase in strength/endurance (c emphasis on dynamic standing balance/tolerance > 8 mins) c Mod I.  Long Term Goal 9: Pt will complete home management tasks c Mod I.:        Plan of Care                                                                              Times per week: 5 days per week for a minimum of 60 minutes/day plus group as appropriate for 60 minutes.   Treatment to include Plan  Times per Day: Daily  Current Treatment Recommendations: Strengthening, Balance training, Functional mobility training, Endurance training, Pain management, Safety education & training, Patient/Caregiver education & training, Equipment evaluation, education, & procurement, Positioning, Self-Care / ADL, Home management training, Coordination training    Electronically signed by   [facility] [address] [facility], [address] [place] [address] ARVIND Lindsey/L #[id number]  9/12/2022, 10:39 AM

## 2022-09-12 NOTE — PROGRESS NOTES
Physical Therapy    [x] daily progress note       [] discharge       Patient Name:  Nimisha Tuttle   :  1960 MRN: [id number]  Room:  [address] [place] [address] Date of Admission: 2022  Rehabilitation Diagnosis:   Necrotizing fasciitis [M72.6]  Osteomyelitis of right leg ([address] [M86.9]       Date 2022       Day of ARU Week:  7   Time IN//1030  1255/1333   Individual Tx Minutes 52+38   Group Tx Minutes    Co-Treat Minutes    Concurrent Tx Minutes    TOTAL Tx Time Mins 90   Variance Time    Variance Time []   Refusal due to:     []   Medical hold/reason:    []   Illness   []   Off Unit for test/procedure  []   Extra time needed to complete task  []   Therapeutic need  []   Other (specify):   Restrictions Restrictions/Precautions  Restrictions/Precautions: Fall Risk, General Precautions, Weight Bearing  Required Braces or Orthoses?: Yes  Position Activity Restriction  Other position/activity restrictions: LUE IV access   Interdisciplinary communication [x]   Cleared for therapy per nursing     []   RN notified about issues during session  []   RN updated on pt performance  []   Spoke with   []   Spoke with OT  []   Spoke with MD  []   Other:    Subjective observations and cognitive status: (AM) Pt resting in bed, alert, states feeling irritated due to concern related to ill-fitting [place], aware of discharge date, willing to participate in PT. (PM) Pt resting in  bed, R residual limb with ace wrap but [place]mp[place] not in place, states feeling tired but willing to participate in PT.     Pain level/location: (AM) 8 /10   (PM)  610    Location: RLE residual limb    Discharge recommendations  Anticipated discharge date:  09/15/2022  Destination: []home alone   []home alone with assist PRN     [x] home w/ family      [] Continuous supervision  []SNF    [] Assisted living     [] Other:  Continued therapy: [x]HHC PT  []OUTPATIENT PT   [] No Further PT  []SNF PT  Caregiver training recommended: []Yes  [] No   Equipment needs: lightweight w/c with antitippers and elevating leg rests, 2ww    Jayden Nathan requires the assistance of a lightweight wheelchair to successfully complete daily living tasks such as: toileting, bathing, dressing, and grooming; or any other daily living task in the home. A lightweight wheelchair is necessary due to the patient's impaired ambulation and mobility restrictions. The patient would not be able to resolve these daily living tasks using a cane or walker and does not have the ability to adequately self-propel in a standard weight manual chair. Jayden Nathan can self-propel a lightweight wheelchair safely without being pushed in their home and can maneuver within their home with adequate access. The patient has not expressed an unwillingness to use the wheelchair. [x]  Anti-tippers are needed for safety and ramp  Expected length of need is lifetime. Jayden Nathan does not have any infectious diagnoses. Jayden Nathan requires elevated leg rests due to  a musculoskeletal condition which prevents 90 degree flexion in the knee and due to significant edema of the lower extremeties that requires an elevating leg rest.     Jayden Nathan requires the assistance of a front-wheeled walker to successfully ambulate from room to room at home to allow completion of daily living tasks such as: bathing, toileting, dressing and grooming. A wheeled walker is necessary due to the patient's unsteady gait, upper body weakness, inability to  a standard walker. This patient can ambulate only by pushing a walker instead of using a lesser assistive device such as a cane or crutch.        PT IRF-GIANNI scores and goals for discharge assessment:   Roll Left and Right  Assistance Needed: Independent  Comment: Ind in regular bed  CARE Score: 6  Discharge Goal: Independent    Sit to Lying  Assistance Needed: Independent  Comment: Ind in regular bed  CARE Score: 6    Lying to Sitting on Side of Bed  Assistance Needed: Independent  Comment: Ind in regular bed  CARE Score: 6  Discharge Goal: Independent    Sit to Stand  Assistance Needed: Supervision or touching assistance  Comment: SBA with 2WW/with external support  CARE Score: 4  Discharge Goal: Independent    Chair/Bed-to-Chair Transfer  Assistance Needed: Supervision or touching assistance  Comment: usually does stand pivot transfer with SBA; pt does w/c set-up  CARE Score: 4  Discharge Goal: Independent    Car Transfer  Assistance Needed: Supervision or touching assistance  Comment: SBA using stand pivot (used car as external support and completed transfer from and to w/c)  CARE Score: 4  Discharge Goal: Independent    Walk 10 Feet? Walk 10 Feet?: Yes    Picking Up Object  Assistance Needed: Partial/moderate assistance  Comment: Min A with 2WW and reacher  CARE Score: 3  Discharge Goal: Independent    Wheelchair Ability  Uses a Wheelchair and/or Scooter?: Yes    Wheel 50 Feet with Two Turns  Assistance Needed: Independent  Comment:  Mod Ind with BUE  CARE Score: 6  Discharge Goal: Independent       Walking Ability  Does the Patient Walk?: Yes    Walk 10 Feet  Assistance Needed: Supervision or touching assistance  Comment: SBA with 2WW  CARE Score: 4  Discharge Goal: Independent    Walk 50 Feet with Two Turns  Assistance Needed: Supervision or touching assistance  Comment: CGA with 2WW  CARE Score: 4  Discharge Goal: Supervision or touching assistance      Additional Therapeutic activities/exercises completed this date:     []   Nu-step:  Time:        Level:         #Steps:       []   Rebounder:    []  Seated     []  Standing        []   Balance training         []   Postural training    [x]   Supine ther ex (reps/sets): L ankle PF/DF/circles x 20 reps each plane of movement; bilat quad set x 5 sec hold x 10 reps, manually resisted L leg presses x 10 reps, R/L SLR x 10 reps, R/L knee flexion/extension x 10 reps, modified pelvic bridging x 10 reps, R/L hip abd/add in sidelying x 10 reps, gluteus max/iliopsoas stretching in R/L sidelying x 10 reps   []   Seated ther ex (reps/sets):     []   Standing ther ex (reps/sets):     []   Other: Toileting activity completed with    [x]   Other: Residual limb wrapped with additional ace wrap on R knee to increase circumference for [facility] prior to level surface gait training     Comments:      Patient/Caregiver Education and Training:   []   Role of PT  [x]   Education about Dx and phases of recovery post-amputation   []   Use of call light for assist   []   HEP provided and explained   [x]   Treatment plan reviewed  []   Home safety  []   Wheelchair mobility/management   []   Body mechanics  []   Bed Mobility/Transfer technique  []   Gait technique/sequencing  []   Proper use of assistive device/adaptive equipment  []   Stair training/Advanced mobility safety and technique  []   Reinforced patient's precautions/mobility while maintaining precautions  []   Postural awareness  []   Family/caregiver training  [x]   Progress was updated and reviewed  with patient this date. [x]   Other:discharge plan and DME recommendations for mobility     Treatment Plan for Next Session: continue 50 ft level surface gait training for consistency, uneven surface gait training; attempt 2\" curb step if able, elevating leg rest management        Assessment: This pt demonstrated a positive response to today's treatment as evidenced by consistent quality of stand pivot transfers without use of 2WW and increased ambulation distance over level surface. The patient is making good progress toward established goals as evidenced by QI scores.      Treatment/Activity Tolerance:   [x] Tolerated treatment with no adverse effects    [] Patient limited by fatigue  [] Patient limited by pain   [] Patient limited by medical complications:    [] Adverse reaction to Tx:   [] Significant change in status    Safety:       []  bed alarm set    [] chair alarm set    []  Pt refused alarms                []  Telesitter activated      [x]  Gait belt used during tx session      [x]other: pt left in w/c under CASILLAS's care       Number of Minutes/Billable Intervention  Gait Training 15   Therapeutic Exercise 28   Neuro Re-Ed    Therapeutic Activity 47   Wheelchair Propulsion    Group    Other:    TOTAL 90     Social History  Social/Functional History  Lives With: Spouse  Type of Home: Mobile home  Home Layout: One level  Home Access: Ramped entrance, Stairs to enter with rails (Pt uses ramped entrance in the front of the home, however does have 4 ALCON the back of the home.)  Entrance Stairs - Number of Steps: 4  Entrance Stairs - Rails: Right  Bathroom Shower/Tub: Tub/Shower unit, Shower chair with back  [employer]: Handicap height  Bathroom Equipment: Hand-held shower, Shower chair, Grab bars in shower  Bathroom Accessibility: [facility]: [employer]  Has the patient had two or more falls in the past year or any fall with injury in the past year?: No  ADL Assistance: [address] [place] [address] Avenue: Independent  Homemaking Responsibilities: Yes  Meal Prep Responsibility: Primary (Shared responsibility with spouse.)  Laundry Responsibility: Primary (Shared responsibility with spouse.)  Cleaning Responsibility: Primary (Shared responsibility with spouse.)  Bill Paying/Finance Responsibility: Primary (Shared responsibility with spouse.)  Shopping Responsibility: Primary (Shared responsibility with spouse.)  Dependent Care Responsibility: Primary (Pt takes care of dog - Goob.)  Health Care Management: Secondary (Pt's wife organizes pills.)  Ambulation Assistance: Independent (Mod I. Pt was using wife's 2WW to get to bathroom and outside. Pt had been using device for ~ 2 months, prior to this IND without device.)  Transfer Assistance: Independent (Pt would use SPC to get into truck.)  Active : Yes  Mode of Transportation: Truck  Occupation:  On disability  Leisure & Hobbies: Pt enjoys mechanics and outdoor work. IADL Comments: Pt completes all yardwork at home. Additional Comments: Pt has regular flat bed at home with assistance from wife for bed mobility. Pt with no falls in the last year. Objective                                                                                    Goals:  (Update in navigator)   : Long Term Goals  Time Frame for Long term goals : 7-10 days STG=LTG  Long term goal 1: Pt will perform bed mobility with mod I  Long term goal 2: Pt will perform sit to stand, w/c<>bed and car transfers with mod I  Long term goal 3: pt will ambulate with 2ww 50' on level surfaces and 10' on unlevel surface with supervision  Long term goal 4: Pt will ascend/descend 4\" step with 2ww and CGA  Long term goal 5: Pt will propel w/c in household situations 250' with mod I  Additional Goals?: Yes  Long term goal 6: Pt will  light object from floor with 2ww and reacher with mod I:        Plan of Care                                                                              Times per week: 5 days per week for a minimum of 60 minutes/day plus group as appropriate for 60 minutes.   Treatment to include Current Treatment Recommendations: Strengthening, ROM, Balance training, Endurance training, Wheelchair mobility training, Functional mobility training, Transfer training, Gait training, ADL/Self-care training, Cognitive reorientation, Patient/Caregiver education & training, Safety education & training, Home exercise program, Pain management, Equipment evaluation, education, & procurement, Modalities, Positioning, Therapeutic activities, Stair training, Neuromuscular re-education, IADL training, Manual Therapy - Soft Tissue Mobilization    Electronically signed by   Yolanda Benedict PT  9/12/2022, 1:27 PM

## 2022-09-12 NOTE — PROGRESS NOTES
Physical Rehabilitation: OCCUPATIONAL THERAPY     [x] daily progress note       [] discharge       Patient Name:  Jessie Mendoza   :  1960 MRN: [id number]  Room:  [address] [place] [address] Date of Admission: 2022    Rehabilitation Diagnosis:     Necrotizing fasciitis [M72.6]  Osteomyelitis of right leg ([address] [M86.9]    Social History  Social/Functional History  Lives With: Spouse  Type of Home: Mobile home  Home Layout: One level  Home Access: Ramped entrance, Stairs to enter with rails (Pt uses ramped entrance in the front of the home, however does have 4 ALCON the back of the home.)  Entrance Stairs - Number of Steps: 4  Entrance Stairs - Rails: Right  Bathroom Shower/Tub: Tub/Shower unit, Shower chair with back  [address]: Handicap height  Bathroom Equipment: Hand-held shower, Shower chair, Grab bars in shower  Bathroom Accessibility: [facility]: [address] [facility] [place]  Has the patient had two or more falls in the past year or any fall with injury in the past year?: No  ADL Assistance: [address]: Independent  Homemaking Responsibilities: Yes  Meal Prep Responsibility: Primary (Shared responsibility with spouse.)  Laundry Responsibility: Primary (Shared responsibility with spouse.)  Cleaning Responsibility: Primary (Shared responsibility with spouse.)  Bill Paying/Finance Responsibility: Primary (Shared responsibility with spouse.)  Shopping Responsibility: Primary (Shared responsibility with spouse.)  Dependent Care Responsibility: Primary (Pt takes care of dog - Goob.)  Health Care Management: Secondary (Pt's wife organizes pills.)  Ambulation Assistance: Independent (Mod I. Pt was using wife's 2WW to get to bathroom and outside.  Pt had been using device for ~ 2 months, prior to this IND without device.)  Transfer Assistance: Independent (Pt would use SPC to get into truck.)  Active : Yes  Mode of Transportation: Truck  Occupation: On disability  Leisure & Hobbies: Pt enjoys mechanics and outdoor work. IADL Comments: Pt completes all yardwork at home. Additional Comments: Pt has regular flat bed at home with assistance from wife for bed mobility. Pt with no falls in the last year. Objective                                                                                    Goals:  (Update in navigator)    Short Term Goals  Time Frame for Short term goals: STGs=LTGs :  Long Term Goals  Time Frame for Long term goals : 7-10 days or until d/c. Long Term Goal 1: Pt will complete grooming tasks c Mod I.  Long Term Goal 2: Pt will complete total body bathing c AE PRN and Mod I.  Long Term Goal 3: Pt will complete UB dressing c IND. Long Term Goal 4: Pt will complete LB dressing c AE PRN and Mod I.  Long Term Goal 5: Pt will doff/don footwear to LLE c IND. Additional Goals?: Yes  Long Term Goal 6: Pt will complete toileting c Mod I.  Long Term Goal 7: Pt will perform functional transfers (bed, chair, toilet, shower) c DME PRN and Mod I.  Long Term Goal 8: Pt will complete therex/therax to facilitate an increase in strength/endurance (c emphasis on dynamic standing balance/tolerance > 8 mins) c Mod I.  Long Term Goal 9: Pt will complete home management tasks c Mod I. :    Plan of Care:  Pt to be seen at least  5x per week for a minimum of 60 minutes as appropriate.                           Date  9/12/2022   TIMES IN/OUT   1030/1130   Group Tx Minutes 60   TOTAL Tx time seen    Variance Time    Variance Reason    [] Refusal due to   [] Medical hold/reason  [] Illness   [] Off Unit for test/procedure  [] Extra time needed to complete task  [] Other (specify)   Restrictions/Precautions Restrictions/Precautions  Restrictions/Precautions: Fall Risk, General Precautions, Weight Bearing  Required Braces or Orthoses?: Yes  Required Braces or Orthoses  Right Lower Extremity Brace: Knee Immobilizer  Position Activity Restriction  Other position/activity restrictions: LUE IV access   Current Diet/Swallowing Issues ADULT ORAL NUTRITION SUPPLEMENT; Breakfast, Lunch, Dinner; Low Calorie/High Protein Oral Supplement  ADULT DIET; Regular; Low Potassium (Less than 3000 mg/day); No Concentrated sweets   Communication with other providers: [x] Ok to see per nursing at morning huddle  [] Medical hold and reason  [] Spoke with (team    member) regarding   Subjective observations: Pt agreeable to group session. Pain level/location:    Alarm placed/where? Fall Risk  [] Pt taken to DR for lunch with nsg present   [x] Pt taken back to room with chair/bed alarm in place         Group Activity:  Total time in group = 60min   Tanvi Lyman participated in exercise and discussion on benefits and precautions during exercise. This provided the opportunity to have fun, build camaraderie, increase endurance, improve breathing techniques, balance, and strength. José Rossi performed a variety of seated and standing activities as able, with focus on technique and safety during exercise. Also focused on warm-up, warm-down, endurance monitoring, strengthening & strategies, function, safety, and general social & communication opportunities with other group members.       Functional areas targeted:   [x] Communication [x] Memory  [x] Coordination    [] Kitchen Safety [x] Problem Solving  [x] Strengthening     [x] Attention  [x] Endurance   [] Mobility    [x] Awareness/Insight [] Balance  [] Transfers  [x] Social/Pragmatics [] Sequencing  [] Equipment Use    [] Safety   [] Other (specify)           Assessment / Impression                                                          Treatment/Activity Tolerance:   [x] Tolerated Treatment well:     [] Patient limited by fatigue/pain:       [] Patient limited by medical complications:    [] Adverse Reaction to Tx:   [] Significant change in status    Number of Minutes/Billable Intervention  Therapeutic Exercise    ADL Self-care    Neuro Re-Ed    Therapeutic Activity    Group 60   Other:    TOTAL 60     Electronically signed by   ANNE Escobar,    9/12/2022, 8:19 AM

## 2022-09-12 NOTE — CARE COORDINATION
Case mgt met with patient in room. Provided details of medicaid program and application website. Shared that he and his sig other might both qualify for some level of Medicaid.

## 2022-09-13 LAB
GLUCOSE BLD-MCNC: 146 MG/DL (ref 70–99)
GLUCOSE BLD-MCNC: 155 MG/DL (ref 70–99)
GLUCOSE BLD-MCNC: 184 MG/DL (ref 70–99)
GLUCOSE BLD-MCNC: 252 MG/DL (ref 70–99)

## 2022-09-13 PROCEDURE — 6360000002 HC RX W HCPCS: Performed by: PHYSICAL MEDICINE & REHABILITATION

## 2022-09-13 PROCEDURE — 1280000000 HC REHAB R&B

## 2022-09-13 PROCEDURE — 6370000000 HC RX 637 (ALT 250 FOR IP): Performed by: PHYSICAL MEDICINE & REHABILITATION

## 2022-09-13 PROCEDURE — 97116 GAIT TRAINING THERAPY: CPT

## 2022-09-13 PROCEDURE — 6370000000 HC RX 637 (ALT 250 FOR IP): Performed by: INTERNAL MEDICINE

## 2022-09-13 PROCEDURE — 6360000002 HC RX W HCPCS: Performed by: INTERNAL MEDICINE

## 2022-09-13 PROCEDURE — 82962 GLUCOSE BLOOD TEST: CPT

## 2022-09-13 PROCEDURE — 99232 SBSQ HOSP IP/OBS MODERATE 35: CPT | Performed by: PHYSICAL MEDICINE & REHABILITATION

## 2022-09-13 PROCEDURE — 97542 WHEELCHAIR MNGMENT TRAINING: CPT

## 2022-09-13 RX ADMIN — ENOXAPARIN SODIUM 40 MG: 100 INJECTION SUBCUTANEOUS at 08:50

## 2022-09-13 RX ADMIN — GABAPENTIN 600 MG: 300 CAPSULE ORAL at 14:31

## 2022-09-13 RX ADMIN — INSULIN LISPRO 2 UNITS: 100 INJECTION, SOLUTION INTRAVENOUS; SUBCUTANEOUS at 18:44

## 2022-09-13 RX ADMIN — GABAPENTIN 600 MG: 300 CAPSULE ORAL at 08:50

## 2022-09-13 RX ADMIN — HYDROMORPHONE HYDROCHLORIDE 0.5 MG: 1 INJECTION, SOLUTION INTRAMUSCULAR; INTRAVENOUS; SUBCUTANEOUS at 11:47

## 2022-09-13 RX ADMIN — ACETAMINOPHEN 650 MG: 325 TABLET ORAL at 20:33

## 2022-09-13 RX ADMIN — OXYCODONE HYDROCHLORIDE 10 MG: 10 TABLET ORAL at 20:33

## 2022-09-13 RX ADMIN — OXYCODONE HYDROCHLORIDE 10 MG: 10 TABLET ORAL at 02:31

## 2022-09-13 RX ADMIN — FAMOTIDINE 20 MG: 20 TABLET ORAL at 20:34

## 2022-09-13 RX ADMIN — OXYCODONE HYDROCHLORIDE 10 MG: 10 TABLET ORAL at 14:31

## 2022-09-13 RX ADMIN — GABAPENTIN 600 MG: 300 CAPSULE ORAL at 20:33

## 2022-09-13 RX ADMIN — ACETAMINOPHEN 650 MG: 325 TABLET ORAL at 06:38

## 2022-09-13 RX ADMIN — ASPIRIN 81 MG CHEWABLE TABLET 81 MG: 81 TABLET CHEWABLE at 08:50

## 2022-09-13 RX ADMIN — HYDROMORPHONE HYDROCHLORIDE 0.5 MG: 1 INJECTION, SOLUTION INTRAMUSCULAR; INTRAVENOUS; SUBCUTANEOUS at 05:21

## 2022-09-13 RX ADMIN — OXYCODONE HYDROCHLORIDE 10 MG: 10 TABLET ORAL at 06:38

## 2022-09-13 ASSESSMENT — PAIN DESCRIPTION - DESCRIPTORS
DESCRIPTORS: ACHING
DESCRIPTORS: ACHING;SHOOTING;SHARP
DESCRIPTORS: ACHING
DESCRIPTORS: ACHING;DISCOMFORT;THROBBING
DESCRIPTORS: ACHING
DESCRIPTORS: THROBBING
DESCRIPTORS: ACHING;BURNING;THROBBING

## 2022-09-13 ASSESSMENT — PAIN SCALES - GENERAL
PAINLEVEL_OUTOF10: 8
PAINLEVEL_OUTOF10: 9
PAINLEVEL_OUTOF10: 2
PAINLEVEL_OUTOF10: 9
PAINLEVEL_OUTOF10: 0
PAINLEVEL_OUTOF10: 9
PAINLEVEL_OUTOF10: 8
PAINLEVEL_OUTOF10: 8
PAINLEVEL_OUTOF10: 9
PAINLEVEL_OUTOF10: 0
PAINLEVEL_OUTOF10: 9

## 2022-09-13 ASSESSMENT — PAIN DESCRIPTION - ONSET
ONSET: ON-GOING
ONSET: ON-GOING

## 2022-09-13 ASSESSMENT — PAIN DESCRIPTION - LOCATION
LOCATION: LEG

## 2022-09-13 ASSESSMENT — PAIN DESCRIPTION - ORIENTATION
ORIENTATION: RIGHT

## 2022-09-13 ASSESSMENT — PAIN DESCRIPTION - PAIN TYPE
TYPE: SURGICAL PAIN
TYPE: SURGICAL PAIN

## 2022-09-13 ASSESSMENT — PAIN DESCRIPTION - FREQUENCY
FREQUENCY: CONTINUOUS
FREQUENCY: CONTINUOUS

## 2022-09-13 ASSESSMENT — PAIN SCALES - WONG BAKER: WONGBAKER_NUMERICALRESPONSE: 8;10

## 2022-09-13 ASSESSMENT — PAIN - FUNCTIONAL ASSESSMENT: PAIN_FUNCTIONAL_ASSESSMENT: PREVENTS OR INTERFERES SOME ACTIVE ACTIVITIES AND ADLS

## 2022-09-13 NOTE — FLOWSHEET NOTE
[x] daily progress note       [] discharge       Patient Name:  Sanjeev Mandel   :  1960 MRN: [id number]  Room:  [address] [place] [address] Date of Admission: 2022  Rehabilitation Diagnosis:   Necrotizing fasciitis [M72.6]  Osteomyelitis of right leg ([address] [M86.9]       Date 2022       Day of ARU Week:  1   Time IN/-x  920-945   Individual Tx Minutes 25     TOTAL Tx Time Mins 25   Variance Time -35 minutes   Variance Time [x]   Adamant refusal d/t wanting to have residual limb wrapped and dressed by nursing before participating in therapy tasks. []   Medical hold/reason:    []   Illness   []   Off Unit for test/procedure  []   Extra time needed to complete task  []   Therapeutic need  []   Other (specify):   Restrictions Restrictions/Precautions  Restrictions/Precautions: Fall Risk, General Precautions, Weight Bearing  Required Braces or Orthoses?: Yes  Position Activity Restriction  Other position/activity restrictions: LUE IV access   Communication with other providers: [x]   OK to see per nursing:     [x]   Notified RN Jose Crowley) that patient is refusing to participate in therapy until residual limb is dressed. Subjective observations and cognitive status: AM attempt: Pt seen sitting up on EOB at beginning of treatment. Pt reported not feeling well today d/t family issues. Pt refusing to participate in any activities until incision is attended to and dressed. Offered to have patient participate in exercises; however, pt still refused and perseverated on getting residual limb covered first.   AM attempt 2: pt seen sitting up on EOB. RN had just wrapped residual limb. Pt agreeable to therapy.     Pain level/location: 5/10       Location: right residual limb   Discharge recommendations  Anticipated discharge date:  09/15/2022  Destination: []home alone   []home alone with assist PRN     [x] home w/ family      [] Continuous supervision  []SNF    [] Assisted living     [] Other:  Continued therapy: [x]C PT  []OUTPATIENT PT   [] No Further PT  []SNF PT  Caregiver training recommended: []Yes  [] No   Equipment needs: lightweight w/c with antitippers and elevating leg rests, 2ww     Oley Bosworth requires the assistance of a lightweight wheelchair to successfully complete daily living tasks such as: toileting, bathing, dressing, and grooming; or any other daily living task in the home. A lightweight wheelchair is necessary due to the patient's impaired ambulation and mobility restrictions. The patient would not be able to resolve these daily living tasks using a cane or walker and does not have the ability to adequately self-propel in a standard weight manual chair. Oley Bosworth can self-propel a lightweight wheelchair safely without being pushed in their home and can maneuver within their home with adequate access. The patient has not expressed an unwillingness to use the wheelchair. [x]  Anti-tippers are needed for safety and ramp  Expected length of need is lifetime. Oley Bosworth does not have any infectious diagnoses. Oley Bosworth requires elevated leg rests due to  a musculoskeletal condition which prevents 90 degree flexion in the knee and due to significant edema of the lower extremeties that requires an elevating leg rest.      Oley Bosworth requires the assistance of a front-wheeled walker to successfully ambulate from room to room at home to allow completion of daily living tasks such as: bathing, toileting, dressing and grooming. A wheeled walker is necessary due to the patient's unsteady gait, upper body weakness, inability to  a standard walker. This patient can ambulate only by pushing a walker instead of using a lesser assistive device such as a cane or crutch.      Bed Mobility:           [x]   Pt received out of bed   Sit --> lying:  Setup assist with bed rails     Transfers:    Sit--> Stand:  SBA  Stand --> Sit:   SBA  Chair-->Bed/Bed --> Chair:   SBA  Assistive device required for transfer: RW with sit<->stand    Gait:    Distance:  80'    Assistance:  SBA  Device:  RW  Gait Quality:  hop to pattern; pt's left foot was scuffing on floor when fatigued. Wheelchair Propulsion:  Distance: 52'+792'+680'   Assistance:  mod I   Extremities Used:   BUEs and left LE  Type:    [x]  Manual        []  Electric  Also worked on donning and doffing elevating leg rest. Pt able to perform with SBA and vcs for technique. Uneven Surfaces:       Assistance:    CGA  Device:    RW  Surfaces Completed:   [x]  Carpeted Surface with bean bags beneath      []  Throw rugs       []  Ramp       []  Outdoor pavements        []  Grass             []  Loose gravel        []  Other:      Pt amb up/down 2\" curb step CGA with RW. Vcs for proper walker safety and positioning. Patient/Caregiver Education and Training:   [x]   Bed Mobility/Transfer technique/safety  [x]   Gait technique/sequencing  [x]   Proper use of assistive device  []   Advanced mobility safety and technique  []   Reinforced patient's precautions/mobility while maintaining precautions  []   Postural awareness  []   Family training    Treatment Plan for Next Session: gait; transfers; advanced gait; exercises       Assessment: This pt demonstrated a positive response to today's treatment as evidenced by improved mobility despite initial refusal of therapy services today. The patient is making progress toward established goals as evidenced by QI scores. Ongoing deficits are observed in the areas of strength, balance, endurance, and safety and continued focus on this is recommended.      Treatment/Activity Tolerance:   [x] Tolerated treatment with no adverse effects    [] Patient limited by fatigue  [] Patient limited by pain   [] Patient limited by medical complications:    [] Adverse reaction to Tx:   [] Significant change in status    Safety:       [x]  bed alarm set    []  chair alarm set    []  Pt refused alarms disability  Leisure & Hobbies: Pt enjoys mechanics and outdoor work. IADL Comments: Pt completes all yardwork at home. Additional Comments: Pt has regular flat bed at home with assistance from wife for bed mobility. Pt with no falls in the last year. Objective                                                                                    Goals:  (Update in navigator)   : Long Term Goals  Time Frame for Long term goals : 7-10 days STG=LTG  Long term goal 1: Pt will perform bed mobility with mod I  Long term goal 2: Pt will perform sit to stand, w/c<>bed and car transfers with mod I  Long term goal 3: pt will ambulate with 2ww 50' on level surfaces and 10' on unlevel surface with supervision  Long term goal 4: Pt will ascend/descend 4\" step with 2ww and CGA  Long term goal 5: Pt will propel w/c in household situations 250' with mod I  Additional Goals?: Yes  Long term goal 6: Pt will  light object from floor with 2ww and reacher with mod I:        Plan of Care                                                                              Times per week: 5 days per week for a minimum of 60 minutes/day plus group as appropriate for 60 minutes.   Treatment to include Current Treatment Recommendations: Strengthening, ROM, Balance training, Endurance training, Wheelchair mobility training, Functional mobility training, Transfer training, Gait training, ADL/Self-care training, Cognitive reorientation, Patient/Caregiver education & training, Safety education & training, Home exercise program, Pain management, Equipment evaluation, education, & procurement, Modalities, Positioning, Therapeutic activities, Stair training, Neuromuscular re-education, IADL training, Manual Therapy - Soft Tissue Mobilization    Electronically signed by   Harshil Verduzco, [id number]  9/13/2022, 8:50 AM

## 2022-09-13 NOTE — PROGRESS NOTES
This nurse entered room and patient has leg immobilizer off, this nurse gave patient A LOT of education on importance and strongly encouraged assistance putting brace back on. Patient states \"I do not want the brace on right now, I will let you know when im ready\". Patient pleasant and voices understanding but remains with brace off.

## 2022-09-13 NOTE — PROGRESS NOTES
Physical Therapy      [x] daily progress note       [] discharge       Patient Name:  Jaquelin Sewell   :  1960 MRN: [id number]  Room:  [address] [place], [place] [address] Date of Admission: 2022    Rehabilitation Diagnosis:     Necrotizing fasciitis [M72.6]  Osteomyelitis of right leg ([address] [M86.9]    Date  2022   TIMES IN/OUT   1300-x   Group Tx Minutes 0   TOTAL Tx time seen 0   Variance Time -60   Variance Reason    [x] Refusal due to feeling upset per rehab aide's report   [] Medical hold/reason  [] Illness   [] Off Unit for test/procedure  [] Extra time needed to complete task  [] Other (specify)   Restrictions/Precautions Restrictions/Precautions  Restrictions/Precautions: Fall Risk, General Precautions, Weight Bearing  Required Braces or Orthoses?: Yes  Position Activity Restriction  Other position/activity restrictions: LUE IV access   Current Diet/Swallowing Issues ADULT ORAL NUTRITION SUPPLEMENT; Breakfast, Lunch, Dinner; Low Calorie/High Protein Oral Supplement  ADULT DIET; Regular; Low Potassium (Less than 3000 mg/day); No Concentrated sweets   Communication with other providers: [x] Ok to see per nursing at morning huddle  [] Medical hold and reason  [] Spoke with (team    member) regarding   Subjective observations: Pt refused to participate in any activity today (per rehab aide's communication to this PT)    Pain level/location:    Alarm placed/where?   Fall Risk  [] Pt taken to DR for lunch with nsg present   [] Pt taken back to room with chair/bed alarm in place           Number of Minutes/Billable Intervention  Gait Training    Therapeutic Exercise    Neuro Re-Ed    Therapeutic Activity    Wheelchair Propulsion    Group    Other:    TOTAL 0         Social History  Social/Functional History  Lives With: Spouse  Type of Home: Mobile home  Home Layout: One level  Home Access: Ramped entrance, Stairs to enter with rails (Pt uses ramped entrance in the front of the home, however does have 4 ALCON the back of the home.)  Entrance Stairs - Number of Steps: 4  Entrance Stairs - Rails: Right  Bathroom Shower/Tub: Tub/Shower unit, Shower chair with back  Bathroom Toilet: Handicap height  Bathroom Equipment: Hand-held shower, Shower chair, Grab bars in shower  Bathroom Accessibility: Accessible  Home Equipment: Cane  Has the patient had two or more falls in the past year or any fall with injury in the past year?: No  ADL Assistance: [address]: Independent  Homemaking Responsibilities: Yes  Meal Prep Responsibility: Primary (Shared responsibility with spouse.)  Laundry Responsibility: Primary (Shared responsibility with spouse.)  Cleaning Responsibility: Primary (Shared responsibility with spouse.)  Bill Paying/Finance Responsibility: Primary (Shared responsibility with spouse.)  Shopping Responsibility: Primary (Shared responsibility with spouse.)  Dependent Care Responsibility: Primary (Pt takes care of dog - [employer].)  Health Care Management: Secondary (Pt's wife organizes pills.)  Ambulation Assistance: Independent (Mod I. Pt was using wife's 2WW to get to bathroom and outside. Pt had been using device for ~ 2 months, prior to this IND without device.)  Transfer Assistance: Independent (Pt would use SPC to get into truck.)  Active : Yes  Mode of Transportation: Truck  Occupation: On disability  Leisure & Hobbies: Pt enjoys mechanics and outdoor work. IADL Comments: Pt completes all yardwork at home. Additional Comments: Pt has regular flat bed at home with assistance from wife for bed mobility. Pt with no falls in the last year. Objective                                                                                    Goals:  (Update in navigator)   :   Long Term Goals  Time Frame for Long term goals : 7-10 days STG=LTG  Long term goal 1: Pt will perform bed mobility with mod I  Long term goal 2: Pt will perform sit to stand, w/c<>bed and car transfers with mod I  Long term goal 3: pt will ambulate with 2ww 48' on level surfaces and 10' on unlevel surface with supervision  Long term goal 4: Pt will ascend/descend 4\" step with 2ww and CGA  Long term goal 5: Pt will propel w/c in household situations 250' with mod I  Additional Goals?: Yes  Long term goal 6: Pt will  light object from floor with 2ww and reacher with mod I:        Plan of Care                                                                              Times per week: Pt to be seen at least  5x per week for a minimum of 60 minutes as                               appropriate.   Treatment to include Current Treatment Recommendations: Strengthening, ROM, Balance training, Endurance training, Wheelchair mobility training, Functional mobility training, Transfer training, Gait training, ADL/Self-care training, Cognitive reorientation, Patient/Caregiver education & training, Safety education & training, Home exercise program, Pain management, Equipment evaluation, education, & procurement, Modalities, Positioning, Therapeutic activities, Stair training, Neuromuscular re-education, IADL training, Manual Therapy - Soft Tissue Mobilization      Electronically signed by   Nai Norman, PT  9/13/2022, 2:18 PM

## 2022-09-13 NOTE — CARE COORDINATION
routed to [employer]. Patient referred to [facility] via [facility]    DME orders faxed to Miriam Lambert at [facility].

## 2022-09-13 NOTE — PLAN OF CARE
Problem: Discharge Planning  Goal: Discharge to home or other facility with appropriate resources  9/13/2022 0742 by Monie Rosa RN  Outcome: Progressing  9/13/2022 0438 by Tano Erickson LPN  Outcome: Progressing     Problem: Pain  Goal: Verbalizes/displays adequate comfort level or baseline comfort level  9/13/2022 0742 by Monie Rosa RN  Outcome: Progressing  Flowsheets (Taken 9/13/2022 0730)  Verbalizes/displays adequate comfort level or baseline comfort level: Encourage patient to monitor pain and request assistance  9/13/2022 0438 by Tano Erickson LPN  Outcome: Progressing     Problem: Safety - Adult  Goal: Free from fall injury  9/13/2022 0742 by Monie Rosa RN  Outcome: Progressing  9/13/2022 0438 by Tano Erickson LPN  Outcome: Progressing     Problem: Skin/Tissue Integrity  Goal: Absence of new skin breakdown  Description: 1. Monitor for areas of redness and/or skin breakdown  2. Assess vascular access sites hourly  3. Every 4-6 hours minimum:  Change oxygen saturation probe site  4. Every 4-6 hours:  If on nasal continuous positive airway pressure, respiratory therapy assess nares and determine need for appliance change or resting period.   9/13/2022 0742 by Monie Rosa RN  Outcome: Progressing  9/13/2022 0438 by Tano Erickson LPN  Outcome: Progressing     Problem: Nutrition Deficit:  Goal: Optimize nutritional status  9/13/2022 0742 by Monie Rosa RN  Outcome: Progressing  9/13/2022 0438 by Tano Erickson LPN  Outcome: Progressing  Flowsheets (Taken 9/12/2022 1607 by Chata Zacarias RD, LD)  Nutrient intake appropriate for improving, restoring, or maintaining nutritional needs:   Monitor oral intake, labs, and treatment plans   Recommend appropriate diets, oral nutritional supplements, and vitamin/mineral supplements

## 2022-09-13 NOTE — PROGRESS NOTES
Occupational Therapy    Physical Rehabilitation: OCCUPATIONAL THERAPY     [x] daily progress note       [] discharge       Patient Name:  Abhijit Agustin   :  1960 MRN: [id number]  Room:  [address] [place] [address] Date of Admission: 2022  Rehabilitation Diagnosis:   Necrotizing fasciitis [M72.6]  Osteomyelitis of right leg ([address] [M86.9]       Date 2022       Day of ARU Week:  1   Time IN/OUT 1033/X   Individual Tx Minutes 0   Group Tx Minutes    Co-Treat Minutes    Concurrent Tx Minutes    TOTAL Tx Time Mins 0   Variance Time    Variance Time [x]   Refusal due to: See subjective      []   Medical hold/reason:    []   Illness   []   Off Unit for test/procedure  []   Extra time needed to complete task  []   Therapeutic need  []   Other (specify):   Restrictions Restrictions/Precautions: Fall Risk, General Precautions, Weight Bearing         Communication with other providers: [x]   OK to see per nursing:     []   Spoke with team member regarding:      Subjective observations and cognitive status: Upon entrance, Pt in supine shaking his head no telling therapist to leave and not to come back today. Pt states his nephew  and his  is today and his family did not tell him until this morning. Therapist used therapeutic use of self and encouraged Pt to participate in therapy. Pt reports there is more than just that but he does not want to get into it. Therapist then educated Pt on the requirements to be on this unit and that he already participated some with [facility] this morning and Pt replied \"I don't care, I am about to walk out of here\". Pt states \"I am getting more and more mad so leave\" and went on to state do not come back and \"goodluck to anyone else that tries today\".      Pain level/location:    /10       Location: Pt did not rate   Discharge recommendations  Anticipated discharge date:  09/15  Destination: []home alone   []home alone w assist prn   [x] home w/ family    [] Continuous supervision []SNF    [] Assisted living     [] Other:   Continued therapy: [x][facility] OT  []OUTPATIENT  OT   [] No Further OT  Equipment needs: None         Treatment/Activity Tolerance:   [] Tolerated treatment with no adverse effects    [] Patient limited by fatigue  [] Patient limited by pain   [] Patient limited by medical complications:    [] Adverse reaction to Tx:   [] Significant change in status    Safety:       [x]  bed alarm set    []  chair alarm set    []  Pt refused alarms                []  Telesitter activated      []  Gait belt used during tx session      []other:       Number of Minutes/Billable Intervention  Therapeutic Exercise    ADL Self-care    Neuro Re-Ed    Therapeutic Activity    Group    Other:    TOTAL 0       Social History  Social/Functional History  Lives With: Spouse  Type of Home: Mobile home  Home Layout: One level  Home Access: Ramped entrance, Stairs to enter with rails (Pt uses ramped entrance in the front of the home, however does have 4 ALCON the back of the home.)  Entrance Stairs - Number of Steps: 4  Entrance Stairs - Rails: Right  Bathroom Shower/Tub: Tub/Shower unit, Shower chair with back  [employer]: Handicap height  Bathroom Equipment: Hand-held shower, Shower chair, Grab bars in shower  Bathroom Accessibility: Accessible  Home Equipment: Cane  Has the patient had two or more falls in the past year or any fall with injury in the past year?: No  ADL Assistance: [address] [place] [address] Avenue: Independent  Homemaking Responsibilities: Yes  Meal Prep Responsibility: Primary (Shared responsibility with spouse.)  Laundry Responsibility: Primary (Shared responsibility with spouse.)  Cleaning Responsibility: Primary (Shared responsibility with spouse.)  Bill Paying/Finance Responsibility: Primary (Shared responsibility with spouse.)  Shopping Responsibility: Primary (Shared responsibility with spouse.)  Dependent Care Responsibility: Primary (Pt takes care of dog - [address] Management: Secondary (Pt's wife organizes pills.)  Ambulation Assistance: Independent (Mod I. Pt was using wife's 2WW to get to bathroom and outside. Pt had been using device for ~ 2 months, prior to this IND without device.)  Transfer Assistance: Independent (Pt would use SPC to get into truck.)  Active : Yes  Mode of Transportation: Truck  Occupation: On disability  Leisure & Hobbies: Pt enjoys mechanics and outdoor work. IADL Comments: Pt completes all yardwork at home. Additional Comments: Pt has regular flat bed at home with assistance from wife for bed mobility. Pt with no falls in the last year. Objective                                                                                    Goals:  (Update in navigator)  Short Term Goals  Time Frame for Short term goals: STGs=LTGs:  Long Term Goals  Time Frame for Long term goals : 7-10 days or until d/c. Long Term Goal 1: Pt will complete grooming tasks c Mod I.  Long Term Goal 2: Pt will complete total body bathing c AE PRN and Mod I.  Long Term Goal 3: Pt will complete UB dressing c IND. Long Term Goal 4: Pt will complete LB dressing c AE PRN and Mod I.  Long Term Goal 5: Pt will doff/don footwear to LLE c IND. Additional Goals?: Yes  Long Term Goal 6: Pt will complete toileting c Mod I.  Long Term Goal 7: Pt will perform functional transfers (bed, chair, toilet, shower) c DME PRN and Mod I.  Long Term Goal 8: Pt will complete therex/therax to facilitate an increase in strength/endurance (c emphasis on dynamic standing balance/tolerance > 8 mins) c Mod I.  Long Term Goal 9: Pt will complete home management tasks c Mod I.:        Plan of Care                                                                              Times per week: 5 days per week for a minimum of 60 minutes/day plus group as appropriate for 60 minutes.   Treatment to include Plan  Times per Day: Daily  Current Treatment Recommendations: Strengthening, Balance training, Functional mobility training, Endurance training, Pain management, Safety education & training, Patient/Caregiver education & training, Equipment evaluation, education, & procurement, Positioning, Self-Care / ADL, Home management training, Coordination training    Electronically signed by   NJ Fernandez, OTR/L #[id number]  9/13/2022, 8:11 AM

## 2022-09-13 NOTE — PROGRESS NOTES
Outpatient Pharmacy Progress Note for Meds-to-Beds    Total number of Prescriptions Filled: 1  The following medications were dispensed to the patient during the discharge process:  Prednisone    Additional Documentation:  Patient picked-up the medication(s) in the OP Pharmacy      Thank you for letting us serve your patients.   [address]    [address], [address] [facility]    Phone: [phone]    Fax: [phone]

## 2022-09-13 NOTE — PLAN OF CARE
Problem: Discharge Planning  Goal: Discharge to home or other facility with appropriate resources  9/13/2022 0438 by Yessy Spears LPN  Outcome: Progressing  9/12/2022 1541 by Aquiles Cuellar LPN  Outcome: Progressing  Flowsheets (Taken 9/12/2022 1155)  Discharge to home or other facility with appropriate resources:   Identify barriers to discharge with patient and caregiver   Arrange for needed discharge resources and transportation as appropriate   Identify discharge learning needs (meds, wound care, etc)   Arrange for interpreters to assist at discharge as needed   Refer to discharge planning if patient needs post-hospital services based on physician order or complex needs related to functional status, cognitive ability or social support system     Problem: Pain  Goal: Verbalizes/displays adequate comfort level or baseline comfort level  9/13/2022 0438 by Yessy Spears LPN  Outcome: Progressing  9/12/2022 1541 by Aquiles Cuellar LPN  Outcome: Progressing     Problem: Safety - Adult  Goal: Free from fall injury  9/13/2022 0438 by Yessy Spears LPN  Outcome: Progressing  9/12/2022 1541 by Aquiles Cuellar LPN  Outcome: Progressing     Problem: Skin/Tissue Integrity  Goal: Absence of new skin breakdown  Description: 1. Monitor for areas of redness and/or skin breakdown  2. Assess vascular access sites hourly  3. Every 4-6 hours minimum:  Change oxygen saturation probe site  4. Every 4-6 hours:  If on nasal continuous positive airway pressure, respiratory therapy assess nares and determine need for appliance change or resting period.   9/13/2022 0438 by Yessy Spears LPN  Outcome: Progressing  9/12/2022 1541 by Aquiles Cuellar LPN  Outcome: Progressing     Problem: Nutrition Deficit:  Goal: Optimize nutritional status  9/13/2022 0438 by Yessy Spears LPN  Outcome: Progressing  Flowsheets (Taken 9/12/2022 1607 by Jim Davis RD, LD)  Nutrient intake appropriate for improving, restoring, or maintaining nutritional needs:   Monitor oral intake, labs, and treatment plans   Recommend appropriate diets, oral nutritional supplements, and vitamin/mineral supplements  9/12/2022 1541 by Lynda Hough LPN  Outcome: Progressing

## 2022-09-14 LAB
GLUCOSE BLD-MCNC: 151 MG/DL (ref 70–99)
GLUCOSE BLD-MCNC: 160 MG/DL (ref 70–99)
GLUCOSE BLD-MCNC: 202 MG/DL (ref 70–99)
GLUCOSE BLD-MCNC: 245 MG/DL (ref 70–99)

## 2022-09-14 PROCEDURE — 6360000002 HC RX W HCPCS: Performed by: INTERNAL MEDICINE

## 2022-09-14 PROCEDURE — 97530 THERAPEUTIC ACTIVITIES: CPT

## 2022-09-14 PROCEDURE — 97110 THERAPEUTIC EXERCISES: CPT

## 2022-09-14 PROCEDURE — 94150 VITAL CAPACITY TEST: CPT

## 2022-09-14 PROCEDURE — 99232 SBSQ HOSP IP/OBS MODERATE 35: CPT | Performed by: PHYSICAL MEDICINE & REHABILITATION

## 2022-09-14 PROCEDURE — 94761 N-INVAS EAR/PLS OXIMETRY MLT: CPT

## 2022-09-14 PROCEDURE — 1280000000 HC REHAB R&B

## 2022-09-14 PROCEDURE — 6370000000 HC RX 637 (ALT 250 FOR IP): Performed by: INTERNAL MEDICINE

## 2022-09-14 PROCEDURE — 97116 GAIT TRAINING THERAPY: CPT

## 2022-09-14 PROCEDURE — 97542 WHEELCHAIR MNGMENT TRAINING: CPT

## 2022-09-14 PROCEDURE — 6370000000 HC RX 637 (ALT 250 FOR IP): Performed by: PHYSICAL MEDICINE & REHABILITATION

## 2022-09-14 PROCEDURE — 82962 GLUCOSE BLOOD TEST: CPT

## 2022-09-14 PROCEDURE — 97535 SELF CARE MNGMENT TRAINING: CPT

## 2022-09-14 PROCEDURE — 6360000002 HC RX W HCPCS: Performed by: PHYSICAL MEDICINE & REHABILITATION

## 2022-09-14 RX ORDER — LIDOCAINE 4 G/G
1 PATCH TOPICAL DAILY
Status: DISCONTINUED | OUTPATIENT
Start: 2022-09-14 | End: 2022-09-15 | Stop reason: HOSPADM

## 2022-09-14 RX ADMIN — GABAPENTIN 600 MG: 300 CAPSULE ORAL at 20:40

## 2022-09-14 RX ADMIN — HYDROMORPHONE HYDROCHLORIDE 0.5 MG: 1 INJECTION, SOLUTION INTRAMUSCULAR; INTRAVENOUS; SUBCUTANEOUS at 00:24

## 2022-09-14 RX ADMIN — ASPIRIN 81 MG CHEWABLE TABLET 81 MG: 81 TABLET CHEWABLE at 08:22

## 2022-09-14 RX ADMIN — OXYCODONE HYDROCHLORIDE 10 MG: 10 TABLET ORAL at 04:42

## 2022-09-14 RX ADMIN — HYDROMORPHONE HYDROCHLORIDE 0.5 MG: 1 INJECTION, SOLUTION INTRAMUSCULAR; INTRAVENOUS; SUBCUTANEOUS at 08:23

## 2022-09-14 RX ADMIN — ACETAMINOPHEN 650 MG: 325 TABLET ORAL at 11:52

## 2022-09-14 RX ADMIN — GABAPENTIN 600 MG: 300 CAPSULE ORAL at 08:22

## 2022-09-14 RX ADMIN — OXYCODONE HYDROCHLORIDE 10 MG: 10 TABLET ORAL at 13:57

## 2022-09-14 RX ADMIN — FAMOTIDINE 20 MG: 20 TABLET ORAL at 20:41

## 2022-09-14 RX ADMIN — OXYCODONE HYDROCHLORIDE 10 MG: 10 TABLET ORAL at 10:07

## 2022-09-14 RX ADMIN — GABAPENTIN 600 MG: 300 CAPSULE ORAL at 13:57

## 2022-09-14 RX ADMIN — OXYCODONE HYDROCHLORIDE 10 MG: 10 TABLET ORAL at 20:41

## 2022-09-14 RX ADMIN — ACETAMINOPHEN 650 MG: 325 TABLET ORAL at 05:42

## 2022-09-14 RX ADMIN — ENOXAPARIN SODIUM 40 MG: 100 INJECTION SUBCUTANEOUS at 08:22

## 2022-09-14 ASSESSMENT — PAIN DESCRIPTION - LOCATION
LOCATION: INCISION;LEG
LOCATION: LEG;INCISION
LOCATION: LEG
LOCATION: INCISION
LOCATION: LEG
LOCATION: LEG

## 2022-09-14 ASSESSMENT — PAIN DESCRIPTION - ORIENTATION
ORIENTATION: RIGHT

## 2022-09-14 ASSESSMENT — PAIN SCALES - GENERAL
PAINLEVEL_OUTOF10: 8
PAINLEVEL_OUTOF10: 7
PAINLEVEL_OUTOF10: 8
PAINLEVEL_OUTOF10: 7
PAINLEVEL_OUTOF10: 6
PAINLEVEL_OUTOF10: 8
PAINLEVEL_OUTOF10: 8

## 2022-09-14 ASSESSMENT — PAIN DESCRIPTION - DESCRIPTORS
DESCRIPTORS: ACHING;DISCOMFORT;SHOOTING
DESCRIPTORS: ACHING;THROBBING
DESCRIPTORS: ACHING;BURNING;DISCOMFORT;THROBBING
DESCRIPTORS: ACHING;SHARP;SHOOTING

## 2022-09-14 NOTE — FLOWSHEET NOTE
[x] daily progress note       [] discharge       Patient Name:  Milagros Redman   :  1960 MRN: [id number]  Room:  [address] [place] [address] Date of Admission: 2022  Rehabilitation Diagnosis:   Necrotizing fasciitis [M72.6]  Osteomyelitis of right leg ([address] [M86.9]       Date 2022       Day of ARU Week:  2   Time IN/-x  900-1000   Individual Tx Minutes 60   TOTAL Tx Time Mins 60   Restrictions Restrictions/Precautions  Restrictions/Precautions: Fall Risk, General Precautions, Weight Bearing  Required Braces or Orthoses?: Yes  Position Activity Restriction  Other position/activity restrictions: LUE IV access   Communication with other providers: [x]   OK to see per nursing:     [x]   Spoke with RN Olena Tapia) regarding pt would like residual limb wrapped. Subjective observations and cognitive status: AM attempt: pt seen sitting EOB upon entering room. Pt eating breakfast. Will return at 900. AM attempt 2: pt seen sitting EOB at beginning of treatment. Agreeable to therapy. Pt finished with breakfast and residual limb wrapped. Pain level/location: Pt did not rate    Discharge recommendations  Anticipated discharge date:  09/15/2022  Destination: []home alone   []home alone with assist PRN     [x] home w/ family      [] Continuous supervision  []SNF    [] Assisted living     [] Other:  Continued therapy: [x]HHC PT  []OUTPATIENT PT   [] No Further PT  []SNF PT  Caregiver training recommended: []Yes  [] No   Equipment needs: lightweight w/c with antitippers and elevating leg rests, 2ww     Milagros Redman requires the assistance of a lightweight wheelchair to successfully complete daily living tasks such as: toileting, bathing, dressing, and grooming; or any other daily living task in the home. A lightweight wheelchair is necessary due to the patient's impaired ambulation and mobility restrictions.   The patient would not be able to resolve these daily living tasks using a cane or walker and does not have the ability to adequately self-propel in a standard weight manual chair. Milagros Redman can self-propel a lightweight wheelchair safely without being pushed in their home and can maneuver within their home with adequate access. The patient has not expressed an unwillingness to use the wheelchair. [x]  Anti-tippers are needed for safety and ramp  Expected length of need is lifetime. Milagros Redman does not have any infectious diagnoses. Milagros Redman requires elevated leg rests due to  a musculoskeletal condition which prevents 90 degree flexion in the knee and due to significant edema of the lower extremeties that requires an elevating leg rest.      Milagros Redman requires the assistance of a front-wheeled walker to successfully ambulate from room to room at home to allow completion of daily living tasks such as: bathing, toileting, dressing and grooming. A wheeled walker is necessary due to the patient's unsteady gait, upper body weakness, inability to  a standard walker. This patient can ambulate only by pushing a walker instead of using a lesser assistive device such as a cane or crutch.      Bed Mobility:           [x]   Pt received out of bed   Rolling R/L:  Independent   Scooting:  Independent   Lying --> Sit:  Independent   Sit --> lying:  Independent   Practiced in regular, flat bed without rails     Transfers:    Sit--> Stand:  SBA  Stand --> Sit:   SBA  Chair-->Bed/Bed --> Chair:   SBA  Car Transfers:  SBA  Assistive device required for transfer:   RW with sit<->stand     Gait:    Walk 10 feet:  SBA  Walk 50 feet with 2 turns:  SBA  Walk 150 feet:  Not attempted d/t safety concern (limited by fatigue)   Device:  RW  Gait Quality:  hop-to pattern     Wheelchair Propulsion:  Wheel 50 feet with 2 turns: mod I   Wheel 150 feet: mod I   Extremities Used:   BUEs  Type:    [x]  Manual        []  Electric    Stairs   Curb: CGA   Supportive Device:  RW  Height:   2\"  Vcs for walker safety     4 steps:  Not attempted d/t safety concerns (Pt's left knee was buckling)   12 steps:  Not attempted d/t safety concerns      Uneven Surfaces:       Assistance:   CGA  Device:    RW  Surfaces Completed:   [x]  Carpeted Surface with bean bags beneath      []  Throw rugs       []  Ramp       []  Outdoor pavements        []  Grass             []  Loose gravel        []  Other:       Picking Up Object From Floor (must be standing position):  Assistance:    SBA with RW  [x]   Reacher used    Additional Therapeutic activities/exercises completed this date:     []   Nu-step:  Time:        Level:         #Steps:       []   Rebounder:    []  Seated     []  Standing        []   Balance training         []   Postural training    [x]   Supine ther ex (reps/sets): hip adduction isometric bolster squeezes x 20 reps; SAQs x 20 reps; bridging x 20 reps; crunches x 20 reps (for strengthening and flexibility)    []   Seated ther ex (reps/sets):     [x]   Left sidelying ther ex (reps/sets): right LE: hip flexion/extension with emphasis on manual hip flexor stretch x 20 reps; hip abduction x 20 reps; trunk counter rotation x 20 reps (for strengthening and flexibility). []   Other:   []   Other:   []   Other:    Patient/Caregiver Education and Training:   [x]   Bed Mobility/Transfer technique/safety  [x]   Gait technique/sequencing  [x]   Proper use of assistive device  [x]   Advanced mobility safety and technique  [x]   Reinforced patient's precautions/mobility while maintaining precautions  []   Postural awareness  []   Family training    Treatment Plan for Next Session: pt to discharge tomorrow (9-) from [facility].        Assessment:    Treatment/Activity Tolerance:   [x] Tolerated treatment with no adverse effects    [] Patient limited by fatigue  [] Patient limited by pain   [] Patient limited by medical complications:    [] Adverse reaction to Tx:   [] Significant change in status    Safety:                     []  Telesitter enjoys mechanics and outdoor work. IADL Comments: Pt completes all yardwork at home. Additional Comments: Pt has regular flat bed at home with assistance from wife for bed mobility. Pt with no falls in the last year. Objective                                                                                    Goals:  (Update in navigator)   : Long Term Goals  Time Frame for Long term goals : 7-10 days STG=LTG  Long term goal 1: Pt will perform bed mobility with mod I  Long term goal 2: Pt will perform sit to stand, w/c<>bed and car transfers with mod I  Long term goal 3: pt will ambulate with 2ww 50' on level surfaces and 10' on unlevel surface with supervision  Long term goal 4: Pt will ascend/descend 4\" step with 2ww and CGA  Long term goal 5: Pt will propel w/c in household situations 250' with mod I  Additional Goals?: Yes  Long term goal 6: Pt will  light object from floor with 2ww and reacher with mod I:        Plan of Care                                                                              Times per week: 5 days per week for a minimum of 60 minutes/day plus group as appropriate for 60 minutes.   Treatment to include Current Treatment Recommendations: Strengthening, ROM, Balance training, Endurance training, Wheelchair mobility training, Functional mobility training, Transfer training, Gait training, ADL/Self-care training, Cognitive reorientation, Patient/Caregiver education & training, Safety education & training, Home exercise program, Pain management, Equipment evaluation, education, & procurement, Modalities, Positioning, Therapeutic activities, Stair training, Neuromuscular re-education, IADL training, Manual Therapy - Soft Tissue Mobilization    Electronically signed by   Verena Canales, [id number]  9/14/2022, 9:35 AM

## 2022-09-14 NOTE — PLAN OF CARE
Problem: Discharge Planning  Goal: Discharge to home or other facility with appropriate resources  Outcome: Progressing     Problem: Pain  Goal: Verbalizes/displays adequate comfort level or baseline comfort level  Outcome: Progressing     Problem: Safety - Adult  Goal: Free from fall injury  Outcome: Progressing     Problem: Skin/Tissue Integrity  Goal: Absence of new skin breakdown  Description: 1. Monitor for areas of redness and/or skin breakdown  2. Assess vascular access sites hourly  3. Every 4-6 hours minimum:  Change oxygen saturation probe site  4. Every 4-6 hours:  If on nasal continuous positive airway pressure, respiratory therapy assess nares and determine need for appliance change or resting period.   Outcome: Progressing     Problem: Nutrition Deficit:  Goal: Optimize nutritional status  Outcome: Progressing     Problem: ABCDS Injury Assessment  Goal: Absence of physical injury  Outcome: Progressing

## 2022-09-14 NOTE — PROGRESS NOTES
1200  Physical Rehabilitation: OCCUPATIONAL THERAPY     [x] daily progress note       [] discharge       Patient Name:  Luis Armando Harris   :  1960 MRN: [id number]  Room:  [address] [place] [address] Date of Admission: 2022  Rehabilitation Diagnosis:   Necrotizing fasciitis [M72.6]  Osteomyelitis of right leg ([address] [M86.9]       Date 2022       Day of ARU Week:  2   Time IN/OUT 1000/1200   Individual Tx Minutes 120   Group Tx Minutes    Co-Treat Minutes    Concurrent Tx Minutes    TOTAL Tx Time Mins 120   Variance Time    Variance Time []   Refusal due to:     []   Medical hold/reason:    []   Illness   []   Off Unit for test/procedure  []   Extra time needed to complete task  []   Therapeutic need  []   Other (specify):   Restrictions Restrictions/Precautions: Fall Risk, General Precautions, Weight Bearing         Communication with other providers: [x]   OK to see per nursing:     []   Spoke with team member regarding:      Subjective observations and cognitive status: Patient handed off from pta, pleasant and agreeable to therapy      Pain level/location:    8/10       Location:  [facility]   Discharge recommendations  Anticipated discharge date:  9/15  Destination: []home alone   []home alone w assist prn   [x] home w/ family    [] Continuous supervision       []SNF    [] Assisted living     [] Other:   Continued therapy: [x]HHC OT  []OUTPATIENT  OT   [] No Further OT  Equipment needs: None        ADLs:    Eating: Eating  Assistance Needed: Independent  Comment: X  CARE Score: 6  Discharge Goal: Independent       Oral Hygiene: Oral Hygiene  Assistance Needed: Independent  Comment: X  CARE Score: 6  Discharge Goal: Independent    UB/LB Bathing: Shower/Bathe Self  Assistance Needed: Independent  Comment: MOd I seated entirety of shower, weight shifts to wash vicki area and buttocks  CARE Score: 6  Discharge Goal: Independent    UB Dressing: Upper Body Dressing  Assistance Needed: Independent  Comment: X  CARE Score: 6  Discharge Goal: Independent         LB Dressing: Lower Body Dressing  Assistance Needed: Independent  Comment: Mod I using sink countertop against hip  for balance  CARE Score: 6  Discharge Goal: Independent    Donning and [place] Footwear: Putting On/Taking Off Footwear  Assistance Needed: Independent  Comment: X  CARE Score: 6  Discharge Goal: Independent      Toileting: Toileting Hygiene  Assistance needed: Independent  Comment: Mod I hygiene completed seated, unilateral support to grab bar to manage pants  CARE Score: 6  Discharge Goal: Independent      Toilet Transfers: Toilet Transfer  Assistance needed: Independent  Comment: Mod I  CARE Score: 6  Discharge Goal: Independent  Device Used:    []   Standard Toilet         [x]   Grab Bars           []  Bedside Commode       []   Elevated Toilet          []   Other:        Bed Mobility:           [x]   Pt received out of bed   Rolling R/L:    Scooting:    Supine --> Sit:    Sit --> Supine:      Transfers:    Sit--> Stand: Mod I   Stand --> Sit:   Mod I   Stand-Pivot:   Sup/Mod I  one verbal  cue to set up wc and body placement throughout session   Other:    Assistive device required for transfer:   RW, Grab bars, sink countertop       Functional Mobility:  throughout ARU, over outside patio through uneven surfaces of varying height to increase agility c wc and increase endurance and strength , therapist makes obstacle course with cones and patient completes three separate back and forth trials with 100% accuracy   Assistance:   Mod I   Device:   []   Rolling Walker     []   Standard Walker [x]   Wheelchair        []   Walter Patel       []   4-Wheeled [place]         []   Cardiac [place]       []   Other:        Homemaking Tasks:   patient retrieves clothing from bag inside closet and transports to bathroom at [employer] level c Mod I       Additional Therapeutic activities/exercises completed this date:     [x]   ADL Training   [x]   Balance/Postural training     [x] Bed/Transfer Training patient instructed in wc pushups to increase BUE triceps  for increased accuracy of sit to stand transfers and increase ease of transfers  upon dc to home with spouse   []   Endurance Training  []   Neuromuscular Re-ed      Nu-step:  Time:        Level:         #Steps:        [x] Rebounder:    [x]  Seated     []  Standing 4 sets of 20 c 4 # weighted ball   To increase strength and endurance to BUE and core strength with patient completing task while seated with back off of wc.         []   Supine Ther Ex (reps/sets):     []   Seated Ther Ex (reps/sets):     []   Standing Ther Ex (reps/sets):     [x]   Other: HEP reviewed and demonstrated with patient this date completing with blue theraband  all planes and all joints to increase strength and endurance and continue upon dc to home, patient completes recall and demo at 100%       Comments:      Patient/Caregiver Education and Training:   [x]   Adaptive Equipment Use  [x]   Bed Mobility/Transfer Technique/Safety  [x]   Energy Conservation Tips  []   Family training  [x]   Postural Awareness  [x]   Safety During Functional Activities  []   Reinforced Patient's Precautions   []   Progress was updated and reviewed in Rehabtracker with patient and/or family this         date.     Treatment Plan for Next Session: POC to continue as tolerated         Treatment/Activity Tolerance:   [x] Tolerated treatment with no adverse effects    [] Patient limited by fatigue  [] Patient limited by pain   [] Patient limited by medical complications:    [] Adverse reaction to Tx:   [] Significant change in status    Safety:       []  bed alarm set    [x]  chair alarm set    []  Pt refused alarms                []  Telesitter activated      [x]  Gait belt used during tx session      []other:       Number of Minutes/Billable Intervention  Therapeutic Exercise 15   ADL Self-care 75   Neuro Re-Ed    Therapeutic Activity 30   Group    Other:    TOTAL 120       Social History  Social/Functional History  Lives With: Spouse  Type of Home: Mobile home  Home Layout: One level  Home Access: Ramped entrance, Stairs to enter with rails (Pt uses ramped entrance in the front of the home, however does have 4 ALCON the back of the home.)  Entrance Stairs - Number of Steps: 4  Entrance Stairs - Rails: Right  Bathroom Shower/Tub: Tub/Shower unit, Shower chair with back  [employer]: Handicap height  Bathroom Equipment: Hand-held shower, Shower chair, Grab bars in shower  Bathroom Accessibility: [facility]: [address] [facility] [address]  Has the patient had two or more falls in the past year or any fall with injury in the past year?: No  ADL Assistance: [address]: Independent  Homemaking Responsibilities: Yes  Meal Prep Responsibility: Primary (Shared responsibility with spouse.)  Laundry Responsibility: Primary (Shared responsibility with spouse.)  Cleaning Responsibility: Primary (Shared responsibility with spouse.)  Bill Paying/Finance Responsibility: Primary (Shared responsibility with spouse.)  Shopping Responsibility: Primary (Shared responsibility with spouse.)  Dependent Care Responsibility: Primary (Pt takes care of dog - Goob.)  Health Care Management: Secondary (Pt's wife organizes pills.)  Ambulation Assistance: Independent (Mod I. Pt was using wife's 2WW to get to bathroom and outside. Pt had been using device for ~ 2 months, prior to this IND without device.)  Transfer Assistance: Independent (Pt would use SPC to get into truck.)  Active : Yes  Mode of Transportation: Truck  Occupation: On disability  Leisure & Hobbies: Pt enjoys mechanics and outdoor work. IADL Comments: Pt completes all yardwork at home. Additional Comments: Pt has regular flat bed at home with assistance from wife for bed mobility. Pt with no falls in the last year.     Objective                                                                                    Goals:  (Update in

## 2022-09-14 NOTE — PROGRESS NOTES
Gianfranco Galarza    : 1960  Acct #: [de-identified]  MRN: [id number]              PM&R Progress Note      Admitting diagnosis: Right lower limb osteomyelitis ( [address])     Comorbid diagnoses impacting rehabilitation: Uncontrolled pain, generalized weakness, gait disturbance, status post right below-knee amputation on 2022, uncontrolled diabetes type 2 with peripheral neuropathy, essential hypertension, acute blood loss anemia, hyponatremia, COPD, nicotine addiction    Chief complaint: Gaining some confidence with hopping in the bathroom areas. Stump pain persist.  Occasional bowel movement reported. Prior (baseline) level of function: Independent. Current level of function:         Current  IRF-GIANNI and Goals:   Occupational Therapy:    Short Term Goals  Time Frame for Short term goals: STGs=LTGs :   Long Term Goals  Time Frame for Long term goals : 7-10 days or until d/c. Long Term Goal 1: Pt will complete grooming tasks c Mod I.  Long Term Goal 2: Pt will complete total body bathing c AE PRN and Mod I.  Long Term Goal 3: Pt will complete UB dressing c IND. Long Term Goal 4: Pt will complete LB dressing c AE PRN and Mod I.  Long Term Goal 5: Pt will doff/don footwear to LLE c IND.   Additional Goals?: Yes  Long Term Goal 6: Pt will complete toileting c Mod I.  Long Term Goal 7: Pt will perform functional transfers (bed, chair, toilet, shower) c DME PRN and Mod I.  Long Term Goal 8: Pt will complete therex/therax to facilitate an increase in strength/endurance (c emphasis on dynamic standing balance/tolerance > 8 mins) c Mod I.  Long Term Goal 9: Pt will complete home management tasks c Mod I. :   Eatin - Patient feeds self  Groomin - Able to perform 3-4 tasks with touching help  Bathin - Able to bathe 3-4 areas  Dressing-Upper: 3 - Requires assist with 2 tasks  Dressing-Lower: 2 - Requires assist with 4-5 parts of dressing  Toiletin - Able to perform 1 task only (e.g. hygiene)  Toilet Transfer: 2 - Requires 50-74% assist getting off toilet                 Eating: Eating  Assistance Needed: Independent  Comment: finished eating on arrival  CARE Score: 6  Discharge Goal: Independent       Oral Hygiene: Oral Hygiene  Assistance Needed: Independent  Comment: completed seated at sink  CARE Score: 6  Discharge Goal: Independent    UB/LB Bathing: Shower/Bathe Self  Assistance Needed: Independent  Comment: S for safety, completed at sink  CARE Score: 6  Discharge Goal: Independent    UB Dressing: Upper Body Dressing  Assistance Needed: Independent  Comment: able to obtain clothing and don/ doff  CARE Score: 6  Discharge Goal: Independent         LB Dressing: Lower Body Dressing  Assistance Needed: Supervision or touching assistance  Comment: S for safety  CARE Score: 4  Discharge Goal: Independent    Donning and [place] Footwear: Putting On/Taking Off Footwear  Assistance Needed: Independent  Comment: c  CARE Score: 6  Discharge Goal: Independent      Toiletin [address] needed: Supervision or touching assistance  Comment: SBA  CARE Score: 4  Discharge Goal: Independent      Toilet Transfers:   Toilet Transfer  Assistance needed: Supervision or touching assistance  Comment: SBA use of bilateral grab bars  CARE Score: 4  Discharge Goal: Independent    Physical Therapy:         Long Term Goals  Time Frame for Long term goals : 7-10 days STG=LTG  Long term goal 1: Pt will perform bed mobility with mod I  Long term goal 2: Pt will perform sit to stand, w/c<>bed and car transfers with mod I  Long term goal 3: pt will ambulate with 2ww 50' on level surfaces and 10' on unlevel surface with supervision  Long term goal 4: Pt will ascend/descend 4\" step with 2ww and CGA  Long term goal 5: Pt will propel w/c in household situations 250' with mod I  Additional Goals?: Yes  Long term goal 6: Pt will  light object from floor with 2ww and reacher with mod I      Bed Mobility:   Sit to Lying  Assistance Needed: Independent  Comment: Ind in regular bed  CARE Score: 6  Roll Left and Right  Assistance Needed: Independent  Comment: Ind in regular bed  CARE Score: 6  Discharge Goal: Independent  Lying to Sitting on Side of Bed  Assistance Needed: Independent  Comment: Ind in regular bed  CARE Score: 6  Discharge Goal: Independent    Transfers:    Sit to Stand  Assistance Needed: Supervision or touching assistance  Comment: SBA with 2WW/with external support  CARE Score: 4  Discharge Goal: Independent  Chair/Bed-to-Chair Transfer  Assistance Needed: Supervision or touching assistance  Comment: usually does stand pivot transfer with SBA; pt does w/c set-up  CARE Score: 4  Discharge Goal: Independent     Car Transfer  Assistance Needed: Supervision or touching assistance  Comment: SBA using stand pivot (used car as external support and completed transfer from and to w/c)  CARE Score: 4  Discharge Goal: Independent    Ambulation:    Walking Ability  Does the Patient Walk?: Yes     Walk 10 Feet  Assistance Needed: Supervision or touching assistance  Comment: SBA with 2WW  CARE Score: 4  Discharge Goal: Independent     Walk 50 Feet with Two Turns  Assistance Needed: Supervision or touching assistance  Comment: CGA with 2WW  Reason if not Attempted: Not attempted due to medical condition or safety concerns  CARE Score: 4  Discharge Goal: Supervision or touching assistance     Walk 150 Feet  Comment: do not feel pt will be able to tolerate this distance at discharge due to chronic medical conditions  Reason if not Attempted: Not attempted due to medical condition or safety concerns  CARE Score: 88  Discharge Goal: Not Attempted     Walking 10 Feet on Uneven Surfaces  Reason if not Attempted: Not attempted due to medical condition or safety concerns  CARE Score: 88  Discharge Goal: Supervision or touching assistance     1 Step (Curb)  Reason if not Attempted: Not attempted due to medical condition or safety concerns  CARE Score: 88  Discharge Goal: Supervision or touching assistance     4 Steps  Reason if not Attempted: Not applicable  CARE Score: 9  Discharge Goal: Not Applicable     12 Steps  Reason if not Attempted: Not applicable  CARE Score: 9  Discharge Goal: Not Applicable       Wheelchair:  w/c Ability: Wheelchair Ability  Uses a Wheelchair and/or Scooter?: Yes  Wheel 50 Feet with Two Turns  Assistance Needed: Independent  Comment: Mod Ind with BUE  CARE Score: 6  Discharge Goal: Independent  Wheel 150 Feet  Assistance Needed: Supervision or touching assistance  CARE Score: 4  Discharge Goal: Independent          Balance:        Object: Picking Up Object  Assistance Needed: Partial/moderate assistance  Comment: Min A with 2WW and reacher  Reason if not Attempted: Not attempted due to medical condition or safety concerns  CARE Score: 3  Discharge Goal: Independent    I      Exam:    Blood pressure (!) 154/58, pulse 81, temperature 98.8 °F (37.1 °C), temperature source Oral, resp. rate 18, height 5' 8\" (1.727 m), weight 132 lb 4.4 oz (60 kg), SpO2 100 %. General: Sitting up in wheelchair. Able to propel it with his arms. Alert. HEENT: Neck supple. Clear speech. Gazing right and left. MMM. Pulmonary: Symmetric air exchange without coughing. Cardiac: RRR. Abdomen: Patient's abdomen is soft and nondistended. Bowel sounds were present throughout. There was no rebound, guarding or masses noted. Upper extremities: Functional  strength. No tremor or new bruising. Lower extremities: Right BKA stump is  to touch but the knee extends fully. Slightly less swollen. Incision is dressed and dry. Sitting balance was good.   Standing balance was fair-.    Lab Results   Component Value Date    WBC 8.2 09/05/2022    HGB 8.0 (L) 09/05/2022    HCT 24.7 (L) 09/05/2022    MCV 86.1 09/05/2022     09/05/2022     Lab Results   Component Value Date    INR 1.19 08/29/2022 INR 1.14 08/28/2018    INR 1.09 08/17/2016    PROTIME 15.4 (H) 08/29/2022    PROTIME 13.0 (H) 08/28/2018    PROTIME 12.5 08/17/2016     Lab Results   Component Value Date    CREATININE 1.1 09/06/2022    BUN 30 (H) 09/06/2022     (L) 09/06/2022    K 5.2 (H) 09/06/2022     09/06/2022    CO2 24 09/06/2022     Lab Results   Component Value Date    ALT 8 (L) 08/30/2022    AST 16 08/30/2022    ALKPHOS 121 08/30/2022    BILITOT 0.3 08/30/2022       Expected length of stay  prior to a supervised level of function for discharge home with a wheelchair/walker and [facility] 77 OT/PT is 9/15/2022. Recommendations:    Osteomyelitis of the right foot with BK amputation: He is demonstrating benefit from participating in the daily occupational and physical therapy. Variable complaints of  pain at rest and with activity. His wound shows no signs of infection. Continue with daily wound care, limb elevation and pain management. He benefits from DVT prophylaxis, aggressive pulmonary hygiene measures and nutritional support. Ongoing attempts to control his blood sugar and blood pressure and provide a nicotine alternative to smoking. He needs caregiver training, adaptive equipment education and introduction to a prosthetists. Outpatient follow-up with his surgeon. He is on oral amoxicillin for a total of 17 more doses. Verbal cues and CGA for transfers today. DVT prophylaxis: Lovenox 40 mg subcu daily. I must monitor his hemoglobin and platelet count periodically while on this medication. Weightbearing activities through the left lower limb are limited but tried daily. GI prophylaxis offered. No clinical signs of blood loss. Uncontrolled pain: Tolerating the gabapentin, injectable Dilaudid and oral oxycodone now. Bowel intervention while on the narcotics. No significant nausea with his medicines. Planning to reduce the strength of the Dilaudid soon. Limb elevation to help control swelling.   Local wound care.  Progressive mobilization. Uncontrolled diabetes type 2 with peripheral neuropathy: Patient requires a diet modified for carbohydrates. He is on scheduled Lantus and a Humalog sliding scale. Neurontin for neuropathy symptoms. Blood sugars are checked at mealtime and bedtime. Encouraging consistent oral intake. Hypertension: Pain management is a significant part of the blood pressure control. Nicotine replacement. Monitoring his blood pressure to determine if there is need for medication. Target systolic blood pressures [id number]. Blood pressure is just above the target range when he was in pain. Monitoring closely. Hyponatremia: Encouraging consistent oral intake. Periodic monitoring of his chemistries. Cautious use of any diuretics. COPD: Monitoring O2 saturations at rest and with activity. Aggressive pulmonary hygiene measures. Bronchodilators as needed. This is a late entry note for service provided 9/13/2022.

## 2022-09-14 NOTE — CARE COORDINATION
[facility] Liaison spoke with pt and pt's wife both are agreeable to [facility] at discharge which is schedule for tomorrow 9/15/22. Verified address and number. Pt & wife ok with completing drsg change. His cell is [phone] please call for appts. 9:06 AM [facility] pt & ot.

## 2022-09-15 VITALS
SYSTOLIC BLOOD PRESSURE: 137 MMHG | HEART RATE: 68 BPM | OXYGEN SATURATION: 100 % | RESPIRATION RATE: 20 BRPM | DIASTOLIC BLOOD PRESSURE: 68 MMHG | BODY MASS INDEX: 19.85 KG/M2 | WEIGHT: 130.95 LBS | HEIGHT: 68 IN | TEMPERATURE: 97.5 F

## 2022-09-15 LAB — GLUCOSE BLD-MCNC: 146 MG/DL (ref 70–99)

## 2022-09-15 PROCEDURE — 99239 HOSP IP/OBS DSCHRG MGMT >30: CPT | Performed by: PHYSICAL MEDICINE & REHABILITATION

## 2022-09-15 PROCEDURE — 82962 GLUCOSE BLOOD TEST: CPT

## 2022-09-15 PROCEDURE — 94761 N-INVAS EAR/PLS OXIMETRY MLT: CPT

## 2022-09-15 PROCEDURE — 6370000000 HC RX 637 (ALT 250 FOR IP): Performed by: PHYSICAL MEDICINE & REHABILITATION

## 2022-09-15 PROCEDURE — 94150 VITAL CAPACITY TEST: CPT

## 2022-09-15 PROCEDURE — 6370000000 HC RX 637 (ALT 250 FOR IP): Performed by: INTERNAL MEDICINE

## 2022-09-15 RX ORDER — DULOXETIN HYDROCHLORIDE 20 MG/1
20 CAPSULE, DELAYED RELEASE ORAL DAILY
Qty: 30 CAPSULE | Refills: 0 | Status: SHIPPED | OUTPATIENT
Start: 2022-09-15

## 2022-09-15 RX ORDER — DULOXETIN HYDROCHLORIDE 20 MG/1
20 CAPSULE, DELAYED RELEASE ORAL DAILY
Status: DISCONTINUED | OUTPATIENT
Start: 2022-09-15 | End: 2022-09-15 | Stop reason: HOSPADM

## 2022-09-15 RX ORDER — TRAMADOL HYDROCHLORIDE 50 MG/1
50 TABLET ORAL EVERY 6 HOURS PRN
Qty: 28 TABLET | Refills: 0 | Status: SHIPPED | OUTPATIENT
Start: 2022-09-15 | End: 2022-09-22

## 2022-09-15 RX ORDER — IBUPROFEN 400 MG/1
400 TABLET ORAL EVERY 6 HOURS PRN
Qty: 120 TABLET | Refills: 3 | COMMUNITY
Start: 2022-09-15

## 2022-09-15 RX ORDER — OXYCODONE HYDROCHLORIDE 10 MG/1
10 TABLET ORAL EVERY 4 HOURS PRN
Qty: 28 TABLET | Refills: 0 | Status: SHIPPED | OUTPATIENT
Start: 2022-09-15 | End: 2022-09-22

## 2022-09-15 RX ORDER — TRAMADOL HYDROCHLORIDE 50 MG/1
50 TABLET ORAL EVERY 6 HOURS PRN
Status: DISCONTINUED | OUTPATIENT
Start: 2022-09-15 | End: 2022-09-15 | Stop reason: HOSPADM

## 2022-09-15 RX ORDER — LIDOCAINE 4 G/G
1 PATCH TOPICAL DAILY
Qty: 10 PATCH | Refills: 0 | Status: SHIPPED | OUTPATIENT
Start: 2022-09-15

## 2022-09-15 RX ORDER — IBUPROFEN 400 MG/1
400 TABLET ORAL EVERY 6 HOURS PRN
Status: DISCONTINUED | OUTPATIENT
Start: 2022-09-15 | End: 2022-09-15 | Stop reason: HOSPADM

## 2022-09-15 RX ADMIN — OXYCODONE HYDROCHLORIDE 10 MG: 10 TABLET ORAL at 06:11

## 2022-09-15 RX ADMIN — OXYCODONE HYDROCHLORIDE 10 MG: 10 TABLET ORAL at 00:35

## 2022-09-15 ASSESSMENT — PAIN SCALES - GENERAL
PAINLEVEL_OUTOF10: 7
PAINLEVEL_OUTOF10: 5
PAINLEVEL_OUTOF10: 9

## 2022-09-15 ASSESSMENT — PAIN DESCRIPTION - LOCATION
LOCATION: LEG

## 2022-09-15 ASSESSMENT — PAIN DESCRIPTION - ORIENTATION
ORIENTATION: RIGHT

## 2022-09-15 ASSESSMENT — PAIN DESCRIPTION - DESCRIPTORS
DESCRIPTORS: ACHING
DESCRIPTORS: STABBING;BURNING
DESCRIPTORS: ACHING;THROBBING

## 2022-09-15 NOTE — PROGRESS NOTES
Presented with discharge instructions and scripts. Voices understanding. Escorted to vehicle. Required assistance to get w/c into truck. No needs voiced no distress noted.

## 2022-09-15 NOTE — CARE COORDINATION
ARU  Discharge Summary    D/C Date: 9/15/22    Patient discharged to: home with sig other    Transported by: sig other    [facility]    Additional information: patient provided with instructions to apply for medicaid    Caregiver training:       Surgical follow up already scheduled. VM left for PCP office requesting follow up for patient.

## 2022-09-15 NOTE — DISCHARGE INSTRUCTIONS
9 Things To Do If You've Been Exposed to COVID-19    Stay home. If you've been exposed to the virus but don't have symptoms, you may need to stay in quarantine for 10 full days. But if you are up to date on your COVID-19 vaccines, or if you tested positive for the COVID virus in the last 90 days and have recovered, you may not need to quarantine. Ask your doctor, or go to [employer].gov to use the COVID-19 Quarantine and Isolation Calculator. Get tested. If you have symptoms, it's important to get tested as soon as possible. And if your test is positive, call your doctor right away. They may have you take a medicine to keep you from getting seriously ill. Treatment works best when it's started early. If you have questions about COVID-19 testing, ask your doctor or go to cdc.gov to use the COVID-19 Viral Testing Tool. For at least 10 days, wear a mask when you are around other people. And for at least 10 days, don't visit people at high risk for serious illness from [facility]. Limit contact with people in your home. If possible, stay in a separate bedroom and use a separate bathroom. Avoid contact with pets and other animals. Cover your mouth and nose with a tissue when you cough or sneeze. Then throw it in the trash right away. Wash your hands often, especially after you cough or sneeze. Use soap and water, and scrub for at least 20 seconds. If soap and water aren't available, use an alcohol-based hand . Don't share personal household items. These include bedding, towels, cups and glasses, and eating utensils. Clean and disinfect your home every day. Use household  or disinfectant wipes or sprays. Current as of: July 28, 2022               Content Version: 13.4  © 2006-2022 Healthwise, Incorporated. Care instructions adapted under license by [facility] [address]  If you have questions about a medical condition or this instruction, always ask your healthcare professional. [address] any warranty or liability for your use of this information. Learning About Coronavirus [phone])  What is coronavirus (COVID-19)? COVID-19 is a disease caused by a type of coronavirus. This illness was first found in December 2019. It has since spread worldwide. Coronaviruses are a large group of viruses. They cause the common cold. They also cause more serious illnesses like [place] respiratory syndrome (MERS) and severe acute respiratory syndrome (SARS). COVID-19 is caused by a novel coronavirus. That means it's a new type that has not been seen in people before. What are the symptoms? COVID-19 symptoms may include:  Fever. Cough. Trouble breathing. Chills or repeated shaking with chills. Muscle and body aches. Headache. Sore throat. New loss of taste or smell. Vomiting. Diarrhea. In severe cases, COVID-19 can cause pneumonia and make it hard to breathe without help from a machine. It can cause death. How is it diagnosed? COVID-19 is diagnosed with a viral test. This may also be called a PCR test or antigen test. It looks for evidence of the virus in your breathing passages or lungs (respiratory system). The test is most often done on a sample from the nose, throat, or lungs. It's sometimes done on a sample of saliva. One way a sample is collected is by putting a long swab into the back of your nose. If you have questions about COVID-19 testing, ask your doctor or go to cdc.gov to use the COVID-19 Viral Testing Tool. How is it treated? Mild cases of COVID-19 can be treated at home. Serious cases need treatment in the hospital. Treatment may include medicines, plus breathing support such as oxygen therapy or a ventilator. Some people may be placed on their belly to help their oxygen levels. Treatments that may help people who have COVID-19 include:  Antiviral medicines. These medicines treat viral infections.   Immune-based therapy. These medicines help the immune system fight COVID-19. Examples include monoclonal antibodies. Blood thinners. These medicines help prevent blood clots. People with severe illness are at risk for blood clots. How can you protect yourself and others? Stay up to date on your COVID-19 vaccines. Avoid sick people, and stay away from others if you are sick. Stay at least 6 feet away from other people. Avoid crowds, especially inside. Get tested for COVID-19 before you have an indoor visit with people who don't live with you. Improve the airflow when you spend time indoors with people who don't live with you. If you can, open windows and doors. Or you can use a fan to blow air away from people and out a window. Cover your mouth with a tissue when you cough or sneeze. Wash your hands often, especially after you cough or sneeze. Use soap and water, and scrub for at least 20 seconds. If soap and water aren't available, use an alcohol-based hand . Avoid touching your mouth, nose, and eyes. Check the CDC website at cdc.gov for the most current information on how to protect yourself. And if you have questions, ask your doctor or go to cdc.gov to use the COVID-19 Quarantine and Isolation Calculator. Here are some other steps you may need to take. If you are not up to date on your COVID-19 vaccines:  Wear a mask with the best fit, protection, and comfort for you. A mask can protect you even when others aren't wearing one. This might be especially important if you:  Have certain health conditions. Live with someone who has a compromised immune system. Live with someone who is not up to date on their COVID-19 vaccines. If you have been exposed to the virus AND are not up to date on your COVID-19 vaccines:  Talk to your doctor as soon as you can. Your doctor might have you take medicine to help prevent serious illness. Get a COVID-19 test. You may need to be tested more than once.  And if your test is positive, follow the instructions below. Stay home. Try to separate from other people where you live. Don't go to school, work, or public areas. Wear a well-fitting mask around other people for a full 10 days. Avoid travel, and stay away from people at high risk for serious illness. Watch for symptoms. If you have been exposed AND either tested positive for COVID-19 in the last 90 days and have recovered or you are up to date on your COVID-19 vaccines:  Talk to your doctor as soon as you can. Your doctor may have you take medicine to help prevent serious illness. Get a COVID-19 test. Wait 5 days after you were last exposed. You may need to be tested more than once. And if your test is positive, follow the instructions below. Wear a well-fitting mask around other people for a full 10 days. Avoid travel and stay away from people at high risk for serious illness. Watch for symptoms. If you tested positive for COVID-19 in the last 90 days and have not recovered, another COVID-19 test may not be needed. If you're sick or test positive for COVID-19:  Talk to your doctor as soon as you can. Your doctor may have you take medicine to help prevent serious illness. Get a COVID-19 test unless you have already been tested. You may need to be tested more than once. Stay home. Leave only if you need to get medical care. If you were seriously ill or if you have a weakened immune system, you may need to isolate for several weeks. For a full 10 days, wear a well-fitting mask whenever you're around other people. Avoid travel and stay away from people at high risk for serious illness. Limit contact with pets and people in your home. If possible, stay in a separate bedroom and use a separate bathroom. Clean and disinfect your home every day. Use household  and disinfectant wipes or sprays. Take special care to clean things that you touch with your hands.   How can you self-isolate when you have COVID-19? If you have COVID-19, there are things you can do to help avoid spreading the virus to others. Stay home, and avoid contact with other people. Limit contact with people in your home. If possible, stay in a separate bedroom and use a separate bathroom. Wear a well-fitting mask when you are around other people. Avoid contact with pets and other animals. Cover your mouth and nose with a tissue when you cough or sneeze. Then throw it in the trash right away. Wash your hands often, especially after you cough or sneeze. Use soap and water, and scrub for at least 20 seconds. If soap and water aren't available, use an alcohol-based hand . Don't share personal household items. These include bedding, towels, cups and glasses, and eating utensils. [address] [facility] [address] in the warmest water allowed for the fabric type, and dry it completely. It's okay to wash other people's laundry with yours. Clean and disinfect your home. Use household  and disinfectant wipes or sprays. If you have questions, visit cdc.gov to check the Quarantine and Isolation Calculator. When should you call for help? Call 911 anytime you think you may need emergency care. For example, call if you have life-threatening symptoms, such as: You have severe trouble breathing. (You can't talk at all.)     You have constant chest pain or pressure. You are severely dizzy or lightheaded. You are confused or can't think clearly. You have pale, gray, or blue-colored skin or lips. You pass out (lose consciousness) or are very hard to wake up. You have loss of balance or trouble walking. You have trouble seeing out of one or both eyes. You have weakness or drooping on one side of the face. You have weakness or numbness in an arm or a leg. You have trouble speaking. You have a severe headache. You have a seizure.    Call your doctor now or seek immediate medical care if:    You have moderate trouble breathing. (You can't speak a full sentence.)     You are coughing up blood. You have signs of low blood pressure. These include feeling lightheaded; being too weak to stand; and having cold, pale, clammy skin. Watch closely for changes in your health, and be sure to contact your doctor if:    Your symptoms get worse. You are not getting better as expected. You have new or worse symptoms of anxiety, depression, nightmares, or flashbacks. Call before you go to the doctor's office. Follow their instructions. And wear a mask. Where can you learn more? Go to [email or web address]://Chatositypepiceweb.[email or web address]. org and sign in to your [employer] account. Enter C008 in the [employer] box to learn more about \"Learning About Coronavirus (COVID-19). \"     If you do not have an account, please click on the \"Sign Up Now\" link. Current as of: July 28, 2022               Content Version: 13.4  © 2006-2022 [employer]. Care instructions adapted under license by [facility] ([facility]). If you have questions about a medical condition or this instruction, always ask your healthcare professional. [place] [address] any warranty or liability for your use of this information. Below-the-Knee Leg Amputation: What to Expect at Home  Your Recovery  A below-the-knee amputation is surgery to remove your leg below the knee. Your doctor removed the leg while keeping as much healthy bone, skin, blood vessel, and nerve tissue as possible. After the surgery, you will probably have bandages, a rigid dressing, or a cast over the remaining part of your leg (remaining limb). The leg may be swollen for at least 4 weeks after your surgery. If you have a rigid dressing or cast, your doctor will set up regular visits to change the dressing or cast and check the healing. If you have elastic bandages, your doctor will tell you how to change them. You may have pain in your remaining limb.  You also may think you have feeling or pain where your leg was. This is called phantom pain. It is common and may come and go for a year or longer. Your doctor can give you medicine for both types of pain. You may have already started a rehabilitation program (rehab). You will continue this under the guidance of your doctor or physical therapist. Claar Topete will need to do a lot of work to recondition your muscles and relearn activities, balance, and coordination. The rehab can last as long as a year. You may have been fitted with a temporary artificial leg while you were still in the hospital. If this is the case, your doctor will teach you how to care for it. If you are getting an artificial leg, you may need to get used to it before you go back to work and your other activities. You will probably not wear it all the time, so you will need to learn how to use a wheelchair, crutches, or other device. You will have to make changes in your home. Your workplace may be able to make allowances for you. Having your leg amputated can be traumatic. And learning to live with new limits can be hard and frustrating. Many people feel depressed and may grieve for their former lifestyle. It's important to understand these feelings. Talking with your family, friends, and health professionals about your frustrations is an important part of your recovery. You may also find that it helps to talk with a person who has had an amputation. Remember that even though you've lost a limb, it doesn't change who you are or prevent you from enjoying life. You'll have to adapt and learn new ways to do things. But you can still work and take part in sports and activities. And you can still learn, love, play, and live life to its fullest.  Many organizations can help you adjust to your new life. For example, you can go to amputee-coalition. org for information and support. This care sheet gives you a general idea about how long it will take for you to recover. But each person recovers at a different pace. Follow the steps below to get better as quickly as possible. How can you care for yourself at home? Activity    Be active. Talk to your doctor about what you can do. If you are active and use your remaining limb, it will heal faster. You may shower when your doctor okays it. Wash the remaining limb with soap and water, and pat it dry. You may need help doing this at first.     You may need to adapt your car to your situation before you drive. You will probably be able to return to work and your usual routine when your remaining limb heals. This can be as soon as 4 to 8 weeks after surgery, but it may take longer. Diet    You can eat your normal diet. If your stomach is upset, try bland, low-fat foods like plain rice, broiled chicken, toast, and yogurt. You may notice that your bowel movements are not regular right after your surgery. This is common. Try to avoid constipation and straining with bowel movements. Take a fiber supplement every day. If you have not had a bowel movement after a couple of days, ask your doctor about taking a mild laxative. Medicines    Your doctor will tell you if and when you can restart your medicines. He or she will also give you instructions about taking any new medicines. If you stopped taking aspirin or some other blood thinner, your doctor will tell you when to start taking it again. Be safe with medicines. Take pain medicines exactly as directed. If the doctor gave you a prescription medicine for pain, take it as prescribed. If you are not taking a prescription pain medicine, ask your doctor if you can take an over-the-counter medicine. If you think your pain medicine is making you sick to your stomach: Take your medicine after meals (unless your doctor has told you not to). Ask your doctor for a different pain medicine. If your doctor prescribed antibiotics, take them as directed.  Do not stop taking them just because you feel better. You need to take the full course of antibiotics. Remaining limb care    You may have bandages, a rigid dressing, or a cast on your remaining limb. Your doctor will tell you how to take care of it. Depending on your dressing and the doctor's instructions:  Check your remaining limb daily for irritation, skin breaks, and redness. Tell your doctor about any problems you see. Wash your remaining limb with mild soap and warm water every night. Pat it dry. If you have a temporary artificial leg, remove it before you go to sleep. Exercise    Rehabilitation is a series of exercises you do after your surgery. This helps you learn to use your remaining limb and artificial leg. You will work with your doctor and physical therapist to plan this exercise program. To get the best results, you need to do the exercises correctly and as often and as long as your doctor tells you. Your rehab program will give you a number of exercises to do. Always do them as your therapist tells you. Other instructions    Preventing contractures is very important. A contracture occurs when a joint becomes stuck in one position. If this happens, it may be hard or impossible to straighten your remaining limb and use an artificial leg. Make sure you put equal weight on both hips when you sit. Use firm chairs, and sit up straight. Keep your remaining limb flat with your knees straight and your legs together while you are lying on your back. Lie on your stomach as much as possible to stretch your hip joint. Do not sit for more than an hour or two. Stand, or lie on your stomach now and then. Do not put pillows under your hips or knees or between your thighs. Do not rest your remaining limb on crutch handles or a wheelchair. Follow-up care is a key part of your treatment and safety. Be sure to make and go to all appointments, and call your doctor if you are having problems.  It's also a good idea to know your test results and keep a list of the medicines you take. When should you call for help? Call 911 anytime you think you may need emergency care. For example, call if:    You passed out (lost consciousness). You have chest pain, are short of breath, or you cough up blood. Call your doctor now or seek immediate medical care if:    You have pain that does not get better after you take pain medicine. You are sick to your stomach or cannot drink fluids. You have loose stitches, or your incision comes open. You have signs of a blood clot in your leg (called a deep vein thrombosis), such as:  Pain in your calf, back of the knee, thigh, or groin. Redness or swelling in your leg. You have signs of infection, such as: Increased pain, swelling, warmth, or redness. Red streaks leading from the incision. Pus draining from the incision. A fever. You bleed through your bandage. Watch closely for any changes in your health, and be sure to contact your doctor if you have any problems. Where can you learn more? Go to https://WorkMeIn.Teliportme. org and sign in to your [employer] account. Enter D912 in the [facility] box to learn more about \"Below-the-Knee Leg Amputation: What to Expect at Home. \"     If you do not have an account, please click on the \"Sign Up Now\" link. Current as of: March 9, 2022               Content Version: 13.4  © 1495-7505 [employer]. Care instructions adapted under license by [facility] [place] ([facility]). If you have questions about a medical condition or this instruction, always ask your healthcare professional. [place] [address] any warranty or liability for your use of this information. duloxetine  Pronunciation:  du JESSY e teen  Brand:  Cymbalta, Glorine Frame  What is the most important information I should know about duloxetine?   People with depression or mental illness may have thoughts about suicide. Some young people may have increased suicidal thoughts when first starting a medicine to treat depression. Tell your doctor right away if you have any sudden changes in mood or behavior, or thoughts about suicide. Do not stop using duloxetine without first asking your doctor. What is duloxetine? Duloxetine is a serotonin and norepinephrine reuptake inhibitor antidepressant (SNRI). Duloxetine is used to treat major depressive disorder in adults. Duloxetine is also used to treat general anxiety disorder in adults and children at least 9years old. Duloxetine is also used in adults to treat diabetic nerve pain or chronic muscle or joint pain (such as low back pain and osteoarthritis pain). Some brands of duloxetine are also used to treat fibromyalgia (a chronic pain disorder). Rudolm Robert is for treating fibromyalgia only in adults. Cymbalta may be used to treat fibromyalgia in adults and children at least 15years old. Duloxetine may also be used for purposes not listed in this medication guide. What should I discuss with my healthcare provider before taking duloxetine? You should not use duloxetine if you are allergic to it. Do not take duloxetine within 5 days before or 14 days after you have used an MAO inhibitor, such as isocarboxazid, linezolid, methylene blue injection, phenelzine, or tranylcypromine. A dangerous drug interaction could occur. Tell your doctor if you also take stimulant medicine, opioid medicine, herbal products, or medicine for depression, mental illness, Parkinson's disease, migraine headaches, serious infections, or prevention of nausea and vomiting. An interaction with duloxetine could cause a serious condition called serotonin syndrome. Not approved for use by anyone younger than 9years old.   Tell your doctor if you have ever had:  heart problems, high blood pressure;  liver or kidney disease;  slow digestion;  a seizure;  bleeding problems;  sexual problems;  narrow-angle glaucoma;  bipolar disorder (manic depression);  drug addiction or suicidal thoughts; or  if you drink large amounts of alcohol. People with depression or mental illness may have thoughts about suicide. Some young people may have increased suicidal thoughts when first starting a medicine to treat depression. Stay alert to changes in your mood or symptoms. Your family or caregivers should also watch for sudden changes in your behavior. Taking this medicine during pregnancy could harm the baby, but stopping the medicine may not be safe for you. Do not start or stop duloxetine without asking your doctor. If you are pregnant, your name may be listed on a pregnancy registry to track the effects of duloxetine on the baby. If you are breastfeeding, tell your doctor if you notice drowsiness, feeding problems, and slow weight gain in the nursing baby. How should I take duloxetine? Follow all directions on your prescription label and read all medication guides or instruction sheets. Your doctor may occasionally change your dose. Use the medicine exactly as directed. Taking duloxetine in higher doses or more often than prescribed will not make it more effective, and may increase side effects. Swallow the capsule whole and do not crush, chew, break, or open it. Take with or without food. Your blood pressure will need to be checked often. Tell your doctor if you have any changes in sexual function, such as loss of interest in sex, trouble having an orgasm, or (in men) problems with erections or ejaculation. Some sexual problems can be treated. Your symptoms may not improve for up to 4 weeks. Do not stop using duloxetine suddenly, or you could have unpleasant symptoms (such as agitation, confusion, tingling or electric shock feelings). Ask your doctor before stopping the medicine. Store at room temperature away from moisture and heat. What happens if I miss a dose?   Take the medicine as soon as you can, but skip the missed dose if it is almost time for your next dose. Do not take two doses at one time. What happens if I overdose? Seek emergency medical attention or call the Poison Help line at 1-922.473.1797. Overdose symptoms may include vomiting, dizziness or drowsiness, seizures, fast heartbeats, fainting, or coma. What should I avoid while taking duloxetine? Ask your doctor before taking a nonsteroidal anti-inflammatory drug (NSAID) such as aspirin, ibuprofen, naproxen, Advil, Aleve, Motrin, and others. Using an NSAID with duloxetine may cause you to bruise or bleed easily. Avoid driving or hazardous activity until you know how this medicine will affect you. Dizziness or drowsiness can cause falls, accidents, or severe injuries. Avoid getting up too fast from a sitting or lying position, or you may feel dizzy. Drinking alcohol may increase your risk of liver damage, especially if you take Drizalma. What are the possible side effects of duloxetine? Get emergency medical help if you have signs of an allergic reaction (hives, difficult breathing, swelling in your face or throat) or a severe skin reaction (fever, sore throat, burning eyes, skin pain, red or purple skin rash with blistering and peeling). Tell your doctor right away if you have new or sudden changes in mood or behavior, including new or worse depression or anxiety, panic attacks, trouble sleeping, or if you feel impulsive, irritable, agitated, hostile, aggressive, restless, more active or talkative, or have thoughts about suicide or hurting yourself.   Call your doctor at once if you have:  pounding heartbeats or fluttering in your chest;  a light-headed feeling, like you might pass out;  easy bruising, unusual bleeding;  vision changes;  painful or difficult urination;  liver problems --right-sided upper stomach pain, itching, dark urine, jaundice (yellowing of the skin or eyes);  low blood sodium --headache, confusion, problems with thinking or memory, weakness, feeling unsteady; or  manic episodes --racing thoughts, increased energy, decreased need for sleep, risk-taking behavior, being agitated or talkative. Seek medical attention right away if you have symptoms of serotonin syndrome, such as: agitation, hallucinations, fever, sweating, shivering, fast heart rate, muscle stiffness, twitching, loss of coordination, nausea, vomiting, or diarrhea. Common side effects may include:  drowsiness;  nausea, constipation, loss of appetite;  dry mouth; or  increased sweating. This is not a complete list of side effects and others may occur. Call your doctor for medical advice about side effects. You may report side effects to FDA at 3-112-IZP-9952. What other drugs will affect duloxetine? Sometimes it is not safe to use certain medications at the same time. Some drugs can affect your blood levels of other drugs you take, which may increase side effects or make the medications less effective. Many drugs can affect duloxetine. This includes prescription and over-the-counter medicines, vitamins, and herbal products. Not all possible interactions are listed here. Tell your doctor about all other medicines you use. Where can I get more information? Your pharmacist can provide more information about duloxetine. Remember, keep this and all other medicines out of the reach of children, never share your medicines with others, and use this medication only for the indication prescribed. Every effort has been made to ensure that the information provided by [address] is accurate, up-to-date, and complete, but no guarantee is made to that effect. Drug information contained herein may be time sensitive.  [facility] information has been compiled for use by healthcare practitioners and consumers in the [place] and therefore [facility] does not warrant that uses outside of the [place] are appropriate, unless specifically indicated otherwise. [facility]'s drug information does not endorse drugs, diagnose patients or recommend therapy. [facility]'s drug information is an informational resource designed to assist licensed healthcare practitioners in caring for their patients and/or to serve consumers viewing this service as a supplement to, and not a substitute for, the expertise, skill, knowledge and judgment of healthcare practitioners. The absence of a warning for a given drug or drug combination in no way should be construed to indicate that the drug or drug combination is safe, effective or appropriate for any given patient. [facility] does not assume any responsibility for any aspect of healthcare administered with the aid of information [facility] provides. The information contained herein is not intended to cover all possible uses, directions, precautions, warnings, drug interactions, allergic reactions, or adverse effects. If you have questions about the drugs you are taking, check with your doctor, nurse or pharmacist.  Copyright 4892-1482 [address]: 17.01. Revision date: 11/1/2021. Care instructions adapted under license by [facility] ([facility]). If you have questions about a medical condition or this instruction, always ask your healthcare professional. [place] [address] any warranty or liability for your use of this information. lidocaine topical  Pronunciation:  LYE melendez kern TOP i esme  Brand:  AneCream, Bactine, Glydo, LidaMantle, Lidoderm, LidoRx, Medi-Quik Spray, RadiaGuard, RectiCare, Regenecare HA Spray, Solarcaine Cool Aloe  What is the most important information I should know about lidocaine topical?  An overdose of numbing medicine can cause fatal side effects if too much of the medicine is absorbed through your skin. Do not use large amounts of lidocaine topical, or cover treated skin areas with a bandage or plastic wrap without medical advice.   Keep both used and unused lidocaine skin patches out of the reach of children or pets. The amount of lidocaine in the skin patches could be harmful to a child or pet who accidentally sucks on or swallows the patch. What is lidocaine topical?  Lidocaine is a local anesthetic (numbing medication). There are many brands and forms of lidocaine available. Not all brands are listed on this leaflet. Lidocaine topical (for use on the skin) is used to reduce pain or discomfort caused by skin irritations such as sunburn, insect bites, poison ivy, poison oak, poison sumac, and minor cuts, scratches, or burns. Lidocaine topical is also used to treat rectal discomfort caused by hemorrhoids. Lidocaine intradermal device can be used in minor medical procedures such as venipuncture or peripheral intravenous cannulation. Lidocaine topical may also be used for purposes not listed in this medication guide. What should I discuss with my healthcare provider before using lidocaine topical?  You should not use lidocaine topical if you are allergic to any type of numbing medicine. Fatal overdoses have occurred when numbing medicines were used without the advice of a medical doctor (such as during a cosmetic procedure like laser hair removal). However, overdose has also occurred in women treated with a numbing medicine before having a mammography. Be aware that many cosmetic procedures are performed without a medical doctor present. Tell your doctor if you have ever had:  a blood cell disorder called methemoglobinemia (in you or a family member);  liver disease; or  if you take a heart rhythm medicine. Tell your doctor if you are pregnant or breastfeeding. If you apply lidocaine topical to your chest, avoid areas that may come into contact with the baby's mouth. How should I use lidocaine topical?  Use this medicine exactly as directed on the label, or as it has been prescribed by your doctor. Do not apply this medicine in larger amounts than recommended.   Improper use of lidocaine topical may result in death. Lidocaine topical comes in many different forms (gel, spray, cream, lotion, ointment, liquid, skin patch, and others). Do not take by mouth. Topical medicine is for use only on the skin. If this medicine gets in your eyes, nose, mouth, rectum, or vagina, rinse with water. Read and carefully follow any Instructions for Use provided with your medicine. Ask your doctor or pharmacist if you do not understand these instructions. Use the smallest amount of medicine needed to numb the skin or relieve pain. Your body may absorb too much of this medicine if you use too much, if you apply it over large skin areas, or if you apply heat, bandages, or plastic wrap to treated skin areas. Skin that is cut or irritated may also absorb more topical medication than healthy skin. Do not apply this medicine to swollen skin areas or deep puncture wounds. Avoid using the medicine on skin that is raw or blistered, such as a severe burn or abrasion. Do not cover treated skin unless your doctor has told you to. Lidocaine topical may be applied with your finger tips or a cotton swab. Lidocaine intradermal device is applied by a healthcare provider. Store at room temperature away from moisture and heat. Keep both used and unused lidocaine topical skin patches out of the reach of children or pets. The amount of lidocaine in the skin patches could be harmful to a child or pet who accidentally sucks on or swallows the patch. Seek emergency medical attention if this happens. What happens if I miss a dose? Since lidocaine topical is used when needed, you may not be on a dosing schedule. Skip any missed dose if it's almost time for your next dose. Do not use two doses at one time. What happens if I overdose? Seek emergency medical attention or call the Poison Help line at 1-845.125.4032.  An overdose of numbing medicine can cause fatal side effects if too much of the medicine is absorbed through your skin and into your blood. Overdose symptoms may include uneven heartbeats, seizure (convulsions), slowed breathing, coma, or respiratory failure (breathing stops). Lidocaine applied to the skin is not likely to cause an overdose unless you apply more than the recommended dose. What should I avoid while using lidocaine topical?  Avoid touching the sticky side of a lidocaine skin patch while applying it. Avoid accidentally injuring treated skin areas while they are numb. Avoid coming into contact with very hot or very cold surfaces. What are the possible side effects of lidocaine topical?  Get emergency medical help if you have signs of an allergic reaction:  hives; difficulty breathing; swelling of your face, lips, tongue, or throat. Call your doctor at once if you have:  severe headache or vomiting;  severe burning, stinging, or irritation where the medicine was applied;  swelling or redness;  sudden dizziness or drowsiness after medicine is applied;  confusion, problems with speech or vision, ringing in your ears; or  unusual sensations of temperature. Common side effects include:  mild irritation where the medication is applied; or  numbness in places where the medicine is accidentally applied. This is not a complete list of side effects and others may occur. Call your doctor for medical advice about side effects. You may report side effects to FDA at 4-879-FDA-0798. What other drugs will affect lidocaine topical?  Medicine used on the skin is not likely to be affected by other drugs you use. But many drugs can interact with each other. Tell each of your health care providers about all medicines you use, including prescription and over-the-counter medicines, vitamins, and herbal products. Where can I get more information?   Your pharmacist can provide more information about lidocaine topical.  Remember, keep this and all other medicines out of the reach of children, never share your medicines with others, and use this medication only for the indication prescribed. Every effort has been made to ensure that the information provided by [address] is accurate, up-to-date, and complete, but no guarantee is made to that effect. Drug information contained herein may be time sensitive. [facility] information has been compiled for use by healthcare practitioners and consumers in the [place] and therefore [facility] does not warrant that uses outside of the [place] are appropriate, unless specifically indicated otherwise. [facility]'s drug information does not endorse drugs, diagnose patients or recommend therapy. [facility]'s drug information is an informational resource designed to assist licensed healthcare practitioners in caring for their patients and/or to serve consumers viewing this service as a supplement to, and not a substitute for, the expertise, skill, knowledge and judgment of healthcare practitioners. The absence of a warning for a given drug or drug combination in no way should be construed to indicate that the drug or drug combination is safe, effective or appropriate for any given patient. [facility] does not assume any responsibility for any aspect of healthcare administered with the aid of information [facility] provides. The information contained herein is not intended to cover all possible uses, directions, precautions, warnings, drug interactions, allergic reactions, or adverse effects. If you have questions about the drugs you are taking, check with your doctor, nurse or pharmacist.  Copyright 7347-6779 [employer] [place]. Version: 9.02. Revision date: 5/17/2022. Care instructions adapted under license by [facility] ([facility]). If you have questions about a medical condition or this instruction, always ask your healthcare professional. [place] [address] any warranty or liability for your use of this information.        tramadol  Pronunciation:  TRAM a dol  Brand:  ConZip, Juan José, Ultram, Ultram ER  What is the most important information I should know about tramadol? MISUSE OF THIS MEDICINE CAN CAUSE ADDICTION, OVERDOSE, OR DEATH. Keep this medicine where others cannot get to it. Tramadol should not be given to a child younger than 15years old, or anyone younger than 25years old who recently had surgery to remove the tonsils or adenoids. Ultram ER should not be given to anyone younger than 25years old. Taking tramadol during pregnancy may cause life-threatening withdrawal symptoms in the . Fatal side effects may occur if you use also use alcohol or other drugs that cause drowsiness or slow breathing. What is tramadol? Tramadol is an pain medicine similar to an opioid. Tramadol is used to treat moderate to severe pain. The extended-release form of tramadol is for around-the-clock treatment of pain. This form of tramadol is not  for use on an as-needed basis for pain. Tramadol may also be used for purposes not listed in this medication guide. What should I discuss with my healthcare provider before taking tramadol? You should not take tramadol if you are allergic to it, or if you have:  severe asthma or breathing problems;  a stomach or bowel obstruction (including paralytic ileus);  if you have recently used alcohol, sedatives, tranquilizers, or narcotic medications; or  if you have used an MAO inhibitor in the past 14 days (such as isocarboxazid, linezolid, methylene blue injection, phenelzine, or tranylcypromine). Tramadol should not be given to a child younger than 15years old. Ultram ER should not be given to anyone younger than 25years old. Do not give tramadol to anyone younger than 25years old who recently had surgery to remove the tonsils or adenoids. Seizures have occurred in some people taking tramadol.  Your seizure risk may be higher if you have ever had:  a head injury, epilepsy or other seizure disorder;  drug or alcohol addiction; or  a metabolic disorder. Tell your doctor if you have ever had:  breathing problems, sleep apnea;  liver or kidney disease;  urination problems;  problems with your gallbladder, pancreas, or thyroid;  a stomach disorder; or  mental illness, or suicide attempt. If you use tramadol during pregnancy, your baby could be born with life-threatening withdrawal symptoms, and may need medical treatment for several weeks. Ask a doctor before using tramadol if you are breastfeeding. Tell your doctor if you notice severe drowsiness or slow breathing in the nursing baby. How should I take tramadol? Follow the directions on your prescription label and read all medication guides. Never use tramadol in larger amounts, or for longer than prescribed. Tell your doctor if you feel an increased urge to take more of this medicine. Never share tramadol with another person, especially someone with a history of drug addiction. MISUSE CAN CAUSE ADDICTION, OVERDOSE, OR DEATH. Keep the medicine where others cannot get to it. Selling or giving away this medicine is against the law. Stop taking all other opioid medications when you start taking tramadol. Tramadol can be taken with or without food, but take it the same way each time. Swallow the capsule or tablet whole to avoid exposure to a potentially fatal overdose. Do not crush, chew, break, open, or dissolve. Measure liquid medicine with the supplied syringe or a dose-measuring device (not a kitchen spoon). Never crush or break a tramadol pill to inhale the powder or mix it into a liquid to inject the drug into your vein. This practice has resulted in death. You may have withdrawal symptoms if you stop using tramadol suddenly. Ask your doctor before stopping the medicine. Store at room temperature away from moisture and heat. Keep track of your medicine. You should be aware if anyone is using it improperly or without a prescription. Do not keep leftover tramadol.  Just one dose can cause death in someone using it accidentally or improperly. Ask your pharmacist where to locate a drug take-back disposal program. If there is no take-back program, mix the leftover medicine with cat litter or coffee grounds in a sealed plastic bag throw the bag in the trash. What happens if I miss a dose? Since tramadol is used for pain, you are not likely to miss a dose. Skip any missed dose if it is almost time for your next dose. Do not use two doses at one time. What happens if I overdose? Seek emergency medical attention or call the Poison Help line at 1-916.126.7424. An overdose can be fatal, especially in a child or other person using the medicine without a prescription. Overdose symptoms may include severe drowsiness, pinpoint pupils, slow breathing, or no breathing. Your doctor may recommend you get naloxone (a medicine to reverse an opioid overdose) and keep it with you at all times. A person caring for you can give the naloxone if you stop breathing or don't wake up. Your caregiver must still get emergency medical help and may need to perform CPR (cardiopulmonary resuscitation) on you while waiting for help to arrive. Anyone can buy naloxone from a pharmacy or local health department. Make sure any person caring for you knows where you keep naloxone and how to use it. What should I avoid while taking tramadol? Do not drink alcohol. Dangerous side effects or death could occur. Avoid driving or hazardous activity until you know how this medicine will affect you. Dizziness or drowsiness can cause falls, accidents, or severe injuries. What are the possible side effects of tramadol? Get emergency medical help if you have signs of an allergic reaction (hives, difficult breathing, swelling in your face or throat) or a severe skin reaction (fever, sore throat, burning in your eyes, skin pain, red or purple skin rash that spreads and causes blistering and peeling).   Tramadol can slow or stop your breathing, and death may occur. A person caring for you should give naloxone and/or seek emergency medical attention if you have slow breathing with long pauses, blue colored lips, or if you are hard to wake up. Call your doctor at once if you have:  noisy breathing, sighing, shallow breathing, breathing that stops during sleep;  a slow heart rate or weak pulse;  a light-headed feeling, like you might pass out;  seizure (convulsions); or  low cortisol levels --nausea, vomiting, loss of appetite, dizziness, worsening tiredness or weakness. Seek medical attention right away if you have symptoms of serotonin syndrome, such as: agitation, hallucinations, fever, sweating, shivering, fast heart rate, muscle stiffness, twitching, loss of coordination, nausea, vomiting, or diarrhea. Serious breathing problems may be more likely in older adults and people who are debilitated or have wasting syndrome or chronic breathing disorders. Common side effects may include:  constipation, nausea, vomiting, stomach pain;  dizziness, drowsiness, tiredness;  headache; or  itching. This is not a complete list of side effects and others may occur. Call your doctor for medical advice about side effects. You may report side effects to FDA at 4-084-FDA-0350. What other drugs will affect tramadol? You may have breathing problems or withdrawal symptoms if you start or stop taking certain other medicines. Tell your doctor if you also use an antibiotic, antifungal medication, heart or blood pressure medication, seizure medication, or medicine to treat HIV or hepatitis C. Many other drugs can be dangerous when used with tramadol.  Tell your doctor if you also use:  medicine for allergies, asthma, blood pressure, motion sickness, irritable bowel, or overactive bladder;  other opioid medicines;  a benzodiazepine sedative like Valium, Klonopin, or Xanax;  sleep medicine, muscle relaxers, or other drugs that make you drowsy; or  drugs that affect serotonin, such as antidepressants, stimulants, or medicine for migraines or Parkinson's disease. This list is not complete. Other drugs may affect tramadol, including prescription and over-the-counter medicines, vitamins, and herbal products. Not all possible interactions are listed here. Many other drugs can be dangerous when used with tramadol. Tell your doctor if you also use:  medicine for allergies, asthma, blood pressure, motion sickness, irritable bowel, or overactive bladder;  other opioid medicines;  a benzodiazepine sedative like Valium, Klonopin, or Xanax;  sleep medicine, muscle relaxers, or other drugs that make you drowsy;  drugs that affect serotonin, such as antidepressants, stimulants, or medicine for migraines or Parkinson's disease. drugs that affect serotonin levels in your body --a stimulant, or medicine for depression, Parkinson's disease, migraine headaches, serious infections, or nausea and vomiting. This list is not complete. Many other drugs may affect tramadol. This includes prescription and over-the-counter medicines, vitamins, and herbal products. Not all possible drug interactions are listed here. Where can I get more information? Your doctor or pharmacist can provide more information about tramadol. Remember, keep this and all other medicines out of the reach of children, never share your medicines with others, and use this medication only for the indication prescribed. Every effort has been made to ensure that the information provided by [address] is accurate, up-to-date, and complete, but no guarantee is made to that effect. Drug information contained herein may be time sensitive. [facility] information has been compiled for use by healthcare practitioners and consumers in the [place] and therefore [facility] does not warrant that uses outside of the [place] are appropriate, unless specifically indicated otherwise.  [facility]'s drug information does not endorse drugs, diagnose patients or recommend therapy. [facility]'s drug information is an informational resource designed to assist licensed healthcare practitioners in caring for their patients and/or to serve consumers viewing this service as a supplement to, and not a substitute for, the expertise, skill, knowledge and judgment of healthcare practitioners. The absence of a warning for a given drug or drug combination in no way should be construed to indicate that the drug or drug combination is safe, effective or appropriate for any given patient. [facility] does not assume any responsibility for any aspect of healthcare administered with the aid of information [facility] provides. The information contained herein is not intended to cover all possible uses, directions, precautions, warnings, drug interactions, allergic reactions, or adverse effects. If you have questions about the drugs you are taking, check with your doctor, nurse or pharmacist.  Copyright [id number] [address]: . Revision date: 6/15/2021. Care instructions adapted under license by [facility] [facility], [place] ([facility]). If you have questions about a medical condition or this instruction, always ask your healthcare professional. [place] [address] any warranty or liability for your use of this information. oxycodone  Pronunciation:  ox i KOE done  Brand:  Oxaydo, OxyCONTIN, Oxyfast, OxyIR, Roxicodone, Xtampza ER  What is the most important information I should know about oxycodone? MISUSE OF OPIOID MEDICINE CAN CAUSE ADDICTION, OVERDOSE, OR DEATH. Keep the medication in a place where others cannot get to it. Taking opioid medicine during pregnancy may cause life-threatening withdrawal symptoms in the . Fatal side effects can occur if you use opioid medicine with alcohol, or with other drugs that cause drowsiness or slow your breathing. What is oxycodone?   Oxycodone is an opioid pain medication used to treat moderate to severe pain. The extended-release form of oxycodone is for around-the-clock treatment of pain and should not  be used on an as-needed basis for pain. Oxycodone may also be used for purposes not listed in this medication guide. What should I discuss with my healthcare provider before using oxycodone? You should not use oxycodone if you are allergic to it, or if you have:  severe asthma or breathing problems; or  a blockage in your stomach or intestines. You should not use oxycodone unless you are already using a similar opioid medicine and are tolerant to it. Most brands of oxycodone are not approved for use in people under 18. OxyContin should not be given to a child younger than 6years old. Tell your doctor if you have ever had:  breathing problems, sleep apnea;  a head injury, or seizures;  drug or alcohol addiction, or mental illness;  liver or kidney disease;  urination problems; or  problems with your gallbladder, pancreas, or thyroid. If you use opioid medicine while you are pregnant, your baby could become dependent on the drug. This can cause life-threatening withdrawal symptoms in the baby after it is born. Babies born dependent on opioids may need medical treatment for several weeks. Ask a doctor before using opioid medicine if you are breastfeeding. Tell your doctor if you notice severe drowsiness or slow breathing in the nursing baby. How should I use oxycodone? Follow the directions on your prescription label and read all medication guides. Never use oxycodone in larger amounts, or for longer than prescribed. Tell your doctor if you feel an increased urge to take more of this medicine. Never share opioid medicine with another person, especially someone with a history of drug abuse or addiction. MISUSE CAN CAUSE ADDICTION, OVERDOSE, OR DEATH. Keep the medication in a place where others cannot get to it. Selling or giving away opioid medicine is against the law.   Stop taking all other around-the-clock opioid pain medicines when you start taking extended-release oxycodone. Take oxycodone with food. Swallow the capsule or tablet whole to avoid exposure to a potentially fatal overdose. Do not crush, chew, break, open, or dissolve. If you cannot swallow a capsule whole, open it and sprinkle the medicine into a spoonful of pudding or applesauce. Swallow the mixture right away without chewing. Do not save it for later use. Never crush or break an oxycodone pill to inhale the powder or mix it into a liquid to inject the drug into your vein. This can cause in death. Measure liquid medicine carefully. Use the dosing syringe provided, or use a medicine dose-measuring device (not a kitchen spoon). You should not stop using oxycodone suddenly. Follow your doctor's instructions about tapering your dose. Store at room temperature, away from heat, moisture, and light. Keep track of your medicine. Oxycodone is a drug of abuse and you should be aware if anyone is using your medicine improperly or without a prescription. Do not keep leftover opioid medication. Just one dose can cause death in someone using this medicine accidentally or improperly. Ask your pharmacist where to locate a drug take-back disposal program. If there is no take-back program, flush the unused medicine down the toilet. What happens if I miss a dose? Since oxycodone is used for pain, you are not likely to miss a dose. Skip any missed dose if it is almost time for your next dose. Do not use two doses at one time. What happens if I overdose? Seek emergency medical attention or call the Poison Help line at 1-879.931.5845. An opioid overdose can be fatal, especially in a child or other person using the medicine without a prescription. Overdose symptoms may include severe drowsiness, pinpoint pupils, slow breathing, or no breathing.   Your doctor may recommend you get naloxone (a medicine to reverse an opioid overdose) and keep it with you at all times. A person caring for you can give the naloxone if you stop breathing or don't wake up. Your caregiver must still get emergency medical help and may need to perform CPR (cardiopulmonary resuscitation) on you while waiting for help to arrive. Anyone can buy naloxone from a pharmacy or local health department. Make sure any person caring for you knows where you keep naloxone and how to use it. What should I avoid while using oxycodone? Do not drink alcohol. Dangerous side effects or death could occur. Avoid driving or operating machinery until you know how oxycodone will affect you. Dizziness or severe drowsiness can cause falls or other accidents. Avoid medication errors. Always check the brand and strength of oxycodone you get from the pharmacy. What are the possible side effects of oxycodone? Get emergency medical help if you have signs of an allergic reaction: hives; difficult breathing; swelling of your face, lips, tongue, or throat. Opioid medicine can slow or stop your breathing, and death may occur. A person caring for you should give naloxone and/or seek emergency medical attention if you have slow breathing with long pauses, blue colored lips, or if you are hard to wake up. Call your doctor at once if you have:  noisy breathing, sighing, shallow breathing, breathing that stops during sleep;  a slow heart rate or weak pulse;  a light-headed feeling, like you might pass out;  confusion, unusual thoughts or behavior;  seizure (convulsions);  low cortisol levels -- nausea, vomiting, loss of appetite, dizziness, worsening tiredness or weakness; or  high levels of serotonin in the body --agitation, hallucinations, fever, sweating, shivering, fast heart rate, muscle stiffness, twitching, loss of coordination, nausea, vomiting, diarrhea.   Serious breathing problems may be more likely in older adults and in those who are debilitated or have wasting syndrome or chronic breathing disorders. Common side effects may include:  drowsiness, headache, dizziness, tiredness; or  constipation, stomach pain, nausea, vomiting. This is not a complete list of side effects and others may occur. Call your doctor for medical advice about side effects. You may report side effects to FDA at 2-310-FDA-9124. What other drugs will affect oxycodone? You may have breathing problems or withdrawal symptoms if you start or stop taking certain other medicines. Tell your doctor if you also use an antibiotic, antifungal medication, heart or blood pressure medication, seizure medication, or medicine to treat HIV or hepatitis C. Opioid medication can interact with many other drugs and cause dangerous side effects or death. Be sure your doctor knows if you also use:  cold or allergy medicines, bronchodilator asthma/COPD medication, or a diuretic (\"water pill\");  medicines for motion sickness, irritable bowel syndrome, or overactive bladder;  other opioids --opioid pain medicine or prescription cough medicine;  a sedative like Valium --diazepam, alprazolam, lorazepam, Xanax, Klonopin, Versed, and others;  drugs that make you sleepy or slow your breathing --a sleeping pill, muscle relaxer, medicine to treat mood disorders or mental illness; or  drugs that affect serotonin levels in your body --a stimulant, or medicine for depression, Parkinson's disease, migraine headaches, serious infections, or nausea and vomiting. This list is not complete and many other drugs may affect oxycodone. This includes prescription and over-the-counter medicines, vitamins, and herbal products. Not all possible drug interactions are listed here. Where can I get more information? Your pharmacist can provide more information about oxycodone. Remember, keep this and all other medicines out of the reach of children, never share your medicines with others, and use this medication only for the indication prescribed.    Every effort has been made to ensure that the information provided by [address] is accurate, up-to-date, and complete, but no guarantee is made to that effect. Drug information contained herein may be time sensitive. [facility] information has been compiled for use by healthcare practitioners and consumers in the [place] and therefore [facility] does not warrant that uses outside of the [place] are appropriate, unless specifically indicated otherwise. [facility]'s drug information does not endorse drugs, diagnose patients or recommend therapy. [facility]'s drug information is an informational resource designed to assist licensed healthcare practitioners in caring for their patients and/or to serve consumers viewing this service as a supplement to, and not a substitute for, the expertise, skill, knowledge and judgment of healthcare practitioners. The absence of a warning for a given drug or drug combination in no way should be construed to indicate that the drug or drug combination is safe, effective or appropriate for any given patient. [facility] does not assume any responsibility for any aspect of healthcare administered with the aid of information [facility] provides. The information contained herein is not intended to cover all possible uses, directions, precautions, warnings, drug interactions, allergic reactions, or adverse effects. If you have questions about the drugs you are taking, check with your doctor, nurse or pharmacist.  Copyright 3522-1261 [address]. Version: 14.02. Revision date: 1/28/2021. Care instructions adapted under license by [facility] ([facility]). If you have questions about a medical condition or this instruction, always ask your healthcare professional. [place] [address] any warranty or liability for your use of this information.

## 2022-09-15 NOTE — PLAN OF CARE
Problem: Discharge Planning  Goal: Discharge to home or other facility with appropriate resources  Outcome: Progressing     Problem: Pain  Goal: Verbalizes/displays adequate comfort level or baseline comfort level  Outcome: Progressing     Problem: Safety - Adult  Goal: Free from fall injury  Outcome: Progressing     Problem: Skin/Tissue Integrity  Goal: Absence of new skin breakdown  Description: 1. Monitor for areas of redness and/or skin breakdown  2. Assess vascular access sites hourly  3. Every 4-6 hours minimum:  Change oxygen saturation probe site  4. Every 4-6 hours:  If on nasal continuous positive airway pressure, respiratory therapy assess nares and determine need for appliance change or resting period.   Outcome: Progressing     Problem: Nutrition Deficit:  Goal: Optimize nutritional status  Outcome: Progressing     Problem: ABCDS Injury Assessment  Goal: Absence of physical injury  Outcome: Progressing     Problem: ABCDS Injury Assessment  Goal: Absence of physical injury  Outcome: Progressing

## 2022-09-15 NOTE — PROGRESS NOTES
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 Pain is controlled. Agreeable to using oral only medications. Prior (baseline) level of function: Independent. Current level of function:         Current  IRF-GIANNI and Goals:   Occupational Therapy:    Short Term Goals  Time Frame for Short term goals: STGs=LTGs :   Long Term Goals  Time Frame for Long term goals : 7-10 days or until d/c. Long Term Goal 1: Pt will complete grooming tasks c Mod I.  Long Term Goal 2: Pt will complete total body bathing c AE PRN and Mod I.  Long Term Goal 3: Pt will complete UB dressing c IND. Long Term Goal 4: Pt will complete LB dressing c AE PRN and Mod I.  Long Term Goal 5: Pt will doff/don footwear to LLE c IND.   Additional Goals?: Yes  Long Term Goal 6: Pt will complete toileting c Mod I.  Long Term Goal 7: Pt will perform functional transfers (bed, chair, toilet, shower) c DME PRN and Mod I.  Long Term Goal 8: Pt will complete therex/therax to facilitate an increase in strength/endurance (c emphasis on dynamic standing balance/tolerance > 8 mins) c Mod I.  Long Term Goal 9: Pt will complete home management tasks c Mod I. :   Eatin - Patient feeds self  Groomin - Able to perform 3-4 tasks with touching help  Bathin - Able to bathe 3-4 areas  Dressing-Upper: 3 - Requires assist with 2 tasks  Dressing-Lower: 2 - Requires assist with 4-5 parts of dressing  Toiletin - Able to perform 1 task only (e.g. hygiene)  Toilet Transfer: 2 - Requires 50-74% assist getting off toilet                 Eating: Eating  Assistance Needed: Independent  Comment: X  CARE Score: 6  Discharge Goal: Independent       Oral Hygiene: Oral Hygiene  Assistance Needed: Independent  Comment: X  CARE Score: 6  Discharge Goal: Independent    UB/LB Bathing: Shower/Bathe Self  Assistance Needed: Independent  Comment: MOd I seated entirety of shower, weight shifts to wash vicki area and buttocks  CARE Score: 6  Discharge Goal: Independent    UB Dressing: Upper Body Dressing  Assistance Needed: Independent  Comment: X  CARE Score: 6  Discharge Goal: Independent         LB Dressing: Lower Body Dressing  Assistance Needed: Independent  Comment: Mod I using sink countertop against hip  for balance  CARE Score: 6  Discharge Goal: Independent    Donning and [place] Footwear: Putting On/Taking Off Footwear  Assistance Needed: Independent  Comment: X  CARE Score: 6  Discharge Goal: Independent      Toileting: Toileting Hygiene  Assistance needed: Independent  Comment: Mod I hygiene completed seated, unilateral support to grab bar to manage pants  CARE Score: 6  Discharge Goal: Independent      Toilet Transfers: Toilet Transfer  Assistance needed: Independent  Comment:  Mod I  CARE Score: 6  Discharge Goal: Independent    Physical Therapy:         Long Term Goals  Time Frame for Long term goals : 7-10 days STG=LTG  Long term goal 1: Pt will perform bed mobility with mod I  Long term goal 2: Pt will perform sit to stand, w/c<>bed and car transfers with mod I  Long term goal 3: pt will ambulate with 2ww 50' on level surfaces and 10' on unlevel surface with supervision  Long term goal 4: Pt will ascend/descend 4\" step with 2ww and CGA  Long term goal 5: Pt will propel w/c in household situations 250' with mod I  Additional Goals?: Yes  Long term goal 6: Pt will  light object from floor with 2ww and reacher with mod I      Bed Mobility:   Sit to Lying  Assistance Needed: Independent  Comment: Practiced in regular, flat bed without rails  CARE Score: 6  Roll Left and Right  Assistance Needed: Independent  Comment: Practiced in regular, flat bed without rails  CARE Score: 6  Discharge Goal: Independent  Lying to Sitting on Side of Bed  Assistance Needed: Independent  Comment: Practiced in regular, flat bed without rails  CARE Score: 6  Discharge Goal: Independent    Transfers:    Sit to Stand  Assistance Needed: Supervision or touching assistance  Comment: SBA with RW  CARE Score: 4  Discharge Goal: Independent  Chair/Bed-to-Chair Transfer  Assistance Needed: Supervision or touching assistance  Comment: SBA without RW. pt does w/c setup. CARE Score: 4  Discharge Goal: Independent     Car Transfer  Assistance Needed: Supervision or touching assistance  Comment: SBA without RW. Uses car for external support.   CARE Score: 4  Discharge Goal: Independent    Ambulation:    Walking Ability  Does the Patient Walk?: Yes     Walk 10 Feet  Assistance Needed: Supervision or touching assistance  Comment: SBA with RW  CARE Score: 4  Discharge Goal: Independent     Walk 50 Feet with Two Turns  Assistance Needed: Supervision or touching assistance  Comment: SBA with RW  Reason if not Attempted: Not attempted due to medical condition or safety concerns  CARE Score: 4  Discharge Goal: Supervision or touching assistance     Walk 150 Feet  Comment: Limited by fatigue  Reason if not Attempted: Not attempted due to medical condition or safety concerns  CARE Score: 88  Discharge Goal: Not Attempted     Walking 10 Feet on Uneven Surfaces  Assistance Needed: Supervision or touching assistance  Comment: CGA with RW  Reason if not Attempted: Not attempted due to medical condition or safety concerns  CARE Score: 4  Discharge Goal: Supervision or touching assistance     1 Step (Curb)  Assistance Needed: Supervision or touching assistance  Comment: CGA with RW  Reason if not Attempted: Not attempted due to medical condition or safety concerns  CARE Score: 4  Discharge Goal: Supervision or touching assistance     4 Steps  Reason if not Attempted: Not applicable  CARE Score: 9  Discharge Goal: Not Applicable     12 Steps  Reason if not Attempted: Not applicable  CARE Score: 9  Discharge Goal: Not Applicable       Wheelchair:  w/c Ability: Wheelchair Ability  Uses a Wheelchair and/or Scooter?: Yes  Wheel 50 Feet with Two Turns  Assistance Needed: Independent  Comment: mod I with BUEs  CARE Score: 6  Discharge Goal: Independent  Wheel 150 Feet  Assistance Needed: Independent  Comment: mod I with BUEs  CARE Score: 6  Discharge Goal: Independent          Balance:        Object: Picking Up Object  Assistance Needed: Supervision or touching assistance  Comment: SBA with RW and reacher  Reason if not Attempted: Not attempted due to medical condition or safety concerns  CARE Score: 4  Discharge Goal: Independent    I      Exam:    Blood pressure (!) 122/54, pulse 77, temperature 98.1 °F (36.7 °C), temperature source Oral, resp. rate 20, height 5' 8\" (1.727 m), weight 134 lb 4.2 oz (60.9 kg), SpO2 98 %. General: Sitting up in a bedside chair with legs elevated. Alert. In good spirits. HEENT: Full visual field. Clear speech. Neck supple. MMM. Pulmonary: No wheezes, rales or coughing. Cardiac: Regular rate and rhythm. Abdomen: Patient's abdomen is soft and nondistended. Bowel sounds were present throughout. There was no rebound, guarding or masses noted. Upper extremities: Able to bring both hands up overhead. Functional  strength. Diminished sensation in the fingers as before. Lower extremities: His right BKA stump is mildly tender to touch but much less swollen. The incision is clean and dry. Knee extends fully. Sitting balance was good.   Standing balance was fair-.    Lab Results   Component Value Date    WBC 8.2 09/05/2022    HGB 8.0 (L) 09/05/2022    HCT 24.7 (L) 09/05/2022    MCV 86.1 09/05/2022     09/05/2022     Lab Results   Component Value Date    INR 1.19 08/29/2022    INR 1.14 08/28/2018    INR 1.09 08/17/2016    PROTIME 15.4 (H) 08/29/2022    PROTIME 13.0 (H) 08/28/2018    PROTIME 12.5 08/17/2016     Lab Results   Component Value Date    CREATININE 1.1 09/06/2022    BUN 30 (H) 09/06/2022     (L) 09/06/2022    K 5.2 (H) 09/06/2022     09/06/2022    CO2 24 09/06/2022     Lab Results   Component Value Date    ALT 8 (L) 08/30/2022    AST 16 08/30/2022    ALKPHOS 121 08/30/2022    BILITOT 0.3 08/30/2022       Expected length of stay  prior to a supervised level of function for discharge home with a wheelchair/walker and [facility] OT/PT is 9/15/2022. Recommendations:    Osteomyelitis of the right foot with BK amputation: Anticipate transitioning to home-based therapy and nursing plan of care once discharged from rehab tomorrow. Improving pain control overall. His wound shows no signs of infection. Continue with daily wound care, limb elevation and pain management. He benefits from DVT prophylaxis, aggressive pulmonary hygiene measures and nutritional support. Ongoing attempts to control his blood sugar and blood pressure and provide a nicotine alternative to smoking. He needs caregiver training, adaptive equipment education and introduction to a prosthetists. Outpatient follow-up with his surgeon in 1 week. He has completed antibiotic therapy. Verbal cues and SBA for transfers. DVT prophylaxis: Lovenox 40 mg subcu daily. I must monitor his hemoglobin and platelet count periodically while on this medication. Weightbearing activities through the left lower limb are slowly progressing daily. GI prophylaxis offered. No new bruising or swelling. Anticipate stopping the Lovenox at discharge. Uncontrolled pain: Using oral medications only now. Fair pain control. Sleeping well. Bowels moving predictably. No significant nausea with his medicines. Planning to reduce the strength of the Dilaudid soon. Limb elevation to help control swelling. Local wound care. Progressive mobilization. Uncontrolled diabetes type 2 with peripheral neuropathy: Patient requires a diet modified for carbohydrates. He is on scheduled Lantus and a Humalog sliding scale. Neurontin for neuropathy symptoms. Blood sugars are checked at mealtime and bedtime. Encouraging consistent oral intake. Hypertension: Pain management is a significant part of the blood pressure control. Nicotine replacement. Monitoring his blood pressure to determine if there is need for medication. Target systolic blood pressures [id number]. Blood pressure is within the target range when he was in pain. Monitoring closely. Hyponatremia: Encouraging consistent oral intake. Periodic monitoring of his chemistries. Cautious use of any diuretics. COPD: Monitoring O2 saturations at rest and with activity. Aggressive pulmonary hygiene measures. Bronchodilators as needed.

## 2022-09-15 NOTE — PROGRESS NOTES
Pt very upset and cursing that bed alarm is on. Attempted to explain to patient that it is on for his safety however he did not care to listen and wanted it shut off. Pt states \"turn it off, I do not want it on. \" Pt also states that he is in terrible pain. This nurse stated I could give him his oral pain med when it was due, also attempted to suggest comfort measures for pain such as elevation and  heat and/or ice, he states \"it doesn't work, nothing works, I have already tried. Pt states \"I just want to be left alone. \" Pt conts to sit at side of bed rubbing his rt leg and stump. Perfect served Dr Radha Lin about above information, awaiting response. No new orders at present.

## 2022-09-15 NOTE — PROGRESS NOTES
Pt continues to sit at side of bed. Pt given his oral pain med, see MAR. Pt did refuse tylenol and also Robaxin. Pt states \"they make me sick and they don't help. \" Dr Griselda Michael also called this nurse to follow up with patient and orders were placed by Dr Griselda Michael. Pt states pain is better than it was at present. Pt rates pain an 8 at present, describing pain as burning and throbbing and aching. Will cont. To monitor patient.

## 2022-09-16 ENCOUNTER — CARE COORDINATION (OUTPATIENT)
Dept: CASE MANAGEMENT | Age: 62
End: 2022-09-16

## 2022-09-16 NOTE — CARE COORDINATION
Care Transitions Outreach Attempt    Call within 2 business days of discharge: Yes   Attempted to reach patient for transitions of care follow up. Unable to reach patient. Left HIPAA compliant message for SAVANAH Lopez to return call. Patient: Jace Blanton Patient : 1960 MRN: <[id number]>    Last Discharge [facility]       Date Complaint Diagnosis Description Type Department Provider    22  WD-Diabetic ulcer of right midfoot associated with type 2 diabetes mellitus, with muscle involvement without evidence of necrosis ([facility] [address] . ..  Admission (Discharged) Davon Swenson MD                   Noted following upcoming appointments from discharge chart review:   REHABILITATION [facility] follow up appointment(s):   Future Appointments   Date Time Provider Teresa Sutton   2022  9:15 AM Khloe Melissa, DO [address] [facility]     Non-[facility] follow up appointment(s):

## 2022-09-19 ENCOUNTER — CARE COORDINATION (OUTPATIENT)
Dept: CASE MANAGEMENT | Age: 62
End: 2022-09-19

## 2022-09-19 NOTE — CARE COORDINATION
Care Transitions Outreach Attempt    Call within 2 business days of discharge: Yes   Attempted to reach patient for transitions of care follow up. Unable to reach patient. Left message w/ CTN # to return call day #2 w/ no response. [address] [place] [address] Rd Grover Brown) said they did not see because they could not get in contact w/ pt. PCP office notified. Patient: Siobhan Higuera Patient : 1960 MRN: [id number]    Last Discharge [facility]       Date Complaint Diagnosis Description Type Department Provider    22  WD-Diabetic ulcer of right midfoot associated with type 2 diabetes mellitus, with muscle involvement without evidence of necrosis ([facility] [address] . ..  Admission (Discharged) Nico Greenwood MD              D    Noted following upcoming appointments from discharge chart review:   [facility] follow up appointment(s):   Future Appointments   Date Time Provider Teresa Sutton   2022  9:15 AM Samreen Giraldo DO [address] [facility]     Non-[facility] follow up appointment(s):

## 2022-09-19 NOTE — DISCHARGE SUMMARY
Patient Name: José Rossi  Patient :  1960  Patient MRN:   [id number]      Admission Date:  2022  Discharge Date: 9/15/2022    Admitting diagnosis: Right lower limb osteomyelitis ( [address])     Comorbid diagnoses impacting rehabilitation: Uncontrolled pain, generalized weakness, gait disturbance, status post right below-knee amputation on 2022, uncontrolled diabetes type 2 with peripheral neuropathy, essential hypertension, acute blood loss anemia, hyponatremia, COPD, nicotine addiction    Discharging diagnosis: Right lower limb osteomyelitis (IGC 5.4)     Comorbid diagnoses impacting rehabilitation: Uncontrolled pain, generalized weakness, gait disturbance, status post right below-knee amputation on 2022, uncontrolled diabetes type 2 with peripheral neuropathy, essential hypertension, acute blood loss anemia, hyponatremia, COPD, nicotine addiction     History of present illness: The patient is a 78-year-old right-hand-dominant male with complications from diabetes and hypertension including a chronic ulcer on his right foot. Patient has been managed in and out of the hospital for this wound. Recently a VAC dressing was placed on the wound. Unfortunately while at home he had a injury to the right foot when a large can of beans fell from a shelf and landed across the dorsum of his right foot. 3 days later on 2022 he presented to our ED with severe pain and drainage from the right foot. He was diagnosed with osteomyelitis and the condition of the wound was so bad there was concern about the patient's life. Therefore, Dr. Lyn Ward formed a right below-knee amputation. The patient had significant fluctuations of pain, blood sugar and a drop in sodium following the procedure. He required injectable analgesics, oral analgesics and some wound care. Prior (baseline) level of function: Independent.     Current level of function:         Current  IRF-GIANNI and Goals:   Occupational Therapy:    Short Term Goals  Time Frame for Short term goals: STGs=LTGs :   Long Term Goals  Time Frame for Long term goals : 7-10 days or until d/c. Long Term Goal 1: Pt will complete grooming tasks c Mod I.  Long Term Goal 2: Pt will complete total body bathing c AE PRN and Mod I.  Long Term Goal 3: Pt will complete UB dressing c IND. Long Term Goal 4: Pt will complete LB dressing c AE PRN and Mod I.  Long Term Goal 5: Pt will doff/don footwear to LLE c IND. Additional Goals?: Yes  Long Term Goal 6: Pt will complete toileting c Mod I.  Long Term Goal 7: Pt will perform functional transfers (bed, chair, toilet, shower) c DME PRN and Mod I.  Long Term Goal 8: Pt will complete therex/therax to facilitate an increase in strength/endurance (c emphasis on dynamic standing balance/tolerance > 8 mins) c Mod I.  Long Term Goal 9: Pt will complete home management tasks c Mod I. :   Eatin - Patient feeds self  Groomin - Able to perform 3-4 tasks with touching help  Bathin - Able to bathe 3-4 areas  Dressing-Upper: 3 - Requires assist with 2 tasks  Dressing-Lower: 2 - Requires assist with 4-5 parts of dressing  Toiletin - Able to perform 1 task only (e.g. hygiene)  Toilet Transfer: 2 - Requires 50-74% assist getting off toilet                 Eating: Eating  Assistance Needed: Independent  Comment: X  CARE Score: 6  Discharge Goal: Independent       Oral Hygiene: Oral Hygiene  Assistance Needed: Independent  Comment: X  CARE Score: 6  Discharge Goal: Independent    UB/LB Bathing: Shower/Bathe Self  Assistance Needed: Independent  Comment: MOd I seated entirety of shower, weight shifts to wash vicki area and buttocks  CARE Score: 6  Discharge Goal: Independent    UB Dressing: Upper Body Dressing  Assistance Needed: Independent  Comment: X  CARE Score: 6  Discharge Goal: Independent         LB Dressing: Lower Body Dressing  Assistance Needed: Independent  Comment:  Mod I using sink countertop against hip  for balance  CARE Score: 6  Discharge Goal: Independent    Donning and [place] Footwear: Putting On/Taking Off Footwear  Assistance Needed: Independent  Comment: X  CARE Score: 6  Discharge Goal: Independent      Toileting: Toileting Hygiene  Assistance needed: Independent  Comment: Mod I hygiene completed seated, unilateral support to grab bar to manage pants  CARE Score: 6  Discharge Goal: Independent      Toilet Transfers: Toilet Transfer  Assistance needed: Independent  Comment: Mod I  CARE Score: 6  Discharge Goal: Independent    Physical Therapy:         Long Term Goals  Time Frame for Long term goals : 7-10 days STG=LTG  Long term goal 1: Pt will perform bed mobility with mod I  Long term goal 2: Pt will perform sit to stand, w/c<>bed and car transfers with mod I  Long term goal 3: pt will ambulate with 2ww 50' on level surfaces and 10' on unlevel surface with supervision  Long term goal 4: Pt will ascend/descend 4\" step with 2ww and CGA  Long term goal 5: Pt will propel w/c in household situations 250' with mod I  Additional Goals?: Yes  Long term goal 6: Pt will  light object from floor with 2ww and reacher with mod I      Bed Mobility:   Sit to Lying  Assistance Needed: Independent  Comment: Practiced in regular, flat bed without rails  CARE Score: 6  Roll Left and Right  Assistance Needed: Independent  Comment: Practiced in regular, flat bed without rails  CARE Score: 6  Discharge Goal: Independent  Lying to Sitting on Side of Bed  Assistance Needed: Independent  Comment: Practiced in regular, flat bed without rails  CARE Score: 6  Discharge Goal: Independent    Transfers:    Sit to Stand  Assistance Needed: Supervision or touching assistance  Comment: SBA with RW  CARE Score: 4  Discharge Goal: Independent  Chair/Bed-to-Chair Transfer  Assistance Needed: Supervision or touching assistance  Comment: SBA without RW. pt does w/c setup.   CARE Score: 4  Discharge Goal: Independent     Car Transfer  Assistance Needed: Supervision or touching assistance  Comment: SBA without RW. Uses car for external support.   CARE Score: 4  Discharge Goal: Independent    Ambulation:    Walking Ability  Does the Patient Walk?: Yes     Walk 10 Feet  Assistance Needed: Supervision or touching assistance  Comment: SBA with RW  CARE Score: 4  Discharge Goal: Independent     Walk 50 Feet with Two Turns  Assistance Needed: Supervision or touching assistance  Comment: SBA with RW  Reason if not Attempted: Not attempted due to medical condition or safety concerns  CARE Score: 4  Discharge Goal: Supervision or touching assistance     Walk 150 Feet  Comment: Limited by fatigue  Reason if not Attempted: Not attempted due to medical condition or safety concerns  CARE Score: 88  Discharge Goal: Not Attempted     Walking 10 Feet on Uneven Surfaces  Assistance Needed: Supervision or touching assistance  Comment: CGA with RW  Reason if not Attempted: Not attempted due to medical condition or safety concerns  CARE Score: 4  Discharge Goal: Supervision or touching assistance     1 Step (Curb)  Assistance Needed: Supervision or touching assistance  Comment: CGA with RW  Reason if not Attempted: Not attempted due to medical condition or safety concerns  CARE Score: 4  Discharge Goal: Supervision or touching assistance     4 Steps  Reason if not Attempted: Not applicable  CARE Score: 9  Discharge Goal: Not Applicable     12 Steps  Reason if not Attempted: Not applicable  CARE Score: 9  Discharge Goal: Not Applicable       Wheelchair:  w/c Ability: Wheelchair Ability  Uses a Wheelchair and/or Scooter?: Yes  Wheel 50 Feet with Two Turns  Assistance Needed: Independent  Comment: mod I with BUEs  CARE Score: 6  Discharge Goal: Independent  Wheel 150 Feet  Assistance Needed: Independent  Comment: mod I with BUEs  CARE Score: 6  Discharge Goal: Independent          Balance:        Object: Picking Up Object  Assistance Needed: Supervision or touching assistance  Comment: SBA with RW and reacher  Reason if not Attempted: Not attempted due to medical condition or safety concerns  CARE Score: 4  Discharge Goal: Independent    I      Exam:    Blood pressure 137/68, pulse 68, temperature 97.5 °F (36.4 °C), temperature source Axillary, resp. rate 20, height 5' 8\" (1.727 m), weight 130 lb 15.3 oz (59.4 kg), SpO2 100 %. General: Sitting up in wheelchair. Talkative. Oriented x3. Asking insightful questions. In good spirits. HEENT: Neck supple. Full visual field. Clear speech. MMM. Pulmonary: Unlabored without wheezes, rales or coughing. Cardiac: Regular rate and rhythm. Abdomen: Patient's abdomen is soft and nondistended. Bowel sounds were present throughout. There was no rebound, guarding or masses noted. Upper extremities: Able to bring both hands up overhead. Functional  strength. Diminished sensation in the fingers as before. Lower extremities: His right BKA stump is mildly tender to touch but much less swollen. The incision is clean and dry. Knee extends fully. Sitting balance was good.   Standing balance was fair-.    Lab Results   Component Value Date    WBC 8.2 09/05/2022    HGB 8.0 (L) 09/05/2022    HCT 24.7 (L) 09/05/2022    MCV 86.1 09/05/2022     09/05/2022     Lab Results   Component Value Date    INR 1.19 08/29/2022    INR 1.14 08/28/2018    INR 1.09 08/17/2016    PROTIME 15.4 (H) 08/29/2022    PROTIME 13.0 (H) 08/28/2018    PROTIME 12.5 08/17/2016     Lab Results   Component Value Date    CREATININE 1.1 09/06/2022    BUN 30 (H) 09/06/2022     (L) 09/06/2022    K 5.2 (H) 09/06/2022     09/06/2022    CO2 24 09/06/2022     Lab Results   Component Value Date    ALT 8 (L) 08/30/2022    AST 16 08/30/2022    ALKPHOS 121 08/30/2022    BILITOT 0.3 08/30/2022         The patient presented to the ARU with the above history requiring a multidisciplinary treatment plan including close medical supervision by the [facility] Director. The patient participated in the prescribed therapy treatment plan with reasonable compliance and progressive tolerance. They avoided significant medical complications. By the time of discharge the patient had become safer with adaptive equipment to transfer and toilet with a wheelchair with the supervision of family/caregivers. The patient was tolerating an oral diet without choking/coughing and was back to their baseline with regards to bowel and bladder control. Discharge instructions were reviewed with the patient. The patient is to follow up with the surgeon and his PCP in 1 week. No driving. [facility] PT/OT/RN will be arranged. Medication List        START taking these medications      DULoxetine 20 MG extended release capsule  Commonly known as: CYMBALTA  Take 1 capsule by mouth daily     ibuprofen 400 MG tablet  Commonly known as: ADVIL;MOTRIN  Take 1 tablet by mouth every 6 hours as needed for Pain     lidocaine 4 % external patch  Place 1 patch onto the skin daily  Notes to patient: Remove after 12 hours wear. oxyCODONE HCl 10 MG immediate release tablet  Commonly known as: OXY-IR  Take 1 tablet by mouth every 4 hours as needed for Pain for up to 7 days. traMADol 50 MG tablet  Commonly known as: ULTRAM  Take 1 tablet by mouth every 6 hours as needed for Pain for up to 7 days. CONTINUE taking these medications      aspirin 81 MG chewable tablet     atorvastatin 40 MG tablet  Commonly known as: LIPITOR  Take 1 tablet by mouth nightly     blood glucose test strips strip  Commonly known as: FREESTYLE LITE  Test 3 times daily as needed. famotidine 20 MG tablet  Commonly known as: PEPCID     FreeStyle Lite Rakel  Apply 1 Device topically three times daily. gabapentin 300 MG capsule  Commonly known as: NEURONTIN  Take 2 capsules by mouth 3 times daily for 30 days.      * Gauze Pads & Dressings 2\"X2\" Pads  1 each by Does not apply route daily

## 2022-09-21 ENCOUNTER — OFFICE VISIT (OUTPATIENT)
Dept: SURGERY | Age: 62
End: 2022-09-21

## 2022-09-21 VITALS
HEIGHT: 68 IN | WEIGHT: 130 LBS | OXYGEN SATURATION: 99 % | HEART RATE: 96 BPM | SYSTOLIC BLOOD PRESSURE: 140 MMHG | BODY MASS INDEX: 19.7 KG/M2 | DIASTOLIC BLOOD PRESSURE: 90 MMHG

## 2022-09-21 DIAGNOSIS — Z48.89 POSTOPERATIVE VISIT: Primary | ICD-10-CM

## 2022-09-21 PROCEDURE — 99024 POSTOP FOLLOW-UP VISIT: CPT | Performed by: SURGERY

## 2022-09-21 ASSESSMENT — PATIENT HEALTH QUESTIONNAIRE - PHQ9
1. LITTLE INTEREST OR PLEASURE IN DOING THINGS: 0
SUM OF ALL RESPONSES TO PHQ QUESTIONS 1-9: 0
2. FEELING DOWN, DEPRESSED OR HOPELESS: 0
SUM OF ALL RESPONSES TO PHQ9 QUESTIONS 1 & 2: 0
SUM OF ALL RESPONSES TO PHQ QUESTIONS 1-9: 0
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
## 2022-09-22 NOTE — CARE COORDINATION
Rosa Isela Vargas from [facility] made pt non admit as pt refused hhc. Liaison called pt and left VM with number the event pt changes his mind and wants hhc.

## 2022-09-23 ENCOUNTER — TELEPHONE (OUTPATIENT)
Dept: BARIATRICS/WEIGHT MGMT | Age: 62
End: 2022-09-23

## 2022-09-26 DIAGNOSIS — G89.18 POST-OP PAIN: Primary | ICD-10-CM

## 2022-09-27 ENCOUNTER — TELEPHONE (OUTPATIENT)
Dept: BARIATRICS/WEIGHT MGMT | Age: 62
End: 2022-09-27

## 2022-09-27 NOTE — TELEPHONE ENCOUNTER
ALENA to call the office back. Maria Fernanda Lopes wrote for Norco and the hard script is in our office ready to .

## 2022-09-28 ENCOUNTER — OFFICE VISIT (OUTPATIENT)
Dept: SURGERY | Age: 62
End: 2022-09-28

## 2022-09-28 VITALS
BODY MASS INDEX: 19.71 KG/M2 | DIASTOLIC BLOOD PRESSURE: 78 MMHG | HEIGHT: 68 IN | HEART RATE: 87 BPM | WEIGHT: 130.07 LBS | OXYGEN SATURATION: 100 % | SYSTOLIC BLOOD PRESSURE: 142 MMHG

## 2022-09-28 DIAGNOSIS — Z48.89 POSTOPERATIVE VISIT: Primary | ICD-10-CM

## 2022-09-28 PROCEDURE — 99024 POSTOP FOLLOW-UP VISIT: CPT | Performed by: SURGERY

## 2022-09-28 ASSESSMENT — PATIENT HEALTH QUESTIONNAIRE - PHQ9
2. FEELING DOWN, DEPRESSED OR HOPELESS: 0
SUM OF ALL RESPONSES TO PHQ QUESTIONS 1-9: 0
SUM OF ALL RESPONSES TO PHQ9 QUESTIONS 1 & 2: 0
SUM OF ALL RESPONSES TO PHQ QUESTIONS 1-9: 0
1. LITTLE INTEREST OR PLEASURE IN DOING THINGS: 0
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
## 2022-10-25 ENCOUNTER — HOSPITAL ENCOUNTER (EMERGENCY)
Age: 62
Discharge: HOME | End: 2022-10-25
Attending: EMERGENCY MEDICINE
Payer: MEDICARE

## 2022-10-25 VITALS
WEIGHT: 145 LBS | HEIGHT: 67 IN | HEART RATE: 74 BPM | BODY MASS INDEX: 22.76 KG/M2 | TEMPERATURE: 98.9 F | SYSTOLIC BLOOD PRESSURE: 162 MMHG | DIASTOLIC BLOOD PRESSURE: 84 MMHG | OXYGEN SATURATION: 98 % | RESPIRATION RATE: 18 BRPM

## 2022-10-25 DIAGNOSIS — T81.31XA DEHISCENCE OF OPERATIVE WOUND, INITIAL ENCOUNTER: Primary | ICD-10-CM

## 2022-10-25 PROCEDURE — 6370000000 HC RX 637 (ALT 250 FOR IP): Performed by: EMERGENCY MEDICINE

## 2022-10-25 PROCEDURE — 99283 EMERGENCY DEPT VISIT LOW MDM: CPT

## 2022-10-25 RX ORDER — NAPROXEN 250 MG/1
500 TABLET ORAL ONCE
Status: COMPLETED | OUTPATIENT
Start: 2022-10-25 | End: 2022-10-25

## 2022-10-25 RX ORDER — SULFAMETHOXAZOLE AND TRIMETHOPRIM 800; 160 MG/1; MG/1
1 TABLET ORAL 2 TIMES DAILY
Qty: 20 TABLET | Refills: 0 | Status: SHIPPED | OUTPATIENT
Start: 2022-10-25 | End: 2022-11-04

## 2022-10-25 RX ORDER — NAPROXEN 500 MG/1
500 TABLET ORAL 2 TIMES DAILY WITH MEALS
Qty: 60 TABLET | Refills: 0 | Status: SHIPPED | OUTPATIENT
Start: 2022-10-25

## 2022-10-25 RX ORDER — SULFAMETHOXAZOLE AND TRIMETHOPRIM 800; 160 MG/1; MG/1
1 TABLET ORAL ONCE
Status: COMPLETED | OUTPATIENT
Start: 2022-10-25 | End: 2022-10-25

## 2022-10-25 RX ADMIN — NAPROXEN 500 MG: 250 TABLET ORAL at 21:35

## 2022-10-25 RX ADMIN — SULFAMETHOXAZOLE AND TRIMETHOPRIM 1 TABLET: 800; 160 TABLET ORAL at 21:35

## 2022-10-25 ASSESSMENT — PAIN - FUNCTIONAL ASSESSMENT: PAIN_FUNCTIONAL_ASSESSMENT: 0-10

## 2022-10-25 ASSESSMENT — PAIN SCALES - GENERAL: PAINLEVEL_OUTOF10: 6

## 2022-10-26 NOTE — DISCHARGE INSTRUCTIONS
Please do twice a day wet-to-dry dressings. Please follow-up with wound care clinic calling in the morning for appointment. Please complete Bactrim course as prescribed.
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
## 2022-10-31 ENCOUNTER — HOSPITAL ENCOUNTER (OUTPATIENT)
Dept: WOUND CARE | Age: 62
Discharge: HOME | End: 2022-10-31

## 2022-10-31 NOTE — DISCHARGE INSTRUCTIONS
PHYSICIAN ORDERS AND DISCHARGE INSTRUCTIONS     NOTE: Upon discharge from the [address], you will receive a patient experience survey. We would be grateful if you would take the time to fill this survey out. Wound care order history:                 MICHAEL's   Right       Left               Date:              Cultures:                Grafts:                Antibiotics:           Continuing wound care orders and information:                 Residence:                Continue home health care with:              Your wound-care supplies will be provided by: Wound cleansing:              Do not scrub or use excessive force. Wash hands with soap and water before and after dressing changes. Prior to applying a clean dressing, cleanse wound with normal saline, wound cleanser, or mild soap and water. Ask the physician or nurse before getting the wound(s) wet in a shower     Daily Wound management:              Keep weight off wounds and reposition every 2 hours. Avoid standing for long periods of time. Apply wraps/stockings in AM and remove at bedtime. If swelling is present, elevate legs to the level of the heart or above for 30 minutes 4-5 times a day and/or when sitting. When taking antibiotics take entire prescription as ordered by physician do not stop taking until medicine is all gone. Wound Care Notes:   [facility] in [facility] ordered 7/25/22; Applied to Right Lateral foot 08/01/22  Heel offloader given in clinic on 7/25/22                             Orders for this week: 10/31/22     Right Plantar Foot - wash with soap and water, pat dry. WOUND VAC THERAPY:  DUODERM/EXUDERM TO PERIWOUND FOR PROTECTION. APPLY ANASEPT, STIMULEN AND BLACK FOAM TO WOUND. (Include undermining)   SECURE VAC DRESSING WITH DRAPE.   SET WOUND VAC  CONTINUOUS SUCTION. CANISTER CHANGE WEEKLY OR ACCORDING TO VOLUME OF DRAINAGE. WOUND VAC DRESSING TO BE CHANGED MONDAYS AND Gustavo Koko. Nurse Visit Thursday   Follow up with Jennifer Patiño CNP in 1 week in the wound care center. Call [phone] for any questions or concerns.

## 2022-12-18 ENCOUNTER — HOSPITAL ENCOUNTER (INPATIENT)
Age: 62
LOS: 22 days | Discharge: LEFT BEFORE DISCHARGE | DRG: 264 | End: 2023-01-09
Attending: STUDENT IN AN ORGANIZED HEALTH CARE EDUCATION/TRAINING PROGRAM | Admitting: STUDENT IN AN ORGANIZED HEALTH CARE EDUCATION/TRAINING PROGRAM
Payer: MEDICARE

## 2022-12-18 DIAGNOSIS — T14.8XXA OPEN WOUND: Primary | ICD-10-CM

## 2022-12-18 DIAGNOSIS — L98.493 SKIN ULCER WITH NECROSIS OF MUSCLE (HCC): ICD-10-CM

## 2022-12-18 LAB
CREAT SERPL-MCNC: 0.9 MG/DL (ref 0.9–1.3)
GFR SERPL CREATININE-BSD FRML MDRD: >60 ML/MIN/1.73M2
GLUCOSE BLD-MCNC: 227 MG/DL (ref 70–99)

## 2022-12-18 PROCEDURE — 2140000000 HC CCU INTERMEDIATE R&B

## 2022-12-18 PROCEDURE — 2580000003 HC RX 258: Performed by: INTERNAL MEDICINE

## 2022-12-18 PROCEDURE — 6360000002 HC RX W HCPCS: Performed by: INTERNAL MEDICINE

## 2022-12-18 PROCEDURE — 82565 ASSAY OF CREATININE: CPT

## 2022-12-18 PROCEDURE — 36415 COLL VENOUS BLD VENIPUNCTURE: CPT

## 2022-12-18 PROCEDURE — 6370000000 HC RX 637 (ALT 250 FOR IP): Performed by: INTERNAL MEDICINE

## 2022-12-18 PROCEDURE — 82962 GLUCOSE BLOOD TEST: CPT

## 2022-12-18 RX ORDER — ATORVASTATIN CALCIUM 40 MG/1
40 TABLET, FILM COATED ORAL NIGHTLY
Status: DISCONTINUED | OUTPATIENT
Start: 2022-12-18 | End: 2023-01-09 | Stop reason: HOSPADM

## 2022-12-18 RX ORDER — HYDRALAZINE HYDROCHLORIDE 20 MG/ML
10 INJECTION INTRAMUSCULAR; INTRAVENOUS EVERY 6 HOURS PRN
Status: DISCONTINUED | OUTPATIENT
Start: 2022-12-18 | End: 2023-01-09 | Stop reason: HOSPADM

## 2022-12-18 RX ORDER — ACETAMINOPHEN 650 MG/1
650 SUPPOSITORY RECTAL EVERY 6 HOURS PRN
Status: DISCONTINUED | OUTPATIENT
Start: 2022-12-18 | End: 2023-01-09 | Stop reason: HOSPADM

## 2022-12-18 RX ORDER — ONDANSETRON 4 MG/1
4 TABLET, ORALLY DISINTEGRATING ORAL EVERY 8 HOURS PRN
Status: DISCONTINUED | OUTPATIENT
Start: 2022-12-18 | End: 2023-01-09 | Stop reason: HOSPADM

## 2022-12-18 RX ORDER — INSULIN GLARGINE 100 [IU]/ML
8 INJECTION, SOLUTION SUBCUTANEOUS NIGHTLY
Status: DISCONTINUED | OUTPATIENT
Start: 2022-12-18 | End: 2022-12-24

## 2022-12-18 RX ORDER — DEXTROSE MONOHYDRATE 100 MG/ML
INJECTION, SOLUTION INTRAVENOUS CONTINUOUS PRN
Status: DISCONTINUED | OUTPATIENT
Start: 2022-12-18 | End: 2023-01-09 | Stop reason: HOSPADM

## 2022-12-18 RX ORDER — SODIUM CHLORIDE 9 MG/ML
INJECTION, SOLUTION INTRAVENOUS PRN
Status: DISCONTINUED | OUTPATIENT
Start: 2022-12-18 | End: 2023-01-09 | Stop reason: HOSPADM

## 2022-12-18 RX ORDER — POLYETHYLENE GLYCOL 3350 17 G/17G
17 POWDER, FOR SOLUTION ORAL DAILY PRN
Status: DISCONTINUED | OUTPATIENT
Start: 2022-12-18 | End: 2023-01-09 | Stop reason: HOSPADM

## 2022-12-18 RX ORDER — NICOTINE 21 MG/24HR
1 PATCH, TRANSDERMAL 24 HOURS TRANSDERMAL DAILY
Status: DISCONTINUED | OUTPATIENT
Start: 2022-12-19 | End: 2023-01-09 | Stop reason: HOSPADM

## 2022-12-18 RX ORDER — INSULIN LISPRO 100 [IU]/ML
0-4 INJECTION, SOLUTION INTRAVENOUS; SUBCUTANEOUS NIGHTLY
Status: DISCONTINUED | OUTPATIENT
Start: 2022-12-18 | End: 2022-12-20

## 2022-12-18 RX ORDER — ASPIRIN 81 MG/1
81 TABLET, CHEWABLE ORAL DAILY
Status: DISCONTINUED | OUTPATIENT
Start: 2022-12-19 | End: 2023-01-09 | Stop reason: HOSPADM

## 2022-12-18 RX ORDER — NICOTINE 21 MG/24HR
1 PATCH, TRANSDERMAL 24 HOURS TRANSDERMAL ONCE
Status: COMPLETED | OUTPATIENT
Start: 2022-12-18 | End: 2022-12-19

## 2022-12-18 RX ORDER — INSULIN LISPRO 100 [IU]/ML
0-8 INJECTION, SOLUTION INTRAVENOUS; SUBCUTANEOUS
Status: DISCONTINUED | OUTPATIENT
Start: 2022-12-19 | End: 2022-12-20

## 2022-12-18 RX ORDER — GABAPENTIN 300 MG/1
600 CAPSULE ORAL 3 TIMES DAILY
Status: DISCONTINUED | OUTPATIENT
Start: 2022-12-18 | End: 2023-01-09 | Stop reason: HOSPADM

## 2022-12-18 RX ORDER — ACETAMINOPHEN 325 MG/1
650 TABLET ORAL EVERY 6 HOURS PRN
Status: DISCONTINUED | OUTPATIENT
Start: 2022-12-18 | End: 2023-01-09 | Stop reason: HOSPADM

## 2022-12-18 RX ORDER — ENOXAPARIN SODIUM 100 MG/ML
40 INJECTION SUBCUTANEOUS DAILY
Status: DISCONTINUED | OUTPATIENT
Start: 2022-12-19 | End: 2023-01-09 | Stop reason: HOSPADM

## 2022-12-18 RX ORDER — ATORVASTATIN CALCIUM 40 MG/1
40 TABLET, FILM COATED ORAL NIGHTLY
Status: DISCONTINUED | OUTPATIENT
Start: 2022-12-18 | End: 2022-12-18

## 2022-12-18 RX ORDER — SODIUM CHLORIDE 0.9 % (FLUSH) 0.9 %
5-40 SYRINGE (ML) INJECTION EVERY 12 HOURS SCHEDULED
Status: DISCONTINUED | OUTPATIENT
Start: 2022-12-18 | End: 2023-01-09 | Stop reason: HOSPADM

## 2022-12-18 RX ORDER — SODIUM CHLORIDE 0.9 % (FLUSH) 0.9 %
5-40 SYRINGE (ML) INJECTION PRN
Status: DISCONTINUED | OUTPATIENT
Start: 2022-12-18 | End: 2023-01-09 | Stop reason: HOSPADM

## 2022-12-18 RX ORDER — ONDANSETRON 2 MG/ML
4 INJECTION INTRAMUSCULAR; INTRAVENOUS EVERY 6 HOURS PRN
Status: DISCONTINUED | OUTPATIENT
Start: 2022-12-18 | End: 2023-01-09 | Stop reason: HOSPADM

## 2022-12-18 RX ORDER — OXYCODONE HYDROCHLORIDE AND ACETAMINOPHEN 5; 325 MG/1; MG/1
1 TABLET ORAL EVERY 4 HOURS PRN
Status: DISCONTINUED | OUTPATIENT
Start: 2022-12-18 | End: 2022-12-19

## 2022-12-18 RX ADMIN — INSULIN GLARGINE 8 UNITS: 100 INJECTION, SOLUTION SUBCUTANEOUS at 21:56

## 2022-12-18 RX ADMIN — CEFEPIME HYDROCHLORIDE 2000 MG: 2 INJECTION, POWDER, FOR SOLUTION INTRAVENOUS at 21:39

## 2022-12-18 RX ADMIN — SODIUM CHLORIDE, PRESERVATIVE FREE 10 ML: 5 INJECTION INTRAVENOUS at 21:40

## 2022-12-18 RX ADMIN — GABAPENTIN 600 MG: 300 CAPSULE ORAL at 21:53

## 2022-12-18 RX ADMIN — OXYCODONE AND ACETAMINOPHEN 1 TABLET: 5; 325 TABLET ORAL at 21:53

## 2022-12-18 RX ADMIN — ATORVASTATIN CALCIUM 40 MG: 40 TABLET, FILM COATED ORAL at 21:53

## 2022-12-18 ASSESSMENT — PAIN DESCRIPTION - ONSET: ONSET: ON-GOING

## 2022-12-18 ASSESSMENT — PAIN DESCRIPTION - FREQUENCY: FREQUENCY: CONTINUOUS

## 2022-12-18 ASSESSMENT — PAIN - FUNCTIONAL ASSESSMENT: PAIN_FUNCTIONAL_ASSESSMENT: PREVENTS OR INTERFERES SOME ACTIVE ACTIVITIES AND ADLS

## 2022-12-18 ASSESSMENT — PAIN SCALES - GENERAL
PAINLEVEL_OUTOF10: 10
PAINLEVEL_OUTOF10: 7

## 2022-12-18 ASSESSMENT — PAIN DESCRIPTION - ORIENTATION: ORIENTATION: RIGHT

## 2022-12-18 ASSESSMENT — PAIN DESCRIPTION - DESCRIPTORS: DESCRIPTORS: DISCOMFORT;ACHING;SHARP

## 2022-12-18 ASSESSMENT — PAIN DESCRIPTION - LOCATION: LOCATION: LEG

## 2022-12-18 ASSESSMENT — PAIN DESCRIPTION - PAIN TYPE: TYPE: ACUTE PAIN;CHRONIC PAIN

## 2022-12-19 LAB
ANION GAP SERPL CALCULATED.3IONS-SCNC: 9 MMOL/L (ref 4–16)
BASOPHILS ABSOLUTE: 0.1 K/CU MM
BASOPHILS RELATIVE PERCENT: 1.1 % (ref 0–1)
BUN BLDV-MCNC: 22 MG/DL (ref 6–23)
CALCIUM SERPL-MCNC: 8.1 MG/DL (ref 8.3–10.6)
CHLORIDE BLD-SCNC: 102 MMOL/L (ref 99–110)
CO2: 25 MMOL/L (ref 21–32)
CREAT SERPL-MCNC: 1 MG/DL (ref 0.9–1.3)
DIFFERENTIAL TYPE: ABNORMAL
EOSINOPHILS ABSOLUTE: 0.1 K/CU MM
EOSINOPHILS RELATIVE PERCENT: 1.1 % (ref 0–3)
ESTIMATED AVERAGE GLUCOSE: 186 MG/DL
GFR SERPL CREATININE-BSD FRML MDRD: >60 ML/MIN/1.73M2
GLUCOSE BLD-MCNC: 144 MG/DL (ref 70–99)
GLUCOSE BLD-MCNC: 171 MG/DL (ref 70–99)
GLUCOSE BLD-MCNC: 217 MG/DL (ref 70–99)
GLUCOSE BLD-MCNC: 221 MG/DL (ref 70–99)
GLUCOSE BLD-MCNC: 235 MG/DL (ref 70–99)
HBA1C MFR BLD: 8.1 % (ref 4.2–6.3)
HCT VFR BLD CALC: 33.3 % (ref 42–52)
HEMOGLOBIN: 11.4 GM/DL (ref 13.5–18)
IMMATURE NEUTROPHIL %: 0.4 % (ref 0–0.43)
LYMPHOCYTES ABSOLUTE: 1.3 K/CU MM
LYMPHOCYTES RELATIVE PERCENT: 17.5 % (ref 24–44)
MCH RBC QN AUTO: 29 PG (ref 27–31)
MCHC RBC AUTO-ENTMCNC: 34.2 % (ref 32–36)
MCV RBC AUTO: 84.7 FL (ref 78–100)
MONOCYTES ABSOLUTE: 0.7 K/CU MM
MONOCYTES RELATIVE PERCENT: 9.3 % (ref 0–4)
NUCLEATED RBC %: 0 %
PDW BLD-RTO: 14.6 % (ref 11.7–14.9)
PLATELET # BLD: 135 K/CU MM (ref 140–440)
PMV BLD AUTO: 9.9 FL (ref 7.5–11.1)
POTASSIUM SERPL-SCNC: 4.3 MMOL/L (ref 3.5–5.1)
RBC # BLD: 3.93 M/CU MM (ref 4.6–6.2)
SEGMENTED NEUTROPHILS ABSOLUTE COUNT: 5.1 K/CU MM
SEGMENTED NEUTROPHILS RELATIVE PERCENT: 70.6 % (ref 36–66)
SODIUM BLD-SCNC: 136 MMOL/L (ref 135–145)
TOTAL IMMATURE NEUTOROPHIL: 0.03 K/CU MM
TOTAL NUCLEATED RBC: 0 K/CU MM
TOTAL RETICULOCYTE COUNT: 0.05 K/CU MM
WBC # BLD: 7.2 K/CU MM (ref 4–10.5)

## 2022-12-19 PROCEDURE — 2140000000 HC CCU INTERMEDIATE R&B

## 2022-12-19 PROCEDURE — 6360000002 HC RX W HCPCS: Performed by: INTERNAL MEDICINE

## 2022-12-19 PROCEDURE — 6370000000 HC RX 637 (ALT 250 FOR IP): Performed by: INTERNAL MEDICINE

## 2022-12-19 PROCEDURE — 97116 GAIT TRAINING THERAPY: CPT

## 2022-12-19 PROCEDURE — 97530 THERAPEUTIC ACTIVITIES: CPT

## 2022-12-19 PROCEDURE — 85025 COMPLETE CBC W/AUTO DIFF WBC: CPT

## 2022-12-19 PROCEDURE — 6370000000 HC RX 637 (ALT 250 FOR IP): Performed by: STUDENT IN AN ORGANIZED HEALTH CARE EDUCATION/TRAINING PROGRAM

## 2022-12-19 PROCEDURE — 97162 PT EVAL MOD COMPLEX 30 MIN: CPT

## 2022-12-19 PROCEDURE — 36415 COLL VENOUS BLD VENIPUNCTURE: CPT

## 2022-12-19 PROCEDURE — 82962 GLUCOSE BLOOD TEST: CPT

## 2022-12-19 PROCEDURE — 80202 ASSAY OF VANCOMYCIN: CPT

## 2022-12-19 PROCEDURE — 80048 BASIC METABOLIC PNL TOTAL CA: CPT

## 2022-12-19 PROCEDURE — 83036 HEMOGLOBIN GLYCOSYLATED A1C: CPT

## 2022-12-19 PROCEDURE — 94761 N-INVAS EAR/PLS OXIMETRY MLT: CPT

## 2022-12-19 PROCEDURE — 2580000003 HC RX 258: Performed by: INTERNAL MEDICINE

## 2022-12-19 RX ORDER — OXYCODONE AND ACETAMINOPHEN 7.5; 325 MG/1; MG/1
1 TABLET ORAL EVERY 4 HOURS PRN
Status: DISCONTINUED | OUTPATIENT
Start: 2022-12-19 | End: 2022-12-21

## 2022-12-19 RX ADMIN — OXYCODONE AND ACETAMINOPHEN 1 TABLET: 5; 325 TABLET ORAL at 12:36

## 2022-12-19 RX ADMIN — CEFEPIME HYDROCHLORIDE 2000 MG: 2 INJECTION, POWDER, FOR SOLUTION INTRAVENOUS at 12:08

## 2022-12-19 RX ADMIN — OXYCODONE AND ACETAMINOPHEN 1 TABLET: 5; 325 TABLET ORAL at 08:33

## 2022-12-19 RX ADMIN — INSULIN GLARGINE 8 UNITS: 100 INJECTION, SOLUTION SUBCUTANEOUS at 22:02

## 2022-12-19 RX ADMIN — OXYCODONE AND ACETAMINOPHEN 1 TABLET: 5; 325 TABLET ORAL at 03:24

## 2022-12-19 RX ADMIN — SODIUM CHLORIDE, PRESERVATIVE FREE 10 ML: 5 INJECTION INTRAVENOUS at 19:47

## 2022-12-19 RX ADMIN — GABAPENTIN 600 MG: 300 CAPSULE ORAL at 14:30

## 2022-12-19 RX ADMIN — GABAPENTIN 600 MG: 300 CAPSULE ORAL at 19:44

## 2022-12-19 RX ADMIN — ASPIRIN 81 MG CHEWABLE TABLET 81 MG: 81 TABLET CHEWABLE at 08:33

## 2022-12-19 RX ADMIN — ATORVASTATIN CALCIUM 40 MG: 40 TABLET, FILM COATED ORAL at 19:44

## 2022-12-19 RX ADMIN — CEFEPIME HYDROCHLORIDE 2000 MG: 2 INJECTION, POWDER, FOR SOLUTION INTRAVENOUS at 22:02

## 2022-12-19 RX ADMIN — ENOXAPARIN SODIUM 40 MG: 100 INJECTION SUBCUTANEOUS at 08:34

## 2022-12-19 RX ADMIN — OXYCODONE AND ACETAMINOPHEN 1 TABLET: 7.5; 325 TABLET ORAL at 19:45

## 2022-12-19 RX ADMIN — SODIUM CHLORIDE, PRESERVATIVE FREE 10 ML: 5 INJECTION INTRAVENOUS at 08:32

## 2022-12-19 RX ADMIN — VANCOMYCIN HYDROCHLORIDE 1000 MG: 1 INJECTION, POWDER, LYOPHILIZED, FOR SOLUTION INTRAVENOUS at 08:37

## 2022-12-19 RX ADMIN — INSULIN LISPRO 2 UNITS: 100 INJECTION, SOLUTION INTRAVENOUS; SUBCUTANEOUS at 12:26

## 2022-12-19 RX ADMIN — INSULIN LISPRO 2 UNITS: 100 INJECTION, SOLUTION INTRAVENOUS; SUBCUTANEOUS at 17:40

## 2022-12-19 RX ADMIN — GABAPENTIN 600 MG: 300 CAPSULE ORAL at 08:33

## 2022-12-19 ASSESSMENT — PAIN DESCRIPTION - DESCRIPTORS
DESCRIPTORS: ACHING;DISCOMFORT;SHARP
DESCRIPTORS: SHARP;BURNING
DESCRIPTORS: ACHING
DESCRIPTORS: SHARP

## 2022-12-19 ASSESSMENT — PAIN DESCRIPTION - PAIN TYPE: TYPE: ACUTE PAIN;CHRONIC PAIN

## 2022-12-19 ASSESSMENT — PAIN DESCRIPTION - ORIENTATION
ORIENTATION: RIGHT

## 2022-12-19 ASSESSMENT — ENCOUNTER SYMPTOMS
EYES NEGATIVE: 1
RESPIRATORY NEGATIVE: 1
GASTROINTESTINAL NEGATIVE: 1

## 2022-12-19 ASSESSMENT — PAIN SCALES - GENERAL
PAINLEVEL_OUTOF10: 2
PAINLEVEL_OUTOF10: 7
PAINLEVEL_OUTOF10: 10
PAINLEVEL_OUTOF10: 3
PAINLEVEL_OUTOF10: 9
PAINLEVEL_OUTOF10: 7
PAINLEVEL_OUTOF10: 4
PAINLEVEL_OUTOF10: 7

## 2022-12-19 ASSESSMENT — PAIN SCALES - WONG BAKER
WONGBAKER_NUMERICALRESPONSE: 0
WONGBAKER_NUMERICALRESPONSE: 0

## 2022-12-19 ASSESSMENT — PAIN DESCRIPTION - LOCATION
LOCATION: KNEE;LEG
LOCATION: KNEE;LEG
LOCATION: LEG
LOCATION: LEG

## 2022-12-19 ASSESSMENT — PAIN DESCRIPTION - FREQUENCY: FREQUENCY: CONTINUOUS

## 2022-12-19 ASSESSMENT — PAIN - FUNCTIONAL ASSESSMENT: PAIN_FUNCTIONAL_ASSESSMENT: PREVENTS OR INTERFERES SOME ACTIVE ACTIVITIES AND ADLS

## 2022-12-19 NOTE — PROGRESS NOTES
Physical Therapy  [facility] [place]  Prudence Ti, 1960, [id number], 12/19/2022    History  [place]:  There were no encounter diagnoses. Patient  has a past medical history of Anesthesia, Anxiety, CAD (coronary artery disease), COPD (chronic obstructive pulmonary disease) ([facility] Degenerative disc disease, Diabetes mellitus ([facility] Gall bladder stones, H/O cardiovascular stress test, H/O echocardiogram, Heroin abuse ([facility] Hx MRSA infection, Hyperlipidemia, Hypertension, Low back pain, Migraine, Pancreatitis, S/P CABG x 4, and Shortness of breath on exertion. Patient  has a past surgical history that includes Hand surgery (15 yrs ago); Dental surgery (2010); Coronary artery bypass graft (1/6/13); Toe amputation (Right, 3/31/2020); Toe amputation (Right, 11/5/2021); Foot Debridement (Right, 06/24/2022); Foot Debridement (Right, 6/24/2022); and Leg amputation below knee (Right, 8/29/2022).     Subjective:  Patient states:  \"I really thought I was going to be better for Watson\"   Pain: 6-7/10 right residual limb   Communication with other providers:  RN   Restrictions: general precautions, falls     Home Setup/Prior level of function  Social/Functional History  Lives With: Spouse  Type of Home: House  Home Layout: One level  Home Access: Ramp   Entrance Stairs - Rails: Both  Bathroom Shower/Tub: Tub/Shower unit, Shower chair with back, grab bars   [employer]: raised toilet seat   B[facility]  Has the patient had two or more falls in the past year or any fall with injury in the past year?: yes   DME: cane, \"bent up WC\", RW   ADL Assistance: Independent  Homemaking Assistance: Independent (shared with spouse)  Homemaking Responsibilities: Yes  Ambulation Assistance: Independent (with RW or WC)   Transfer Assistance: Independent  Active : Yes       Examination of body systems (includes body structures/functions, activity/participation limitations):  Observation:  Sitting on EOB upon arrival. Cooperative with therapy   Vision:  reading glasses   Hearing:  [facility]  Cardiopulmonary:  stable vitals on room air     Musculoskeletal  ROM R/L:  [facility] BLEs  Strength R/L:  BLES grossly 4+/5  Neuro:  intact sensation bilat LES     Mobility/treatment:   Rolling L/R: NT   Supine to sit:  NT, sitting on EOB upon arrival   Transfers:   Sit to stand: CGA for safety from EOB and Hilary for small amount of steadying from standard toilet  Stand to sit: CGA for safety to standard toilet and recliner  Step pivot: CGA for safety with RW (2x)   Sitting balance:  SBA for safety, static and light dynamic without UE support while managing shoes   Standing balance:  CGA for safety at RW with BUE support progressing to single UE support while managing LB dressing needing CGA to Hilary. ~2 minutes in standing. Gait: ~10ft x 2 with RW CGA for safety. Fair and consistent sage with good left LE foot clearance. No major LOB noted. Educated pt on POC, role of PT, DME use, discharge. Cues provided for sequencing to inc safety and indep     [facility] 6 Clicks Inpatient Mobility:  AM-PAC Inpatient Mobility Raw Score : 17    Safety: patient left in chair with alarm, call light within reach, RN notified, gait belt used. Assessment:  Pt is a 60year old male admitted after a fall with open necrotic/gangrenous wound on right residual limb. Hx of R BKA 8/29/2022. Recommend ARU once medically stable. At baseline he is Kash with gross mobility and ADLS. He performed below his baseline this date with dec strength, balance, and activity tolerance. He would benefit from continued therapy to address his current deficits, dec potential fall risk, and restore function. Complexity: Moderate  Prognosis: Good, no significant barriers to participation at this time.    Plan: 3-5 times per week  Discharge Recommendations: IP Rehab  Equipment: continue to assess at next level of care     Goals:  Short Term Goals  Time Frame for Short Term Goals: 2 weeks  Short Term Goal 1: Pt will perform sit><supine Kash  Short Term Goal 2: Pt will transfer between surfaces SBA  Short Term Goal 3: Pt will ambulate 30ft with RW SBA  Short Term Goal 4: Pt will propel WC 150ft Kash  Short Term Goal 5: Pt will perform standing light dynamic activity x 3 minutes, single UE support SBA       Treatment plan:  Bed mobility, transfers, balance, gait, TA, TX, WC     Recommendations for NURSING mobility: ambulate with RW to the bathroom     Time:   Time in: 1402  Time out: 1435  Timed treatment minutes: 23  Total time: 33 minutes     Electronically signed by:    Rhona Her, [id number]  12/19/2022, 3:29 PM

## 2022-12-19 NOTE — PLAN OF CARE
Problem: Chronic Conditions and Co-morbidities  Goal: Patient's chronic conditions and co-morbidity symptoms are monitored and maintained or improved  Outcome: Progressing     Problem: Discharge Planning  Goal: Discharge to home or other facility with appropriate resources  Outcome: Progressing     Problem: Pain  Goal: Verbalizes/displays adequate comfort level or baseline comfort level  Outcome: Progressing ATTENDING CERTIFICATION

## 2022-12-19 NOTE — PROGRESS NOTES
[address] [facility]  consulted by Dr. Ruchi Tubbs for monitoring and adjustment. Indication for treatment: Open wound on the right stump; Possibility of chronic osteomyelitis is not excludable  Goal trough: Trough Goal: 15-20 mcg/mL  AUC/SUHA: 400-600    Risk Factors for MRSA Identified:   Purulent and/or complicated SSTI    Pertinent Laboratory Values:   Temp Readings from Last 3 Encounters:   12/19/22 97.7 °F (36.5 °C) (Oral)   10/25/22 98.9 °F (37.2 °C) (Oral)   09/15/22 97.5 °F (36.4 °C) (Axillary)     WBC 5.5 4.0 - 10.5 K/uL   12/18/2022 4:28 PM EST [address] [facility]     BUN 25 7 - 25 mg/dL   12/18/2022 4:13 PM EST [address] [facility]     Creatinine 1.33 (A) 0.7 - 1.3 mg/dL   12/18/2022 4:13 PM EST [address] [facility]       Estimated Creatinine Clearance: 70 mL/min (based on SCr of 1 mg/dL). Intake/Output Summary (Last 24 hours) at 12/19/2022 1602  Last data filed at 12/19/2022 5101  Gross per 24 hour   Intake 300.04 ml   Output 0 ml   Net 300.04 ml     RENAL LAB EVALUATION  Estimated Creatinine Clearance: 70 mL/min (based on SCr of 1 mg/dL). Recent Labs     12/18/22  2252 12/19/22  0410   BUN  --  22   CREATININE 0.9 1.0       Pertinent Cultures:   Date    Source    Results  12/18   Blood    Collected [facility])    Assessment:  HPI: 60 y.o. male with coronary artery disease s/p CABG, PAD, Diabetes mellitus type 2, COPD, chronic hepatitis C, diabetic foot ulcer/necrotizing fasciitis s/p right BKA (8/2022). Patient presented to [facility] ED with complaints of pain, redness, swelling, drainage from the right stump. Renal trends are WNL and Scr stable in range 0.9-1  Day(s) of therapy: 2 of 7  Vancomycin concentration:   12/20 - To be collected    Plan:  Vancomycin 1,000 mg IV given at [facility] ED;  Follow with Vancomycin 1,000 mg IV Q 18 Hours  Predicted AUC: 535; Predicted Trough: 16.9  Plan to collect a level tomorrow AM  Pharmacy will continue to monitor patient and adjust therapy as indicated    VANCOMYCIN CONCENTRATION SCHEDULED FOR 12/20/2022 @ 6 AM    Thank you for the consult,  Yancy JORDAN [facility], PharmD  12/19/2022 4:02 PM

## 2022-12-19 NOTE — PROGRESS NOTES
V2.0  [facility] [place] Hospitalist Progress Note      Name:  Calista Rodriguez /Age/Sex: 1960  (60 y.o. male)   MRN & CSN:  [id number] & [id number] Encounter Date/Time: 2022 7:06 AM EST    Location:  [address] [place] [address] PCP: Naeem uNno MD       Hospital Day: 2    Assessment and Plan:   Calista Rodriguez is a 60 y.o. male with pmh of coronary artery disease s/p CABG, PAD, Diabetes mellitus type 2, COPD, not on home oxygen, chronic hepatitis C, and diabetic foot ulcer/necrotizing fasciitis s/p right BKA (2022) who presents with Osteomyelitis ([facility] [address]    Plan:  #Open necrotic/gangrenous wound on the right stump  #History of necrotizing fasciitis of right foot s/p right BKA- 2022  --Patient was seen in ED-10/25/2022 after a fall and was diagnosed with dehiscence and cellulitis of the right stump and was discharged on Bactrim. --P/W worsening wound, looks infected with necrotic/gangrenous margins(pic uploaded to media). Pus pockets  --CT right knee-distal femur no evidence of periosteal reactive changes or bony destruction. Small cortical lucencies within the distal tibia shaft just above the level of patient's below-knee amputation, may reflect postsurgical changes, possibility of chronic osteomyelitis is not excludable. No subcutaneous air is noted within the soft tissues. --Trend inflammatory markers: CRP 9.14, ESR 64  --Continue vancomycin, cefepime  --General Surgery consulted, will appreciate recs     #Accelerated hypertension - Resolving  --Likely secondary to pain, no h/o antihypertensives  --Continue to monitor, ensure adequate pain control  --Hydralazine as needed for systolic blood pressure more than 160. #Diabetes mellitus type 2, on long-term insulin  --Continue accuchek, basal bolus regimen  --F/U A1c  --Continue with hypoglycemia protocol. #Diabetic neuropathy  --Continue gabapentin  --Patient has duloxetine listed on his home medications but reports he does not take it.      #Peripheral arterial disease  --CTA of right lower extremity in 10/2021 showed complete occlusion of the right common femoral artery, opacifying anterior and posterior tibial arteries. Had angioplasty 11/2021  --Continue aspirin, patient reports not taking atorvastatin, will restart in house     #Coronary artery disease s/p CABG- 6/2013  --Continue aspirin, atorvastatin     #COPD  --Does not appear to be in exacerbation  --Patient is not on any inhalers as per medication list, breathing treatments as needed     #History of opioid abuse     #Chronic hepatitis C     #Chronic tobacco dependence  --Nicotine patch ordered    Diet ADULT DIET; Regular; 4 carb choices (60 gm/meal)   DVT Prophylaxis [x] Lovenox, []  Heparin, [] SCDs, [] Ambulation,  [] Eliquis, [] Xarelto  [] Coumadin   Code Status Full Code   Disposition From: Home  Expected Disposition: TBD  Estimated Date of Discharge: 2-3 days  Patient requires continued admission due to Necrotic wound over right BKA stump requiring surgical intervention and IV abx   Surrogate Decision Maker/ POA      Subjective:     Chief Complaint: No chief complaint on file. Patient seen and examined at bedside, He is pleasantly interactive, endorsing pain over the right stump to the lightest touch. He also endorses that he had a fall last week         Review of Systems:    Review of Systems   Constitutional: Negative. HENT: Negative. Eyes: Negative. Respiratory: Negative. Cardiovascular: Negative. Gastrointestinal: Negative. Genitourinary: Negative. Musculoskeletal:         S/P R BKA   Skin:  Positive for wound. Over the stump of his right BKA   Neurological: Negative. Psychiatric/Behavioral: Negative. Objective:      Intake/Output Summary (Last 24 hours) at 12/19/2022 0706  Last data filed at 12/19/2022 0456  Gross per 24 hour   Intake 290.04 ml   Output 0 ml   Net 290.04 ml        Vitals:   Vitals:    12/19/22 0626   BP: 111/66   Pulse: 71   Resp: 27 Temp:    SpO2:        Physical Exam:   General: NAD, sitting on the edge of his bed eating breakfast  Eyes: EOMI  ENT: moist mucous membrane  Cardiovascular: Regular rate. Respiratory: Clear to auscultation  Gastrointestinal: Soft, non tender  Genitourinary: no suprapubic tenderness  Musculoskeletal: right BKA  Skin: Necrotic wound over the stump of his right BKA with pockets oozing pus and foul odour  Neuro: Alert. Psych: Mood appropriate.      Medications:   Medications:    sodium chloride flush  5-40 mL IntraVENous 2 times per day    enoxaparin  40 mg SubCUTAneous Daily    cefepime  2,000 mg IntraVENous Q12H    insulin lispro  0-8 Units SubCUTAneous TID WC    insulin lispro  0-4 Units SubCUTAneous Nightly    nicotine  1 patch TransDERmal Daily    nicotine  1 patch TransDERmal Once    aspirin  81 mg Oral Daily    gabapentin  600 mg Oral TID    insulin glargine  8 Units SubCUTAneous Nightly    atorvastatin  40 mg Oral Nightly    vancomycin  1,000 mg IntraVENous Q18H      Infusions:    sodium chloride      dextrose       PRN Meds: sodium chloride flush, 5-40 mL, PRN  sodium chloride, , PRN  ondansetron, 4 mg, Q8H PRN   Or  ondansetron, 4 mg, Q6H PRN  polyethylene glycol, 17 g, Daily PRN  acetaminophen, 650 mg, Q6H PRN   Or  acetaminophen, 650 mg, Q6H PRN  glucose, 4 tablet, PRN  dextrose bolus, 125 mL, PRN   Or  dextrose bolus, 250 mL, PRN  glucagon (rDNA), 1 mg, PRN  dextrose, , Continuous PRN  oxyCODONE-acetaminophen, 1 tablet, Q4H PRN  hydrALAZINE, 10 mg, Q6H PRN        Labs      Recent Results (from the past 24 hour(s))   POCT Glucose    Collection Time: 12/18/22  9:24 PM   Result Value Ref Range    POC Glucose 227 (H) 70 - 99 MG/DL   Creatinine    Collection Time: 12/18/22 10:52 PM   Result Value Ref Range    Creatinine 0.9 0.9 - 1.3 MG/DL    Est, Glom Filt Rate >60 >60 LB/LMF/1.95N9   Basic Metabolic Panel w/ Reflex to MG    Collection Time: 12/19/22  4:10 AM   Result Value Ref Range    Sodium 136 135 - 145 MMOL/L    Potassium 4.3 3.5 - 5.1 MMOL/L    Chloride 102 99 - 110 mMol/L    CO2 25 21 - 32 MMOL/L    Anion Gap 9 4 - 16    BUN 22 6 - 23 MG/DL    Creatinine 1.0 0.9 - 1.3 MG/DL    Est, Glom Filt Rate >60 >60 mL/min/1.73m2    Glucose 144 (H) 70 - 99 MG/DL    Calcium 8.1 (L) 8.3 - 10.6 MG/DL   CBC with Auto Differential    Collection Time: 12/19/22  4:10 AM   Result Value Ref Range    WBC 7.2 4.0 - 10.5 K/CU MM    RBC 3.93 (L) 4.6 - 6.2 M/CU MM    Hemoglobin 11.4 (L) 13.5 - 18.0 GM/DL    Hematocrit 33.3 (L) 42 - 52 %    MCV 84.7 78 - 100 FL    MCH 29.0 27 - 31 PG    MCHC 34.2 32.0 - 36.0 %    RDW 14.6 11.7 - 14.9 %    Platelets 662 (L) 041 - 440 K/CU MM    MPV 9.9 7.5 - 11.1 FL    Differential Type AUTOMATED DIFFERENTIAL     Segs Relative 70.6 (H) 36 - 66 %    Lymphocytes % 17.5 (L) 24 - 44 %    Monocytes % 9.3 (H) 0 - 4 %    Eosinophils % 1.1 0 - 3 %    Basophils % 1.1 (H) 0 - 1 %    Segs Absolute 5.1 K/CU MM    Lymphocytes Absolute 1.3 K/CU MM    Monocytes Absolute 0.7 K/CU MM    Eosinophils Absolute 0.1 K/CU MM    Basophils Absolute 0.1 K/CU MM    Nucleated RBC % 0.0 %    Total Nucleated RBC 0.0 K/CU MM    TRC 0.0472 K/CU MM    Total Immature Neutrophil 0.03 K/CU MM    Immature Neutrophil % 0.4 0 - 0.43 %        Imaging/Diagnostics Last 24 Hours   No results found.     Electronically signed by Ledy Raya MD on 12/19/2022 at 7:06 AM

## 2022-12-19 NOTE — CARE COORDINATION
.CM met with pt for d/c planning. Introduced self and updated white board. Pt lives with his GF in a mobile home. Pt has a PCP, has insurance, and is able to afford his medication. Pt has  cane, shower seat, and a w/c. Pt states that he is weak and had a fall at home. He states that he would be agreeable to go to ARU. He states that he will not go to a SNF and does not want HHC. Pt is agreeable for referral to be made to ARU. Referral made to Nikki/VAL. PT/OT ordered and requested via WB. Pt will require a pre-cert if accepted to ARU. Nikki/VAL to review clinicals and PT/OT notes when in and will notify CM of decision to accept or not.   TE

## 2022-12-19 NOTE — PROGRESS NOTES
[address] [facility]  consulted by Dr. Aldo Unger for monitoring and adjustment. Indication for treatment: Open wound on the right stump; Possibility of chronic osteomyelitis is not excludable  Goal trough: Trough Goal: 15-20 mcg/mL  AUC/SUHA: 400-600    Risk Factors for MRSA Identified:   Purulent and/or complicated SSTI    Pertinent Laboratory Values:   Temp Readings from Last 3 Encounters:   12/18/22 98 °F (36.7 °C) (Oral)   10/25/22 98.9 °F (37.2 °C) (Oral)   09/15/22 97.5 °F (36.4 °C) (Axillary)     WBC 5.5 4.0 - 10.5 K/uL   12/18/2022 4:28 PM EST [address]     BUN 25 7 - 25 mg/dL   12/18/2022 4:13 PM EST [address]     Creatinine 1.33 (A) 0.7 - 1.3 mg/dL   12/18/2022 4:13 PM EST [address]       CrCl cannot be calculated (Patient's most recent lab result is older than the maximum 30 days allowed. ). No intake or output data in the 24 hours ending 12/18/22 1565    Pertinent Cultures:   Date    Source    Results  12/18   Blood    Collected [facility])    Assessment:  HPI: 60 y.o. male with coronary artery disease s/p CABG, PAD, Diabetes mellitus type 2, COPD, chronic hepatitis C, diabetic foot ulcer/necrotizing fasciitis s/p right BKA (8/2022). Patient presented to [facility] ED with complaints of pain, redness, swelling, drainage from the right stump. SCr = 1.33, BUN = 25, and no I/O data  Day(s) of therapy: 1 of 7  Vancomycin concentration:   12/20 - To be collected    Plan:  Vancomycin 1,000 mg IV given at [facility] ED; Follow with Vancomycin 1,000 mg IV Q 18 Hours  Predicted AUC: 535; Predicted Trough: 16.9  Pharmacy will continue to monitor patient and adjust therapy as indicated    Sonal 3 12/20/2022 @ 6 AM    Thank you for the consult.   Pradeep Hernandez  12/18/2022 11:35 PM

## 2022-12-19 NOTE — PLAN OF CARE
Problem: Chronic Conditions and Co-morbidities  Goal: Patient's chronic conditions and co-morbidity symptoms are monitored and maintained or improved  12/19/2022 1231 by Amber Barrera RN  Outcome: Progressing  12/19/2022 0110 by Andrae Monteiro RN  Outcome: Progressing     Problem: Discharge Planning  Goal: Discharge to home or other facility with appropriate resources  12/19/2022 1231 by Amber Barrera RN  Outcome: Progressing  12/19/2022 0110 by Andrae Monteiro RN  Outcome: Progressing     Problem: Pain  Goal: Verbalizes/displays adequate comfort level or baseline comfort level  12/19/2022 1231 by Amber Barrera RN  Outcome: Progressing  Flowsheets (Taken 12/19/2022 1206)  Verbalizes/displays adequate comfort level or baseline comfort level:   Encourage patient to monitor pain and request assistance   Assess pain using appropriate pain scale   Administer analgesics based on type and severity of pain and evaluate response   Implement non-pharmacological measures as appropriate and evaluate response   Consider cultural and social influences on pain and pain management   Notify Licensed Independent Practitioner if interventions unsuccessful or patient reports new pain  12/19/2022 0110 by Andrae Monteiro RN  Outcome: Progressing     Problem: Safety - Adult  Goal: Free from fall injury  12/19/2022 1231 by Amber Barrera RN  Outcome: Progressing  12/19/2022 0110 by Andrae Monteiro RN  Outcome: Progressing     Problem: ABCDS Injury Assessment  Goal: Absence of physical injury  12/19/2022 1231 by Amber Barrera RN  Outcome: Progressing  12/19/2022 0110 by Andrae Monteiro RN  Outcome: Progressing     Problem: Skin/Tissue Integrity  Goal: Absence of new skin breakdown  Description: 1. Monitor for areas of redness and/or skin breakdown  2. Assess vascular access sites hourly  3. Every 4-6 hours minimum:  Change oxygen saturation probe site  4.   Every 4-6 hours:  If on nasal continuous positive airway pressure, respiratory therapy assess nares and determine need for appliance change or resting period.  12/19/2022 1231 by Karuna Gannon RN  Outcome: Progressing  12/19/2022 0110 by Rayne Collins RN  Outcome: Progressing

## 2022-12-19 NOTE — CARE COORDINATION
Received possible referral for ARU. Will review patients clinicals and PT/OT notes.   Thank you for the referral.
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
## 2022-12-20 ENCOUNTER — ANESTHESIA EVENT (OUTPATIENT)
Dept: OPERATING ROOM | Age: 62
End: 2022-12-20
Payer: MEDICARE

## 2022-12-20 LAB
ALBUMIN SERPL-MCNC: 2.2 GM/DL (ref 3.4–5)
ALP BLD-CCNC: 137 IU/L (ref 40–128)
ALT SERPL-CCNC: 22 U/L (ref 10–40)
ANION GAP SERPL CALCULATED.3IONS-SCNC: 10 MMOL/L (ref 4–16)
AST SERPL-CCNC: 38 IU/L (ref 15–37)
BILIRUB SERPL-MCNC: 0.2 MG/DL (ref 0–1)
BUN BLDV-MCNC: 34 MG/DL (ref 6–23)
C-REACTIVE PROTEIN, HIGH SENSITIVITY: 12.1 MG/L
CALCIUM SERPL-MCNC: 7.6 MG/DL (ref 8.3–10.6)
CHLORIDE BLD-SCNC: 100 MMOL/L (ref 99–110)
CO2: 22 MMOL/L (ref 21–32)
CREAT SERPL-MCNC: 1 MG/DL (ref 0.9–1.3)
DOSE AMOUNT: NORMAL
DOSE TIME: NORMAL
GFR SERPL CREATININE-BSD FRML MDRD: >60 ML/MIN/1.73M2
GLUCOSE BLD-MCNC: 128 MG/DL (ref 70–99)
GLUCOSE BLD-MCNC: 190 MG/DL (ref 70–99)
GLUCOSE BLD-MCNC: 200 MG/DL (ref 70–99)
GLUCOSE BLD-MCNC: 266 MG/DL (ref 70–99)
GLUCOSE BLD-MCNC: 267 MG/DL (ref 70–99)
GLUCOSE BLD-MCNC: 269 MG/DL (ref 70–99)
HCT VFR BLD CALC: 28.1 % (ref 42–52)
HEMOGLOBIN: 9.6 GM/DL (ref 13.5–18)
MAGNESIUM: 1.9 MG/DL (ref 1.8–2.4)
MCH RBC QN AUTO: 29 PG (ref 27–31)
MCHC RBC AUTO-ENTMCNC: 34.2 % (ref 32–36)
MCV RBC AUTO: 84.9 FL (ref 78–100)
PDW BLD-RTO: 14.4 % (ref 11.7–14.9)
PHOSPHORUS: 3.3 MG/DL (ref 2.5–4.9)
PLATELET # BLD: 91 K/CU MM (ref 140–440)
PMV BLD AUTO: 10.2 FL (ref 7.5–11.1)
POTASSIUM SERPL-SCNC: 4.3 MMOL/L (ref 3.5–5.1)
PROCALCITONIN: 0.14
RBC # BLD: 3.31 M/CU MM (ref 4.6–6.2)
SODIUM BLD-SCNC: 132 MMOL/L (ref 135–145)
TOTAL PROTEIN: 5.8 GM/DL (ref 6.4–8.2)
VANCOMYCIN RANDOM: 35.1 UG/ML
WBC # BLD: 5.2 K/CU MM (ref 4–10.5)

## 2022-12-20 PROCEDURE — 82962 GLUCOSE BLOOD TEST: CPT

## 2022-12-20 PROCEDURE — 84100 ASSAY OF PHOSPHORUS: CPT

## 2022-12-20 PROCEDURE — 6370000000 HC RX 637 (ALT 250 FOR IP): Performed by: STUDENT IN AN ORGANIZED HEALTH CARE EDUCATION/TRAINING PROGRAM

## 2022-12-20 PROCEDURE — 6370000000 HC RX 637 (ALT 250 FOR IP): Performed by: INTERNAL MEDICINE

## 2022-12-20 PROCEDURE — 2580000003 HC RX 258: Performed by: INTERNAL MEDICINE

## 2022-12-20 PROCEDURE — 87186 SC STD MICRODIL/AGAR DIL: CPT

## 2022-12-20 PROCEDURE — 83735 ASSAY OF MAGNESIUM: CPT

## 2022-12-20 PROCEDURE — 99223 1ST HOSP IP/OBS HIGH 75: CPT | Performed by: INTERNAL MEDICINE

## 2022-12-20 PROCEDURE — 86140 C-REACTIVE PROTEIN: CPT

## 2022-12-20 PROCEDURE — 80053 COMPREHEN METABOLIC PANEL: CPT

## 2022-12-20 PROCEDURE — 36415 COLL VENOUS BLD VENIPUNCTURE: CPT

## 2022-12-20 PROCEDURE — 97530 THERAPEUTIC ACTIVITIES: CPT

## 2022-12-20 PROCEDURE — 84145 PROCALCITONIN (PCT): CPT

## 2022-12-20 PROCEDURE — 6360000002 HC RX W HCPCS: Performed by: INTERNAL MEDICINE

## 2022-12-20 PROCEDURE — 80202 ASSAY OF VANCOMYCIN: CPT

## 2022-12-20 PROCEDURE — 87070 CULTURE OTHR SPECIMN AEROBIC: CPT

## 2022-12-20 PROCEDURE — 99221 1ST HOSP IP/OBS SF/LOW 40: CPT | Performed by: SURGERY

## 2022-12-20 PROCEDURE — 85027 COMPLETE CBC AUTOMATED: CPT

## 2022-12-20 PROCEDURE — 97166 OT EVAL MOD COMPLEX 45 MIN: CPT

## 2022-12-20 PROCEDURE — 97535 SELF CARE MNGMENT TRAINING: CPT

## 2022-12-20 PROCEDURE — 2140000000 HC CCU INTERMEDIATE R&B

## 2022-12-20 PROCEDURE — 87077 CULTURE AEROBIC IDENTIFY: CPT

## 2022-12-20 PROCEDURE — 87075 CULTR BACTERIA EXCEPT BLOOD: CPT

## 2022-12-20 PROCEDURE — 94761 N-INVAS EAR/PLS OXIMETRY MLT: CPT

## 2022-12-20 RX ORDER — INSULIN LISPRO 100 [IU]/ML
4 INJECTION, SOLUTION INTRAVENOUS; SUBCUTANEOUS
Status: DISCONTINUED | OUTPATIENT
Start: 2022-12-20 | End: 2023-01-09 | Stop reason: HOSPADM

## 2022-12-20 RX ORDER — INSULIN LISPRO 100 [IU]/ML
0-4 INJECTION, SOLUTION INTRAVENOUS; SUBCUTANEOUS
Status: DISCONTINUED | OUTPATIENT
Start: 2022-12-20 | End: 2023-01-09 | Stop reason: HOSPADM

## 2022-12-20 RX ORDER — INSULIN LISPRO 100 [IU]/ML
0-4 INJECTION, SOLUTION INTRAVENOUS; SUBCUTANEOUS NIGHTLY
Status: DISCONTINUED | OUTPATIENT
Start: 2022-12-20 | End: 2023-01-09 | Stop reason: HOSPADM

## 2022-12-20 RX ADMIN — OXYCODONE AND ACETAMINOPHEN 1 TABLET: 7.5; 325 TABLET ORAL at 04:46

## 2022-12-20 RX ADMIN — SODIUM CHLORIDE, PRESERVATIVE FREE 10 ML: 5 INJECTION INTRAVENOUS at 10:14

## 2022-12-20 RX ADMIN — GABAPENTIN 600 MG: 300 CAPSULE ORAL at 22:17

## 2022-12-20 RX ADMIN — INSULIN LISPRO 4 UNITS: 100 INJECTION, SOLUTION INTRAVENOUS; SUBCUTANEOUS at 10:10

## 2022-12-20 RX ADMIN — VANCOMYCIN HYDROCHLORIDE 1000 MG: 1 INJECTION, POWDER, LYOPHILIZED, FOR SOLUTION INTRAVENOUS at 22:18

## 2022-12-20 RX ADMIN — ENOXAPARIN SODIUM 40 MG: 100 INJECTION SUBCUTANEOUS at 09:56

## 2022-12-20 RX ADMIN — OXYCODONE AND ACETAMINOPHEN 1 TABLET: 7.5; 325 TABLET ORAL at 22:17

## 2022-12-20 RX ADMIN — GABAPENTIN 600 MG: 300 CAPSULE ORAL at 09:56

## 2022-12-20 RX ADMIN — CEFEPIME HYDROCHLORIDE 2000 MG: 2 INJECTION, POWDER, FOR SOLUTION INTRAVENOUS at 10:06

## 2022-12-20 RX ADMIN — SODIUM CHLORIDE, PRESERVATIVE FREE 10 ML: 5 INJECTION INTRAVENOUS at 22:24

## 2022-12-20 RX ADMIN — GABAPENTIN 600 MG: 300 CAPSULE ORAL at 14:18

## 2022-12-20 RX ADMIN — VANCOMYCIN HYDROCHLORIDE 1000 MG: 1 INJECTION, POWDER, LYOPHILIZED, FOR SOLUTION INTRAVENOUS at 04:28

## 2022-12-20 RX ADMIN — OXYCODONE AND ACETAMINOPHEN 1 TABLET: 7.5; 325 TABLET ORAL at 10:05

## 2022-12-20 RX ADMIN — OXYCODONE AND ACETAMINOPHEN 1 TABLET: 7.5; 325 TABLET ORAL at 00:35

## 2022-12-20 RX ADMIN — OXYCODONE AND ACETAMINOPHEN 1 TABLET: 7.5; 325 TABLET ORAL at 14:17

## 2022-12-20 RX ADMIN — INSULIN LISPRO 4 UNITS: 100 INJECTION, SOLUTION INTRAVENOUS; SUBCUTANEOUS at 12:43

## 2022-12-20 RX ADMIN — ATORVASTATIN CALCIUM 40 MG: 40 TABLET, FILM COATED ORAL at 22:17

## 2022-12-20 RX ADMIN — ASPIRIN 81 MG CHEWABLE TABLET 81 MG: 81 TABLET CHEWABLE at 09:56

## 2022-12-20 ASSESSMENT — PAIN DESCRIPTION - ORIENTATION
ORIENTATION: RIGHT

## 2022-12-20 ASSESSMENT — ENCOUNTER SYMPTOMS
BLOOD IN STOOL: 0
COLOR CHANGE: 0
VOMITING: 0
VOICE CHANGE: 0
ABDOMINAL DISTENTION: 0
SINUS PAIN: 0
DIARRHEA: 0
STRIDOR: 0
CHOKING: 0
COUGH: 0
COLOR CHANGE: 1
CHEST TIGHTNESS: 0
BACK PAIN: 0
SORE THROAT: 0
TROUBLE SWALLOWING: 0
SHORTNESS OF BREATH: 0
SINUS PRESSURE: 0
WHEEZING: 0
CONSTIPATION: 0
NAUSEA: 0
ABDOMINAL PAIN: 0

## 2022-12-20 ASSESSMENT — PAIN DESCRIPTION - LOCATION
LOCATION: LEG
LOCATION: KNEE

## 2022-12-20 ASSESSMENT — PAIN DESCRIPTION - FREQUENCY: FREQUENCY: CONTINUOUS

## 2022-12-20 ASSESSMENT — PAIN DESCRIPTION - DESCRIPTORS
DESCRIPTORS: ACHING
DESCRIPTORS: ACHING;BURNING
DESCRIPTORS: ACHING;DISCOMFORT
DESCRIPTORS: ACHING

## 2022-12-20 ASSESSMENT — PAIN DESCRIPTION - ONSET: ONSET: ON-GOING

## 2022-12-20 ASSESSMENT — PAIN SCALES - GENERAL
PAINLEVEL_OUTOF10: 3
PAINLEVEL_OUTOF10: 9
PAINLEVEL_OUTOF10: 8
PAINLEVEL_OUTOF10: 7

## 2022-12-20 ASSESSMENT — PAIN DESCRIPTION - PAIN TYPE: TYPE: ACUTE PAIN

## 2022-12-20 ASSESSMENT — LIFESTYLE VARIABLES: SMOKING_STATUS: 1

## 2022-12-20 NOTE — CARE COORDINATION
Referral received for ARU. Awaiting OT notes. Patient with BKA, now with infected stump to OR today for I&D then wound vac placement. Surgeon is hopeful to salvage stump but possibility remains for AKA if intervention is not successful. I spoke with Annika Lopez RN,  and explained that this patient is ARU appropriate and precert can be initiated once a medical plan is solidified. Will follow for OT notes and outcome of surgical intervention.
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
## 2022-12-20 NOTE — PROGRESS NOTES
V2.0  [facility] Hospitalist Progress Note      Name:  Pat Dumont /Age/Sex: 1960  (60 y.o. male)   MRN & CSN:  [id number] & [id number] Encounter Date/Time: 2022 2:16 PM EST    Location:  [address] [place] [address] PCP: Taty Kulkarni MD       Hospital Day: 3    Assessment and Plan:   Pat Dumont is a 60 y.o. male with pmh of coronary artery disease s/p CABG, PAD, Diabetes mellitus type 2, COPD, not on home oxygen, chronic hepatitis C, and diabetic foot ulcer/necrotizing fasciitis s/p right BKA (2022) who presents with Osteomyelitis ([address]      Plan:    Open necrotic/gangrenous wound of right stump. Had a fall and dehiscence P/W worsening wound, looks infected with necrotic/gangrenous margins(pic uploaded to media). Pus pockets. General surgery consulted  To have surgical intervention on 22  ID consulted  IV Antibiotics    Type 2 diabetes on long-term insulin  Lantus 8 units nightly  Lispro 40s with meals  Low-dose sliding scale  Plan of care glucose checks  Hypoglycemia protocol    Diabetic neuropathy  Continue gabapentin     Peripheral arterial disease  CTA of right lower extremity in 10/2021 showed complete occlusion of the right common femoral artery, opacifying anterior and posterior tibial arteries. Had angioplasty 2021  Continue aspirin and atorvastatin     Coronary artery disease s/p CABG- 2013  Continue aspirin, atorvastatin       Diet ADULT DIET; Regular; 4 carb choices (60 gm/meal)  Diet NPO   DVT Prophylaxis [x] Lovenox, []  Heparin, [] SCDs, [] Ambulation,    [] Eliquis, [] Xarelto  [] Coumadin [] other   Code Status Full Code   Disposition From: home  Expected Disposition: home  Estimated Date of Discharge: 2-5 days  Patient requires continued admission due to surgical intervention for leg   Surrogate Decision Maker/ POA      Subjective:     Chief Complaint: No chief complaint on file. Patient to have a I&D and wound VAC placed tomorrow. Currently on antibiotics.   He has no acute complaints today and is aortic and neck steps towards healing his wound. Review of Systems:    Review of Systems   Constitutional:  Negative for activity change, appetite change, chills, fatigue and fever. HENT:  Negative for congestion, hearing loss, sinus pressure, sinus pain, sore throat, trouble swallowing and voice change. Eyes:  Negative for visual disturbance. Respiratory:  Negative for cough, chest tightness, shortness of breath and wheezing. Cardiovascular:  Negative for chest pain, palpitations and leg swelling. Gastrointestinal:  Negative for abdominal pain, constipation, diarrhea, nausea and vomiting. Endocrine: Negative for cold intolerance, heat intolerance and polyuria. Genitourinary:  Negative for dysuria. Musculoskeletal:  Negative for arthralgias, back pain and gait problem. Skin:  Positive for color change and wound. Neurological:  Negative for dizziness, weakness and headaches. Psychiatric/Behavioral:  Negative for agitation and confusion. The patient is not nervous/anxious. Objective:   No intake or output data in the 24 hours ending 12/20/22 1416     Vitals:   Vitals:    12/20/22 1122   BP:    Pulse:    Resp:    Temp:    SpO2: 98%       Physical Exam:     Physical Exam  Constitutional:       General: He is not in acute distress. Appearance: Normal appearance. HENT:      Head: Normocephalic and atraumatic. Nose: Nose normal.      Mouth/Throat:      Mouth: Mucous membranes are moist.      Pharynx: Oropharynx is clear. Eyes:      Extraocular Movements: Extraocular movements intact. Conjunctiva/sclera: Conjunctivae normal.      Pupils: Pupils are equal, round, and reactive to light. Cardiovascular:      Rate and Rhythm: Normal rate and regular rhythm. Pulses: Normal pulses. Heart sounds: Normal heart sounds. Pulmonary:      Effort: Pulmonary effort is normal.   Abdominal:      General: Abdomen is flat.  Bowel sounds are normal. Palpations: Abdomen is soft. Musculoskeletal:         General: Normal range of motion. Cervical back: Normal range of motion and neck supple. Skin:     General: Skin is warm and dry. Findings: Erythema, lesion and rash present. Neurological:      General: No focal deficit present. Mental Status: He is alert and oriented to person, place, and time. Mental status is at baseline. Psychiatric:         Mood and Affect: Mood normal.         Behavior: Behavior normal.         Thought Content:  Thought content normal.       Medications:   Medications:    insulin lispro  4 Units SubCUTAneous TID WC    insulin lispro  0-4 Units SubCUTAneous TID WC    insulin lispro  0-4 Units SubCUTAneous Nightly    sodium chloride flush  5-40 mL IntraVENous 2 times per day    enoxaparin  40 mg SubCUTAneous Daily    cefepime  2,000 mg IntraVENous Q12H    nicotine  1 patch TransDERmal Daily    aspirin  81 mg Oral Daily    gabapentin  600 mg Oral TID    insulin glargine  8 Units SubCUTAneous Nightly    atorvastatin  40 mg Oral Nightly    vancomycin  1,000 mg IntraVENous Q18H      Infusions:    sodium chloride      dextrose       PRN Meds: oxyCODONE-acetaminophen, 1 tablet, Q4H PRN  sodium chloride flush, 5-40 mL, PRN  sodium chloride, , PRN  ondansetron, 4 mg, Q8H PRN   Or  ondansetron, 4 mg, Q6H PRN  polyethylene glycol, 17 g, Daily PRN  acetaminophen, 650 mg, Q6H PRN   Or  acetaminophen, 650 mg, Q6H PRN  glucose, 4 tablet, PRN  dextrose bolus, 125 mL, PRN   Or  dextrose bolus, 250 mL, PRN  glucagon (rDNA), 1 mg, PRN  dextrose, , Continuous PRN  hydrALAZINE, 10 mg, Q6H PRN        Labs      Recent Results (from the past 24 hour(s))   POCT Glucose    Collection Time: 12/19/22  4:18 PM   Result Value Ref Range    POC Glucose 235 (H) 70 - 99 MG/DL   Hemoglobin A1C    Collection Time: 12/19/22  5:05 PM   Result Value Ref Range    Hemoglobin A1C 8.1 (H) 4.2 - 6.3 %    eAG 186 mg/dL   POCT Glucose    Collection Time: 12/19/22  9:41 PM   Result Value Ref Range    POC Glucose 217 (H) 70 - 99 MG/DL   Vancomycin Level, Random    Collection Time: 12/20/22  5:14 AM   Result Value Ref Range    Vancomycin Rm 35.1 UG/ML    DOSE AMOUNT DOSE AMT.  GIVEN - 1,000 mg     DOSE TIME DOSE TIME GIVEN - UNKNOWN    CBC    Collection Time: 12/20/22  5:14 AM   Result Value Ref Range    WBC 5.2 4.0 - 10.5 K/CU MM    RBC 3.31 (L) 4.6 - 6.2 M/CU MM    Hemoglobin 9.6 (L) 13.5 - 18.0 GM/DL    Hematocrit 28.1 (L) 42 - 52 %    MCV 84.9 78 - 100 FL    MCH 29.0 27 - 31 PG    MCHC 34.2 32.0 - 36.0 %    RDW 14.4 11.7 - 14.9 %    Platelets 91 (L) 103 - 440 K/CU MM    MPV 10.2 7.5 - 11.1 FL   Comprehensive Metabolic Panel    Collection Time: 12/20/22  5:14 AM   Result Value Ref Range    Sodium 132 (L) 135 - 145 MMOL/L    Potassium 4.3 3.5 - 5.1 MMOL/L    Chloride 100 99 - 110 mMol/L    CO2 22 21 - 32 MMOL/L    BUN 34 (H) 6 - 23 MG/DL    Creatinine 1.0 0.9 - 1.3 MG/DL    Est, Glom Filt Rate >60 >60 mL/min/1.73m2    Glucose 269 (H) 70 - 99 MG/DL    Calcium 7.6 (L) 8.3 - 10.6 MG/DL    Albumin 2.2 (L) 3.4 - 5.0 GM/DL    Total Protein 5.8 (L) 6.4 - 8.2 GM/DL    Total Bilirubin 0.2 0.0 - 1.0 MG/DL    ALT 22 10 - 40 U/L    AST 38 (H) 15 - 37 IU/L    Alkaline Phosphatase 137 (H) 40 - 128 IU/L    Anion Gap 10 4 - 16   Magnesium    Collection Time: 12/20/22  5:14 AM   Result Value Ref Range    Magnesium 1.9 1.8 - 2.4 mg/dl   Phosphorus    Collection Time: 12/20/22  5:14 AM   Result Value Ref Range    Phosphorus 3.3 2.5 - 4.9 MG/DL   C-Reactive Protein    Collection Time: 12/20/22  5:14 AM   Result Value Ref Range    CRP High Sensitivity 12.1 (H) <5.0 mg/L   Procalcitonin    Collection Time: 12/20/22  5:14 AM   Result Value Ref Range    Procalcitonin 0.135    POCT Glucose    Collection Time: 12/20/22  6:29 AM   Result Value Ref Range    POC Glucose 266 (H) 70 - 99 MG/DL   POCT Glucose    Collection Time: 12/20/22  7:27 AM   Result Value Ref Range    POC Glucose 267 (H) 70 - 99 MG/DL   POCT Glucose    Collection Time: 12/20/22 11:42 AM   Result Value Ref Range    POC Glucose 190 (H) 70 - 99 MG/DL        Imaging/Diagnostics Last 24 Hours   No results found. Comment: Please note this report has been produced using speech recognition software and may contain errors related to that system including errors in grammar, punctuation, and spelling, as well as words and phrases that may be inappropriate. If there are any questions or concerns please feel free to contact the dictating provider for clarification.      Electronically signed by Mari Dial MD on 12/20/2022 at 2:16 PM

## 2022-12-20 NOTE — PROGRESS NOTES
Physical Therapy    Physical Therapy Treatment Note  Name: Royal Solis MRN: [id number] :   1960   Date:  2022   Admission Date: 2022 Room:  [address] [place], [place] [address]   Restrictions/Precautions:          general precautions, falls   Subjective:  Patient states:  \"I really can't do much today. They are going to debride this tomorrow morning\"   Pain:   Location, Type, Intensity (0/10 to 10/10):  10/10 with movement to right knee   Objective:    Observation:    Sitting on EOB upon arrival. Pt receptive to education but unable to tolerate much mobility due to significant pain. R residual limb unwrapped and pt unable to tolerate re-wrapping it at this time due to such sensitivity. Plan is for debridement tomorrow a.m. Objective Measures:    Stable vitals on room air   Treatment, including education/measures:  Sitting on EOB SBA to Kash performing light dynamic activity with single UE support. Pt consistently held right LE for support and kept knee in significant flexion. Only able to tolerate partial knee extension ~5x. Pt educated on contractures and risk of having a non functional gait with prosthetic if knee flexion contracture were to occur. Demonstrated use of bed features to raise and lower foot part of the bed to slowly and gradually attain total knee extension due to significant pain with active knee flexion. Pt felt like he was going to rip open his wound with knee extension. Educated pt thoroughly on POC, role of PT, ARU requirements, contractures, daily mobility, prosthetic, DME use, and generalized discharge plan.  Pt receptive to education and continues to want to go to ARU upon discharge from the hospital.   Assessment / Impression:    Patient's tolerance of treatment:  limited due to significant pain    Adverse Reaction: no  Significant change in status and impact:  dec activity tolerance due to significant pain   Barriers to improvement:  activity tolerance, strength, balance, pain   Plan for Next Session:    Activity tolerance, strength, balance, gait, transfers, WC mobility as tolerated   Time in:  0859  Time out:  0909  Timed treatment minutes:  10   Total treatment time:  10 minutes     Short Term Goals  Time Frame for Short Term Goals: 2 weeks  Short Term Goal 1: Pt will perform sit><supine Kash  Short Term Goal 2: Pt will transfer between surfaces SBA  Short Term Goal 3: Pt will ambulate 30ft with RW SBA  Short Term Goal 4: Pt will propel WC 150ft Kash  Short Term Goal 5: Pt will perform standing light dynamic activity x 3 minutes, single UE support SBA    Electronically signed by:    Igor Bhakta [id number]  12/20/2022, 10:01 AM
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
## 2022-12-20 NOTE — PROGRESS NOTES
[address] [facility]  consulted by Dr. Kareem Gross for monitoring and adjustment. Indication for treatment: Open wound on the right stump; Possibility of chronic osteomyelitis is not excludable  Goal trough: Trough Goal: 15-20 mcg/mL  AUC/SUHA: 400-600    Risk Factors for MRSA Identified:   Purulent and/or complicated SSTI    Pertinent Laboratory Values:   Temp Readings from Last 3 Encounters:   12/20/22 97.7 °F (36.5 °C) (Oral)   10/25/22 98.9 °F (37.2 °C) (Oral)   09/15/22 97.5 °F (36.4 °C) (Axillary)     WBC 5.5 4.0 - 10.5 K/uL   12/18/2022 4:28 PM EST [address]     BUN 25 7 - 25 mg/dL   12/18/2022 4:13 PM EST [address] [facility]     Creatinine 1.33 (A) 0.7 - 1.3 mg/dL   12/18/2022 4:13 PM EST [address] [facility]       Estimated Creatinine Clearance: 70 mL/min (based on SCr of 1 mg/dL). No intake or output data in the 24 hours ending 12/20/22 1128    RENAL LAB EVALUATION  Estimated Creatinine Clearance: 70 mL/min (based on SCr of 1 mg/dL). Recent Labs     12/18/22  2252 12/19/22  0410 12/20/22  0514   BUN  --  22 34*   CREATININE 0.9 1.0 1.0         Pertinent Cultures:   Date    Source    Results  12/18   Blood    Collected [facility])    Assessment:  HPI: 60 y.o. male with coronary artery disease s/p CABG, PAD, Diabetes mellitus type 2, COPD, chronic hepatitis C, diabetic foot ulcer/necrotizing fasciitis s/p right BKA (8/2022). Patient presented to [facility] ED with complaints of pain, redness, swelling, drainage from the right stump.     Renal trends:   Scr stable in range 0.9-1; noted slight increase in BUN  Continue to monitor renal trends closely  Day(s) of therapy: 3 of 7  Vancomycin concentration:   12/20 - 35.1, falsely elevated/unable to be interpreted, collected while dose infusing  12/21 - to be collected    Plan:  Continue vancomycin 1000 mg IVPB q18h  Predicted ; Trough 13.4  Level collected this AM is falsely elevated (collected while dose infusing)  Repeat level tomorrow AM  Noted ID is consulted, will follow notes  Pharmacy will continue to monitor patient and adjust therapy as indicated    Sonal 3 12/21 @ 0600    Thank you for the consult,  Mary Ellen Jauregui [facility], PharmD  12/20/2022 11:28 AM

## 2022-12-20 NOTE — CARE COORDINATION
VM left for Mariel/VAL to notify CM of decision to accept or not. Requested OT to see pt via WB. Pt having an I&D today with wound vac placement per surgeon note.   TE

## 2022-12-21 ENCOUNTER — ANESTHESIA (OUTPATIENT)
Dept: OPERATING ROOM | Age: 62
End: 2022-12-21
Payer: MEDICARE

## 2022-12-21 ENCOUNTER — APPOINTMENT (OUTPATIENT)
Dept: MRI IMAGING | Age: 62
DRG: 264 | End: 2022-12-21
Attending: STUDENT IN AN ORGANIZED HEALTH CARE EDUCATION/TRAINING PROGRAM
Payer: MEDICARE

## 2022-12-21 PROBLEM — T14.8XXA OPEN WOUND: Status: ACTIVE | Noted: 2022-12-21

## 2022-12-21 PROBLEM — D69.6 THROMBOCYTOPENIA (HCC): Status: ACTIVE | Noted: 2022-12-21

## 2022-12-21 LAB
ANION GAP SERPL CALCULATED.3IONS-SCNC: 10 MMOL/L (ref 4–16)
BASOPHILS ABSOLUTE: 0.1 K/CU MM
BASOPHILS RELATIVE PERCENT: 1.8 % (ref 0–1)
BUN BLDV-MCNC: 29 MG/DL (ref 6–23)
C-REACTIVE PROTEIN, HIGH SENSITIVITY: 8.7 MG/L
CALCIUM SERPL-MCNC: 8.2 MG/DL (ref 8.3–10.6)
CHLORIDE BLD-SCNC: 105 MMOL/L (ref 99–110)
CO2: 23 MMOL/L (ref 21–32)
CREAT SERPL-MCNC: 0.8 MG/DL (ref 0.9–1.3)
DIFFERENTIAL TYPE: ABNORMAL
DOSE AMOUNT: NORMAL
DOSE TIME: NORMAL
EOSINOPHILS ABSOLUTE: 0.1 K/CU MM
EOSINOPHILS RELATIVE PERCENT: 2.9 % (ref 0–3)
GFR SERPL CREATININE-BSD FRML MDRD: >60 ML/MIN/1.73M2
GLUCOSE BLD-MCNC: 149 MG/DL (ref 70–99)
GLUCOSE BLD-MCNC: 196 MG/DL (ref 70–99)
GLUCOSE BLD-MCNC: 201 MG/DL (ref 70–99)
GLUCOSE BLD-MCNC: 205 MG/DL (ref 70–99)
GLUCOSE BLD-MCNC: 258 MG/DL (ref 70–99)
GLUCOSE BLD-MCNC: 321 MG/DL (ref 70–99)
HCT VFR BLD CALC: 31.3 % (ref 42–52)
HEMOGLOBIN: 10.6 GM/DL (ref 13.5–18)
IMMATURE NEUTROPHIL %: 1.3 % (ref 0–0.43)
LYMPHOCYTES ABSOLUTE: 1.1 K/CU MM
LYMPHOCYTES RELATIVE PERCENT: 24.6 % (ref 24–44)
MCH RBC QN AUTO: 28.8 PG (ref 27–31)
MCHC RBC AUTO-ENTMCNC: 33.9 % (ref 32–36)
MCV RBC AUTO: 85.1 FL (ref 78–100)
MONOCYTES ABSOLUTE: 0.5 K/CU MM
MONOCYTES RELATIVE PERCENT: 11.5 % (ref 0–4)
NUCLEATED RBC %: 0 %
PDW BLD-RTO: 14.5 % (ref 11.7–14.9)
PLATELET # BLD: 113 K/CU MM (ref 140–440)
PMV BLD AUTO: 10.7 FL (ref 7.5–11.1)
POTASSIUM SERPL-SCNC: 4.4 MMOL/L (ref 3.5–5.1)
RBC # BLD: 3.68 M/CU MM (ref 4.6–6.2)
SEGMENTED NEUTROPHILS ABSOLUTE COUNT: 2.6 K/CU MM
SEGMENTED NEUTROPHILS RELATIVE PERCENT: 57.9 % (ref 36–66)
SODIUM BLD-SCNC: 138 MMOL/L (ref 135–145)
TOTAL IMMATURE NEUTOROPHIL: 0.06 K/CU MM
TOTAL NUCLEATED RBC: 0 K/CU MM
VANCOMYCIN RANDOM: 19.3 UG/ML
WBC # BLD: 4.5 K/CU MM (ref 4–10.5)

## 2022-12-21 PROCEDURE — 6360000002 HC RX W HCPCS: Performed by: STUDENT IN AN ORGANIZED HEALTH CARE EDUCATION/TRAINING PROGRAM

## 2022-12-21 PROCEDURE — 6370000000 HC RX 637 (ALT 250 FOR IP): Performed by: SURGERY

## 2022-12-21 PROCEDURE — 6370000000 HC RX 637 (ALT 250 FOR IP): Performed by: STUDENT IN AN ORGANIZED HEALTH CARE EDUCATION/TRAINING PROGRAM

## 2022-12-21 PROCEDURE — 2580000003 HC RX 258: Performed by: INTERNAL MEDICINE

## 2022-12-21 PROCEDURE — 3600000012 HC SURGERY LEVEL 2 ADDTL 15MIN: Performed by: SURGERY

## 2022-12-21 PROCEDURE — 3700000001 HC ADD 15 MINUTES (ANESTHESIA): Performed by: SURGERY

## 2022-12-21 PROCEDURE — 82962 GLUCOSE BLOOD TEST: CPT

## 2022-12-21 PROCEDURE — 94761 N-INVAS EAR/PLS OXIMETRY MLT: CPT

## 2022-12-21 PROCEDURE — 2500000003 HC RX 250 WO HCPCS: Performed by: NURSE ANESTHETIST, CERTIFIED REGISTERED

## 2022-12-21 PROCEDURE — 3600000002 HC SURGERY LEVEL 2 BASE: Performed by: SURGERY

## 2022-12-21 PROCEDURE — 2500000003 HC RX 250 WO HCPCS: Performed by: SURGERY

## 2022-12-21 PROCEDURE — 97605 NEG PRS WND THER DME<=50SQCM: CPT | Performed by: SURGERY

## 2022-12-21 PROCEDURE — 1200000000 HC SEMI PRIVATE

## 2022-12-21 PROCEDURE — 6360000002 HC RX W HCPCS: Performed by: NURSE ANESTHETIST, CERTIFIED REGISTERED

## 2022-12-21 PROCEDURE — 6360000002 HC RX W HCPCS: Performed by: INTERNAL MEDICINE

## 2022-12-21 PROCEDURE — 85025 COMPLETE CBC W/AUTO DIFF WBC: CPT

## 2022-12-21 PROCEDURE — 80202 ASSAY OF VANCOMYCIN: CPT

## 2022-12-21 PROCEDURE — 80048 BASIC METABOLIC PNL TOTAL CA: CPT

## 2022-12-21 PROCEDURE — 15273 SKIN SUB GRFT T/ARM/LG CHILD: CPT | Performed by: SURGERY

## 2022-12-21 PROCEDURE — 36415 COLL VENOUS BLD VENIPUNCTURE: CPT

## 2022-12-21 PROCEDURE — 3700000000 HC ANESTHESIA ATTENDED CARE: Performed by: SURGERY

## 2022-12-21 PROCEDURE — 2709999900 HC NON-CHARGEABLE SUPPLY: Performed by: SURGERY

## 2022-12-21 PROCEDURE — 86140 C-REACTIVE PROTEIN: CPT

## 2022-12-21 PROCEDURE — 6370000000 HC RX 637 (ALT 250 FOR IP): Performed by: INTERNAL MEDICINE

## 2022-12-21 DEVICE — PURAPLY™ ANTIMICROBIAL WOUND MATRIX CONSISTS OF A COLLAGEN SHEET COATED WITH POLYHEXAMETHYLENEBIGUANIDE HYDROCHLORIDE (PHMB) INTENDED FOR THE MANAGEMENT OF WOUNDS. PURAPLY™ ANTIMICROBIAL WOUND MATRIX IS SUPPLIED DRY IN SHEET FORM. THE DEVICE IS PACKAGED IN STERILE, SEALED SINGLE POUCHES.
Type: IMPLANTABLE DEVICE | Site: LEG | Status: FUNCTIONAL
Brand: PURAPLY™ ANTIMICROBIAL WOUND MATRIX

## 2022-12-21 RX ORDER — OXYCODONE HYDROCHLORIDE AND ACETAMINOPHEN 5; 325 MG/1; MG/1
2 TABLET ORAL EVERY 4 HOURS PRN
Status: DISCONTINUED | OUTPATIENT
Start: 2022-12-21 | End: 2023-01-09 | Stop reason: HOSPADM

## 2022-12-21 RX ORDER — OXYCODONE HYDROCHLORIDE AND ACETAMINOPHEN 5; 325 MG/1; MG/1
1 TABLET ORAL EVERY 4 HOURS PRN
Status: DISCONTINUED | OUTPATIENT
Start: 2022-12-21 | End: 2023-01-09 | Stop reason: HOSPADM

## 2022-12-21 RX ORDER — LIDOCAINE HYDROCHLORIDE 20 MG/ML
INJECTION, SOLUTION EPIDURAL; INFILTRATION; INTRACAUDAL; PERINEURAL PRN
Status: DISCONTINUED | OUTPATIENT
Start: 2022-12-21 | End: 2022-12-21 | Stop reason: SDUPTHER

## 2022-12-21 RX ORDER — LIDOCAINE HYDROCHLORIDE 10 MG/ML
INJECTION, SOLUTION INFILTRATION; PERINEURAL
Status: COMPLETED | OUTPATIENT
Start: 2022-12-21 | End: 2022-12-21

## 2022-12-21 RX ORDER — SENNA PLUS 8.6 MG/1
1 TABLET ORAL NIGHTLY
Status: DISCONTINUED | OUTPATIENT
Start: 2022-12-21 | End: 2023-01-09 | Stop reason: HOSPADM

## 2022-12-21 RX ORDER — FENTANYL CITRATE 50 UG/ML
INJECTION, SOLUTION INTRAMUSCULAR; INTRAVENOUS PRN
Status: DISCONTINUED | OUTPATIENT
Start: 2022-12-21 | End: 2022-12-21 | Stop reason: SDUPTHER

## 2022-12-21 RX ORDER — PROPOFOL 10 MG/ML
INJECTION, EMULSION INTRAVENOUS PRN
Status: DISCONTINUED | OUTPATIENT
Start: 2022-12-21 | End: 2022-12-21 | Stop reason: SDUPTHER

## 2022-12-21 RX ADMIN — OXYCODONE AND ACETAMINOPHEN 2 TABLET: 5; 325 TABLET ORAL at 11:45

## 2022-12-21 RX ADMIN — INSULIN LISPRO 3 UNITS: 100 INJECTION, SOLUTION INTRAVENOUS; SUBCUTANEOUS at 12:05

## 2022-12-21 RX ADMIN — OXYCODONE AND ACETAMINOPHEN 1 TABLET: 7.5; 325 TABLET ORAL at 03:20

## 2022-12-21 RX ADMIN — HYDROMORPHONE HYDROCHLORIDE 0.5 MG: 1 INJECTION, SOLUTION INTRAMUSCULAR; INTRAVENOUS; SUBCUTANEOUS at 22:28

## 2022-12-21 RX ADMIN — ENOXAPARIN SODIUM 40 MG: 100 INJECTION SUBCUTANEOUS at 10:33

## 2022-12-21 RX ADMIN — HYDROMORPHONE HYDROCHLORIDE 1 MG: 1 INJECTION, SOLUTION INTRAMUSCULAR; INTRAVENOUS; SUBCUTANEOUS at 10:12

## 2022-12-21 RX ADMIN — GABAPENTIN 600 MG: 300 CAPSULE ORAL at 10:33

## 2022-12-21 RX ADMIN — SENNOSIDES 8.6 MG: 8.6 TABLET, FILM COATED ORAL at 22:23

## 2022-12-21 RX ADMIN — HYDROMORPHONE HYDROCHLORIDE 0.5 MG: 1 INJECTION, SOLUTION INTRAMUSCULAR; INTRAVENOUS; SUBCUTANEOUS at 16:47

## 2022-12-21 RX ADMIN — FENTANYL CITRATE 25 MCG: 50 INJECTION, SOLUTION INTRAMUSCULAR; INTRAVENOUS at 08:19

## 2022-12-21 RX ADMIN — LIDOCAINE HYDROCHLORIDE 100 MG: 20 INJECTION, SOLUTION EPIDURAL; INFILTRATION; INTRACAUDAL; PERINEURAL at 08:12

## 2022-12-21 RX ADMIN — CEFEPIME HYDROCHLORIDE 2000 MG: 2 INJECTION, POWDER, FOR SOLUTION INTRAVENOUS at 22:33

## 2022-12-21 RX ADMIN — ATORVASTATIN CALCIUM 40 MG: 40 TABLET, FILM COATED ORAL at 22:23

## 2022-12-21 RX ADMIN — CEFEPIME HYDROCHLORIDE 2000 MG: 2 INJECTION, POWDER, FOR SOLUTION INTRAVENOUS at 08:17

## 2022-12-21 RX ADMIN — INSULIN LISPRO 4 UNITS: 100 INJECTION, SOLUTION INTRAVENOUS; SUBCUTANEOUS at 12:06

## 2022-12-21 RX ADMIN — CEFEPIME HYDROCHLORIDE 2000 MG: 2 INJECTION, POWDER, FOR SOLUTION INTRAVENOUS at 01:11

## 2022-12-21 RX ADMIN — FENTANYL CITRATE 25 MCG: 50 INJECTION, SOLUTION INTRAMUSCULAR; INTRAVENOUS at 08:25

## 2022-12-21 RX ADMIN — GABAPENTIN 600 MG: 300 CAPSULE ORAL at 22:23

## 2022-12-21 RX ADMIN — GABAPENTIN 600 MG: 300 CAPSULE ORAL at 15:39

## 2022-12-21 RX ADMIN — HYDROMORPHONE HYDROCHLORIDE 0.5 MG: 1 INJECTION, SOLUTION INTRAMUSCULAR; INTRAVENOUS; SUBCUTANEOUS at 11:09

## 2022-12-21 RX ADMIN — SODIUM CHLORIDE: 9 INJECTION, SOLUTION INTRAVENOUS at 07:43

## 2022-12-21 RX ADMIN — ASPIRIN 81 MG CHEWABLE TABLET 81 MG: 81 TABLET CHEWABLE at 10:33

## 2022-12-21 RX ADMIN — PROPOFOL 240 MG: 10 INJECTION, EMULSION INTRAVENOUS at 08:12

## 2022-12-21 RX ADMIN — OXYCODONE AND ACETAMINOPHEN 1 TABLET: 7.5; 325 TABLET ORAL at 09:12

## 2022-12-21 RX ADMIN — VANCOMYCIN HYDROCHLORIDE 1000 MG: 1 INJECTION, POWDER, LYOPHILIZED, FOR SOLUTION INTRAVENOUS at 15:43

## 2022-12-21 RX ADMIN — OXYCODONE AND ACETAMINOPHEN 2 TABLET: 5; 325 TABLET ORAL at 18:08

## 2022-12-21 RX ADMIN — INSULIN GLARGINE 8 UNITS: 100 INJECTION, SOLUTION SUBCUTANEOUS at 22:24

## 2022-12-21 ASSESSMENT — PAIN SCALES - WONG BAKER: WONGBAKER_NUMERICALRESPONSE: 0

## 2022-12-21 ASSESSMENT — PAIN DESCRIPTION - ORIENTATION
ORIENTATION: RIGHT

## 2022-12-21 ASSESSMENT — PAIN SCALES - GENERAL
PAINLEVEL_OUTOF10: 8
PAINLEVEL_OUTOF10: 10
PAINLEVEL_OUTOF10: 7
PAINLEVEL_OUTOF10: 8
PAINLEVEL_OUTOF10: 10
PAINLEVEL_OUTOF10: 0

## 2022-12-21 ASSESSMENT — ENCOUNTER SYMPTOMS
CHEST TIGHTNESS: 0
COLOR CHANGE: 1
CONSTIPATION: 0
COUGH: 0
SORE THROAT: 0
TROUBLE SWALLOWING: 0
SINUS PRESSURE: 0
DIARRHEA: 0
ABDOMINAL PAIN: 0
SINUS PAIN: 0
NAUSEA: 0
VOMITING: 0
SHORTNESS OF BREATH: 0
VOICE CHANGE: 0
WHEEZING: 0
BACK PAIN: 0

## 2022-12-21 ASSESSMENT — LIFESTYLE VARIABLES: SMOKING_STATUS: 1

## 2022-12-21 ASSESSMENT — PAIN DESCRIPTION - FREQUENCY
FREQUENCY: CONTINUOUS
FREQUENCY: CONTINUOUS

## 2022-12-21 ASSESSMENT — PAIN DESCRIPTION - LOCATION
LOCATION: LEG

## 2022-12-21 ASSESSMENT — PAIN DESCRIPTION - PAIN TYPE
TYPE: ACUTE PAIN;CHRONIC PAIN
TYPE: SURGICAL PAIN

## 2022-12-21 ASSESSMENT — PAIN DESCRIPTION - DESCRIPTORS
DESCRIPTORS: ACHING;DISCOMFORT
DESCRIPTORS: ACHING;SHARP

## 2022-12-21 ASSESSMENT — PAIN DESCRIPTION - ONSET
ONSET: ON-GOING
ONSET: ON-GOING

## 2022-12-21 NOTE — ANESTHESIA POSTPROCEDURE EVALUATION
Department of Anesthesiology  Postprocedure Note    Patient: Hanna Fraire  MRN: [id number]  YOB: 1960  Date of evaluation: 12/21/2022      Procedure Summary     Date: 12/21/22 Room / Location: [address]    Anesthesia Start: 0802 Anesthesia Stop: 5897    Procedure: BKA WOUND DEBRIDEMENT INCISION AND DRAINAGE WITH WOUND VAC AND PURAPLY APPLICATION (Right: Leg Lower) Diagnosis:       Infection      (Infection [B99.9])    Surgeons: Júnior Miramontes DO Responsible Provider: STEPHANIE Gregorio CRNA    Anesthesia Type: general ASA Status: 3          Anesthesia Type: No value filed.     Carmen Phase I:      Carmen Phase II:        Anesthesia Post Evaluation    Patient location during evaluation: floor  Patient participation: complete - patient participated  Level of consciousness: sleepy but conscious  Pain score: 0  Airway patency: patent  Nausea & Vomiting: no vomiting and no nausea  Complications: no  Cardiovascular status: blood pressure returned to baseline and hemodynamically stable  Respiratory status: acceptable, room air, spontaneous ventilation and nonlabored ventilation  Hydration status: stable

## 2022-12-21 NOTE — PROGRESS NOTES
[address] [facility]  consulted by Dr. Radames Rangel for monitoring and adjustment. Indication for treatment: Open wound on the right stump; Possibility of chronic osteomyelitis is not excludable  Goal trough: Trough Goal: 15-20 mcg/mL  AUC/SUHA: 400-600    Risk Factors for MRSA Identified:   Purulent and/or complicated SSTI    Pertinent Laboratory Values:   Temp Readings from Last 3 Encounters:   12/21/22 97.8 °F (36.6 °C) (Oral)   10/25/22 98.9 °F (37.2 °C) (Oral)   09/15/22 97.5 °F (36.4 °C) (Axillary)     WBC 5.5 4.0 - 10.5 K/uL   12/18/2022 4:28 PM EST [address] [facility]     BUN 25 7 - 25 mg/dL   12/18/2022 4:13 PM EST [address] [facility]     Creatinine 1.33 (A) 0.7 - 1.3 mg/dL   12/18/2022 4:13 PM EST [address] [facility]       Estimated Creatinine Clearance: 88 mL/min (A) (based on SCr of 0.8 mg/dL (L)). Intake/Output Summary (Last 24 hours) at 12/21/2022 1312  Last data filed at 12/21/2022 1015  Gross per 24 hour   Intake 520 ml   Output 700 ml   Net -180 ml       RENAL LAB EVALUATION  Estimated Creatinine Clearance: 88 mL/min (A) (based on SCr of 0.8 mg/dL (L)). Recent Labs     12/19/22  0410 12/20/22  0514 12/21/22  0559   BUN 22 34* 29*   CREATININE 1.0 1.0 0.8*         Pertinent Cultures:   Date    Source    Results  12/18   Blood    Collected [facility])  12/20                           Wound                                   In process     Assessment:  HPI: 60 y.o. male with coronary artery disease s/p CABG, PAD, Diabetes mellitus type 2, COPD, chronic hepatitis C, diabetic foot ulcer/necrotizing fasciitis s/p right BKA (8/2022). Patient presented to [facility] ED with complaints of pain, redness, swelling, drainage from the right stump.     Renal trends:   Scr stable in range 0.9-1; noted slight increase in BUN  Continue to monitor renal trends closely  Day(s) of therapy: 4 of 7  Vancomycin concentration:   12/20 - 35.1, falsely elevated/unable to be interpreted, collected while dose infusing  12/21 - 19.3, 8 hours post-dose    Plan:  Continue vancomycin 1000 mg IVPB q18h with a therapeutic trough and  at steady state  Repeat random level in 2 days  Noted ID is consulted, will follow notes  Pharmacy will continue to monitor patient and adjust therapy as indicated    VANCOMYCIN CONCENTRATION SCHEDULED FOR 12/23 @ 0600    Thank you for the consult,  Perez Boateng, [facility]  12/21/2022 1:12 PM

## 2022-12-21 NOTE — OP NOTE
Operative Note      Patient: Letitia Garrison  YOB: 1960  MRN: [id number]    Date of Procedure: 12/21/2022    Pre-Op Diagnosis: Infection [B99.9]    Post-Op Diagnosis: Same           Right BKA stump Excisional debridement down to soft tissue of 7 x 3.5 (24.5 sq cm) wound. Application of Puraply   Placement of wound vac. Surgeon(s):  Ning Riley DO    Assistant:   * No surgical staff found *    Anesthesia: Monitor Anesthesia Care    Estimated Blood Loss (mL): Minimal    Complications: None    Specimens:   * No specimens in log *    Implants:  Implant Name Type Inv. Item Serial No.  Lot No. LRB No. Used Action   Orly Briggs WND [id number] [id number] - [id number]  ECHO-GRAFT Nicolas Leon WND MATRIX [id number]  [employer] [id number] Right 1 Implanted         Drains: * No LDAs found *    Findings:   Excisional debridement down to soft tissue of 7 x 3.5 (24.5 sq cm) wound. Application of Puraply   Placement of wound vac. Detailed Description of Procedure:     Patient was taken the OR and laid in supine position. After induction of general anesthesia, patient was prepped and draped in sterile fashion. Eschar, necrotic tissue, sloughing tissue/biofilm were debrided from wound sharply with 10 blade scalpel. Hemostasis achieved with electrocautery. The wound base was clean healthy tissue. PuraPly as noted above was placed over wound. A wound VAC was then placed with black foam over the graft. There was good seal.  Patient tolerated procedure well, no complication, and the end of case was transported recovery in stable condition. Wound measurement 7 x 3.5 cm with 24.5 cm debrided down to subcutaneous tissue.     Electronically signed by Ning Riley DO on 12/21/2022 at 9:23 AM

## 2022-12-21 NOTE — FLOWSHEET NOTE
Pt returned from OR- Bedside report given, pt placed on monitor and alarms verified. Pt wound vac present and site is clean dry and intact. PT is c/o of pain in right leg.

## 2022-12-21 NOTE — PROGRESS NOTES
Attempted mri today but pt was in too much pain and was unable to continue. No additional pain meds were due at that time so pt opted to stop scanning.   Talked to nurse and she will have pt get pain med tomorrow at 930am and the mri should be attempted around 10am
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
## 2022-12-21 NOTE — PROGRESS NOTES
V2.0  [facility] Hospitalist Progress Note      Name:  Reuben Stock /Age/Sex: 1960  (60 y.o. male)   MRN & CSN:  [id number] & [id number] Encounter Date/Time: 2022 2:16 PM EST    Location:  7695/0848-Z PCP: Denver Colas, MD       Hospital Day: 4    Assessment and Plan:   Reuben Stock is a 60 y.o. male with pmh of coronary artery disease s/p CABG, PAD, Diabetes mellitus type 2, COPD, not on home oxygen, chronic hepatitis C, and diabetic foot ulcer/necrotizing fasciitis s/p right BKA (2022) who presents with Osteomyelitis ([address]      Plan:    Open necrotic/gangrenous wound of right stump. Had a fall and dehiscence P/W worsening wound, looks infected with necrotic/gangrenous margins(pic uploaded to media). Pus pockets. General surgery consulted  surgical intervention on 22 with wound vac placement  ID consulted  IV Antibiotics  Pain control: Percocet panel and IV Dilaudid for breakthrough    Type 2 diabetes on long-term insulin  Lantus 8 units nightly  Lispro 40s with meals  Low-dose sliding scale  Plan of care glucose checks  Hypoglycemia protocol    Diabetic neuropathy  Continue gabapentin     Peripheral arterial disease  CTA of right lower extremity in 10/2021 showed complete occlusion of the right common femoral artery, opacifying anterior and posterior tibial arteries. Had angioplasty 2021  Continue aspirin and atorvastatin     Coronary artery disease s/p CABG- 2013  Continue aspirin, atorvastatin       Diet ADULT DIET; Regular; 3 carb choices (45 gm/meal)   DVT Prophylaxis [x] Lovenox, []  Heparin, [] SCDs, [] Ambulation,    [] Eliquis, [] Xarelto  [] Coumadin [] other   Code Status Full Code   Disposition From: home  Expected Disposition: home  Estimated Date of Discharge: 2-5 days  Patient requires continued admission due to surgical intervention for leg   Surrogate Decision Maker/ POA      Subjective:     Chief Complaint: No chief complaint on file.      Patient seen after his surgical intervention and wound VAC placement today. He was in a great deal of pain. Was able to control the pain better with increased and his pain regimen. Endorses the pain at the site of surgical intervention and states that the low back is painful. Otherwise surgery went well and he is hemodynamically stable. Review of Systems:    Review of Systems   Constitutional:  Negative for activity change, appetite change, chills, fatigue and fever. HENT:  Negative for congestion, hearing loss, sinus pressure, sinus pain, sore throat, trouble swallowing and voice change. Eyes:  Negative for visual disturbance. Respiratory:  Negative for cough, chest tightness, shortness of breath and wheezing. Cardiovascular:  Negative for chest pain, palpitations and leg swelling. Gastrointestinal:  Negative for abdominal pain, constipation, diarrhea, nausea and vomiting. Endocrine: Negative for cold intolerance, heat intolerance and polyuria. Genitourinary:  Negative for dysuria. Musculoskeletal:  Negative for arthralgias, back pain and gait problem. Skin:  Positive for color change and wound. Neurological:  Negative for dizziness, weakness and headaches. Psychiatric/Behavioral:  Negative for agitation and confusion. The patient is not nervous/anxious. Objective: Intake/Output Summary (Last 24 hours) at 12/21/2022 1529  Last data filed at 12/21/2022 1015  Gross per 24 hour   Intake 520 ml   Output 700 ml   Net -180 ml          Vitals:   Vitals:    12/21/22 1059   BP: 116/76   Pulse: 72   Resp:    Temp: 97.8 °F (36.6 °C)   SpO2: 100%       Physical Exam:     Physical Exam  Constitutional:       General: He is not in acute distress. Appearance: Normal appearance. HENT:      Head: Normocephalic and atraumatic. Nose: Nose normal.      Mouth/Throat:      Mouth: Mucous membranes are moist.      Pharynx: Oropharynx is clear. Eyes:      Extraocular Movements: Extraocular movements intact. Conjunctiva/sclera: Conjunctivae normal.      Pupils: Pupils are equal, round, and reactive to light. Cardiovascular:      Rate and Rhythm: Normal rate and regular rhythm. Pulses: Normal pulses. Heart sounds: Normal heart sounds. Pulmonary:      Effort: Pulmonary effort is normal.   Abdominal:      General: Abdomen is flat. Bowel sounds are normal.      Palpations: Abdomen is soft. Musculoskeletal:         General: Normal range of motion. Cervical back: Normal range of motion and neck supple. Skin:     General: Skin is warm and dry. Findings: Erythema, lesion and rash present. Neurological:      General: No focal deficit present. Mental Status: He is alert and oriented to person, place, and time. Mental status is at baseline. Psychiatric:         Mood and Affect: Mood normal.         Behavior: Behavior normal.         Thought Content:  Thought content normal.       Medications:   Medications:    senna  1 tablet Oral Nightly    insulin lispro  4 Units SubCUTAneous TID WC    insulin lispro  0-4 Units SubCUTAneous TID WC    insulin lispro  0-4 Units SubCUTAneous Nightly    sodium chloride flush  5-40 mL IntraVENous 2 times per day    enoxaparin  40 mg SubCUTAneous Daily    cefepime  2,000 mg IntraVENous Q12H    nicotine  1 patch TransDERmal Daily    aspirin  81 mg Oral Daily    gabapentin  600 mg Oral TID    insulin glargine  8 Units SubCUTAneous Nightly    atorvastatin  40 mg Oral Nightly    vancomycin  1,000 mg IntraVENous Q18H      Infusions:    sodium chloride 40 mL/hr at 12/21/22 0802    dextrose       PRN Meds: oxyCODONE-acetaminophen, 1 tablet, Q4H PRN   Or  oxyCODONE-acetaminophen, 2 tablet, Q4H PRN  HYDROmorphone, 0.5 mg, Q4H PRN  sodium chloride flush, 5-40 mL, PRN  sodium chloride, , PRN  ondansetron, 4 mg, Q8H PRN   Or  ondansetron, 4 mg, Q6H PRN  polyethylene glycol, 17 g, Daily PRN  acetaminophen, 650 mg, Q6H PRN   Or  acetaminophen, 650 mg, Q6H PRN  glucose, 4 tablet, PRN  dextrose bolus, 125 mL, PRN   Or  dextrose bolus, 250 mL, PRN  glucagon (rDNA), 1 mg, PRN  dextrose, , Continuous PRN  hydrALAZINE, 10 mg, Q6H PRN      Labs      Recent Results (from the past 24 hour(s))   Culture, Wound Aerobic Only    Collection Time: 12/20/22  3:46 PM    Specimen: Leg   Result Value Ref Range    Specimen LEG     Special Requests       Right BKA stump wound  Description:  >Aerobic Only      Culture       Prelim Report Anaerobic culture further report to follow    Culture (A)      PROTEUS MIRABILIS Rare growth Sensitivity to follow   POCT Glucose    Collection Time: 12/20/22  4:37 PM   Result Value Ref Range    POC Glucose 128 (H) 70 - 99 MG/DL   POCT Glucose    Collection Time: 12/20/22  9:00 PM   Result Value Ref Range    POC Glucose 200 (H) 70 - 99 MG/DL   Basic Metabolic Panel w/ Reflex to MG    Collection Time: 12/21/22  5:59 AM   Result Value Ref Range    Sodium 138 135 - 145 MMOL/L    Potassium 4.4 3.5 - 5.1 MMOL/L    Chloride 105 99 - 110 mMol/L    CO2 23 21 - 32 MMOL/L    Anion Gap 10 4 - 16    BUN 29 (H) 6 - 23 MG/DL    Creatinine 0.8 (L) 0.9 - 1.3 MG/DL    Est, Glom Filt Rate >60 >60 mL/min/1.73m2    Glucose 196 (H) 70 - 99 MG/DL    Calcium 8.2 (L) 8.3 - 10.6 MG/DL   C-Reactive Protein    Collection Time: 12/21/22  5:59 AM   Result Value Ref Range    CRP High Sensitivity 8.7 (H) <5.0 mg/L   CBC with Auto Differential    Collection Time: 12/21/22  5:59 AM   Result Value Ref Range    WBC 4.5 4.0 - 10.5 K/CU MM    RBC 3.68 (L) 4.6 - 6.2 M/CU MM    Hemoglobin 10.6 (L) 13.5 - 18.0 GM/DL    Hematocrit 31.3 (L) 42 - 52 %    MCV 85.1 78 - 100 FL    MCH 28.8 27 - 31 PG    MCHC 33.9 32.0 - 36.0 %    RDW 14.5 11.7 - 14.9 %    Platelets 671 (L) 353 - 440 K/CU MM    MPV 10.7 7.5 - 11.1 FL    Differential Type AUTOMATED DIFFERENTIAL     Segs Relative 57.9 36 - 66 %    Lymphocytes % 24.6 24 - 44 %    Monocytes % 11.5 (H) 0 - 4 %    Eosinophils % 2.9 0 - 3 %    Basophils % 1.8 (H) 0 - 1 % Segs Absolute 2.6 K/CU MM    Lymphocytes Absolute 1.1 K/CU MM    Monocytes Absolute 0.5 K/CU MM    Eosinophils Absolute 0.1 K/CU MM    Basophils Absolute 0.1 K/CU MM    Nucleated RBC % 0.0 %    Total Nucleated RBC 0.0 K/CU MM    Total Immature Neutrophil 0.06 K/CU MM    Immature Neutrophil % 1.3 (H) 0 - 0.43 %   Vancomycin Level, Random    Collection Time: 12/21/22  5:59 AM   Result Value Ref Range    Vancomycin Rm 19.3 UG/ML    DOSE AMOUNT DOSE AMT. GIVEN - `     DOSE TIME DOSE TIME GIVEN - `    POCT Glucose    Collection Time: 12/21/22  7:32 AM   Result Value Ref Range    POC Glucose 205 (H) 70 - 99 MG/DL   POCT Glucose    Collection Time: 12/21/22  9:05 AM   Result Value Ref Range    POC Glucose 149 (H) 70 - 99 MG/DL   POCT Glucose    Collection Time: 12/21/22 11:47 AM   Result Value Ref Range    POC Glucose 321 (H) 70 - 99 MG/DL        Imaging/Diagnostics Last 24 Hours   No results found. Comment: Please note this report has been produced using speech recognition software and may contain errors related to that system including errors in grammar, punctuation, and spelling, as well as words and phrases that may be inappropriate. If there are any questions or concerns please feel free to contact the dictating provider for clarification.      Electronically signed by Fran Daniel MD on 12/21/2022 at 3:29 PM

## 2022-12-21 NOTE — CARE COORDINATION
Discussed pt in IDR. Pt had an I&D and wound vac placement today. Pt may be ready to d/c to ARU over the weekend per , notified Nikki/VAL.  She states that she will start the pre-cert in the am.  Nikki/VAL to start pre-cert in am.  Pt will need updated PT/OT notes tomorrow, will place on wb in the am.  TE

## 2022-12-22 ENCOUNTER — APPOINTMENT (OUTPATIENT)
Dept: MRI IMAGING | Age: 62
DRG: 264 | End: 2022-12-22
Attending: STUDENT IN AN ORGANIZED HEALTH CARE EDUCATION/TRAINING PROGRAM
Payer: MEDICARE

## 2022-12-22 LAB
ANION GAP SERPL CALCULATED.3IONS-SCNC: 9 MMOL/L (ref 4–16)
BASOPHILS ABSOLUTE: 0.1 K/CU MM
BASOPHILS RELATIVE PERCENT: 0.8 % (ref 0–1)
BUN BLDV-MCNC: 24 MG/DL (ref 6–23)
C-REACTIVE PROTEIN, HIGH SENSITIVITY: 10.5 MG/L
CALCIUM SERPL-MCNC: 7.9 MG/DL (ref 8.3–10.6)
CHLORIDE BLD-SCNC: 107 MMOL/L (ref 99–110)
CO2: 23 MMOL/L (ref 21–32)
CREAT SERPL-MCNC: 0.8 MG/DL (ref 0.9–1.3)
DIFFERENTIAL TYPE: ABNORMAL
EOSINOPHILS ABSOLUTE: 0.1 K/CU MM
EOSINOPHILS RELATIVE PERCENT: 1.6 % (ref 0–3)
GFR SERPL CREATININE-BSD FRML MDRD: >60 ML/MIN/1.73M2
GLUCOSE BLD-MCNC: 130 MG/DL (ref 70–99)
GLUCOSE BLD-MCNC: 137 MG/DL (ref 70–99)
GLUCOSE BLD-MCNC: 190 MG/DL (ref 70–99)
GLUCOSE BLD-MCNC: 235 MG/DL (ref 70–99)
GLUCOSE BLD-MCNC: 85 MG/DL (ref 70–99)
HCT VFR BLD CALC: 28.9 % (ref 42–52)
HEMOGLOBIN: 9.9 GM/DL (ref 13.5–18)
IMMATURE NEUTROPHIL %: 0.5 % (ref 0–0.43)
LYMPHOCYTES ABSOLUTE: 0.9 K/CU MM
LYMPHOCYTES RELATIVE PERCENT: 14.5 % (ref 24–44)
MCH RBC QN AUTO: 29.3 PG (ref 27–31)
MCHC RBC AUTO-ENTMCNC: 34.3 % (ref 32–36)
MCV RBC AUTO: 85.5 FL (ref 78–100)
MONOCYTES ABSOLUTE: 0.5 K/CU MM
MONOCYTES RELATIVE PERCENT: 8.4 % (ref 0–4)
NUCLEATED RBC %: 0 %
PDW BLD-RTO: 14.5 % (ref 11.7–14.9)
PLATELET # BLD: 93 K/CU MM (ref 140–440)
PMV BLD AUTO: 11.5 FL (ref 7.5–11.1)
POTASSIUM SERPL-SCNC: 5 MMOL/L (ref 3.5–5.1)
RBC # BLD: 3.38 M/CU MM (ref 4.6–6.2)
SEGMENTED NEUTROPHILS ABSOLUTE COUNT: 4.6 K/CU MM
SEGMENTED NEUTROPHILS RELATIVE PERCENT: 74.2 % (ref 36–66)
SODIUM BLD-SCNC: 139 MMOL/L (ref 135–145)
TOTAL IMMATURE NEUTOROPHIL: 0.03 K/CU MM
TOTAL NUCLEATED RBC: 0 K/CU MM
WBC # BLD: 6.2 K/CU MM (ref 4–10.5)

## 2022-12-22 PROCEDURE — 1200000000 HC SEMI PRIVATE

## 2022-12-22 PROCEDURE — 2580000003 HC RX 258: Performed by: INTERNAL MEDICINE

## 2022-12-22 PROCEDURE — 6370000000 HC RX 637 (ALT 250 FOR IP): Performed by: SURGERY

## 2022-12-22 PROCEDURE — 85025 COMPLETE CBC W/AUTO DIFF WBC: CPT

## 2022-12-22 PROCEDURE — 73718 MRI LOWER EXTREMITY W/O DYE: CPT

## 2022-12-22 PROCEDURE — 99232 SBSQ HOSP IP/OBS MODERATE 35: CPT | Performed by: INTERNAL MEDICINE

## 2022-12-22 PROCEDURE — 80048 BASIC METABOLIC PNL TOTAL CA: CPT

## 2022-12-22 PROCEDURE — 82962 GLUCOSE BLOOD TEST: CPT

## 2022-12-22 PROCEDURE — 94761 N-INVAS EAR/PLS OXIMETRY MLT: CPT

## 2022-12-22 PROCEDURE — 6360000002 HC RX W HCPCS: Performed by: STUDENT IN AN ORGANIZED HEALTH CARE EDUCATION/TRAINING PROGRAM

## 2022-12-22 PROCEDURE — 6370000000 HC RX 637 (ALT 250 FOR IP): Performed by: STUDENT IN AN ORGANIZED HEALTH CARE EDUCATION/TRAINING PROGRAM

## 2022-12-22 PROCEDURE — 86140 C-REACTIVE PROTEIN: CPT

## 2022-12-22 PROCEDURE — 6360000002 HC RX W HCPCS: Performed by: INTERNAL MEDICINE

## 2022-12-22 PROCEDURE — 6370000000 HC RX 637 (ALT 250 FOR IP): Performed by: INTERNAL MEDICINE

## 2022-12-22 RX ADMIN — OXYCODONE AND ACETAMINOPHEN 2 TABLET: 5; 325 TABLET ORAL at 17:34

## 2022-12-22 RX ADMIN — GABAPENTIN 600 MG: 300 CAPSULE ORAL at 10:01

## 2022-12-22 RX ADMIN — INSULIN LISPRO 4 UNITS: 100 INJECTION, SOLUTION INTRAVENOUS; SUBCUTANEOUS at 17:17

## 2022-12-22 RX ADMIN — HYDROMORPHONE HYDROCHLORIDE 1 MG: 1 INJECTION, SOLUTION INTRAMUSCULAR; INTRAVENOUS; SUBCUTANEOUS at 10:09

## 2022-12-22 RX ADMIN — OXYCODONE AND ACETAMINOPHEN 2 TABLET: 5; 325 TABLET ORAL at 22:11

## 2022-12-22 RX ADMIN — INSULIN LISPRO 1 UNITS: 100 INJECTION, SOLUTION INTRAVENOUS; SUBCUTANEOUS at 10:02

## 2022-12-22 RX ADMIN — ASPIRIN 81 MG CHEWABLE TABLET 81 MG: 81 TABLET CHEWABLE at 10:01

## 2022-12-22 RX ADMIN — OXYCODONE AND ACETAMINOPHEN 2 TABLET: 5; 325 TABLET ORAL at 02:26

## 2022-12-22 RX ADMIN — GABAPENTIN 600 MG: 300 CAPSULE ORAL at 14:15

## 2022-12-22 RX ADMIN — CEFAZOLIN 2000 MG: 2 INJECTION, POWDER, FOR SOLUTION INTRAMUSCULAR; INTRAVENOUS at 12:06

## 2022-12-22 RX ADMIN — ATORVASTATIN CALCIUM 40 MG: 40 TABLET, FILM COATED ORAL at 21:13

## 2022-12-22 RX ADMIN — ENOXAPARIN SODIUM 40 MG: 100 INJECTION SUBCUTANEOUS at 10:01

## 2022-12-22 RX ADMIN — HYDROMORPHONE HYDROCHLORIDE 0.5 MG: 1 INJECTION, SOLUTION INTRAMUSCULAR; INTRAVENOUS; SUBCUTANEOUS at 07:12

## 2022-12-22 RX ADMIN — CEFAZOLIN 2000 MG: 2 INJECTION, POWDER, FOR SOLUTION INTRAMUSCULAR; INTRAVENOUS at 17:23

## 2022-12-22 RX ADMIN — SODIUM CHLORIDE, PRESERVATIVE FREE 10 ML: 5 INJECTION INTRAVENOUS at 21:14

## 2022-12-22 RX ADMIN — INSULIN LISPRO 4 UNITS: 100 INJECTION, SOLUTION INTRAVENOUS; SUBCUTANEOUS at 10:03

## 2022-12-22 RX ADMIN — INSULIN LISPRO 4 UNITS: 100 INJECTION, SOLUTION INTRAVENOUS; SUBCUTANEOUS at 12:09

## 2022-12-22 RX ADMIN — GABAPENTIN 600 MG: 300 CAPSULE ORAL at 21:13

## 2022-12-22 RX ADMIN — SENNOSIDES 8.6 MG: 8.6 TABLET, FILM COATED ORAL at 21:13

## 2022-12-22 ASSESSMENT — ENCOUNTER SYMPTOMS
COLOR CHANGE: 1
BACK PAIN: 0
NAUSEA: 0
SORE THROAT: 0
SINUS PRESSURE: 0
VOICE CHANGE: 0
SINUS PAIN: 0
WHEEZING: 0
CHEST TIGHTNESS: 0
CONSTIPATION: 0
ABDOMINAL PAIN: 0
TROUBLE SWALLOWING: 0
COUGH: 0
DIARRHEA: 0
SHORTNESS OF BREATH: 0
VOMITING: 0

## 2022-12-22 ASSESSMENT — PAIN DESCRIPTION - DESCRIPTORS
DESCRIPTORS: SHARP
DESCRIPTORS: ACHING
DESCRIPTORS: ACHING;SHARP
DESCRIPTORS: ACHING

## 2022-12-22 ASSESSMENT — PAIN SCALES - WONG BAKER
WONGBAKER_NUMERICALRESPONSE: 4
WONGBAKER_NUMERICALRESPONSE: 0
WONGBAKER_NUMERICALRESPONSE: 0

## 2022-12-22 ASSESSMENT — PAIN DESCRIPTION - LOCATION
LOCATION: KNEE
LOCATION: LEG
LOCATION: LEG
LOCATION: LEG;KNEE

## 2022-12-22 ASSESSMENT — PAIN SCALES - GENERAL
PAINLEVEL_OUTOF10: 8
PAINLEVEL_OUTOF10: 3
PAINLEVEL_OUTOF10: 8

## 2022-12-22 ASSESSMENT — PAIN DESCRIPTION - ORIENTATION
ORIENTATION: RIGHT

## 2022-12-22 NOTE — PROGRESS NOTES
Occupational Therapy Treatment Note    Name: Ryan Bustos MRN: [id number] :   1960   Date:  2022   Admission Date: 2022 Room:  [address] [place] [address]-A     Attempted to see pt this morning @903 am but pt refusing therapy due to not feeling well. Pt educated on benefits of therapy. Pt asked to come back later today. Will re-attempt as schedule allows.        Electronically signed by:    ANNE Mackenzie,   2022, 11:06 AM

## 2022-12-22 NOTE — PROGRESS NOTES
Physical Therapy  Attempted to see pt for PT treatment. Pt declined therapy today stating he did not feel well. RN notified of pts discomfort. Will re-attempt as time permits.

## 2022-12-22 NOTE — PROGRESS NOTES
V2.0  [facility] [place]      Name:  Antoni Pacheco /Age/Sex: 1960  (60 y.o. male)   MRN & CSN:  [id number] & [id number] Encounter Date/Time: 2022 2:16 PM EST    Location:  [address] [place] [address] PCP: Chato Lyle MD       Hospital Day: 5    Assessment and Plan:   Antoni Pacheco is a 60 y.o. male with pmh of coronary artery disease s/p CABG, PAD, Diabetes mellitus type 2, COPD, not on home oxygen, chronic hepatitis C, and diabetic foot ulcer/necrotizing fasciitis s/p right BKA (2022) who presents with Osteomyelitis ([address]      Plan:    Open necrotic/gangrenous wound of right stump. Had a fall and dehiscence P/W worsening wound, looks infected with necrotic/gangrenous margins(pic uploaded to media). Pus pockets. General surgery consulted  surgical intervention on 22 with wound vac placement  F/U MRI of leg  ID consulted  IV Antibiotics  Pain control: Percocet panel and IV Dilaudid for breakthrough    Type 2 diabetes on long-term insulin  Lantus 8 units nightly  Lispro 40s with meals  Low-dose sliding scale  Plan of care glucose checks  Hypoglycemia protocol    Diabetic neuropathy  Continue gabapentin     Peripheral arterial disease  CTA of right lower extremity in 10/2021 showed complete occlusion of the right common femoral artery, opacifying anterior and posterior tibial arteries. Had angioplasty 2021  Continue aspirin and atorvastatin     Coronary artery disease s/p CABG- 2013  Continue aspirin, atorvastatin       Diet ADULT DIET; Regular; 3 carb choices (45 gm/meal)   DVT Prophylaxis [x] Lovenox, []  Heparin, [] SCDs, [] Ambulation,    [] Eliquis, [] Xarelto  [] Coumadin [] other   Code Status Full Code   Disposition From: home  Expected Disposition: home  Estimated Date of Discharge: 2-5 days  Patient requires continued admission due to surgical intervention for leg   Surrogate Decision Maker/ POA      Subjective:     Chief Complaint: No chief complaint on file.      Patient continues to endorse postsurgical pain particular with a wound VAC. States pain medications are helping. to have his MRI done today to look for any potential osteomyelitis. Review of Systems:    Review of Systems   Constitutional:  Negative for activity change, appetite change, chills, fatigue and fever. HENT:  Negative for congestion, hearing loss, sinus pressure, sinus pain, sore throat, trouble swallowing and voice change. Eyes:  Negative for visual disturbance. Respiratory:  Negative for cough, chest tightness, shortness of breath and wheezing. Cardiovascular:  Negative for chest pain, palpitations and leg swelling. Gastrointestinal:  Negative for abdominal pain, constipation, diarrhea, nausea and vomiting. Endocrine: Negative for cold intolerance, heat intolerance and polyuria. Genitourinary:  Negative for dysuria. Musculoskeletal:  Positive for arthralgias and myalgias. Negative for back pain and gait problem. Skin:  Positive for color change and wound. Neurological:  Negative for dizziness, weakness and headaches. Psychiatric/Behavioral:  Negative for agitation and confusion. The patient is not nervous/anxious. Objective: Intake/Output Summary (Last 24 hours) at 12/22/2022 1240  Last data filed at 12/22/2022 0207  Gross per 24 hour   Intake 450 ml   Output 650 ml   Net -200 ml          Vitals:   Vitals:    12/22/22 1039   BP:    Pulse:    Resp: 16   Temp:    SpO2:        Physical Exam:     Physical Exam  Constitutional:       General: He is not in acute distress. Appearance: Normal appearance. HENT:      Head: Normocephalic and atraumatic. Nose: Nose normal.      Mouth/Throat:      Mouth: Mucous membranes are moist.      Pharynx: Oropharynx is clear. Eyes:      Extraocular Movements: Extraocular movements intact. Conjunctiva/sclera: Conjunctivae normal.      Pupils: Pupils are equal, round, and reactive to light.    Cardiovascular:      Rate and Rhythm: Normal rate and regular rhythm. Pulses: Normal pulses. Heart sounds: Normal heart sounds. Pulmonary:      Effort: Pulmonary effort is normal.   Abdominal:      General: Abdomen is flat. Bowel sounds are normal.      Palpations: Abdomen is soft. Musculoskeletal:         General: Normal range of motion. Cervical back: Normal range of motion and neck supple. Skin:     General: Skin is warm and dry. Findings: Erythema, lesion and rash present. Neurological:      General: No focal deficit present. Mental Status: He is alert and oriented to person, place, and time. Mental status is at baseline. Psychiatric:         Mood and Affect: Mood normal.         Behavior: Behavior normal.         Thought Content:  Thought content normal.       Medications:   Medications:    ceFAZolin  2,000 mg IntraVENous 3 times per day    senna  1 tablet Oral Nightly    insulin lispro  4 Units SubCUTAneous TID WC    insulin lispro  0-4 Units SubCUTAneous TID WC    insulin lispro  0-4 Units SubCUTAneous Nightly    sodium chloride flush  5-40 mL IntraVENous 2 times per day    enoxaparin  40 mg SubCUTAneous Daily    nicotine  1 patch TransDERmal Daily    aspirin  81 mg Oral Daily    gabapentin  600 mg Oral TID    insulin glargine  8 Units SubCUTAneous Nightly    atorvastatin  40 mg Oral Nightly      Infusions:    sodium chloride 40 mL/hr at 12/21/22 0802    dextrose       PRN Meds: oxyCODONE-acetaminophen, 1 tablet, Q4H PRN   Or  oxyCODONE-acetaminophen, 2 tablet, Q4H PRN  HYDROmorphone, 0.5 mg, Q4H PRN  sodium chloride flush, 5-40 mL, PRN  sodium chloride, , PRN  ondansetron, 4 mg, Q8H PRN   Or  ondansetron, 4 mg, Q6H PRN  polyethylene glycol, 17 g, Daily PRN  acetaminophen, 650 mg, Q6H PRN   Or  acetaminophen, 650 mg, Q6H PRN  glucose, 4 tablet, PRN  dextrose bolus, 125 mL, PRN   Or  dextrose bolus, 250 mL, PRN  glucagon (rDNA), 1 mg, PRN  dextrose, , Continuous PRN  hydrALAZINE, 10 mg, Q6H PRN      Labs Recent Results (from the past 24 hour(s))   POCT Glucose    Collection Time: 12/21/22  4:39 PM   Result Value Ref Range    POC Glucose 201 (H) 70 - 99 MG/DL   POCT Glucose    Collection Time: 12/21/22  8:52 PM   Result Value Ref Range    POC Glucose 258 (H) 70 - 99 MG/DL   Basic Metabolic Panel w/ Reflex to MG    Collection Time: 12/22/22  7:00 AM   Result Value Ref Range    Sodium 139 135 - 145 MMOL/L    Potassium 5.0 3.5 - 5.1 MMOL/L    Chloride 107 99 - 110 mMol/L    CO2 23 21 - 32 MMOL/L    Anion Gap 9 4 - 16    BUN 24 (H) 6 - 23 MG/DL    Creatinine 0.8 (L) 0.9 - 1.3 MG/DL    Est, Glom Filt Rate >60 >60 mL/min/1.73m2    Glucose 137 (H) 70 - 99 MG/DL    Calcium 7.9 (L) 8.3 - 10.6 MG/DL   C-Reactive Protein    Collection Time: 12/22/22  7:00 AM   Result Value Ref Range    CRP High Sensitivity 10.5 (H) <5.0 mg/L   CBC with Auto Differential    Collection Time: 12/22/22  7:00 AM   Result Value Ref Range    WBC 6.2 4.0 - 10.5 K/CU MM    RBC 3.38 (L) 4.6 - 6.2 M/CU MM    Hemoglobin 9.9 (L) 13.5 - 18.0 GM/DL    Hematocrit 28.9 (L) 42 - 52 %    MCV 85.5 78 - 100 FL    MCH 29.3 27 - 31 PG    MCHC 34.3 32.0 - 36.0 %    RDW 14.5 11.7 - 14.9 %    Platelets 93 (L) 922 - 440 K/CU MM    MPV 11.5 (H) 7.5 - 11.1 FL    Differential Type AUTOMATED DIFFERENTIAL     Segs Relative 74.2 (H) 36 - 66 %    Lymphocytes % 14.5 (L) 24 - 44 %    Monocytes % 8.4 (H) 0 - 4 %    Eosinophils % 1.6 0 - 3 %    Basophils % 0.8 0 - 1 %    Segs Absolute 4.6 K/CU MM    Lymphocytes Absolute 0.9 K/CU MM    Monocytes Absolute 0.5 K/CU MM    Eosinophils Absolute 0.1 K/CU MM    Basophils Absolute 0.1 K/CU MM    Nucleated RBC % 0.0 %    Total Nucleated RBC 0.0 K/CU MM    Total Immature Neutrophil 0.03 K/CU MM    Immature Neutrophil % 0.5 (H) 0 - 0.43 %   POCT Glucose    Collection Time: 12/22/22  8:23 AM   Result Value Ref Range    POC Glucose 235 (H) 70 - 99 MG/DL   POCT Glucose    Collection Time: 12/22/22 11:45 AM   Result Value Ref Range    POC Glucose 130 (H) 70 - 99 MG/DL        Imaging/Diagnostics Last 24 Hours   No results found. Comment: Please note this report has been produced using speech recognition software and may contain errors related to that system including errors in grammar, punctuation, and spelling, as well as words and phrases that may be inappropriate. If there are any questions or concerns please feel free to contact the dictating provider for clarification.      Electronically signed by Natasha Love MD on 12/22/2022 at 12:40 PM

## 2022-12-22 NOTE — PROGRESS NOTES
Infectious Disease Progress Note  2022   Patient Name: Calista Rodriguez : 1960     Assessment  Right BKA stump wound with infection  S/p wound debridement, I and D with wound VAC application on   MRI of the right stump was attempted on 2022 but it was interrupted as the patient was said to be in pain. Another attempt was made on 2022  Wound culture positive for Proteus mirabilis and MSSA  CRP is not markedly elevated  Thrombocytopenia  Comorbid conditions: Coronary artery disease status post CABG, peripheral arterial disease, type 2 diabetes mellitus, COPD, chronic hepatitis C     Plan  Therapeutic:   intravenous cefazolin 2g q 8 hours for 6 weeks  After discharge the following should be done:  Weekly labs drawn on Monday during the course of treatment  CBC with differential, CMP, ESR, CRP,  Cathflo for blocked vascular access as needed  Fax results to Attn: [place] Staff  # [phone]  See in clinic within one week after discharge  Disposition:  Diagnostic: CRP, ESR  F/u: Wound culture  Other:   D/w Dr. Piper Christensen  Primary care or hospitalist service to assume infectious disease care of patient. Patient may be transferred to another facility if higher level of care is needed. Reason for visit: F/u right BKA stump wound infection, suspected osteomyelitis  History:? Interval history noted  Denies n/v/d/f or untoward effects of antimicrobials  Physical Exam:  Vital Signs: BP (!) 118/59   Pulse 71   Temp 97 °F (36.1 °C) (Axillary)   Resp 16   Ht 5' 7\" (1.702 m)   Wt 145 lb (65.8 kg)   SpO2 97%   BMI 22.71 kg/m²     Gen: alert and oriented X3, no distress  Skin: no stigmata of endocarditis  Wounds: right BKA wound VAC  HEMT: AT/NC Oropharynx pink, moist, and without lesions or exudates; dentition in good state of repair  Eyes: PERRLA, EOMI, conjunctiva pink, sclera anicteric. Neck: Supple. Trachea midline. No LAD. Chest: no distress and CTA. Good air movement. Heart: RRR and no MRG. Abd: soft, non-distended, no tenderness, no hepatomegaly. Normoactive bowel sounds. Ext: no clubbing, cyanosis, or edema  Catheter Site: without erythema or tenderness  LDA:   Neuro: Mental status intact. CN 2-12 intact and no focal sensory or motor deficits     Radiologic / Imaging / TESTING  MRI OF THE RIGHT TIBIA/FIBULA WITHOUT CONTRAST, 12/21/2022 2:55 pm     TECHNIQUE:   Multiplanar multisequence MRI of the right tibia/fibula was performed without   the administration of intravenous contrast.     COMPARISON:   MRI dated 11/04/2021, right foot radiographs dated 08/29/2022. HISTORY:   ORDERING SYSTEM PROVIDED HISTORY:   TECHNOLOGIST PROVIDED HISTORY:   Right stump osteomyelitis   Reason for Exam: Osteomyelitis   Relevant Medical/Surgical History: None     FINDINGS:   The patient is status post right below-the-knee amputation. There is   decreased T1 marrow signal within the distal tibial shaft at the level of the   amputation site. There is corresponding T2 hyperintensity. The extent of   marrow signal change measures 3.7 cm in craniocaudal dimension. There is edema within the soft tissues overlying the amputation site   anteriorly. There is focal skin thickening about the lateral aspect of the   amputation site with T2 hyperintensity. There is edema diffusely throughout   the leg musculature. A defined soft tissue fluid collection is not appreciated. There is no evidence of osteomyelitis of the fibula. Impression:  Findings most consistent with osteomyelitis of the distal tibia at the   amputation site.       Labs:    Recent Results (from the past 24 hour(s))   POCT Glucose    Collection Time: 12/21/22 11:47 AM   Result Value Ref Range    POC Glucose 321 (H) 70 - 99 MG/DL   POCT Glucose    Collection Time: 12/21/22  4:39 PM   Result Value Ref Range    POC Glucose 201 (H) 70 - 99 MG/DL   POCT Glucose    Collection Time: 12/21/22  8:52 PM   Result Value Ref Range    POC Glucose 258 (H) 70 - 99 MG/DL   Basic Metabolic Panel w/ Reflex to MG    Collection Time: 12/22/22  7:00 AM   Result Value Ref Range    Sodium 139 135 - 145 MMOL/L    Potassium 5.0 3.5 - 5.1 MMOL/L    Chloride 107 99 - 110 mMol/L    CO2 23 21 - 32 MMOL/L    Anion Gap 9 4 - 16    BUN 24 (H) 6 - 23 MG/DL    Creatinine 0.8 (L) 0.9 - 1.3 MG/DL    Est, Glom Filt Rate >60 >60 mL/min/1.73m2    Glucose 137 (H) 70 - 99 MG/DL    Calcium 7.9 (L) 8.3 - 10.6 MG/DL   C-Reactive Protein    Collection Time: 12/22/22  7:00 AM   Result Value Ref Range    CRP High Sensitivity 10.5 (H) <5.0 mg/L   CBC with Auto Differential    Collection Time: 12/22/22  7:00 AM   Result Value Ref Range    WBC 6.2 4.0 - 10.5 K/CU MM    RBC 3.38 (L) 4.6 - 6.2 M/CU MM    Hemoglobin 9.9 (L) 13.5 - 18.0 GM/DL    Hematocrit 28.9 (L) 42 - 52 %    MCV 85.5 78 - 100 FL    MCH 29.3 27 - 31 PG    MCHC 34.3 32.0 - 36.0 %    RDW 14.5 11.7 - 14.9 %    Platelets 93 (L) 143 - 440 K/CU MM    MPV 11.5 (H) 7.5 - 11.1 FL    Differential Type AUTOMATED DIFFERENTIAL     Segs Relative 74.2 (H) 36 - 66 %    Lymphocytes % 14.5 (L) 24 - 44 %    Monocytes % 8.4 (H) 0 - 4 %    Eosinophils % 1.6 0 - 3 %    Basophils % 0.8 0 - 1 %    Segs Absolute 4.6 K/CU MM    Lymphocytes Absolute 0.9 K/CU MM    Monocytes Absolute 0.5 K/CU MM    Eosinophils Absolute 0.1 K/CU MM    Basophils Absolute 0.1 K/CU MM    Nucleated RBC % 0.0 %    Total Nucleated RBC 0.0 K/CU MM    Total Immature Neutrophil 0.03 K/CU MM    Immature Neutrophil % 0.5 (H) 0 - 0.43 %   POCT Glucose    Collection Time: 12/22/22  8:23 AM   Result Value Ref Range    POC Glucose 235 (H) 70 - 99 MG/DL     CULTURE results: Invalid input(s): BLOOD CULTURE,  URINE CULTURE, SURGICAL CULTURE    Diagnosis:  Patient Active Problem List   Diagnosis    Diabetes mellitus    H/O echocardiogram    S/P CABG (coronary artery bypass graft)    Chest pain    Cellulitis    Heroin withdrawal (HCC)    CAD (coronary artery disease)    Polysubstance abuse ([address]    Small bowel obstruction (HCC)    Narcotic abuse, continuous (HCC)    Hx of hepatitis    Epigastric pain    Diarrhea    Constipation with Ileus    Vomiting of fecal matter with nausea    Encephalopathy    Metabolic encephalopathy    Infectious gastroenteritis    Bilateral leg weakness    Gait disturbance    Left carotid stenosis    Hypothyroidism    Osteomyelitis ([address]    Uncontrolled type II diabetes with peripheral autonomic neuropathy    Gangrene of toe (HCC)    Acute hematogenous osteomyelitis of right foot (HCC)    Amputation of little toe (HCC)    PAD (peripheral artery disease) (HCC)    Uncontrolled pain    Generalized weakness    Simple chronic bronchitis (HCC)    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 ([address]    Skin ulcer with necrosis of muscle (HCC)    Toe ulcer ([address]    Diabetic foot ([address]    Diabetic foot infection ([address]    Abscess of right foot    WD-Diabetic ulcer of right midfoot associated with type 2 diabetes mellitus, with muscle involvement without evidence of necrosis ([address]    Necrotizing fasciitis ([address]    Bacteremia due to group B Streptococcus    Gangrene of right foot ([address]    Osteomyelitis of right lower limb ([address]    Acute blood loss anemia    Uncontrolled type 2 diabetes mellitus with peripheral neuropathy    Essential hypertension    Hyponatremia    Cigarette nicotine dependence without complication    Thrombocytopenia (HCC)    Open wound       Active Problems  Principal Problem:    Osteomyelitis (HCC)  Active Problems: Thrombocytopenia ([address]    Open wound  Resolved Problems:    * No resolved hospital problems.  *              Electronically signed by: Electronically signed by Chantal Bell MD on 12/22/2022 at 10:01 AM

## 2022-12-22 NOTE — CARE COORDINATION
Spoke with Bárbara. She will start the pre-cert today. Requested updated PT/OT notes via WB. Bárbara to notify CM when pre-cert received.   TE

## 2022-12-23 LAB
ANION GAP SERPL CALCULATED.3IONS-SCNC: 8 MMOL/L (ref 4–16)
BASOPHILS ABSOLUTE: 0.1 K/CU MM
BASOPHILS RELATIVE PERCENT: 0.9 % (ref 0–1)
BUN BLDV-MCNC: 23 MG/DL (ref 6–23)
C-REACTIVE PROTEIN, HIGH SENSITIVITY: 19.2 MG/L
CALCIUM SERPL-MCNC: 7.9 MG/DL (ref 8.3–10.6)
CHLORIDE BLD-SCNC: 106 MMOL/L (ref 99–110)
CO2: 23 MMOL/L (ref 21–32)
CREAT SERPL-MCNC: 0.8 MG/DL (ref 0.9–1.3)
DIFFERENTIAL TYPE: ABNORMAL
EOSINOPHILS ABSOLUTE: 0.1 K/CU MM
EOSINOPHILS RELATIVE PERCENT: 1.6 % (ref 0–3)
GFR SERPL CREATININE-BSD FRML MDRD: >60 ML/MIN/1.73M2
GLUCOSE BLD-MCNC: 126 MG/DL (ref 70–99)
GLUCOSE BLD-MCNC: 176 MG/DL (ref 70–99)
GLUCOSE BLD-MCNC: 195 MG/DL (ref 70–99)
GLUCOSE BLD-MCNC: 285 MG/DL (ref 70–99)
GLUCOSE BLD-MCNC: 310 MG/DL (ref 70–99)
HCT VFR BLD CALC: 28.9 % (ref 42–52)
HEMOGLOBIN: 9.8 GM/DL (ref 13.5–18)
IMMATURE NEUTROPHIL %: 0.5 % (ref 0–0.43)
LYMPHOCYTES ABSOLUTE: 1.1 K/CU MM
LYMPHOCYTES RELATIVE PERCENT: 18.9 % (ref 24–44)
MCH RBC QN AUTO: 29.3 PG (ref 27–31)
MCHC RBC AUTO-ENTMCNC: 33.9 % (ref 32–36)
MCV RBC AUTO: 86.5 FL (ref 78–100)
MONOCYTES ABSOLUTE: 0.5 K/CU MM
MONOCYTES RELATIVE PERCENT: 9.2 % (ref 0–4)
NUCLEATED RBC %: 0 %
PDW BLD-RTO: 14.6 % (ref 11.7–14.9)
PLATELET # BLD: 85 K/CU MM (ref 140–440)
PMV BLD AUTO: 10.4 FL (ref 7.5–11.1)
POTASSIUM SERPL-SCNC: 4.3 MMOL/L (ref 3.5–5.1)
RBC # BLD: 3.34 M/CU MM (ref 4.6–6.2)
SEGMENTED NEUTROPHILS ABSOLUTE COUNT: 3.8 K/CU MM
SEGMENTED NEUTROPHILS RELATIVE PERCENT: 68.9 % (ref 36–66)
SODIUM BLD-SCNC: 137 MMOL/L (ref 135–145)
TOTAL IMMATURE NEUTOROPHIL: 0.03 K/CU MM
TOTAL NUCLEATED RBC: 0 K/CU MM
WBC # BLD: 5.5 K/CU MM (ref 4–10.5)

## 2022-12-23 PROCEDURE — 6370000000 HC RX 637 (ALT 250 FOR IP): Performed by: INTERNAL MEDICINE

## 2022-12-23 PROCEDURE — 6370000000 HC RX 637 (ALT 250 FOR IP): Performed by: SURGERY

## 2022-12-23 PROCEDURE — 6360000002 HC RX W HCPCS: Performed by: STUDENT IN AN ORGANIZED HEALTH CARE EDUCATION/TRAINING PROGRAM

## 2022-12-23 PROCEDURE — 85025 COMPLETE CBC W/AUTO DIFF WBC: CPT

## 2022-12-23 PROCEDURE — 97530 THERAPEUTIC ACTIVITIES: CPT

## 2022-12-23 PROCEDURE — 82962 GLUCOSE BLOOD TEST: CPT

## 2022-12-23 PROCEDURE — 6370000000 HC RX 637 (ALT 250 FOR IP): Performed by: STUDENT IN AN ORGANIZED HEALTH CARE EDUCATION/TRAINING PROGRAM

## 2022-12-23 PROCEDURE — 2580000003 HC RX 258: Performed by: INTERNAL MEDICINE

## 2022-12-23 PROCEDURE — 6360000002 HC RX W HCPCS: Performed by: INTERNAL MEDICINE

## 2022-12-23 PROCEDURE — 94761 N-INVAS EAR/PLS OXIMETRY MLT: CPT

## 2022-12-23 PROCEDURE — 36415 COLL VENOUS BLD VENIPUNCTURE: CPT

## 2022-12-23 PROCEDURE — 80048 BASIC METABOLIC PNL TOTAL CA: CPT

## 2022-12-23 PROCEDURE — 97605 NEG PRS WND THER DME<=50SQCM: CPT

## 2022-12-23 PROCEDURE — 86140 C-REACTIVE PROTEIN: CPT

## 2022-12-23 PROCEDURE — 1200000000 HC SEMI PRIVATE

## 2022-12-23 PROCEDURE — 0JBN0ZZ EXCISION OF RIGHT LOWER LEG SUBCUTANEOUS TISSUE AND FASCIA, OPEN APPROACH: ICD-10-PCS | Performed by: SURGERY

## 2022-12-23 PROCEDURE — 99024 POSTOP FOLLOW-UP VISIT: CPT | Performed by: SURGERY

## 2022-12-23 RX ADMIN — CEFAZOLIN 2000 MG: 2 INJECTION, POWDER, FOR SOLUTION INTRAMUSCULAR; INTRAVENOUS at 02:06

## 2022-12-23 RX ADMIN — HYDROMORPHONE HYDROCHLORIDE 0.5 MG: 1 INJECTION, SOLUTION INTRAMUSCULAR; INTRAVENOUS; SUBCUTANEOUS at 15:28

## 2022-12-23 RX ADMIN — INSULIN LISPRO 4 UNITS: 100 INJECTION, SOLUTION INTRAVENOUS; SUBCUTANEOUS at 10:14

## 2022-12-23 RX ADMIN — HYDROMORPHONE HYDROCHLORIDE 0.5 MG: 1 INJECTION, SOLUTION INTRAMUSCULAR; INTRAVENOUS; SUBCUTANEOUS at 21:19

## 2022-12-23 RX ADMIN — ATORVASTATIN CALCIUM 40 MG: 40 TABLET, FILM COATED ORAL at 21:16

## 2022-12-23 RX ADMIN — INSULIN LISPRO 4 UNITS: 100 INJECTION, SOLUTION INTRAVENOUS; SUBCUTANEOUS at 13:37

## 2022-12-23 RX ADMIN — OXYCODONE AND ACETAMINOPHEN 2 TABLET: 5; 325 TABLET ORAL at 18:32

## 2022-12-23 RX ADMIN — GABAPENTIN 600 MG: 300 CAPSULE ORAL at 21:16

## 2022-12-23 RX ADMIN — CEFAZOLIN 2000 MG: 2 INJECTION, POWDER, FOR SOLUTION INTRAMUSCULAR; INTRAVENOUS at 18:39

## 2022-12-23 RX ADMIN — GABAPENTIN 600 MG: 300 CAPSULE ORAL at 10:03

## 2022-12-23 RX ADMIN — INSULIN GLARGINE 8 UNITS: 100 INJECTION, SOLUTION SUBCUTANEOUS at 21:18

## 2022-12-23 RX ADMIN — SODIUM CHLORIDE, PRESERVATIVE FREE 10 ML: 5 INJECTION INTRAVENOUS at 22:08

## 2022-12-23 RX ADMIN — SENNOSIDES 8.6 MG: 8.6 TABLET, FILM COATED ORAL at 21:16

## 2022-12-23 RX ADMIN — OXYCODONE AND ACETAMINOPHEN 2 TABLET: 5; 325 TABLET ORAL at 23:51

## 2022-12-23 RX ADMIN — ENOXAPARIN SODIUM 40 MG: 100 INJECTION SUBCUTANEOUS at 10:04

## 2022-12-23 RX ADMIN — INSULIN LISPRO 2 UNITS: 100 INJECTION, SOLUTION INTRAVENOUS; SUBCUTANEOUS at 13:37

## 2022-12-23 RX ADMIN — OXYCODONE AND ACETAMINOPHEN 2 TABLET: 5; 325 TABLET ORAL at 04:09

## 2022-12-23 RX ADMIN — HYDROMORPHONE HYDROCHLORIDE 0.5 MG: 1 INJECTION, SOLUTION INTRAMUSCULAR; INTRAVENOUS; SUBCUTANEOUS at 10:13

## 2022-12-23 RX ADMIN — CEFAZOLIN 2000 MG: 2 INJECTION, POWDER, FOR SOLUTION INTRAMUSCULAR; INTRAVENOUS at 10:20

## 2022-12-23 RX ADMIN — GABAPENTIN 600 MG: 300 CAPSULE ORAL at 13:38

## 2022-12-23 RX ADMIN — SODIUM CHLORIDE, PRESERVATIVE FREE 10 ML: 5 INJECTION INTRAVENOUS at 10:04

## 2022-12-23 RX ADMIN — OXYCODONE AND ACETAMINOPHEN 2 TABLET: 5; 325 TABLET ORAL at 13:38

## 2022-12-23 RX ADMIN — INSULIN LISPRO 4 UNITS: 100 INJECTION, SOLUTION INTRAVENOUS; SUBCUTANEOUS at 21:18

## 2022-12-23 RX ADMIN — OXYCODONE AND ACETAMINOPHEN 2 TABLET: 5; 325 TABLET ORAL at 08:21

## 2022-12-23 RX ADMIN — SODIUM CHLORIDE 25 ML/HR: 9 INJECTION, SOLUTION INTRAVENOUS at 18:38

## 2022-12-23 RX ADMIN — ASPIRIN 81 MG CHEWABLE TABLET 81 MG: 81 TABLET CHEWABLE at 10:03

## 2022-12-23 ASSESSMENT — PAIN SCALES - GENERAL
PAINLEVEL_OUTOF10: 4
PAINLEVEL_OUTOF10: 10
PAINLEVEL_OUTOF10: 9
PAINLEVEL_OUTOF10: 8
PAINLEVEL_OUTOF10: 7
PAINLEVEL_OUTOF10: 8
PAINLEVEL_OUTOF10: 8
PAINLEVEL_OUTOF10: 9
PAINLEVEL_OUTOF10: 9

## 2022-12-23 ASSESSMENT — ENCOUNTER SYMPTOMS
CONSTIPATION: 0
SINUS PAIN: 0
SORE THROAT: 0
COUGH: 0
WHEEZING: 0
DIARRHEA: 0
VOICE CHANGE: 0
ABDOMINAL PAIN: 0
VOMITING: 0
BACK PAIN: 0
TROUBLE SWALLOWING: 0
SINUS PRESSURE: 0
SHORTNESS OF BREATH: 0
COLOR CHANGE: 1
CHEST TIGHTNESS: 0
NAUSEA: 0

## 2022-12-23 ASSESSMENT — PAIN DESCRIPTION - LOCATION
LOCATION: LEG
LOCATION: INCISION;OTHER (COMMENT)
LOCATION: LEG
LOCATION: LEG

## 2022-12-23 ASSESSMENT — PAIN DESCRIPTION - ORIENTATION
ORIENTATION: RIGHT

## 2022-12-23 ASSESSMENT — PAIN DESCRIPTION - FREQUENCY: FREQUENCY: CONTINUOUS

## 2022-12-23 ASSESSMENT — PAIN DESCRIPTION - DESCRIPTORS
DESCRIPTORS: ACHING
DESCRIPTORS: ACHING
DESCRIPTORS: BURNING
DESCRIPTORS: BURNING

## 2022-12-23 ASSESSMENT — PAIN DESCRIPTION - PAIN TYPE: TYPE: SURGICAL PAIN

## 2022-12-23 ASSESSMENT — PAIN DESCRIPTION - ONSET: ONSET: ON-GOING

## 2022-12-23 NOTE — PROGRESS NOTES
Physical Therapy    Physical Therapy Treatment Note  Name: Kentrell Corrales MRN: [id number] :   1960   Date:  2022   Admission Date: 2022 Room:  [address] [place] [address]   Restrictions/Precautions:          general precautions, falls, wound vac R residual limb  Subjective:  Patient states:  \"I'm hungry, where is my breakfast?\"   Pain:   Location, Type, Intensity (0/10 to 10/10):  5/10 in bed, pt states pain increased with standing  Objective:    Observation:  Patient in bed upon arrival. Pt voices understanding of education but unable to tolerate much mobility due to significant pain. Objective Measures: Stable vitals on room air   Treatment, including education/measures:  Sitting balance/tolerance: at EOB SBA-Kash performing light dynamic activity with single UE support. STS transfer: from EOB to RW with CGA-SBA. VC for hand placement. Ambulation: 20ft with RW and CGA. Assist for line management of tele and wound vac. Distance limited d/t increase in pain and patient requesting to sit down. Education  Pt educated on contractures and risk of having a non functional gait with prosthetic if knee flexion contracture were to occur. Educated pt thoroughly on POC, role of PT, ARU requirements, and contractures.  Pt voices understanding of education and continues to want to go to ARU upon discharge from the hospital.   Assessment / Impression:    Patient's tolerance of treatment:  limited due to significant pain    Adverse Reaction: increased pain in upright position  Significant change in status and impact:  dec activity tolerance   Barriers to improvement:  activity tolerance, strength, balance, pain   Plan for Next Session:    Activity tolerance, strength, balance, gait, transfers, WC mobility as tolerated   Time in:  0859  Time out:  915  Timed treatment minutes:  16  Total treatment time:  16 minutes     Short Term Goals  Time Frame for Short Term Goals: 2 weeks  Short Term Goal 1: Pt will perform sit><supine Kash  Short Term Goal 2: Pt will transfer between surfaces SBA  Short Term Goal 3: Pt will ambulate 30ft with RW SBA  Short Term Goal 4: Pt will propel WC 150ft Kash  Short Term Goal 5: Pt will perform standing light dynamic activity x 3 minutes, single UE support SBA    Electronically signed by:    Bernadette Davis PT, DPT  12/23/2022, 10:40 AM

## 2022-12-23 NOTE — CARE COORDINATION
After reviewing therapy notes from yesterday (12/23) patient refused twice. After speaking with therapy on ARU, patient didn't obtain his minutes last time he was on ARU either. Due to this ARU is unable to accept patient. Will cancel pre-cert that was initiated with [employer]. Left  for Cailin REYES

## 2022-12-23 NOTE — PROGRESS NOTES
Occupational Therapy    Occupational Therapy Treatment Note    Name: Natalie Rosa MRN: [id number] :   1960   Date:  2022   Admission Date: 2022 Room:  [address] [place] [address]-     Primary Problem:  Osteomyelitis    Restrictions/Precautions: General Precautions, Fall Risk, Rt BKA, Wound vac,  Bed/chair alarm    Communication with other providers: RN approved session. Co treat with PT Nae for precert    Subjective:  Patient states: Pt agreeable to therapy session with encouragement. Pain:   Location, Type, Intensity (0/10 to 10/10):  5/10 in R residual limb    Objective:    Observation: Pt supine in bed upon arrival.  Objective Measures:  Pt alert and oriented. Treatment, including education:  Therapeutic Activity Training:   Therapeutic activity training was instructed today. Cues were given for safety, sequence, UE/LE placement, awareness, and balance. Activities performed today included bed mobility training, sup-sit, sit-stand, SPT. Pt supine in bed upon arrival and completed supine to sit with head of bed slightly elevated and use of bed rails with SBA. Pt completed sit to stand from edge of bed [employer] and gait belt donned and contact guard assist with decreased safety awareness. Pt completed functional mobility less than household distance with noted increase in pain with residual limb down. Pt returned in room seated in recliner and educated on progression of tolerance to session with appears limited due to pain this date. Pt call in reach. Assessment / Impression:    Patient's tolerance of treatment: fair-  Adverse Reaction: None  Significant change in status and impact:   Barriers to improvement: None noted    Plan for Next Session:    Continue OT POC and progress OOB tolerance.     Time in:  859  Time out:  916  Timed treatment minutes:  17  Total treatment time:  17      Electronically signed by:    ANNE Wilkes,   2022, 10:54 AM

## 2022-12-23 NOTE — PLAN OF CARE
Problem: Chronic Conditions and Co-morbidities  Goal: Patient's chronic conditions and co-morbidity symptoms are monitored and maintained or improved  12/23/2022 1050 by Zachery Marcial RN  Outcome: Progressing  Flowsheets (Taken 12/23/2022 1002)  Care Plan - Patient's Chronic Conditions and Co-Morbidity Symptoms are Monitored and Maintained or Improved:   Monitor and assess patient's chronic conditions and comorbid symptoms for stability, deterioration, or improvement   Update acute care plan with appropriate goals if chronic or comorbid symptoms are exacerbated and prevent overall improvement and discharge   Collaborate with multidisciplinary team to address chronic and comorbid conditions and prevent exacerbation or deterioration  12/23/2022 0133 by Roberto Shipley RN  Outcome: Progressing     Problem: Discharge Planning  Goal: Discharge to home or other facility with appropriate resources  12/23/2022 1050 by Zachery Marcial RN  Outcome: Progressing  Flowsheets (Taken 12/23/2022 1002)  Discharge to home or other facility with appropriate resources:   Identify barriers to discharge with patient and caregiver   Arrange for needed discharge resources and transportation as appropriate   Identify discharge learning needs (meds, wound care, etc)   Arrange for interpreters to assist at discharge as needed   Refer to discharge planning if patient needs post-hospital services based on physician order or complex needs related to functional status, cognitive ability or social support system  12/23/2022 0133 by Roberto Shipley RN  Outcome: Progressing     Problem: Pain  Goal: Verbalizes/displays adequate comfort level or baseline comfort level  12/23/2022 1050 by Zachery Marcial RN  Outcome: Progressing  12/23/2022 0133 by Roberto Shipley RN  Outcome: Progressing     Problem: Safety - Adult  Goal: Free from fall injury  12/23/2022 1050 by Zachery Marcial RN  Outcome: Progressing  12/23/2022 0133 by Goyo Fisher MELISSA Mcgowan  Outcome: Progressing     Problem: ABCDS Injury Assessment  Goal: Absence of physical injury  12/23/2022 1050 by Darnell Jarquin RN  Outcome: Progressing  12/23/2022 0133 by Regino Paiz RN  Outcome: Progressing     Problem: Skin/Tissue Integrity  Goal: Absence of new skin breakdown  Description: 1. Monitor for areas of redness and/or skin breakdown  2. Assess vascular access sites hourly  3. Every 4-6 hours minimum:  Change oxygen saturation probe site  4. Every 4-6 hours:  If on nasal continuous positive airway pressure, respiratory therapy assess nares and determine need for appliance change or resting period.   12/23/2022 1050 by Darnell Jarquin RN  Outcome: Progressing  12/23/2022 0133 by Regino Paiz RN  Outcome: Progressing
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
## 2022-12-23 NOTE — PLAN OF CARE
Problem: Chronic Conditions and Co-morbidities  Goal: Patient's chronic conditions and co-morbidity symptoms are monitored and maintained or improved  Outcome: Progressing     Problem: Discharge Planning  Goal: Discharge to home or other facility with appropriate resources  Outcome: Progressing     Problem: Pain  Goal: Verbalizes/displays adequate comfort level or baseline comfort level  Outcome: Progressing     Problem: Safety - Adult  Goal: Free from fall injury  Outcome: Progressing     Problem: ABCDS Injury Assessment  Goal: Absence of physical injury  Outcome: Progressing     Problem: Skin/Tissue Integrity  Goal: Absence of new skin breakdown  Description: 1. Monitor for areas of redness and/or skin breakdown  2. Assess vascular access sites hourly  3. Every 4-6 hours minimum:  Change oxygen saturation probe site  4. Every 4-6 hours:  If on nasal continuous positive airway pressure, respiratory therapy assess nares and determine need for appliance change or resting period.   Outcome: Progressing

## 2022-12-23 NOTE — PROGRESS NOTES
[place]2.0  [facility] [place]      Name:  Tereasa Severin /Age/Sex: 1960  (60 y.o. male)   MRN & CSN:  [id number] & [id number] Encounter Date/Time: 2022 2:16 PM EST    Location:  9131/9299-Y PCP: Leonard Pastor MD       Hospital Day: 6    Assessment and Plan:   Tereasa Severin is a 60 y.o. male with pmh of coronary artery disease s/p CABG, PAD, Diabetes mellitus type 2, COPD, not on home oxygen, chronic hepatitis C, and diabetic foot ulcer/necrotizing fasciitis s/p right BKA (2022) who presents with Osteomyelitis ([address]      Plan:    Open necrotic/gangrenous wound of right stump. Had a fall and dehiscence P/W worsening wound, looks infected with necrotic/gangrenous margins(pic uploaded to media). Pus pockets. General surgery consulted  surgical intervention on 22 with wound vac placement  F/U MRI of leg  ID consulted: To continue IV Ancef  Pain control: Percocet panel and IV Dilaudid for breakthrough    Type 2 diabetes on long-term insulin  Lantus 8 units nightly  Lispro 40s with meals  Low-dose sliding scale  Plan of care glucose checks  Hypoglycemia protocol    Diabetic neuropathy  Continue gabapentin     Peripheral arterial disease  CTA of right lower extremity in 10/2021 showed complete occlusion of the right common femoral artery, opacifying anterior and posterior tibial arteries. Had angioplasty 2021  Continue aspirin and atorvastatin     Coronary artery disease s/p CABG- 2013  Continue aspirin, atorvastatin       Diet ADULT DIET; Regular; 3 carb choices (45 gm/meal)   DVT Prophylaxis [x] Lovenox, []  Heparin, [] SCDs, [] Ambulation,    [] Eliquis, [] Xarelto  [] Coumadin [] other   Code Status Full Code   Disposition From: home  Expected Disposition: home  Estimated Date of Discharge: 1-3 days  Patient requires continued admission due IV antibiotics and pain control for osteomyelitis. Now pending pre-CERT for SNF placement.    Surrogate Decision Maker/ POA Subjective:     Chief Complaint: No chief complaint on file. Postop pain control has been her biggest barrier for the last day. It is improving, but patient stated that when he is trying to work with PT the pain was unbearable and he had to stop. At rest he was able to recover and does endorse improvements from day-to-day. Review of Systems:    Review of Systems   Constitutional:  Negative for activity change, appetite change, chills, fatigue and fever. HENT:  Negative for congestion, hearing loss, sinus pressure, sinus pain, sore throat, trouble swallowing and voice change. Eyes:  Negative for visual disturbance. Respiratory:  Negative for cough, chest tightness, shortness of breath and wheezing. Cardiovascular:  Negative for chest pain, palpitations and leg swelling. Gastrointestinal:  Negative for abdominal pain, constipation, diarrhea, nausea and vomiting. Endocrine: Negative for cold intolerance, heat intolerance and polyuria. Genitourinary:  Negative for dysuria. Musculoskeletal:  Positive for arthralgias and myalgias. Negative for back pain and gait problem. Skin:  Positive for color change and wound. Neurological:  Negative for dizziness, weakness and headaches. Psychiatric/Behavioral:  Negative for agitation and confusion. The patient is not nervous/anxious. Objective: Intake/Output Summary (Last 24 hours) at 12/23/2022 1502  Last data filed at 12/23/2022 1004  Gross per 24 hour   Intake 130 ml   Output 1000 ml   Net -870 ml          Vitals:   Vitals:    12/23/22 1338   BP:    Pulse:    Resp: 18   Temp:    SpO2:        Physical Exam:     Physical Exam  Constitutional:       General: He is not in acute distress. Appearance: Normal appearance. HENT:      Head: Normocephalic and atraumatic. Nose: Nose normal.      Mouth/Throat:      Mouth: Mucous membranes are moist.      Pharynx: Oropharynx is clear.    Eyes:      Extraocular Movements: Extraocular movements intact. Conjunctiva/sclera: Conjunctivae normal.      Pupils: Pupils are equal, round, and reactive to light. Cardiovascular:      Rate and Rhythm: Normal rate and regular rhythm. Pulses: Normal pulses. Heart sounds: Normal heart sounds. Pulmonary:      Effort: Pulmonary effort is normal.   Abdominal:      General: Abdomen is flat. Bowel sounds are normal.      Palpations: Abdomen is soft. Musculoskeletal:         General: Normal range of motion. Cervical back: Normal range of motion and neck supple. Skin:     General: Skin is warm and dry. Findings: Erythema, lesion and rash present. Neurological:      General: No focal deficit present. Mental Status: He is alert and oriented to person, place, and time. Mental status is at baseline. Psychiatric:         Mood and Affect: Mood normal.         Behavior: Behavior normal.         Thought Content:  Thought content normal.       Medications:   Medications:    ceFAZolin  2,000 mg IntraVENous 3 times per day    senna  1 tablet Oral Nightly    insulin lispro  4 Units SubCUTAneous TID WC    insulin lispro  0-4 Units SubCUTAneous TID WC    insulin lispro  0-4 Units SubCUTAneous Nightly    sodium chloride flush  5-40 mL IntraVENous 2 times per day    enoxaparin  40 mg SubCUTAneous Daily    nicotine  1 patch TransDERmal Daily    aspirin  81 mg Oral Daily    gabapentin  600 mg Oral TID    insulin glargine  8 Units SubCUTAneous Nightly    atorvastatin  40 mg Oral Nightly      Infusions:    sodium chloride 40 mL/hr at 12/21/22 0802    dextrose       PRN Meds: oxyCODONE-acetaminophen, 1 tablet, Q4H PRN   Or  oxyCODONE-acetaminophen, 2 tablet, Q4H PRN  HYDROmorphone, 0.5 mg, Q4H PRN  sodium chloride flush, 5-40 mL, PRN  sodium chloride, , PRN  ondansetron, 4 mg, Q8H PRN   Or  ondansetron, 4 mg, Q6H PRN  polyethylene glycol, 17 g, Daily PRN  acetaminophen, 650 mg, Q6H PRN   Or  acetaminophen, 650 mg, Q6H PRN  glucose, 4 tablet, PRN  dextrose bolus, 125 mL, PRN   Or  dextrose bolus, 250 mL, PRN  glucagon (rDNA), 1 mg, PRN  dextrose, , Continuous PRN  hydrALAZINE, 10 mg, Q6H PRN      Labs      Recent Results (from the past 24 hour(s))   POCT Glucose    Collection Time: 12/22/22  3:52 PM   Result Value Ref Range    POC Glucose 190 (H) 70 - 99 MG/DL   POCT Glucose    Collection Time: 12/22/22  9:02 PM   Result Value Ref Range    POC Glucose 85 70 - 99 MG/DL   Basic Metabolic Panel w/ Reflex to MG    Collection Time: 12/23/22  4:15 AM   Result Value Ref Range    Sodium 137 135 - 145 MMOL/L    Potassium 4.3 3.5 - 5.1 MMOL/L    Chloride 106 99 - 110 mMol/L    CO2 23 21 - 32 MMOL/L    Anion Gap 8 4 - 16    BUN 23 6 - 23 MG/DL    Creatinine 0.8 (L) 0.9 - 1.3 MG/DL    Est, Glom Filt Rate >60 >60 mL/min/1.73m2    Glucose 176 (H) 70 - 99 MG/DL    Calcium 7.9 (L) 8.3 - 10.6 MG/DL   C-Reactive Protein    Collection Time: 12/23/22  4:15 AM   Result Value Ref Range    CRP High Sensitivity 19.2 (H) <5.0 mg/L   CBC with Auto Differential    Collection Time: 12/23/22  4:15 AM   Result Value Ref Range    WBC 5.5 4.0 - 10.5 K/CU MM    RBC 3.34 (L) 4.6 - 6.2 M/CU MM    Hemoglobin 9.8 (L) 13.5 - 18.0 GM/DL    Hematocrit 28.9 (L) 42 - 52 %    MCV 86.5 78 - 100 FL    MCH 29.3 27 - 31 PG    MCHC 33.9 32.0 - 36.0 %    RDW 14.6 11.7 - 14.9 %    Platelets 85 (L) 223 - 440 K/CU MM    MPV 10.4 7.5 - 11.1 FL    Differential Type AUTOMATED DIFFERENTIAL     Segs Relative 68.9 (H) 36 - 66 %    Lymphocytes % 18.9 (L) 24 - 44 %    Monocytes % 9.2 (H) 0 - 4 %    Eosinophils % 1.6 0 - 3 %    Basophils % 0.9 0 - 1 %    Segs Absolute 3.8 K/CU MM    Lymphocytes Absolute 1.1 K/CU MM    Monocytes Absolute 0.5 K/CU MM    Eosinophils Absolute 0.1 K/CU MM    Basophils Absolute 0.1 K/CU MM    Nucleated RBC % 0.0 %    Total Nucleated RBC 0.0 K/CU MM    Total Immature Neutrophil 0.03 K/CU MM    Immature Neutrophil % 0.5 (H) 0 - 0.43 %   POCT Glucose    Collection Time: 12/23/22  7:03 AM Result Value Ref Range    POC Glucose 195 (H) 70 - 99 MG/DL   POCT Glucose    Collection Time: 12/23/22 11:41 AM   Result Value Ref Range    POC Glucose 285 (H) 70 - 99 MG/DL        Imaging/Diagnostics Last 24 Hours   No results found. Comment: Please note this report has been produced using speech recognition software and may contain errors related to that system including errors in grammar, punctuation, and spelling, as well as words and phrases that may be inappropriate. If there are any questions or concerns please feel free to contact the dictating provider for clarification.      Electronically signed by Daisy Mendez MD on 12/23/2022 at 3:02 PM

## 2022-12-23 NOTE — CARE COORDINATION
Received pt in transfer, reviewed chart: Nikki from [facility] has declined pt and cancelled precert. Met c pt to advise of denial by [facility]. Pt agreeable to go to SNF as he realizes he is not ready to go home. Reviewed in network provider list and pt chose [facility]. Sent referral to Franci for review.

## 2022-12-23 NOTE — PROGRESS NOTES
Comprehensive Nutrition Assessment    Type and Reason for Visit:  Initial (los 5 d)    Nutrition Recommendations/Plan:   Modify diet to Carb Control 5 to meet his energy needs for healing  Begin oral supplements to help meet his protein needs for healing     Malnutrition Assessment:  Malnutrition Status: At risk for malnutrition  (12/23/22 1503)    Context:  Acute Illness       Nutrition Assessment:    Pt admitted after a fall with open necrotic/gangrenous wound of right stump, s/p debridement of BKA stump wound, has wound vac. H/O coronary artery disease s/p CABG, PAD, diabetes mellitus, COPD, chronic hepatitis C, diabetic foot ulcer/necrotizing fasciitis s/p right BKA. Feeding self with generally adequate po intake, consuming 76% to all of most meals dos. Wt appears stable. Will modify diet to meet his needs for healing, begin oral supplement to optimize nutrition status/healing. Continue to follow as moderate nutrition risk. Nutrition Related Findings:    Glu 176-285, A1c 8.1   Wound Type: Surgical Incision, Wound Vac       Current Nutrition Intake & Therapies:    Average Meal Intake: 1-25%, %  Average Supplements Intake: None Ordered  ADULT DIET; Regular; 3 carb choices (45 gm/meal)    Anthropometric Measures:  Height: 5' 7\" (170.2 cm)  Ideal Body Weight (IBW): 148 lbs (67 kg)    Admission Body Weight: 145 lb (65.8 kg) (?)  Current Body Weight: 145 lb (65.8 kg), 98 % IBW. Weight Source: Bed Scale  Current BMI (kg/m2): 22.7  Usual Body Weight: 145 lb 1 oz (65.8 kg) (9/6/22)  % Weight Change (Calculated): 0  Weight Adjustment For: Amputation  Total Adjusted Percentage (Calculated): 5.9  Adjusted Ideal Body Weight (lbs) (Calculated): 139.3 lbs  Adjusted Ideal Body Weight (kg) (Calculated): 63.32 kg  Adjusted % Ideal Body Weight (Calculated): 104.1  Adjusted BMI (kg/m2) (Calculated): 24  BMI Categories: Normal Weight (BMI 18.5-24. 9)    Estimated Daily Nutrient Needs:  Energy Requirements Based On: Kcal/kg  Weight Used for Energy Requirements: Current  Energy (kcal/day): 5787-8739 (30-35 kcal/kg)  Weight Used for Protein Requirements: Current  Protein (g/day): 79-92 (1.2-1.4 g/kg)  Method Used for Fluid Requirements: 1 ml/kcal  Fluid (ml/day): 5797-3619    Nutrition Diagnosis:   Inadequate protein-energy intake related to increase demand for energy/nutrients as evidenced by wounds    Nutrition Interventions:   Food and/or Nutrient Delivery: Modify Current Diet, Start Oral Nutrition Supplement  Nutrition Education/Counseling: No recommendation at this time  Coordination of Nutrition Care: Continue to monitor while inpatient       Goals:     Goals: Meet at least 75% of estimated needs       Nutrition Monitoring and Evaluation:   Behavioral-Environmental Outcomes: None Identified  Food/Nutrient Intake Outcomes: Food and Nutrient Intake, Supplement Intake  Physical Signs/Symptoms Outcomes: Biochemical Data, Meal Time Behavior, Nutrition Focused Physical Findings, Skin, Weight    Discharge Planning:    Continue Oral Nutrition Supplement     Bryce Paulino, [address] [place] [address],   Contact: [phone]

## 2022-12-24 LAB
GLUCOSE BLD-MCNC: 174 MG/DL (ref 70–99)
GLUCOSE BLD-MCNC: 209 MG/DL (ref 70–99)
GLUCOSE BLD-MCNC: 213 MG/DL (ref 70–99)
GLUCOSE BLD-MCNC: 289 MG/DL (ref 70–99)

## 2022-12-24 PROCEDURE — 6360000002 HC RX W HCPCS: Performed by: STUDENT IN AN ORGANIZED HEALTH CARE EDUCATION/TRAINING PROGRAM

## 2022-12-24 PROCEDURE — 6360000002 HC RX W HCPCS: Performed by: INTERNAL MEDICINE

## 2022-12-24 PROCEDURE — 2580000003 HC RX 258: Performed by: INTERNAL MEDICINE

## 2022-12-24 PROCEDURE — 6370000000 HC RX 637 (ALT 250 FOR IP): Performed by: STUDENT IN AN ORGANIZED HEALTH CARE EDUCATION/TRAINING PROGRAM

## 2022-12-24 PROCEDURE — 82962 GLUCOSE BLOOD TEST: CPT

## 2022-12-24 PROCEDURE — 1200000000 HC SEMI PRIVATE

## 2022-12-24 PROCEDURE — 94761 N-INVAS EAR/PLS OXIMETRY MLT: CPT

## 2022-12-24 PROCEDURE — 6370000000 HC RX 637 (ALT 250 FOR IP): Performed by: SURGERY

## 2022-12-24 PROCEDURE — 6370000000 HC RX 637 (ALT 250 FOR IP): Performed by: INTERNAL MEDICINE

## 2022-12-24 RX ORDER — INSULIN GLARGINE 100 [IU]/ML
10 INJECTION, SOLUTION SUBCUTANEOUS NIGHTLY
Status: DISCONTINUED | OUTPATIENT
Start: 2022-12-24 | End: 2022-12-25

## 2022-12-24 RX ADMIN — OXYCODONE AND ACETAMINOPHEN 2 TABLET: 5; 325 TABLET ORAL at 06:37

## 2022-12-24 RX ADMIN — GABAPENTIN 600 MG: 300 CAPSULE ORAL at 09:34

## 2022-12-24 RX ADMIN — GABAPENTIN 600 MG: 300 CAPSULE ORAL at 13:27

## 2022-12-24 RX ADMIN — CEFAZOLIN 2000 MG: 2 INJECTION, POWDER, FOR SOLUTION INTRAMUSCULAR; INTRAVENOUS at 17:13

## 2022-12-24 RX ADMIN — GABAPENTIN 600 MG: 300 CAPSULE ORAL at 21:57

## 2022-12-24 RX ADMIN — INSULIN LISPRO 1 UNITS: 100 INJECTION, SOLUTION INTRAVENOUS; SUBCUTANEOUS at 13:26

## 2022-12-24 RX ADMIN — OXYCODONE AND ACETAMINOPHEN 2 TABLET: 5; 325 TABLET ORAL at 17:07

## 2022-12-24 RX ADMIN — INSULIN LISPRO 4 UNITS: 100 INJECTION, SOLUTION INTRAVENOUS; SUBCUTANEOUS at 13:26

## 2022-12-24 RX ADMIN — SODIUM CHLORIDE, PRESERVATIVE FREE 10 ML: 5 INJECTION INTRAVENOUS at 09:45

## 2022-12-24 RX ADMIN — INSULIN LISPRO 4 UNITS: 100 INJECTION, SOLUTION INTRAVENOUS; SUBCUTANEOUS at 09:36

## 2022-12-24 RX ADMIN — OXYCODONE AND ACETAMINOPHEN 2 TABLET: 5; 325 TABLET ORAL at 12:24

## 2022-12-24 RX ADMIN — ENOXAPARIN SODIUM 40 MG: 100 INJECTION SUBCUTANEOUS at 09:34

## 2022-12-24 RX ADMIN — INSULIN LISPRO 2 UNITS: 100 INJECTION, SOLUTION INTRAVENOUS; SUBCUTANEOUS at 09:36

## 2022-12-24 RX ADMIN — SODIUM CHLORIDE: 9 INJECTION, SOLUTION INTRAVENOUS at 02:34

## 2022-12-24 RX ADMIN — HYDROMORPHONE HYDROCHLORIDE 0.5 MG: 1 INJECTION, SOLUTION INTRAMUSCULAR; INTRAVENOUS; SUBCUTANEOUS at 02:54

## 2022-12-24 RX ADMIN — HYDROMORPHONE HYDROCHLORIDE 0.5 MG: 1 INJECTION, SOLUTION INTRAMUSCULAR; INTRAVENOUS; SUBCUTANEOUS at 09:43

## 2022-12-24 RX ADMIN — ATORVASTATIN CALCIUM 40 MG: 40 TABLET, FILM COATED ORAL at 21:57

## 2022-12-24 RX ADMIN — CEFAZOLIN 2000 MG: 2 INJECTION, POWDER, FOR SOLUTION INTRAMUSCULAR; INTRAVENOUS at 02:35

## 2022-12-24 RX ADMIN — ASPIRIN 81 MG CHEWABLE TABLET 81 MG: 81 TABLET CHEWABLE at 09:34

## 2022-12-24 RX ADMIN — OXYCODONE AND ACETAMINOPHEN 2 TABLET: 5; 325 TABLET ORAL at 21:57

## 2022-12-24 RX ADMIN — INSULIN LISPRO 4 UNITS: 100 INJECTION, SOLUTION INTRAVENOUS; SUBCUTANEOUS at 17:08

## 2022-12-24 RX ADMIN — SENNOSIDES 8.6 MG: 8.6 TABLET, FILM COATED ORAL at 21:57

## 2022-12-24 RX ADMIN — INSULIN LISPRO 1 UNITS: 100 INJECTION, SOLUTION INTRAVENOUS; SUBCUTANEOUS at 17:08

## 2022-12-24 RX ADMIN — SODIUM CHLORIDE, PRESERVATIVE FREE 10 ML: 5 INJECTION INTRAVENOUS at 21:57

## 2022-12-24 RX ADMIN — CEFAZOLIN 2000 MG: 2 INJECTION, POWDER, FOR SOLUTION INTRAMUSCULAR; INTRAVENOUS at 09:43

## 2022-12-24 ASSESSMENT — PAIN DESCRIPTION - DESCRIPTORS
DESCRIPTORS: ACHING
DESCRIPTORS: ACHING;BURNING;THROBBING
DESCRIPTORS: BURNING;THROBBING
DESCRIPTORS: DISCOMFORT;NAGGING;SHARP
DESCRIPTORS: CRUSHING;ACHING
DESCRIPTORS: SORE;SHARP

## 2022-12-24 ASSESSMENT — PAIN SCALES - GENERAL
PAINLEVEL_OUTOF10: 8
PAINLEVEL_OUTOF10: 8
PAINLEVEL_OUTOF10: 0
PAINLEVEL_OUTOF10: 8
PAINLEVEL_OUTOF10: 7
PAINLEVEL_OUTOF10: 8
PAINLEVEL_OUTOF10: 9
PAINLEVEL_OUTOF10: 8
PAINLEVEL_OUTOF10: 9

## 2022-12-24 ASSESSMENT — PAIN DESCRIPTION - ORIENTATION
ORIENTATION: RIGHT

## 2022-12-24 ASSESSMENT — ENCOUNTER SYMPTOMS
COUGH: 0
TROUBLE SWALLOWING: 0
SINUS PAIN: 0
DIARRHEA: 0
VOMITING: 0
NAUSEA: 0
ABDOMINAL PAIN: 0
WHEEZING: 0
CONSTIPATION: 0
SHORTNESS OF BREATH: 0
BACK PAIN: 0
VOICE CHANGE: 0
SINUS PRESSURE: 0
COLOR CHANGE: 1
CHEST TIGHTNESS: 0
SORE THROAT: 0

## 2022-12-24 ASSESSMENT — PAIN DESCRIPTION - LOCATION
LOCATION: LEG
LOCATION: KNEE;LEG
LOCATION: LEG
LOCATION: LEG
LOCATION: OTHER (COMMENT)
LOCATION: LEG

## 2022-12-24 ASSESSMENT — PAIN SCALES - WONG BAKER: WONGBAKER_NUMERICALRESPONSE: 0

## 2022-12-24 NOTE — PROGRESS NOTES
V2.0  [facility] [place]      Name:  Pat Dumont /Age/Sex: 1960  (60 y.o. male)   MRN & CSN:  [id number] & [id number] Encounter Date/Time: 2022 2:16 PM EST    Location:  9867/6177-V PCP: Taty Kulkarni MD       Hospital Day: 7    Assessment and Plan:   Pat Dumont is a 60 y.o. male with pmh of coronary artery disease s/p CABG, PAD, Diabetes mellitus type 2, COPD, not on home oxygen, chronic hepatitis C, and diabetic foot ulcer/necrotizing fasciitis s/p right BKA (2022) who presents with Osteomyelitis ([address]      Plan:    Osteomyelitis, and necrotic/gangrenous wound of right stump. Had a fall and dehiscence P/W worsening wound, looks infected with necrotic/gangrenous margins(pic uploaded to media). Pus pockets. General surgery consulted  surgical intervention on 22 with wound vac placement  MRI consistent with osteomyelitis  ID consulted: To continue IV Ancef  Pain control: Percocet panel and IV Dilaudid for breakthrough    Type 2 diabetes on long-term insulin  Increase Lantus 10 units nightly  Lispro 4 units s with meals  Low-dose sliding scale  Plan of care glucose checks  Hypoglycemia protocol    Diabetic neuropathy  Continue gabapentin     Peripheral arterial disease  CTA of right lower extremity in 10/2021 showed complete occlusion of the right common femoral artery, opacifying anterior and posterior tibial arteries. Had angioplasty 2021  Continue aspirin and atorvastatin     Coronary artery disease s/p CABG- 2013  Continue aspirin, atorvastatin       Diet ADULT DIET; Regular; 5 carb choices (75 gm/meal)  ADULT ORAL NUTRITION SUPPLEMENT; Lunch, Dinner; Diabetic Oral Supplement  ADULT ORAL NUTRITION SUPPLEMENT; Breakfast, Dinner;  Wound Healing Oral Supplement   DVT Prophylaxis [x] Lovenox, []  Heparin, [] SCDs, [] Ambulation,    [] Eliquis, [] Xarelto  [] Coumadin [] other   Code Status Full Code   Disposition From: home  Expected Disposition: home  Estimated Date of Discharge: 1-3 days  Patient requires continued admission due IV antibiotics and pain control for osteomyelitis. Now pending pre-CERT for SNF placement. Surrogate Decision Maker/ POA      Subjective:     Chief Complaint: No chief complaint on file. Pain is persistent, but is improving control with current pain medications. Patient interested in SNF or rehab as he wants to ensure that he does awaken to heal properly this time. The patient has been initiated for placement at this time. Review of Systems:    Review of Systems   Constitutional:  Negative for activity change, appetite change, chills, fatigue and fever. HENT:  Negative for congestion, hearing loss, sinus pressure, sinus pain, sore throat, trouble swallowing and voice change. Eyes:  Negative for visual disturbance. Respiratory:  Negative for cough, chest tightness, shortness of breath and wheezing. Cardiovascular:  Negative for chest pain, palpitations and leg swelling. Gastrointestinal:  Negative for abdominal pain, constipation, diarrhea, nausea and vomiting. Endocrine: Negative for cold intolerance, heat intolerance and polyuria. Genitourinary:  Negative for dysuria. Musculoskeletal:  Positive for arthralgias and myalgias. Negative for back pain and gait problem. Skin:  Positive for color change and wound. Neurological:  Negative for dizziness, weakness and headaches. Psychiatric/Behavioral:  Negative for agitation and confusion. The patient is not nervous/anxious. Objective: Intake/Output Summary (Last 24 hours) at 12/24/2022 1301  Last data filed at 12/24/2022 0219  Gross per 24 hour   Intake --   Output 450 ml   Net -450 ml          Vitals:   Vitals:    12/24/22 1224   BP:    Pulse:    Resp: 18   Temp:    SpO2:        Physical Exam:     Physical Exam  Constitutional:       General: He is not in acute distress. Appearance: Normal appearance. HENT:      Head: Normocephalic and atraumatic. Nose: Nose normal.      Mouth/Throat:      Mouth: Mucous membranes are moist.      Pharynx: Oropharynx is clear. Eyes:      Extraocular Movements: Extraocular movements intact. Conjunctiva/sclera: Conjunctivae normal.      Pupils: Pupils are equal, round, and reactive to light. Cardiovascular:      Rate and Rhythm: Normal rate and regular rhythm. Pulses: Normal pulses. Heart sounds: Normal heart sounds. Pulmonary:      Effort: Pulmonary effort is normal.   Abdominal:      General: Abdomen is flat. Bowel sounds are normal.      Palpations: Abdomen is soft. Musculoskeletal:         General: Normal range of motion. Cervical back: Normal range of motion and neck supple. Skin:     General: Skin is warm and dry. Findings: Erythema, lesion and rash present. Neurological:      General: No focal deficit present. Mental Status: He is alert and oriented to person, place, and time. Mental status is at baseline. Psychiatric:         Mood and Affect: Mood normal.         Behavior: Behavior normal.         Thought Content:  Thought content normal.       Medications:   Medications:    insulin glargine  10 Units SubCUTAneous Nightly    ceFAZolin  2,000 mg IntraVENous 3 times per day    senna  1 tablet Oral Nightly    insulin lispro  4 Units SubCUTAneous TID WC    insulin lispro  0-4 Units SubCUTAneous TID WC    insulin lispro  0-4 Units SubCUTAneous Nightly    sodium chloride flush  5-40 mL IntraVENous 2 times per day    enoxaparin  40 mg SubCUTAneous Daily    nicotine  1 patch TransDERmal Daily    aspirin  81 mg Oral Daily    gabapentin  600 mg Oral TID    atorvastatin  40 mg Oral Nightly      Infusions:    sodium chloride 25 mL/hr at 12/24/22 0234    dextrose       PRN Meds: oxyCODONE-acetaminophen, 1 tablet, Q4H PRN   Or  oxyCODONE-acetaminophen, 2 tablet, Q4H PRN  HYDROmorphone, 0.5 mg, Q4H PRN  sodium chloride flush, 5-40 mL, PRN  sodium chloride, , PRN  ondansetron, 4 mg, Q8H PRN Or  ondansetron, 4 mg, Q6H PRN  polyethylene glycol, 17 g, Daily PRN  acetaminophen, 650 mg, Q6H PRN   Or  acetaminophen, 650 mg, Q6H PRN  glucose, 4 tablet, PRN  dextrose bolus, 125 mL, PRN   Or  dextrose bolus, 250 mL, PRN  glucagon (rDNA), 1 mg, PRN  dextrose, , Continuous PRN  hydrALAZINE, 10 mg, Q6H PRN      Labs      Recent Results (from the past 24 hour(s))   POCT Glucose    Collection Time: 12/23/22  5:39 PM   Result Value Ref Range    POC Glucose 126 (H) 70 - 99 MG/DL   POCT Glucose    Collection Time: 12/23/22  8:22 PM   Result Value Ref Range    POC Glucose 310 (H) 70 - 99 MG/DL   POCT Glucose    Collection Time: 12/24/22  8:00 AM   Result Value Ref Range    POC Glucose 289 (H) 70 - 99 MG/DL   POCT Glucose    Collection Time: 12/24/22 11:37 AM   Result Value Ref Range    POC Glucose 213 (H) 70 - 99 MG/DL        Imaging/Diagnostics Last 24 Hours   No results found. Comment: Please note this report has been produced using speech recognition software and may contain errors related to that system including errors in grammar, punctuation, and spelling, as well as words and phrases that may be inappropriate. If there are any questions or concerns please feel free to contact the dictating provider for clarification.      Electronically signed by Beatrice Donaldson MD on 12/24/2022 at 1:01 PM

## 2022-12-25 LAB
CULTURE: ABNORMAL
GLUCOSE BLD-MCNC: 159 MG/DL (ref 70–99)
GLUCOSE BLD-MCNC: 178 MG/DL (ref 70–99)
GLUCOSE BLD-MCNC: 255 MG/DL (ref 70–99)
GLUCOSE BLD-MCNC: 255 MG/DL (ref 70–99)
Lab: ABNORMAL
SPECIMEN: ABNORMAL

## 2022-12-25 PROCEDURE — 6370000000 HC RX 637 (ALT 250 FOR IP): Performed by: SURGERY

## 2022-12-25 PROCEDURE — 6370000000 HC RX 637 (ALT 250 FOR IP): Performed by: INTERNAL MEDICINE

## 2022-12-25 PROCEDURE — 6370000000 HC RX 637 (ALT 250 FOR IP): Performed by: STUDENT IN AN ORGANIZED HEALTH CARE EDUCATION/TRAINING PROGRAM

## 2022-12-25 PROCEDURE — 2580000003 HC RX 258: Performed by: INTERNAL MEDICINE

## 2022-12-25 PROCEDURE — 1200000000 HC SEMI PRIVATE

## 2022-12-25 PROCEDURE — 6360000002 HC RX W HCPCS: Performed by: STUDENT IN AN ORGANIZED HEALTH CARE EDUCATION/TRAINING PROGRAM

## 2022-12-25 PROCEDURE — 6360000002 HC RX W HCPCS: Performed by: INTERNAL MEDICINE

## 2022-12-25 PROCEDURE — 94761 N-INVAS EAR/PLS OXIMETRY MLT: CPT

## 2022-12-25 PROCEDURE — 82962 GLUCOSE BLOOD TEST: CPT

## 2022-12-25 RX ORDER — INSULIN GLARGINE 100 [IU]/ML
10 INJECTION, SOLUTION SUBCUTANEOUS NIGHTLY
Status: DISCONTINUED | OUTPATIENT
Start: 2022-12-25 | End: 2023-01-09 | Stop reason: HOSPADM

## 2022-12-25 RX ORDER — INSULIN GLARGINE 100 [IU]/ML
8 INJECTION, SOLUTION SUBCUTANEOUS NIGHTLY
Status: DISCONTINUED | OUTPATIENT
Start: 2022-12-25 | End: 2022-12-25

## 2022-12-25 RX ADMIN — HYDROMORPHONE HYDROCHLORIDE 0.5 MG: 1 INJECTION, SOLUTION INTRAMUSCULAR; INTRAVENOUS; SUBCUTANEOUS at 00:48

## 2022-12-25 RX ADMIN — ATORVASTATIN CALCIUM 40 MG: 40 TABLET, FILM COATED ORAL at 21:26

## 2022-12-25 RX ADMIN — GABAPENTIN 600 MG: 300 CAPSULE ORAL at 13:25

## 2022-12-25 RX ADMIN — CEFAZOLIN 2000 MG: 2 INJECTION, POWDER, FOR SOLUTION INTRAMUSCULAR; INTRAVENOUS at 17:56

## 2022-12-25 RX ADMIN — OXYCODONE AND ACETAMINOPHEN 2 TABLET: 5; 325 TABLET ORAL at 13:34

## 2022-12-25 RX ADMIN — INSULIN GLARGINE 10 UNITS: 100 INJECTION, SOLUTION SUBCUTANEOUS at 21:29

## 2022-12-25 RX ADMIN — ASPIRIN 81 MG CHEWABLE TABLET 81 MG: 81 TABLET CHEWABLE at 08:30

## 2022-12-25 RX ADMIN — SODIUM CHLORIDE: 9 INJECTION, SOLUTION INTRAVENOUS at 03:46

## 2022-12-25 RX ADMIN — CEFAZOLIN 2000 MG: 2 INJECTION, POWDER, FOR SOLUTION INTRAMUSCULAR; INTRAVENOUS at 10:26

## 2022-12-25 RX ADMIN — HYDROMORPHONE HYDROCHLORIDE 0.5 MG: 1 INJECTION, SOLUTION INTRAMUSCULAR; INTRAVENOUS; SUBCUTANEOUS at 11:15

## 2022-12-25 RX ADMIN — GABAPENTIN 600 MG: 300 CAPSULE ORAL at 21:26

## 2022-12-25 RX ADMIN — SENNOSIDES 8.6 MG: 8.6 TABLET, FILM COATED ORAL at 21:27

## 2022-12-25 RX ADMIN — OXYCODONE AND ACETAMINOPHEN 2 TABLET: 5; 325 TABLET ORAL at 17:59

## 2022-12-25 RX ADMIN — INSULIN LISPRO 4 UNITS: 100 INJECTION, SOLUTION INTRAVENOUS; SUBCUTANEOUS at 13:26

## 2022-12-25 RX ADMIN — OXYCODONE AND ACETAMINOPHEN 1 TABLET: 5; 325 TABLET ORAL at 08:37

## 2022-12-25 RX ADMIN — SODIUM CHLORIDE, PRESERVATIVE FREE 5 ML: 5 INJECTION INTRAVENOUS at 21:31

## 2022-12-25 RX ADMIN — GABAPENTIN 600 MG: 300 CAPSULE ORAL at 08:30

## 2022-12-25 RX ADMIN — HYDROMORPHONE HYDROCHLORIDE 0.5 MG: 1 INJECTION, SOLUTION INTRAMUSCULAR; INTRAVENOUS; SUBCUTANEOUS at 21:21

## 2022-12-25 RX ADMIN — HYDROMORPHONE HYDROCHLORIDE 0.5 MG: 1 INJECTION, SOLUTION INTRAMUSCULAR; INTRAVENOUS; SUBCUTANEOUS at 15:52

## 2022-12-25 RX ADMIN — INSULIN LISPRO 4 UNITS: 100 INJECTION, SOLUTION INTRAVENOUS; SUBCUTANEOUS at 08:30

## 2022-12-25 RX ADMIN — CEFAZOLIN 2000 MG: 2 INJECTION, POWDER, FOR SOLUTION INTRAMUSCULAR; INTRAVENOUS at 03:55

## 2022-12-25 RX ADMIN — INSULIN LISPRO 2 UNITS: 100 INJECTION, SOLUTION INTRAVENOUS; SUBCUTANEOUS at 13:26

## 2022-12-25 RX ADMIN — ENOXAPARIN SODIUM 40 MG: 100 INJECTION SUBCUTANEOUS at 08:29

## 2022-12-25 RX ADMIN — INSULIN LISPRO 4 UNITS: 100 INJECTION, SOLUTION INTRAVENOUS; SUBCUTANEOUS at 17:53

## 2022-12-25 ASSESSMENT — PAIN DESCRIPTION - LOCATION
LOCATION: INCISION
LOCATION: KNEE
LOCATION: LEG

## 2022-12-25 ASSESSMENT — PAIN DESCRIPTION - FREQUENCY: FREQUENCY: CONTINUOUS

## 2022-12-25 ASSESSMENT — ENCOUNTER SYMPTOMS
COUGH: 0
ABDOMINAL PAIN: 0
NAUSEA: 0
VOMITING: 0
CHEST TIGHTNESS: 0
COLOR CHANGE: 1
TROUBLE SWALLOWING: 0
VOICE CHANGE: 0
BACK PAIN: 0
SHORTNESS OF BREATH: 0
SINUS PAIN: 0
SINUS PRESSURE: 0
WHEEZING: 0
DIARRHEA: 0
SORE THROAT: 0
CONSTIPATION: 0

## 2022-12-25 ASSESSMENT — PAIN DESCRIPTION - ORIENTATION
ORIENTATION: RIGHT

## 2022-12-25 ASSESSMENT — PAIN DESCRIPTION - DESCRIPTORS
DESCRIPTORS: BURNING
DESCRIPTORS: ACHING;BURNING;SHARP
DESCRIPTORS: SHARP

## 2022-12-25 ASSESSMENT — PAIN SCALES - GENERAL
PAINLEVEL_OUTOF10: 9
PAINLEVEL_OUTOF10: 8
PAINLEVEL_OUTOF10: 3
PAINLEVEL_OUTOF10: 10
PAINLEVEL_OUTOF10: 9
PAINLEVEL_OUTOF10: 6
PAINLEVEL_OUTOF10: 9

## 2022-12-25 ASSESSMENT — PAIN - FUNCTIONAL ASSESSMENT: PAIN_FUNCTIONAL_ASSESSMENT: ACTIVITIES ARE NOT PREVENTED

## 2022-12-25 ASSESSMENT — PAIN DESCRIPTION - PAIN TYPE: TYPE: SURGICAL PAIN

## 2022-12-25 ASSESSMENT — PAIN DESCRIPTION - ONSET: ONSET: ON-GOING

## 2022-12-25 NOTE — PROGRESS NOTES
V2.0  [facility] [place] Hospitalist Progress Note      Name:  Rekha Eason /Age/Sex: 1960  (60 y.o. male)   MRN & CSN:  [id number] & [id number] Encounter Date/Time: 2022 2:16 PM EST    Location:  [id number]-J PCP: Haley Mason MD       Hospital Day: 8    Assessment and Plan:   Rekha Eason is a 60 y.o. male with pmh of coronary artery disease s/p CABG, PAD, Diabetes mellitus type 2, COPD, not on home oxygen, chronic hepatitis C, and diabetic foot ulcer/necrotizing fasciitis s/p right BKA (2022) who presents with Osteomyelitis ([address]      Plan:    Osteomyelitis, and necrotic/gangrenous wound of right stump. Had a fall and dehiscence P/W worsening wound, looks infected with necrotic/gangrenous margins(pic uploaded to media). Pus pockets. General surgery consulted  surgical intervention on 22 with wound vac placement  MRI consistent with osteomyelitis  ID consulted: To continue IV Ancef for 6 weeks. Follow-up with ID as an outpatient. Pain control: Percocet panel and IV Dilaudid for breakthrough    Type 2 diabetes on long-term insulin  Increase Lantus 10 units nightly  Lispro 4 units s with meals  Low-dose sliding scale  Plan of care glucose checks  Hypoglycemia protocol    Diabetic neuropathy  Continue gabapentin     Peripheral arterial disease  CTA of right lower extremity in 10/2021 showed complete occlusion of the right common femoral artery, opacifying anterior and posterior tibial arteries. Had angioplasty 2021  Continue aspirin and atorvastatin     Coronary artery disease s/p CABG- 2013  Continue aspirin, atorvastatin       Diet ADULT DIET; Regular; 5 carb choices (75 gm/meal)  ADULT ORAL NUTRITION SUPPLEMENT; Lunch, Dinner; Diabetic Oral Supplement  ADULT ORAL NUTRITION SUPPLEMENT; Breakfast, Dinner;  Wound Healing Oral Supplement   DVT Prophylaxis [x] Lovenox, []  Heparin, [] SCDs, [] Ambulation,    [] Eliquis, [] Xarelto  [] Coumadin [] other   Code Status Full Code   Disposition From: home  Expected Disposition: home  Estimated Date of Discharge: 1-3 days  Patient requires continued admission due IV antibiotics and pain control for osteomyelitis. Now pending pre-CERT for SNF placement. Surrogate Decision Maker/ POA      Subjective:     Chief Complaint: No chief complaint on file. The patient's pain is improving. States that today is the best that he has been. He is likely going to require a stay at a SNF for therapy as well as his continued extended course of IV antibiotics. Remains afebrile. Hemodynamically stable. No other acute complaints at this time. Review of Systems:    Review of Systems   Constitutional:  Negative for activity change, appetite change, chills, fatigue and fever. HENT:  Negative for congestion, hearing loss, sinus pressure, sinus pain, sore throat, trouble swallowing and voice change. Eyes:  Negative for visual disturbance. Respiratory:  Negative for cough, chest tightness, shortness of breath and wheezing. Cardiovascular:  Negative for chest pain, palpitations and leg swelling. Gastrointestinal:  Negative for abdominal pain, constipation, diarrhea, nausea and vomiting. Endocrine: Negative for cold intolerance, heat intolerance and polyuria. Genitourinary:  Negative for dysuria. Musculoskeletal:  Positive for arthralgias and myalgias. Negative for back pain and gait problem. Skin:  Positive for color change and wound. Neurological:  Negative for dizziness, weakness and headaches. Psychiatric/Behavioral:  Negative for agitation and confusion. The patient is not nervous/anxious. Objective:   No intake or output data in the 24 hours ending 12/25/22 1454       Vitals:   Vitals:    12/25/22 1404   BP: (!) 122/59   Pulse: 65   Resp: 18   Temp: 98 °F (36.7 °C)   SpO2: 99%       Physical Exam:     Physical Exam  Constitutional:       General: He is not in acute distress. Appearance: Normal appearance.    HENT:      Head: Normocephalic and atraumatic. Nose: Nose normal.      Mouth/Throat:      Mouth: Mucous membranes are moist.      Pharynx: Oropharynx is clear. Eyes:      Extraocular Movements: Extraocular movements intact. Conjunctiva/sclera: Conjunctivae normal.      Pupils: Pupils are equal, round, and reactive to light. Cardiovascular:      Rate and Rhythm: Normal rate and regular rhythm. Pulses: Normal pulses. Heart sounds: Normal heart sounds. Pulmonary:      Effort: Pulmonary effort is normal.   Abdominal:      General: Abdomen is flat. Bowel sounds are normal.      Palpations: Abdomen is soft. Musculoskeletal:         General: Normal range of motion. Cervical back: Normal range of motion and neck supple. Skin:     General: Skin is warm and dry. Findings: Erythema, lesion and rash present. Neurological:      General: No focal deficit present. Mental Status: He is alert and oriented to person, place, and time. Mental status is at baseline. Psychiatric:         Mood and Affect: Mood normal.         Behavior: Behavior normal.         Thought Content:  Thought content normal.       Medications:   Medications:    insulin glargine  10 Units SubCUTAneous Nightly    ceFAZolin  2,000 mg IntraVENous 3 times per day    senna  1 tablet Oral Nightly    insulin lispro  4 Units SubCUTAneous TID WC    insulin lispro  0-4 Units SubCUTAneous TID WC    insulin lispro  0-4 Units SubCUTAneous Nightly    sodium chloride flush  5-40 mL IntraVENous 2 times per day    enoxaparin  40 mg SubCUTAneous Daily    nicotine  1 patch TransDERmal Daily    aspirin  81 mg Oral Daily    gabapentin  600 mg Oral TID    atorvastatin  40 mg Oral Nightly      Infusions:    sodium chloride 25 mL/hr at 12/25/22 0346    dextrose       PRN Meds: oxyCODONE-acetaminophen, 1 tablet, Q4H PRN   Or  oxyCODONE-acetaminophen, 2 tablet, Q4H PRN  HYDROmorphone, 0.5 mg, Q4H PRN  sodium chloride flush, 5-40 mL, PRN  sodium chloride, , PRN  ondansetron, 4 mg, Q8H PRN   Or  ondansetron, 4 mg, Q6H PRN  polyethylene glycol, 17 g, Daily PRN  acetaminophen, 650 mg, Q6H PRN   Or  acetaminophen, 650 mg, Q6H PRN  glucose, 4 tablet, PRN  dextrose bolus, 125 mL, PRN   Or  dextrose bolus, 250 mL, PRN  glucagon (rDNA), 1 mg, PRN  dextrose, , Continuous PRN  hydrALAZINE, 10 mg, Q6H PRN      Labs      Recent Results (from the past 24 hour(s))   POCT Glucose    Collection Time: 12/24/22  4:31 PM   Result Value Ref Range    POC Glucose 209 (H) 70 - 99 MG/DL   POCT Glucose    Collection Time: 12/24/22 10:31 PM   Result Value Ref Range    POC Glucose 174 (H) 70 - 99 MG/DL   POCT Glucose    Collection Time: 12/25/22  7:30 AM   Result Value Ref Range    POC Glucose 178 (H) 70 - 99 MG/DL   POCT Glucose    Collection Time: 12/25/22 11:26 AM   Result Value Ref Range    POC Glucose 255 (H) 70 - 99 MG/DL        Imaging/Diagnostics Last 24 Hours   No results found. Comment: Please note this report has been produced using speech recognition software and may contain errors related to that system including errors in grammar, punctuation, and spelling, as well as words and phrases that may be inappropriate. If there are any questions or concerns please feel free to contact the dictating provider for clarification.      Electronically signed by Natasha Love MD on 12/25/2022 at 2:54 PM

## 2022-12-26 LAB
GLUCOSE BLD-MCNC: 170 MG/DL (ref 70–99)
GLUCOSE BLD-MCNC: 176 MG/DL (ref 70–99)
GLUCOSE BLD-MCNC: 216 MG/DL (ref 70–99)
GLUCOSE BLD-MCNC: 299 MG/DL (ref 70–99)

## 2022-12-26 PROCEDURE — 2580000003 HC RX 258: Performed by: INTERNAL MEDICINE

## 2022-12-26 PROCEDURE — 6360000002 HC RX W HCPCS: Performed by: INTERNAL MEDICINE

## 2022-12-26 PROCEDURE — 82962 GLUCOSE BLOOD TEST: CPT

## 2022-12-26 PROCEDURE — 6370000000 HC RX 637 (ALT 250 FOR IP): Performed by: STUDENT IN AN ORGANIZED HEALTH CARE EDUCATION/TRAINING PROGRAM

## 2022-12-26 PROCEDURE — 6370000000 HC RX 637 (ALT 250 FOR IP): Performed by: INTERNAL MEDICINE

## 2022-12-26 PROCEDURE — 1200000000 HC SEMI PRIVATE

## 2022-12-26 PROCEDURE — 94761 N-INVAS EAR/PLS OXIMETRY MLT: CPT

## 2022-12-26 PROCEDURE — 6370000000 HC RX 637 (ALT 250 FOR IP): Performed by: SURGERY

## 2022-12-26 PROCEDURE — 6360000002 HC RX W HCPCS: Performed by: STUDENT IN AN ORGANIZED HEALTH CARE EDUCATION/TRAINING PROGRAM

## 2022-12-26 PROCEDURE — 97530 THERAPEUTIC ACTIVITIES: CPT

## 2022-12-26 RX ADMIN — ASPIRIN 81 MG CHEWABLE TABLET 81 MG: 81 TABLET CHEWABLE at 10:07

## 2022-12-26 RX ADMIN — GABAPENTIN 600 MG: 300 CAPSULE ORAL at 10:07

## 2022-12-26 RX ADMIN — SENNOSIDES 8.6 MG: 8.6 TABLET, FILM COATED ORAL at 20:38

## 2022-12-26 RX ADMIN — HYDROMORPHONE HYDROCHLORIDE 0.5 MG: 1 INJECTION, SOLUTION INTRAMUSCULAR; INTRAVENOUS; SUBCUTANEOUS at 14:45

## 2022-12-26 RX ADMIN — ATORVASTATIN CALCIUM 40 MG: 40 TABLET, FILM COATED ORAL at 20:38

## 2022-12-26 RX ADMIN — OXYCODONE AND ACETAMINOPHEN 2 TABLET: 5; 325 TABLET ORAL at 00:13

## 2022-12-26 RX ADMIN — INSULIN LISPRO 4 UNITS: 100 INJECTION, SOLUTION INTRAVENOUS; SUBCUTANEOUS at 10:07

## 2022-12-26 RX ADMIN — HYDROMORPHONE HYDROCHLORIDE 0.5 MG: 1 INJECTION, SOLUTION INTRAMUSCULAR; INTRAVENOUS; SUBCUTANEOUS at 01:31

## 2022-12-26 RX ADMIN — ENOXAPARIN SODIUM 40 MG: 100 INJECTION SUBCUTANEOUS at 10:08

## 2022-12-26 RX ADMIN — OXYCODONE AND ACETAMINOPHEN 2 TABLET: 5; 325 TABLET ORAL at 04:54

## 2022-12-26 RX ADMIN — CEFAZOLIN 2000 MG: 2 INJECTION, POWDER, FOR SOLUTION INTRAMUSCULAR; INTRAVENOUS at 01:35

## 2022-12-26 RX ADMIN — SODIUM CHLORIDE, PRESERVATIVE FREE 10 ML: 5 INJECTION INTRAVENOUS at 10:08

## 2022-12-26 RX ADMIN — OXYCODONE AND ACETAMINOPHEN 2 TABLET: 5; 325 TABLET ORAL at 18:05

## 2022-12-26 RX ADMIN — OXYCODONE AND ACETAMINOPHEN 2 TABLET: 5; 325 TABLET ORAL at 23:20

## 2022-12-26 RX ADMIN — GABAPENTIN 600 MG: 300 CAPSULE ORAL at 13:52

## 2022-12-26 RX ADMIN — INSULIN LISPRO 4 UNITS: 100 INJECTION, SOLUTION INTRAVENOUS; SUBCUTANEOUS at 12:51

## 2022-12-26 RX ADMIN — OXYCODONE AND ACETAMINOPHEN 2 TABLET: 5; 325 TABLET ORAL at 12:54

## 2022-12-26 RX ADMIN — HYDROMORPHONE HYDROCHLORIDE 0.5 MG: 1 INJECTION, SOLUTION INTRAMUSCULAR; INTRAVENOUS; SUBCUTANEOUS at 20:13

## 2022-12-26 RX ADMIN — INSULIN LISPRO 1 UNITS: 100 INJECTION, SOLUTION INTRAVENOUS; SUBCUTANEOUS at 17:55

## 2022-12-26 RX ADMIN — CEFAZOLIN 2000 MG: 2 INJECTION, POWDER, FOR SOLUTION INTRAMUSCULAR; INTRAVENOUS at 10:16

## 2022-12-26 RX ADMIN — HYDROMORPHONE HYDROCHLORIDE 0.5 MG: 1 INJECTION, SOLUTION INTRAMUSCULAR; INTRAVENOUS; SUBCUTANEOUS at 10:29

## 2022-12-26 RX ADMIN — GABAPENTIN 600 MG: 300 CAPSULE ORAL at 20:38

## 2022-12-26 RX ADMIN — HYDROMORPHONE HYDROCHLORIDE 0.5 MG: 1 INJECTION, SOLUTION INTRAMUSCULAR; INTRAVENOUS; SUBCUTANEOUS at 05:51

## 2022-12-26 RX ADMIN — INSULIN GLARGINE 10 UNITS: 100 INJECTION, SOLUTION SUBCUTANEOUS at 21:13

## 2022-12-26 RX ADMIN — CEFAZOLIN 2000 MG: 2 INJECTION, POWDER, FOR SOLUTION INTRAMUSCULAR; INTRAVENOUS at 18:02

## 2022-12-26 RX ADMIN — INSULIN LISPRO 4 UNITS: 100 INJECTION, SOLUTION INTRAVENOUS; SUBCUTANEOUS at 17:55

## 2022-12-26 ASSESSMENT — PAIN DESCRIPTION - LOCATION
LOCATION: LEG
LOCATION: LEG
LOCATION: LEG;INCISION
LOCATION: LEG
LOCATION: LEG
LOCATION: KNEE
LOCATION: LEG

## 2022-12-26 ASSESSMENT — PAIN SCALES - GENERAL
PAINLEVEL_OUTOF10: 8
PAINLEVEL_OUTOF10: 9
PAINLEVEL_OUTOF10: 8
PAINLEVEL_OUTOF10: 9
PAINLEVEL_OUTOF10: 8
PAINLEVEL_OUTOF10: 9
PAINLEVEL_OUTOF10: 7
PAINLEVEL_OUTOF10: 8
PAINLEVEL_OUTOF10: 9
PAINLEVEL_OUTOF10: 8
PAINLEVEL_OUTOF10: 8

## 2022-12-26 ASSESSMENT — ENCOUNTER SYMPTOMS
ABDOMINAL PAIN: 0
CONSTIPATION: 0
WHEEZING: 0
DIARRHEA: 0
BACK PAIN: 0
TROUBLE SWALLOWING: 0
NAUSEA: 0
COLOR CHANGE: 1
SHORTNESS OF BREATH: 0
SINUS PRESSURE: 0
SINUS PAIN: 0
SORE THROAT: 0
COUGH: 0
VOICE CHANGE: 0
CHEST TIGHTNESS: 0
VOMITING: 0

## 2022-12-26 ASSESSMENT — PAIN DESCRIPTION - ORIENTATION
ORIENTATION: RIGHT
ORIENTATION: LEFT
ORIENTATION: LEFT
ORIENTATION: RIGHT
ORIENTATION: MID
ORIENTATION: RIGHT
ORIENTATION: LEFT

## 2022-12-26 ASSESSMENT — PAIN DESCRIPTION - DESCRIPTORS
DESCRIPTORS: ACHING
DESCRIPTORS: SHARP;BURNING
DESCRIPTORS: THROBBING
DESCRIPTORS: ACHING
DESCRIPTORS: SHARP;BURNING
DESCRIPTORS: ACHING
DESCRIPTORS: ACHING;DISCOMFORT

## 2022-12-26 ASSESSMENT — PAIN SCALES - WONG BAKER
WONGBAKER_NUMERICALRESPONSE: 0

## 2022-12-26 NOTE — PROGRESS NOTES
Physical Therapy    Physical Therapy Treatment Note  Name: Krista Valderrama MRN: [id number] :   1960   Date:  2022   Admission Date: 2022 Room:  [address] [place] [address]   Restrictions/Precautions:          general precautions, falls, wound vac   Communication with other providers:  RN   Subjective:  Patient states:  \"I can walk I just don't want to do much right now\"   Pain:   Location, Type, Intensity (0/10 to 10/10):  9/10 right residual limb (RN notified)   Objective:    Observation:    Supine in bed. Cooperative with therapy to his tolerance   Objective Measures:    Stable vitals on room air   Treatment, including education/measures:  Supine to sit: indep from flat bed surface  Sit to stand: CGA for safety from EOB  Stand to sit: CGA for safety to recliner  Step pivot: CGA for safety with RW   Sitting balance: indep at EOB, static and light dynamic while changing UB dressing without UE support. Ambulation: ~20ft with RW CGA for safety. Fair pace and hop length/clearance. No major LOB noted. Limited distance due to pain   Educated pt on POC, role of PT, DME use, discharge plan.    Assessment / Impression:    Patient's tolerance of treatment: fair/good-limited with pain   Adverse Reaction: no  Significant change in status and impact:  no  Barriers to improvement:  activity tolerance, strength, balance, pain, R BKA, wound vac   Plan for Next Session:    Activity tolerance, strength, balance, gait, transfers, WC mobility   Time in:  1409  Time out:  1424  Timed treatment minutes:  15  Total treatment time:  15 minutes     Short Term Goals  Time Frame for Short Term Goals: 2 weeks  Short Term Goal 1: Pt will perform sit><supine Kash  Short Term Goal 2: Pt will transfer between surfaces SBA  Short Term Goal 3: Pt will ambulate 30ft with RW SBA  Short Term Goal 4: Pt will propel WC 150ft Kash  Short Term Goal 5: Pt will perform standing light dynamic activity x 3 minutes, single UE support SBA    Electronically signed by:    Johnny Powers, [id number]  12/26/2022, 3:01 PM

## 2022-12-26 NOTE — PROGRESS NOTES
V2.0  [facility] [place] Hospitalist Progress Note      Name:  Eunice Pena /Age/Sex: 1960  (60 y.o. male)   MRN & CSN:  [id number] & [id number] Encounter Date/Time: 2022 2:16 PM EST    Location:  3699/0283-P PCP: Eddi Castorena MD       Hospital Day: 9    Assessment and Plan:   Eunice Pena is a 60 y.o. male with pmh of coronary artery disease s/p CABG, PAD, Diabetes mellitus type 2, COPD, not on home oxygen, chronic hepatitis C, and diabetic foot ulcer/necrotizing fasciitis s/p right BKA (2022) who presents with Osteomyelitis ([address]      Plan:    Osteomyelitis, and necrotic/gangrenous wound of right stump. Had a fall and dehiscence P/W worsening wound, looks infected with necrotic/gangrenous margins(pic uploaded to media). Pus pockets. General surgery consulted  surgical intervention on 22 with wound vac placement  MRI consistent with osteomyelitis  ID consulted: To continue IV Ancef for 6 weeks. Follow-up with ID as an outpatient. Pain control: Percocet panel and IV Dilaudid for breakthrough    Type 2 diabetes on long-term insulin  Increase Lantus 10 units nightly  Lispro 4 units s with meals  Low-dose sliding scale  Plan of care glucose checks  Hypoglycemia protocol    Diabetic neuropathy  Continue gabapentin     Peripheral arterial disease  CTA of right lower extremity in 10/2021 showed complete occlusion of the right common femoral artery, opacifying anterior and posterior tibial arteries. Had angioplasty 2021  Continue aspirin and atorvastatin     Coronary artery disease s/p CABG- 2013  Continue aspirin, atorvastatin       Diet ADULT DIET; Regular; 5 carb choices (75 gm/meal)  ADULT ORAL NUTRITION SUPPLEMENT; Lunch, Dinner; Diabetic Oral Supplement  ADULT ORAL NUTRITION SUPPLEMENT; Breakfast, Dinner;  Wound Healing Oral Supplement   DVT Prophylaxis [x] Lovenox, []  Heparin, [] SCDs, [] Ambulation,    [] Eliquis, [] Xarelto  [] Coumadin [] other   Code Status Full Code   Disposition From: home  Expected Disposition: home  Estimated Date of Discharge: 1-3 days  Patient requires continued admission due IV antibiotics and pain control for osteomyelitis. Now pending pre-CERT for SNF placement. Surrogate Decision Maker/ POA      Subjective:     Chief Complaint: No chief complaint on file. Patient continues to make slow improvements. Pain is starting to be tolerable, but he is still requiring intermittent IV Dilaudid. States that pain is particularly bad when he tries to work with physiotherapy and pushes himself too much. Patient states that the pain that he was experiencing from the wound VAC itself has vastly improved. Review of Systems:    Review of Systems   Constitutional:  Negative for activity change, appetite change, chills, fatigue and fever. HENT:  Negative for congestion, hearing loss, sinus pressure, sinus pain, sore throat, trouble swallowing and voice change. Eyes:  Negative for visual disturbance. Respiratory:  Negative for cough, chest tightness, shortness of breath and wheezing. Cardiovascular:  Negative for chest pain, palpitations and leg swelling. Gastrointestinal:  Negative for abdominal pain, constipation, diarrhea, nausea and vomiting. Endocrine: Negative for cold intolerance, heat intolerance and polyuria. Genitourinary:  Negative for dysuria. Musculoskeletal:  Positive for arthralgias and myalgias. Negative for back pain and gait problem. Skin:  Positive for color change and wound. Neurological:  Negative for dizziness, weakness and headaches. Psychiatric/Behavioral:  Negative for agitation and confusion. The patient is not nervous/anxious. Objective:      Intake/Output Summary (Last 24 hours) at 12/26/2022 1510  Last data filed at 12/26/2022 0136  Gross per 24 hour   Intake --   Output 700 ml   Net -700 ml          Vitals:   Vitals:    12/26/22 1445   BP:    Pulse:    Resp: 16   Temp:    SpO2:        Physical Exam:     Physical Exam  Constitutional:       General: He is not in acute distress. Appearance: Normal appearance. HENT:      Head: Normocephalic and atraumatic. Nose: Nose normal.      Mouth/Throat:      Mouth: Mucous membranes are moist.      Pharynx: Oropharynx is clear. Eyes:      Extraocular Movements: Extraocular movements intact. Conjunctiva/sclera: Conjunctivae normal.      Pupils: Pupils are equal, round, and reactive to light. Cardiovascular:      Rate and Rhythm: Normal rate and regular rhythm. Pulses: Normal pulses. Heart sounds: Normal heart sounds. Pulmonary:      Effort: Pulmonary effort is normal.   Abdominal:      General: Abdomen is flat. Bowel sounds are normal.      Palpations: Abdomen is soft. Musculoskeletal:         General: Normal range of motion. Cervical back: Normal range of motion and neck supple. Skin:     General: Skin is warm and dry. Findings: Erythema, lesion and rash present. Neurological:      General: No focal deficit present. Mental Status: He is alert and oriented to person, place, and time. Mental status is at baseline. Psychiatric:         Mood and Affect: Mood normal.         Behavior: Behavior normal.         Thought Content:  Thought content normal.       Medications:   Medications:    insulin glargine  10 Units SubCUTAneous Nightly    ceFAZolin  2,000 mg IntraVENous 3 times per day    senna  1 tablet Oral Nightly    insulin lispro  4 Units SubCUTAneous TID WC    insulin lispro  0-4 Units SubCUTAneous TID WC    insulin lispro  0-4 Units SubCUTAneous Nightly    sodium chloride flush  5-40 mL IntraVENous 2 times per day    enoxaparin  40 mg SubCUTAneous Daily    nicotine  1 patch TransDERmal Daily    aspirin  81 mg Oral Daily    gabapentin  600 mg Oral TID    atorvastatin  40 mg Oral Nightly      Infusions:    sodium chloride 25 mL/hr at 12/25/22 0346    dextrose       PRN Meds: oxyCODONE-acetaminophen, 1 tablet, Q4H PRN Or  oxyCODONE-acetaminophen, 2 tablet, Q4H PRN  HYDROmorphone, 0.5 mg, Q4H PRN  sodium chloride flush, 5-40 mL, PRN  sodium chloride, , PRN  ondansetron, 4 mg, Q8H PRN   Or  ondansetron, 4 mg, Q6H PRN  polyethylene glycol, 17 g, Daily PRN  acetaminophen, 650 mg, Q6H PRN   Or  acetaminophen, 650 mg, Q6H PRN  glucose, 4 tablet, PRN  dextrose bolus, 125 mL, PRN   Or  dextrose bolus, 250 mL, PRN  glucagon (rDNA), 1 mg, PRN  dextrose, , Continuous PRN  hydrALAZINE, 10 mg, Q6H PRN      Labs      Recent Results (from the past 24 hour(s))   POCT Glucose    Collection Time: 12/25/22  4:41 PM   Result Value Ref Range    POC Glucose 159 (H) 70 - 99 MG/DL   POCT Glucose    Collection Time: 12/25/22  8:14 PM   Result Value Ref Range    POC Glucose 255 (H) 70 - 99 MG/DL   POCT Glucose    Collection Time: 12/26/22  8:45 AM   Result Value Ref Range    POC Glucose 170 (H) 70 - 99 MG/DL   POCT Glucose    Collection Time: 12/26/22 12:23 PM   Result Value Ref Range    POC Glucose 176 (H) 70 - 99 MG/DL        Imaging/Diagnostics Last 24 Hours   No results found. Comment: Please note this report has been produced using speech recognition software and may contain errors related to that system including errors in grammar, punctuation, and spelling, as well as words and phrases that may be inappropriate. If there are any questions or concerns please feel free to contact the dictating provider for clarification.      Electronically signed by Avelina Johnston MD on 12/26/2022 at 3:10 PM

## 2022-12-26 NOTE — PROGRESS NOTES
Occupational Therapy  Attempted to see pt at 11:20 AM and reports being in increased pain and requested therapy at later time. Will re-attempt as schedule allows.        Marisa CASILLAS/AURORA

## 2022-12-27 LAB
GLUCOSE BLD-MCNC: 121 MG/DL (ref 70–99)
GLUCOSE BLD-MCNC: 173 MG/DL (ref 70–99)
GLUCOSE BLD-MCNC: 253 MG/DL (ref 70–99)
GLUCOSE BLD-MCNC: 255 MG/DL (ref 70–99)

## 2022-12-27 PROCEDURE — 6370000000 HC RX 637 (ALT 250 FOR IP): Performed by: INTERNAL MEDICINE

## 2022-12-27 PROCEDURE — 6370000000 HC RX 637 (ALT 250 FOR IP): Performed by: STUDENT IN AN ORGANIZED HEALTH CARE EDUCATION/TRAINING PROGRAM

## 2022-12-27 PROCEDURE — 2580000003 HC RX 258: Performed by: INTERNAL MEDICINE

## 2022-12-27 PROCEDURE — 6360000002 HC RX W HCPCS: Performed by: INTERNAL MEDICINE

## 2022-12-27 PROCEDURE — 97605 NEG PRS WND THER DME<=50SQCM: CPT

## 2022-12-27 PROCEDURE — 97535 SELF CARE MNGMENT TRAINING: CPT

## 2022-12-27 PROCEDURE — 94761 N-INVAS EAR/PLS OXIMETRY MLT: CPT

## 2022-12-27 PROCEDURE — 97530 THERAPEUTIC ACTIVITIES: CPT

## 2022-12-27 PROCEDURE — 97110 THERAPEUTIC EXERCISES: CPT

## 2022-12-27 PROCEDURE — 1200000000 HC SEMI PRIVATE

## 2022-12-27 PROCEDURE — 6370000000 HC RX 637 (ALT 250 FOR IP): Performed by: SURGERY

## 2022-12-27 PROCEDURE — 82962 GLUCOSE BLOOD TEST: CPT

## 2022-12-27 PROCEDURE — 6360000002 HC RX W HCPCS: Performed by: STUDENT IN AN ORGANIZED HEALTH CARE EDUCATION/TRAINING PROGRAM

## 2022-12-27 RX ORDER — IPRATROPIUM BROMIDE AND ALBUTEROL SULFATE 2.5; .5 MG/3ML; MG/3ML
SOLUTION RESPIRATORY (INHALATION)
Status: DISPENSED
Start: 2022-12-27 | End: 2022-12-27

## 2022-12-27 RX ADMIN — GABAPENTIN 600 MG: 300 CAPSULE ORAL at 21:02

## 2022-12-27 RX ADMIN — HYDROMORPHONE HYDROCHLORIDE 0.5 MG: 1 INJECTION, SOLUTION INTRAMUSCULAR; INTRAVENOUS; SUBCUTANEOUS at 19:06

## 2022-12-27 RX ADMIN — INSULIN LISPRO 2 UNITS: 100 INJECTION, SOLUTION INTRAVENOUS; SUBCUTANEOUS at 17:06

## 2022-12-27 RX ADMIN — GABAPENTIN 600 MG: 300 CAPSULE ORAL at 08:21

## 2022-12-27 RX ADMIN — SODIUM CHLORIDE 25 ML: 9 INJECTION, SOLUTION INTRAVENOUS at 10:10

## 2022-12-27 RX ADMIN — ATORVASTATIN CALCIUM 40 MG: 40 TABLET, FILM COATED ORAL at 21:02

## 2022-12-27 RX ADMIN — OXYCODONE AND ACETAMINOPHEN 2 TABLET: 5; 325 TABLET ORAL at 21:02

## 2022-12-27 RX ADMIN — HYDROMORPHONE HYDROCHLORIDE 0.5 MG: 1 INJECTION, SOLUTION INTRAMUSCULAR; INTRAVENOUS; SUBCUTANEOUS at 15:08

## 2022-12-27 RX ADMIN — HYDROMORPHONE HYDROCHLORIDE 0.5 MG: 1 INJECTION, SOLUTION INTRAMUSCULAR; INTRAVENOUS; SUBCUTANEOUS at 01:35

## 2022-12-27 RX ADMIN — CEFAZOLIN 2000 MG: 2 INJECTION, POWDER, FOR SOLUTION INTRAMUSCULAR; INTRAVENOUS at 17:06

## 2022-12-27 RX ADMIN — CEFAZOLIN 2000 MG: 2 INJECTION, POWDER, FOR SOLUTION INTRAMUSCULAR; INTRAVENOUS at 03:41

## 2022-12-27 RX ADMIN — GABAPENTIN 600 MG: 300 CAPSULE ORAL at 13:00

## 2022-12-27 RX ADMIN — OXYCODONE AND ACETAMINOPHEN 2 TABLET: 5; 325 TABLET ORAL at 17:10

## 2022-12-27 RX ADMIN — OXYCODONE AND ACETAMINOPHEN 2 TABLET: 5; 325 TABLET ORAL at 11:02

## 2022-12-27 RX ADMIN — HYDROMORPHONE HYDROCHLORIDE 0.5 MG: 1 INJECTION, SOLUTION INTRAMUSCULAR; INTRAVENOUS; SUBCUTANEOUS at 09:54

## 2022-12-27 RX ADMIN — OXYCODONE AND ACETAMINOPHEN 2 TABLET: 5; 325 TABLET ORAL at 05:20

## 2022-12-27 RX ADMIN — INSULIN LISPRO 2 UNITS: 100 INJECTION, SOLUTION INTRAVENOUS; SUBCUTANEOUS at 08:24

## 2022-12-27 RX ADMIN — INSULIN GLARGINE 10 UNITS: 100 INJECTION, SOLUTION SUBCUTANEOUS at 21:02

## 2022-12-27 RX ADMIN — INSULIN LISPRO 4 UNITS: 100 INJECTION, SOLUTION INTRAVENOUS; SUBCUTANEOUS at 08:24

## 2022-12-27 RX ADMIN — ASPIRIN 81 MG CHEWABLE TABLET 81 MG: 81 TABLET CHEWABLE at 08:21

## 2022-12-27 RX ADMIN — CEFAZOLIN 2000 MG: 2 INJECTION, POWDER, FOR SOLUTION INTRAMUSCULAR; INTRAVENOUS at 10:14

## 2022-12-27 RX ADMIN — INSULIN LISPRO 4 UNITS: 100 INJECTION, SOLUTION INTRAVENOUS; SUBCUTANEOUS at 13:00

## 2022-12-27 RX ADMIN — ENOXAPARIN SODIUM 40 MG: 100 INJECTION SUBCUTANEOUS at 08:21

## 2022-12-27 RX ADMIN — SODIUM CHLORIDE, PRESERVATIVE FREE 10 ML: 5 INJECTION INTRAVENOUS at 08:22

## 2022-12-27 RX ADMIN — HYDROMORPHONE HYDROCHLORIDE 0.5 MG: 1 INJECTION, SOLUTION INTRAMUSCULAR; INTRAVENOUS; SUBCUTANEOUS at 23:00

## 2022-12-27 RX ADMIN — INSULIN LISPRO 4 UNITS: 100 INJECTION, SOLUTION INTRAVENOUS; SUBCUTANEOUS at 17:06

## 2022-12-27 RX ADMIN — SODIUM CHLORIDE 50 ML: 9 INJECTION, SOLUTION INTRAVENOUS at 03:40

## 2022-12-27 RX ADMIN — SENNOSIDES 8.6 MG: 8.6 TABLET, FILM COATED ORAL at 21:02

## 2022-12-27 ASSESSMENT — ENCOUNTER SYMPTOMS
NAUSEA: 0
VOICE CHANGE: 0
ABDOMINAL PAIN: 0
CONSTIPATION: 0
VOMITING: 0
CHEST TIGHTNESS: 0
SHORTNESS OF BREATH: 0
WHEEZING: 0
DIARRHEA: 0
SINUS PRESSURE: 0
TROUBLE SWALLOWING: 0
SORE THROAT: 0
COUGH: 0
COLOR CHANGE: 1
SINUS PAIN: 0
BACK PAIN: 0

## 2022-12-27 ASSESSMENT — PAIN DESCRIPTION - ORIENTATION
ORIENTATION: RIGHT

## 2022-12-27 ASSESSMENT — PAIN DESCRIPTION - LOCATION
LOCATION: LEG
LOCATION: KNEE
LOCATION: LEG
LOCATION: KNEE

## 2022-12-27 ASSESSMENT — PAIN SCALES - GENERAL
PAINLEVEL_OUTOF10: 8
PAINLEVEL_OUTOF10: 0
PAINLEVEL_OUTOF10: 10
PAINLEVEL_OUTOF10: 8
PAINLEVEL_OUTOF10: 9
PAINLEVEL_OUTOF10: 8
PAINLEVEL_OUTOF10: 0
PAINLEVEL_OUTOF10: 2

## 2022-12-27 ASSESSMENT — PAIN - FUNCTIONAL ASSESSMENT
PAIN_FUNCTIONAL_ASSESSMENT: PREVENTS OR INTERFERES SOME ACTIVE ACTIVITIES AND ADLS

## 2022-12-27 ASSESSMENT — PAIN DESCRIPTION - DESCRIPTORS
DESCRIPTORS: ACHING;BURNING
DESCRIPTORS: ACHING
DESCRIPTORS: ACHING
DESCRIPTORS: THROBBING;ACHING
DESCRIPTORS: ACHING
DESCRIPTORS: THROBBING
DESCRIPTORS: BURNING;THROBBING;SHARP

## 2022-12-27 ASSESSMENT — PAIN SCALES - WONG BAKER
WONGBAKER_NUMERICALRESPONSE: 0

## 2022-12-27 NOTE — PROGRESS NOTES
Occupational Therapy  . Occupational Therapy Treatment Note    Name: Jonathan Prajapati MRN: [id number] :   1960   Date:  2022   Admission Date: 2022 Room:  [address] [place] [address]     Primary Problem:      Restrictions/Precautions:          General Precautions, Fall Risk, Rt BKA, IV, Bed/chair alarm    Communication with other providers: Restrictions: General Precautions, Fall Risk, Rt BKA, IV, Wound Vac Rt BKA    Subjective:  Patient states:  \"I'm still waiting to see where I go. I know I'm going somewhere. \" Pt also stated, \"I'll do whatever you need me to do. \"  Pain:   Location, Type, Intensity (0/10 to 10/10):  9/10, Rt BKA (reports nurse is managing pain meds)    Objective:    Observation: Pt received sitting upright on EOB. Actively participated. Required mod cues for safe body positioning c RW c occasional repetition for recall. Objective Measures:  N/A    Treatment, including education:  Therapeutic Activity Training:   Therapeutic activity training was instructed today. Cues were given for safety, sequence, UE/LE placement, awareness, and balance. Activities performed today included educational review for rationale for therapeutic intervention, and for sit-stand, SPT c RW.  Sit<>stands: CGA to Min A c RW (varied c surface heights) + mod cues for safe body positioning / sequencing. Stand Pivot Transfers: See Toilet Transfer  Standing balance / tolerance: CGA static / Min A during dynamic reaching c RW, tolerated two stands, 3-4 minutes. Functional Mobility: CGA / Min A c RW inside room and to navigate in bathroom + cues for safe body positioning throughout, assistance to manage wound vac. Self Care Training:   Cues were given for safety, sequence, UE/LE placement, visual cues, and balance. Activities performed today included toilet transfer, toileting, hand hygiene at sink  Min A c RW for Toilet Transfer. Mod A for clothing mgmt before / after as pt only able to manage L-side.   CGA c RW in stance at sink for grooming task of hand hygiene. Required one cue for safe body positioning. Therapeutic Exercise:  Cues were given for technique, safety, recruitment, and rationale. Cues were verbal and/or tactile. With emphasis on BUE strengthening, pt Sup + vc's for technique to perform BUE AROM exercises to include: shoulder flex/extension, internal/external rotation, chest presses, bicep curls, rowing, and supination/pronation x20 reps each and for self-pacing through BUE resistive AROM exercises c Red Theraband + cues for rest breaks PRN. All therapeutic intervention performed c emphasis on dynamic balance / standing tolerance to inc strength, endurance and act tolerance for inc Indep c ADL tasks, func transfers / mobility. Safety  Patient safely in bedside chair + alarm set at end of session, with call light/phone in reach, and nursing aware. Gait belt was used for func transfers / mobility. Assessment / Impression:    Patient's tolerance of treatment: Well  Adverse Reaction: None  Significant change in status and impact: Improved from initial evaluation  Barriers to improvement: Safety / endurance      Plan for Next Session:    Continue per OT POC c emphasis on safe body positioning during functional transfers / mobility for ADL tasks. Time in:  1330  Time out:  1416  Timed treatment minutes:  46  Total treatment time:  46      Electronically signed by:    TARIQ Meeks  12/27/2022, 1:23 PM    Goals:  1. Pt will complete all aspects of bed mobility for EOB/OOB ADLs mod I with HOB flat  2. Pt will complete UB/LB bathing SBA with setup  3. Pt will complete all aspects of LB dressing SBA with setup  4. Pt will complete all functional transfers to and from bed, chair, toilet, shower chair SBA on Lt LE  5. Pt will ambulate HH distance to bathroom for toileting SBA with RW using hop gait pattern on Lt LE  6. Pt will complete all aspects of toileting task SBA  7.  Pt will complete oral hygiene/grooming routine in standing at sink SBA on Lt LE  8.  Pt will complete ther ex/ther act with focus on UE strengthening and functional activity tolerance in standing on Lt LE >10 minutes

## 2022-12-27 NOTE — PROGRESS NOTES
Physician Progress Note      PATIENT:               Rosio Miller  CSN #:                  [id number]  :                       1960  ADMIT DATE:       2022 7:47 PM  [address] [place] DATE:  [place] [address]  PROVIDER #:        Leora Hope DO          QUERY TEXT:    Dr. Victor M Gilbert,    Patient admitted with Osteomyelitis, and necrotic/gangrenous wound of right   stump. Per Op note dated12022  \"BKA WOUND DEBRIDEMENT INCISION AND   DRAINAGE WITH WOUND VAC AND PURAPLY APPLICATION\" was done. The debridement was   specified as excisional debridement of subcut. tissue but no laterality of   the debridement was provided. To accurately reflect the procedure performed   please document if debridement the laterality of the debridement:    Risk Factors: osteomyelitis and infection from injury sustained  Clinical Indicators: Wound measurement 7 x 3.5 cm with 24.5 cm debrided down   to subcutaneous tissue. Treatment: labs, imaging, Surgical consult w/ wound debridement, Puraply and   wound vac application    Thank you,  Chelsey Ralph RN CDS  [phone]  Options provided:  -- Excisional debridement of subcutaneous tissue, please specify   site/laterality, was performed during procedure on  22.   -- Other - I will add my own diagnosis  -- Disagree - Not applicable / Not valid  -- Disagree - Clinically unable to determine / Unknown  -- Refer to Clinical Documentation Reviewer    PROVIDER RESPONSE TEXT:    See op note    Query created by: Raman Woodward on 2022 11:42 AM      Electronically signed by:  Leora Hope DO 2022 4:04 PM

## 2022-12-27 NOTE — PROGRESS NOTES
V2.0  [facility] [place] [facility] [place]      Name:  Ann-Marie Vela /Age/Sex: 1960  (60 y.o. male)   MRN & CSN:  [id number] & [id number] Encounter Date/Time: 2022 2:16 PM EST    Location:  5488/7082-G PCP: Luz Elena Isbell MD       Hospital Day: 10    Assessment and Plan:   Ann-Marie Vela is a 60 y.o. male with pmh of coronary artery disease s/p CABG, PAD, Diabetes mellitus type 2, COPD, not on home oxygen, chronic hepatitis C, and diabetic foot ulcer/necrotizing fasciitis s/p right BKA (2022) who presents with Osteomyelitis ([address]      Plan:    Osteomyelitis, and necrotic/gangrenous wound of right stump. Had a fall and dehiscence P/W worsening wound, looks infected with necrotic/gangrenous margins(pic uploaded to media). Pus pockets. General surgery consulted  surgical intervention on 22 with wound vac placement  MRI consistent with osteomyelitis  ID consulted: To continue IV Ancef for 6 weeks. Follow-up with ID as an outpatient. Pain control: Percocet panel and IV Dilaudid for breakthrough    Type 2 diabetes on long-term insulin  Increase Lantus 10 units nightly  Lispro 4 units s with meals  Low-dose sliding scale  Plan of care glucose checks  Hypoglycemia protocol    Diabetic neuropathy  Continue gabapentin     Peripheral arterial disease  CTA of right lower extremity in 10/2021 showed complete occlusion of the right common femoral artery, opacifying anterior and posterior tibial arteries. Had angioplasty 2021  Continue aspirin and atorvastatin     Coronary artery disease s/p CABG- 2013  Continue aspirin, atorvastatin       Diet ADULT DIET; Regular; 5 carb choices (75 gm/meal)  ADULT ORAL NUTRITION SUPPLEMENT; Lunch, Dinner; Diabetic Oral Supplement  ADULT ORAL NUTRITION SUPPLEMENT; Breakfast, Dinner;  Wound Healing Oral Supplement   DVT Prophylaxis [x] Lovenox, []  Heparin, [] SCDs, [] Ambulation,    [] Eliquis, [] Xarelto  [] Coumadin [] other   Code Status Full Code   Disposition From: home  Expected Disposition: home  Estimated Date of Discharge: 1-3 days  Patient requires continued admission due IV antibiotics and pain control for osteomyelitis. Now pending pre-CERT for SNF placement. Surrogate Decision Maker/ POA      Subjective:     Chief Complaint: No chief complaint on file. Pain is improving in his low back pain was tolerable. Patient is doing well and medically ready for discharge when pre-CERT for SNF has been approved. Review of Systems:    Review of Systems   Constitutional:  Negative for activity change, appetite change, chills, fatigue and fever. HENT:  Negative for congestion, hearing loss, sinus pressure, sinus pain, sore throat, trouble swallowing and voice change. Eyes:  Negative for visual disturbance. Respiratory:  Negative for cough, chest tightness, shortness of breath and wheezing. Cardiovascular:  Negative for chest pain, palpitations and leg swelling. Gastrointestinal:  Negative for abdominal pain, constipation, diarrhea, nausea and vomiting. Endocrine: Negative for cold intolerance, heat intolerance and polyuria. Genitourinary:  Negative for dysuria. Musculoskeletal:  Positive for arthralgias and myalgias. Negative for back pain and gait problem. Skin:  Positive for color change and wound. Neurological:  Negative for dizziness, weakness and headaches. Psychiatric/Behavioral:  Negative for agitation and confusion. The patient is not nervous/anxious. Objective: Intake/Output Summary (Last 24 hours) at 12/27/2022 1553  Last data filed at 12/27/2022 4585  Gross per 24 hour   Intake 5 ml   Output 450 ml   Net -445 ml          Vitals:   Vitals:    12/27/22 1508   BP:    Pulse:    Resp: 18   Temp:    SpO2:        Physical Exam:     Physical Exam  Constitutional:       General: He is not in acute distress. Appearance: Normal appearance. HENT:      Head: Normocephalic and atraumatic.       Nose: Nose normal.      Mouth/Throat: Mouth: Mucous membranes are moist.      Pharynx: Oropharynx is clear. Eyes:      Extraocular Movements: Extraocular movements intact. Conjunctiva/sclera: Conjunctivae normal.      Pupils: Pupils are equal, round, and reactive to light. Cardiovascular:      Rate and Rhythm: Normal rate and regular rhythm. Pulses: Normal pulses. Heart sounds: Normal heart sounds. Pulmonary:      Effort: Pulmonary effort is normal.   Abdominal:      General: Abdomen is flat. Bowel sounds are normal.      Palpations: Abdomen is soft. Musculoskeletal:         General: Normal range of motion. Cervical back: Normal range of motion and neck supple. Skin:     General: Skin is warm and dry. Findings: Erythema, lesion and rash present. Neurological:      General: No focal deficit present. Mental Status: He is alert and oriented to person, place, and time. Mental status is at baseline. Psychiatric:         Mood and Affect: Mood normal.         Behavior: Behavior normal.         Thought Content:  Thought content normal.       Medications:   Medications:    ipratropium-albuterol        insulin glargine  10 Units SubCUTAneous Nightly    ceFAZolin  2,000 mg IntraVENous 3 times per day    senna  1 tablet Oral Nightly    insulin lispro  4 Units SubCUTAneous TID WC    insulin lispro  0-4 Units SubCUTAneous TID WC    insulin lispro  0-4 Units SubCUTAneous Nightly    sodium chloride flush  5-40 mL IntraVENous 2 times per day    enoxaparin  40 mg SubCUTAneous Daily    nicotine  1 patch TransDERmal Daily    aspirin  81 mg Oral Daily    gabapentin  600 mg Oral TID    atorvastatin  40 mg Oral Nightly      Infusions:    sodium chloride 25 mL (12/27/22 1010)    dextrose       PRN Meds: oxyCODONE-acetaminophen, 1 tablet, Q4H PRN   Or  oxyCODONE-acetaminophen, 2 tablet, Q4H PRN  HYDROmorphone, 0.5 mg, Q4H PRN  sodium chloride flush, 5-40 mL, PRN  sodium chloride, , PRN  ondansetron, 4 mg, Q8H PRN Or  ondansetron, 4 mg, Q6H PRN  polyethylene glycol, 17 g, Daily PRN  acetaminophen, 650 mg, Q6H PRN   Or  acetaminophen, 650 mg, Q6H PRN  glucose, 4 tablet, PRN  dextrose bolus, 125 mL, PRN   Or  dextrose bolus, 250 mL, PRN  glucagon (rDNA), 1 mg, PRN  dextrose, , Continuous PRN  hydrALAZINE, 10 mg, Q6H PRN      Labs      Recent Results (from the past 24 hour(s))   POCT Glucose    Collection Time: 12/26/22  5:46 PM   Result Value Ref Range    POC Glucose 216 (H) 70 - 99 MG/DL   POCT Glucose    Collection Time: 12/26/22  8:09 PM   Result Value Ref Range    POC Glucose 299 (H) 70 - 99 MG/DL   POCT Glucose    Collection Time: 12/27/22  7:19 AM   Result Value Ref Range    POC Glucose 255 (H) 70 - 99 MG/DL   POCT Glucose    Collection Time: 12/27/22 11:56 AM   Result Value Ref Range    POC Glucose 121 (H) 70 - 99 MG/DL        Imaging/Diagnostics Last 24 Hours   No results found. Comment: Please note this report has been produced using speech recognition software and may contain errors related to that system including errors in grammar, punctuation, and spelling, as well as words and phrases that may be inappropriate. If there are any questions or concerns please feel free to contact the dictating provider for clarification.      Electronically signed by Sushila Saucedo MD on 12/27/2022 at 3:53 PM
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
## 2022-12-27 NOTE — PLAN OF CARE
Problem: Chronic Conditions and Co-morbidities  Goal: Patient's chronic conditions and co-morbidity symptoms are monitored and maintained or improved  Outcome: Progressing  Flowsheets (Taken 12/27/2022 0811)  Care Plan - Patient's Chronic Conditions and Co-Morbidity Symptoms are Monitored and Maintained or Improved:   Update acute care plan with appropriate goals if chronic or comorbid symptoms are exacerbated and prevent overall improvement and discharge   Collaborate with multidisciplinary team to address chronic and comorbid conditions and prevent exacerbation or deterioration   Monitor and assess patient's chronic conditions and comorbid symptoms for stability, deterioration, or improvement     Problem: Discharge Planning  Goal: Discharge to home or other facility with appropriate resources  Outcome: Progressing  Flowsheets (Taken 12/27/2022 0811)  Discharge to home or other facility with appropriate resources:   Identify barriers to discharge with patient and caregiver   Arrange for needed discharge resources and transportation as appropriate   Identify discharge learning needs (meds, wound care, etc)   Refer to discharge planning if patient needs post-hospital services based on physician order or complex needs related to functional status, cognitive ability or social support system     Problem: Pain  Goal: Verbalizes/displays adequate comfort level or baseline comfort level  Outcome: Progressing     Problem: Safety - Adult  Goal: Free from fall injury  Outcome: Progressing  Flowsheets (Taken 12/27/2022 0818)  Free From Fall Injury: Instruct family/caregiver on patient safety     Problem: ABCDS Injury Assessment  Goal: Absence of physical injury  Outcome: Progressing  Flowsheets (Taken 12/27/2022 0818)  Absence of Physical Injury: Implement safety measures based on patient assessment     Problem: Skin/Tissue Integrity  Goal: Absence of new skin breakdown  Description: 1.   Monitor for areas of redness and/or skin breakdown  2. Assess vascular access sites hourly  3. Every 4-6 hours minimum:  Change oxygen saturation probe site  4. Every 4-6 hours:  If on nasal continuous positive airway pressure, respiratory therapy assess nares and determine need for appliance change or resting period.   Outcome: Progressing     Problem: Nutrition Deficit:  Goal: Optimize nutritional status  Outcome: Progressing

## 2022-12-27 NOTE — PROGRESS NOTES
12/27/22 1536   Encounter Summary   Encounter Overview/Reason  Initial Encounter;Grief, Loss, and Adjustments   Service Provided For: Patient   Referral/Consult From: [facility]   Support System Significant other   Last Encounter  12/27/22  (Greif and loss of lower right leg. emotional adjustment)   Complexity of Encounter Low   Begin Time 1527   End Time  1538   Total Time Calculated 11 min   Encounter    Type Initial Screen/Assessment   Spiritual/Emotional needs   Type Spiritual Support   Grief, Loss, and Adjustments   Type Life Adjustments   Assessment/Intervention/Outcome   Assessment Coping;Peaceful   Intervention Active listening;Discussed belief system/Holiness practices/aguilar;Discussed illness injury and its impact; Discussed relationship with God;Empowerment;Grief Care;Prayer (assurance of)/Readlyn   Outcome Comfort; Optimistic;Expressed Gratitude   Plan and Referrals   Plan/Referrals Continue Support (comment)

## 2022-12-27 NOTE — CARE COORDINATION
Left vm for Franci at [facility] to follow up on referral info/ refaxed facesheet if needed. Spoke with Franci at [facility], they can accept pt, aware of pt needing wound vac. She will begin precert.

## 2022-12-28 LAB
GLUCOSE BLD-MCNC: 185 MG/DL (ref 70–99)
GLUCOSE BLD-MCNC: 219 MG/DL (ref 70–99)
GLUCOSE BLD-MCNC: 248 MG/DL (ref 70–99)
GLUCOSE BLD-MCNC: 271 MG/DL (ref 70–99)

## 2022-12-28 PROCEDURE — 6370000000 HC RX 637 (ALT 250 FOR IP): Performed by: INTERNAL MEDICINE

## 2022-12-28 PROCEDURE — 6360000002 HC RX W HCPCS: Performed by: STUDENT IN AN ORGANIZED HEALTH CARE EDUCATION/TRAINING PROGRAM

## 2022-12-28 PROCEDURE — 6370000000 HC RX 637 (ALT 250 FOR IP): Performed by: STUDENT IN AN ORGANIZED HEALTH CARE EDUCATION/TRAINING PROGRAM

## 2022-12-28 PROCEDURE — 2580000003 HC RX 258: Performed by: INTERNAL MEDICINE

## 2022-12-28 PROCEDURE — 6360000002 HC RX W HCPCS: Performed by: INTERNAL MEDICINE

## 2022-12-28 PROCEDURE — 82962 GLUCOSE BLOOD TEST: CPT

## 2022-12-28 PROCEDURE — 1200000000 HC SEMI PRIVATE

## 2022-12-28 PROCEDURE — 94761 N-INVAS EAR/PLS OXIMETRY MLT: CPT

## 2022-12-28 PROCEDURE — 6370000000 HC RX 637 (ALT 250 FOR IP): Performed by: SURGERY

## 2022-12-28 RX ORDER — SODIUM CHLORIDE 0.9 % (FLUSH) 0.9 %
5-40 SYRINGE (ML) INJECTION PRN
Status: DISCONTINUED | OUTPATIENT
Start: 2022-12-28 | End: 2023-01-09 | Stop reason: HOSPADM

## 2022-12-28 RX ORDER — SODIUM CHLORIDE 0.9 % (FLUSH) 0.9 %
5-40 SYRINGE (ML) INJECTION EVERY 12 HOURS SCHEDULED
Status: DISCONTINUED | OUTPATIENT
Start: 2022-12-28 | End: 2023-01-09 | Stop reason: HOSPADM

## 2022-12-28 RX ORDER — LIDOCAINE HYDROCHLORIDE 10 MG/ML
5 INJECTION, SOLUTION EPIDURAL; INFILTRATION; INTRACAUDAL; PERINEURAL ONCE
Status: DISCONTINUED | OUTPATIENT
Start: 2022-12-28 | End: 2023-01-09 | Stop reason: HOSPADM

## 2022-12-28 RX ORDER — SODIUM CHLORIDE 9 MG/ML
INJECTION, SOLUTION INTRAVENOUS PRN
Status: DISCONTINUED | OUTPATIENT
Start: 2022-12-28 | End: 2023-01-09 | Stop reason: HOSPADM

## 2022-12-28 RX ADMIN — INSULIN LISPRO 1 UNITS: 100 INJECTION, SOLUTION INTRAVENOUS; SUBCUTANEOUS at 10:35

## 2022-12-28 RX ADMIN — OXYCODONE AND ACETAMINOPHEN 1 TABLET: 5; 325 TABLET ORAL at 10:46

## 2022-12-28 RX ADMIN — GABAPENTIN 600 MG: 300 CAPSULE ORAL at 10:39

## 2022-12-28 RX ADMIN — GABAPENTIN 600 MG: 300 CAPSULE ORAL at 20:09

## 2022-12-28 RX ADMIN — HYDROMORPHONE HYDROCHLORIDE 0.5 MG: 1 INJECTION, SOLUTION INTRAMUSCULAR; INTRAVENOUS; SUBCUTANEOUS at 07:24

## 2022-12-28 RX ADMIN — SODIUM CHLORIDE, PRESERVATIVE FREE 10 ML: 5 INJECTION INTRAVENOUS at 10:40

## 2022-12-28 RX ADMIN — HYDROMORPHONE HYDROCHLORIDE 0.5 MG: 1 INJECTION, SOLUTION INTRAMUSCULAR; INTRAVENOUS; SUBCUTANEOUS at 13:16

## 2022-12-28 RX ADMIN — ENOXAPARIN SODIUM 40 MG: 100 INJECTION SUBCUTANEOUS at 10:39

## 2022-12-28 RX ADMIN — CEFAZOLIN 2000 MG: 2 INJECTION, POWDER, FOR SOLUTION INTRAMUSCULAR; INTRAVENOUS at 03:22

## 2022-12-28 RX ADMIN — HYDROMORPHONE HYDROCHLORIDE 0.5 MG: 1 INJECTION, SOLUTION INTRAMUSCULAR; INTRAVENOUS; SUBCUTANEOUS at 22:13

## 2022-12-28 RX ADMIN — INSULIN LISPRO 4 UNITS: 100 INJECTION, SOLUTION INTRAVENOUS; SUBCUTANEOUS at 19:02

## 2022-12-28 RX ADMIN — INSULIN LISPRO 4 UNITS: 100 INJECTION, SOLUTION INTRAVENOUS; SUBCUTANEOUS at 10:35

## 2022-12-28 RX ADMIN — HYDROMORPHONE HYDROCHLORIDE 0.5 MG: 1 INJECTION, SOLUTION INTRAMUSCULAR; INTRAVENOUS; SUBCUTANEOUS at 03:20

## 2022-12-28 RX ADMIN — CEFAZOLIN 2000 MG: 2 INJECTION, POWDER, FOR SOLUTION INTRAMUSCULAR; INTRAVENOUS at 17:54

## 2022-12-28 RX ADMIN — INSULIN LISPRO 2 UNITS: 100 INJECTION, SOLUTION INTRAVENOUS; SUBCUTANEOUS at 19:02

## 2022-12-28 RX ADMIN — HYDROMORPHONE HYDROCHLORIDE 0.5 MG: 1 INJECTION, SOLUTION INTRAMUSCULAR; INTRAVENOUS; SUBCUTANEOUS at 17:50

## 2022-12-28 RX ADMIN — OXYCODONE AND ACETAMINOPHEN 2 TABLET: 5; 325 TABLET ORAL at 15:41

## 2022-12-28 RX ADMIN — OXYCODONE AND ACETAMINOPHEN 2 TABLET: 5; 325 TABLET ORAL at 20:09

## 2022-12-28 RX ADMIN — ASPIRIN 81 MG CHEWABLE TABLET 81 MG: 81 TABLET CHEWABLE at 10:39

## 2022-12-28 RX ADMIN — SODIUM CHLORIDE, PRESERVATIVE FREE 10 ML: 5 INJECTION INTRAVENOUS at 20:10

## 2022-12-28 RX ADMIN — OXYCODONE AND ACETAMINOPHEN 2 TABLET: 5; 325 TABLET ORAL at 01:27

## 2022-12-28 RX ADMIN — CEFAZOLIN 2000 MG: 2 INJECTION, POWDER, FOR SOLUTION INTRAMUSCULAR; INTRAVENOUS at 10:39

## 2022-12-28 RX ADMIN — INSULIN GLARGINE 10 UNITS: 100 INJECTION, SOLUTION SUBCUTANEOUS at 21:16

## 2022-12-28 RX ADMIN — SENNOSIDES 8.6 MG: 8.6 TABLET, FILM COATED ORAL at 20:09

## 2022-12-28 RX ADMIN — ATORVASTATIN CALCIUM 40 MG: 40 TABLET, FILM COATED ORAL at 20:09

## 2022-12-28 RX ADMIN — GABAPENTIN 600 MG: 300 CAPSULE ORAL at 14:48

## 2022-12-28 ASSESSMENT — PAIN DESCRIPTION - ORIENTATION
ORIENTATION: RIGHT

## 2022-12-28 ASSESSMENT — ENCOUNTER SYMPTOMS
WHEEZING: 0
SINUS PRESSURE: 0
NAUSEA: 0
SHORTNESS OF BREATH: 0
VOMITING: 0
CHEST TIGHTNESS: 0
ABDOMINAL PAIN: 0
COLOR CHANGE: 1
TROUBLE SWALLOWING: 0
CONSTIPATION: 0
BACK PAIN: 0
SORE THROAT: 0
SINUS PAIN: 0
DIARRHEA: 0
COUGH: 0
VOICE CHANGE: 0

## 2022-12-28 ASSESSMENT — PAIN DESCRIPTION - DESCRIPTORS
DESCRIPTORS: ACHING;BURNING
DESCRIPTORS: ACHING;BURNING

## 2022-12-28 ASSESSMENT — PAIN SCALES - GENERAL
PAINLEVEL_OUTOF10: 8
PAINLEVEL_OUTOF10: 0
PAINLEVEL_OUTOF10: 8
PAINLEVEL_OUTOF10: 10
PAINLEVEL_OUTOF10: 8

## 2022-12-28 ASSESSMENT — PAIN DESCRIPTION - LOCATION
LOCATION: LEG

## 2022-12-28 ASSESSMENT — PAIN SCALES - WONG BAKER
WONGBAKER_NUMERICALRESPONSE: 0
WONGBAKER_NUMERICALRESPONSE: 0

## 2022-12-28 NOTE — PROGRESS NOTES
V2.0  [facility] Hospitalist Progress Note      Name:  Eyad Ellison /Age/Sex: 1960  (60 y.o. male)   MRN & CSN:  [id number] & [id number] Encounter Date/Time: 2022 2:16 PM EST    Location:  0091/3990-X PCP: Geraldine Echeverria MD       Hospital Day: 11    Assessment and Plan:   Eyad Ellison is a 60 y.o. male with pmh of coronary artery disease s/p CABG, PAD, Diabetes mellitus type 2, COPD, not on home oxygen, chronic hepatitis C, and diabetic foot ulcer/necrotizing fasciitis s/p right BKA (2022) who presents with Osteomyelitis ([address]      Plan:    Osteomyelitis, and necrotic/gangrenous wound of right stump. Had a fall and dehiscence P/W worsening wound, looks infected with necrotic/gangrenous margins(pic uploaded to media). Pus pockets. General surgery consulted  surgical intervention on 22 with wound vac placement  MRI consistent with osteomyelitis  ID consulted: To continue IV Ancef for 6 weeks. Follow-up with ID as an outpatient. Pain control: Percocet panel and IV Dilaudid for breakthrough  Obtain CBC with auto differential, CMP, ESR and CRP for tomorrow morning. Type 2 diabetes on long-term insulin  Increase Lantus 10 units nightly  Lispro 4 units s with meals  Low-dose sliding scale  Plan of care glucose checks  Hypoglycemia protocol    Diabetic neuropathy  Continue gabapentin     Peripheral arterial disease  CTA of right lower extremity in 10/2021 showed complete occlusion of the right common femoral artery, opacifying anterior and posterior tibial arteries. Had angioplasty 2021  Continue aspirin and atorvastatin     Coronary artery disease s/p CABG- 2013  Continue aspirin, atorvastatin       Diet ADULT DIET; Regular; 5 carb choices (75 gm/meal)  ADULT ORAL NUTRITION SUPPLEMENT; Lunch, Dinner; Diabetic Oral Supplement  ADULT ORAL NUTRITION SUPPLEMENT; Breakfast, Dinner;  Wound Healing Oral Supplement   DVT Prophylaxis [x] Lovenox, []  Heparin, [] SCDs, [] Ambulation,    [] Eliquis, [] Xarelto  [] Coumadin [] other   Code Status Full Code   Disposition From: home  Expected Disposition: home  Estimated Date of Discharge: 1-3 days  Patient requires continued admission due IV antibiotics and pain control for osteomyelitis. Now pending pre-CERT for SNF placement. Surrogate Decision Maker/ POA      Subjective:     Chief Complaint: No chief complaint on file. Seen and examined in the morning. Patient has no complaints. He has been medically ready and is waiting SNF placement. Review of Systems:    Review of Systems   Constitutional:  Negative for activity change, appetite change, chills, fatigue and fever. HENT:  Negative for congestion, hearing loss, sinus pressure, sinus pain, sore throat, trouble swallowing and voice change. Eyes:  Negative for visual disturbance. Respiratory:  Negative for cough, chest tightness, shortness of breath and wheezing. Cardiovascular:  Negative for chest pain, palpitations and leg swelling. Gastrointestinal:  Negative for abdominal pain, constipation, diarrhea, nausea and vomiting. Endocrine: Negative for cold intolerance, heat intolerance and polyuria. Genitourinary:  Negative for dysuria. Musculoskeletal:  Positive for arthralgias and myalgias. Negative for back pain and gait problem. Skin:  Positive for color change and wound. Neurological:  Negative for dizziness, weakness and headaches. Psychiatric/Behavioral:  Negative for agitation and confusion. The patient is not nervous/anxious. Objective: Intake/Output Summary (Last 24 hours) at 12/28/2022 1449  Last data filed at 12/27/2022 1652  Gross per 24 hour   Intake --   Output 300 ml   Net -300 ml          Vitals:   Vitals:    12/28/22 0829   BP: (!) 159/73   Pulse: 63   Resp: 16   Temp: 98.2 °F (36.8 °C)   SpO2: 97%       Physical Exam:     Physical Exam  Constitutional:       General: He is not in acute distress. Appearance: Normal appearance.    HENT: Head: Normocephalic and atraumatic. Nose: Nose normal.      Mouth/Throat:      Mouth: Mucous membranes are moist.      Pharynx: Oropharynx is clear. Eyes:      Extraocular Movements: Extraocular movements intact. Conjunctiva/sclera: Conjunctivae normal.      Pupils: Pupils are equal, round, and reactive to light. Cardiovascular:      Rate and Rhythm: Normal rate and regular rhythm. Pulses: Normal pulses. Heart sounds: Normal heart sounds. Pulmonary:      Effort: Pulmonary effort is normal.   Abdominal:      General: Abdomen is flat. Bowel sounds are normal.      Palpations: Abdomen is soft. Musculoskeletal:         General: Normal range of motion. Cervical back: Normal range of motion and neck supple. Skin:     General: Skin is warm and dry. Findings: Erythema, lesion and rash present. Neurological:      General: No focal deficit present. Mental Status: He is alert and oriented to person, place, and time. Mental status is at baseline. Psychiatric:         Mood and Affect: Mood normal.         Behavior: Behavior normal.         Thought Content:  Thought content normal.       Medications:   Medications:    lidocaine PF  5 mL IntraDERmal Once    sodium chloride flush  5-40 mL IntraVENous 2 times per day    insulin glargine  10 Units SubCUTAneous Nightly    ceFAZolin  2,000 mg IntraVENous 3 times per day    senna  1 tablet Oral Nightly    insulin lispro  4 Units SubCUTAneous TID WC    insulin lispro  0-4 Units SubCUTAneous TID WC    insulin lispro  0-4 Units SubCUTAneous Nightly    sodium chloride flush  5-40 mL IntraVENous 2 times per day    enoxaparin  40 mg SubCUTAneous Daily    nicotine  1 patch TransDERmal Daily    aspirin  81 mg Oral Daily    gabapentin  600 mg Oral TID    atorvastatin  40 mg Oral Nightly      Infusions:    sodium chloride      sodium chloride 25 mL (12/27/22 1010)    dextrose       PRN Meds: sodium chloride flush, 5-40 mL, PRN  sodium chloride, , PRN  oxyCODONE-acetaminophen, 1 tablet, Q4H PRN   Or  oxyCODONE-acetaminophen, 2 tablet, Q4H PRN  HYDROmorphone, 0.5 mg, Q4H PRN  sodium chloride flush, 5-40 mL, PRN  sodium chloride, , PRN  ondansetron, 4 mg, Q8H PRN   Or  ondansetron, 4 mg, Q6H PRN  polyethylene glycol, 17 g, Daily PRN  acetaminophen, 650 mg, Q6H PRN   Or  acetaminophen, 650 mg, Q6H PRN  glucose, 4 tablet, PRN  dextrose bolus, 125 mL, PRN   Or  dextrose bolus, 250 mL, PRN  glucagon (rDNA), 1 mg, PRN  dextrose, , Continuous PRN  hydrALAZINE, 10 mg, Q6H PRN      Labs      Recent Results (from the past 24 hour(s))   POCT Glucose    Collection Time: 12/27/22  4:50 PM   Result Value Ref Range    POC Glucose 253 (H) 70 - 99 MG/DL   POCT Glucose    Collection Time: 12/27/22  8:31 PM   Result Value Ref Range    POC Glucose 173 (H) 70 - 99 MG/DL   POCT Glucose    Collection Time: 12/28/22  8:15 AM   Result Value Ref Range    POC Glucose 219 (H) 70 - 99 MG/DL   POCT Glucose    Collection Time: 12/28/22 12:18 PM   Result Value Ref Range    POC Glucose 185 (H) 70 - 99 MG/DL        Imaging/Diagnostics Last 24 Hours   No results found. Comment: Please note this report has been produced using speech recognition software and may contain errors related to that system including errors in grammar, punctuation, and spelling, as well as words and phrases that may be inappropriate. If there are any questions or concerns please feel free to contact the dictating provider for clarification.      Electronically signed by Willa Her MD on 12/28/2022 at 2:49 PM

## 2022-12-28 NOTE — PROGRESS NOTES
Physical Therapy  1305 Attempted to see pt, pt had just asked for pain meds and the nurse was getting them, returned at [phone], pt was eating his lunch and asked me to return later to help him in a chair. Will cont. Basilio Segura.  Dori Thurston PTA

## 2022-12-28 NOTE — CONSULTS
Consult reviewed. Patient requiring 6 week course of IV antibiotics. Collaborated with Dr. Amber Min, PICC discontinued at this time, patient awaiting SNF placement and discharge date is uncertain. Patient currently has PIV access that meets therapeutic needs. Please consult IV/PICC team when patient ready for discharge or for questions, concerns, or patient's needs change.

## 2022-12-28 NOTE — CARE COORDINATION
Per Franci at [facility] remains pending,  Electronic PAS completed      Submitted message to [facility] [employer] online system case management requesting assistance in expediting SNF auth as pt require IV Abx/ PT/OT and wound care.

## 2022-12-29 LAB
ALBUMIN SERPL-MCNC: 2.5 GM/DL (ref 3.4–5)
ALP BLD-CCNC: 236 IU/L (ref 40–128)
ALT SERPL-CCNC: 13 U/L (ref 10–40)
ANION GAP SERPL CALCULATED.3IONS-SCNC: 10 MMOL/L (ref 4–16)
AST SERPL-CCNC: 78 IU/L (ref 15–37)
BASOPHILS ABSOLUTE: 0 K/CU MM
BASOPHILS RELATIVE PERCENT: 1 % (ref 0–1)
BILIRUB SERPL-MCNC: 0.2 MG/DL (ref 0–1)
BUN BLDV-MCNC: 29 MG/DL (ref 6–23)
CALCIUM SERPL-MCNC: 8.1 MG/DL (ref 8.3–10.6)
CHLORIDE BLD-SCNC: 103 MMOL/L (ref 99–110)
CO2: 26 MMOL/L (ref 21–32)
CREAT SERPL-MCNC: 0.9 MG/DL (ref 0.9–1.3)
DIFFERENTIAL TYPE: ABNORMAL
EOSINOPHILS ABSOLUTE: 0.1 K/CU MM
EOSINOPHILS RELATIVE PERCENT: 2.8 % (ref 0–3)
ERYTHROCYTE SEDIMENTATION RATE: 52 MM/HR (ref 0–20)
GFR SERPL CREATININE-BSD FRML MDRD: >60 ML/MIN/1.73M2
GLUCOSE BLD-MCNC: 130 MG/DL (ref 70–99)
GLUCOSE BLD-MCNC: 142 MG/DL (ref 70–99)
GLUCOSE BLD-MCNC: 166 MG/DL (ref 70–99)
GLUCOSE BLD-MCNC: 166 MG/DL (ref 70–99)
GLUCOSE BLD-MCNC: 353 MG/DL (ref 70–99)
HCT VFR BLD CALC: 27 % (ref 42–52)
HEMOGLOBIN: 8.6 GM/DL (ref 13.5–18)
HIGH SENSITIVE C-REACTIVE PROTEIN: 5.8 MG/L (ref 0–5)
IMMATURE NEUTROPHIL %: 1.3 % (ref 0–0.43)
LYMPHOCYTES ABSOLUTE: 0.9 K/CU MM
LYMPHOCYTES RELATIVE PERCENT: 22.7 % (ref 24–44)
MCH RBC QN AUTO: 29.1 PG (ref 27–31)
MCHC RBC AUTO-ENTMCNC: 31.9 % (ref 32–36)
MCV RBC AUTO: 91.2 FL (ref 78–100)
MONOCYTES ABSOLUTE: 0.4 K/CU MM
MONOCYTES RELATIVE PERCENT: 10.1 % (ref 0–4)
NUCLEATED RBC %: 0 %
PDW BLD-RTO: 15.4 % (ref 11.7–14.9)
PLATELET # BLD: 103 K/CU MM (ref 140–440)
PMV BLD AUTO: 11 FL (ref 7.5–11.1)
POTASSIUM SERPL-SCNC: 4.7 MMOL/L (ref 3.5–5.1)
RBC # BLD: 2.96 M/CU MM (ref 4.6–6.2)
SEGMENTED NEUTROPHILS ABSOLUTE COUNT: 2.5 K/CU MM
SEGMENTED NEUTROPHILS RELATIVE PERCENT: 62.1 % (ref 36–66)
SODIUM BLD-SCNC: 139 MMOL/L (ref 135–145)
TOTAL IMMATURE NEUTOROPHIL: 0.05 K/CU MM
TOTAL NUCLEATED RBC: 0 K/CU MM
TOTAL PROTEIN: 6.6 GM/DL (ref 6.4–8.2)
WBC # BLD: 4 K/CU MM (ref 4–10.5)

## 2022-12-29 PROCEDURE — 6370000000 HC RX 637 (ALT 250 FOR IP): Performed by: SURGERY

## 2022-12-29 PROCEDURE — 85025 COMPLETE CBC W/AUTO DIFF WBC: CPT

## 2022-12-29 PROCEDURE — 6370000000 HC RX 637 (ALT 250 FOR IP): Performed by: STUDENT IN AN ORGANIZED HEALTH CARE EDUCATION/TRAINING PROGRAM

## 2022-12-29 PROCEDURE — 36415 COLL VENOUS BLD VENIPUNCTURE: CPT

## 2022-12-29 PROCEDURE — 97530 THERAPEUTIC ACTIVITIES: CPT

## 2022-12-29 PROCEDURE — 82962 GLUCOSE BLOOD TEST: CPT

## 2022-12-29 PROCEDURE — 86141 C-REACTIVE PROTEIN HS: CPT

## 2022-12-29 PROCEDURE — 6360000002 HC RX W HCPCS: Performed by: INTERNAL MEDICINE

## 2022-12-29 PROCEDURE — 2580000003 HC RX 258: Performed by: INTERNAL MEDICINE

## 2022-12-29 PROCEDURE — 6370000000 HC RX 637 (ALT 250 FOR IP): Performed by: INTERNAL MEDICINE

## 2022-12-29 PROCEDURE — 6360000002 HC RX W HCPCS: Performed by: STUDENT IN AN ORGANIZED HEALTH CARE EDUCATION/TRAINING PROGRAM

## 2022-12-29 PROCEDURE — 97116 GAIT TRAINING THERAPY: CPT

## 2022-12-29 PROCEDURE — 80053 COMPREHEN METABOLIC PANEL: CPT

## 2022-12-29 PROCEDURE — 1200000000 HC SEMI PRIVATE

## 2022-12-29 PROCEDURE — 85652 RBC SED RATE AUTOMATED: CPT

## 2022-12-29 PROCEDURE — 94761 N-INVAS EAR/PLS OXIMETRY MLT: CPT

## 2022-12-29 RX ADMIN — GABAPENTIN 600 MG: 300 CAPSULE ORAL at 09:12

## 2022-12-29 RX ADMIN — SODIUM CHLORIDE 25 ML: 9 INJECTION, SOLUTION INTRAVENOUS at 03:22

## 2022-12-29 RX ADMIN — OXYCODONE AND ACETAMINOPHEN 2 TABLET: 5; 325 TABLET ORAL at 01:40

## 2022-12-29 RX ADMIN — OXYCODONE AND ACETAMINOPHEN 2 TABLET: 5; 325 TABLET ORAL at 13:03

## 2022-12-29 RX ADMIN — CEFAZOLIN 2000 MG: 2 INJECTION, POWDER, FOR SOLUTION INTRAMUSCULAR; INTRAVENOUS at 09:15

## 2022-12-29 RX ADMIN — HYDROMORPHONE HYDROCHLORIDE 0.5 MG: 1 INJECTION, SOLUTION INTRAMUSCULAR; INTRAVENOUS; SUBCUTANEOUS at 09:10

## 2022-12-29 RX ADMIN — ASPIRIN 81 MG CHEWABLE TABLET 81 MG: 81 TABLET CHEWABLE at 09:12

## 2022-12-29 RX ADMIN — SENNOSIDES 8.6 MG: 8.6 TABLET, FILM COATED ORAL at 20:31

## 2022-12-29 RX ADMIN — HYDROMORPHONE HYDROCHLORIDE 0.5 MG: 1 INJECTION, SOLUTION INTRAMUSCULAR; INTRAVENOUS; SUBCUTANEOUS at 14:14

## 2022-12-29 RX ADMIN — OXYCODONE AND ACETAMINOPHEN 2 TABLET: 5; 325 TABLET ORAL at 06:20

## 2022-12-29 RX ADMIN — INSULIN GLARGINE 10 UNITS: 100 INJECTION, SOLUTION SUBCUTANEOUS at 20:23

## 2022-12-29 RX ADMIN — ENOXAPARIN SODIUM 40 MG: 100 INJECTION SUBCUTANEOUS at 09:10

## 2022-12-29 RX ADMIN — INSULIN LISPRO 4 UNITS: 100 INJECTION, SOLUTION INTRAVENOUS; SUBCUTANEOUS at 09:23

## 2022-12-29 RX ADMIN — CEFAZOLIN 2000 MG: 2 INJECTION, POWDER, FOR SOLUTION INTRAMUSCULAR; INTRAVENOUS at 03:23

## 2022-12-29 RX ADMIN — HYDROMORPHONE HYDROCHLORIDE 0.5 MG: 1 INJECTION, SOLUTION INTRAMUSCULAR; INTRAVENOUS; SUBCUTANEOUS at 03:16

## 2022-12-29 RX ADMIN — GABAPENTIN 600 MG: 300 CAPSULE ORAL at 20:31

## 2022-12-29 RX ADMIN — SODIUM CHLORIDE, PRESERVATIVE FREE 10 ML: 5 INJECTION INTRAVENOUS at 09:17

## 2022-12-29 RX ADMIN — HYDROMORPHONE HYDROCHLORIDE 0.5 MG: 1 INJECTION, SOLUTION INTRAMUSCULAR; INTRAVENOUS; SUBCUTANEOUS at 17:59

## 2022-12-29 RX ADMIN — INSULIN LISPRO 4 UNITS: 100 INJECTION, SOLUTION INTRAVENOUS; SUBCUTANEOUS at 17:15

## 2022-12-29 RX ADMIN — GABAPENTIN 600 MG: 300 CAPSULE ORAL at 14:15

## 2022-12-29 RX ADMIN — OXYCODONE AND ACETAMINOPHEN 2 TABLET: 5; 325 TABLET ORAL at 20:32

## 2022-12-29 RX ADMIN — CEFAZOLIN 2000 MG: 2 INJECTION, POWDER, FOR SOLUTION INTRAMUSCULAR; INTRAVENOUS at 17:23

## 2022-12-29 RX ADMIN — OXYCODONE AND ACETAMINOPHEN 2 TABLET: 5; 325 TABLET ORAL at 16:34

## 2022-12-29 RX ADMIN — INSULIN LISPRO 4 UNITS: 100 INJECTION, SOLUTION INTRAVENOUS; SUBCUTANEOUS at 17:14

## 2022-12-29 RX ADMIN — SODIUM CHLORIDE, PRESERVATIVE FREE 10 ML: 5 INJECTION INTRAVENOUS at 20:52

## 2022-12-29 RX ADMIN — ATORVASTATIN CALCIUM 40 MG: 40 TABLET, FILM COATED ORAL at 20:31

## 2022-12-29 ASSESSMENT — ENCOUNTER SYMPTOMS
COLOR CHANGE: 1
SINUS PAIN: 0
SHORTNESS OF BREATH: 0
CONSTIPATION: 0
TROUBLE SWALLOWING: 0
SINUS PRESSURE: 0
SORE THROAT: 0
BACK PAIN: 0
CHEST TIGHTNESS: 0
DIARRHEA: 0
NAUSEA: 0
WHEEZING: 0
ABDOMINAL PAIN: 0
VOMITING: 0
VOICE CHANGE: 0
COUGH: 0

## 2022-12-29 ASSESSMENT — PAIN DESCRIPTION - LOCATION
LOCATION: LEG
LOCATION: KNEE
LOCATION: LEG

## 2022-12-29 ASSESSMENT — PAIN DESCRIPTION - ONSET
ONSET: ON-GOING
ONSET: ON-GOING

## 2022-12-29 ASSESSMENT — PAIN DESCRIPTION - DESCRIPTORS
DESCRIPTORS: ACHING;SHARP
DESCRIPTORS: ACHING
DESCRIPTORS: ACHING
DESCRIPTORS: ACHING;SHARP
DESCRIPTORS: ACHING;SHARP;SHOOTING
DESCRIPTORS: ACHING

## 2022-12-29 ASSESSMENT — PAIN SCALES - GENERAL
PAINLEVEL_OUTOF10: 8
PAINLEVEL_OUTOF10: 9
PAINLEVEL_OUTOF10: 8

## 2022-12-29 ASSESSMENT — PAIN DESCRIPTION - PAIN TYPE
TYPE: SURGICAL PAIN
TYPE: SURGICAL PAIN

## 2022-12-29 ASSESSMENT — PAIN SCALES - WONG BAKER
WONGBAKER_NUMERICALRESPONSE: 0
WONGBAKER_NUMERICALRESPONSE: 0

## 2022-12-29 ASSESSMENT — PAIN DESCRIPTION - ORIENTATION
ORIENTATION: RIGHT

## 2022-12-29 ASSESSMENT — PAIN DESCRIPTION - FREQUENCY
FREQUENCY: CONTINUOUS
FREQUENCY: CONTINUOUS

## 2022-12-29 NOTE — PROGRESS NOTES
V2.0  [facility] Hospitalist Progress Note      Name:  Ivan Flores /Age/Sex: 1960  (60 y.o. male)   MRN & CSN:  [id number] & [id number] Encounter Date/Time: 2022 2:16 PM EST    Location:  [id number]-X PCP: Gris Solis MD       Hospital Day: 12    Assessment and Plan:   Ivan Flores is a 60 y.o. male with pmh of coronary artery disease s/p CABG, PAD, Diabetes mellitus type 2, COPD, not on home oxygen, chronic hepatitis C, and diabetic foot ulcer/necrotizing fasciitis s/p right BKA (2022) who presents with Osteomyelitis ([address]      Plan:    Osteomyelitis, and necrotic/gangrenous wound of right stump. Had a fall and dehiscence P/W worsening wound, looks infected with necrotic/gangrenous margins(pic uploaded to media). Pus pockets. General surgery consulted  surgical intervention on 22 with wound vac placement  MRI consistent with osteomyelitis  ID consulted: To continue IV Ancef for 6 weeks. Follow-up with ID as an outpatient. Pain control: Percocet panel and IV Dilaudid for breakthrough  CRP obtained 2022, trending down. CBC WNL     Type 2 diabetes on long-term insulin  Increase Lantus 10 units nightly  Lispro 4 units s with meals  Low-dose sliding scale  Plan of care glucose checks  Hypoglycemia protocol    Diabetic neuropathy  Continue gabapentin     Peripheral arterial disease  CTA of right lower extremity in 10/2021 showed complete occlusion of the right common femoral artery, opacifying anterior and posterior tibial arteries. Had angioplasty 2021  Continue aspirin and atorvastatin     Coronary artery disease s/p CABG- 2013  Continue aspirin, atorvastatin     Isolated elevated Akh phos   ? Due to antibiotics   Patient asymptomatic  We will monitor     Diet ADULT DIET;  Regular; 5 carb choices (75 gm/meal)  ADULT ORAL NUTRITION SUPPLEMENT; Lunch, Dinner, Breakfast; Diabetic Oral Supplement   DVT Prophylaxis [x] Lovenox, []  Heparin, [] SCDs, [] Ambulation,    [] Eliquis, [] Xarelto  [] Coumadin [] other   Code Status Full Code   Disposition From: home  Expected Disposition: home  Estimated Date of Discharge: 1-3 days  Patient requires continued admission due IV antibiotics and pain control for osteomyelitis. Now pending pre-CERT for SNF placement. Surrogate Decision Maker/ POA      Subjective:     Chief Complaint: No chief complaint on file. Seen and examined in the morning. Patient has no complaints. He has been medically ready and is waiting SNF placement. Review of Systems:    Review of Systems   Constitutional:  Negative for activity change, appetite change, chills, fatigue and fever. HENT:  Negative for congestion, hearing loss, sinus pressure, sinus pain, sore throat, trouble swallowing and voice change. Eyes:  Negative for visual disturbance. Respiratory:  Negative for cough, chest tightness, shortness of breath and wheezing. Cardiovascular:  Negative for chest pain, palpitations and leg swelling. Gastrointestinal:  Negative for abdominal pain, constipation, diarrhea, nausea and vomiting. Endocrine: Negative for cold intolerance, heat intolerance and polyuria. Genitourinary:  Negative for dysuria. Musculoskeletal:  Positive for arthralgias and myalgias. Negative for back pain and gait problem. Skin:  Positive for color change and wound. Neurological:  Negative for dizziness, weakness and headaches. Psychiatric/Behavioral:  Negative for agitation and confusion. The patient is not nervous/anxious. Objective: Intake/Output Summary (Last 24 hours) at 12/29/2022 1540  Last data filed at 12/29/2022 0316  Gross per 24 hour   Intake --   Output 800 ml   Net -800 ml          Vitals:   Vitals:    12/29/22 1414   BP:    Pulse:    Resp: 18   Temp:    SpO2:        Physical Exam:     Physical Exam  Constitutional:       General: He is not in acute distress. Appearance: Normal appearance. HENT:      Head: Normocephalic and atraumatic. Nose: Nose normal.      Mouth/Throat:      Mouth: Mucous membranes are moist.      Pharynx: Oropharynx is clear. Eyes:      Extraocular Movements: Extraocular movements intact. Conjunctiva/sclera: Conjunctivae normal.      Pupils: Pupils are equal, round, and reactive to light. Cardiovascular:      Rate and Rhythm: Normal rate and regular rhythm. Pulses: Normal pulses. Heart sounds: Normal heart sounds. Pulmonary:      Effort: Pulmonary effort is normal.   Abdominal:      General: Abdomen is flat. Bowel sounds are normal.      Palpations: Abdomen is soft. Musculoskeletal:         General: Normal range of motion. Cervical back: Normal range of motion and neck supple. Skin:     General: Skin is warm and dry. Findings: Erythema, lesion and rash present. Neurological:      General: No focal deficit present. Mental Status: He is alert and oriented to person, place, and time. Mental status is at baseline. Psychiatric:         Mood and Affect: Mood normal.         Behavior: Behavior normal.         Thought Content:  Thought content normal.       Medications:   Medications:    lidocaine PF  5 mL IntraDERmal Once    sodium chloride flush  5-40 mL IntraVENous 2 times per day    insulin glargine  10 Units SubCUTAneous Nightly    ceFAZolin  2,000 mg IntraVENous 3 times per day    senna  1 tablet Oral Nightly    insulin lispro  4 Units SubCUTAneous TID WC    insulin lispro  0-4 Units SubCUTAneous TID WC    insulin lispro  0-4 Units SubCUTAneous Nightly    sodium chloride flush  5-40 mL IntraVENous 2 times per day    enoxaparin  40 mg SubCUTAneous Daily    nicotine  1 patch TransDERmal Daily    aspirin  81 mg Oral Daily    gabapentin  600 mg Oral TID    atorvastatin  40 mg Oral Nightly      Infusions:    sodium chloride      sodium chloride Stopped (12/29/22 0431)    dextrose       PRN Meds: sodium chloride flush, 5-40 mL, PRN  sodium chloride, , PRN  oxyCODONE-acetaminophen, 1 tablet, Q4H PRN   Or  oxyCODONE-acetaminophen, 2 tablet, Q4H PRN  HYDROmorphone, 0.5 mg, Q4H PRN  sodium chloride flush, 5-40 mL, PRN  sodium chloride, , PRN  ondansetron, 4 mg, Q8H PRN   Or  ondansetron, 4 mg, Q6H PRN  polyethylene glycol, 17 g, Daily PRN  acetaminophen, 650 mg, Q6H PRN   Or  acetaminophen, 650 mg, Q6H PRN  glucose, 4 tablet, PRN  dextrose bolus, 125 mL, PRN   Or  dextrose bolus, 250 mL, PRN  glucagon (rDNA), 1 mg, PRN  dextrose, , Continuous PRN  hydrALAZINE, 10 mg, Q6H PRN      Labs      Recent Results (from the past 24 hour(s))   POCT Glucose    Collection Time: 12/28/22  5:10 PM   Result Value Ref Range    POC Glucose 271 (H) 70 - 99 MG/DL   POCT Glucose    Collection Time: 12/28/22  8:13 PM   Result Value Ref Range    POC Glucose 248 (H) 70 - 99 MG/DL   CBC with Auto Differential    Collection Time: 12/29/22  3:09 AM   Result Value Ref Range    WBC 4.0 4.0 - 10.5 K/CU MM    RBC 2.96 (L) 4.6 - 6.2 M/CU MM    Hemoglobin 8.6 (L) 13.5 - 18.0 GM/DL    Hematocrit 27.0 (L) 42 - 52 %    MCV 91.2 78 - 100 FL    MCH 29.1 27 - 31 PG    MCHC 31.9 (L) 32.0 - 36.0 %    RDW 15.4 (H) 11.7 - 14.9 %    Platelets 844 (L) 183 - 440 K/CU MM    MPV 11.0 7.5 - 11.1 FL    Differential Type AUTOMATED DIFFERENTIAL     Segs Relative 62.1 36 - 66 %    Lymphocytes % 22.7 (L) 24 - 44 %    Monocytes % 10.1 (H) 0 - 4 %    Eosinophils % 2.8 0 - 3 %    Basophils % 1.0 0 - 1 %    Segs Absolute 2.5 K/CU MM    Lymphocytes Absolute 0.9 K/CU MM    Monocytes Absolute 0.4 K/CU MM    Eosinophils Absolute 0.1 K/CU MM    Basophils Absolute 0.0 K/CU MM    Nucleated RBC % 0.0 %    Total Nucleated RBC 0.0 K/CU MM    Total Immature Neutrophil 0.05 K/CU MM    Immature Neutrophil % 1.3 (H) 0 - 0.43 %   Comprehensive Metabolic Panel w/ Reflex to MG    Collection Time: 12/29/22  3:09 AM   Result Value Ref Range    Sodium 139 135 - 145 MMOL/L    Potassium 4.7 3.5 - 5.1 MMOL/L    Chloride 103 99 - 110 mMol/L    CO2 26 21 - 32 MMOL/L    BUN 29 (H) 6 - 23 MG/DL    Creatinine 0.9 0.9 - 1.3 MG/DL    Est, Glom Filt Rate >60 >60 mL/min/1.73m2    Glucose 166 (H) 70 - 99 MG/DL    Calcium 8.1 (L) 8.3 - 10.6 MG/DL    Albumin 2.5 (L) 3.4 - 5.0 GM/DL    Total Protein 6.6 6.4 - 8.2 GM/DL    Total Bilirubin 0.2 0.0 - 1.0 MG/DL    ALT 13 10 - 40 U/L    AST 78 (H) 15 - 37 IU/L    Alkaline Phosphatase 236 (H) 40 - 128 IU/L    Anion Gap 10 4 - 16   Sedimentation Rate    Collection Time: 12/29/22  3:09 AM   Result Value Ref Range    Sed Rate 52 (H) 0 - 20 MM/HR   High sensitivity CRP    Collection Time: 12/29/22  3:09 AM   Result Value Ref Range    CRP, High Sensitivity 5.8 (H) 0.0 - 5.0 mg/L   POCT Glucose    Collection Time: 12/29/22  7:32 AM   Result Value Ref Range    POC Glucose 166 (H) 70 - 99 MG/DL   POCT Glucose    Collection Time: 12/29/22 11:51 AM   Result Value Ref Range    POC Glucose 130 (H) 70 - 99 MG/DL        Imaging/Diagnostics Last 24 Hours   No results found. Comment: Please note this report has been produced using speech recognition software and may contain errors related to that system including errors in grammar, punctuation, and spelling, as well as words and phrases that may be inappropriate. If there are any questions or concerns please feel free to contact the dictating provider for clarification.      Electronically signed by Radha Granda MD on 12/29/2022 at 3:40 PM

## 2022-12-29 NOTE — PROGRESS NOTES
Comprehensive Nutrition Assessment    Type and Reason for Visit:  Reassess    Nutrition Recommendations/Plan:   Discontinue Wound healing oral nutrition supplements, provide diabetic oral nutrition supplement TID   Encourage adequate protein intake and hydration for wound healing  Record and monitor all PO intakes daily      Malnutrition Assessment:  Malnutrition Status: At risk for malnutrition (Comment) (12/23/22 1503)    Context:  Acute Illness       Nutrition Assessment:    Pt remains on carb controlled diet with oral supplementation for wound healing, s/p NPWT. Last meals recorded of %. Remains moderate nutrition risk. Nutrition Related Findings:    A1c 8.1, Glu 130, Alk Phos 236; Having BMs and some UOP Wound Type: Surgical Incision, Wound Vac       Current Nutrition Intake & Therapies:    Average Meal Intake: %  Average Supplements Intake: Unable to assess  ADULT DIET; Regular; 5 carb choices (75 gm/meal)  ADULT ORAL NUTRITION SUPPLEMENT; Lunch, Dinner; Diabetic Oral Supplement  ADULT ORAL NUTRITION SUPPLEMENT; Breakfast, Dinner; Wound Healing Oral Supplement    Anthropometric Measures:  Height:  (unableto get wt.scale broken)  Ideal Body Weight (IBW): 148 lbs (67 kg)    Admission Body Weight: 145 lb (65.8 kg) (?)  Current Body Weight: 145 lb (65.8 kg), 98 % IBW. Weight Source: Bed Scale  Current BMI (kg/m2): 22.7  Usual Body Weight: 145 lb 1 oz (65.8 kg) (9/6/22)  % Weight Change (Calculated): 0  Weight Adjustment For: Amputation  Total Adjusted Percentage (Calculated): 5.9  Adjusted Ideal Body Weight (lbs) (Calculated): 139.3 lbs  Adjusted Ideal Body Weight (kg) (Calculated): 63.32 kg  Adjusted % Ideal Body Weight (Calculated): 104.1  Adjusted BMI (kg/m2) (Calculated): 24  BMI Categories: Normal Weight (BMI 18.5-24. 9)    Estimated Daily Nutrient Needs:  Energy Requirements Based On: Kcal/kg  Weight Used for Energy Requirements: Current  Energy (kcal/day): 9824-0195 (30-35 kcal/kg)  Weight Used for Protein Requirements: Ideal  Protein (g/day):  (1.3-1.6 g/kg)  Method Used for Fluid Requirements: 1 ml/kcal  Fluid (ml/day): 8669-0399    Nutrition Diagnosis:   Inadequate protein-energy intake related to increase demand for energy/nutrients as evidenced by wounds    Nutrition Interventions:   Food and/or Nutrient Delivery: Continue Current Diet, Modify Oral Nutrition Supplement (Wound healing ONS out of stock temporarily)  Nutrition Education/Counseling: No recommendation at this time  Coordination of Nutrition Care: Continue to monitor while inpatient       Goals:  Previous Goal Met: Progressing toward Goal(s)  Goals: Meet at least 75% of estimated needs, by next RD assessment       Nutrition Monitoring and Evaluation:   Behavioral-Environmental Outcomes: None Identified  Food/Nutrient Intake Outcomes: Food and Nutrient Intake, Supplement Intake  Physical Signs/Symptoms Outcomes: Biochemical Data, Meal Time Behavior, Nutrition Focused Physical Findings, Skin, Weight    Discharge Planning:    Continue Oral Nutrition Supplement, Continue current diet     Georgina Caballero RD, LD  Contact: [phone]

## 2022-12-29 NOTE — CARE COORDINATION
Esdras Koroma at [place], precert remains pending. I received a return message from Care Management at [employer] stating no precert had been submitted however I was able to verify that it has been started and in pending status, therefore I sent another message back to [employer] requesting assistance with expediting SNF auth:     From: Leopoldo Robert  Date: 12/29/2022 01:29:14 CST  User ID: [email or web address]  Tax ID: [id number]  ------------------------------------------------  Your Message: The precert is listed as pending in the authorizations section; Auth Status: Jairon Smith [id number]  Admit Date: 12/28/2022  Provider of Service(s): [address] [facility]  Diagnosis Code(s): X72.43    So if you can assist in expediting the SNF auth that would be greatly appreciated. Thank you,  Jose Richardson          ------------------------------------------------  From: Eduardo Baeza by Carmelo Beaver - [place]  Date: 12/29/2022 07:37:26 CST  ------------------------------------------------  Your Message: Shyanne Rivas,  Thank you for contacting [facility]. It will be my pleasure to assist you with your inquiry. Authorization [id number] was approved for your facility for dates of service 12/18/22 to 1/3/23. There is no authorization request on file from [facility] and [address] [place] [address]. Thank you for contacting [facility]. If you need further assistance, please feel free to contact us via the web portal or call Provider Services for Medicaid, Behavioral Health and [facility] at [phone] or [facility] at [phone]. Thank you for being a valued [facility] provider.   [employer] (mp)    Reference #: [id number]

## 2022-12-30 LAB
GLUCOSE BLD-MCNC: 165 MG/DL (ref 70–99)
GLUCOSE BLD-MCNC: 191 MG/DL (ref 70–99)
GLUCOSE BLD-MCNC: 246 MG/DL (ref 70–99)
GLUCOSE BLD-MCNC: 336 MG/DL (ref 70–99)

## 2022-12-30 PROCEDURE — 97605 NEG PRS WND THER DME<=50SQCM: CPT

## 2022-12-30 PROCEDURE — 6370000000 HC RX 637 (ALT 250 FOR IP): Performed by: STUDENT IN AN ORGANIZED HEALTH CARE EDUCATION/TRAINING PROGRAM

## 2022-12-30 PROCEDURE — 1200000000 HC SEMI PRIVATE

## 2022-12-30 PROCEDURE — 2580000003 HC RX 258: Performed by: INTERNAL MEDICINE

## 2022-12-30 PROCEDURE — 94761 N-INVAS EAR/PLS OXIMETRY MLT: CPT

## 2022-12-30 PROCEDURE — 6360000002 HC RX W HCPCS: Performed by: INTERNAL MEDICINE

## 2022-12-30 PROCEDURE — 6370000000 HC RX 637 (ALT 250 FOR IP): Performed by: SURGERY

## 2022-12-30 PROCEDURE — 6360000002 HC RX W HCPCS: Performed by: STUDENT IN AN ORGANIZED HEALTH CARE EDUCATION/TRAINING PROGRAM

## 2022-12-30 PROCEDURE — 82962 GLUCOSE BLOOD TEST: CPT

## 2022-12-30 PROCEDURE — 6370000000 HC RX 637 (ALT 250 FOR IP): Performed by: INTERNAL MEDICINE

## 2022-12-30 RX ADMIN — GABAPENTIN 600 MG: 300 CAPSULE ORAL at 22:50

## 2022-12-30 RX ADMIN — ENOXAPARIN SODIUM 40 MG: 100 INJECTION SUBCUTANEOUS at 09:44

## 2022-12-30 RX ADMIN — HYDROMORPHONE HYDROCHLORIDE 0.5 MG: 1 INJECTION, SOLUTION INTRAMUSCULAR; INTRAVENOUS; SUBCUTANEOUS at 19:27

## 2022-12-30 RX ADMIN — CEFAZOLIN 2000 MG: 2 INJECTION, POWDER, FOR SOLUTION INTRAMUSCULAR; INTRAVENOUS at 09:42

## 2022-12-30 RX ADMIN — CEFAZOLIN 2000 MG: 2 INJECTION, POWDER, FOR SOLUTION INTRAMUSCULAR; INTRAVENOUS at 18:54

## 2022-12-30 RX ADMIN — INSULIN LISPRO 1 UNITS: 100 INJECTION, SOLUTION INTRAVENOUS; SUBCUTANEOUS at 17:05

## 2022-12-30 RX ADMIN — INSULIN GLARGINE 10 UNITS: 100 INJECTION, SOLUTION SUBCUTANEOUS at 22:57

## 2022-12-30 RX ADMIN — OXYCODONE AND ACETAMINOPHEN 2 TABLET: 5; 325 TABLET ORAL at 01:55

## 2022-12-30 RX ADMIN — GABAPENTIN 600 MG: 300 CAPSULE ORAL at 09:44

## 2022-12-30 RX ADMIN — HYDROMORPHONE HYDROCHLORIDE 0.5 MG: 1 INJECTION, SOLUTION INTRAMUSCULAR; INTRAVENOUS; SUBCUTANEOUS at 03:58

## 2022-12-30 RX ADMIN — INSULIN LISPRO 4 UNITS: 100 INJECTION, SOLUTION INTRAVENOUS; SUBCUTANEOUS at 09:59

## 2022-12-30 RX ADMIN — INSULIN LISPRO 4 UNITS: 100 INJECTION, SOLUTION INTRAVENOUS; SUBCUTANEOUS at 17:05

## 2022-12-30 RX ADMIN — ASPIRIN 81 MG CHEWABLE TABLET 81 MG: 81 TABLET CHEWABLE at 09:44

## 2022-12-30 RX ADMIN — GABAPENTIN 600 MG: 300 CAPSULE ORAL at 13:48

## 2022-12-30 RX ADMIN — HYDROMORPHONE HYDROCHLORIDE 0.5 MG: 1 INJECTION, SOLUTION INTRAMUSCULAR; INTRAVENOUS; SUBCUTANEOUS at 09:58

## 2022-12-30 RX ADMIN — SODIUM CHLORIDE, PRESERVATIVE FREE 5 ML: 5 INJECTION INTRAVENOUS at 23:25

## 2022-12-30 RX ADMIN — SODIUM CHLORIDE, PRESERVATIVE FREE 10 ML: 5 INJECTION INTRAVENOUS at 09:45

## 2022-12-30 RX ADMIN — OXYCODONE AND ACETAMINOPHEN 2 TABLET: 5; 325 TABLET ORAL at 15:37

## 2022-12-30 RX ADMIN — OXYCODONE AND ACETAMINOPHEN 2 TABLET: 5; 325 TABLET ORAL at 07:02

## 2022-12-30 RX ADMIN — HYDROMORPHONE HYDROCHLORIDE 0.5 MG: 1 INJECTION, SOLUTION INTRAMUSCULAR; INTRAVENOUS; SUBCUTANEOUS at 13:50

## 2022-12-30 RX ADMIN — CEFAZOLIN 2000 MG: 2 INJECTION, POWDER, FOR SOLUTION INTRAMUSCULAR; INTRAVENOUS at 01:57

## 2022-12-30 RX ADMIN — SODIUM CHLORIDE, PRESERVATIVE FREE 5 ML: 5 INJECTION INTRAVENOUS at 23:24

## 2022-12-30 RX ADMIN — SENNOSIDES 8.6 MG: 8.6 TABLET, FILM COATED ORAL at 22:50

## 2022-12-30 RX ADMIN — ATORVASTATIN CALCIUM 40 MG: 40 TABLET, FILM COATED ORAL at 22:50

## 2022-12-30 RX ADMIN — OXYCODONE AND ACETAMINOPHEN 2 TABLET: 5; 325 TABLET ORAL at 22:38

## 2022-12-30 ASSESSMENT — PAIN DESCRIPTION - ORIENTATION
ORIENTATION: RIGHT
ORIENTATION: LEFT
ORIENTATION: RIGHT

## 2022-12-30 ASSESSMENT — PAIN DESCRIPTION - FREQUENCY
FREQUENCY: CONTINUOUS

## 2022-12-30 ASSESSMENT — PAIN DESCRIPTION - DESCRIPTORS
DESCRIPTORS: ACHING;DISCOMFORT
DESCRIPTORS: ACHING
DESCRIPTORS: THROBBING
DESCRIPTORS: BURNING;ACHING;DULL
DESCRIPTORS: ACHING;DISCOMFORT
DESCRIPTORS: BURNING
DESCRIPTORS: BURNING
DESCRIPTORS: ACHING;DISCOMFORT
DESCRIPTORS: THROBBING
DESCRIPTORS: ACHING;DISCOMFORT
DESCRIPTORS: ACHING
DESCRIPTORS: THROBBING
DESCRIPTORS: ACHING;DISCOMFORT

## 2022-12-30 ASSESSMENT — PAIN DESCRIPTION - PAIN TYPE
TYPE: SURGICAL PAIN

## 2022-12-30 ASSESSMENT — PAIN SCALES - GENERAL
PAINLEVEL_OUTOF10: 8
PAINLEVEL_OUTOF10: 0
PAINLEVEL_OUTOF10: 8
PAINLEVEL_OUTOF10: 8
PAINLEVEL_OUTOF10: 9
PAINLEVEL_OUTOF10: 8
PAINLEVEL_OUTOF10: 9
PAINLEVEL_OUTOF10: 8
PAINLEVEL_OUTOF10: 10
PAINLEVEL_OUTOF10: 8

## 2022-12-30 ASSESSMENT — PAIN SCALES - WONG BAKER
WONGBAKER_NUMERICALRESPONSE: 0

## 2022-12-30 ASSESSMENT — PAIN - FUNCTIONAL ASSESSMENT

## 2022-12-30 ASSESSMENT — PAIN DESCRIPTION - LOCATION
LOCATION: LEG

## 2022-12-30 ASSESSMENT — ENCOUNTER SYMPTOMS
COLOR CHANGE: 1
VOICE CHANGE: 0
ABDOMINAL PAIN: 0
COUGH: 0
TROUBLE SWALLOWING: 0
BACK PAIN: 0
WHEEZING: 0
CHEST TIGHTNESS: 0
SINUS PRESSURE: 0
NAUSEA: 0
CONSTIPATION: 0
VOMITING: 0
SORE THROAT: 0
SINUS PAIN: 0
SHORTNESS OF BREATH: 0
DIARRHEA: 0

## 2022-12-30 ASSESSMENT — PAIN DESCRIPTION - ONSET
ONSET: ON-GOING
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
## 2022-12-30 NOTE — PROGRESS NOTES
V2.0  [facility] [place]      Name:  Filippo Lyle /Age/Sex: 1960  (60 y.o. male)   MRN & CSN:  [id number] & [id number] Encounter Date/Time: 2022 2:16 PM EST    Location:  3167/1651-J PCP: Lico Meeks MD       Hospital Day: 13    Assessment and Plan:   Filippo Lyle is a 60 y.o. male with pmh of coronary artery disease s/p CABG, PAD, Diabetes mellitus type 2, COPD, not on home oxygen, chronic hepatitis C, and diabetic foot ulcer/necrotizing fasciitis s/p right BKA (2022) who presents with Osteomyelitis ([address]      Plan:    Osteomyelitis, and necrotic/gangrenous wound of right stump. Had a fall and dehiscence P/W worsening wound, looks infected with necrotic/gangrenous margins(pic uploaded to media). Pus pockets. General surgery consulted  surgical intervention on 22 with wound vac placement  MRI consistent with osteomyelitis  ID consulted: To continue IV Ancef for 6 weeks. Follow-up with ID as an outpatient. Pain control: Percocet panel and IV Dilaudid for breakthrough  CRP obtained 2022, trending down. CBC WNL     Type 2 diabetes on long-term insulin  Increase Lantus 10 units nightly  Lispro 4 units s with meals  Low-dose sliding scale  Plan of care glucose checks  Hypoglycemia protocol    Diabetic neuropathy  Continue gabapentin     Peripheral arterial disease  CTA of right lower extremity in 10/2021 showed complete occlusion of the right common femoral artery, opacifying anterior and posterior tibial arteries. Had angioplasty 2021  Continue aspirin and atorvastatin     Coronary artery disease s/p CABG- 2013  Continue aspirin, atorvastatin     Isolated elevated Alk phos   ? Due to antibiotics   Patient asymptomatic  We will monitor     Diet ADULT DIET;  Regular; 5 carb choices (75 gm/meal)  ADULT ORAL NUTRITION SUPPLEMENT; Lunch, Dinner, Breakfast; Diabetic Oral Supplement   DVT Prophylaxis [x] Lovenox, []  Heparin, [] SCDs, [] Ambulation,    [] Eliquis, [] Xarelto  [] Coumadin [] other   Code Status Full Code   Disposition From: home  Expected Disposition: home  Estimated Date of Discharge: 1-3 days  Patient requires continued admission due IV antibiotics and pain control for osteomyelitis. Now pending pre-CERT for SNF placement. Surrogate Decision Maker/ POA      Subjective:     Chief Complaint: No chief complaint on file. Seen and examined in the morning. Patient has no complaints. He has been medically ready and is waiting SNF placement. Review of Systems:    Review of Systems   Constitutional:  Negative for activity change, appetite change, chills, fatigue and fever. HENT:  Negative for congestion, hearing loss, sinus pressure, sinus pain, sore throat, trouble swallowing and voice change. Eyes:  Negative for visual disturbance. Respiratory:  Negative for cough, chest tightness, shortness of breath and wheezing. Cardiovascular:  Negative for chest pain, palpitations and leg swelling. Gastrointestinal:  Negative for abdominal pain, constipation, diarrhea, nausea and vomiting. Endocrine: Negative for cold intolerance, heat intolerance and polyuria. Genitourinary:  Negative for dysuria. Musculoskeletal:  Positive for arthralgias and myalgias. Negative for back pain and gait problem. Skin:  Positive for color change and wound. Neurological:  Negative for dizziness, weakness and headaches. Psychiatric/Behavioral:  Negative for agitation and confusion. The patient is not nervous/anxious. Objective: Intake/Output Summary (Last 24 hours) at 12/30/2022 1127  Last data filed at 12/30/2022 0541  Gross per 24 hour   Intake 100 ml   Output 650 ml   Net -550 ml          Vitals:   Vitals:    12/30/22 0958   BP:    Pulse:    Resp: 16   Temp:    SpO2:        Physical Exam:     Physical Exam  Constitutional:       General: He is not in acute distress. Appearance: Normal appearance.    HENT:      Head: Normocephalic and atraumatic. Nose: Nose normal.      Mouth/Throat:      Mouth: Mucous membranes are moist.      Pharynx: Oropharynx is clear. Eyes:      Extraocular Movements: Extraocular movements intact. Conjunctiva/sclera: Conjunctivae normal.      Pupils: Pupils are equal, round, and reactive to light. Cardiovascular:      Rate and Rhythm: Normal rate and regular rhythm. Pulses: Normal pulses. Heart sounds: Normal heart sounds. Pulmonary:      Effort: Pulmonary effort is normal.   Abdominal:      General: Abdomen is flat. Bowel sounds are normal.      Palpations: Abdomen is soft. Musculoskeletal:         General: Normal range of motion. Cervical back: Normal range of motion and neck supple. Skin:     General: Skin is warm and dry. Findings: Erythema, lesion and rash present. Neurological:      General: No focal deficit present. Mental Status: He is alert and oriented to person, place, and time. Mental status is at baseline. Psychiatric:         Mood and Affect: Mood normal.         Behavior: Behavior normal.         Thought Content:  Thought content normal.       Medications:   Medications:    lidocaine PF  5 mL IntraDERmal Once    sodium chloride flush  5-40 mL IntraVENous 2 times per day    insulin glargine  10 Units SubCUTAneous Nightly    ceFAZolin  2,000 mg IntraVENous 3 times per day    senna  1 tablet Oral Nightly    insulin lispro  4 Units SubCUTAneous TID WC    insulin lispro  0-4 Units SubCUTAneous TID WC    insulin lispro  0-4 Units SubCUTAneous Nightly    sodium chloride flush  5-40 mL IntraVENous 2 times per day    enoxaparin  40 mg SubCUTAneous Daily    nicotine  1 patch TransDERmal Daily    aspirin  81 mg Oral Daily    gabapentin  600 mg Oral TID    atorvastatin  40 mg Oral Nightly      Infusions:    sodium chloride      sodium chloride Stopped (12/30/22 0245)    dextrose       PRN Meds: sodium chloride flush, 5-40 mL, PRN  sodium chloride, , PRN  oxyCODONE-acetaminophen, 1 tablet, Q4H PRN   Or  oxyCODONE-acetaminophen, 2 tablet, Q4H PRN  HYDROmorphone, 0.5 mg, Q4H PRN  sodium chloride flush, 5-40 mL, PRN  sodium chloride, , PRN  ondansetron, 4 mg, Q8H PRN   Or  ondansetron, 4 mg, Q6H PRN  polyethylene glycol, 17 g, Daily PRN  acetaminophen, 650 mg, Q6H PRN   Or  acetaminophen, 650 mg, Q6H PRN  glucose, 4 tablet, PRN  dextrose bolus, 125 mL, PRN   Or  dextrose bolus, 250 mL, PRN  glucagon (rDNA), 1 mg, PRN  dextrose, , Continuous PRN  hydrALAZINE, 10 mg, Q6H PRN      Labs      Recent Results (from the past 24 hour(s))   POCT Glucose    Collection Time: 12/29/22 11:51 AM   Result Value Ref Range    POC Glucose 130 (H) 70 - 99 MG/DL   POCT Glucose    Collection Time: 12/29/22  4:35 PM   Result Value Ref Range    POC Glucose 353 (H) 70 - 99 MG/DL   POCT Glucose    Collection Time: 12/29/22  8:09 PM   Result Value Ref Range    POC Glucose 142 (H) 70 - 99 MG/DL   POCT Glucose    Collection Time: 12/30/22  7:49 AM   Result Value Ref Range    POC Glucose 191 (H) 70 - 99 MG/DL        Imaging/Diagnostics Last 24 Hours   No results found. Comment: Please note this report has been produced using speech recognition software and may contain errors related to that system including errors in grammar, punctuation, and spelling, as well as words and phrases that may be inappropriate. If there are any questions or concerns please feel free to contact the dictating provider for clarification.      Electronically signed by Miguel A Henley MD on 12/30/2022 at 11:27 AM

## 2022-12-30 NOTE — PROGRESS NOTES
----- Message from Cheryl Lejeune sent at 7/1/2022 11:08 AM CDT -----  Regarding: RE: pv jessica 7/8 0920  Pt informed of OV and check in protocol.  He verbalized understanding.  ----- Message -----  From: Brenda Bartholomew LPN  Sent: 7/1/2022  10:47 AM CDT  To: Gin Lejeune  Subject: pv jessica 7/8 0920                                Are there any outstanding tasks in chart? No    Is there any documentation of tasks? No    Has the pt seen another physician, been to ER, C, or admitted to hospital since last visit?    Has the pt done blood work or imaging since last visit?         Occupational Therapy      Attempted at 1123 hrs c nurse reporting pt had wound vac dressing change at site of BKA. Pt lying supine in bed,  reports did receive pain meds within the past hour, describing pain level as \"unglued\" and requesting no therapeutic intervention at this time. Will re-attempt per OT POC as schedule allows.     Electronically signed by:    TARIQ Santoyo  12/30/2022, 12:09 PM

## 2022-12-30 NOTE — CARE COORDINATION
Per online system with [employer] by [employer] Moreno remains pending and I received the following response to my email requesting assistance with expediting the request:    Ryan Scripture [id number] was received on 12/28/22 and entered into the system on 12/29/22 for review. Please allow time for the review to be completed. This is a one time exception for answering a message that is submitted under tax ID [id number] and the authorization requesting review is under a different tax ID number. [facility] the message and the authorization has to be under the same tax ID number.

## 2022-12-31 LAB
GLUCOSE BLD-MCNC: 147 MG/DL (ref 70–99)
GLUCOSE BLD-MCNC: 176 MG/DL (ref 70–99)
GLUCOSE BLD-MCNC: 178 MG/DL (ref 70–99)
GLUCOSE BLD-MCNC: 217 MG/DL (ref 70–99)
GLUCOSE BLD-MCNC: 218 MG/DL (ref 70–99)

## 2022-12-31 PROCEDURE — 6360000002 HC RX W HCPCS: Performed by: INTERNAL MEDICINE

## 2022-12-31 PROCEDURE — 2580000003 HC RX 258: Performed by: INTERNAL MEDICINE

## 2022-12-31 PROCEDURE — 6370000000 HC RX 637 (ALT 250 FOR IP): Performed by: INTERNAL MEDICINE

## 2022-12-31 PROCEDURE — 82962 GLUCOSE BLOOD TEST: CPT

## 2022-12-31 PROCEDURE — 97530 THERAPEUTIC ACTIVITIES: CPT

## 2022-12-31 PROCEDURE — 6370000000 HC RX 637 (ALT 250 FOR IP): Performed by: SURGERY

## 2022-12-31 PROCEDURE — 6360000002 HC RX W HCPCS: Performed by: STUDENT IN AN ORGANIZED HEALTH CARE EDUCATION/TRAINING PROGRAM

## 2022-12-31 PROCEDURE — 94761 N-INVAS EAR/PLS OXIMETRY MLT: CPT

## 2022-12-31 PROCEDURE — 6370000000 HC RX 637 (ALT 250 FOR IP): Performed by: STUDENT IN AN ORGANIZED HEALTH CARE EDUCATION/TRAINING PROGRAM

## 2022-12-31 PROCEDURE — 1200000000 HC SEMI PRIVATE

## 2022-12-31 RX ADMIN — ATORVASTATIN CALCIUM 40 MG: 40 TABLET, FILM COATED ORAL at 22:14

## 2022-12-31 RX ADMIN — OXYCODONE AND ACETAMINOPHEN 2 TABLET: 5; 325 TABLET ORAL at 22:14

## 2022-12-31 RX ADMIN — GABAPENTIN 600 MG: 300 CAPSULE ORAL at 12:20

## 2022-12-31 RX ADMIN — OXYCODONE AND ACETAMINOPHEN 2 TABLET: 5; 325 TABLET ORAL at 04:02

## 2022-12-31 RX ADMIN — GABAPENTIN 600 MG: 300 CAPSULE ORAL at 22:14

## 2022-12-31 RX ADMIN — SENNOSIDES 8.6 MG: 8.6 TABLET, FILM COATED ORAL at 22:14

## 2022-12-31 RX ADMIN — OXYCODONE AND ACETAMINOPHEN 2 TABLET: 5; 325 TABLET ORAL at 17:12

## 2022-12-31 RX ADMIN — ENOXAPARIN SODIUM 40 MG: 100 INJECTION SUBCUTANEOUS at 09:04

## 2022-12-31 RX ADMIN — INSULIN LISPRO 4 UNITS: 100 INJECTION, SOLUTION INTRAVENOUS; SUBCUTANEOUS at 17:55

## 2022-12-31 RX ADMIN — SODIUM CHLORIDE, PRESERVATIVE FREE 10 ML: 5 INJECTION INTRAVENOUS at 09:09

## 2022-12-31 RX ADMIN — INSULIN LISPRO 4 UNITS: 100 INJECTION, SOLUTION INTRAVENOUS; SUBCUTANEOUS at 09:09

## 2022-12-31 RX ADMIN — CEFAZOLIN 2000 MG: 2 INJECTION, POWDER, FOR SOLUTION INTRAMUSCULAR; INTRAVENOUS at 17:06

## 2022-12-31 RX ADMIN — INSULIN LISPRO 1 UNITS: 100 INJECTION, SOLUTION INTRAVENOUS; SUBCUTANEOUS at 17:54

## 2022-12-31 RX ADMIN — HYDROMORPHONE HYDROCHLORIDE 0.5 MG: 1 INJECTION, SOLUTION INTRAMUSCULAR; INTRAVENOUS; SUBCUTANEOUS at 06:16

## 2022-12-31 RX ADMIN — CEFAZOLIN 2000 MG: 2 INJECTION, POWDER, FOR SOLUTION INTRAMUSCULAR; INTRAVENOUS at 03:22

## 2022-12-31 RX ADMIN — ASPIRIN 81 MG CHEWABLE TABLET 81 MG: 81 TABLET CHEWABLE at 09:03

## 2022-12-31 RX ADMIN — OXYCODONE AND ACETAMINOPHEN 2 TABLET: 5; 325 TABLET ORAL at 13:11

## 2022-12-31 RX ADMIN — HYDROMORPHONE HYDROCHLORIDE 0.5 MG: 1 INJECTION, SOLUTION INTRAMUSCULAR; INTRAVENOUS; SUBCUTANEOUS at 10:24

## 2022-12-31 RX ADMIN — OXYCODONE AND ACETAMINOPHEN 2 TABLET: 5; 325 TABLET ORAL at 09:02

## 2022-12-31 RX ADMIN — SODIUM CHLORIDE, PRESERVATIVE FREE 10 ML: 5 INJECTION INTRAVENOUS at 09:08

## 2022-12-31 RX ADMIN — INSULIN GLARGINE 10 UNITS: 100 INJECTION, SOLUTION SUBCUTANEOUS at 22:15

## 2022-12-31 RX ADMIN — SODIUM CHLORIDE, PRESERVATIVE FREE 10 ML: 5 INJECTION INTRAVENOUS at 22:14

## 2022-12-31 RX ADMIN — CEFAZOLIN 2000 MG: 2 INJECTION, POWDER, FOR SOLUTION INTRAMUSCULAR; INTRAVENOUS at 09:08

## 2022-12-31 RX ADMIN — INSULIN LISPRO 4 UNITS: 100 INJECTION, SOLUTION INTRAVENOUS; SUBCUTANEOUS at 12:21

## 2022-12-31 RX ADMIN — HYDROMORPHONE HYDROCHLORIDE 0.5 MG: 1 INJECTION, SOLUTION INTRAMUSCULAR; INTRAVENOUS; SUBCUTANEOUS at 20:15

## 2022-12-31 RX ADMIN — HYDROMORPHONE HYDROCHLORIDE 0.5 MG: 1 INJECTION, SOLUTION INTRAMUSCULAR; INTRAVENOUS; SUBCUTANEOUS at 15:32

## 2022-12-31 RX ADMIN — GABAPENTIN 600 MG: 300 CAPSULE ORAL at 09:03

## 2022-12-31 RX ADMIN — HYDROMORPHONE HYDROCHLORIDE 0.5 MG: 1 INJECTION, SOLUTION INTRAMUSCULAR; INTRAVENOUS; SUBCUTANEOUS at 01:10

## 2022-12-31 ASSESSMENT — PAIN DESCRIPTION - ONSET
ONSET: ON-GOING

## 2022-12-31 ASSESSMENT — PAIN DESCRIPTION - PAIN TYPE
TYPE: SURGICAL PAIN

## 2022-12-31 ASSESSMENT — PAIN SCALES - GENERAL
PAINLEVEL_OUTOF10: 9
PAINLEVEL_OUTOF10: 8
PAINLEVEL_OUTOF10: 9
PAINLEVEL_OUTOF10: 8
PAINLEVEL_OUTOF10: 8
PAINLEVEL_OUTOF10: 10
PAINLEVEL_OUTOF10: 1
PAINLEVEL_OUTOF10: 8
PAINLEVEL_OUTOF10: 9

## 2022-12-31 ASSESSMENT — PAIN DESCRIPTION - LOCATION
LOCATION: LEG
LOCATION: OTHER (COMMENT)
LOCATION: LEG

## 2022-12-31 ASSESSMENT — PAIN DESCRIPTION - ORIENTATION
ORIENTATION: RIGHT

## 2022-12-31 ASSESSMENT — PAIN - FUNCTIONAL ASSESSMENT
PAIN_FUNCTIONAL_ASSESSMENT: ACTIVITIES ARE NOT PREVENTED

## 2022-12-31 ASSESSMENT — PAIN DESCRIPTION - DESCRIPTORS
DESCRIPTORS: ACHING
DESCRIPTORS: ACHING
DESCRIPTORS: BURNING;ACHING;SHARP;SHOOTING
DESCRIPTORS: ACHING

## 2022-12-31 ASSESSMENT — PAIN DESCRIPTION - FREQUENCY
FREQUENCY: CONTINUOUS

## 2022-12-31 NOTE — PROGRESS NOTES
Patient called, requesting to speak to Charge Nurse. Entered patients room to discuss patient's dissatisfaction with hospitalist. Patient stated that Yinka Murdock is taking away my pain meds and I want a new doctor or I'm leaving\". Reviewed patients MAR and notified him of current pain medications ordered, patient states \"well she said she is reducing them and I don't want to be uncomfortable, do you?\" Spoke with Dr. Krysten Trimble who stated that she discussed with patient the need to reduce pain medications, but did not plan on doing that today since he was reporting increased pain. Both hospitalist and RN agree that patient appears drowsy during assessment despite rating pain \"8 or 9 out of 10\". RN discussed with patient that if he chose to leave AMA, wound vac would need to be removed, and patient would not be able to receive pain medication or IV antibiotics that are recommended. Patient stated he would stay, but requesting new hospitalist for tomorrow. Dr. Krysten Trimble states a new hospitalist will be assigned to him tomorrow. Primary RN notified and updated on current plan of care.      Eduardo Allison, MARIXAN, RN

## 2022-12-31 NOTE — PROGRESS NOTES
V2.0  [facility] [place] Hospitalist Progress Note      Name:  Ryan Bustos /Age/Sex: 1960  (60 y.o. male)   MRN & CSN:  [id number] & [id number] Encounter Date/Time: 2022 2:16 PM EST    Location:  7078/1447-X PCP: Ambrose Álvarez MD       Hospital Day: 14    Assessment and Plan:   Ryan Bustos is a 60 y.o. male with pmh of coronary artery disease s/p CABG, PAD, Diabetes mellitus type 2, COPD, not on home oxygen, chronic hepatitis C, and diabetic foot ulcer/necrotizing fasciitis s/p right BKA (2022) who presents with Osteomyelitis ([address]      Plan:    Osteomyelitis, and necrotic/gangrenous wound of right stump. Had a fall and dehiscence P/W worsening wound, looks infected with necrotic/gangrenous margins(pic uploaded to media). Pus pockets. General surgery consulted  surgical intervention on 22 with wound vac placement  MRI consistent with osteomyelitis  ID consulted: To continue IV Ancef for 6 weeks. Follow-up with ID as an outpatient. Pain control: Percocet panel and IV Dilaudid for breakthrough  CRP obtained 2022, trending down. CBC  WNL   Patient currently utilizing excessive p.o. and IV pain meds. Discussed about reducing the for easier transition upon discharge. Patient got very upset about this. And is requesting different hospitalist.  Notified hospital administration and the lead doc. Type 2 diabetes on long-term insulin  Increase Lantus 10 units nightly  Lispro 4 units s with meals  Low-dose sliding scale  Plan of care glucose checks  Hypoglycemia protocol    Diabetic neuropathy  Continue gabapentin     Peripheral arterial disease  CTA of right lower extremity in 10/2021 showed complete occlusion of the right common femoral artery, opacifying anterior and posterior tibial arteries. Had angioplasty 2021  Continue aspirin and atorvastatin     Coronary artery disease s/p CABG- 2013  Continue aspirin, atorvastatin     Isolated elevated Alk phos   ?  Due to antibiotics Patient asymptomatic  We will monitor     Diet ADULT DIET; Regular; 5 carb choices (75 gm/meal)  ADULT ORAL NUTRITION SUPPLEMENT; Lunch, Dinner, Breakfast; Diabetic Oral Supplement   DVT Prophylaxis [x] Lovenox, []  Heparin, [] SCDs, [] Ambulation,    [] Eliquis, [] Xarelto  [] Coumadin [] other   Code Status Full Code   Disposition From: home  Expected Disposition: home  Estimated Date of Discharge: 1-3 days  Patient requires continued admission due IV antibiotics and pain control for osteomyelitis. Now pending pre-CERT for SNF placement. Surrogate Decision Maker/ POA      Subjective:     Chief Complaint: No chief complaint on file. Seen and examined in the morning. Patient appears comfortable. Lying on the edge of the bed. Was notified by bedside nurse yesterday regarding escalation of pain medications. Patient already utilizing IV and p.o. as needed pain medications around-the-clock. When seen, patient appears comfortable and there is no other signs of increased pain like tachycardia. Discussed about de-escalating pain medications this morning. Patient got upset and threatened to leave AMA. Patient later requesting a different hospitalist from tomorrow. Review of Systems:    Review of Systems   Constitutional:  Negative for activity change, appetite change, chills, fatigue and fever. HENT:  Negative for congestion, hearing loss, sinus pressure, sinus pain, sore throat, trouble swallowing and voice change. Eyes:  Negative for visual disturbance. Respiratory:  Negative for cough, chest tightness, shortness of breath and wheezing. Cardiovascular:  Negative for chest pain, palpitations and leg swelling. Gastrointestinal:  Negative for abdominal pain, constipation, diarrhea, nausea and vomiting. Endocrine: Negative for cold intolerance, heat intolerance and polyuria. Genitourinary:  Negative for dysuria. Musculoskeletal:  Positive for arthralgias and myalgias.  Negative for back pain and gait problem. Skin:  Positive for color change and wound. Neurological:  Negative for dizziness, weakness and headaches. Psychiatric/Behavioral:  Negative for agitation and confusion. The patient is not nervous/anxious. Objective: Intake/Output Summary (Last 24 hours) at 12/31/2022 1510  Last data filed at 12/30/2022 2026  Gross per 24 hour   Intake --   Output 300 ml   Net -300 ml          Vitals:   Vitals:    12/31/22 0758   BP: 139/70   Pulse: 59   Resp: 16   Temp: 97.4 °F (36.3 °C)   SpO2: 99%       Physical Exam:     Physical Exam  Constitutional:       General: He is not in acute distress. Appearance: Normal appearance. HENT:      Head: Normocephalic and atraumatic. Nose: Nose normal.      Mouth/Throat:      Mouth: Mucous membranes are moist.      Pharynx: Oropharynx is clear. Eyes:      Extraocular Movements: Extraocular movements intact. Conjunctiva/sclera: Conjunctivae normal.      Pupils: Pupils are equal, round, and reactive to light. Cardiovascular:      Rate and Rhythm: Normal rate and regular rhythm. Pulses: Normal pulses. Heart sounds: Normal heart sounds. Pulmonary:      Effort: Pulmonary effort is normal.   Abdominal:      General: Abdomen is flat. Bowel sounds are normal.      Palpations: Abdomen is soft. Musculoskeletal:         General: Normal range of motion. Cervical back: Normal range of motion and neck supple. Skin:     General: Skin is warm and dry. Findings: Erythema, lesion and rash present. Neurological:      General: No focal deficit present. Mental Status: He is alert and oriented to person, place, and time. Mental status is at baseline. Psychiatric:         Mood and Affect: Mood normal.         Behavior: Behavior normal.         Thought Content:  Thought content normal.       Medications:   Medications:    lidocaine PF  5 mL IntraDERmal Once    sodium chloride flush  5-40 mL IntraVENous 2 times per day insulin glargine  10 Units SubCUTAneous Nightly    ceFAZolin  2,000 mg IntraVENous 3 times per day    senna  1 tablet Oral Nightly    insulin lispro  4 Units SubCUTAneous TID WC    insulin lispro  0-4 Units SubCUTAneous TID WC    insulin lispro  0-4 Units SubCUTAneous Nightly    sodium chloride flush  5-40 mL IntraVENous 2 times per day    enoxaparin  40 mg SubCUTAneous Daily    nicotine  1 patch TransDERmal Daily    aspirin  81 mg Oral Daily    gabapentin  600 mg Oral TID    atorvastatin  40 mg Oral Nightly      Infusions:    sodium chloride      sodium chloride Stopped (12/30/22 0245)    dextrose       PRN Meds: sodium chloride flush, 5-40 mL, PRN  sodium chloride, , PRN  oxyCODONE-acetaminophen, 1 tablet, Q4H PRN   Or  oxyCODONE-acetaminophen, 2 tablet, Q4H PRN  HYDROmorphone, 0.5 mg, Q4H PRN  sodium chloride flush, 5-40 mL, PRN  sodium chloride, , PRN  ondansetron, 4 mg, Q8H PRN   Or  ondansetron, 4 mg, Q6H PRN  polyethylene glycol, 17 g, Daily PRN  acetaminophen, 650 mg, Q6H PRN   Or  acetaminophen, 650 mg, Q6H PRN  glucose, 4 tablet, PRN  dextrose bolus, 125 mL, PRN   Or  dextrose bolus, 250 mL, PRN  glucagon (rDNA), 1 mg, PRN  dextrose, , Continuous PRN  hydrALAZINE, 10 mg, Q6H PRN      Labs      Recent Results (from the past 24 hour(s))   POCT Glucose    Collection Time: 12/30/22  4:45 PM   Result Value Ref Range    POC Glucose 246 (H) 70 - 99 MG/DL   POCT Glucose    Collection Time: 12/30/22  7:43 PM   Result Value Ref Range    POC Glucose 165 (H) 70 - 99 MG/DL   POCT Glucose    Collection Time: 12/30/22 10:55 PM   Result Value Ref Range    POC Glucose 336 (H) 70 - 99 MG/DL   POCT Glucose    Collection Time: 12/31/22  8:01 AM   Result Value Ref Range    POC Glucose 147 (H) 70 - 99 MG/DL   POCT Glucose    Collection Time: 12/31/22 12:03 PM   Result Value Ref Range    POC Glucose 178 (H) 70 - 99 MG/DL        Imaging/Diagnostics Last 24 Hours   No results found.     Comment: Please note this report has been produced using speech recognition software and may contain errors related to that system including errors in grammar, punctuation, and spelling, as well as words and phrases that may be inappropriate. If there are any questions or concerns please feel free to contact the dictating provider for clarification.      Electronically signed by Samy Mendez MD on 12/31/2022 at 3:10 PM

## 2022-12-31 NOTE — PROGRESS NOTES
Occupational Therapy    Occupational Therapy Treatment Note    Name: Carlos Alberto Lyman MRN: [id number] :   1960   Date:  2022   Admission Date: 2022 Room:  [address] [place] [address]-A     Primary Problem:   There were no encounter diagnoses.    Restrictions/Precautions:          General Precautions, Fall Risk, Rt BKA    Communication with other providers: RN approved session.     Subjective:  Patient states:  \"If you find my muscles and the other part of my leg, let me know.\"   Pain:   Location, Type, Intensity (0/10 to 10/10):  8/10 in R LE, nursing reports pt has had his pain medication.     Objective:    Observation: Pt presented in semi-davey's, agreeable to session.   Objective Measures:  Wound Vac     Treatment, including education:  Therapeutic Activity Training:   Therapeutic activity training was instructed today.  Cues were given for safety, sequence, UE/LE placement, awareness, and balance.    Activities performed today included bed mobility training, sup-sit, sit-stand, SPT.     Supine to sit with HOB partially raised with SUP.     Sit to Stand from EOB with SBA with use of FWW.     Functional Mobility for short HH distance within room with use of fWW with CGA for ~10' with assistance for wound vac mgmt.     Self Care Training:   Cues were given for safety, sequence, UE/LE placement, visual cues, and balance.      Pt declined to stand at sink secondary to pain, pt engaged in hygiene tasks of washing face and utilizing mouth wash while seated in recliner with set-up A.     Pt engaged in UB dressing task of doffing shirt and donning clean one with set-up A while seated.     Pt agreeable to sitting up in recliner at end of session. Activities performed today included bed mobility training, transfers, functional mobility  to increase strength, activity tolerance to facilitate IND c ADL tasks, func transfers / mobility with emphasis on safety awareness carryover. Pt left with call light within reach, chair  alarm on, and all needs met. Assessment / Impression:    Patient's tolerance of treatment: Well  Adverse Reaction: None  Significant change in status and impact:none   Barriers to improvement: pain       Plan for Next Session:    Cont OT POC     Time in:  7349  Time out:  1208  Timed treatment minutes:  17  Total treatment time:  17      Electronically signed by:     ANNE Smith,   12/31/2022, 12:52 PM

## 2023-01-01 LAB
GLUCOSE BLD-MCNC: 149 MG/DL (ref 70–99)
GLUCOSE BLD-MCNC: 198 MG/DL (ref 70–99)
GLUCOSE BLD-MCNC: 216 MG/DL (ref 70–99)
GLUCOSE BLD-MCNC: 278 MG/DL (ref 70–99)

## 2023-01-01 PROCEDURE — 6360000002 HC RX W HCPCS: Performed by: STUDENT IN AN ORGANIZED HEALTH CARE EDUCATION/TRAINING PROGRAM

## 2023-01-01 PROCEDURE — 1200000000 HC SEMI PRIVATE

## 2023-01-01 PROCEDURE — 6370000000 HC RX 637 (ALT 250 FOR IP): Performed by: INTERNAL MEDICINE

## 2023-01-01 PROCEDURE — 6370000000 HC RX 637 (ALT 250 FOR IP): Performed by: SURGERY

## 2023-01-01 PROCEDURE — 2580000003 HC RX 258: Performed by: INTERNAL MEDICINE

## 2023-01-01 PROCEDURE — 6360000002 HC RX W HCPCS: Performed by: INTERNAL MEDICINE

## 2023-01-01 PROCEDURE — 6370000000 HC RX 637 (ALT 250 FOR IP): Performed by: STUDENT IN AN ORGANIZED HEALTH CARE EDUCATION/TRAINING PROGRAM

## 2023-01-01 PROCEDURE — 94761 N-INVAS EAR/PLS OXIMETRY MLT: CPT

## 2023-01-01 PROCEDURE — 82962 GLUCOSE BLOOD TEST: CPT

## 2023-01-01 RX ADMIN — CEFAZOLIN 2000 MG: 2 INJECTION, POWDER, FOR SOLUTION INTRAMUSCULAR; INTRAVENOUS at 10:15

## 2023-01-01 RX ADMIN — ENOXAPARIN SODIUM 40 MG: 100 INJECTION SUBCUTANEOUS at 08:26

## 2023-01-01 RX ADMIN — GABAPENTIN 600 MG: 300 CAPSULE ORAL at 08:26

## 2023-01-01 RX ADMIN — INSULIN LISPRO 4 UNITS: 100 INJECTION, SOLUTION INTRAVENOUS; SUBCUTANEOUS at 17:30

## 2023-01-01 RX ADMIN — HYDROMORPHONE HYDROCHLORIDE 0.5 MG: 1 INJECTION, SOLUTION INTRAMUSCULAR; INTRAVENOUS; SUBCUTANEOUS at 02:17

## 2023-01-01 RX ADMIN — OXYCODONE AND ACETAMINOPHEN 2 TABLET: 5; 325 TABLET ORAL at 15:37

## 2023-01-01 RX ADMIN — INSULIN LISPRO 4 UNITS: 100 INJECTION, SOLUTION INTRAVENOUS; SUBCUTANEOUS at 08:19

## 2023-01-01 RX ADMIN — OXYCODONE AND ACETAMINOPHEN 2 TABLET: 5; 325 TABLET ORAL at 21:05

## 2023-01-01 RX ADMIN — INSULIN GLARGINE 10 UNITS: 100 INJECTION, SOLUTION SUBCUTANEOUS at 21:08

## 2023-01-01 RX ADMIN — ASPIRIN 81 MG CHEWABLE TABLET 81 MG: 81 TABLET CHEWABLE at 08:25

## 2023-01-01 RX ADMIN — ATORVASTATIN CALCIUM 40 MG: 40 TABLET, FILM COATED ORAL at 21:05

## 2023-01-01 RX ADMIN — GABAPENTIN 600 MG: 300 CAPSULE ORAL at 21:05

## 2023-01-01 RX ADMIN — CEFAZOLIN 2000 MG: 2 INJECTION, POWDER, FOR SOLUTION INTRAMUSCULAR; INTRAVENOUS at 02:20

## 2023-01-01 RX ADMIN — SODIUM CHLORIDE, PRESERVATIVE FREE 10 ML: 5 INJECTION INTRAVENOUS at 23:00

## 2023-01-01 RX ADMIN — CEFAZOLIN 2000 MG: 2 INJECTION, POWDER, FOR SOLUTION INTRAMUSCULAR; INTRAVENOUS at 17:39

## 2023-01-01 RX ADMIN — HYDROMORPHONE HYDROCHLORIDE 0.5 MG: 1 INJECTION, SOLUTION INTRAMUSCULAR; INTRAVENOUS; SUBCUTANEOUS at 17:39

## 2023-01-01 RX ADMIN — HYDROMORPHONE HYDROCHLORIDE 0.5 MG: 1 INJECTION, SOLUTION INTRAMUSCULAR; INTRAVENOUS; SUBCUTANEOUS at 06:58

## 2023-01-01 RX ADMIN — OXYCODONE AND ACETAMINOPHEN 2 TABLET: 5; 325 TABLET ORAL at 10:19

## 2023-01-01 RX ADMIN — SODIUM CHLORIDE, PRESERVATIVE FREE 10 ML: 5 INJECTION INTRAVENOUS at 08:27

## 2023-01-01 RX ADMIN — SENNOSIDES 8.6 MG: 8.6 TABLET, FILM COATED ORAL at 21:05

## 2023-01-01 RX ADMIN — HYDROMORPHONE HYDROCHLORIDE 0.5 MG: 1 INJECTION, SOLUTION INTRAMUSCULAR; INTRAVENOUS; SUBCUTANEOUS at 23:01

## 2023-01-01 RX ADMIN — OXYCODONE AND ACETAMINOPHEN 2 TABLET: 5; 325 TABLET ORAL at 05:23

## 2023-01-01 RX ADMIN — INSULIN LISPRO 4 UNITS: 100 INJECTION, SOLUTION INTRAVENOUS; SUBCUTANEOUS at 12:35

## 2023-01-01 RX ADMIN — HYDROMORPHONE HYDROCHLORIDE 0.5 MG: 1 INJECTION, SOLUTION INTRAMUSCULAR; INTRAVENOUS; SUBCUTANEOUS at 12:00

## 2023-01-01 RX ADMIN — INSULIN LISPRO 2 UNITS: 100 INJECTION, SOLUTION INTRAVENOUS; SUBCUTANEOUS at 17:30

## 2023-01-01 RX ADMIN — GABAPENTIN 600 MG: 300 CAPSULE ORAL at 13:38

## 2023-01-01 ASSESSMENT — PAIN DESCRIPTION - ORIENTATION
ORIENTATION: RIGHT

## 2023-01-01 ASSESSMENT — PAIN SCALES - WONG BAKER
WONGBAKER_NUMERICALRESPONSE: 0

## 2023-01-01 ASSESSMENT — PAIN SCALES - GENERAL
PAINLEVEL_OUTOF10: 8
PAINLEVEL_OUTOF10: 3
PAINLEVEL_OUTOF10: 8
PAINLEVEL_OUTOF10: 0
PAINLEVEL_OUTOF10: 8
PAINLEVEL_OUTOF10: 10
PAINLEVEL_OUTOF10: 9
PAINLEVEL_OUTOF10: 8
PAINLEVEL_OUTOF10: 10

## 2023-01-01 ASSESSMENT — PAIN DESCRIPTION - DESCRIPTORS
DESCRIPTORS: ACHING;BURNING
DESCRIPTORS: ACHING
DESCRIPTORS: ACHING;BURNING
DESCRIPTORS: ACHING
DESCRIPTORS: ACHING;BURNING

## 2023-01-01 ASSESSMENT — PAIN DESCRIPTION - LOCATION
LOCATION: LEG

## 2023-01-01 ASSESSMENT — PAIN - FUNCTIONAL ASSESSMENT: PAIN_FUNCTIONAL_ASSESSMENT: ACTIVITIES ARE NOT PREVENTED

## 2023-01-01 NOTE — PROGRESS NOTES
V2.0  [facility] [place]      Name:  Pat Dumont /Age/Sex: 1960  (60 y.o. male)   MRN & CSN:  [id number] & [id number] Encounter Date/Time: 2023 2:02 PM EST    Location:  [address] [place] [address] PCP: Taty Kulkarni MD       Hospital Day: 15    Assessment and Plan:   Pat Dumont is a 60 y.o. male with stump infection      Plan: Medically ready for discharge pending SNF placement. Osteomyelitis, and necrotic/gangrenous wound of right stump. Had a fall and dehiscence P/W worsening wound, looks infected with necrotic/gangrenous margins(pic uploaded to media). Pus pockets. - General surgery consulted  - surgical intervention on 22 with wound vac placement  - MRI consistent with osteomyelitis  - ID consulted: To continue IV Ancef for 6 weeks. Follow-up with ID as an outpatient. Pain control: Percocet panel and IV Dilaudid for breakthrough  - CRP obtained 2022, trending down. - bCBC  WNL      Type 2 diabetes on long-term insulin  - Increase Lantus 10 units nightly  - Lispro 4 units s with meals  - Low-dose sliding scale  - Plan of care glucose checks  - Hypoglycemia protocol     Diabetic neuropathy  - Continue gabapentin     Peripheral arterial disease  CTA of right lower extremity in 10/2021 showed complete occlusion of the right common femoral artery, opacifying anterior and posterior tibial arteries. Had angioplasty 2021  - Continue aspirin and atorvastatin     Coronary artery disease s/p CABG- 2013  - Continue aspirin, atorvastatin     Isolated elevated Alk phos   - ? Due to antibiotics   - Patient asymptomatic  - We will monitor     Ppx: Lovenox  Dispo: pending SNF placement    Subjective:     Chief Complaint: No chief complaint on file. No complaints, no adverse interval events. Medically ready for discharge pending SNF placement.          Review of Systems:    Review of Systems    10 point ROS negative except as stated above in \"subjective\" section    Objective: Intake/Output Summary (Last 24 hours) at 1/1/2023 1402  Last data filed at 1/1/2023 0827  Gross per 24 hour   Intake 10 ml   Output --   Net 10 ml        Vitals:   Vitals:    01/01/23 1200   BP:    Pulse:    Resp: 18   Temp:    SpO2:        Physical Exam:     General: NAD  Eyes: EOMI  ENT: neck supple  Cardiovascular: Regular rate. Respiratory: Clear to auscultation  Gastrointestinal: Soft, non tender  Genitourinary: no suprapubic tenderness  Musculoskeletal: No edema, RLE wound vac  Skin: warm, dry  Neuro: Alert. Psych: Mood appropriate.      Medications:   Medications:    lidocaine PF  5 mL IntraDERmal Once    sodium chloride flush  5-40 mL IntraVENous 2 times per day    insulin glargine  10 Units SubCUTAneous Nightly    ceFAZolin  2,000 mg IntraVENous 3 times per day    senna  1 tablet Oral Nightly    insulin lispro  4 Units SubCUTAneous TID WC    insulin lispro  0-4 Units SubCUTAneous TID WC    insulin lispro  0-4 Units SubCUTAneous Nightly    sodium chloride flush  5-40 mL IntraVENous 2 times per day    enoxaparin  40 mg SubCUTAneous Daily    nicotine  1 patch TransDERmal Daily    aspirin  81 mg Oral Daily    gabapentin  600 mg Oral TID    atorvastatin  40 mg Oral Nightly      Infusions:    sodium chloride      sodium chloride Stopped (12/30/22 0245)    dextrose       PRN Meds: sodium chloride flush, 5-40 mL, PRN  sodium chloride, , PRN  oxyCODONE-acetaminophen, 1 tablet, Q4H PRN   Or  oxyCODONE-acetaminophen, 2 tablet, Q4H PRN  HYDROmorphone, 0.5 mg, Q4H PRN  sodium chloride flush, 5-40 mL, PRN  sodium chloride, , PRN  ondansetron, 4 mg, Q8H PRN   Or  ondansetron, 4 mg, Q6H PRN  polyethylene glycol, 17 g, Daily PRN  acetaminophen, 650 mg, Q6H PRN   Or  acetaminophen, 650 mg, Q6H PRN  glucose, 4 tablet, PRN  dextrose bolus, 125 mL, PRN   Or  dextrose bolus, 250 mL, PRN  glucagon (rDNA), 1 mg, PRN  dextrose, , Continuous PRN  hydrALAZINE, 10 mg, Q6H PRN        Labs      Recent Results (from the past 24 hour(s))   POCT Glucose    Collection Time: 12/31/22  5:24 PM   Result Value Ref Range    POC Glucose 218 (H) 70 - 99 MG/DL   POCT Glucose    Collection Time: 12/31/22  9:03 PM   Result Value Ref Range    POC Glucose 176 (H) 70 - 99 MG/DL   POCT Glucose    Collection Time: 12/31/22 10:06 PM   Result Value Ref Range    POC Glucose 217 (H) 70 - 99 MG/DL   POCT Glucose    Collection Time: 01/01/23  8:15 AM   Result Value Ref Range    POC Glucose 149 (H) 70 - 99 MG/DL   POCT Glucose    Collection Time: 01/01/23 12:25 PM   Result Value Ref Range    POC Glucose 198 (H) 70 - 99 MG/DL        Imaging/Diagnostics Last 24 Hours   No results found.    Electronically signed by Neno Cheney MD on 1/1/2023 at 2:02 PM

## 2023-01-02 LAB
GLUCOSE BLD-MCNC: 141 MG/DL (ref 70–99)
GLUCOSE BLD-MCNC: 173 MG/DL (ref 70–99)
GLUCOSE BLD-MCNC: 192 MG/DL (ref 70–99)
GLUCOSE BLD-MCNC: 213 MG/DL (ref 70–99)

## 2023-01-02 PROCEDURE — 2580000003 HC RX 258: Performed by: INTERNAL MEDICINE

## 2023-01-02 PROCEDURE — 82962 GLUCOSE BLOOD TEST: CPT

## 2023-01-02 PROCEDURE — 76937 US GUIDE VASCULAR ACCESS: CPT

## 2023-01-02 PROCEDURE — 6370000000 HC RX 637 (ALT 250 FOR IP): Performed by: SURGERY

## 2023-01-02 PROCEDURE — 6370000000 HC RX 637 (ALT 250 FOR IP): Performed by: INTERNAL MEDICINE

## 2023-01-02 PROCEDURE — 97530 THERAPEUTIC ACTIVITIES: CPT

## 2023-01-02 PROCEDURE — 94761 N-INVAS EAR/PLS OXIMETRY MLT: CPT

## 2023-01-02 PROCEDURE — 6370000000 HC RX 637 (ALT 250 FOR IP): Performed by: STUDENT IN AN ORGANIZED HEALTH CARE EDUCATION/TRAINING PROGRAM

## 2023-01-02 PROCEDURE — 6360000002 HC RX W HCPCS: Performed by: INTERNAL MEDICINE

## 2023-01-02 PROCEDURE — 1200000000 HC SEMI PRIVATE

## 2023-01-02 PROCEDURE — 97535 SELF CARE MNGMENT TRAINING: CPT

## 2023-01-02 PROCEDURE — 36569 INSJ PICC 5 YR+ W/O IMAGING: CPT

## 2023-01-02 PROCEDURE — 6360000002 HC RX W HCPCS: Performed by: STUDENT IN AN ORGANIZED HEALTH CARE EDUCATION/TRAINING PROGRAM

## 2023-01-02 PROCEDURE — C1751 CATH, INF, PER/CENT/MIDLINE: HCPCS

## 2023-01-02 RX ADMIN — CEFAZOLIN 2000 MG: 2 INJECTION, POWDER, FOR SOLUTION INTRAMUSCULAR; INTRAVENOUS at 17:50

## 2023-01-02 RX ADMIN — HYDROMORPHONE HYDROCHLORIDE 0.5 MG: 1 INJECTION, SOLUTION INTRAMUSCULAR; INTRAVENOUS; SUBCUTANEOUS at 11:09

## 2023-01-02 RX ADMIN — SENNOSIDES 8.6 MG: 8.6 TABLET, FILM COATED ORAL at 21:56

## 2023-01-02 RX ADMIN — ATORVASTATIN CALCIUM 40 MG: 40 TABLET, FILM COATED ORAL at 21:56

## 2023-01-02 RX ADMIN — ASPIRIN 81 MG CHEWABLE TABLET 81 MG: 81 TABLET CHEWABLE at 09:03

## 2023-01-02 RX ADMIN — HYDROMORPHONE HYDROCHLORIDE 0.5 MG: 1 INJECTION, SOLUTION INTRAMUSCULAR; INTRAVENOUS; SUBCUTANEOUS at 19:58

## 2023-01-02 RX ADMIN — HYDROMORPHONE HYDROCHLORIDE 0.5 MG: 1 INJECTION, SOLUTION INTRAMUSCULAR; INTRAVENOUS; SUBCUTANEOUS at 15:46

## 2023-01-02 RX ADMIN — GABAPENTIN 600 MG: 300 CAPSULE ORAL at 09:04

## 2023-01-02 RX ADMIN — HYDROMORPHONE HYDROCHLORIDE 0.5 MG: 1 INJECTION, SOLUTION INTRAMUSCULAR; INTRAVENOUS; SUBCUTANEOUS at 03:30

## 2023-01-02 RX ADMIN — OXYCODONE AND ACETAMINOPHEN 2 TABLET: 5; 325 TABLET ORAL at 02:10

## 2023-01-02 RX ADMIN — INSULIN LISPRO 4 UNITS: 100 INJECTION, SOLUTION INTRAVENOUS; SUBCUTANEOUS at 13:13

## 2023-01-02 RX ADMIN — INSULIN GLARGINE 10 UNITS: 100 INJECTION, SOLUTION SUBCUTANEOUS at 21:57

## 2023-01-02 RX ADMIN — SODIUM CHLORIDE, PRESERVATIVE FREE 10 ML: 5 INJECTION INTRAVENOUS at 09:31

## 2023-01-02 RX ADMIN — INSULIN LISPRO 4 UNITS: 100 INJECTION, SOLUTION INTRAVENOUS; SUBCUTANEOUS at 09:04

## 2023-01-02 RX ADMIN — GABAPENTIN 600 MG: 300 CAPSULE ORAL at 21:56

## 2023-01-02 RX ADMIN — OXYCODONE AND ACETAMINOPHEN 2 TABLET: 5; 325 TABLET ORAL at 21:55

## 2023-01-02 RX ADMIN — ENOXAPARIN SODIUM 40 MG: 100 INJECTION SUBCUTANEOUS at 09:04

## 2023-01-02 RX ADMIN — CEFAZOLIN 2000 MG: 2 INJECTION, POWDER, FOR SOLUTION INTRAMUSCULAR; INTRAVENOUS at 02:19

## 2023-01-02 RX ADMIN — CEFAZOLIN 2000 MG: 2 INJECTION, POWDER, FOR SOLUTION INTRAMUSCULAR; INTRAVENOUS at 10:53

## 2023-01-02 RX ADMIN — SODIUM CHLORIDE, PRESERVATIVE FREE 10 ML: 5 INJECTION INTRAVENOUS at 19:58

## 2023-01-02 RX ADMIN — OXYCODONE AND ACETAMINOPHEN 2 TABLET: 5; 325 TABLET ORAL at 13:43

## 2023-01-02 RX ADMIN — OXYCODONE AND ACETAMINOPHEN 2 TABLET: 5; 325 TABLET ORAL at 17:46

## 2023-01-02 RX ADMIN — OXYCODONE AND ACETAMINOPHEN 2 TABLET: 5; 325 TABLET ORAL at 08:30

## 2023-01-02 RX ADMIN — GABAPENTIN 600 MG: 300 CAPSULE ORAL at 13:12

## 2023-01-02 RX ADMIN — INSULIN LISPRO 4 UNITS: 100 INJECTION, SOLUTION INTRAVENOUS; SUBCUTANEOUS at 17:42

## 2023-01-02 RX ADMIN — SODIUM CHLORIDE, PRESERVATIVE FREE 10 ML: 5 INJECTION INTRAVENOUS at 21:55

## 2023-01-02 ASSESSMENT — PAIN DESCRIPTION - LOCATION
LOCATION: LEG

## 2023-01-02 ASSESSMENT — PAIN - FUNCTIONAL ASSESSMENT
PAIN_FUNCTIONAL_ASSESSMENT: ACTIVITIES ARE NOT PREVENTED

## 2023-01-02 ASSESSMENT — PAIN SCALES - GENERAL
PAINLEVEL_OUTOF10: 9
PAINLEVEL_OUTOF10: 3
PAINLEVEL_OUTOF10: 9
PAINLEVEL_OUTOF10: 9
PAINLEVEL_OUTOF10: 8
PAINLEVEL_OUTOF10: 8
PAINLEVEL_OUTOF10: 0
PAINLEVEL_OUTOF10: 8
PAINLEVEL_OUTOF10: 9
PAINLEVEL_OUTOF10: 9
PAINLEVEL_OUTOF10: 4
PAINLEVEL_OUTOF10: 9

## 2023-01-02 ASSESSMENT — PAIN DESCRIPTION - FREQUENCY: FREQUENCY: CONTINUOUS

## 2023-01-02 ASSESSMENT — PAIN DESCRIPTION - DESCRIPTORS
DESCRIPTORS: THROBBING;BURNING;ACHING
DESCRIPTORS: SHARP
DESCRIPTORS: BURNING;TINGLING;TIGHTNESS
DESCRIPTORS: HYPERSENSITIVITY;SHARP

## 2023-01-02 ASSESSMENT — PAIN DESCRIPTION - ORIENTATION
ORIENTATION: RIGHT

## 2023-01-02 ASSESSMENT — PAIN DESCRIPTION - ONSET: ONSET: ON-GOING

## 2023-01-02 ASSESSMENT — PAIN SCALES - WONG BAKER

## 2023-01-02 ASSESSMENT — PAIN DESCRIPTION - PAIN TYPE: TYPE: SURGICAL PAIN

## 2023-01-02 NOTE — PLAN OF CARE
Problem: Chronic Conditions and Co-morbidities  Goal: Patient's chronic conditions and co-morbidity symptoms are monitored and maintained or improved  1/2/2023 0941 by Johnson Calvo LPN  Outcome: Progressing  1/2/2023 0035 by Letitia Lopes RN  Outcome: Progressing     Problem: Discharge Planning  Goal: Discharge to home or other facility with appropriate resources  1/2/2023 0941 by Johnson Calvo LPN  Outcome: Progressing  1/2/2023 0035 by Letitia Lopes RN  Outcome: Progressing     Problem: Pain  Goal: Verbalizes/displays adequate comfort level or baseline comfort level  1/2/2023 0941 by Johnson Calvo LPN  Outcome: Progressing  1/2/2023 0035 by Letitia Lopes RN  Outcome: Progressing

## 2023-01-02 NOTE — PROGRESS NOTES
V2.0  [facility] [place] Hospitalist Progress Note      Name:  Cindy Duarte /Age/Sex: 1960  (60 y.o. male)   MRN & CSN:  [id number] & [id number] Encounter Date/Time: 2023 2:02 PM EST    Location:  [facility]/3877-E PCP: Robert Lowery MD       Hospital Day: 16    Assessment and Plan:   Cindy Duarte is a 60 y.o. male with stump infection      Plan: Medically ready for discharge pending SNF placement. PICC line ordered. Osteomyelitis, and necrotic/gangrenous wound of right stump. Had a fall and dehiscence P/W worsening wound, looks infected with necrotic/gangrenous margins(pic uploaded to media). Pus pockets. - General surgery consulted  - surgical intervention on 22 with wound vac placement  - MRI consistent with osteomyelitis  - ID consulted: To continue IV Ancef for 6 weeks. Follow-up with ID as an outpatient. Pain control: Percocet panel and IV Dilaudid for breakthrough  - CRP obtained 2022, trending down. - bCBC  WNL      Type 2 diabetes on long-term insulin  - Increase Lantus 10 units nightly  - Lispro 4 units s with meals  - Low-dose sliding scale  - Plan of care glucose checks  - Hypoglycemia protocol     Diabetic neuropathy  - Continue gabapentin     Peripheral arterial disease  CTA of right lower extremity in 10/2021 showed complete occlusion of the right common femoral artery, opacifying anterior and posterior tibial arteries. Had angioplasty 2021  - Continue aspirin and atorvastatin     Coronary artery disease s/p CABG- 2013  - Continue aspirin, atorvastatin     Isolated elevated Alk phos   - ? Due to antibiotics   - Patient asymptomatic  - We will monitor     Ppx: Lovenox  Dispo: pending SNF placement    Subjective:     Chief Complaint: No chief complaint on file. Patient is frustrated with how long he has been kept in the hospital. Otherwise doing ok. PICC line ordered for outpatient abx.          Review of Systems:    Review of Systems    10 point ROS negative except as stated above in \"subjective\" section    Objective:   No intake or output data in the 24 hours ending 01/02/23 1039       Vitals:   Vitals:    01/02/23 0900   BP:    Pulse:    Resp: 18   Temp:    SpO2:        Physical Exam:     General: NAD  Eyes: EOMI  ENT: neck supple  Cardiovascular: Regular rate. Respiratory: Clear to auscultation  Gastrointestinal: Soft, non tender  Genitourinary: no suprapubic tenderness  Musculoskeletal: No edema, RLE wound vac  Skin: warm, dry  Neuro: Alert. Psych: Mood appropriate.      Medications:   Medications:    lidocaine PF  5 mL IntraDERmal Once    sodium chloride flush  5-40 mL IntraVENous 2 times per day    insulin glargine  10 Units SubCUTAneous Nightly    ceFAZolin  2,000 mg IntraVENous 3 times per day    senna  1 tablet Oral Nightly    insulin lispro  4 Units SubCUTAneous TID WC    insulin lispro  0-4 Units SubCUTAneous TID WC    insulin lispro  0-4 Units SubCUTAneous Nightly    sodium chloride flush  5-40 mL IntraVENous 2 times per day    enoxaparin  40 mg SubCUTAneous Daily    nicotine  1 patch TransDERmal Daily    aspirin  81 mg Oral Daily    gabapentin  600 mg Oral TID    atorvastatin  40 mg Oral Nightly      Infusions:    sodium chloride      sodium chloride Stopped (12/30/22 0245)    dextrose       PRN Meds: sodium chloride flush, 5-40 mL, PRN  sodium chloride, , PRN  oxyCODONE-acetaminophen, 1 tablet, Q4H PRN   Or  oxyCODONE-acetaminophen, 2 tablet, Q4H PRN  HYDROmorphone, 0.5 mg, Q4H PRN  sodium chloride flush, 5-40 mL, PRN  sodium chloride, , PRN  ondansetron, 4 mg, Q8H PRN   Or  ondansetron, 4 mg, Q6H PRN  polyethylene glycol, 17 g, Daily PRN  acetaminophen, 650 mg, Q6H PRN   Or  acetaminophen, 650 mg, Q6H PRN  glucose, 4 tablet, PRN  dextrose bolus, 125 mL, PRN   Or  dextrose bolus, 250 mL, PRN  glucagon (rDNA), 1 mg, PRN  dextrose, , Continuous PRN  hydrALAZINE, 10 mg, Q6H PRN      Labs      Recent Results (from the past 24 hour(s))   POCT Glucose Collection Time: 01/01/23 12:25 PM   Result Value Ref Range    POC Glucose 198 (H) 70 - 99 MG/DL   POCT Glucose    Collection Time: 01/01/23  5:18 PM   Result Value Ref Range    POC Glucose 278 (H) 70 - 99 MG/DL   POCT Glucose    Collection Time: 01/01/23  9:07 PM   Result Value Ref Range    POC Glucose 216 (H) 70 - 99 MG/DL   POCT Glucose    Collection Time: 01/02/23  8:25 AM   Result Value Ref Range    POC Glucose 173 (H) 70 - 99 MG/DL        Imaging/Diagnostics Last 24 Hours   No results found.     Electronically signed by Giorgio Kinsey MD on 1/2/2023 at 10:39 AM

## 2023-01-02 NOTE — CONSULTS
PICC Consult complete. Indication for line: Ancef 6 weeks. Education regarding PICC insertion discussed and risks and benefits assessed with patient. Patient verbalized understanding. Consent signed by patient. Time out done @ 1120. PICC inserted in LUE Basilic without difficulty per protocol. Placement verified via VPSG4 monitoring system. Good blood return and flushes easily on single port. Patient tolerated well. Education pamphlets left in chart  Ultrasound photos of vein diameter and doppler signature placed in chart. Please consult IV/PICC team if patient's needs change. PICC OK to use.

## 2023-01-02 NOTE — PROGRESS NOTES
Occupational Therapy  . Occupational Therapy Treatment Note    Name: Jayna Ambrosio MRN: [id number] :   1960   Date:  2023   Admission Date: 2022 Room:  4836/7044-Z     Primary Problem:  General Precautions, Fall Risk, Rt BKA, IV,    Restrictions/Precautions:          General Precautions, Fall Risk, Rt BKA, IV, Bed/chair alarm    Communication with other providers: Per chart review and Nurse Cee, patient is appropriate for therapeutic intervention. Co-Tx c PTA Araceli Dakin for pt's activity tolerance (high level of pain). Subjective:  Patient states:  Pt agreeable to Tx session. \"What do you want me to do, I'll do it. \"  Pain:   Location, Type, Intensity (0/10 to 10/10):  6/10, pain, \"it's a bad day\". Objective:    Observation: Pt A&O to self / situation, received sitting EOB. Objective Measures:  Wound vac, IV. Treatment, including education:  Self Care Training:   Cues were given for safety, sequence, UE/LE placement, visual cues, and balance. Activities performed today included UB/LB dressing tasks, toileting, hand hygiene at sink. Pt CGA c RW + cues for safe body positioning throughout for sit<>stands, functional mobility to / from bathroom. Toilet Transfer: CGA c RW + cues for safe body positioning. Toileting: CGA for steadying assist while pt managed clothing and performed hygiene. CGA c RW in stance for hand hygiene. All therapeutic intervention performed c emphasis on dynamic balance / standing tolerance to inc strength, endurance and act tolerance for inc Indep c ADL tasks, func transfers / mobility. Safety  Patient safely in bedside chair c PTA to complete Tx session, with call light/phone in reach, and nursing aware. Gait belt was used for func transfers / mobility.     Assessment / Impression:    Patient's tolerance of treatment: Well  Adverse Reaction: None  Significant change in status and impact: Improved from initial evaluation  Barriers to improvement: Pain, balance      Plan for Next Session:    Continue per OT POC per patient's tolerance c emphasis on standing balance / tolerance during ADL tasks. Time in:  1206  Time out:  1224  Timed treatment minutes:  18  Total treatment time:  18      Electronically signed by:    TARIQ Boyd  1/2/2023, 12:09 PM       Goals:  1. Pt will complete all aspects of bed mobility for EOB/OOB ADLs mod I with HOB flat  2. Pt will complete UB/LB bathing SBA with setup  3. Pt will complete all aspects of LB dressing SBA with setup  4. Pt will complete all functional transfers to and from bed, chair, toilet, shower chair SBA on Lt LE  5. Pt will ambulate HH distance to bathroom for toileting SBA with RW using hop gait pattern on Lt LE  6. Pt will complete all aspects of toileting task SBA  7. Pt will complete oral hygiene/grooming routine in standing at sink SBA on Lt LE  8.  Pt will complete ther ex/ther act with focus on UE strengthening and functional activity tolerance in standing on Lt LE >10 minutes

## 2023-01-02 NOTE — PROGRESS NOTES
Physical Therapy    Physical Therapy Treatment Note  Name: Monica Keita MRN: [id number] :   1960   Date:  2023   Admission Date: 2022 Room:  [address] [place] [address]   Restrictions/Precautions:          general precautions, falls, wound vac   Communication with other providers:  co tx /c CASILLAS  Subjective:  Patient states:  agreeable to PT  Pain:   Location, Type, Intensity (0/10 to 10/10):  6/10 wound site      Objective:    Observation:  sitting EOB upon entry  Treatment, including education/measures:  Transfers   Sit to stand : CGA  Stand to sit :CGA  SPT:CGA  Static standing CGA /c self care tasks ~1'. Multiple sets from various surfaces, vc/tc for safety techs. Seated rest breaks between transfers including SPT to/from toilet using FWW. Increased time for AD and wound vac management  Gait:  Pt amb with FWW for ~10', 7' /c CGA. Pt requests adelina further amb on this date, agreeable to focus on amb next session. Pt needed VC's for safety and sequencing, maneuvering safely through bathroom/bedroom to increase independence  Therapeutic Ex  Sitting:  LAQ x10  Marches x10  Hip abd x10  Vc for proper form. [facility] for HEP of sitting and supine ex's. Safety  Patient left safely in the chair, with call light/phone in reach with alarm applied. Gait belt was used for transfers and gait. Assessment / Impression:    Pt tolerates tx well, motivated and prefers to complete functional mob independently. Some impulsiveness and need for safety cues /c wound vac management and functional mob. Patient's tolerance of treatment:  good   Adverse Reaction: na  Significant change in status and impact:  na  Barriers to improvement:  weakness, endurance  Plan for Next Session:    Gait training focus /c chair follow.   Time in:  1210  Time out:  1235  Timed treatment minutes:  25  Total treatment time:  25    Previously filed items:           Short Term Goals  Time Frame for Short Term Goals: 2 weeks  Short Term Goal 1: Pt will perform sit><supine Kash  Short Term Goal 2: Pt will transfer between surfaces SBA  Short Term Goal 3: Pt will ambulate 30ft with RW SBA  Short Term Goal 4: Pt will propel WC 150ft Kash  Short Term Goal 5: Pt will perform standing light dynamic activity x 3 minutes, single UE support SBA    Electronically signed by:    Joceline Maloney, IRWIN  1/2/2023, 12:44 PM

## 2023-01-03 LAB
GLUCOSE BLD-MCNC: 156 MG/DL (ref 70–99)
GLUCOSE BLD-MCNC: 196 MG/DL (ref 70–99)
GLUCOSE BLD-MCNC: 201 MG/DL (ref 70–99)
GLUCOSE BLD-MCNC: 225 MG/DL (ref 70–99)

## 2023-01-03 PROCEDURE — 94761 N-INVAS EAR/PLS OXIMETRY MLT: CPT

## 2023-01-03 PROCEDURE — 6370000000 HC RX 637 (ALT 250 FOR IP): Performed by: INTERNAL MEDICINE

## 2023-01-03 PROCEDURE — 2580000003 HC RX 258: Performed by: INTERNAL MEDICINE

## 2023-01-03 PROCEDURE — 6360000002 HC RX W HCPCS: Performed by: INTERNAL MEDICINE

## 2023-01-03 PROCEDURE — 6370000000 HC RX 637 (ALT 250 FOR IP): Performed by: SURGERY

## 2023-01-03 PROCEDURE — 6370000000 HC RX 637 (ALT 250 FOR IP): Performed by: STUDENT IN AN ORGANIZED HEALTH CARE EDUCATION/TRAINING PROGRAM

## 2023-01-03 PROCEDURE — 97605 NEG PRS WND THER DME<=50SQCM: CPT

## 2023-01-03 PROCEDURE — 6360000002 HC RX W HCPCS: Performed by: STUDENT IN AN ORGANIZED HEALTH CARE EDUCATION/TRAINING PROGRAM

## 2023-01-03 PROCEDURE — 1200000000 HC SEMI PRIVATE

## 2023-01-03 PROCEDURE — 82962 GLUCOSE BLOOD TEST: CPT

## 2023-01-03 RX ADMIN — SODIUM CHLORIDE, PRESERVATIVE FREE 10 ML: 5 INJECTION INTRAVENOUS at 23:56

## 2023-01-03 RX ADMIN — GABAPENTIN 600 MG: 300 CAPSULE ORAL at 08:20

## 2023-01-03 RX ADMIN — INSULIN GLARGINE 10 UNITS: 100 INJECTION, SOLUTION SUBCUTANEOUS at 20:19

## 2023-01-03 RX ADMIN — ATORVASTATIN CALCIUM 40 MG: 40 TABLET, FILM COATED ORAL at 20:15

## 2023-01-03 RX ADMIN — ASPIRIN 81 MG CHEWABLE TABLET 81 MG: 81 TABLET CHEWABLE at 08:20

## 2023-01-03 RX ADMIN — INSULIN LISPRO 4 UNITS: 100 INJECTION, SOLUTION INTRAVENOUS; SUBCUTANEOUS at 14:18

## 2023-01-03 RX ADMIN — ENOXAPARIN SODIUM 40 MG: 100 INJECTION SUBCUTANEOUS at 08:21

## 2023-01-03 RX ADMIN — OXYCODONE AND ACETAMINOPHEN 2 TABLET: 5; 325 TABLET ORAL at 15:17

## 2023-01-03 RX ADMIN — CEFAZOLIN 2000 MG: 2 INJECTION, POWDER, FOR SOLUTION INTRAMUSCULAR; INTRAVENOUS at 11:22

## 2023-01-03 RX ADMIN — OXYCODONE AND ACETAMINOPHEN 2 TABLET: 5; 325 TABLET ORAL at 10:56

## 2023-01-03 RX ADMIN — GABAPENTIN 600 MG: 300 CAPSULE ORAL at 20:15

## 2023-01-03 RX ADMIN — INSULIN LISPRO 1 UNITS: 100 INJECTION, SOLUTION INTRAVENOUS; SUBCUTANEOUS at 14:17

## 2023-01-03 RX ADMIN — GABAPENTIN 600 MG: 300 CAPSULE ORAL at 15:17

## 2023-01-03 RX ADMIN — SODIUM CHLORIDE, PRESERVATIVE FREE 10 ML: 5 INJECTION INTRAVENOUS at 08:35

## 2023-01-03 RX ADMIN — HYDROMORPHONE HYDROCHLORIDE 0.5 MG: 1 INJECTION, SOLUTION INTRAMUSCULAR; INTRAVENOUS; SUBCUTANEOUS at 16:48

## 2023-01-03 RX ADMIN — SENNOSIDES 8.6 MG: 8.6 TABLET, FILM COATED ORAL at 20:15

## 2023-01-03 RX ADMIN — HYDROMORPHONE HYDROCHLORIDE 0.5 MG: 1 INJECTION, SOLUTION INTRAMUSCULAR; INTRAVENOUS; SUBCUTANEOUS at 12:54

## 2023-01-03 RX ADMIN — OXYCODONE AND ACETAMINOPHEN 2 TABLET: 5; 325 TABLET ORAL at 20:15

## 2023-01-03 RX ADMIN — HYDROMORPHONE HYDROCHLORIDE 0.5 MG: 1 INJECTION, SOLUTION INTRAMUSCULAR; INTRAVENOUS; SUBCUTANEOUS at 00:43

## 2023-01-03 RX ADMIN — CEFAZOLIN 2000 MG: 2 INJECTION, POWDER, FOR SOLUTION INTRAMUSCULAR; INTRAVENOUS at 16:52

## 2023-01-03 RX ADMIN — HYDROMORPHONE HYDROCHLORIDE 0.5 MG: 1 INJECTION, SOLUTION INTRAMUSCULAR; INTRAVENOUS; SUBCUTANEOUS at 04:53

## 2023-01-03 RX ADMIN — SODIUM CHLORIDE, PRESERVATIVE FREE 10 ML: 5 INJECTION INTRAVENOUS at 08:28

## 2023-01-03 RX ADMIN — CEFAZOLIN 2000 MG: 2 INJECTION, POWDER, FOR SOLUTION INTRAMUSCULAR; INTRAVENOUS at 02:11

## 2023-01-03 RX ADMIN — HYDROMORPHONE HYDROCHLORIDE 0.5 MG: 1 INJECTION, SOLUTION INTRAMUSCULAR; INTRAVENOUS; SUBCUTANEOUS at 08:21

## 2023-01-03 RX ADMIN — HYDROMORPHONE HYDROCHLORIDE 0.5 MG: 1 INJECTION, SOLUTION INTRAMUSCULAR; INTRAVENOUS; SUBCUTANEOUS at 23:53

## 2023-01-03 RX ADMIN — OXYCODONE AND ACETAMINOPHEN 2 TABLET: 5; 325 TABLET ORAL at 02:08

## 2023-01-03 RX ADMIN — ONDANSETRON 4 MG: 2 INJECTION INTRAMUSCULAR; INTRAVENOUS at 09:26

## 2023-01-03 ASSESSMENT — PAIN SCALES - GENERAL
PAINLEVEL_OUTOF10: 8
PAINLEVEL_OUTOF10: 9
PAINLEVEL_OUTOF10: 8
PAINLEVEL_OUTOF10: 2
PAINLEVEL_OUTOF10: 8
PAINLEVEL_OUTOF10: 9
PAINLEVEL_OUTOF10: 8
PAINLEVEL_OUTOF10: 8
PAINLEVEL_OUTOF10: 0
PAINLEVEL_OUTOF10: 2
PAINLEVEL_OUTOF10: 8
PAINLEVEL_OUTOF10: 9
PAINLEVEL_OUTOF10: 9
PAINLEVEL_OUTOF10: 7
PAINLEVEL_OUTOF10: 6
PAINLEVEL_OUTOF10: 8

## 2023-01-03 ASSESSMENT — PAIN DESCRIPTION - ORIENTATION
ORIENTATION: RIGHT

## 2023-01-03 ASSESSMENT — PAIN - FUNCTIONAL ASSESSMENT
PAIN_FUNCTIONAL_ASSESSMENT: ACTIVITIES ARE NOT PREVENTED

## 2023-01-03 ASSESSMENT — PAIN SCALES - WONG BAKER
WONGBAKER_NUMERICALRESPONSE: 0

## 2023-01-03 ASSESSMENT — PAIN DESCRIPTION - DESCRIPTORS
DESCRIPTORS: SHARP
DESCRIPTORS: ACHING;THROBBING
DESCRIPTORS: SHOOTING;SHARP;DISCOMFORT
DESCRIPTORS: SHARP;SHOOTING;DISCOMFORT;ACHING
DESCRIPTORS: ACHING;THROBBING
DESCRIPTORS: BURNING;SHARP
DESCRIPTORS: ACHING;BURNING;SHARP
DESCRIPTORS: ACHING
DESCRIPTORS: STABBING
DESCRIPTORS: SHARP;SHOOTING;DISCOMFORT

## 2023-01-03 ASSESSMENT — PAIN DESCRIPTION - LOCATION
LOCATION: LEG

## 2023-01-03 NOTE — PROGRESS NOTES
V2.0  [facility] Hospitalist Progress Note      Name:  Royal Solis /Age/Sex: 1960  (60 y.o. male)   MRN & CSN:  [id number] & [id number] Encounter Date/Time: 1/3/2023 2:02 PM EST    Location:  [facility] PCP: Jamie Alarcon MD       Hospital Day: 17    Assessment and Plan:   Royal Solis is a 60 y.o. male with stump infection      Plan: Medically ready for discharge pending SNF placement. PICC line placed on 2023. Osteomyelitis, and necrotic/gangrenous wound of right stump. Had a fall and dehiscence P/W worsening wound, looks infected with necrotic/gangrenous margins(pic uploaded to media). Pus pockets. - General surgery consulted  - surgical intervention on 22 with wound vac placement  - MRI consistent with osteomyelitis  - ID consulted: To continue IV Ancef for 6 weeks. Follow-up with ID as an outpatient. Pain control: Percocet panel and IV Dilaudid for breakthrough  - CRP obtained 2022, trending down. - bCBC  WNL      Type 2 diabetes on long-term insulin  - Increase Lantus 10 units nightly  - Lispro 4 units s with meals  - Low-dose sliding scale  - Plan of care glucose checks  - Hypoglycemia protocol     Diabetic neuropathy  - Continue gabapentin     Peripheral arterial disease  CTA of right lower extremity in 10/2021 showed complete occlusion of the right common femoral artery, opacifying anterior and posterior tibial arteries. Had angioplasty 2021  - Continue aspirin and atorvastatin     Coronary artery disease s/p CABG- 2013  - Continue aspirin, atorvastatin     Isolated elevated Alk phos   - ? Due to antibiotics   - Patient asymptomatic  - We will monitor     Ppx: Lovenox  Dispo: pending SNF placement    Subjective:     Chief Complaint: No chief complaint on file. Patient was seen and examined at bedside. Was sitting on the side of the bed. Has been tolerating diet well. Bowel movements are regular. Denies any other active complaints.            Review of Systems:    Review of Systems    10 point ROS negative except as stated above. Objective: Intake/Output Summary (Last 24 hours) at 1/3/2023 1135  Last data filed at 1/3/2023 0245  Gross per 24 hour   Intake --   Output 400 ml   Net -400 ml          Vitals:   Vitals:    01/03/23 1056   BP:    Pulse:    Resp: 18   Temp:    SpO2:        Physical Exam:     General: NAD  Eyes: EOMI  ENT: neck supple  Cardiovascular: Regular rate. Respiratory: Clear to auscultation  Gastrointestinal: Soft, non tender  Genitourinary: no suprapubic tenderness  Musculoskeletal: No edema, RLE wound vac  Skin: warm, dry  Neuro: Alert. Psych: Mood appropriate.      Medications:   Medications:    lidocaine PF  5 mL IntraDERmal Once    sodium chloride flush  5-40 mL IntraVENous 2 times per day    insulin glargine  10 Units SubCUTAneous Nightly    ceFAZolin  2,000 mg IntraVENous 3 times per day    senna  1 tablet Oral Nightly    insulin lispro  4 Units SubCUTAneous TID WC    insulin lispro  0-4 Units SubCUTAneous TID WC    insulin lispro  0-4 Units SubCUTAneous Nightly    sodium chloride flush  5-40 mL IntraVENous 2 times per day    enoxaparin  40 mg SubCUTAneous Daily    nicotine  1 patch TransDERmal Daily    aspirin  81 mg Oral Daily    gabapentin  600 mg Oral TID    atorvastatin  40 mg Oral Nightly      Infusions:    sodium chloride      sodium chloride Stopped (12/30/22 0245)    dextrose       PRN Meds: sodium chloride flush, 5-40 mL, PRN  sodium chloride, , PRN  oxyCODONE-acetaminophen, 1 tablet, Q4H PRN   Or  oxyCODONE-acetaminophen, 2 tablet, Q4H PRN  HYDROmorphone, 0.5 mg, Q4H PRN  sodium chloride flush, 5-40 mL, PRN  sodium chloride, , PRN  ondansetron, 4 mg, Q8H PRN   Or  ondansetron, 4 mg, Q6H PRN  polyethylene glycol, 17 g, Daily PRN  acetaminophen, 650 mg, Q6H PRN   Or  acetaminophen, 650 mg, Q6H PRN  glucose, 4 tablet, PRN  dextrose bolus, 125 mL, PRN   Or  dextrose bolus, 250 mL, PRN  glucagon (rDNA), 1 mg, PRN  dextrose, , Continuous PRN  hydrALAZINE, 10 mg, Q6H PRN      Labs      Recent Results (from the past 24 hour(s))   POCT Glucose    Collection Time: 01/02/23 12:35 PM   Result Value Ref Range    POC Glucose 141 (H) 70 - 99 MG/DL   POCT Glucose    Collection Time: 01/02/23  4:34 PM   Result Value Ref Range    POC Glucose 192 (H) 70 - 99 MG/DL   POCT Glucose    Collection Time: 01/02/23  8:27 PM   Result Value Ref Range    POC Glucose 213 (H) 70 - 99 MG/DL   POCT Glucose    Collection Time: 01/03/23  8:15 AM   Result Value Ref Range    POC Glucose 196 (H) 70 - 99 MG/DL        Imaging/Diagnostics Last 24 Hours   No results found.     Electronically signed by Karly Landers MD on 1/3/2023 at 11:35 AM
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
## 2023-01-04 LAB
ANION GAP SERPL CALCULATED.3IONS-SCNC: 10 MMOL/L (ref 4–16)
BASOPHILS ABSOLUTE: 0 K/CU MM
BASOPHILS RELATIVE PERCENT: 0.7 % (ref 0–1)
BUN BLDV-MCNC: 33 MG/DL (ref 6–23)
CALCIUM SERPL-MCNC: 8.3 MG/DL (ref 8.3–10.6)
CHLORIDE BLD-SCNC: 107 MMOL/L (ref 99–110)
CO2: 21 MMOL/L (ref 21–32)
CREAT SERPL-MCNC: 0.9 MG/DL (ref 0.9–1.3)
DIFFERENTIAL TYPE: ABNORMAL
EOSINOPHILS ABSOLUTE: 0.1 K/CU MM
EOSINOPHILS RELATIVE PERCENT: 2.6 % (ref 0–3)
GFR SERPL CREATININE-BSD FRML MDRD: >60 ML/MIN/1.73M2
GLUCOSE BLD-MCNC: 146 MG/DL (ref 70–99)
GLUCOSE BLD-MCNC: 154 MG/DL (ref 70–99)
GLUCOSE BLD-MCNC: 181 MG/DL (ref 70–99)
GLUCOSE BLD-MCNC: 193 MG/DL (ref 70–99)
GLUCOSE BLD-MCNC: 258 MG/DL (ref 70–99)
HCT VFR BLD CALC: 28.5 % (ref 42–52)
HEMOGLOBIN: 8.9 GM/DL (ref 13.5–18)
IMMATURE NEUTROPHIL %: 0.4 % (ref 0–0.43)
LYMPHOCYTES ABSOLUTE: 1 K/CU MM
LYMPHOCYTES RELATIVE PERCENT: 18 % (ref 24–44)
MCH RBC QN AUTO: 29.8 PG (ref 27–31)
MCHC RBC AUTO-ENTMCNC: 31.2 % (ref 32–36)
MCV RBC AUTO: 95.3 FL (ref 78–100)
MONOCYTES ABSOLUTE: 0.5 K/CU MM
MONOCYTES RELATIVE PERCENT: 8.6 % (ref 0–4)
NUCLEATED RBC %: 0 %
PDW BLD-RTO: 16.8 % (ref 11.7–14.9)
PLATELET # BLD: 100 K/CU MM (ref 140–440)
PMV BLD AUTO: 11.3 FL (ref 7.5–11.1)
POTASSIUM SERPL-SCNC: 4.6 MMOL/L (ref 3.5–5.1)
RBC # BLD: 2.99 M/CU MM (ref 4.6–6.2)
SEGMENTED NEUTROPHILS ABSOLUTE COUNT: 3.8 K/CU MM
SEGMENTED NEUTROPHILS RELATIVE PERCENT: 69.7 % (ref 36–66)
SODIUM BLD-SCNC: 138 MMOL/L (ref 135–145)
TOTAL IMMATURE NEUTOROPHIL: 0.02 K/CU MM
TOTAL NUCLEATED RBC: 0 K/CU MM
TOTAL RETICULOCYTE COUNT: 0.08 K/CU MM
WBC # BLD: 5.5 K/CU MM (ref 4–10.5)

## 2023-01-04 PROCEDURE — 6370000000 HC RX 637 (ALT 250 FOR IP): Performed by: INTERNAL MEDICINE

## 2023-01-04 PROCEDURE — 82962 GLUCOSE BLOOD TEST: CPT

## 2023-01-04 PROCEDURE — 2580000003 HC RX 258: Performed by: INTERNAL MEDICINE

## 2023-01-04 PROCEDURE — 97116 GAIT TRAINING THERAPY: CPT

## 2023-01-04 PROCEDURE — 6370000000 HC RX 637 (ALT 250 FOR IP): Performed by: STUDENT IN AN ORGANIZED HEALTH CARE EDUCATION/TRAINING PROGRAM

## 2023-01-04 PROCEDURE — 6360000002 HC RX W HCPCS: Performed by: INTERNAL MEDICINE

## 2023-01-04 PROCEDURE — 6360000002 HC RX W HCPCS: Performed by: STUDENT IN AN ORGANIZED HEALTH CARE EDUCATION/TRAINING PROGRAM

## 2023-01-04 PROCEDURE — 80048 BASIC METABOLIC PNL TOTAL CA: CPT

## 2023-01-04 PROCEDURE — 1200000000 HC SEMI PRIVATE

## 2023-01-04 PROCEDURE — 85025 COMPLETE CBC W/AUTO DIFF WBC: CPT

## 2023-01-04 PROCEDURE — 97110 THERAPEUTIC EXERCISES: CPT

## 2023-01-04 PROCEDURE — 97530 THERAPEUTIC ACTIVITIES: CPT

## 2023-01-04 PROCEDURE — 94761 N-INVAS EAR/PLS OXIMETRY MLT: CPT

## 2023-01-04 PROCEDURE — 6370000000 HC RX 637 (ALT 250 FOR IP): Performed by: SURGERY

## 2023-01-04 RX ADMIN — HYDROMORPHONE HYDROCHLORIDE 0.5 MG: 1 INJECTION, SOLUTION INTRAMUSCULAR; INTRAVENOUS; SUBCUTANEOUS at 12:36

## 2023-01-04 RX ADMIN — CEFAZOLIN 2000 MG: 2 INJECTION, POWDER, FOR SOLUTION INTRAMUSCULAR; INTRAVENOUS at 10:14

## 2023-01-04 RX ADMIN — GABAPENTIN 600 MG: 300 CAPSULE ORAL at 14:24

## 2023-01-04 RX ADMIN — INSULIN LISPRO 4 UNITS: 100 INJECTION, SOLUTION INTRAVENOUS; SUBCUTANEOUS at 12:26

## 2023-01-04 RX ADMIN — SENNOSIDES 8.6 MG: 8.6 TABLET, FILM COATED ORAL at 21:31

## 2023-01-04 RX ADMIN — OXYCODONE AND ACETAMINOPHEN 2 TABLET: 5; 325 TABLET ORAL at 10:01

## 2023-01-04 RX ADMIN — OXYCODONE AND ACETAMINOPHEN 2 TABLET: 5; 325 TABLET ORAL at 02:49

## 2023-01-04 RX ADMIN — GABAPENTIN 600 MG: 300 CAPSULE ORAL at 09:51

## 2023-01-04 RX ADMIN — INSULIN LISPRO 4 UNITS: 100 INJECTION, SOLUTION INTRAVENOUS; SUBCUTANEOUS at 09:49

## 2023-01-04 RX ADMIN — HYDROMORPHONE HYDROCHLORIDE 0.5 MG: 1 INJECTION, SOLUTION INTRAMUSCULAR; INTRAVENOUS; SUBCUTANEOUS at 05:23

## 2023-01-04 RX ADMIN — SODIUM CHLORIDE, PRESERVATIVE FREE 10 ML: 5 INJECTION INTRAVENOUS at 10:23

## 2023-01-04 RX ADMIN — GABAPENTIN 600 MG: 300 CAPSULE ORAL at 21:32

## 2023-01-04 RX ADMIN — SODIUM CHLORIDE, PRESERVATIVE FREE 10 ML: 5 INJECTION INTRAVENOUS at 09:53

## 2023-01-04 RX ADMIN — ASPIRIN 81 MG CHEWABLE TABLET 81 MG: 81 TABLET CHEWABLE at 09:51

## 2023-01-04 RX ADMIN — OXYCODONE AND ACETAMINOPHEN 2 TABLET: 5; 325 TABLET ORAL at 16:35

## 2023-01-04 RX ADMIN — CEFAZOLIN 2000 MG: 2 INJECTION, POWDER, FOR SOLUTION INTRAMUSCULAR; INTRAVENOUS at 02:46

## 2023-01-04 RX ADMIN — INSULIN LISPRO 4 UNITS: 100 INJECTION, SOLUTION INTRAVENOUS; SUBCUTANEOUS at 17:34

## 2023-01-04 RX ADMIN — INSULIN GLARGINE 10 UNITS: 100 INJECTION, SOLUTION SUBCUTANEOUS at 21:40

## 2023-01-04 RX ADMIN — CEFAZOLIN 2000 MG: 2 INJECTION, POWDER, FOR SOLUTION INTRAMUSCULAR; INTRAVENOUS at 17:40

## 2023-01-04 RX ADMIN — ENOXAPARIN SODIUM 40 MG: 100 INJECTION SUBCUTANEOUS at 09:52

## 2023-01-04 RX ADMIN — OXYCODONE AND ACETAMINOPHEN 2 TABLET: 5; 325 TABLET ORAL at 21:31

## 2023-01-04 RX ADMIN — ATORVASTATIN CALCIUM 40 MG: 40 TABLET, FILM COATED ORAL at 21:31

## 2023-01-04 RX ADMIN — SODIUM CHLORIDE 25 ML: 9 INJECTION, SOLUTION INTRAVENOUS at 02:44

## 2023-01-04 ASSESSMENT — PAIN DESCRIPTION - ORIENTATION
ORIENTATION: RIGHT

## 2023-01-04 ASSESSMENT — PAIN DESCRIPTION - LOCATION
LOCATION: INCISION
LOCATION: KNEE;LEG
LOCATION: FOOT
LOCATION: LEG
LOCATION: FOOT
LOCATION: INCISION

## 2023-01-04 ASSESSMENT — PAIN SCALES - GENERAL
PAINLEVEL_OUTOF10: 3
PAINLEVEL_OUTOF10: 2
PAINLEVEL_OUTOF10: 7
PAINLEVEL_OUTOF10: 8
PAINLEVEL_OUTOF10: 7
PAINLEVEL_OUTOF10: 8
PAINLEVEL_OUTOF10: 7
PAINLEVEL_OUTOF10: 4
PAINLEVEL_OUTOF10: 9
PAINLEVEL_OUTOF10: 6
PAINLEVEL_OUTOF10: 8
PAINLEVEL_OUTOF10: 10
PAINLEVEL_OUTOF10: 8

## 2023-01-04 ASSESSMENT — PAIN DESCRIPTION - DESCRIPTORS
DESCRIPTORS: ACHING;BURNING;THROBBING
DESCRIPTORS: ACHING;BURNING;SHARP
DESCRIPTORS: THROBBING;TINGLING
DESCRIPTORS: ACHING;BURNING
DESCRIPTORS: ACHING;BURNING
DESCRIPTORS: ACHING;BURNING;SHARP

## 2023-01-04 ASSESSMENT — PAIN SCALES - WONG BAKER
WONGBAKER_NUMERICALRESPONSE: 8
WONGBAKER_NUMERICALRESPONSE: 0
WONGBAKER_NUMERICALRESPONSE: 2
WONGBAKER_NUMERICALRESPONSE: 2

## 2023-01-04 NOTE — PROGRESS NOTES
V2.0  [facility] Hospitalist Progress Note      Name:  Tereasa Severin /Age/Sex: 1960  (60 y.o. male)   MRN & CSN:  [id number] & [id number] Encounter Date/Time: 2023 2:02 PM EST    Location:  [address] [place], [place] [address] PCP: Leonard Pastor MD       Hospital Day: 18    Assessment and Plan:   Tereasa Severin is a 60 y.o. male with stump infection      Plan: Medically ready for discharge pending SNF placement. PICC line placed on 2023. Osteomyelitis, and necrotic/gangrenous wound of right stump. Had a fall and dehiscence P/W worsening wound, looks infected with necrotic/gangrenous margins(pic uploaded to media). Pus pockets. - General surgery consulted  - surgical intervention on 22 with wound vac placement  - MRI consistent with osteomyelitis  - ID consulted: To continue IV Ancef for 6 weeks. Follow-up with ID as an outpatient. Pain control: Percocet, will dc Dilaudid  - CRP obtained 2022, trending down. - bCBC  WNL      Type 2 diabetes on long-term insulin  - Increase Lantus 10 units nightly  - Lispro 4 units s with meals  - Low-dose sliding scale  - Plan of care glucose checks  - Hypoglycemia protocol     Diabetic neuropathy  - Continue gabapentin     Peripheral arterial disease  CTA of right lower extremity in 10/2021 showed complete occlusion of the right common femoral artery, opacifying anterior and posterior tibial arteries. Had angioplasty 2021  - Continue aspirin and atorvastatin     Coronary artery disease s/p CABG- 2013  - Continue aspirin, atorvastatin     Isolated elevated Alk phos   - ? Due to antibiotics   - Patient asymptomatic  - We will monitor     Ppx: Lovenox  Dispo: pending SNF placement    Subjective:     Chief Complaint: No chief complaint on file. Patient was seen and examined at bedside. Was sitting on the side of the bed. Has been tolerating diet well. Bowel movements are regular. Denies any other active complaints.  Wants to get out of the room by getting in wheel chair. Review of Systems:    Review of Systems    10 point ROS negative except as stated above. Objective: Intake/Output Summary (Last 24 hours) at 1/4/2023 1240  Last data filed at 1/4/2023 0232  Gross per 24 hour   Intake --   Output 500 ml   Net -500 ml          Vitals:   Vitals:    01/04/23 1058   BP: (!) 160/89   Pulse: 93   Resp: 18   Temp: 98.1 °F (36.7 °C)   SpO2:        Physical Exam:     General: NAD  Eyes: EOMI  ENT: neck supple  Cardiovascular: Regular rate. Respiratory: Clear to auscultation  Gastrointestinal: Soft, non tender  Genitourinary: no suprapubic tenderness  Musculoskeletal: No edema, RLE wound vac, LLE edema 1+  Skin: warm, dry  Neuro: Alert. Psych: Mood appropriate.      Medications:   Medications:    lidocaine PF  5 mL IntraDERmal Once    sodium chloride flush  5-40 mL IntraVENous 2 times per day    insulin glargine  10 Units SubCUTAneous Nightly    ceFAZolin  2,000 mg IntraVENous 3 times per day    senna  1 tablet Oral Nightly    insulin lispro  4 Units SubCUTAneous TID WC    insulin lispro  0-4 Units SubCUTAneous TID WC    insulin lispro  0-4 Units SubCUTAneous Nightly    sodium chloride flush  5-40 mL IntraVENous 2 times per day    enoxaparin  40 mg SubCUTAneous Daily    nicotine  1 patch TransDERmal Daily    aspirin  81 mg Oral Daily    gabapentin  600 mg Oral TID    atorvastatin  40 mg Oral Nightly      Infusions:    sodium chloride      sodium chloride 25 mL (01/04/23 0244)    dextrose       PRN Meds: sodium chloride flush, 5-40 mL, PRN  sodium chloride, , PRN  oxyCODONE-acetaminophen, 1 tablet, Q4H PRN   Or  oxyCODONE-acetaminophen, 2 tablet, Q4H PRN  HYDROmorphone, 0.5 mg, Q4H PRN  sodium chloride flush, 5-40 mL, PRN  sodium chloride, , PRN  ondansetron, 4 mg, Q8H PRN   Or  ondansetron, 4 mg, Q6H PRN  polyethylene glycol, 17 g, Daily PRN  acetaminophen, 650 mg, Q6H PRN   Or  acetaminophen, 650 mg, Q6H PRN  glucose, 4 tablet, PRN  dextrose bolus, 125 mL, PRN   Or  dextrose bolus, 250 mL, PRN  glucagon (rDNA), 1 mg, PRN  dextrose, , Continuous PRN  hydrALAZINE, 10 mg, Q6H PRN      Labs      Recent Results (from the past 24 hour(s))   POCT Glucose    Collection Time: 01/03/23  5:01 PM   Result Value Ref Range    POC Glucose 156 (H) 70 - 99 MG/DL   POCT Glucose    Collection Time: 01/03/23  7:50 PM   Result Value Ref Range    POC Glucose 201 (H) 70 - 99 MG/DL   POCT Glucose    Collection Time: 01/04/23  8:25 AM   Result Value Ref Range    POC Glucose 154 (H) 70 - 99 MG/DL   POCT Glucose    Collection Time: 01/04/23 12:13 PM   Result Value Ref Range    POC Glucose 193 (H) 70 - 99 MG/DL        Imaging/Diagnostics Last 24 Hours   No results found.     Electronically signed by Adrianna Ocampo MD on 1/4/2023 at 12:40 PM

## 2023-01-04 NOTE — PROGRESS NOTES
Comprehensive Nutrition Assessment    Type and Reason for Visit:  Reassess    Nutrition Recommendations/Plan:   Continue current diet and oral nutrition supplement as ordered   Encourage pt to PCP for further blood sugar mgt after discharge     Malnutrition Assessment:  Malnutrition Status: At risk for malnutrition (Comment) (12/23/22 1503)    Context:  Acute Illness       Nutrition Assessment:    Pt remains on carb controlled diet with oral nutrition suplements, pt consumes %. Sometimes pt will not eat as well due to N/V or depressed feelings per pt. Pt wants to discharge, feels depressed dos. Provided pt with comfort at bedside. Follow as moderate nutrition risk. Nutrition Related Findings: Thin frame, A1c 8.1, Glu 154, some POCT of greater than 200, encouraged pt to follow up with PCP for BS mgt after discharge. +2 Pitting edema in LLE, Last BM 1/3 Wound Type: Surgical Incision, Wound Vac       Current Nutrition Intake & Therapies:    Average Meal Intake: %  Average Supplements Intake: Unable to assess  ADULT DIET; Regular; 5 carb choices (75 gm/meal)  ADULT ORAL NUTRITION SUPPLEMENT; Lunch, Dinner, Breakfast; Diabetic Oral Supplement    Anthropometric Measures:  Height: 5' 7\" (170.2 cm)  Ideal Body Weight (IBW): 148 lbs (67 kg)    Admission Body Weight: 145 lb (65.8 kg) (?)  Current Body Weight: 145 lb (65.8 kg), 98 % IBW. Weight Source: Bed Scale  Current BMI (kg/m2): 22.7  Usual Body Weight: 145 lb 1 oz (65.8 kg) (9/6/22)  % Weight Change (Calculated): 0  Weight Adjustment For: Amputation  Total Adjusted Percentage (Calculated): 5.9  Adjusted Ideal Body Weight (lbs) (Calculated): 139.3 lbs  Adjusted Ideal Body Weight (kg) (Calculated): 63.32 kg  Adjusted % Ideal Body Weight (Calculated): 104.1  Adjusted BMI (kg/m2) (Calculated): 24  BMI Categories: Normal Weight (BMI 18.5-24. 9)    Estimated Daily Nutrient Needs:  Energy Requirements Based On: Kcal/kg  Weight Used for Energy Requirements: Current  Energy (kcal/day): 9695-3803 (30-35 kcal/kg)  Weight Used for Protein Requirements: Ideal  Protein (g/day):  (1.3-1.6 g/kg)  Method Used for Fluid Requirements: 1 ml/kcal  Fluid (ml/day): 9173-4123    Nutrition Diagnosis:   Inadequate protein-energy intake related to increase demand for energy/nutrients as evidenced by wounds    Nutrition Interventions:   Food and/or Nutrient Delivery: Continue Current Diet, Continue Oral Nutrition Supplement  Nutrition Education/Counseling: No recommendation at this time  Coordination of Nutrition Care: Continue to monitor while inpatient  Plan of Care discussed with: pt, PS MD over potential pysch needs/involvement    Goals:  Previous Goal Met: Progressing toward Goal(s)  Goals: Meet at least 75% of estimated needs, by next RD assessment       Nutrition Monitoring and Evaluation:   Behavioral-Environmental Outcomes: None Identified  Food/Nutrient Intake Outcomes: Food and Nutrient Intake, Supplement Intake  Physical Signs/Symptoms Outcomes: Biochemical Data, Meal Time Behavior, Nutrition Focused Physical Findings, Skin, Weight    Discharge Planning:    Continue Oral Nutrition Supplement, Continue current diet, Recommend pursue outpatient nutrition counseling     Bell Doty RD, LD  Contact: [phone]

## 2023-01-04 NOTE — PROGRESS NOTES
Occupational Therapy Treatment Note    Name: Chiquis Hurtado MRN: [id number] :   1960   Date:  2023   Admission Date: 2022 Room:  [address] [place] [address]     Restrictions/Precautions:          General Precautions, Fall Risk, Rt BKA, IV, Bed/chair alarm    Communication with other providers: Per chart review and Nurse patient is appropriate for therapeutic intervention. Subjective:  Patient states:  Agreeable to OT therapy. \"I've already been cleaned up this morning. I can do something in bed. \" Celena Ross getting into the chair due to pain   Pain:   Location, Type, Intensity (0/10 to 10/10):  RLE phantom pain 9/10    Objective:    Observation: in high davey's position upon arrival.   Objective Measures:  Alert and oriented    Treatment, including education:  Therapeutic Exercise:Completed all exercises on EOB. Demo SBA with bed features for all aspects of bed mobility  BUE strengthening ex targeting utilizing 4# free weight shoulder press, chest press, shoulder abd/add, shoulder horiz abd/add, concentric arm circles clockwise/counter clockwise, bicep/triceps curls x2 sets x10 reps. Demo SBA  +verbal/visual cues for pace and corrected techs. All therapeutic intervention performed c emphasis on BUE strengthening and endurance to  increase strength for functional tasks / transfers. Assessment / Impression:    Patient's tolerance of treatment: Well  Adverse Reaction: None  Significant change in status and impact: Improved from initial evaluation  Barriers to improvement: None noted    Safety  Patient educated on role of OT , benefits of OT and rationale for therapeutic intervention. Patient safely in bed and refuse alarm set at end of session due to pt getting up on EOB himself throughout the day, with call light/phone in reach, and nursing aware. Gait belt was used for func transfers / mobility.     Plan for Next Session:    Continue OT POC    Time in:  1119  Time out:  1146  Timed treatment minutes: 27  Total treatment time:  32      Electronically signed by:    ANNE Gibson,   1/4/2023, 11:22 AM

## 2023-01-04 NOTE — PROGRESS NOTES
Physical Therapy    Physical Therapy Treatment Note  Name: Kentrell Corrales MRN: [id number] :   1960   Date:  2023   Admission Date: 2022 Room:  [address] [place] [address]   Restrictions/Precautions:          general precautions, falls, wound vac   Communication with other providers:    Subjective:  Patient states:  agreeable to PT  Pain:   Location, Type, Intensity (0/10 to 10/10): does not rate      Objective:    Observation:  sitting EOB upon entry  Treatment, including education/measures:  Transfers   Supine<>sit: SBA  Sit to stand : CGA  Stand to sit :CGA  SPT:CGA  Static standing @ FWW /c BUE support x ~1.5'. LE placement for optimal COG  Vc for safety during transfers. Gait:  Pt amb with FWW for ~25', 30', 20' /c CGA and close chair follow. Seated rest breaks between sets. Hop-to pattern displayed. Adjusted walker a bit per pt request. Pt shows no postural sway or LOB, does states he has cramping in L gastroc, requiring extended rest break. Edu pt on LLE gastroc/soleus stretching, BLE hip ex's in supine for HEP. Safety  Patient left safely sitting on EOB, with call light/phone in reach and pt refusing bed alarm, stating she has PTSD and has been having it left off. Gait belt was used for transfers and gait. Assessment / Impression:    Pt improves gait tolerance /c distance progression on this date  Patient's tolerance of treatment:  good   Adverse Reaction: na  Significant change in status and impact:  na  Barriers to improvement:  weakness, endurance  Plan for Next Session:    Gait training focus /c chair follow.   Time in:  1245  Time out:  1315  Timed treatment minutes:  30  Total treatment time:  30    Previously filed items:           Short Term Goals  Time Frame for Short Term Goals: 2 weeks  Short Term Goal 1: Pt will perform sit><supine Kash  Short Term Goal 2: Pt will transfer between surfaces SBA  Short Term Goal 3: Pt will ambulate 30ft with RW SBA  Short Term Goal 4: Pt will propel WC 150ft Kash  Short Term Goal 5: Pt will perform standing light dynamic activity x 3 minutes, single UE support SBA    Electronically signed by:    Rosa Black, IRWIN  1/4/2023, 1:24 PM

## 2023-01-05 LAB
GLUCOSE BLD-MCNC: 177 MG/DL (ref 70–99)
GLUCOSE BLD-MCNC: 236 MG/DL (ref 70–99)
GLUCOSE BLD-MCNC: 295 MG/DL (ref 70–99)
GLUCOSE BLD-MCNC: 328 MG/DL (ref 70–99)

## 2023-01-05 PROCEDURE — 2580000003 HC RX 258: Performed by: INTERNAL MEDICINE

## 2023-01-05 PROCEDURE — 1200000000 HC SEMI PRIVATE

## 2023-01-05 PROCEDURE — 6370000000 HC RX 637 (ALT 250 FOR IP): Performed by: STUDENT IN AN ORGANIZED HEALTH CARE EDUCATION/TRAINING PROGRAM

## 2023-01-05 PROCEDURE — 6370000000 HC RX 637 (ALT 250 FOR IP): Performed by: INTERNAL MEDICINE

## 2023-01-05 PROCEDURE — 82962 GLUCOSE BLOOD TEST: CPT

## 2023-01-05 PROCEDURE — 6360000002 HC RX W HCPCS: Performed by: INTERNAL MEDICINE

## 2023-01-05 PROCEDURE — 6370000000 HC RX 637 (ALT 250 FOR IP): Performed by: SURGERY

## 2023-01-05 PROCEDURE — 94761 N-INVAS EAR/PLS OXIMETRY MLT: CPT

## 2023-01-05 RX ORDER — HYDROMORPHONE HYDROCHLORIDE 2 MG/1
1 TABLET ORAL EVERY 6 HOURS PRN
Status: DISCONTINUED | OUTPATIENT
Start: 2023-01-05 | End: 2023-01-09 | Stop reason: HOSPADM

## 2023-01-05 RX ORDER — HYDROMORPHONE HYDROCHLORIDE 2 MG/1
1 TABLET ORAL EVERY 6 HOURS PRN
Status: DISCONTINUED | OUTPATIENT
Start: 2023-01-05 | End: 2023-01-05

## 2023-01-05 RX ADMIN — HYDROMORPHONE HYDROCHLORIDE 1 MG: 2 TABLET ORAL at 09:39

## 2023-01-05 RX ADMIN — CEFAZOLIN 2000 MG: 2 INJECTION, POWDER, FOR SOLUTION INTRAMUSCULAR; INTRAVENOUS at 18:32

## 2023-01-05 RX ADMIN — ENOXAPARIN SODIUM 40 MG: 100 INJECTION SUBCUTANEOUS at 09:31

## 2023-01-05 RX ADMIN — HYDROMORPHONE HYDROCHLORIDE 1 MG: 2 TABLET ORAL at 18:52

## 2023-01-05 RX ADMIN — ASPIRIN 81 MG CHEWABLE TABLET 81 MG: 81 TABLET CHEWABLE at 09:31

## 2023-01-05 RX ADMIN — SODIUM CHLORIDE, PRESERVATIVE FREE 10 ML: 5 INJECTION INTRAVENOUS at 22:08

## 2023-01-05 RX ADMIN — SODIUM CHLORIDE, PRESERVATIVE FREE 10 ML: 5 INJECTION INTRAVENOUS at 22:09

## 2023-01-05 RX ADMIN — INSULIN LISPRO 4 UNITS: 100 INJECTION, SOLUTION INTRAVENOUS; SUBCUTANEOUS at 09:31

## 2023-01-05 RX ADMIN — INSULIN LISPRO 3 UNITS: 100 INJECTION, SOLUTION INTRAVENOUS; SUBCUTANEOUS at 18:27

## 2023-01-05 RX ADMIN — CEFAZOLIN 2000 MG: 2 INJECTION, POWDER, FOR SOLUTION INTRAMUSCULAR; INTRAVENOUS at 03:04

## 2023-01-05 RX ADMIN — INSULIN GLARGINE 10 UNITS: 100 INJECTION, SOLUTION SUBCUTANEOUS at 22:02

## 2023-01-05 RX ADMIN — INSULIN LISPRO 4 UNITS: 100 INJECTION, SOLUTION INTRAVENOUS; SUBCUTANEOUS at 13:24

## 2023-01-05 RX ADMIN — INSULIN LISPRO 4 UNITS: 100 INJECTION, SOLUTION INTRAVENOUS; SUBCUTANEOUS at 18:26

## 2023-01-05 RX ADMIN — INSULIN LISPRO 2 UNITS: 100 INJECTION, SOLUTION INTRAVENOUS; SUBCUTANEOUS at 13:25

## 2023-01-05 RX ADMIN — OXYCODONE AND ACETAMINOPHEN 2 TABLET: 5; 325 TABLET ORAL at 13:21

## 2023-01-05 RX ADMIN — GABAPENTIN 600 MG: 300 CAPSULE ORAL at 22:01

## 2023-01-05 RX ADMIN — ATORVASTATIN CALCIUM 40 MG: 40 TABLET, FILM COATED ORAL at 22:02

## 2023-01-05 RX ADMIN — SENNOSIDES 8.6 MG: 8.6 TABLET, FILM COATED ORAL at 22:02

## 2023-01-05 RX ADMIN — SODIUM CHLORIDE: 9 INJECTION, SOLUTION INTRAVENOUS at 03:03

## 2023-01-05 RX ADMIN — GABAPENTIN 600 MG: 300 CAPSULE ORAL at 13:21

## 2023-01-05 RX ADMIN — OXYCODONE AND ACETAMINOPHEN 2 TABLET: 5; 325 TABLET ORAL at 22:02

## 2023-01-05 RX ADMIN — GABAPENTIN 600 MG: 300 CAPSULE ORAL at 09:31

## 2023-01-05 RX ADMIN — OXYCODONE AND ACETAMINOPHEN 2 TABLET: 5; 325 TABLET ORAL at 03:42

## 2023-01-05 RX ADMIN — SODIUM CHLORIDE, PRESERVATIVE FREE 10 ML: 5 INJECTION INTRAVENOUS at 13:26

## 2023-01-05 RX ADMIN — SODIUM CHLORIDE, PRESERVATIVE FREE 10 ML: 5 INJECTION INTRAVENOUS at 09:32

## 2023-01-05 RX ADMIN — CEFAZOLIN 2000 MG: 2 INJECTION, POWDER, FOR SOLUTION INTRAMUSCULAR; INTRAVENOUS at 09:44

## 2023-01-05 RX ADMIN — INSULIN LISPRO 1 UNITS: 100 INJECTION, SOLUTION INTRAVENOUS; SUBCUTANEOUS at 09:32

## 2023-01-05 RX ADMIN — SODIUM CHLORIDE 25 ML/HR: 9 INJECTION, SOLUTION INTRAVENOUS at 09:43

## 2023-01-05 ASSESSMENT — PAIN DESCRIPTION - DESCRIPTORS
DESCRIPTORS: ACHING;BURNING;THROBBING
DESCRIPTORS: ACHING;CRUSHING
DESCRIPTORS: ACHING;BURNING;THROBBING
DESCRIPTORS: ACHING;CRUSHING;GNAWING

## 2023-01-05 ASSESSMENT — PAIN DESCRIPTION - LOCATION
LOCATION: LEG

## 2023-01-05 ASSESSMENT — PAIN DESCRIPTION - ORIENTATION
ORIENTATION: RIGHT

## 2023-01-05 ASSESSMENT — PAIN SCALES - GENERAL
PAINLEVEL_OUTOF10: 8
PAINLEVEL_OUTOF10: 8
PAINLEVEL_OUTOF10: 10
PAINLEVEL_OUTOF10: 8
PAINLEVEL_OUTOF10: 9

## 2023-01-05 ASSESSMENT — PAIN - FUNCTIONAL ASSESSMENT: PAIN_FUNCTIONAL_ASSESSMENT: INTOLERABLE, UNABLE TO DO ANY ACTIVE OR PASSIVE ACTIVITIES

## 2023-01-05 NOTE — PROGRESS NOTES
V2.0  [facility] Hospitalist Progress Note      Name:  Krista Valderrama /Age/Sex: 1960  (60 y.o. male)   MRN & CSN:  [id number] & [id number] Encounter Date/Time: 2023 2:02 PM EST    Location:  [facility] PCP: Monserrat Church MD       Hospital Day: 19    Assessment and Plan:   Krista Valderrama is a 60 y.o. male with stump infection      Plan: Medically ready for discharge pending SNF placement. Osteomyelitis, and necrotic/gangrenous wound of right stump. Had a fall and dehiscence P/W worsening wound, looks infected with necrotic/gangrenous margins(pic uploaded to media). Pus pockets. - General surgery consulted  - surgical intervention on 22 with wound vac placement  - MRI consistent with osteomyelitis  - ID consulted: To continue IV Ancef for 6 weeks. Follow-up with ID as an outpatient. Pain control: Percocet, will dc Dilaudid  - CRP obtained 2022, trending down. - bCBC  WNL      Type 2 diabetes on long-term insulin  - Increase Lantus 10 units nightly  - Lispro 4 units s with meals  - Low-dose sliding scale  - Plan of care glucose checks  - Hypoglycemia protocol     Diabetic neuropathy  - Continue gabapentin     Peripheral arterial disease  CTA of right lower extremity in 10/2021 showed complete occlusion of the right common femoral artery, opacifying anterior and posterior tibial arteries. Had angioplasty 2021  - Continue aspirin and atorvastatin     Coronary artery disease s/p CABG- 2013  - Continue aspirin, atorvastatin     Isolated elevated Alk phos   - ? Due to antibiotics   - Patient asymptomatic  - We will monitor     Ppx: Lovenox  Dispo: pending SNF placement    Subjective:     Chief Complaint: No chief complaint on file. Patient was seen and examined at bedside. Was sitting on the side of the bed.   Very agitated today as his SNF approval is taking so long and because I discontinued his dilaudid, discussed with him need of decreasing the dose of his opioid pain medications, he wants to have dilaudid as that controls his pain. Wants his compression stocking for L leg. Review of Systems:    Review of Systems    10 point ROS negative except as stated above. Objective: Intake/Output Summary (Last 24 hours) at 1/5/2023 1523  Last data filed at 1/5/2023 0944  Gross per 24 hour   Intake --   Output 1400 ml   Net -1400 ml          Vitals:   Vitals:    01/05/23 1351   BP:    Pulse:    Resp: 16   Temp:    SpO2:        Physical Exam:     General: NAD  Eyes: EOMI  ENT: neck supple  Cardiovascular: Regular rate. Respiratory: Clear to auscultation  Gastrointestinal: Soft, non tender  Genitourinary: no suprapubic tenderness  Musculoskeletal: No edema, RLE wound vac, LLE edema 1+ pitting,   Skin: warm, dry  Neuro: Alert. Psych: Mood appropriate.      Medications:   Medications:    lidocaine PF  5 mL IntraDERmal Once    sodium chloride flush  5-40 mL IntraVENous 2 times per day    insulin glargine  10 Units SubCUTAneous Nightly    ceFAZolin  2,000 mg IntraVENous 3 times per day    senna  1 tablet Oral Nightly    insulin lispro  4 Units SubCUTAneous TID WC    insulin lispro  0-4 Units SubCUTAneous TID WC    insulin lispro  0-4 Units SubCUTAneous Nightly    sodium chloride flush  5-40 mL IntraVENous 2 times per day    enoxaparin  40 mg SubCUTAneous Daily    nicotine  1 patch TransDERmal Daily    aspirin  81 mg Oral Daily    gabapentin  600 mg Oral TID    atorvastatin  40 mg Oral Nightly      Infusions:    sodium chloride      sodium chloride 25 mL/hr (01/05/23 0943)    dextrose       PRN Meds: HYDROmorphone, 1 mg, Q6H PRN  sodium chloride flush, 5-40 mL, PRN  sodium chloride, , PRN  oxyCODONE-acetaminophen, 1 tablet, Q4H PRN   Or  oxyCODONE-acetaminophen, 2 tablet, Q4H PRN  sodium chloride flush, 5-40 mL, PRN  sodium chloride, , PRN  ondansetron, 4 mg, Q8H PRN   Or  ondansetron, 4 mg, Q6H PRN  polyethylene glycol, 17 g, Daily PRN  acetaminophen, 650 mg, Q6H PRN Or  acetaminophen, 650 mg, Q6H PRN  glucose, 4 tablet, PRN  dextrose bolus, 125 mL, PRN   Or  dextrose bolus, 250 mL, PRN  glucagon (rDNA), 1 mg, PRN  dextrose, , Continuous PRN  hydrALAZINE, 10 mg, Q6H PRN      Labs      Recent Results (from the past 24 hour(s))   POCT Glucose    Collection Time: 01/04/23  5:17 PM   Result Value Ref Range    POC Glucose 181 (H) 70 - 99 MG/DL   POCT Glucose    Collection Time: 01/04/23  9:39 PM   Result Value Ref Range    POC Glucose 258 (H) 70 - 99 MG/DL   POCT Glucose    Collection Time: 01/05/23  7:47 AM   Result Value Ref Range    POC Glucose 236 (H) 70 - 99 MG/DL   POCT Glucose    Collection Time: 01/05/23 12:02 PM   Result Value Ref Range    POC Glucose 295 (H) 70 - 99 MG/DL        Imaging/Diagnostics Last 24 Hours   No results found.     Electronically signed by Mary Jo Peterson MD on 1/5/2023 at 3:23 PM

## 2023-01-05 NOTE — CARE COORDINATION
Per Yulia Quinonez by [facility] website precert still pending. I sent yet another email to [address] [place] [address] Management team to request assistance with discharge planning.

## 2023-01-06 LAB
GLUCOSE BLD-MCNC: 204 MG/DL (ref 70–99)
GLUCOSE BLD-MCNC: 244 MG/DL (ref 70–99)
GLUCOSE BLD-MCNC: 346 MG/DL (ref 70–99)
GLUCOSE BLD-MCNC: 347 MG/DL (ref 70–99)
GLUCOSE BLD-MCNC: 385 MG/DL (ref 70–99)

## 2023-01-06 PROCEDURE — 6370000000 HC RX 637 (ALT 250 FOR IP): Performed by: STUDENT IN AN ORGANIZED HEALTH CARE EDUCATION/TRAINING PROGRAM

## 2023-01-06 PROCEDURE — 2580000003 HC RX 258: Performed by: INTERNAL MEDICINE

## 2023-01-06 PROCEDURE — 82962 GLUCOSE BLOOD TEST: CPT

## 2023-01-06 PROCEDURE — 97605 NEG PRS WND THER DME<=50SQCM: CPT

## 2023-01-06 PROCEDURE — 6360000002 HC RX W HCPCS: Performed by: INTERNAL MEDICINE

## 2023-01-06 PROCEDURE — 6370000000 HC RX 637 (ALT 250 FOR IP): Performed by: INTERNAL MEDICINE

## 2023-01-06 PROCEDURE — 1200000000 HC SEMI PRIVATE

## 2023-01-06 RX ADMIN — GABAPENTIN 600 MG: 300 CAPSULE ORAL at 08:47

## 2023-01-06 RX ADMIN — ASPIRIN 81 MG CHEWABLE TABLET 81 MG: 81 TABLET CHEWABLE at 08:46

## 2023-01-06 RX ADMIN — CEFAZOLIN 2000 MG: 2 INJECTION, POWDER, FOR SOLUTION INTRAMUSCULAR; INTRAVENOUS at 10:14

## 2023-01-06 RX ADMIN — ATORVASTATIN CALCIUM 40 MG: 40 TABLET, FILM COATED ORAL at 21:35

## 2023-01-06 RX ADMIN — GABAPENTIN 600 MG: 300 CAPSULE ORAL at 21:35

## 2023-01-06 RX ADMIN — OXYCODONE AND ACETAMINOPHEN 2 TABLET: 5; 325 TABLET ORAL at 21:34

## 2023-01-06 RX ADMIN — HYDROMORPHONE HYDROCHLORIDE 1 MG: 2 TABLET ORAL at 11:37

## 2023-01-06 RX ADMIN — INSULIN LISPRO 4 UNITS: 100 INJECTION, SOLUTION INTRAVENOUS; SUBCUTANEOUS at 09:34

## 2023-01-06 RX ADMIN — INSULIN LISPRO 1 UNITS: 100 INJECTION, SOLUTION INTRAVENOUS; SUBCUTANEOUS at 15:19

## 2023-01-06 RX ADMIN — CEFAZOLIN 2000 MG: 2 INJECTION, POWDER, FOR SOLUTION INTRAMUSCULAR; INTRAVENOUS at 02:43

## 2023-01-06 RX ADMIN — OXYCODONE AND ACETAMINOPHEN 2 TABLET: 5; 325 TABLET ORAL at 15:25

## 2023-01-06 RX ADMIN — GABAPENTIN 600 MG: 300 CAPSULE ORAL at 15:18

## 2023-01-06 RX ADMIN — HYDROMORPHONE HYDROCHLORIDE 1 MG: 2 TABLET ORAL at 02:44

## 2023-01-06 RX ADMIN — HYDROMORPHONE HYDROCHLORIDE 1 MG: 2 TABLET ORAL at 18:29

## 2023-01-06 RX ADMIN — INSULIN LISPRO 4 UNITS: 100 INJECTION, SOLUTION INTRAVENOUS; SUBCUTANEOUS at 17:36

## 2023-01-06 RX ADMIN — SODIUM CHLORIDE, PRESERVATIVE FREE 10 ML: 5 INJECTION INTRAVENOUS at 08:48

## 2023-01-06 RX ADMIN — INSULIN LISPRO 1 UNITS: 100 INJECTION, SOLUTION INTRAVENOUS; SUBCUTANEOUS at 09:34

## 2023-01-06 RX ADMIN — SODIUM CHLORIDE, PRESERVATIVE FREE 10 ML: 5 INJECTION INTRAVENOUS at 21:36

## 2023-01-06 RX ADMIN — ENOXAPARIN SODIUM 40 MG: 100 INJECTION SUBCUTANEOUS at 08:47

## 2023-01-06 RX ADMIN — CEFAZOLIN 2000 MG: 2 INJECTION, POWDER, FOR SOLUTION INTRAMUSCULAR; INTRAVENOUS at 17:41

## 2023-01-06 RX ADMIN — SODIUM CHLORIDE: 9 INJECTION, SOLUTION INTRAVENOUS at 02:40

## 2023-01-06 RX ADMIN — INSULIN LISPRO 3 UNITS: 100 INJECTION, SOLUTION INTRAVENOUS; SUBCUTANEOUS at 17:37

## 2023-01-06 RX ADMIN — OXYCODONE AND ACETAMINOPHEN 2 TABLET: 5; 325 TABLET ORAL at 08:46

## 2023-01-06 RX ADMIN — INSULIN LISPRO 4 UNITS: 100 INJECTION, SOLUTION INTRAVENOUS; SUBCUTANEOUS at 15:18

## 2023-01-06 RX ADMIN — INSULIN LISPRO 4 UNITS: 100 INJECTION, SOLUTION INTRAVENOUS; SUBCUTANEOUS at 22:42

## 2023-01-06 RX ADMIN — SODIUM CHLORIDE, PRESERVATIVE FREE 10 ML: 5 INJECTION INTRAVENOUS at 08:47

## 2023-01-06 ASSESSMENT — PAIN - FUNCTIONAL ASSESSMENT
PAIN_FUNCTIONAL_ASSESSMENT: PREVENTS OR INTERFERES SOME ACTIVE ACTIVITIES AND ADLS
PAIN_FUNCTIONAL_ASSESSMENT: PREVENTS OR INTERFERES SOME ACTIVE ACTIVITIES AND ADLS
PAIN_FUNCTIONAL_ASSESSMENT: PREVENTS OR INTERFERES WITH ALL ACTIVE AND SOME PASSIVE ACTIVITIES
PAIN_FUNCTIONAL_ASSESSMENT: PREVENTS OR INTERFERES WITH ALL ACTIVE AND SOME PASSIVE ACTIVITIES

## 2023-01-06 ASSESSMENT — PAIN SCALES - GENERAL
PAINLEVEL_OUTOF10: 10
PAINLEVEL_OUTOF10: 10
PAINLEVEL_OUTOF10: 9
PAINLEVEL_OUTOF10: 9
PAINLEVEL_OUTOF10: 8
PAINLEVEL_OUTOF10: 10

## 2023-01-06 ASSESSMENT — PAIN DESCRIPTION - LOCATION
LOCATION: LEG
LOCATION: LEG
LOCATION: LEG;NECK
LOCATION: LEG
LOCATION: LEG

## 2023-01-06 ASSESSMENT — PAIN DESCRIPTION - ORIENTATION
ORIENTATION: RIGHT
ORIENTATION: RIGHT

## 2023-01-06 ASSESSMENT — PAIN DESCRIPTION - DESCRIPTORS
DESCRIPTORS: ACHING;CRUSHING;GNAWING
DESCRIPTORS: ACHING;CRUSHING;SORE
DESCRIPTORS: ACHING;CRUSHING;GNAWING

## 2023-01-06 NOTE — CARE COORDINATION
I logged into [facility] by [employer] to check status of precert and I have now been blocked from accessing pt's chart. I received the following email from [employer] requesting assistance from Case Management:   \"You can do an rrequest by calling [phone] in the future and providing member name and spelling, , auth number, requesting facility, servicing facility, and any other pertinent information such as fax and phone. This line does not return calls so leave a detailed message. Response should be expected within 24 hrs. Thank you for contacting [facility]. If you need further assistance, please feel free to contact us via the web portal or call Provider Services for Medicaid, Behavioral Health and [facility] at [phone] or [facility] at [phone]. \"    I did call [phone], there was not an option to enter an extension number therefore I requested transfer to the \"\". I verbally gave extension, transferred to a generic line that just said to leave a message, as I was leaving a voicemail the line disconnected. Attempted to recall and \"\" did not  after waiting 15mins on line. I spoke with Franci at [facility] and still showing precert pending, they have tried to call [employer] several time however the lines still stating not taking calls due to an emergency.
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
## 2023-01-06 NOTE — PROGRESS NOTES
V2.0  [facility] Hospitalist Progress Note      Name:  Kentrell Corrales /Age/Sex: 1960  (60 y.o. male)   MRN & CSN:  [id number] & [id number] Encounter Date/Time: 2023 2:02 PM EST    Location:  [address] [place], [place] [id number] PCP: Nathaly Mclean MD       Hospital Day: 20    Assessment and Plan:   Kentrell Corrales is a 60 y.o. male with stump infection      Plan: Medically ready for discharge pending SNF placement. Osteomyelitis, and necrotic/gangrenous wound of right stump. Had a fall and dehiscence P/W worsening wound, looks infected with necrotic/gangrenous margins  - General surgery consulted  - surgical intervention on 22 with wound vac placement  - MRI consistent with osteomyelitis  - ID consulted: To continue IV Ancef for 6 weeks. Follow-up with ID as an outpatient. Pain control: Percocet, will dc Dilaudid  - CRP obtained 2022, trending down. Type 2 diabetes on long-term insulin  - Increase Lantus 10 units nightly  - Lispro 4 units with meals  - Low-dose sliding scale  - POC glucose checks  - Hypoglycemia protocol     Diabetic neuropathy  - Continue gabapentin     Peripheral arterial disease  CTA of right lower extremity in 10/2021 showed complete occlusion of the right common femoral artery, opacifying anterior and posterior tibial arteries. Had angioplasty 2021  - Continue aspirin and atorvastatin     Coronary artery disease s/p CABG- 2013  - Continue aspirin, atorvastatin     Isolated elevated Alk phos   - ? Due to antibiotics   - Patient asymptomatic  - We will monitor     Ppx: Lovenox  Dispo: pending SNF placement    Subjective:     Chief Complaint: No chief complaint on file. Patient was seen and examined at bedside. Was sitting on the side of the bed.   Very agitated today as his SNF approval is taking so long and because I discontinued his dilaudid, discussed with him need of decreasing the dose of his opioid pain medications, he wants to have dilaudid as that controls his pain.  Wants his compression stocking for L leg. Review of Systems:    Review of Systems    10 point ROS negative except as stated above. Objective: Intake/Output Summary (Last 24 hours) at 1/6/2023 1124  Last data filed at 1/5/2023 2232  Gross per 24 hour   Intake 240 ml   Output 750 ml   Net -510 ml          Vitals:   Vitals:    01/06/23 1022   BP: (!) 142/86   Pulse: 88   Resp: 18   Temp: 98.3 °F (36.8 °C)   SpO2: 96%       Physical Exam:     General: NAD  Eyes: EOMI  ENT: neck supple  Cardiovascular: Regular rate. Respiratory: Clear to auscultation  Gastrointestinal: Soft, non tender  Genitourinary: no suprapubic tenderness  Musculoskeletal: No edema, RLE wound vac, LLE edema 1+ pitting,   Skin: warm, dry  Neuro: Alert. Psych: Mood appropriate.      Medications:   Medications:    lidocaine PF  5 mL IntraDERmal Once    sodium chloride flush  5-40 mL IntraVENous 2 times per day    insulin glargine  10 Units SubCUTAneous Nightly    ceFAZolin  2,000 mg IntraVENous 3 times per day    senna  1 tablet Oral Nightly    insulin lispro  4 Units SubCUTAneous TID WC    insulin lispro  0-4 Units SubCUTAneous TID WC    insulin lispro  0-4 Units SubCUTAneous Nightly    sodium chloride flush  5-40 mL IntraVENous 2 times per day    enoxaparin  40 mg SubCUTAneous Daily    nicotine  1 patch TransDERmal Daily    aspirin  81 mg Oral Daily    gabapentin  600 mg Oral TID    atorvastatin  40 mg Oral Nightly      Infusions:    sodium chloride      sodium chloride 25 mL/hr at 01/06/23 0240    dextrose       PRN Meds: HYDROmorphone, 1 mg, Q6H PRN  sodium chloride flush, 5-40 mL, PRN  sodium chloride, , PRN  oxyCODONE-acetaminophen, 1 tablet, Q4H PRN   Or  oxyCODONE-acetaminophen, 2 tablet, Q4H PRN  sodium chloride flush, 5-40 mL, PRN  sodium chloride, , PRN  ondansetron, 4 mg, Q8H PRN   Or  ondansetron, 4 mg, Q6H PRN  polyethylene glycol, 17 g, Daily PRN  acetaminophen, 650 mg, Q6H PRN   Or  acetaminophen, 650 mg, Q6H PRN  glucose, 4 tablet, PRN  dextrose bolus, 125 mL, PRN   Or  dextrose bolus, 250 mL, PRN  glucagon (rDNA), 1 mg, PRN  dextrose, , Continuous PRN  hydrALAZINE, 10 mg, Q6H PRN      Labs      Recent Results (from the past 24 hour(s))   POCT Glucose    Collection Time: 01/05/23 12:02 PM   Result Value Ref Range    POC Glucose 295 (H) 70 - 99 MG/DL   POCT Glucose    Collection Time: 01/05/23  5:13 PM   Result Value Ref Range    POC Glucose 328 (H) 70 - 99 MG/DL   POCT Glucose    Collection Time: 01/05/23  8:14 PM   Result Value Ref Range    POC Glucose 177 (H) 70 - 99 MG/DL   POCT Glucose    Collection Time: 01/06/23  8:13 AM   Result Value Ref Range    POC Glucose 204 (H) 70 - 99 MG/DL        Imaging/Diagnostics Last 24 Hours   No results found.     Electronically signed by Panchito Argueta MD on 1/6/2023 at 11:25 AM

## 2023-01-07 LAB
GLUCOSE BLD-MCNC: 197 MG/DL (ref 70–99)
GLUCOSE BLD-MCNC: 296 MG/DL (ref 70–99)
GLUCOSE BLD-MCNC: 328 MG/DL (ref 70–99)
GLUCOSE BLD-MCNC: 407 MG/DL (ref 70–99)
GLUCOSE BLD-MCNC: 520 MG/DL (ref 70–99)
GLUCOSE BLD-MCNC: 92 MG/DL (ref 70–99)
GLUCOSE BLD-MCNC: 97 MG/DL (ref 70–99)

## 2023-01-07 PROCEDURE — 6370000000 HC RX 637 (ALT 250 FOR IP): Performed by: INTERNAL MEDICINE

## 2023-01-07 PROCEDURE — 2580000003 HC RX 258: Performed by: INTERNAL MEDICINE

## 2023-01-07 PROCEDURE — 6370000000 HC RX 637 (ALT 250 FOR IP): Performed by: STUDENT IN AN ORGANIZED HEALTH CARE EDUCATION/TRAINING PROGRAM

## 2023-01-07 PROCEDURE — 6370000000 HC RX 637 (ALT 250 FOR IP): Performed by: SURGERY

## 2023-01-07 PROCEDURE — 94761 N-INVAS EAR/PLS OXIMETRY MLT: CPT

## 2023-01-07 PROCEDURE — 6360000002 HC RX W HCPCS: Performed by: INTERNAL MEDICINE

## 2023-01-07 PROCEDURE — 1200000000 HC SEMI PRIVATE

## 2023-01-07 PROCEDURE — 82962 GLUCOSE BLOOD TEST: CPT

## 2023-01-07 RX ADMIN — HYDROMORPHONE HYDROCHLORIDE 1 MG: 2 TABLET ORAL at 01:55

## 2023-01-07 RX ADMIN — INSULIN LISPRO 16 UNITS: 100 INJECTION, SOLUTION INTRAVENOUS; SUBCUTANEOUS at 18:18

## 2023-01-07 RX ADMIN — ASPIRIN 81 MG CHEWABLE TABLET 81 MG: 81 TABLET CHEWABLE at 10:01

## 2023-01-07 RX ADMIN — OXYCODONE AND ACETAMINOPHEN 2 TABLET: 5; 325 TABLET ORAL at 06:38

## 2023-01-07 RX ADMIN — INSULIN LISPRO 5 UNITS: 100 INJECTION, SOLUTION INTRAVENOUS; SUBCUTANEOUS at 13:20

## 2023-01-07 RX ADMIN — OXYCODONE AND ACETAMINOPHEN 2 TABLET: 5; 325 TABLET ORAL at 18:55

## 2023-01-07 RX ADMIN — CEFAZOLIN 2000 MG: 2 INJECTION, POWDER, FOR SOLUTION INTRAMUSCULAR; INTRAVENOUS at 02:03

## 2023-01-07 RX ADMIN — INSULIN LISPRO 4 UNITS: 100 INJECTION, SOLUTION INTRAVENOUS; SUBCUTANEOUS at 13:19

## 2023-01-07 RX ADMIN — ENOXAPARIN SODIUM 40 MG: 100 INJECTION SUBCUTANEOUS at 10:01

## 2023-01-07 RX ADMIN — ATORVASTATIN CALCIUM 40 MG: 40 TABLET, FILM COATED ORAL at 20:28

## 2023-01-07 RX ADMIN — CEFAZOLIN 2000 MG: 2 INJECTION, POWDER, FOR SOLUTION INTRAMUSCULAR; INTRAVENOUS at 18:56

## 2023-01-07 RX ADMIN — SODIUM CHLORIDE, PRESERVATIVE FREE 10 ML: 5 INJECTION INTRAVENOUS at 10:01

## 2023-01-07 RX ADMIN — GABAPENTIN 600 MG: 300 CAPSULE ORAL at 13:47

## 2023-01-07 RX ADMIN — ONDANSETRON 4 MG: 2 INJECTION INTRAMUSCULAR; INTRAVENOUS at 20:27

## 2023-01-07 RX ADMIN — SENNOSIDES 8.6 MG: 8.6 TABLET, FILM COATED ORAL at 20:28

## 2023-01-07 RX ADMIN — INSULIN LISPRO 4 UNITS: 100 INJECTION, SOLUTION INTRAVENOUS; SUBCUTANEOUS at 18:13

## 2023-01-07 RX ADMIN — GABAPENTIN 600 MG: 300 CAPSULE ORAL at 10:01

## 2023-01-07 RX ADMIN — CEFAZOLIN 2000 MG: 2 INJECTION, POWDER, FOR SOLUTION INTRAMUSCULAR; INTRAVENOUS at 10:04

## 2023-01-07 RX ADMIN — HYDROMORPHONE HYDROCHLORIDE 1 MG: 2 TABLET ORAL at 16:43

## 2023-01-07 RX ADMIN — GABAPENTIN 600 MG: 300 CAPSULE ORAL at 20:28

## 2023-01-07 RX ADMIN — INSULIN GLARGINE 10 UNITS: 100 INJECTION, SOLUTION SUBCUTANEOUS at 01:50

## 2023-01-07 RX ADMIN — INSULIN LISPRO 4 UNITS: 100 INJECTION, SOLUTION INTRAVENOUS; SUBCUTANEOUS at 09:56

## 2023-01-07 RX ADMIN — OXYCODONE AND ACETAMINOPHEN 2 TABLET: 5; 325 TABLET ORAL at 13:46

## 2023-01-07 ASSESSMENT — PAIN SCALES - GENERAL
PAINLEVEL_OUTOF10: 8
PAINLEVEL_OUTOF10: 8
PAINLEVEL_OUTOF10: 9
PAINLEVEL_OUTOF10: 8
PAINLEVEL_OUTOF10: 10
PAINLEVEL_OUTOF10: 8
PAINLEVEL_OUTOF10: 9

## 2023-01-07 ASSESSMENT — PAIN DESCRIPTION - LOCATION
LOCATION: LEG
LOCATION: KNEE
LOCATION: KNEE

## 2023-01-07 ASSESSMENT — PAIN SCALES - WONG BAKER
WONGBAKER_NUMERICALRESPONSE: 0
WONGBAKER_NUMERICALRESPONSE: 6

## 2023-01-07 ASSESSMENT — PAIN DESCRIPTION - DESCRIPTORS
DESCRIPTORS: ACHING

## 2023-01-07 ASSESSMENT — PAIN DESCRIPTION - PAIN TYPE: TYPE: CHRONIC PAIN

## 2023-01-07 ASSESSMENT — PAIN - FUNCTIONAL ASSESSMENT: PAIN_FUNCTIONAL_ASSESSMENT: PREVENTS OR INTERFERES SOME ACTIVE ACTIVITIES AND ADLS

## 2023-01-07 ASSESSMENT — PAIN DESCRIPTION - ORIENTATION
ORIENTATION: RIGHT

## 2023-01-07 NOTE — PLAN OF CARE
Problem: Chronic Conditions and Co-morbidities  Goal: Patient's chronic conditions and co-morbidity symptoms are monitored and maintained or improved  1/7/2023 1157 by Krystle Rai LPN  Outcome: Progressing  1/7/2023 0336 by Riri King RN  Outcome: Progressing     Problem: Discharge Planning  Goal: Discharge to home or other facility with appropriate resources  1/7/2023 1157 by Krystle Rai LPN  Outcome: Progressing  1/7/2023 0336 by Riri King RN  Outcome: Progressing     Problem: Pain  Goal: Verbalizes/displays adequate comfort level or baseline comfort level  1/7/2023 1157 by Krystle Rai LPN  Outcome: Progressing  1/7/2023 0336 by Riri King RN  Outcome: Progressing     Problem: Safety - Adult  Goal: Free from fall injury  1/7/2023 1157 by Krystle Rai LPN  Outcome: Progressing  1/7/2023 0336 by Riri King RN  Outcome: Progressing     Problem: ABCDS Injury Assessment  Goal: Absence of physical injury  1/7/2023 1157 by Krystle Rai LPN  Outcome: Progressing  1/7/2023 0336 by Riri King RN  Outcome: Progressing     Problem: Skin/Tissue Integrity  Goal: Absence of new skin breakdown  Description: 1. Monitor for areas of redness and/or skin breakdown  2. Assess vascular access sites hourly  3. Every 4-6 hours minimum:  Change oxygen saturation probe site  4. Every 4-6 hours:  If on nasal continuous positive airway pressure, respiratory therapy assess nares and determine need for appliance change or resting period.   1/7/2023 1157 by Krystle Rai LPN  Outcome: Progressing  1/7/2023 0336 by Riri King RN  Outcome: Progressing     Problem: Nutrition Deficit:  Goal: Optimize nutritional status  1/7/2023 1157 by Krystle Rai LPN  Outcome: Progressing  1/7/2023 0336 by Riri King RN  Outcome: Progressing

## 2023-01-07 NOTE — PROGRESS NOTES
V2.0  [facility] Hospitalist Progress Note      Name:  Carlos Alberto Lyman /Age/Sex: 1960  (62 y.o. male)   MRN & CSN:  [id number] & [id number] Encounter Date/Time: 2023 2:02 PM EST    Location:  [address] [place] [address] PCP: Fortino Perez MD       Hospital Day:     Assessment and Plan:   Carlos Alberto Lyman is a 62 y.o. male with stump infection      Plan: Medically ready for discharge pending SNF placement.    Osteomyelitis, and necrotic/gangrenous wound of right stump.  Had a fall and dehiscence P/W worsening wound, looks infected with necrotic/gangrenous margins  - General surgery consulted  - surgical intervention on 22 with wound vac placement  - MRI consistent with osteomyelitis  - ID consulted: To continue IV Ancef for 6 weeks.  Follow-up with ID as an outpatient.  Pain control: Percocet, will dc Dilaudid  - CRP obtained 2022, trending down.    Type 2 diabetes on long-term insulin  - Increase Lantus 10 units nightly  - Lispro 4 units with meals  - Low-dose sliding scale  - POC glucose checks  - Hypoglycemia protocol     Diabetic neuropathy  - Continue gabapentin     Peripheral arterial disease  CTA of right lower extremity in 10/2021 showed complete occlusion of the right common femoral artery, opacifying anterior and posterior tibial arteries. Had angioplasty 2021  - Continue aspirin and atorvastatin     Coronary artery disease s/p CABG- 2013  - Continue aspirin, atorvastatin     Isolated elevated Alk phos   - ? Due to antibiotics   - Patient asymptomatic  - We will monitor     Ppx: Lovenox  Dispo: pending SNF placement    Subjective:     Chief Complaint: No chief complaint on file.     Patient was seen and examined at bedside.  Was sitting on the side of the bed.  He feels that he wants to leave the hospital, feels like in intermediate, he would like to get out of his room and move around if able, would like to bathe. Wants his compression stocking for L leg.         Review of Systems:    Review of  Systems    10 point ROS negative except as stated above. Objective: Intake/Output Summary (Last 24 hours) at 1/7/2023 1134  Last data filed at 1/7/2023 0639  Gross per 24 hour   Intake --   Output 850 ml   Net -850 ml          Vitals:   Vitals:    01/07/23 0905   BP: (!) 171/84   Pulse: 85   Resp: 16   Temp: 97.9 °F (36.6 °C)   SpO2: 100%       Physical Exam:     General: NAD  Eyes: EOMI  ENT: neck supple  Cardiovascular: Regular rate. Respiratory: Clear to auscultation  Gastrointestinal: Soft, non tender  Genitourinary: no suprapubic tenderness  Musculoskeletal: No edema, RLE wound vac, LLE edema 1+ pitting,   Skin: warm, dry  Neuro: Alert. Psych: Mood appropriate.      Medications:   Medications:    lidocaine PF  5 mL IntraDERmal Once    sodium chloride flush  5-40 mL IntraVENous 2 times per day    insulin glargine  10 Units SubCUTAneous Nightly    ceFAZolin  2,000 mg IntraVENous 3 times per day    senna  1 tablet Oral Nightly    insulin lispro  4 Units SubCUTAneous TID WC    insulin lispro  0-4 Units SubCUTAneous TID WC    insulin lispro  0-4 Units SubCUTAneous Nightly    sodium chloride flush  5-40 mL IntraVENous 2 times per day    enoxaparin  40 mg SubCUTAneous Daily    nicotine  1 patch TransDERmal Daily    aspirin  81 mg Oral Daily    gabapentin  600 mg Oral TID    atorvastatin  40 mg Oral Nightly      Infusions:    sodium chloride      sodium chloride 25 mL/hr at 01/06/23 0240    dextrose       PRN Meds: HYDROmorphone, 1 mg, Q6H PRN  sodium chloride flush, 5-40 mL, PRN  sodium chloride, , PRN  oxyCODONE-acetaminophen, 1 tablet, Q4H PRN   Or  oxyCODONE-acetaminophen, 2 tablet, Q4H PRN  sodium chloride flush, 5-40 mL, PRN  sodium chloride, , PRN  ondansetron, 4 mg, Q8H PRN   Or  ondansetron, 4 mg, Q6H PRN  polyethylene glycol, 17 g, Daily PRN  acetaminophen, 650 mg, Q6H PRN   Or  acetaminophen, 650 mg, Q6H PRN  glucose, 4 tablet, PRN  dextrose bolus, 125 mL, PRN   Or  dextrose bolus, 250 mL, PRN  glucagon (rDNA), 1 mg, PRN  dextrose, , Continuous PRN  hydrALAZINE, 10 mg, Q6H PRN      Labs      Recent Results (from the past 24 hour(s))   POCT Glucose    Collection Time: 01/06/23  1:12 PM   Result Value Ref Range    POC Glucose 244 (H) 70 - 99 MG/DL   POCT Glucose    Collection Time: 01/06/23  4:39 PM   Result Value Ref Range    POC Glucose 347 (H) 70 - 99 MG/DL   POCT Glucose    Collection Time: 01/06/23  8:58 PM   Result Value Ref Range    POC Glucose 385 (H) 70 - 99 MG/DL   POCT Glucose    Collection Time: 01/06/23  9:38 PM   Result Value Ref Range    POC Glucose 346 (H) 70 - 99 MG/DL   POCT Glucose    Collection Time: 01/07/23  1:42 AM   Result Value Ref Range    POC Glucose 296 (H) 70 - 99 MG/DL   POCT Glucose    Collection Time: 01/07/23  9:02 AM   Result Value Ref Range    POC Glucose 197 (H) 70 - 99 MG/DL        Imaging/Diagnostics Last 24 Hours   No results found.     Electronically signed by Terryl Koyanagi, MD on 1/7/2023 at 11:34 AM

## 2023-01-07 NOTE — PROGRESS NOTES
Occupational Therapy    Attempted at 1540 hrs c pt initially agreeable. RN Alexandria notified and arrived. Pt became agitated over recommendation for weaning off IV Dilaudid and declined all therapeutic intervention at this time. \"Just go away and let me talk to my wife first.\" Pt reporting plan to discharge self home. Will not re-attempt this date d/t pt's agitation. Continue per OT POC as schedule allows.     Electronically signed by:    TARIQ Meneses  1/7/2023, 2:22 PM

## 2023-01-08 LAB
GLUCOSE BLD-MCNC: 115 MG/DL (ref 70–99)
GLUCOSE BLD-MCNC: 176 MG/DL (ref 70–99)
GLUCOSE BLD-MCNC: 258 MG/DL (ref 70–99)
GLUCOSE BLD-MCNC: 392 MG/DL (ref 70–99)

## 2023-01-08 PROCEDURE — 6370000000 HC RX 637 (ALT 250 FOR IP): Performed by: STUDENT IN AN ORGANIZED HEALTH CARE EDUCATION/TRAINING PROGRAM

## 2023-01-08 PROCEDURE — 6360000002 HC RX W HCPCS: Performed by: INTERNAL MEDICINE

## 2023-01-08 PROCEDURE — 2580000003 HC RX 258: Performed by: INTERNAL MEDICINE

## 2023-01-08 PROCEDURE — 6370000000 HC RX 637 (ALT 250 FOR IP): Performed by: INTERNAL MEDICINE

## 2023-01-08 PROCEDURE — 1200000000 HC SEMI PRIVATE

## 2023-01-08 PROCEDURE — 6370000000 HC RX 637 (ALT 250 FOR IP): Performed by: SURGERY

## 2023-01-08 PROCEDURE — 82962 GLUCOSE BLOOD TEST: CPT

## 2023-01-08 RX ADMIN — ATORVASTATIN CALCIUM 40 MG: 40 TABLET, FILM COATED ORAL at 22:20

## 2023-01-08 RX ADMIN — GABAPENTIN 600 MG: 300 CAPSULE ORAL at 15:44

## 2023-01-08 RX ADMIN — OXYCODONE AND ACETAMINOPHEN 2 TABLET: 5; 325 TABLET ORAL at 22:32

## 2023-01-08 RX ADMIN — INSULIN LISPRO 4 UNITS: 100 INJECTION, SOLUTION INTRAVENOUS; SUBCUTANEOUS at 18:17

## 2023-01-08 RX ADMIN — SODIUM CHLORIDE, PRESERVATIVE FREE 10 ML: 5 INJECTION INTRAVENOUS at 11:29

## 2023-01-08 RX ADMIN — SODIUM CHLORIDE, PRESERVATIVE FREE 10 ML: 5 INJECTION INTRAVENOUS at 22:29

## 2023-01-08 RX ADMIN — GABAPENTIN 600 MG: 300 CAPSULE ORAL at 11:27

## 2023-01-08 RX ADMIN — GABAPENTIN 600 MG: 300 CAPSULE ORAL at 22:20

## 2023-01-08 RX ADMIN — SENNOSIDES 8.6 MG: 8.6 TABLET, FILM COATED ORAL at 22:20

## 2023-01-08 RX ADMIN — OXYCODONE AND ACETAMINOPHEN 2 TABLET: 5; 325 TABLET ORAL at 15:42

## 2023-01-08 RX ADMIN — CEFAZOLIN 2000 MG: 2 INJECTION, POWDER, FOR SOLUTION INTRAMUSCULAR; INTRAVENOUS at 18:16

## 2023-01-08 RX ADMIN — ENOXAPARIN SODIUM 40 MG: 100 INJECTION SUBCUTANEOUS at 11:28

## 2023-01-08 RX ADMIN — CEFAZOLIN 2000 MG: 2 INJECTION, POWDER, FOR SOLUTION INTRAMUSCULAR; INTRAVENOUS at 11:35

## 2023-01-08 RX ADMIN — INSULIN LISPRO 2 UNITS: 100 INJECTION, SOLUTION INTRAVENOUS; SUBCUTANEOUS at 13:24

## 2023-01-08 RX ADMIN — ASPIRIN 81 MG CHEWABLE TABLET 81 MG: 81 TABLET CHEWABLE at 11:26

## 2023-01-08 RX ADMIN — HYDROMORPHONE HYDROCHLORIDE 1 MG: 2 TABLET ORAL at 09:04

## 2023-01-08 RX ADMIN — CEFAZOLIN 2000 MG: 2 INJECTION, POWDER, FOR SOLUTION INTRAMUSCULAR; INTRAVENOUS at 01:52

## 2023-01-08 RX ADMIN — INSULIN LISPRO 4 UNITS: 100 INJECTION, SOLUTION INTRAVENOUS; SUBCUTANEOUS at 13:24

## 2023-01-08 RX ADMIN — OXYCODONE AND ACETAMINOPHEN 2 TABLET: 5; 325 TABLET ORAL at 04:41

## 2023-01-08 RX ADMIN — INSULIN GLARGINE 10 UNITS: 100 INJECTION, SOLUTION SUBCUTANEOUS at 22:49

## 2023-01-08 RX ADMIN — SODIUM CHLORIDE, PRESERVATIVE FREE 10 ML: 5 INJECTION INTRAVENOUS at 22:20

## 2023-01-08 RX ADMIN — HYDROMORPHONE HYDROCHLORIDE 1 MG: 2 TABLET ORAL at 00:24

## 2023-01-08 ASSESSMENT — PAIN DESCRIPTION - ORIENTATION
ORIENTATION: RIGHT

## 2023-01-08 ASSESSMENT — PAIN SCALES - GENERAL
PAINLEVEL_OUTOF10: 8
PAINLEVEL_OUTOF10: 10
PAINLEVEL_OUTOF10: 8
PAINLEVEL_OUTOF10: 7
PAINLEVEL_OUTOF10: 7

## 2023-01-08 ASSESSMENT — PAIN - FUNCTIONAL ASSESSMENT
PAIN_FUNCTIONAL_ASSESSMENT: PREVENTS OR INTERFERES SOME ACTIVE ACTIVITIES AND ADLS
PAIN_FUNCTIONAL_ASSESSMENT: ACTIVITIES ARE NOT PREVENTED
PAIN_FUNCTIONAL_ASSESSMENT: PREVENTS OR INTERFERES SOME ACTIVE ACTIVITIES AND ADLS
PAIN_FUNCTIONAL_ASSESSMENT: PREVENTS OR INTERFERES SOME ACTIVE ACTIVITIES AND ADLS
PAIN_FUNCTIONAL_ASSESSMENT: ACTIVITIES ARE NOT PREVENTED

## 2023-01-08 ASSESSMENT — PAIN DESCRIPTION - LOCATION
LOCATION: LEG
LOCATION: KNEE
LOCATION: LEG;KNEE
LOCATION: OTHER (COMMENT)
LOCATION: OTHER (COMMENT)

## 2023-01-08 ASSESSMENT — PAIN DESCRIPTION - DESCRIPTORS
DESCRIPTORS: THROBBING;SHARP
DESCRIPTORS: ACHING
DESCRIPTORS: SQUEEZING
DESCRIPTORS: ACHING
DESCRIPTORS: SHARP

## 2023-01-08 NOTE — PROGRESS NOTES
Found chewing tobacco, cigarettes, and lighter in room. Asked pt if items could be locked up in med room and educated smoke/nicotine free facility. Pt did not want to hand over items. I said security could come and talk with him. Pt stated 'call security, go ahead and try, do your best.' Security called.

## 2023-01-08 NOTE — PROGRESS NOTES
Pt in [place] talking to the tele tech about wanting to leave. Pt returned to room. Pt disconnected wound vac. Refused to be reconnected.

## 2023-01-08 NOTE — PROGRESS NOTES
V2.0  [facility] [place]      Name:  Tyshawn Boggs /Age/Sex: 1960  (60 y.o. male)   MRN & CSN:  [id number] & [id number] Encounter Date/Time: 2023 2:02 PM EST    Location:  [facility] PCP: Araceli Mckeon MD       Hospital Day:     Assessment and Plan:   Tyshawn Boggs is a 60 y.o. male with stump infection      Plan: Medically ready for discharge pending SNF placement. Osteomyelitis, and necrotic/gangrenous wound of right stump. Had a fall and dehiscence P/W worsening wound, looks infected with necrotic/gangrenous margins  - General surgery consulted  - surgical intervention on 22 with wound vac placement  - MRI consistent with osteomyelitis  - ID consulted: To continue IV Ancef for 6 weeks. Follow-up with ID as an outpatient. Pain control: Percocet, and Dilaudid  - CRP obtained 2022, trending down. Type 2 diabetes on long-term insulin  - Increase Lantus 10 units nightly  - Lispro 4 units with meals  - Low-dose sliding scale  - POC glucose checks  - Hypoglycemia protocol     Diabetic neuropathy  - Continue gabapentin     Peripheral arterial disease  CTA of right lower extremity in 10/2021 showed complete occlusion of the right common femoral artery, opacifying anterior and posterior tibial arteries. Had angioplasty 2021  - Continue aspirin and atorvastatin     Coronary artery disease s/p CABG- 2013  - Continue aspirin, atorvastatin     Isolated elevated Alk phos   - ? Due to antibiotics   - Patient asymptomatic  - We will monitor     Ppx: Lovenox  Dispo: pending SNF placement    Subjective:     Chief Complaint: No chief complaint on file. Patient was seen and examined at bedside. Was sitting on the side of the bed. He feels that he is in the prison as he is just in his room for so long. He wants to get out of the room in a wheelchair. States that pain is well controlled with pain medication.       Review of Systems:    Review of Systems    10 point ROS negative except as stated above. Objective:   No intake or output data in the 24 hours ending 01/08/23 0830       Vitals:   Vitals:    01/08/23 0441   BP:    Pulse:    Resp: 16   Temp:    SpO2:        Physical Exam:     General: NAD  Eyes: EOMI  ENT: neck supple  Cardiovascular: Regular rate. Respiratory: Clear to auscultation  Gastrointestinal: Soft, non tender  Genitourinary: no suprapubic tenderness  Musculoskeletal: No edema, RLE wound vac, LLE edema 1+ pitting,   Skin: warm, dry  Neuro: Alert. Psych: Mood appropriate.      Medications:   Medications:    lidocaine PF  5 mL IntraDERmal Once    sodium chloride flush  5-40 mL IntraVENous 2 times per day    insulin glargine  10 Units SubCUTAneous Nightly    ceFAZolin  2,000 mg IntraVENous 3 times per day    senna  1 tablet Oral Nightly    insulin lispro  4 Units SubCUTAneous TID WC    insulin lispro  0-4 Units SubCUTAneous TID WC    insulin lispro  0-4 Units SubCUTAneous Nightly    sodium chloride flush  5-40 mL IntraVENous 2 times per day    enoxaparin  40 mg SubCUTAneous Daily    nicotine  1 patch TransDERmal Daily    aspirin  81 mg Oral Daily    gabapentin  600 mg Oral TID    atorvastatin  40 mg Oral Nightly      Infusions:    sodium chloride      sodium chloride 25 mL/hr at 01/06/23 0240    dextrose       PRN Meds: HYDROmorphone, 1 mg, Q6H PRN  sodium chloride flush, 5-40 mL, PRN  sodium chloride, , PRN  oxyCODONE-acetaminophen, 1 tablet, Q4H PRN   Or  oxyCODONE-acetaminophen, 2 tablet, Q4H PRN  sodium chloride flush, 5-40 mL, PRN  sodium chloride, , PRN  ondansetron, 4 mg, Q8H PRN   Or  ondansetron, 4 mg, Q6H PRN  polyethylene glycol, 17 g, Daily PRN  acetaminophen, 650 mg, Q6H PRN   Or  acetaminophen, 650 mg, Q6H PRN  glucose, 4 tablet, PRN  dextrose bolus, 125 mL, PRN   Or  dextrose bolus, 250 mL, PRN  glucagon (rDNA), 1 mg, PRN  dextrose, , Continuous PRN  hydrALAZINE, 10 mg, Q6H PRN      Labs      Recent Results (from the past 24 hour(s))   POCT Glucose Collection Time: 01/07/23  9:02 AM   Result Value Ref Range    POC Glucose 197 (H) 70 - 99 MG/DL   POCT Glucose    Collection Time: 01/07/23 11:45 AM   Result Value Ref Range    POC Glucose 407 (H) 70 - 99 MG/DL   POCT Glucose    Collection Time: 01/07/23  5:31 PM   Result Value Ref Range    POC Glucose 520 (H) 70 - 99 MG/DL   POCT Glucose    Collection Time: 01/07/23  7:09 PM   Result Value Ref Range    POC Glucose 328 (H) 70 - 99 MG/DL   POCT Glucose    Collection Time: 01/07/23  7:52 PM   Result Value Ref Range    POC Glucose 92 70 - 99 MG/DL   POCT Glucose    Collection Time: 01/07/23  8:40 PM   Result Value Ref Range    POC Glucose 97 70 - 99 MG/DL   POCT Glucose    Collection Time: 01/08/23  8:17 AM   Result Value Ref Range    POC Glucose 115 (H) 70 - 99 MG/DL        Imaging/Diagnostics Last 24 Hours   No results found.     Electronically signed by Saida Armstrong MD on 1/8/2023 at 8:30 AM

## 2023-01-08 NOTE — PROGRESS NOTES
Dr. Antonio Zapata notified by secure message that pt is wanting to leave AMA. Security and Baldev, house supervisor, are in room at this time.

## 2023-01-08 NOTE — PROGRESS NOTES
Lyn, NA, notified me that pt stated he has a ride coming to get him. Pt educated not to leave with IV in place.

## 2023-01-09 VITALS
BODY MASS INDEX: 22.76 KG/M2 | OXYGEN SATURATION: 98 % | DIASTOLIC BLOOD PRESSURE: 74 MMHG | HEIGHT: 67 IN | SYSTOLIC BLOOD PRESSURE: 171 MMHG | HEART RATE: 93 BPM | RESPIRATION RATE: 18 BRPM | TEMPERATURE: 98.1 F | WEIGHT: 145 LBS
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
## 2023-01-09 NOTE — CONSULTS
PICC line dressing changed per protocol. Patient tolerated well. Please consult IV/PICC Team if patient's needs change.

## 2023-01-09 NOTE — PROGRESS NOTES
Entered room to greet patient he was very pleasant and agreed to reconnect the tubing of his wound vac. Inspected wound vac and dressing site for a leak no leak was detected and therapy resumed. Continuing with plan of care. Medical Clearance received 3/18/19.

## 2023-01-09 NOTE — PROGRESS NOTES
PICC line removed, wound vac removed by patient himself after stating \"Im leaving. \" Hospitilist, ERICK Ortiz, at bedside providing education. Patient insists on leaving AMA. States \"I will not sign the paperwork because I'm suing you guys. \"
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
## 2023-01-09 NOTE — PROGRESS NOTES
Call initiated by: Nursing staff:  Supervisor  Call addressed around: 1/9/2023 3:51 AM      Reason for call: Patient requested to leave AMA. I discussed with the patient the risk of leaving against medical advice  which includes worsening of his wound, severe sepsis and even death. He verbalized understanding but states though he is leaving AMA but he will not sign the AMA paper work. I called his domestic partner, Sammie Gunter to update her. Patient completely oriented and able to make decisions at time of this conversion. Also read  RN supervisor notes.       Orders placed: none        Delia Acevedo, APRN - CNP

## 2023-02-08 ENCOUNTER — OFFICE VISIT (OUTPATIENT)
Dept: SURGERY | Age: 63
End: 2023-02-08
Payer: MEDICARE

## 2023-02-08 VITALS — OXYGEN SATURATION: 98 % | HEART RATE: 93 BPM | WEIGHT: 145 LBS | HEIGHT: 67 IN | BODY MASS INDEX: 22.76 KG/M2

## 2023-02-08 DIAGNOSIS — T81.89XA DELAYED SURGICAL WOUND HEALING OF BELOW-THE-KNEE AMPUTATION STUMP (HCC): Primary | ICD-10-CM

## 2023-02-08 DIAGNOSIS — T87.89 DELAYED SURGICAL WOUND HEALING OF BELOW-THE-KNEE AMPUTATION STUMP (HCC): Primary | ICD-10-CM

## 2023-02-08 PROCEDURE — 99213 OFFICE O/P EST LOW 20 MIN: CPT | Performed by: SURGERY

## 2023-02-08 ASSESSMENT — PATIENT HEALTH QUESTIONNAIRE - PHQ9
1. LITTLE INTEREST OR PLEASURE IN DOING THINGS: 0
SUM OF ALL RESPONSES TO PHQ QUESTIONS 1-9: 0
SUM OF ALL RESPONSES TO PHQ9 QUESTIONS 1 & 2: 0
SUM OF ALL RESPONSES TO PHQ QUESTIONS 1-9: 0
2. FEELING DOWN, DEPRESSED OR HOPELESS: 0
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
## 2023-02-13 ENCOUNTER — TELEPHONE (OUTPATIENT)
Dept: SURGERY | Age: 63
End: 2023-02-13

## 2023-02-13 ENCOUNTER — APPOINTMENT (OUTPATIENT)
Dept: GENERAL RADIOLOGY | Age: 63
DRG: 475 | End: 2023-02-13
Payer: MEDICARE

## 2023-02-13 ENCOUNTER — HOSPITAL ENCOUNTER (INPATIENT)
Age: 63
LOS: 11 days | Discharge: NURSING, REHAB OR LONG-TERM CARE | DRG: 475 | End: 2023-02-24
Attending: EMERGENCY MEDICINE
Payer: MEDICARE

## 2023-02-13 DIAGNOSIS — L08.9 WOUND INFECTION: ICD-10-CM

## 2023-02-13 DIAGNOSIS — B99.9 INFECTION: ICD-10-CM

## 2023-02-13 DIAGNOSIS — T81.89XD NON-HEALING SURGICAL WOUND, SUBSEQUENT ENCOUNTER: Primary | ICD-10-CM

## 2023-02-13 DIAGNOSIS — T87.40: ICD-10-CM

## 2023-02-13 DIAGNOSIS — E87.1 HYPONATREMIA: ICD-10-CM

## 2023-02-13 DIAGNOSIS — T14.8XXA WOUND INFECTION: ICD-10-CM

## 2023-02-13 LAB
25(OH)D3 SERPL-MCNC: <6 NG/ML
ALBUMIN SERPL-MCNC: 3.1 GM/DL (ref 3.4–5)
ALP BLD-CCNC: 176 IU/L (ref 40–129)
ALT SERPL-CCNC: 24 U/L (ref 10–40)
ANION GAP SERPL CALCULATED.3IONS-SCNC: 6 MMOL/L (ref 4–16)
AST SERPL-CCNC: 31 IU/L (ref 15–37)
BASOPHILS ABSOLUTE: 0.1 K/CU MM
BASOPHILS RELATIVE PERCENT: 0.8 % (ref 0–1)
BILIRUB SERPL-MCNC: 0.2 MG/DL (ref 0–1)
BUN SERPL-MCNC: 15 MG/DL (ref 6–23)
CALCIUM SERPL-MCNC: 8.3 MG/DL (ref 8.3–10.6)
CHLORIDE BLD-SCNC: 95 MMOL/L (ref 99–110)
CHP ED QC CHECK: NORMAL
CO2: 26 MMOL/L (ref 21–32)
CREAT SERPL-MCNC: 0.7 MG/DL (ref 0.9–1.3)
DIFFERENTIAL TYPE: ABNORMAL
EOSINOPHILS ABSOLUTE: 0.1 K/CU MM
EOSINOPHILS RELATIVE PERCENT: 1.2 % (ref 0–3)
GFR SERPL CREATININE-BSD FRML MDRD: >60 ML/MIN/1.73M2
GLUCOSE BLD-MCNC: 256 MG/DL
GLUCOSE BLD-MCNC: 256 MG/DL (ref 70–99)
GLUCOSE SERPL-MCNC: 271 MG/DL (ref 70–99)
HCT VFR BLD CALC: 32.5 % (ref 42–52)
HEMOGLOBIN: 11 GM/DL (ref 13.5–18)
IMMATURE NEUTROPHIL %: 0.5 % (ref 0–0.43)
LACTIC ACID, SEPSIS: 1.2 MMOL/L (ref 0.5–1.9)
LACTIC ACID, SEPSIS: 1.4 MMOL/L (ref 0.5–1.9)
LYMPHOCYTES ABSOLUTE: 1.3 K/CU MM
LYMPHOCYTES RELATIVE PERCENT: 20.2 % (ref 24–44)
MAGNESIUM: 2 MG/DL (ref 1.8–2.4)
MCH RBC QN AUTO: 29.2 PG (ref 27–31)
MCHC RBC AUTO-ENTMCNC: 33.8 % (ref 32–36)
MCV RBC AUTO: 86.2 FL (ref 78–100)
MONOCYTES ABSOLUTE: 0.5 K/CU MM
MONOCYTES RELATIVE PERCENT: 7.3 % (ref 0–4)
NUCLEATED RBC %: 0 %
PDW BLD-RTO: 13.8 % (ref 11.7–14.9)
PLATELET # BLD: 150 K/CU MM (ref 140–440)
PMV BLD AUTO: 10.5 FL (ref 7.5–11.1)
POTASSIUM SERPL-SCNC: 3.5 MMOL/L (ref 3.5–5.1)
RBC # BLD: 3.77 M/CU MM (ref 4.6–6.2)
SEGMENTED NEUTROPHILS ABSOLUTE COUNT: 4.5 K/CU MM
SEGMENTED NEUTROPHILS RELATIVE PERCENT: 70 % (ref 36–66)
SODIUM BLD-SCNC: 127 MMOL/L (ref 135–145)
TOTAL IMMATURE NEUTOROPHIL: 0.03 K/CU MM
TOTAL NUCLEATED RBC: 0 K/CU MM
TOTAL PROTEIN: 7.6 GM/DL (ref 6.4–8.2)
WBC # BLD: 6.4 K/CU MM (ref 4–10.5)

## 2023-02-13 PROCEDURE — 2140000000 HC CCU INTERMEDIATE R&B

## 2023-02-13 PROCEDURE — 80053 COMPREHEN METABOLIC PANEL: CPT

## 2023-02-13 PROCEDURE — 2580000003 HC RX 258: Performed by: EMERGENCY MEDICINE

## 2023-02-13 PROCEDURE — 83605 ASSAY OF LACTIC ACID: CPT

## 2023-02-13 PROCEDURE — 73560 X-RAY EXAM OF KNEE 1 OR 2: CPT

## 2023-02-13 PROCEDURE — 82306 VITAMIN D 25 HYDROXY: CPT

## 2023-02-13 PROCEDURE — 6370000000 HC RX 637 (ALT 250 FOR IP): Performed by: STUDENT IN AN ORGANIZED HEALTH CARE EDUCATION/TRAINING PROGRAM

## 2023-02-13 PROCEDURE — 85025 COMPLETE CBC W/AUTO DIFF WBC: CPT

## 2023-02-13 PROCEDURE — 6360000002 HC RX W HCPCS: Performed by: EMERGENCY MEDICINE

## 2023-02-13 PROCEDURE — 83735 ASSAY OF MAGNESIUM: CPT

## 2023-02-13 PROCEDURE — 93005 ELECTROCARDIOGRAM TRACING: CPT | Performed by: EMERGENCY MEDICINE

## 2023-02-13 PROCEDURE — 82962 GLUCOSE BLOOD TEST: CPT

## 2023-02-13 PROCEDURE — 2500000003 HC RX 250 WO HCPCS: Performed by: EMERGENCY MEDICINE

## 2023-02-13 PROCEDURE — 87040 BLOOD CULTURE FOR BACTERIA: CPT

## 2023-02-13 PROCEDURE — 81003 URINALYSIS AUTO W/O SCOPE: CPT

## 2023-02-13 PROCEDURE — 83036 HEMOGLOBIN GLYCOSYLATED A1C: CPT

## 2023-02-13 PROCEDURE — 99285 EMERGENCY DEPT VISIT HI MDM: CPT

## 2023-02-13 RX ORDER — ACETAMINOPHEN 650 MG/1
650 SUPPOSITORY RECTAL EVERY 6 HOURS PRN
Status: DISCONTINUED | OUTPATIENT
Start: 2023-02-13 | End: 2023-02-24 | Stop reason: HOSPADM

## 2023-02-13 RX ORDER — DEXTROSE MONOHYDRATE 100 MG/ML
INJECTION, SOLUTION INTRAVENOUS CONTINUOUS PRN
Status: DISCONTINUED | OUTPATIENT
Start: 2023-02-13 | End: 2023-02-24 | Stop reason: HOSPADM

## 2023-02-13 RX ORDER — ENOXAPARIN SODIUM 100 MG/ML
40 INJECTION SUBCUTANEOUS DAILY
Status: DISCONTINUED | OUTPATIENT
Start: 2023-02-14 | End: 2023-02-24 | Stop reason: HOSPADM

## 2023-02-13 RX ORDER — INSULIN GLARGINE 100 [IU]/ML
6 INJECTION, SOLUTION SUBCUTANEOUS NIGHTLY
Status: DISCONTINUED | OUTPATIENT
Start: 2023-02-13 | End: 2023-02-24 | Stop reason: HOSPADM

## 2023-02-13 RX ORDER — SODIUM CHLORIDE 0.9 % (FLUSH) 0.9 %
5-40 SYRINGE (ML) INJECTION PRN
Status: DISCONTINUED | OUTPATIENT
Start: 2023-02-13 | End: 2023-02-24 | Stop reason: HOSPADM

## 2023-02-13 RX ORDER — SODIUM CHLORIDE 0.9 % (FLUSH) 0.9 %
5-40 SYRINGE (ML) INJECTION EVERY 12 HOURS SCHEDULED
Status: DISCONTINUED | OUTPATIENT
Start: 2023-02-13 | End: 2023-02-24 | Stop reason: HOSPADM

## 2023-02-13 RX ORDER — ONDANSETRON 2 MG/ML
4 INJECTION INTRAMUSCULAR; INTRAVENOUS EVERY 6 HOURS PRN
Status: DISCONTINUED | OUTPATIENT
Start: 2023-02-13 | End: 2023-02-24 | Stop reason: HOSPADM

## 2023-02-13 RX ORDER — SODIUM CHLORIDE, SODIUM LACTATE, POTASSIUM CHLORIDE, CALCIUM CHLORIDE 600; 310; 30; 20 MG/100ML; MG/100ML; MG/100ML; MG/100ML
INJECTION, SOLUTION INTRAVENOUS CONTINUOUS
Status: DISCONTINUED | OUTPATIENT
Start: 2023-02-13 | End: 2023-02-17

## 2023-02-13 RX ORDER — 0.9 % SODIUM CHLORIDE 0.9 %
1000 INTRAVENOUS SOLUTION INTRAVENOUS ONCE
Status: COMPLETED | OUTPATIENT
Start: 2023-02-13 | End: 2023-02-13

## 2023-02-13 RX ORDER — ASPIRIN 81 MG/1
81 TABLET, CHEWABLE ORAL DAILY
Status: DISCONTINUED | OUTPATIENT
Start: 2023-02-14 | End: 2023-02-24 | Stop reason: HOSPADM

## 2023-02-13 RX ORDER — MORPHINE SULFATE 4 MG/ML
4 INJECTION, SOLUTION INTRAMUSCULAR; INTRAVENOUS EVERY 30 MIN PRN
Status: COMPLETED | OUTPATIENT
Start: 2023-02-13 | End: 2023-02-14

## 2023-02-13 RX ORDER — ONDANSETRON 4 MG/1
4 TABLET, ORALLY DISINTEGRATING ORAL EVERY 8 HOURS PRN
Status: DISCONTINUED | OUTPATIENT
Start: 2023-02-13 | End: 2023-02-24 | Stop reason: HOSPADM

## 2023-02-13 RX ORDER — ACETAMINOPHEN 325 MG/1
650 TABLET ORAL EVERY 6 HOURS PRN
Status: DISCONTINUED | OUTPATIENT
Start: 2023-02-13 | End: 2023-02-24 | Stop reason: HOSPADM

## 2023-02-13 RX ORDER — SODIUM CHLORIDE 9 MG/ML
INJECTION, SOLUTION INTRAVENOUS PRN
Status: DISCONTINUED | OUTPATIENT
Start: 2023-02-13 | End: 2023-02-24 | Stop reason: HOSPADM

## 2023-02-13 RX ORDER — CLINDAMYCIN PHOSPHATE 300 MG/50ML
300 INJECTION INTRAVENOUS ONCE
Status: COMPLETED | OUTPATIENT
Start: 2023-02-13 | End: 2023-02-13

## 2023-02-13 RX ORDER — METRONIDAZOLE 500 MG/100ML
500 INJECTION, SOLUTION INTRAVENOUS EVERY 8 HOURS
Status: DISCONTINUED | OUTPATIENT
Start: 2023-02-13 | End: 2023-02-16

## 2023-02-13 RX ORDER — SODIUM CHLORIDE 9 MG/ML
INJECTION, SOLUTION INTRAVENOUS PRN
Status: DISCONTINUED | OUTPATIENT
Start: 2023-02-13 | End: 2023-02-13

## 2023-02-13 RX ORDER — INSULIN LISPRO 100 [IU]/ML
0-4 INJECTION, SOLUTION INTRAVENOUS; SUBCUTANEOUS
Status: DISCONTINUED | OUTPATIENT
Start: 2023-02-14 | End: 2023-02-24 | Stop reason: HOSPADM

## 2023-02-13 RX ORDER — DULOXETIN HYDROCHLORIDE 20 MG/1
20 CAPSULE, DELAYED RELEASE ORAL DAILY
Status: DISCONTINUED | OUTPATIENT
Start: 2023-02-14 | End: 2023-02-15

## 2023-02-13 RX ORDER — INSULIN LISPRO 100 [IU]/ML
0-4 INJECTION, SOLUTION INTRAVENOUS; SUBCUTANEOUS NIGHTLY
Status: DISCONTINUED | OUTPATIENT
Start: 2023-02-13 | End: 2023-02-24 | Stop reason: HOSPADM

## 2023-02-13 RX ORDER — ATORVASTATIN CALCIUM 40 MG/1
40 TABLET, FILM COATED ORAL NIGHTLY
Status: DISCONTINUED | OUTPATIENT
Start: 2023-02-13 | End: 2023-02-24 | Stop reason: HOSPADM

## 2023-02-13 RX ORDER — POLYETHYLENE GLYCOL 3350 17 G/17G
17 POWDER, FOR SOLUTION ORAL DAILY PRN
Status: DISCONTINUED | OUTPATIENT
Start: 2023-02-13 | End: 2023-02-24 | Stop reason: HOSPADM

## 2023-02-13 RX ADMIN — INSULIN GLARGINE 6 UNITS: 100 INJECTION, SOLUTION SUBCUTANEOUS at 22:56

## 2023-02-13 RX ADMIN — SODIUM CHLORIDE 1000 ML: 9 INJECTION, SOLUTION INTRAVENOUS at 21:48

## 2023-02-13 RX ADMIN — CEFEPIME HYDROCHLORIDE 2000 MG: 2 INJECTION, POWDER, FOR SOLUTION INTRAVENOUS at 21:46

## 2023-02-13 RX ADMIN — MORPHINE SULFATE 4 MG: 4 INJECTION, SOLUTION INTRAMUSCULAR; INTRAVENOUS at 21:15

## 2023-02-13 RX ADMIN — CLINDAMYCIN PHOSPHATE 300 MG: 300 INJECTION, SOLUTION INTRAVENOUS at 22:28

## 2023-02-13 ASSESSMENT — ENCOUNTER SYMPTOMS
COLOR CHANGE: 1
EYE PAIN: 0
SHORTNESS OF BREATH: 0
COUGH: 0
VOICE CHANGE: 0
DIARRHEA: 0
ABDOMINAL PAIN: 0
EYE DISCHARGE: 0
VOMITING: 0
SINUS PRESSURE: 0
SINUS PAIN: 0
NAUSEA: 0
BACK PAIN: 1
BLOOD IN STOOL: 0
CHEST TIGHTNESS: 0

## 2023-02-13 ASSESSMENT — PAIN DESCRIPTION - ORIENTATION: ORIENTATION: RIGHT

## 2023-02-13 ASSESSMENT — PAIN SCALES - GENERAL: PAINLEVEL_OUTOF10: 8

## 2023-02-13 ASSESSMENT — PAIN DESCRIPTION - LOCATION: LOCATION: LEG

## 2023-02-13 ASSESSMENT — PAIN - FUNCTIONAL ASSESSMENT: PAIN_FUNCTIONAL_ASSESSMENT: ACTIVITIES ARE NOT PREVENTED

## 2023-02-13 ASSESSMENT — PAIN DESCRIPTION - DESCRIPTORS: DESCRIPTORS: ACHING;SORE

## 2023-02-13 NOTE — ED TRIAGE NOTES
Patient presents to the ED with complaint of redness, pain, and swelling at his right leg amputation site. Patient states his symptoms began approximately 4 days ago.

## 2023-02-13 NOTE — TELEPHONE ENCOUNTER
Pt called wishing to be directed admitted after refusing on prior visit 2/08/23     Dr. Cinthia Alvarado was contacted and referred pt to ED either [facility] where he could be seen by  on rounds in the morning or an ED of pt choice. Pt was called , stated  that he was going to [facility] and thanked  for his time.

## 2023-02-14 ENCOUNTER — ANESTHESIA (OUTPATIENT)
Dept: OPERATING ROOM | Age: 63
End: 2023-02-14
Payer: MEDICARE

## 2023-02-14 ENCOUNTER — ANESTHESIA EVENT (OUTPATIENT)
Dept: OPERATING ROOM | Age: 63
End: 2023-02-14
Payer: MEDICARE

## 2023-02-14 PROBLEM — T81.89XA SURGICAL WOUND, NON HEALING: Status: ACTIVE | Noted: 2023-02-14

## 2023-02-14 LAB
ANION GAP SERPL CALCULATED.3IONS-SCNC: 7 MMOL/L (ref 4–16)
BUN SERPL-MCNC: 13 MG/DL (ref 6–23)
CALCIUM SERPL-MCNC: 7.8 MG/DL (ref 8.3–10.6)
CHLORIDE BLD-SCNC: 105 MMOL/L (ref 99–110)
CHP ED QC CHECK: YES
CO2: 24 MMOL/L (ref 21–32)
CREAT SERPL-MCNC: 0.6 MG/DL (ref 0.9–1.3)
CRP SERPL HS-MCNC: 12.6 MG/L
EKG ATRIAL RATE: 66 BPM
EKG DIAGNOSIS: NORMAL
EKG P AXIS: 9 DEGREES
EKG P-R INTERVAL: 168 MS
EKG Q-T INTERVAL: 458 MS
EKG QRS DURATION: 86 MS
EKG QTC CALCULATION (BAZETT): 480 MS
EKG R AXIS: 49 DEGREES
EKG T AXIS: 20 DEGREES
EKG VENTRICULAR RATE: 66 BPM
ESTIMATED AVERAGE GLUCOSE: 171 MG/DL
GFR SERPL CREATININE-BSD FRML MDRD: >60 ML/MIN/1.73M2
GLUCOSE BLD-MCNC: 161 MG/DL (ref 70–99)
GLUCOSE BLD-MCNC: 164 MG/DL (ref 70–99)
GLUCOSE BLD-MCNC: 168 MG/DL
GLUCOSE BLD-MCNC: 168 MG/DL (ref 70–99)
GLUCOSE BLD-MCNC: 195 MG/DL (ref 70–99)
GLUCOSE BLD-MCNC: 225 MG/DL (ref 70–99)
GLUCOSE BLD-MCNC: 237 MG/DL (ref 70–99)
GLUCOSE SERPL-MCNC: 156 MG/DL (ref 70–99)
HBA1C MFR BLD: 7.6 % (ref 4.2–6.3)
HCT VFR BLD CALC: 30.8 % (ref 42–52)
HEMOGLOBIN: 10.4 GM/DL (ref 13.5–18)
MAGNESIUM: 1.7 MG/DL (ref 1.8–2.4)
MCH RBC QN AUTO: 29.3 PG (ref 27–31)
MCHC RBC AUTO-ENTMCNC: 33.8 % (ref 32–36)
MCV RBC AUTO: 86.8 FL (ref 78–100)
PDW BLD-RTO: 13.9 % (ref 11.7–14.9)
PHOSPHORUS: 3 MG/DL (ref 2.5–4.9)
PLATELET # BLD: 133 K/CU MM (ref 140–440)
PMV BLD AUTO: 10.1 FL (ref 7.5–11.1)
POTASSIUM SERPL-SCNC: 4 MMOL/L (ref 3.5–5.1)
RBC # BLD: 3.55 M/CU MM (ref 4.6–6.2)
SODIUM BLD-SCNC: 136 MMOL/L (ref 135–145)
WBC # BLD: 7.8 K/CU MM (ref 4–10.5)

## 2023-02-14 PROCEDURE — 86140 C-REACTIVE PROTEIN: CPT

## 2023-02-14 PROCEDURE — 3600000005 HC SURGERY LEVEL 5 BASE: Performed by: SURGERY

## 2023-02-14 PROCEDURE — 2500000003 HC RX 250 WO HCPCS: Performed by: NURSE ANESTHETIST, CERTIFIED REGISTERED

## 2023-02-14 PROCEDURE — 87086 URINE CULTURE/COLONY COUNT: CPT

## 2023-02-14 PROCEDURE — 6370000000 HC RX 637 (ALT 250 FOR IP): Performed by: STUDENT IN AN ORGANIZED HEALTH CARE EDUCATION/TRAINING PROGRAM

## 2023-02-14 PROCEDURE — 82962 GLUCOSE BLOOD TEST: CPT

## 2023-02-14 PROCEDURE — 80048 BASIC METABOLIC PNL TOTAL CA: CPT

## 2023-02-14 PROCEDURE — 88307 TISSUE EXAM BY PATHOLOGIST: CPT | Performed by: PATHOLOGY

## 2023-02-14 PROCEDURE — 6360000002 HC RX W HCPCS: Performed by: NURSE ANESTHETIST, CERTIFIED REGISTERED

## 2023-02-14 PROCEDURE — 6360000002 HC RX W HCPCS: Performed by: ANESTHESIOLOGY

## 2023-02-14 PROCEDURE — 84100 ASSAY OF PHOSPHORUS: CPT

## 2023-02-14 PROCEDURE — 87186 SC STD MICRODIL/AGAR DIL: CPT

## 2023-02-14 PROCEDURE — 2580000003 HC RX 258: Performed by: EMERGENCY MEDICINE

## 2023-02-14 PROCEDURE — 2500000003 HC RX 250 WO HCPCS: Performed by: STUDENT IN AN ORGANIZED HEALTH CARE EDUCATION/TRAINING PROGRAM

## 2023-02-14 PROCEDURE — 0Y6C0Z3 DETACHMENT AT RIGHT UPPER LEG, LOW, OPEN APPROACH: ICD-10-PCS | Performed by: SURGERY

## 2023-02-14 PROCEDURE — 2580000003 HC RX 258: Performed by: STUDENT IN AN ORGANIZED HEALTH CARE EDUCATION/TRAINING PROGRAM

## 2023-02-14 PROCEDURE — 7100000000 HC PACU RECOVERY - FIRST 15 MIN: Performed by: SURGERY

## 2023-02-14 PROCEDURE — 3600000015 HC SURGERY LEVEL 5 ADDTL 15MIN: Performed by: SURGERY

## 2023-02-14 PROCEDURE — 3700000000 HC ANESTHESIA ATTENDED CARE: Performed by: SURGERY

## 2023-02-14 PROCEDURE — 2580000003 HC RX 258: Performed by: SURGERY

## 2023-02-14 PROCEDURE — 93010 ELECTROCARDIOGRAM REPORT: CPT | Performed by: INTERNAL MEDICINE

## 2023-02-14 PROCEDURE — 6370000000 HC RX 637 (ALT 250 FOR IP): Performed by: SURGERY

## 2023-02-14 PROCEDURE — 88311 DECALCIFY TISSUE: CPT | Performed by: PATHOLOGY

## 2023-02-14 PROCEDURE — 6360000002 HC RX W HCPCS: Performed by: STUDENT IN AN ORGANIZED HEALTH CARE EDUCATION/TRAINING PROGRAM

## 2023-02-14 PROCEDURE — 6360000002 HC RX W HCPCS: Performed by: SURGERY

## 2023-02-14 PROCEDURE — 27590 AMPUTATE LEG AT THIGH: CPT | Performed by: SURGERY

## 2023-02-14 PROCEDURE — 85027 COMPLETE CBC AUTOMATED: CPT

## 2023-02-14 PROCEDURE — 83735 ASSAY OF MAGNESIUM: CPT

## 2023-02-14 PROCEDURE — 99222 1ST HOSP IP/OBS MODERATE 55: CPT | Performed by: SURGERY

## 2023-02-14 PROCEDURE — 87077 CULTURE AEROBIC IDENTIFY: CPT

## 2023-02-14 PROCEDURE — 6360000002 HC RX W HCPCS: Performed by: EMERGENCY MEDICINE

## 2023-02-14 PROCEDURE — 3700000001 HC ADD 15 MINUTES (ANESTHESIA): Performed by: SURGERY

## 2023-02-14 PROCEDURE — 7100000001 HC PACU RECOVERY - ADDTL 15 MIN: Performed by: SURGERY

## 2023-02-14 PROCEDURE — 2500000003 HC RX 250 WO HCPCS: Performed by: SURGERY

## 2023-02-14 PROCEDURE — 2709999900 HC NON-CHARGEABLE SUPPLY: Performed by: SURGERY

## 2023-02-14 PROCEDURE — 2500000003 HC RX 250 WO HCPCS: Performed by: ANESTHESIOLOGY

## 2023-02-14 PROCEDURE — 2140000000 HC CCU INTERMEDIATE R&B

## 2023-02-14 RX ORDER — ROCURONIUM BROMIDE 10 MG/ML
INJECTION, SOLUTION INTRAVENOUS PRN
Status: DISCONTINUED | OUTPATIENT
Start: 2023-02-14 | End: 2023-02-14 | Stop reason: SDUPTHER

## 2023-02-14 RX ORDER — FENTANYL CITRATE 50 UG/ML
25 INJECTION, SOLUTION INTRAMUSCULAR; INTRAVENOUS EVERY 5 MIN PRN
Status: COMPLETED | OUTPATIENT
Start: 2023-02-14 | End: 2023-02-14

## 2023-02-14 RX ORDER — OXYCODONE HYDROCHLORIDE 5 MG/1
5 TABLET ORAL
Status: ACTIVE | OUTPATIENT
Start: 2023-02-14 | End: 2023-02-15

## 2023-02-14 RX ORDER — IPRATROPIUM BROMIDE AND ALBUTEROL SULFATE 2.5; .5 MG/3ML; MG/3ML
1 SOLUTION RESPIRATORY (INHALATION)
Status: ACTIVE | OUTPATIENT
Start: 2023-02-14 | End: 2023-02-15

## 2023-02-14 RX ORDER — KETAMINE HCL 50MG/ML(1)
SYRINGE (ML) INTRAVENOUS PRN
Status: DISCONTINUED | OUTPATIENT
Start: 2023-02-14 | End: 2023-02-14 | Stop reason: SDUPTHER

## 2023-02-14 RX ORDER — LIDOCAINE HYDROCHLORIDE 20 MG/ML
INJECTION, SOLUTION INTRAVENOUS PRN
Status: DISCONTINUED | OUTPATIENT
Start: 2023-02-14 | End: 2023-02-14 | Stop reason: SDUPTHER

## 2023-02-14 RX ORDER — OXYCODONE HYDROCHLORIDE AND ACETAMINOPHEN 5; 325 MG/1; MG/1
2 TABLET ORAL EVERY 4 HOURS PRN
Status: DISCONTINUED | OUTPATIENT
Start: 2023-02-14 | End: 2023-02-24 | Stop reason: HOSPADM

## 2023-02-14 RX ORDER — MEPERIDINE HYDROCHLORIDE 25 MG/ML
12.5 INJECTION INTRAMUSCULAR; INTRAVENOUS; SUBCUTANEOUS EVERY 5 MIN PRN
Status: DISCONTINUED | OUTPATIENT
Start: 2023-02-14 | End: 2023-02-14 | Stop reason: HOSPADM

## 2023-02-14 RX ORDER — SODIUM CHLORIDE 0.9 % (FLUSH) 0.9 %
5-40 SYRINGE (ML) INJECTION EVERY 12 HOURS SCHEDULED
Status: DISCONTINUED | OUTPATIENT
Start: 2023-02-14 | End: 2023-02-14 | Stop reason: HOSPADM

## 2023-02-14 RX ORDER — DROPERIDOL 2.5 MG/ML
0.62 INJECTION, SOLUTION INTRAMUSCULAR; INTRAVENOUS EVERY 10 MIN PRN
Status: DISCONTINUED | OUTPATIENT
Start: 2023-02-14 | End: 2023-02-24 | Stop reason: HOSPADM

## 2023-02-14 RX ORDER — HYDRALAZINE HYDROCHLORIDE 20 MG/ML
10 INJECTION INTRAMUSCULAR; INTRAVENOUS
Status: DISCONTINUED | OUTPATIENT
Start: 2023-02-14 | End: 2023-02-14 | Stop reason: HOSPADM

## 2023-02-14 RX ORDER — SODIUM CHLORIDE 0.9 % (FLUSH) 0.9 %
5-40 SYRINGE (ML) INJECTION PRN
Status: DISCONTINUED | OUTPATIENT
Start: 2023-02-14 | End: 2023-02-14 | Stop reason: HOSPADM

## 2023-02-14 RX ORDER — ONDANSETRON 2 MG/ML
4 INJECTION INTRAMUSCULAR; INTRAVENOUS
Status: DISCONTINUED | OUTPATIENT
Start: 2023-02-14 | End: 2023-02-14 | Stop reason: HOSPADM

## 2023-02-14 RX ORDER — FENTANYL CITRATE 50 UG/ML
INJECTION, SOLUTION INTRAMUSCULAR; INTRAVENOUS PRN
Status: DISCONTINUED | OUTPATIENT
Start: 2023-02-14 | End: 2023-02-14 | Stop reason: SDUPTHER

## 2023-02-14 RX ORDER — ONDANSETRON 2 MG/ML
INJECTION INTRAMUSCULAR; INTRAVENOUS PRN
Status: DISCONTINUED | OUTPATIENT
Start: 2023-02-14 | End: 2023-02-14 | Stop reason: SDUPTHER

## 2023-02-14 RX ORDER — PROPOFOL 10 MG/ML
INJECTION, EMULSION INTRAVENOUS PRN
Status: DISCONTINUED | OUTPATIENT
Start: 2023-02-14 | End: 2023-02-14 | Stop reason: SDUPTHER

## 2023-02-14 RX ORDER — SODIUM CHLORIDE, SODIUM LACTATE, POTASSIUM CHLORIDE, CALCIUM CHLORIDE 600; 310; 30; 20 MG/100ML; MG/100ML; MG/100ML; MG/100ML
INJECTION, SOLUTION INTRAVENOUS CONTINUOUS PRN
Status: DISCONTINUED | OUTPATIENT
Start: 2023-02-14 | End: 2023-02-14 | Stop reason: SDUPTHER

## 2023-02-14 RX ORDER — ERGOCALCIFEROL 1.25 MG/1
50000 CAPSULE ORAL WEEKLY
Status: DISCONTINUED | OUTPATIENT
Start: 2023-02-14 | End: 2023-02-24 | Stop reason: HOSPADM

## 2023-02-14 RX ORDER — MAGNESIUM SULFATE IN WATER 40 MG/ML
2000 INJECTION, SOLUTION INTRAVENOUS ONCE
Status: COMPLETED | OUTPATIENT
Start: 2023-02-14 | End: 2023-02-14

## 2023-02-14 RX ORDER — SODIUM CHLORIDE, SODIUM LACTATE, POTASSIUM CHLORIDE, CALCIUM CHLORIDE 600; 310; 30; 20 MG/100ML; MG/100ML; MG/100ML; MG/100ML
INJECTION, SOLUTION INTRAVENOUS CONTINUOUS
Status: DISCONTINUED | OUTPATIENT
Start: 2023-02-14 | End: 2023-02-15

## 2023-02-14 RX ORDER — DIPHENHYDRAMINE HYDROCHLORIDE 50 MG/ML
12.5 INJECTION INTRAMUSCULAR; INTRAVENOUS
Status: ACTIVE | OUTPATIENT
Start: 2023-02-14 | End: 2023-02-15

## 2023-02-14 RX ORDER — LABETALOL HYDROCHLORIDE 5 MG/ML
10 INJECTION, SOLUTION INTRAVENOUS
Status: DISCONTINUED | OUTPATIENT
Start: 2023-02-14 | End: 2023-02-14 | Stop reason: HOSPADM

## 2023-02-14 RX ORDER — DEXAMETHASONE SODIUM PHOSPHATE 4 MG/ML
INJECTION, SOLUTION INTRA-ARTICULAR; INTRALESIONAL; INTRAMUSCULAR; INTRAVENOUS; SOFT TISSUE PRN
Status: DISCONTINUED | OUTPATIENT
Start: 2023-02-14 | End: 2023-02-14 | Stop reason: SDUPTHER

## 2023-02-14 RX ORDER — OXYCODONE HYDROCHLORIDE AND ACETAMINOPHEN 5; 325 MG/1; MG/1
1 TABLET ORAL EVERY 4 HOURS PRN
Status: DISCONTINUED | OUTPATIENT
Start: 2023-02-14 | End: 2023-02-24 | Stop reason: HOSPADM

## 2023-02-14 RX ADMIN — DEXAMETHASONE SODIUM PHOSPHATE 8 MG: 4 INJECTION, SOLUTION INTRAMUSCULAR; INTRAVENOUS at 13:03

## 2023-02-14 RX ADMIN — CEFEPIME HYDROCHLORIDE 2000 MG: 2 INJECTION, POWDER, FOR SOLUTION INTRAVENOUS at 21:27

## 2023-02-14 RX ADMIN — PROPOFOL 150 MG: 10 INJECTION, EMULSION INTRAVENOUS at 13:03

## 2023-02-14 RX ADMIN — OXYCODONE AND ACETAMINOPHEN 2 TABLET: 5; 325 TABLET ORAL at 23:41

## 2023-02-14 RX ADMIN — INSULIN GLARGINE 6 UNITS: 100 INJECTION, SOLUTION SUBCUTANEOUS at 21:29

## 2023-02-14 RX ADMIN — HYDROMORPHONE HYDROCHLORIDE 0.5 MG: 1 INJECTION, SOLUTION INTRAMUSCULAR; INTRAVENOUS; SUBCUTANEOUS at 10:45

## 2023-02-14 RX ADMIN — HYDROMORPHONE HYDROCHLORIDE 0.5 MG: 1 INJECTION, SOLUTION INTRAMUSCULAR; INTRAVENOUS; SUBCUTANEOUS at 18:33

## 2023-02-14 RX ADMIN — FENTANYL CITRATE 100 MCG: 50 INJECTION, SOLUTION INTRAMUSCULAR; INTRAVENOUS at 13:03

## 2023-02-14 RX ADMIN — HYDROMORPHONE HYDROCHLORIDE 0.5 MG: 1 INJECTION, SOLUTION INTRAMUSCULAR; INTRAVENOUS; SUBCUTANEOUS at 03:37

## 2023-02-14 RX ADMIN — ONDANSETRON 4 MG: 2 INJECTION INTRAMUSCULAR; INTRAVENOUS at 13:03

## 2023-02-14 RX ADMIN — SODIUM CHLORIDE, PRESERVATIVE FREE 10 ML: 5 INJECTION INTRAVENOUS at 00:11

## 2023-02-14 RX ADMIN — CEFEPIME HYDROCHLORIDE 2000 MG: 2 INJECTION, POWDER, FOR SOLUTION INTRAVENOUS at 10:19

## 2023-02-14 RX ADMIN — METRONIDAZOLE 500 MG: 500 INJECTION, SOLUTION INTRAVENOUS at 17:16

## 2023-02-14 RX ADMIN — MORPHINE SULFATE 4 MG: 4 INJECTION, SOLUTION INTRAMUSCULAR; INTRAVENOUS at 00:09

## 2023-02-14 RX ADMIN — HYDROMORPHONE HYDROCHLORIDE 0.5 MG: 1 INJECTION, SOLUTION INTRAMUSCULAR; INTRAVENOUS; SUBCUTANEOUS at 14:24

## 2023-02-14 RX ADMIN — OXYCODONE AND ACETAMINOPHEN 2 TABLET: 5; 325 TABLET ORAL at 17:05

## 2023-02-14 RX ADMIN — VANCOMYCIN HYDROCHLORIDE 1750 MG: 5 INJECTION, POWDER, LYOPHILIZED, FOR SOLUTION INTRAVENOUS at 00:12

## 2023-02-14 RX ADMIN — LIDOCAINE HYDROCHLORIDE 80 MG: 20 INJECTION, SOLUTION INTRAVENOUS at 13:03

## 2023-02-14 RX ADMIN — HYDROMORPHONE HYDROCHLORIDE 0.5 MG: 1 INJECTION, SOLUTION INTRAMUSCULAR; INTRAVENOUS; SUBCUTANEOUS at 21:21

## 2023-02-14 RX ADMIN — HYDROMORPHONE HYDROCHLORIDE 0.5 MG: 1 INJECTION, SOLUTION INTRAMUSCULAR; INTRAVENOUS; SUBCUTANEOUS at 15:40

## 2023-02-14 RX ADMIN — MAGNESIUM SULFATE HEPTAHYDRATE 2000 MG: 2 INJECTION, SOLUTION INTRAVENOUS at 10:51

## 2023-02-14 RX ADMIN — ATORVASTATIN CALCIUM 40 MG: 40 TABLET, FILM COATED ORAL at 00:44

## 2023-02-14 RX ADMIN — SODIUM CHLORIDE, PRESERVATIVE FREE 10 ML: 5 INJECTION INTRAVENOUS at 21:33

## 2023-02-14 RX ADMIN — ROCURONIUM BROMIDE 50 MG: 10 INJECTION, SOLUTION INTRAVENOUS at 13:03

## 2023-02-14 RX ADMIN — FENTANYL CITRATE 25 MCG: 50 INJECTION INTRAMUSCULAR; INTRAVENOUS at 15:32

## 2023-02-14 RX ADMIN — ERGOCALCIFEROL 50000 UNITS: 1.25 CAPSULE ORAL at 17:11

## 2023-02-14 RX ADMIN — SODIUM CHLORIDE, PRESERVATIVE FREE 10 ML: 5 INJECTION INTRAVENOUS at 21:57

## 2023-02-14 RX ADMIN — SODIUM CHLORIDE, SODIUM LACTATE, POTASSIUM CHLORIDE, CALCIUM CHLORIDE: 600; 310; 30; 20 INJECTION, SOLUTION INTRAVENOUS at 12:53

## 2023-02-14 RX ADMIN — VANCOMYCIN HYDROCHLORIDE 1000 MG: 1 INJECTION, POWDER, LYOPHILIZED, FOR SOLUTION INTRAVENOUS at 13:08

## 2023-02-14 RX ADMIN — LABETALOL HYDROCHLORIDE 10 MG: 5 INJECTION, SOLUTION INTRAVENOUS at 15:49

## 2023-02-14 RX ADMIN — MORPHINE SULFATE 4 MG: 4 INJECTION, SOLUTION INTRAMUSCULAR; INTRAVENOUS at 02:03

## 2023-02-14 RX ADMIN — HYDROMORPHONE HYDROCHLORIDE 0.5 MG: 1 INJECTION, SOLUTION INTRAMUSCULAR; INTRAVENOUS; SUBCUTANEOUS at 07:53

## 2023-02-14 RX ADMIN — ATORVASTATIN CALCIUM 40 MG: 40 TABLET, FILM COATED ORAL at 21:29

## 2023-02-14 RX ADMIN — SUGAMMADEX 2 MG: 100 INJECTION, SOLUTION INTRAVENOUS at 14:58

## 2023-02-14 RX ADMIN — METRONIDAZOLE 500 MG: 500 INJECTION, SOLUTION INTRAVENOUS at 23:16

## 2023-02-14 RX ADMIN — SODIUM CHLORIDE, PRESERVATIVE FREE 10 ML: 5 INJECTION INTRAVENOUS at 00:15

## 2023-02-14 RX ADMIN — Medication 50 MG: at 13:07

## 2023-02-14 RX ADMIN — SODIUM CHLORIDE, POTASSIUM CHLORIDE, SODIUM LACTATE AND CALCIUM CHLORIDE: 600; 310; 30; 20 INJECTION, SOLUTION INTRAVENOUS at 23:25

## 2023-02-14 RX ADMIN — SODIUM CHLORIDE, POTASSIUM CHLORIDE, SODIUM LACTATE AND CALCIUM CHLORIDE: 600; 310; 30; 20 INJECTION, SOLUTION INTRAVENOUS at 00:43

## 2023-02-14 RX ADMIN — FENTANYL CITRATE 25 MCG: 50 INJECTION INTRAMUSCULAR; INTRAVENOUS at 15:58

## 2023-02-14 RX ADMIN — FENTANYL CITRATE 25 MCG: 50 INJECTION INTRAMUSCULAR; INTRAVENOUS at 16:03

## 2023-02-14 RX ADMIN — FENTANYL CITRATE 25 MCG: 50 INJECTION INTRAMUSCULAR; INTRAVENOUS at 15:37

## 2023-02-14 RX ADMIN — METRONIDAZOLE 500 MG: 500 INJECTION, SOLUTION INTRAVENOUS at 04:38

## 2023-02-14 ASSESSMENT — ENCOUNTER SYMPTOMS
ABDOMINAL PAIN: 0
SHORTNESS OF BREATH: 0
TROUBLE SWALLOWING: 0
CHOKING: 0
COUGH: 0
BLOOD IN STOOL: 0
SINUS PRESSURE: 0
CONSTIPATION: 0
CHEST TIGHTNESS: 0
SORE THROAT: 0
DIARRHEA: 0
WHEEZING: 0
VOICE CHANGE: 0
VOMITING: 0
SINUS PAIN: 0
COLOR CHANGE: 1
BACK PAIN: 0
COLOR CHANGE: 0
NAUSEA: 0
STRIDOR: 0
ABDOMINAL DISTENTION: 0

## 2023-02-14 ASSESSMENT — PAIN SCALES - GENERAL
PAINLEVEL_OUTOF10: 10
PAINLEVEL_OUTOF10: 10
PAINLEVEL_OUTOF10: 9
PAINLEVEL_OUTOF10: 5
PAINLEVEL_OUTOF10: 6
PAINLEVEL_OUTOF10: 5
PAINLEVEL_OUTOF10: 9
PAINLEVEL_OUTOF10: 10
PAINLEVEL_OUTOF10: 9
PAINLEVEL_OUTOF10: 9
PAINLEVEL_OUTOF10: 6
PAINLEVEL_OUTOF10: 5
PAINLEVEL_OUTOF10: 8
PAINLEVEL_OUTOF10: 6
PAINLEVEL_OUTOF10: 10
PAINLEVEL_OUTOF10: 6
PAINLEVEL_OUTOF10: 10
PAINLEVEL_OUTOF10: 9

## 2023-02-14 ASSESSMENT — PAIN DESCRIPTION - DESCRIPTORS
DESCRIPTORS: THROBBING;BURNING;ACHING
DESCRIPTORS: ACHING
DESCRIPTORS: ACHING;BURNING;STABBING
DESCRIPTORS: SHARP;STABBING;BURNING
DESCRIPTORS: ACHING
DESCRIPTORS: DISCOMFORT
DESCRIPTORS: DISCOMFORT

## 2023-02-14 ASSESSMENT — PAIN DESCRIPTION - ORIENTATION
ORIENTATION: RIGHT

## 2023-02-14 ASSESSMENT — PAIN - FUNCTIONAL ASSESSMENT
PAIN_FUNCTIONAL_ASSESSMENT: PREVENTS OR INTERFERES SOME ACTIVE ACTIVITIES AND ADLS
PAIN_FUNCTIONAL_ASSESSMENT: INTOLERABLE, UNABLE TO DO ANY ACTIVE OR PASSIVE ACTIVITIES
PAIN_FUNCTIONAL_ASSESSMENT: PREVENTS OR INTERFERES SOME ACTIVE ACTIVITIES AND ADLS

## 2023-02-14 ASSESSMENT — PAIN DESCRIPTION - LOCATION
LOCATION: LEG
LOCATION: ABDOMEN
LOCATION: LEG
LOCATION: OTHER (COMMENT)
LOCATION: LEG

## 2023-02-14 ASSESSMENT — LIFESTYLE VARIABLES: SMOKING_STATUS: 1

## 2023-02-14 ASSESSMENT — PAIN SCALES - WONG BAKER: WONGBAKER_NUMERICALRESPONSE: 2

## 2023-02-14 NOTE — ED NOTES
Encouraged pt to give urine sample, pt states, \"I've been dehydrated and I need to drink something before I can go. \" Gave pt urinal and encouraged urination when able.      Dorene Lujan RN  02/14/23 [id number]

## 2023-02-14 NOTE — OP NOTE
Operative Note      Patient: Tereasa Severin  YOB: 1960  MRN: [id number]    Date of Procedure: 2/14/2023    Pre-Op Diagnosis: Right BKA stump infection/osteomyelitis. Post-Op Diagnosis: Same       Procedure(s):  LEG AMPUTATION ABOVE KNEE    Surgeon(s):  Kourtney Retana DO    Assistant:   First Assistant: Ne Ivey    Anesthesia: General    Estimated Blood Loss (mL): 352 mL     Complications: None    Specimens:   ID Type Source Tests Collected by Time Destination   A : RIGHT KNEE STUMP FROM AKA Specimen Leg SURGICAL PATHOLOGY Kourtney Retana DO 2/14/2023 1325        Implants:  * No implants in log *      Drains: * No LDAs found *    Findings: see post op dx. Detailed Description of Procedure:     Patient was taken to the OR and laid in supine position. After induction of general endotracheal anesthesia, patient was prepped and draped in usual sterile fashion. [place] incision was made circumferentially above the knee. Using electrocautery dissection was carried down through subcutaneous tissue through fascia circumferentially. Muscles were divided down to bone anteriorly and medially. Saphenous vein suture-ligated and divided. SFA/popliteal artery/vein were both suture-ligated with 0 silk sutures and divided. Sciatic nerve was pulled ligated and divided. Periosteum was divided and periosteal elevator used circumferentially. Femur was divided with a saw and edges of bone filed. Using electrocautery and knife muscles were divided posteriorly at level of the skin flap. Specimen was passed off field. The wound was irrigated. Everything appeared hemostatic. The muscle was closed over the bone and fascia was sutured with interrupted 2-0 Vicryl sutures. The skin was then closed with interrupted 3-0 Vicryl suture followed by a running 3-0 Vicryl suture and staples. Sterile dressing placed.   Patient tolerated procedure well, without complication, and the end of the case was transported to recovery area in stable condition.       Electronically signed by Júnior Miramontes DO on 2/14/2023 at 3:24 PM
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
## 2023-02-14 NOTE — PROGRESS NOTES
1517 Patient arrived to PACU, monitors placed and alarms on. Received report from [facility] Rn/[place] CRNA. Patient drowsy from anesthesia but arouses easily. 1532 Medicated for complaint of right leg pain  1537 Medicated for complaint of right leg pain  1549 Medicated for elevated BP  1558 Medicated for complaint of right leg pain  1603 Medicated for complaint of right leg pain  1620 Patient dozing. Denies any further needs at this time. Patient report called to [address] Patient transported to room 3127.
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
## 2023-02-14 NOTE — CARE COORDINATION
MCG criteria for musculoskeletal reviewed at this time, criteria supports inpatient admission.  JORGE,RN/CM

## 2023-02-14 NOTE — PROGRESS NOTES
Patient refused to allow this nurse and another RN to access backside of body. Front side documented in chart.

## 2023-02-14 NOTE — ANESTHESIA POSTPROCEDURE EVALUATION
Department of Anesthesiology  Postprocedure Note    Patient: Milagros Redman  MRN: [id number]  YOB: 1960  Date of evaluation: 2/14/2023      Procedure Summary     Date: 02/14/23 Room / Location: [address]    Anesthesia Start: 2666 Anesthesia Stop: 5749    Procedure: LEG AMPUTATION ABOVE KNEE (Right) Diagnosis:       Infection      (Infection [B99.9])    Surgeons: Marilee Rod DO Responsible Provider: Forestine Boast, MD    Anesthesia Type: general ASA Status: 4 - Emergent          Anesthesia Type: No value filed.     Carmen Phase I:      Carmen Phase II:        Anesthesia Post Evaluation    Patient location during evaluation: PACU  Patient participation: complete - patient participated  Level of consciousness: awake and alert  Pain score: 4  Airway patency: patent  Nausea & Vomiting: no vomiting and no nausea  Complications: no  Cardiovascular status: blood pressure returned to baseline and hemodynamically stable  Respiratory status: acceptable, spontaneous ventilation, nonlabored ventilation and nasal cannula  Hydration status: stable
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
## 2023-02-14 NOTE — ED NOTES
Asked pt to provide urine sample, pt states he is still unable to at this time.      Zack Armenta, MELISSA  02/14/23 8411

## 2023-02-14 NOTE — PROGRESS NOTES
V2.0  [facility] [place] Hospitalist Progress Note      Name:  Tereasa Severin /Age/Sex: 1960  (60 y.o. male)   MRN & CSN:  [id number] & [id number] Encounter Date/Time: 2023 3:36 PM EST    Location:  OR/NONE PCP: Leonard Pastor MD       Hospital Day: 2    Assessment and Plan:   Tereasa Severin is a 60 y.o. male with pmh of right BKA, insulin-dependent diabetes mellitus type 2, peripheral neuropathy, hyperlipidemia  who presents with Infection (chronic) of amputation stump ([facility] [address]      Plan:    Right BKA stump wound infection: Rule out osteomyelitis. X-ray of the right stump shows no bony erosion or gas. surgery on board. Patient previously treated for osteomyelitis had recent hospitalization with a wound VAC. Did leave  [address] prior to his hospital stay being completed. Started on IV vancomycin Flagyl and cefepime. Follow-up blood cultures. Infectious disease consulted. To have surgical intervention today. Hypochloremic hyponatremia in the setting of dehydration continue to monitor with IV hydration    Chronic normocytic normochromic anemia in the setting of anemia of chronic disease hemoglobin of 11 close to baseline    Coronary artery disease s/p CABG X4 history currently on DAPT at home    Hyperlipidemia on statin    Chronic GERD on Pepcid    Insulin-dependent diabetes mellitus type 2 complicated with peripheral neuropathy and PAD: Patient was not compliant with statin encouraged to do so. Need to discuss with primary team regarding Xarelto for peripheral vascular disease. Takes Lantus 8 mg nightly at home will start 6 units of Lantus along with sliding scale insulin hemoglobin A1c is pending.      Tobacco use disorder encouraged to discontinue smoking    Diet Diet NPO Exceptions are: Ice Chips, Sips of Water with Meds   DVT Prophylaxis [x] Lovenox, []  Heparin, [] SCDs, [] Ambulation,    [] Eliquis, [] Xarelto  [] Coumadin [] other   Code Status Full Code   Disposition From: Home  Expected Disposition: To be determined  Estimated Date of Discharge: 3 to 4 days  Patient requires continued admission due to surgical intervention for right BKA stump wound infection   Surrogate Decision Maker/ POA      Subjective:     Chief Complaint: Leg Pain and Wound Check     Patient seen today and will be going to the OR today for surgical intervention and above-the-knee amputation. Review of Systems:    Review of Systems   Constitutional:  Negative for activity change, appetite change, chills, fatigue and fever. HENT:  Negative for congestion, hearing loss, sinus pressure, sinus pain, sore throat, trouble swallowing and voice change. Eyes:  Negative for visual disturbance. Respiratory:  Negative for cough, chest tightness, shortness of breath and wheezing. Cardiovascular:  Negative for chest pain, palpitations and leg swelling. Gastrointestinal:  Negative for abdominal pain, constipation, diarrhea, nausea and vomiting. Endocrine: Negative for cold intolerance, heat intolerance and polyuria. Genitourinary:  Negative for dysuria. Musculoskeletal:  Negative for arthralgias, back pain and gait problem. Skin:  Positive for color change and wound. Neurological:  Negative for dizziness, weakness and headaches. Psychiatric/Behavioral:  Negative for agitation and confusion. The patient is not nervous/anxious. Objective: Intake/Output Summary (Last 24 hours) at 2/14/2023 1536  Last data filed at 2/14/2023 1522  Gross per 24 hour   Intake 1060 ml   Output 100 ml   Net 960 ml        Vitals:   Vitals:    02/14/23 1127   BP: (!) 110/59   Pulse: 64   Resp: 16   Temp: 98.3 °F (36.8 °C)   SpO2: 96%       Physical Exam:     Physical Exam  Constitutional:       General: He is not in acute distress. Appearance: Normal appearance. HENT:      Head: Normocephalic and atraumatic.       Nose: Nose normal.      Mouth/Throat:      Mouth: Mucous membranes are moist.      Pharynx: Oropharynx is clear. Eyes:      Extraocular Movements: Extraocular movements intact. Conjunctiva/sclera: Conjunctivae normal.      Pupils: Pupils are equal, round, and reactive to light. Cardiovascular:      Rate and Rhythm: Normal rate and regular rhythm. Pulses: Normal pulses. Heart sounds: Normal heart sounds. Pulmonary:      Effort: Pulmonary effort is normal.   Abdominal:      General: Abdomen is flat. Bowel sounds are normal.      Palpations: Abdomen is soft. Musculoskeletal:         General: Swelling, tenderness and signs of injury present. Normal range of motion. Cervical back: Normal range of motion and neck supple. Skin:     General: Skin is warm and dry. Findings: Lesion present. Neurological:      General: No focal deficit present. Mental Status: He is alert and oriented to person, place, and time. Mental status is at baseline. Psychiatric:         Mood and Affect: Mood normal.         Behavior: Behavior normal.         Thought Content:  Thought content normal.       Medications:   Medications:    vancomycin  1,000 mg IntraVENous Q12H    vitamin D  50,000 Units Oral Weekly    sodium chloride flush  5-40 mL IntraVENous 2 times per day    sodium chloride flush  5-40 mL IntraVENous 2 times per day    aspirin  81 mg Oral Daily    atorvastatin  40 mg Oral Nightly    DULoxetine  20 mg Oral Daily    insulin glargine  6 Units SubCUTAneous Nightly    insulin lispro  0-4 Units SubCUTAneous TID WC    insulin lispro  0-4 Units SubCUTAneous Nightly    sodium chloride flush  5-40 mL IntraVENous 2 times per day    enoxaparin  40 mg SubCUTAneous Daily    cefepime  2,000 mg IntraVENous Q12H    metroNIDAZOLE  500 mg IntraVENous Q8H      Infusions:    lactated ringers IV soln      dextrose      sodium chloride      lactated ringers IV soln 100 mL/hr at 02/14/23 0538     PRN Meds: HYDROmorphone, 0.5 mg, Q4H PRN  oxyCODONE-acetaminophen, 1 tablet, Q4H PRN Or  oxyCODONE-acetaminophen, 2 tablet, Q4H PRN  sodium chloride flush, 5-40 mL, PRN  meperidine, 12.5 mg, Q5 Min PRN  fentanNYL, 25 mcg, Q5 Min PRN  HYDROmorphone, 0.5 mg, Q5 Min PRN  oxyCODONE, 5 mg, Once PRN  ondansetron, 4 mg, Once PRN  droperidol, 0.625 mg, Q10 Min PRN  diphenhydrAMINE, 12.5 mg, Once PRN  labetalol, 10 mg, Q15 Min PRN   Or  hydrALAZINE, 10 mg, Q15 Min PRN  ipratropium-albuterol, 1 ampule, Once PRN  sodium chloride flush, 5-40 mL, PRN  glucose, 4 tablet, PRN  dextrose bolus, 125 mL, PRN   Or  dextrose bolus, 250 mL, PRN  glucagon (rDNA), 1 mg, PRN  dextrose, , Continuous PRN  sodium chloride flush, 5-40 mL, PRN  sodium chloride, , PRN  ondansetron, 4 mg, Q8H PRN   Or  ondansetron, 4 mg, Q6H PRN  polyethylene glycol, 17 g, Daily PRN  acetaminophen, 650 mg, Q6H PRN   Or  acetaminophen, 650 mg, Q6H PRN        Labs      Recent Results (from the past 24 hour(s))   CBC auto differential    Collection Time: 02/13/23  8:45 PM   Result Value Ref Range    WBC 6.4 4.0 - 10.5 K/CU MM    RBC 3.77 (L) 4.6 - 6.2 M/CU MM    Hemoglobin 11.0 (L) 13.5 - 18.0 GM/DL    Hematocrit 32.5 (L) 42 - 52 %    MCV 86.2 78 - 100 FL    MCH 29.2 27 - 31 PG    MCHC 33.8 32.0 - 36.0 %    RDW 13.8 11.7 - 14.9 %    Platelets 839 800 - 351 K/CU MM    MPV 10.5 7.5 - 11.1 FL    Differential Type AUTOMATED DIFFERENTIAL     Segs Relative 70.0 (H) 36 - 66 %    Lymphocytes % 20.2 (L) 24 - 44 %    Monocytes % 7.3 (H) 0 - 4 %    Eosinophils % 1.2 0 - 3 %    Basophils % 0.8 0 - 1 %    Segs Absolute 4.5 K/CU MM    Lymphocytes Absolute 1.3 K/CU MM    Monocytes Absolute 0.5 K/CU MM    Eosinophils Absolute 0.1 K/CU MM    Basophils Absolute 0.1 K/CU MM    Nucleated RBC % 0.0 %    Total Nucleated RBC 0.0 K/CU MM    Total Immature Neutrophil 0.03 K/CU MM    Immature Neutrophil % 0.5 (H) 0 - 0.43 %   Comprehensive Metabolic Panel w/ Reflex to MG    Collection Time: 02/13/23  8:45 PM   Result Value Ref Range    Sodium 127 (L) 135 - 145 MMOL/L Potassium 3.5 3.5 - 5.1 MMOL/L    Chloride 95 (L) 99 - 110 mMol/L    CO2 26 21 - 32 MMOL/L    BUN 15 6 - 23 MG/DL    Creatinine 0.7 (L) 0.9 - 1.3 MG/DL    Est, Glom Filt Rate >60 >60 mL/min/1.73m2    Glucose 271 (H) 70 - 99 MG/DL    Calcium 8.3 8.3 - 10.6 MG/DL    Albumin 3.1 (L) 3.4 - 5.0 GM/DL    Total Protein 7.6 6.4 - 8.2 GM/DL    Total Bilirubin 0.2 0.0 - 1.0 MG/DL    ALT 24 10 - 40 U/L    AST 31 15 - 37 IU/L    Alkaline Phosphatase 176 (H) 40 - 129 IU/L    Anion Gap 6 4 - 16   Magnesium    Collection Time: 02/13/23  8:45 PM   Result Value Ref Range    Magnesium 2.0 1.8 - 2.4 mg/dl   Hemoglobin A1c    Collection Time: 02/13/23  8:45 PM   Result Value Ref Range    Hemoglobin A1C 7.6 (H) 4.2 - 6.3 %    eAG 171 mg/dL   Vitamin D 25 Hydroxy    Collection Time: 02/13/23  8:45 PM   Result Value Ref Range    Vit D, 25-Hydroxy <6.0 (L) >20 NG/ML   Lactate, Sepsis    Collection Time: 02/13/23  9:08 PM   Result Value Ref Range    Lactic Acid, Sepsis 1.4 0.5 - 1.9 mMOL/L   EKG 12 lead    Collection Time: 02/13/23  9:37 PM   Result Value Ref Range    Ventricular Rate 66 BPM    Atrial Rate 66 BPM    P-R Interval 168 ms    QRS Duration 86 ms    Q-T Interval 458 ms    QTc Calculation (Bazett) 480 ms    P Axis 9 degrees    R Axis 49 degrees    T Axis 20 degrees    Diagnosis       Normal sinus rhythm  Prolonged QT  Abnormal ECG  When compared with ECG of 01-NOV-2021 09:28,  QRS axis shifted left  T wave inversion no longer evident in Lateral leads     POCT Glucose    Collection Time: 02/13/23 10:53 PM   Result Value Ref Range    POC Glucose 256 (H) 70 - 99 MG/DL   POCT glucose    Collection Time: 02/13/23 10:55 PM   Result Value Ref Range    Glucose 256 mg/dL    QC OK?  OK    Lactate, Sepsis    Collection Time: 02/13/23 11:01 PM   Result Value Ref Range    Lactic Acid, Sepsis 1.2 0.5 - 1.9 mMOL/L   POCT Glucose    Collection Time: 02/14/23  2:20 AM   Result Value Ref Range    POC Glucose 195 (H) 70 - 99 MG/DL   CBC    Collection Time: 02/14/23  8:00 AM   Result Value Ref Range    WBC 7.8 4.0 - 10.5 K/CU MM    RBC 3.55 (L) 4.6 - 6.2 M/CU MM    Hemoglobin 10.4 (L) 13.5 - 18.0 GM/DL    Hematocrit 30.8 (L) 42 - 52 %    MCV 86.8 78 - 100 FL    MCH 29.3 27 - 31 PG    MCHC 33.8 32.0 - 36.0 %    RDW 13.9 11.7 - 14.9 %    Platelets 767 (L) 978 - 440 K/CU MM    MPV 10.1 7.5 - 11.1 FL   Magnesium    Collection Time: 02/14/23  8:00 AM   Result Value Ref Range    Magnesium 1.7 (L) 1.8 - 2.4 mg/dl   Phosphorus    Collection Time: 02/14/23  8:00 AM   Result Value Ref Range    Phosphorus 3.0 2.5 - 4.9 MG/DL   Basic Metabolic Panel    Collection Time: 02/14/23  8:00 AM   Result Value Ref Range    Sodium 136 135 - 145 MMOL/L    Potassium 4.0 3.5 - 5.1 MMOL/L    Chloride 105 99 - 110 mMol/L    CO2 24 21 - 32 MMOL/L    Anion Gap 7 4 - 16    BUN 13 6 - 23 MG/DL    Creatinine 0.6 (L) 0.9 - 1.3 MG/DL    Est, Glom Filt Rate >60 >60 mL/min/1.73m2    Glucose 156 (H) 70 - 99 MG/DL    Calcium 7.8 (L) 8.3 - 10.6 MG/DL   C-Reactive Protein    Collection Time: 02/14/23  8:00 AM   Result Value Ref Range    CRP High Sensitivity 12.6 (H) <5.0 mg/L   POCT Glucose    Collection Time: 02/14/23  8:01 AM   Result Value Ref Range    POC Glucose 168 (H) 70 - 99 MG/DL   POCT glucose    Collection Time: 02/14/23  8:04 AM   Result Value Ref Range    Glucose 168 mg/dL    QC OK? yes    POCT Glucose    Collection Time: 02/14/23 11:37 AM   Result Value Ref Range    POC Glucose 161 (H) 70 - 99 MG/DL   POCT Glucose    Collection Time: 02/14/23  3:23 PM   Result Value Ref Range    POC Glucose 164 (H) 70 - 99 MG/DL        Imaging/Diagnostics Last 24 Hours   XR KNEE RIGHT (1-2 VIEWS)    Result Date: 2/13/2023  EXAMINATION: TWO XRAY VIEWS OF THE RIGHT KNEE 2/13/2023 8:48 pm COMPARISON: None.  HISTORY: ORDERING SYSTEM PROVIDED HISTORY: Robert Cristina in fall, nonhealing TECHNOLOGIST PROVIDED HISTORY: Reason for exam:->nec fasc in fall, nonhealing Reason for Exam: Robert Cristina in fall, nonhealing Additional signs and symptoms: none Relevant Medical/Surgical History: diabetes FINDINGS: Status post below-knee amputation. No acute periostitis or bony destruction. Suspected wound on anterior aspect of the stomach. The knee joint is maintained. No acute periostitis or bony destruction. Status post BKA. Comment: Please note this report has been produced using speech recognition software and may contain errors related to that system including errors in grammar, punctuation, and spelling, as well as words and phrases that may be inappropriate. If there are any questions or concerns please feel free to contact the dictating provider for clarification.      Electronically signed by Varghese Trujillo MD on 2/14/2023 at 3:36 PM
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
## 2023-02-14 NOTE — PROGRESS NOTES
[facility]  consulted by Dr. Mey Vega for monitoring and adjustment. Indication for treatment: Stump infection, R/O osteomyelitis  Goal trough: Trough Goal: 15-20 mcg/mL  AUC/SUHA: 400-600    Risk Factors for MRSA Identified:   Hospitalization within the past 90 days, Received IV antibiotics within the past 90 days, Purulent and/or complicated SSTI    Pertinent Laboratory Values:   Temp Readings from Last 3 Encounters:   02/13/23 97.7 °F (36.5 °C) (Oral)   01/09/23 98.1 °F (36.7 °C) (Oral)   10/25/22 98.9 °F (37.2 °C) (Oral)     Recent Labs     02/13/23 2045   WBC 6.4     Recent Labs     02/13/23 2045   BUN 15   CREATININE 0.7*     Estimated Creatinine Clearance: 101 mL/min (A) (based on SCr of 0.7 mg/dL (L)). Intake/Output Summary (Last 24 hours) at 2/14/2023 0414  Last data filed at 2/14/2023 0011  Gross per 24 hour   Intake 10 ml   Output --   Net 10 ml       Pertinent Cultures:   Date    Source    Results  02/13   Blood    Collected  02/13   Urine    To be collected  02/13   Wound (Aerobic)  Held    Assessment:  HPI: 60 y.o. male with history of right BKA, insulin-dependent DM-2, peripheral neuropathy, and hyperlipidemia. Patient presents with infection (chronic) of amputation stump ([address] X-ray of the right stump shows no bony erosion or gas. MRI pending for the morning. SCr = 0.7, BUN = 15, and no output data  Day(s) of therapy: 1  Vancomycin concentration:   02/15 - To be collected    Plan:  Vancomycin 1,750 mg IV given in ED; Follow with Vancomycin 1,000 mg IV Q 12 Hours  Predicted AUC: 512; Predicted Trough: 15.5  Pharmacy will continue to monitor patient and adjust therapy as indicated    Sonal 3 02/15/2023 @ 6 AM    Thank you for the consult.   Rockne Pallas, [facility]  2/14/2023 4:14 AM

## 2023-02-15 LAB
ANION GAP SERPL CALCULATED.3IONS-SCNC: 10 MMOL/L (ref 4–16)
BUN SERPL-MCNC: 15 MG/DL (ref 6–23)
CALCIUM SERPL-MCNC: 7.7 MG/DL (ref 8.3–10.6)
CHLORIDE BLD-SCNC: 101 MMOL/L (ref 99–110)
CO2: 22 MMOL/L (ref 21–32)
CREAT SERPL-MCNC: 0.6 MG/DL (ref 0.9–1.3)
CRP SERPL HS-MCNC: 29.3 MG/L
DOSE AMOUNT: NORMAL
DOSE TIME: NORMAL
GFR SERPL CREATININE-BSD FRML MDRD: >60 ML/MIN/1.73M2
GLUCOSE BLD-MCNC: 140 MG/DL (ref 70–99)
GLUCOSE BLD-MCNC: 181 MG/DL (ref 70–99)
GLUCOSE BLD-MCNC: 187 MG/DL (ref 70–99)
GLUCOSE BLD-MCNC: 202 MG/DL (ref 70–99)
GLUCOSE SERPL-MCNC: 185 MG/DL (ref 70–99)
HCT VFR BLD CALC: 30.1 % (ref 42–52)
HEMOGLOBIN: 10.6 GM/DL (ref 13.5–18)
MAGNESIUM: 1.9 MG/DL (ref 1.8–2.4)
MCH RBC QN AUTO: 29.4 PG (ref 27–31)
MCHC RBC AUTO-ENTMCNC: 35.2 % (ref 32–36)
MCV RBC AUTO: 83.4 FL (ref 78–100)
PDW BLD-RTO: 13.8 % (ref 11.7–14.9)
PHOSPHORUS: 3.5 MG/DL (ref 2.5–4.9)
PLATELET # BLD: 154 K/CU MM (ref 140–440)
PMV BLD AUTO: 10.2 FL (ref 7.5–11.1)
POTASSIUM SERPL-SCNC: 3.9 MMOL/L (ref 3.5–5.1)
RBC # BLD: 3.61 M/CU MM (ref 4.6–6.2)
SODIUM BLD-SCNC: 133 MMOL/L (ref 135–145)
VANCOMYCIN RANDOM: 21.1 UG/ML
WBC # BLD: 11.4 K/CU MM (ref 4–10.5)

## 2023-02-15 PROCEDURE — 80202 ASSAY OF VANCOMYCIN: CPT

## 2023-02-15 PROCEDURE — 6360000002 HC RX W HCPCS: Performed by: SURGERY

## 2023-02-15 PROCEDURE — 6370000000 HC RX 637 (ALT 250 FOR IP): Performed by: SURGERY

## 2023-02-15 PROCEDURE — 83735 ASSAY OF MAGNESIUM: CPT

## 2023-02-15 PROCEDURE — 85027 COMPLETE CBC AUTOMATED: CPT

## 2023-02-15 PROCEDURE — 97162 PT EVAL MOD COMPLEX 30 MIN: CPT

## 2023-02-15 PROCEDURE — 6370000000 HC RX 637 (ALT 250 FOR IP): Performed by: STUDENT IN AN ORGANIZED HEALTH CARE EDUCATION/TRAINING PROGRAM

## 2023-02-15 PROCEDURE — 2140000000 HC CCU INTERMEDIATE R&B

## 2023-02-15 PROCEDURE — 80048 BASIC METABOLIC PNL TOTAL CA: CPT

## 2023-02-15 PROCEDURE — 97166 OT EVAL MOD COMPLEX 45 MIN: CPT

## 2023-02-15 PROCEDURE — 82962 GLUCOSE BLOOD TEST: CPT

## 2023-02-15 PROCEDURE — 2500000003 HC RX 250 WO HCPCS: Performed by: SURGERY

## 2023-02-15 PROCEDURE — 84100 ASSAY OF PHOSPHORUS: CPT

## 2023-02-15 PROCEDURE — 99223 1ST HOSP IP/OBS HIGH 75: CPT | Performed by: INTERNAL MEDICINE

## 2023-02-15 PROCEDURE — 36415 COLL VENOUS BLD VENIPUNCTURE: CPT

## 2023-02-15 PROCEDURE — 6360000002 HC RX W HCPCS: Performed by: NURSE PRACTITIONER

## 2023-02-15 PROCEDURE — 86140 C-REACTIVE PROTEIN: CPT

## 2023-02-15 PROCEDURE — 2580000003 HC RX 258: Performed by: SURGERY

## 2023-02-15 RX ORDER — HYDRALAZINE HYDROCHLORIDE 20 MG/ML
10 INJECTION INTRAMUSCULAR; INTRAVENOUS ONCE
Status: COMPLETED | OUTPATIENT
Start: 2023-02-15 | End: 2023-02-15

## 2023-02-15 RX ORDER — DULOXETIN HYDROCHLORIDE 20 MG/1
40 CAPSULE, DELAYED RELEASE ORAL ONCE
Status: COMPLETED | OUTPATIENT
Start: 2023-02-15 | End: 2023-02-15

## 2023-02-15 RX ORDER — DULOXETIN HYDROCHLORIDE 30 MG/1
60 CAPSULE, DELAYED RELEASE ORAL DAILY
Status: DISCONTINUED | OUTPATIENT
Start: 2023-02-16 | End: 2023-02-24 | Stop reason: HOSPADM

## 2023-02-15 RX ORDER — GABAPENTIN 300 MG/1
300 CAPSULE ORAL 3 TIMES DAILY
Status: DISCONTINUED | OUTPATIENT
Start: 2023-02-15 | End: 2023-02-24 | Stop reason: HOSPADM

## 2023-02-15 RX ADMIN — GABAPENTIN 300 MG: 300 CAPSULE ORAL at 10:58

## 2023-02-15 RX ADMIN — SODIUM CHLORIDE, PRESERVATIVE FREE 10 ML: 5 INJECTION INTRAVENOUS at 21:36

## 2023-02-15 RX ADMIN — HYDROMORPHONE HYDROCHLORIDE 0.5 MG: 1 INJECTION, SOLUTION INTRAMUSCULAR; INTRAVENOUS; SUBCUTANEOUS at 04:19

## 2023-02-15 RX ADMIN — ATORVASTATIN CALCIUM 40 MG: 40 TABLET, FILM COATED ORAL at 21:32

## 2023-02-15 RX ADMIN — VANCOMYCIN HYDROCHLORIDE 1000 MG: 1 INJECTION, POWDER, LYOPHILIZED, FOR SOLUTION INTRAVENOUS at 13:15

## 2023-02-15 RX ADMIN — HYDROMORPHONE HYDROCHLORIDE 0.5 MG: 1 INJECTION, SOLUTION INTRAMUSCULAR; INTRAVENOUS; SUBCUTANEOUS at 19:32

## 2023-02-15 RX ADMIN — HYDROMORPHONE HYDROCHLORIDE 0.5 MG: 1 INJECTION, SOLUTION INTRAMUSCULAR; INTRAVENOUS; SUBCUTANEOUS at 23:23

## 2023-02-15 RX ADMIN — DULOXETINE HYDROCHLORIDE 20 MG: 20 CAPSULE, DELAYED RELEASE ORAL at 08:29

## 2023-02-15 RX ADMIN — HYDROMORPHONE HYDROCHLORIDE 0.5 MG: 1 INJECTION, SOLUTION INTRAMUSCULAR; INTRAVENOUS; SUBCUTANEOUS at 00:59

## 2023-02-15 RX ADMIN — GABAPENTIN 300 MG: 300 CAPSULE ORAL at 21:32

## 2023-02-15 RX ADMIN — HYDROMORPHONE HYDROCHLORIDE 0.5 MG: 1 INJECTION, SOLUTION INTRAMUSCULAR; INTRAVENOUS; SUBCUTANEOUS at 06:27

## 2023-02-15 RX ADMIN — METRONIDAZOLE 500 MG: 500 INJECTION, SOLUTION INTRAVENOUS at 06:34

## 2023-02-15 RX ADMIN — VANCOMYCIN HYDROCHLORIDE 1000 MG: 1 INJECTION, POWDER, LYOPHILIZED, FOR SOLUTION INTRAVENOUS at 00:34

## 2023-02-15 RX ADMIN — GABAPENTIN 300 MG: 300 CAPSULE ORAL at 14:29

## 2023-02-15 RX ADMIN — CEFEPIME HYDROCHLORIDE 2000 MG: 2 INJECTION, POWDER, FOR SOLUTION INTRAVENOUS at 08:33

## 2023-02-15 RX ADMIN — CEFEPIME HYDROCHLORIDE 2000 MG: 2 INJECTION, POWDER, FOR SOLUTION INTRAVENOUS at 21:32

## 2023-02-15 RX ADMIN — OXYCODONE AND ACETAMINOPHEN 2 TABLET: 5; 325 TABLET ORAL at 08:26

## 2023-02-15 RX ADMIN — HYDROMORPHONE HYDROCHLORIDE 0.5 MG: 1 INJECTION, SOLUTION INTRAMUSCULAR; INTRAVENOUS; SUBCUTANEOUS at 10:16

## 2023-02-15 RX ADMIN — HYDROMORPHONE HYDROCHLORIDE 0.5 MG: 1 INJECTION, SOLUTION INTRAMUSCULAR; INTRAVENOUS; SUBCUTANEOUS at 13:10

## 2023-02-15 RX ADMIN — DULOXETINE HYDROCHLORIDE 40 MG: 20 CAPSULE, DELAYED RELEASE ORAL at 10:58

## 2023-02-15 RX ADMIN — INSULIN GLARGINE 6 UNITS: 100 INJECTION, SOLUTION SUBCUTANEOUS at 21:32

## 2023-02-15 RX ADMIN — OXYCODONE AND ACETAMINOPHEN 2 TABLET: 5; 325 TABLET ORAL at 17:43

## 2023-02-15 RX ADMIN — ASPIRIN 81 MG 81 MG: 81 TABLET ORAL at 08:28

## 2023-02-15 RX ADMIN — ENOXAPARIN SODIUM 40 MG: 100 INJECTION SUBCUTANEOUS at 08:29

## 2023-02-15 RX ADMIN — HYDRALAZINE HYDROCHLORIDE 10 MG: 20 INJECTION INTRAMUSCULAR; INTRAVENOUS at 00:25

## 2023-02-15 RX ADMIN — METRONIDAZOLE 500 MG: 500 INJECTION, SOLUTION INTRAVENOUS at 17:00

## 2023-02-15 RX ADMIN — METRONIDAZOLE 500 MG: 500 INJECTION, SOLUTION INTRAVENOUS at 23:39

## 2023-02-15 ASSESSMENT — PAIN DESCRIPTION - ORIENTATION
ORIENTATION: RIGHT;POSTERIOR;ANTERIOR
ORIENTATION: RIGHT

## 2023-02-15 ASSESSMENT — PAIN SCALES - GENERAL
PAINLEVEL_OUTOF10: 10
PAINLEVEL_OUTOF10: 6
PAINLEVEL_OUTOF10: 10
PAINLEVEL_OUTOF10: 10
PAINLEVEL_OUTOF10: 6
PAINLEVEL_OUTOF10: 9
PAINLEVEL_OUTOF10: 6
PAINLEVEL_OUTOF10: 9
PAINLEVEL_OUTOF10: 5
PAINLEVEL_OUTOF10: 10
PAINLEVEL_OUTOF10: 9
PAINLEVEL_OUTOF10: 7
PAINLEVEL_OUTOF10: 10
PAINLEVEL_OUTOF10: 8

## 2023-02-15 ASSESSMENT — PAIN DESCRIPTION - DESCRIPTORS
DESCRIPTORS: ACHING;BURNING;SHARP;SHOOTING;THROBBING
DESCRIPTORS: CRAMPING;BURNING;ACHING
DESCRIPTORS: ACHING;BURNING;THROBBING;STABBING
DESCRIPTORS: ACHING;BURNING
DESCRIPTORS: ACHING;DISCOMFORT;THROBBING
DESCRIPTORS: ACHING;BURNING;THROBBING;STABBING
DESCRIPTORS: ACHING;BURNING;SHARP;THROBBING

## 2023-02-15 ASSESSMENT — PAIN DESCRIPTION - LOCATION
LOCATION: LEG

## 2023-02-15 ASSESSMENT — PAIN - FUNCTIONAL ASSESSMENT
PAIN_FUNCTIONAL_ASSESSMENT: PREVENTS OR INTERFERES WITH MANY ACTIVE NOT PASSIVE ACTIVITIES

## 2023-02-15 ASSESSMENT — ENCOUNTER SYMPTOMS
CHEST TIGHTNESS: 0
SINUS PAIN: 0
COUGH: 0
SORE THROAT: 0
BACK PAIN: 0
ABDOMINAL PAIN: 0
VOMITING: 0
WHEEZING: 0
COLOR CHANGE: 1
SHORTNESS OF BREATH: 0
SINUS PRESSURE: 0
NAUSEA: 0
TROUBLE SWALLOWING: 0
DIARRHEA: 0
CONSTIPATION: 0
VOICE CHANGE: 0

## 2023-02-15 ASSESSMENT — PAIN DESCRIPTION - PAIN TYPE: TYPE: CHRONIC PAIN;SURGICAL PAIN

## 2023-02-15 ASSESSMENT — PAIN DESCRIPTION - ONSET: ONSET: ON-GOING
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
## 2023-02-15 NOTE — CARE COORDINATION
Case Management Assessment  Initial Evaluation    Date/Time of Evaluation: 2/15/2023 10:15 AM  Assessment Completed by: RUTHANN Cole    If patient is discharged prior to next notation, then this note serves as note for discharge by case management. Patient Name: Hanna Fraire                   YOB: 1960  Diagnosis: Infection (chronic) of amputation stump ([facility] [T87.40]  Hyponatremia [E87.1]  Wound infection [T14. 8XXA, L08.9]  Non-healing surgical wound, subsequent encounter [T81.89XD]                   Date / Time: 2/13/2023  8:01 PM    Patient Admission Status: Inpatient   Readmission Risk (Low < 19, Mod (19-27), High > 27): Readmission Risk Score: 25.3    Current PCP: Deon Tang MD  PCP verified by CM? Yes    Chart Reviewed: Yes      History Provided by: Patient  Patient Orientation: Alert and Oriented    Patient Cognition: Alert    Hospitalization in the last 30 days (Readmission):  No    If yes, Readmission Assessment in CM Navigator will be completed. Advance Directives:      Code Status: Full Code   Patient's Primary Decision Maker is: Named in Scanned ACP Document    Primary Decision Maker: Mylene Orellana [facility] - Domestic Partner - [phone]    Discharge Planning:    Patient lives with: Alone Type of Home: House  Primary Care Giver: Self  Patient Support Systems include: Spouse/Significant Other   Current Financial resources: Medicare  Current community resources: None  Current services prior to admission: None            Current DME:              Type of Home Care services:  None    ADLS  Prior functional level: Independent in ADLs/IADLs  Current functional level: Independent in ADLs/IADLs    PT AM-PAC:   /24  OT AM-PAC:   /24    Family can provide assistance at DC: Yes  Would you like Case Management to discuss the discharge plan with any other family members/significant others, and if so, who?  No  Plans to Return to Present Housing: Unknown at present  Other Identified Issues/Barriers to RETURNING to current housing: PT/OT, possible SNF  Potential Assistance needed at discharge: Home Care            Potential DME:  pending therapy  Patient expects to discharge to: [facility] for transportation at discharge:  TBD    Financial    [address]: [address] / Plan: [address] [employer] [place] / Product Type: *No Product type* /     Does insurance require precert for SNF: Yes    Potential assistance Purchasing Medications:    Meds-to-Beds request: Yes      [id number] [place], [place] [phone] - F [phone] [address] [place] [address]  Phone: [phone] Fax: [phone]      Notes:    Factors facilitating achievement of predicted outcomes: Friend support, Motivated, and Cooperative    Barriers to discharge: PT/OT, possible SNF    Additional Case Management Notes: CM in to see Pt to initiate discharge planning. Pt agreeable to SNF at discharge if recommended. PT/OT ordered. CM following     The Plan for Transition of Care is related to the following treatment goals of Infection (chronic) of amputation stump (HCC) [T87.40]  Hyponatremia [E87.1]  Wound infection [T14. 8XXA, L08.9]  Non-healing surgical wound, subsequent encounter [T81.89XD]    The Patient and/or Patient Representative Agree with the Discharge Plan?   Yes     Brandon Zendejas [place]  Case Management Department  Ph: [phone]

## 2023-02-15 NOTE — CARE COORDINATION
Dr elisabet FLOR that pt is agreeable to go to a SNF for rehab. PT/OT ordered and requested for this am via WB. Pt will require a pre-cert. Pt's BASIA/Adrian to follow.  TE

## 2023-02-15 NOTE — CONSULTS
[address] [facility], 1960, [id number]-, 2/15/2023    History  [place]:  The primary encounter diagnosis was Non-healing surgical wound, subsequent encounter. Diagnoses of Hyponatremia, Wound infection, and Infection were also pertinent to this visit. Patient  has a past medical history of Anesthesia, Anxiety, CAD (coronary artery disease), COPD (chronic obstructive pulmonary disease) ([facility] Degenerative disc disease, Diabetes mellitus ([facility] Gall bladder stones, H/O cardiovascular stress test, H/O echocardiogram, Heroin abuse ([facility] Hx MRSA infection, Hyperlipidemia, Hypertension, Low back pain, Migraine, Pancreatitis, S/P CABG x 4, and Shortness of breath on exertion. Patient  has a past surgical history that includes Hand surgery (15 yrs ago); Dental surgery (2010); Coronary artery bypass graft (1/6/13); Toe amputation (Right, 3/31/2020); Toe amputation (Right, 11/5/2021); Foot Debridement (Right, 06/24/2022); Foot Debridement (Right, 6/24/2022); Leg amputation below knee (Right, 8/29/2022); and Leg Surgery (Right, 12/21/2022). Subjective:  Patient states:  \"I'm sorry I couldn't do more. \"    Pain:  c/o pain at residual limb, does not rate. RN aware and already administered pain meds.     Communication with other providers:  Handoff to RN, OT  Restrictions: general precautions, fall risk, AKA 2/14/2023    Home Setup/Prior level of function   Lives With: Spouse  Type of Home: House  Home Layout: One level  Home Access: Ramp   Entrance Stairs - Rails: Both  Bathroom Shower/Tub: Tub/Shower unit, Shower chair with back, grab bars   [employer]: raised toilet seat   Bathroom Accessibility: [facility] accessible  Has the patient had two or more falls in the past year or any fall with injury in the past year?: yes   DME: MEHNAZ king, RW   ADL Assistance: Independent  Homemaking Assistance: Independent (shared with spouse)  Homemaking Responsibilities: Yes  Ambulation Assistance: Independent (with RW or WC)   Transfer Assistance: Independent  Active : Yes     Examination of body systems (includes body structures/functions, activity/participation limitations):  Observation:  pt supine in bed upon arrival and agreeable to therapy  Vision:  [facility]  Hearing:  [facility]  Cardiopulmonary:  no O2 needs  Cognition: WFL, see OT/SLP note for further evaluation. Musculoskeletal  ROM R/L:  WFL. Strength R/L:  4/5, moderate impairment in function and endurance. Neuro:  WFL      Mobility:  Rolling L/R:  SBA  Supine to sit:  SBA  Sit to supine: SBA  Transfers: deferred due to nausea  Sitting balance:  good, pt sat EOB ~6 minutes but became nauseas and requesting to return to supine. Standing balance:  NT.    Gait: NT    [facility] 6 Clicks Inpatient Mobility:  AM-PAC Inpatient Mobility Raw Score : 10    Safety: patient left supine in bed with alarm on, call light within reach, RN notified, gait belt used. Assessment:  Pt is a 60 y.o. male admitted to the hospital for an infection of amputation stump. Pt underwent a R AKA on 2/14/2023. Pt is currently performing bed mobility SBA and unable to transfer or ambulate at this time. Pt is presenting with decreased endurance, impaired gait, impaired transfers, impaired bed mobility, and increased pain. Pt would benefit from continued acute care PT as well as SNF placement upon discharge to continue to address impairments. Complexity: moderate    Prognosis: Good, no significant barriers to participation at this time.      General Plan:  (4+)/week     Equipment: TBD at next level of care    Goals:  Short Term Goals  Time Frame for Short Term Goals: 1 week  Short Term Goal 1: Pt to complete all bed mobility mod I  Short Term Goal 2: pt to complete STS transfer to/from bed, commode, and chair mod A  Short Term Goal 3: Pt to ambulate 25' with LRAD mod A       Treatment plan:  Bed mobility, transfers, balance, gait, TA, TX    Recommendations for NURSING mobility: veronica    Time:   Time in: 1057  Time out: 1112  Timed treatment minutes: 0  Total time: 15    Electronically signed by:    Breonna Razo, PT  2/15/2023, 1:10 PM

## 2023-02-15 NOTE — PROGRESS NOTES
02/15/23 1018   Encounter Summary   Encounter Overview/Reason  Initial Encounter   Service Provided For: Patient   Referral/Consult From: [address] [place] [address] Street other;Children   Last Encounter  02/15/23  (Paitent in much discomfort. Offered prayer and comfort care.)   Complexity of Encounter Low   Begin Time 1001   End Time  1019   Total Time Calculated 18 min   Encounter    Type Initial Screen/Assessment   Spiritual/Emotional needs   Type Spiritual Support   Grief, Loss, and Adjustments   Type Adjustment to illness   Assessment/Intervention/Outcome   Assessment Coping;Despair   Intervention Active listening;Discussed belief system/Latter day practices/aguilar;Discussed illness injury and its impact; Discussed relationship with God;Empowerment;Nurtured Hope;Prayer (assurance of)/[place];Sustaining Presence/Ministry of presence   Outcome Engaged in conversation;Expressed feelings, needs, and concerns;Expressed Gratitude; Optimistic   Plan and Referrals   Plan/Referrals Continue to visit, (comment)

## 2023-02-15 NOTE — PROGRESS NOTES
V2.0  [facility] Hospitalist Progress Note      Name:  Olaf Griffiths /Age/Sex: 1960  (60 y.o. male)   MRN & CSN:  [id number] & [id number] Encounter Date/Time: 2/15/2023 3:36 PM EST    Location:  [address] [place] [address] PCP: Blank Muñoz MD       Hospital Day: 3    Assessment and Plan:   Olaf Griffiths is a 60 y.o. male with pmh of right BKA, insulin-dependent diabetes mellitus type 2, peripheral neuropathy, hyperlipidemia  who presents with Infection (chronic) of amputation stump ([facility] [address]      Plan:    Right BKA stump wound infection: Rule out osteomyelitis. X-ray of the right stump shows no bony erosion or gas. surgery on board. Patient previously treated for osteomyelitis had recent hospitalization with a wound VAC. Did leave  [address] prior to his hospital stay being completed. Started on IV vancomycin Flagyl and cefepime. Follow-up blood cultures. Infectious disease consulted. Surgical intervention on 2023    Hypochloremic hyponatremia in the setting of dehydration continue to monitor with IV hydration    Chronic normocytic normochromic anemia in the setting of anemia of chronic disease hemoglobin of 11 close to baseline    Coronary artery disease s/p CABG X4 history currently on DAPT at home    Hyperlipidemia on statin    Chronic GERD on Pepcid    Insulin-dependent diabetes mellitus type 2 complicated with peripheral neuropathy and PAD: Patient was not compliant with statin encouraged to do so. Need to discuss with primary team regarding Xarelto for peripheral vascular disease. Takes Lantus 8 mg nightly at home will start 6 units of Lantus along with sliding scale insulin hemoglobin A1c is pending. Tobacco use disorder encouraged to discontinue smoking    Diet ADULT DIET; Regular   DVT Prophylaxis [x] Lovenox, []  Heparin, [] SCDs, [] Ambulation,    [] Eliquis, [] Xarelto  [] Coumadin [] other   Code Status Full Code   Disposition From: Home  Expected Disposition:  To be determined  Estimated Date of Discharge: 3 to 4 days  Patient requires continued admission due to surgical intervention for right BKA stump wound infection   Surrogate Decision Maker/ POA      Subjective:     Chief Complaint: Leg Pain and Wound Check     Patient endorsing postsurgical pain. We will try to optimize his pain control today. To work with PT and OT and likely will require placement. Review of Systems:    Review of Systems   Constitutional:  Negative for activity change, appetite change, chills, fatigue and fever. HENT:  Negative for congestion, hearing loss, sinus pressure, sinus pain, sore throat, trouble swallowing and voice change. Eyes:  Negative for visual disturbance. Respiratory:  Negative for cough, chest tightness, shortness of breath and wheezing. Cardiovascular:  Negative for chest pain, palpitations and leg swelling. Gastrointestinal:  Negative for abdominal pain, constipation, diarrhea, nausea and vomiting. Endocrine: Negative for cold intolerance, heat intolerance and polyuria. Genitourinary:  Negative for dysuria. Musculoskeletal:  Negative for arthralgias, back pain and gait problem. Skin:  Positive for color change and wound. Neurological:  Negative for dizziness, weakness and headaches. Psychiatric/Behavioral:  Negative for agitation and confusion. The patient is not nervous/anxious. Objective: Intake/Output Summary (Last 24 hours) at 2/15/2023 1458  Last data filed at 2/15/2023 1435  Gross per 24 hour   Intake 3612.24 ml   Output 1650 ml   Net 1962.24 ml          Vitals:   Vitals:    02/15/23 1430   BP: (!) 106/59   Pulse: 75   Resp: 16   Temp: 98 °F (36.7 °C)   SpO2: 98%       Physical Exam:     Physical Exam  Constitutional:       General: He is not in acute distress. Appearance: Normal appearance. HENT:      Head: Normocephalic and atraumatic.       Nose: Nose normal.      Mouth/Throat:      Mouth: Mucous membranes are moist. Pharynx: Oropharynx is clear. Eyes:      Extraocular Movements: Extraocular movements intact. Conjunctiva/sclera: Conjunctivae normal.      Pupils: Pupils are equal, round, and reactive to light. Cardiovascular:      Rate and Rhythm: Normal rate and regular rhythm. Pulses: Normal pulses. Heart sounds: Normal heart sounds. Pulmonary:      Effort: Pulmonary effort is normal.   Abdominal:      General: Abdomen is flat. Bowel sounds are normal.      Palpations: Abdomen is soft. Musculoskeletal:         General: Swelling, tenderness and signs of injury present. Normal range of motion. Cervical back: Normal range of motion and neck supple. Skin:     General: Skin is warm and dry. Findings: Lesion present. Neurological:      General: No focal deficit present. Mental Status: He is alert and oriented to person, place, and time. Mental status is at baseline. Psychiatric:         Mood and Affect: Mood normal.         Behavior: Behavior normal.         Thought Content:  Thought content normal.       Medications:   Medications:    gabapentin  300 mg Oral TID    [START ON 2/16/2023] DULoxetine  60 mg Oral Daily    vancomycin  1,000 mg IntraVENous Q12H    vitamin D  50,000 Units Oral Weekly    sodium chloride flush  5-40 mL IntraVENous 2 times per day    aspirin  81 mg Oral Daily    atorvastatin  40 mg Oral Nightly    insulin glargine  6 Units SubCUTAneous Nightly    insulin lispro  0-4 Units SubCUTAneous TID WC    insulin lispro  0-4 Units SubCUTAneous Nightly    sodium chloride flush  5-40 mL IntraVENous 2 times per day    enoxaparin  40 mg SubCUTAneous Daily    cefepime  2,000 mg IntraVENous Q12H    metroNIDAZOLE  500 mg IntraVENous Q8H      Infusions:    dextrose      sodium chloride      lactated ringers IV soln 100 mL/hr at 02/15/23 1017     PRN Meds: oxyCODONE-acetaminophen, 1 tablet, Q4H PRN   Or  oxyCODONE-acetaminophen, 2 tablet, Q4H PRN  HYDROmorphone, 0.5 mg, Q2H PRN  oxyCODONE, 5 mg, Once PRN  droperidol, 0.625 mg, Q10 Min PRN  diphenhydrAMINE, 12.5 mg, Once PRN  ipratropium-albuterol, 1 ampule, Once PRN  sodium chloride flush, 5-40 mL, PRN  glucose, 4 tablet, PRN  dextrose bolus, 125 mL, PRN   Or  dextrose bolus, 250 mL, PRN  glucagon (rDNA), 1 mg, PRN  dextrose, , Continuous PRN  sodium chloride flush, 5-40 mL, PRN  sodium chloride, , PRN  ondansetron, 4 mg, Q8H PRN   Or  ondansetron, 4 mg, Q6H PRN  polyethylene glycol, 17 g, Daily PRN  acetaminophen, 650 mg, Q6H PRN   Or  acetaminophen, 650 mg, Q6H PRN      Labs      Recent Results (from the past 24 hour(s))   POCT Glucose    Collection Time: 02/14/23  3:23 PM   Result Value Ref Range    POC Glucose 164 (H) 70 - 99 MG/DL   POCT Glucose    Collection Time: 02/14/23  5:03 PM   Result Value Ref Range    POC Glucose 225 (H) 70 - 99 MG/DL   POCT Glucose    Collection Time: 02/14/23  8:47 PM   Result Value Ref Range    POC Glucose 237 (H) 70 - 99 MG/DL   CBC    Collection Time: 02/15/23  6:30 AM   Result Value Ref Range    WBC 11.4 (H) 4.0 - 10.5 K/CU MM    RBC 3.61 (L) 4.6 - 6.2 M/CU MM    Hemoglobin 10.6 (L) 13.5 - 18.0 GM/DL    Hematocrit 30.1 (L) 42 - 52 %    MCV 83.4 78 - 100 FL    MCH 29.4 27 - 31 PG    MCHC 35.2 32.0 - 36.0 %    RDW 13.8 11.7 - 14.9 %    Platelets 943 418 - 293 K/CU MM    MPV 10.2 7.5 - 11.1 FL   Magnesium    Collection Time: 02/15/23  6:30 AM   Result Value Ref Range    Magnesium 1.9 1.8 - 2.4 mg/dl   Phosphorus    Collection Time: 02/15/23  6:30 AM   Result Value Ref Range    Phosphorus 3.5 2.5 - 4.9 MG/DL   Basic Metabolic Panel    Collection Time: 02/15/23  6:30 AM   Result Value Ref Range    Sodium 133 (L) 135 - 145 MMOL/L    Potassium 3.9 3.5 - 5.1 MMOL/L    Chloride 101 99 - 110 mMol/L    CO2 22 21 - 32 MMOL/L    Anion Gap 10 4 - 16    BUN 15 6 - 23 MG/DL    Creatinine 0.6 (L) 0.9 - 1.3 MG/DL    Est, Glom Filt Rate >60 >60 mL/min/1.73m2    Glucose 185 (H) 70 - 99 MG/DL    Calcium 7.7 (L) 8.3 - 10.6 MG/DL   Vancomycin Level, Random    Collection Time: 02/15/23  6:30 AM   Result Value Ref Range    Vancomycin Rm 21.1 UG/ML    DOSE AMOUNT DOSE AMT. GIVEN - 1,000 mg     DOSE TIME DOSE TIME GIVEN - 0000 / 1200    C-Reactive Protein    Collection Time: 02/15/23  6:30 AM   Result Value Ref Range    CRP High Sensitivity 29.3 (H) <5.0 mg/L   POCT Glucose    Collection Time: 02/15/23  8:21 AM   Result Value Ref Range    POC Glucose 187 (H) 70 - 99 MG/DL   POCT Glucose    Collection Time: 02/15/23 11:32 AM   Result Value Ref Range    POC Glucose 181 (H) 70 - 99 MG/DL        Imaging/Diagnostics Last 24 Hours   XR KNEE RIGHT (1-2 VIEWS)    Result Date: 2/13/2023  EXAMINATION: TWO XRAY VIEWS OF THE RIGHT KNEE 2/13/2023 8:48 pm COMPARISON: None. HISTORY: ORDERING SYSTEM PROVIDED HISTORY: Jonathan Wei in fall, nonhealing TECHNOLOGIST PROVIDED HISTORY: Reason for exam:->nec fasc in fall, nonhealing Reason for Exam: Jonathan Sanjuana in fall, nonhealing Additional signs and symptoms: none Relevant Medical/Surgical History: diabetes FINDINGS: Status post below-knee amputation. No acute periostitis or bony destruction. Suspected wound on anterior aspect of the stomach. The knee joint is maintained. No acute periostitis or bony destruction. Status post BKA. Comment: Please note this report has been produced using speech recognition software and may contain errors related to that system including errors in grammar, punctuation, and spelling, as well as words and phrases that may be inappropriate. If there are any questions or concerns please feel free to contact the dictating provider for clarification.      Electronically signed by Selam Wade MD on 2/15/2023 at 2:58 PM

## 2023-02-15 NOTE — PROGRESS NOTES
[address] [facility]  consulted by Dr. Bang Moy for monitoring and adjustment. Indication for treatment: Stump infection, R/O osteomyelitis  Goal trough: Trough Goal: 15-20 mcg/mL  AUC/SUHA: 400-600    Risk Factors for MRSA Identified:   Hospitalization within the past 90 days, Received IV antibiotics within the past 90 days, Purulent and/or complicated SSTI    Pertinent Laboratory Values:   Temp Readings from Last 3 Encounters:   02/15/23 98 °F (36.7 °C)   01/09/23 98.1 °F (36.7 °C) (Oral)   10/25/22 98.9 °F (37.2 °C) (Oral)     Recent Labs     02/13/23 2045 02/14/23  0800 02/15/23  0630   WBC 6.4 7.8 11.4*       Recent Labs     02/13/23 2045 02/14/23  0800 02/15/23  0630   BUN 15 13 15   CREATININE 0.7* 0.6* 0.6*       Estimated Creatinine Clearance: 117 mL/min (A) (based on SCr of 0.6 mg/dL (L)). Intake/Output Summary (Last 24 hours) at 2/15/2023 0814  Last data filed at 2/15/2023 0554  Gross per 24 hour   Intake 1050 ml   Output 1250 ml   Net -200 ml         Pertinent Cultures:   Date    Source    Results  02/13   Blood    NGTD  02/13   Wound (Aerobic)  Held    Assessment:  HPI: 60 y.o. male with history of right BKA, insulin-dependent DM-2, peripheral neuropathy, and hyperlipidemia. Patient presents with infection (chronic) of amputation stump ([address] X-ray of the right stump shows no bony erosion or gas. MRI pending for the morning. SCr = 0.7, BUN = 15, and no output data  Day(s) of therapy: 2  Vancomycin concentration:   02/15 - T21.1 ()    Plan:  Continue Vancomycin 1,000 mg IV Q 12 Hours  Pharmacy will continue to monitor patient and adjust therapy as indicated    Sonal 3 02/17/2023 @ 6 AM    Thank you for the consult.   Gayr Zamorano, [facility]  2/15/2023 8:14 AM
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
## 2023-02-16 LAB
ANION GAP SERPL CALCULATED.3IONS-SCNC: 8 MMOL/L (ref 4–16)
BASOPHILS ABSOLUTE: 0.1 K/CU MM
BASOPHILS RELATIVE PERCENT: 0.8 % (ref 0–1)
BUN SERPL-MCNC: 16 MG/DL (ref 6–23)
CALCIUM SERPL-MCNC: 7.4 MG/DL (ref 8.3–10.6)
CHLORIDE BLD-SCNC: 104 MMOL/L (ref 99–110)
CO2: 25 MMOL/L (ref 21–32)
CREAT SERPL-MCNC: 0.7 MG/DL (ref 0.9–1.3)
CRP SERPL HS-MCNC: 52.5 MG/L
DIFFERENTIAL TYPE: ABNORMAL
EOSINOPHILS ABSOLUTE: 0.1 K/CU MM
EOSINOPHILS RELATIVE PERCENT: 1.3 % (ref 0–3)
GFR SERPL CREATININE-BSD FRML MDRD: >60 ML/MIN/1.73M2
GLUCOSE BLD-MCNC: 108 MG/DL (ref 70–99)
GLUCOSE BLD-MCNC: 108 MG/DL (ref 70–99)
GLUCOSE BLD-MCNC: 138 MG/DL (ref 70–99)
GLUCOSE SERPL-MCNC: 95 MG/DL (ref 70–99)
HCT VFR BLD CALC: 27.4 % (ref 42–52)
HEMOGLOBIN: 9.3 GM/DL (ref 13.5–18)
IMMATURE NEUTROPHIL %: 0.4 % (ref 0–0.43)
LYMPHOCYTES ABSOLUTE: 0.9 K/CU MM
LYMPHOCYTES RELATIVE PERCENT: 10.3 % (ref 24–44)
MCH RBC QN AUTO: 29 PG (ref 27–31)
MCHC RBC AUTO-ENTMCNC: 33.9 % (ref 32–36)
MCV RBC AUTO: 85.4 FL (ref 78–100)
MONOCYTES ABSOLUTE: 0.9 K/CU MM
MONOCYTES RELATIVE PERCENT: 9.3 % (ref 0–4)
NUCLEATED RBC %: 0 %
PDW BLD-RTO: 14.1 % (ref 11.7–14.9)
PLATELET # BLD: 131 K/CU MM (ref 140–440)
PMV BLD AUTO: 10 FL (ref 7.5–11.1)
POTASSIUM SERPL-SCNC: 3.8 MMOL/L (ref 3.5–5.1)
RBC # BLD: 3.21 M/CU MM (ref 4.6–6.2)
SEGMENTED NEUTROPHILS ABSOLUTE COUNT: 7.2 K/CU MM
SEGMENTED NEUTROPHILS RELATIVE PERCENT: 77.9 % (ref 36–66)
SODIUM BLD-SCNC: 137 MMOL/L (ref 135–145)
TOTAL IMMATURE NEUTOROPHIL: 0.04 K/CU MM
TOTAL NUCLEATED RBC: 0 K/CU MM
WBC # BLD: 9.2 K/CU MM (ref 4–10.5)

## 2023-02-16 PROCEDURE — 94761 N-INVAS EAR/PLS OXIMETRY MLT: CPT

## 2023-02-16 PROCEDURE — 2580000003 HC RX 258: Performed by: SURGERY

## 2023-02-16 PROCEDURE — 6370000000 HC RX 637 (ALT 250 FOR IP): Performed by: SURGERY

## 2023-02-16 PROCEDURE — 6370000000 HC RX 637 (ALT 250 FOR IP): Performed by: STUDENT IN AN ORGANIZED HEALTH CARE EDUCATION/TRAINING PROGRAM

## 2023-02-16 PROCEDURE — 2500000003 HC RX 250 WO HCPCS: Performed by: SURGERY

## 2023-02-16 PROCEDURE — 80048 BASIC METABOLIC PNL TOTAL CA: CPT

## 2023-02-16 PROCEDURE — 6360000002 HC RX W HCPCS: Performed by: SURGERY

## 2023-02-16 PROCEDURE — 99232 SBSQ HOSP IP/OBS MODERATE 35: CPT | Performed by: INTERNAL MEDICINE

## 2023-02-16 PROCEDURE — 86140 C-REACTIVE PROTEIN: CPT

## 2023-02-16 PROCEDURE — 85025 COMPLETE CBC W/AUTO DIFF WBC: CPT

## 2023-02-16 PROCEDURE — 94150 VITAL CAPACITY TEST: CPT

## 2023-02-16 PROCEDURE — 97110 THERAPEUTIC EXERCISES: CPT

## 2023-02-16 PROCEDURE — 82962 GLUCOSE BLOOD TEST: CPT

## 2023-02-16 PROCEDURE — 36415 COLL VENOUS BLD VENIPUNCTURE: CPT

## 2023-02-16 PROCEDURE — 2140000000 HC CCU INTERMEDIATE R&B

## 2023-02-16 PROCEDURE — 2580000003 HC RX 258: Performed by: NURSE PRACTITIONER

## 2023-02-16 RX ORDER — LIDOCAINE HYDROCHLORIDE 10 MG/ML
5 INJECTION, SOLUTION EPIDURAL; INFILTRATION; INTRACAUDAL; PERINEURAL SEE ADMIN INSTRUCTIONS
Status: DISCONTINUED | OUTPATIENT
Start: 2023-02-16 | End: 2023-02-24 | Stop reason: HOSPADM

## 2023-02-16 RX ORDER — SODIUM CHLORIDE 0.9 % (FLUSH) 0.9 %
5-40 SYRINGE (ML) INJECTION EVERY 12 HOURS SCHEDULED
Status: DISCONTINUED | OUTPATIENT
Start: 2023-02-16 | End: 2023-02-24 | Stop reason: HOSPADM

## 2023-02-16 RX ORDER — SODIUM CHLORIDE 0.9 % (FLUSH) 0.9 %
5-40 SYRINGE (ML) INJECTION PRN
Status: DISCONTINUED | OUTPATIENT
Start: 2023-02-16 | End: 2023-02-24 | Stop reason: HOSPADM

## 2023-02-16 RX ORDER — SODIUM CHLORIDE 9 MG/ML
INJECTION, SOLUTION INTRAVENOUS PRN
Status: DISCONTINUED | OUTPATIENT
Start: 2023-02-16 | End: 2023-02-24 | Stop reason: HOSPADM

## 2023-02-16 RX ADMIN — INSULIN GLARGINE 6 UNITS: 100 INJECTION, SOLUTION SUBCUTANEOUS at 20:12

## 2023-02-16 RX ADMIN — ENOXAPARIN SODIUM 40 MG: 100 INJECTION SUBCUTANEOUS at 08:52

## 2023-02-16 RX ADMIN — VANCOMYCIN HYDROCHLORIDE 1000 MG: 1 INJECTION, POWDER, LYOPHILIZED, FOR SOLUTION INTRAVENOUS at 01:01

## 2023-02-16 RX ADMIN — HYDROMORPHONE HYDROCHLORIDE 0.5 MG: 1 INJECTION, SOLUTION INTRAMUSCULAR; INTRAVENOUS; SUBCUTANEOUS at 14:44

## 2023-02-16 RX ADMIN — ATORVASTATIN CALCIUM 40 MG: 40 TABLET, FILM COATED ORAL at 20:02

## 2023-02-16 RX ADMIN — SODIUM CHLORIDE, PRESERVATIVE FREE 10 ML: 5 INJECTION INTRAVENOUS at 20:04

## 2023-02-16 RX ADMIN — SODIUM CHLORIDE, PRESERVATIVE FREE 10 ML: 5 INJECTION INTRAVENOUS at 20:03

## 2023-02-16 RX ADMIN — HYDROMORPHONE HYDROCHLORIDE 0.5 MG: 1 INJECTION, SOLUTION INTRAMUSCULAR; INTRAVENOUS; SUBCUTANEOUS at 04:20

## 2023-02-16 RX ADMIN — SODIUM CHLORIDE, POTASSIUM CHLORIDE, SODIUM LACTATE AND CALCIUM CHLORIDE: 600; 310; 30; 20 INJECTION, SOLUTION INTRAVENOUS at 14:48

## 2023-02-16 RX ADMIN — OXYCODONE AND ACETAMINOPHEN 2 TABLET: 5; 325 TABLET ORAL at 08:49

## 2023-02-16 RX ADMIN — HYDROMORPHONE HYDROCHLORIDE 0.5 MG: 1 INJECTION, SOLUTION INTRAMUSCULAR; INTRAVENOUS; SUBCUTANEOUS at 06:26

## 2023-02-16 RX ADMIN — GABAPENTIN 300 MG: 300 CAPSULE ORAL at 20:02

## 2023-02-16 RX ADMIN — ASPIRIN 81 MG 81 MG: 81 TABLET ORAL at 08:51

## 2023-02-16 RX ADMIN — GABAPENTIN 300 MG: 300 CAPSULE ORAL at 11:25

## 2023-02-16 RX ADMIN — METRONIDAZOLE 500 MG: 500 INJECTION, SOLUTION INTRAVENOUS at 06:28

## 2023-02-16 RX ADMIN — HYDROMORPHONE HYDROCHLORIDE 0.5 MG: 1 INJECTION, SOLUTION INTRAMUSCULAR; INTRAVENOUS; SUBCUTANEOUS at 20:02

## 2023-02-16 RX ADMIN — CEFEPIME HYDROCHLORIDE 2000 MG: 2 INJECTION, POWDER, FOR SOLUTION INTRAVENOUS at 11:27

## 2023-02-16 RX ADMIN — OXYCODONE AND ACETAMINOPHEN 2 TABLET: 5; 325 TABLET ORAL at 22:20

## 2023-02-16 RX ADMIN — GABAPENTIN 300 MG: 300 CAPSULE ORAL at 17:21

## 2023-02-16 RX ADMIN — OXYCODONE AND ACETAMINOPHEN 2 TABLET: 5; 325 TABLET ORAL at 00:54

## 2023-02-16 RX ADMIN — DULOXETINE HYDROCHLORIDE 60 MG: 30 CAPSULE, DELAYED RELEASE ORAL at 08:51

## 2023-02-16 ASSESSMENT — ENCOUNTER SYMPTOMS
SHORTNESS OF BREATH: 0
SINUS PAIN: 0
DIARRHEA: 0
CHEST TIGHTNESS: 0
BACK PAIN: 0
COLOR CHANGE: 1
COUGH: 0
SORE THROAT: 0
ABDOMINAL PAIN: 0
WHEEZING: 0
NAUSEA: 0
CONSTIPATION: 0
VOICE CHANGE: 0
VOMITING: 0
TROUBLE SWALLOWING: 0
SINUS PRESSURE: 0

## 2023-02-16 ASSESSMENT — PAIN - FUNCTIONAL ASSESSMENT
PAIN_FUNCTIONAL_ASSESSMENT: PREVENTS OR INTERFERES SOME ACTIVE ACTIVITIES AND ADLS
PAIN_FUNCTIONAL_ASSESSMENT: PREVENTS OR INTERFERES WITH MANY ACTIVE NOT PASSIVE ACTIVITIES
PAIN_FUNCTIONAL_ASSESSMENT: PREVENTS OR INTERFERES SOME ACTIVE ACTIVITIES AND ADLS
PAIN_FUNCTIONAL_ASSESSMENT: PREVENTS OR INTERFERES SOME ACTIVE ACTIVITIES AND ADLS
PAIN_FUNCTIONAL_ASSESSMENT: PREVENTS OR INTERFERES WITH ALL ACTIVE AND SOME PASSIVE ACTIVITIES
PAIN_FUNCTIONAL_ASSESSMENT: PREVENTS OR INTERFERES SOME ACTIVE ACTIVITIES AND ADLS

## 2023-02-16 ASSESSMENT — PAIN DESCRIPTION - FREQUENCY
FREQUENCY: CONTINUOUS

## 2023-02-16 ASSESSMENT — PAIN DESCRIPTION - LOCATION
LOCATION: LEG
LOCATION: BACK;INCISION;LEG
LOCATION: LEG
LOCATION: GENERALIZED;LEG
LOCATION: LEG;BACK
LOCATION: LEG

## 2023-02-16 ASSESSMENT — PAIN DESCRIPTION - PAIN TYPE
TYPE: SURGICAL PAIN;CHRONIC PAIN

## 2023-02-16 ASSESSMENT — PAIN DESCRIPTION - ORIENTATION
ORIENTATION: RIGHT
ORIENTATION: LOWER
ORIENTATION: RIGHT

## 2023-02-16 ASSESSMENT — PAIN SCALES - GENERAL
PAINLEVEL_OUTOF10: 7
PAINLEVEL_OUTOF10: 4
PAINLEVEL_OUTOF10: 9
PAINLEVEL_OUTOF10: 10
PAINLEVEL_OUTOF10: 8
PAINLEVEL_OUTOF10: 9
PAINLEVEL_OUTOF10: 10
PAINLEVEL_OUTOF10: 2
PAINLEVEL_OUTOF10: 4
PAINLEVEL_OUTOF10: 3
PAINLEVEL_OUTOF10: 9
PAINLEVEL_OUTOF10: 3

## 2023-02-16 ASSESSMENT — PAIN DESCRIPTION - DESCRIPTORS
DESCRIPTORS: ACHING
DESCRIPTORS: ACHING
DESCRIPTORS: ACHING;CRAMPING;DISCOMFORT
DESCRIPTORS: ACHING
DESCRIPTORS: ACHING;CRAMPING
DESCRIPTORS: ACHING
DESCRIPTORS: ACHING;BURNING;CRAMPING;CRUSHING;DISCOMFORT
DESCRIPTORS: ACHING
DESCRIPTORS: ACHING
DESCRIPTORS: ACHING;CRAMPING

## 2023-02-16 ASSESSMENT — PAIN DESCRIPTION - ONSET
ONSET: ON-GOING

## 2023-02-16 ASSESSMENT — PAIN SCALES - WONG BAKER
WONGBAKER_NUMERICALRESPONSE: 0
WONGBAKER_NUMERICALRESPONSE: 2
WONGBAKER_NUMERICALRESPONSE: 0

## 2023-02-16 NOTE — PROGRESS NOTES
V2.0  [facility] [place] [facility] Progress [place]      Name:  Joseph Reeder /Age/Sex: 1960  (60 y.o. male)   MRN & CSN:  [id number] & [id number] Encounter Date/Time: 2023 3:36 PM EST    Location:  [facility] PCP: Pretty Rendon MD       Hospital Day: 4    Assessment and Plan:   Joseph Reeder is a 60 y.o. male with pmh of right BKA, insulin-dependent diabetes mellitus type 2, peripheral neuropathy, hyperlipidemia  who presents with Infection (chronic) of amputation stump ([facility] [address]      Plan:    Right BKA stump wound infection: Rule out osteomyelitis. X-ray of the right stump shows no bony erosion or gas. surgery on board. Patient previously treated for osteomyelitis had recent hospitalization with a wound VAC. Did leave  [address] prior to his hospital stay being completed. Surgical intervention on 2023  IV vancomycin Flagyl and cefepime. Follow-up blood cultures. Infectious disease consulted. Postop pain control: Has had difficult control pain in the past.  Use Percocet with IV Dilaudid for breakthrough. Also have patient on Cymbalta and gabapentin for neuropathic type pain. If improved, can discontinue gabapentin prior to DC. Benefit from long-term and high-dose Cymbalta for safety depressive properties as well. Hypochloremic hyponatremia in the setting of dehydration continue to monitor with IV hydration    Chronic normocytic normochromic anemia in the setting of anemia of chronic disease hemoglobin of 11 close to baseline    Coronary artery disease s/p CABG X4 history currently on DAPT at home    Hyperlipidemia on statin    Chronic GERD on Pepcid    Insulin-dependent diabetes mellitus type 2 complicated with peripheral neuropathy and PAD: Patient was not compliant with statin encouraged to do so. Need to discuss with primary team regarding Xarelto for peripheral vascular disease.   Takes Lantus 8 mg nightly at home will start 6 units of Lantus along with sliding scale insulin hemoglobin A1c is pending. Tobacco use disorder encouraged to discontinue smoking    Diet ADULT DIET; Regular   DVT Prophylaxis [x] Lovenox, []  Heparin, [] SCDs, [] Ambulation,    [] Eliquis, [] Xarelto  [] Coumadin [] other   Code Status Full Code   Disposition From: Home  Expected Disposition: To be determined  Estimated Date of Discharge: 3 to 4 days  Patient requires continued admission due to surgical intervention for right BKA stump wound infection. We will need SNF placement. Surrogate Decision Maker/ POA      Subjective:     Chief Complaint: Leg Pain and Wound Check     Patient states that his pain is manageable, but severe. Has historically difficult to control pain after procedures. On an optimized regimen at this moment and the hypertrophic a lot. Still awaiting final culture results and arrangements for placement after hospitalization. Review of Systems:    Review of Systems   Constitutional:  Negative for activity change, appetite change, chills, fatigue and fever. HENT:  Negative for congestion, hearing loss, sinus pressure, sinus pain, sore throat, trouble swallowing and voice change. Eyes:  Negative for visual disturbance. Respiratory:  Negative for cough, chest tightness, shortness of breath and wheezing. Cardiovascular:  Negative for chest pain, palpitations and leg swelling. Gastrointestinal:  Negative for abdominal pain, constipation, diarrhea, nausea and vomiting. Endocrine: Negative for cold intolerance, heat intolerance and polyuria. Genitourinary:  Negative for dysuria. Musculoskeletal:  Negative for arthralgias, back pain and gait problem. Skin:  Positive for color change and wound. Neurological:  Negative for dizziness, weakness and headaches. Psychiatric/Behavioral:  Negative for agitation and confusion. The patient is not nervous/anxious. Objective:      Intake/Output Summary (Last 24 hours) at 2/16/2023 1232  Last data filed at 2/16/2023 0857  Gross per 24 hour   Intake 541.7 ml   Output 820 ml   Net -278.3 ml          Vitals:   Vitals:    02/16/23 0856   BP:    Pulse: 78   Resp:    Temp:    SpO2:        Physical Exam:     Physical Exam  Constitutional:       General: He is not in acute distress. Appearance: Normal appearance. HENT:      Head: Normocephalic and atraumatic. Nose: Nose normal.      Mouth/Throat:      Mouth: Mucous membranes are moist.      Pharynx: Oropharynx is clear. Eyes:      Extraocular Movements: Extraocular movements intact. Conjunctiva/sclera: Conjunctivae normal.      Pupils: Pupils are equal, round, and reactive to light. Cardiovascular:      Rate and Rhythm: Normal rate and regular rhythm. Pulses: Normal pulses. Heart sounds: Normal heart sounds. Pulmonary:      Effort: Pulmonary effort is normal.   Abdominal:      General: Abdomen is flat. Bowel sounds are normal.      Palpations: Abdomen is soft. Musculoskeletal:         General: Swelling, tenderness and signs of injury present. Normal range of motion. Cervical back: Normal range of motion and neck supple. Skin:     General: Skin is warm and dry. Findings: Lesion present. Neurological:      General: No focal deficit present. Mental Status: He is alert and oriented to person, place, and time. Mental status is at baseline. Psychiatric:         Mood and Affect: Mood normal.         Behavior: Behavior normal.         Thought Content:  Thought content normal.       Medications:   Medications:    lidocaine PF  5 mL IntraDERmal See Admin Instructions    sodium chloride flush  5-40 mL IntraVENous 2 times per day    gabapentin  300 mg Oral TID    DULoxetine  60 mg Oral Daily    vancomycin  1,000 mg IntraVENous Q12H    vitamin D  50,000 Units Oral Weekly    sodium chloride flush  5-40 mL IntraVENous 2 times per day    aspirin  81 mg Oral Daily    atorvastatin  40 mg Oral Nightly    insulin glargine  6 Units SubCUTAneous Nightly    insulin lispro  0-4 Units SubCUTAneous TID WC    insulin lispro  0-4 Units SubCUTAneous Nightly    sodium chloride flush  5-40 mL IntraVENous 2 times per day    enoxaparin  40 mg SubCUTAneous Daily    cefepime  2,000 mg IntraVENous Q12H    metroNIDAZOLE  500 mg IntraVENous Q8H      Infusions:    sodium chloride      dextrose      sodium chloride      lactated ringers IV soln 100 mL/hr at 02/15/23 1856     PRN Meds: sodium chloride flush, 5-40 mL, PRN  sodium chloride, , PRN  oxyCODONE-acetaminophen, 1 tablet, Q4H PRN   Or  oxyCODONE-acetaminophen, 2 tablet, Q4H PRN  HYDROmorphone, 0.5 mg, Q2H PRN  droperidol, 0.625 mg, Q10 Min PRN  sodium chloride flush, 5-40 mL, PRN  glucose, 4 tablet, PRN  dextrose bolus, 125 mL, PRN   Or  dextrose bolus, 250 mL, PRN  glucagon (rDNA), 1 mg, PRN  dextrose, , Continuous PRN  sodium chloride flush, 5-40 mL, PRN  sodium chloride, , PRN  ondansetron, 4 mg, Q8H PRN   Or  ondansetron, 4 mg, Q6H PRN  polyethylene glycol, 17 g, Daily PRN  acetaminophen, 650 mg, Q6H PRN   Or  acetaminophen, 650 mg, Q6H PRN      Labs      Recent Results (from the past 24 hour(s))   POCT Glucose    Collection Time: 02/15/23  5:17 PM   Result Value Ref Range    POC Glucose 202 (H) 70 - 99 MG/DL   POCT Glucose    Collection Time: 02/15/23  9:02 PM   Result Value Ref Range    POC Glucose 140 (H) 70 - 99 MG/DL   C-Reactive Protein    Collection Time: 02/16/23  7:24 AM   Result Value Ref Range    CRP High Sensitivity 52.5 (H) <5.0 mg/L   Basic Metabolic Panel w/ Reflex to MG    Collection Time: 02/16/23  7:24 AM   Result Value Ref Range    Sodium 137 135 - 145 MMOL/L    Potassium 3.8 3.5 - 5.1 MMOL/L    Chloride 104 99 - 110 mMol/L    CO2 25 21 - 32 MMOL/L    Anion Gap 8 4 - 16    BUN 16 6 - 23 MG/DL    Creatinine 0.7 (L) 0.9 - 1.3 MG/DL    Est, Glom Filt Rate >60 >60 mL/min/1.73m2    Glucose 95 70 - 99 MG/DL    Calcium 7.4 (L) 8.3 - 10.6 MG/DL   CBC with Auto Differential    Collection Time: 02/16/23  7:24 AM   Result Value Ref Range    WBC 9.2 4.0 - 10.5 K/CU MM    RBC 3.21 (L) 4.6 - 6.2 M/CU MM    Hemoglobin 9.3 (L) 13.5 - 18.0 GM/DL    Hematocrit 27.4 (L) 42 - 52 %    MCV 85.4 78 - 100 FL    MCH 29.0 27 - 31 PG    MCHC 33.9 32.0 - 36.0 %    RDW 14.1 11.7 - 14.9 %    Platelets 412 (L) 438 - 440 K/CU MM    MPV 10.0 7.5 - 11.1 FL    Differential Type AUTOMATED DIFFERENTIAL     Segs Relative 77.9 (H) 36 - 66 %    Lymphocytes % 10.3 (L) 24 - 44 %    Monocytes % 9.3 (H) 0 - 4 %    Eosinophils % 1.3 0 - 3 %    Basophils % 0.8 0 - 1 %    Segs Absolute 7.2 K/CU MM    Lymphocytes Absolute 0.9 K/CU MM    Monocytes Absolute 0.9 K/CU MM    Eosinophils Absolute 0.1 K/CU MM    Basophils Absolute 0.1 K/CU MM    Nucleated RBC % 0.0 %    Total Nucleated RBC 0.0 K/CU MM    Total Immature Neutrophil 0.04 K/CU MM    Immature Neutrophil % 0.4 0 - 0.43 %   POCT Glucose    Collection Time: 02/16/23  8:42 AM   Result Value Ref Range    POC Glucose 108 (H) 70 - 99 MG/DL        Imaging/Diagnostics Last 24 Hours   XR KNEE RIGHT (1-2 VIEWS)    Result Date: 2/13/2023  EXAMINATION: TWO XRAY VIEWS OF THE RIGHT KNEE 2/13/2023 8:48 pm COMPARISON: None. HISTORY: ORDERING SYSTEM PROVIDED HISTORY: Evita Rein in fall, nonhealing TECHNOLOGIST PROVIDED HISTORY: Reason for exam:->nec fasc in fall, nonhealing Reason for Exam: Evita Rein in fall, nonhealing Additional signs and symptoms: none Relevant Medical/Surgical History: diabetes FINDINGS: Status post below-knee amputation. No acute periostitis or bony destruction. Suspected wound on anterior aspect of the stomach. The knee joint is maintained. No acute periostitis or bony destruction. Status post BKA. Comment: Please note this report has been produced using speech recognition software and may contain errors related to that system including errors in grammar, punctuation, and spelling, as well as words and phrases that may be inappropriate.  If there are any questions or concerns please feel free to contact the dictating provider for clarification.      Electronically signed by Brown Rasmussen MD on 2/16/2023 at 12:32 PM

## 2023-02-16 NOTE — PROGRESS NOTES
GENERAL SURGERY PROGRESS NOTE    Jonathan Prajapati is a 60 y.o. male POD 2 right AKA. Subjective:    Complains of pain. No acute events overnight. Objective:    Vitals: VITALS:  BP (!) 161/67   Pulse 78   Temp 98 °F (36.7 °C) (Oral)   Resp 16   Ht 5' 8\" (1.727 m)   Wt 139 lb (63 kg)   SpO2 98%   BMI 21.13 kg/m²     I/O: 02/15 0701 - 02/16 0700  In: 3353.9 [I.V.:2191.7]  Out: 400 [Urine:400]    Labs/Imaging Results:   Lab Results   Component Value Date/Time     02/16/2023 07:24 AM    K 3.8 02/16/2023 07:24 AM     02/16/2023 07:24 AM    CO2 25 02/16/2023 07:24 AM    BUN 16 02/16/2023 07:24 AM    CREATININE 0.7 02/16/2023 07:24 AM    GLUCOSE 95 02/16/2023 07:24 AM    CALCIUM 7.4 02/16/2023 07:24 AM      Lab Results   Component Value Date    WBC 9.2 02/16/2023    HGB 9.3 (L) 02/16/2023    HCT 27.4 (L) 02/16/2023    MCV 85.4 02/16/2023     (L) 02/16/2023          IV Fluids:   sodium chloride    dextrose    sodium chloride    lactated ringers IV soln Last Rate: 100 mL/hr at 02/15/23 1856    Scheduled Meds:   lidocaine PF, 5 mL, IntraDERmal, See Admin Instructions    sodium chloride flush, 5-40 mL, IntraVENous, 2 times per day    gabapentin, 300 mg, Oral, TID    DULoxetine, 60 mg, Oral, Daily    vitamin D, 50,000 Units, Oral, Weekly    sodium chloride flush, 5-40 mL, IntraVENous, 2 times per day    aspirin, 81 mg, Oral, Daily    atorvastatin, 40 mg, Oral, Nightly    insulin glargine, 6 Units, SubCUTAneous, Nightly    insulin lispro, 0-4 Units, SubCUTAneous, TID WC    insulin lispro, 0-4 Units, SubCUTAneous, Nightly    sodium chloride flush, 5-40 mL, IntraVENous, 2 times per day    enoxaparin, 40 mg, SubCUTAneous, Daily    Physical Exam:  General: A&O x 3, no distress. HEENT: Anicteric sclerae, MMM. Extremities: No edema bilat LE. Abdomen: Soft, nondistended, nontender   Right AKA stump with incision clean/dry/intact with staples in place. No drainage.   No erythema. Assessment and Plan:     Patient Active Problem List:     Diabetes mellitus     H/O echocardiogram     S/P CABG (coronary artery bypass graft)     Chest pain     Cellulitis     Heroin withdrawal (HCC)     CAD (coronary artery disease)     Polysubstance abuse (HCC)     Small bowel obstruction (HCC)     Narcotic abuse, continuous (HCC)     Hx of hepatitis     Epigastric pain     Diarrhea     Constipation with Ileus     Vomiting of fecal matter with nausea     Encephalopathy     Metabolic encephalopathy     Infectious gastroenteritis     Bilateral leg weakness     Gait disturbance     Left carotid stenosis     Hypothyroidism     Osteomyelitis (HCC)     Uncontrolled type II diabetes with peripheral autonomic neuropathy     Gangrene of toe (HCC)     Acute hematogenous osteomyelitis of right foot (HCC)     Amputation of little toe (HCC)     PAD (peripheral artery disease) (HCC)     Uncontrolled pain     Generalized weakness     Simple chronic bronchitis (HCC)     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 ([address]     Skin ulcer with necrosis of muscle (HCC)     Toe ulcer ([address]     Diabetic foot ([address]     Diabetic foot infection ([address]     Abscess of right foot     WD-Diabetic ulcer of right midfoot associated with type 2 diabetes mellitus, with muscle involvement without evidence of necrosis ([address]     Necrotizing fasciitis ([address]     Bacteremia due to group B Streptococcus     Gangrene of right foot ([address]     Osteomyelitis of right lower limb ([address]     Acute blood loss anemia     Uncontrolled type 2 diabetes mellitus with peripheral neuropathy     Essential hypertension     Hyponatremia     Cigarette nicotine dependence without complication     Thrombocytopenia (HCC)     Open wound     Infection (chronic) of amputation stump ([address]     Surgical wound, non healing      Status post right AKA 2/14  Antibiotics per ID  Pain control with Percocet, Dilaudid as needed.   PT/OT        Brigitte Perez DO

## 2023-02-16 NOTE — CARE COORDINATION
CM in to see Pt to follow up on discharge planning. Pt agreeable with therapy recommendation of SNF. No preference of facilities. Referral made to [facility].     3:06 PM   CM updated by kristin Koroma pending at this time.       CM following

## 2023-02-16 NOTE — PROGRESS NOTES
Infectious Disease Progress Note  2023   Patient Name: Pat Dumont : 1960     Assessment  Right BKA stump OM  Known from previous admission, had a debridement of the stump on 2023. Cx positive for MSSA and P mirabilis. 6 week course of IV cefazolin was planned but he left AMA on 2023. Found to be unsalvageable and an AKA was performed on 2023  AKA stump is clean, and is not inflamed  T2DM  CAD  PAD  Comorbid conditions:      Plan  Therapeutic: discontinue vancomycin, cefepime and metronidazole - 2023  Diagnostic:   F/u: blood cx  Other:     Reason for visit: F/u right BKA stump OM? History:? Interval history noted  Denies n/v/d/f or untoward effects of antimicrobials  Physical Exam:  Vital Signs: BP (!) 161/67   Pulse 78   Temp 98 °F (36.7 °C) (Oral)   Resp 16   Ht 5' 8\" (1.727 m)   Wt 139 lb (63 kg)   SpO2 98%   BMI 21.13 kg/m²     Gen: alert and oriented X3, no distress  Skin: no stigmata of endocarditis  Wounds: right AKA stump (pictures in media)   HEMT: AT/NC Oropharynx pink, moist, and without lesions or exudates; dentition in good state of repair  Eyes: PERRLA, EOMI, conjunctiva pink, sclera anicteric. Neck: Supple. Trachea midline. No LAD. Chest: no distress and CTA. Good air movement. Heart: RRR and no MRG. Abd: soft, non-distended, no tenderness, no hepatomegaly. Normoactive bowel sounds. Ext: no clubbing, cyanosis, or edema  Catheter Site: without erythema or tenderness  LDA:   Neuro: Mental status intact. CN 2-12 intact and no focal sensory or motor deficits       Radiologic / Imaging / TESTING  No results found.      Labs:    Recent Results (from the past 24 hour(s))   POCT Glucose    Collection Time: 02/15/23  5:17 PM   Result Value Ref Range    POC Glucose 202 (H) 70 - 99 MG/DL   POCT Glucose    Collection Time: 02/15/23  9:02 PM   Result Value Ref Range    POC Glucose 140 (H) 70 - 99 MG/DL   C-Reactive Protein    Collection Time: 23  7:24 AM Result Value Ref Range    CRP High Sensitivity 52.5 (H) <5.0 mg/L   Basic Metabolic Panel w/ Reflex to MG    Collection Time: 02/16/23  7:24 AM   Result Value Ref Range    Sodium 137 135 - 145 MMOL/L    Potassium 3.8 3.5 - 5.1 MMOL/L    Chloride 104 99 - 110 mMol/L    CO2 25 21 - 32 MMOL/L    Anion Gap 8 4 - 16    BUN 16 6 - 23 MG/DL    Creatinine 0.7 (L) 0.9 - 1.3 MG/DL    Est, Glom Filt Rate >60 >60 mL/min/1.73m2    Glucose 95 70 - 99 MG/DL    Calcium 7.4 (L) 8.3 - 10.6 MG/DL   CBC with Auto Differential    Collection Time: 02/16/23  7:24 AM   Result Value Ref Range    WBC 9.2 4.0 - 10.5 K/CU MM    RBC 3.21 (L) 4.6 - 6.2 M/CU MM    Hemoglobin 9.3 (L) 13.5 - 18.0 GM/DL    Hematocrit 27.4 (L) 42 - 52 %    MCV 85.4 78 - 100 FL    MCH 29.0 27 - 31 PG    MCHC 33.9 32.0 - 36.0 %    RDW 14.1 11.7 - 14.9 %    Platelets 294 (L) 724 - 440 K/CU MM    MPV 10.0 7.5 - 11.1 FL    Differential Type AUTOMATED DIFFERENTIAL     Segs Relative 77.9 (H) 36 - 66 %    Lymphocytes % 10.3 (L) 24 - 44 %    Monocytes % 9.3 (H) 0 - 4 %    Eosinophils % 1.3 0 - 3 %    Basophils % 0.8 0 - 1 %    Segs Absolute 7.2 K/CU MM    Lymphocytes Absolute 0.9 K/CU MM    Monocytes Absolute 0.9 K/CU MM    Eosinophils Absolute 0.1 K/CU MM    Basophils Absolute 0.1 K/CU MM    Nucleated RBC % 0.0 %    Total Nucleated RBC 0.0 K/CU MM    Total Immature Neutrophil 0.04 K/CU MM    Immature Neutrophil % 0.4 0 - 0.43 %   POCT Glucose    Collection Time: 02/16/23  8:42 AM   Result Value Ref Range    POC Glucose 108 (H) 70 - 99 MG/DL     CULTURE results: Invalid input(s): BLOOD CULTURE,  URINE CULTURE, SURGICAL CULTURE    Diagnosis:  Patient Active Problem List   Diagnosis    Diabetes mellitus    H/O echocardiogram    S/P CABG (coronary artery bypass graft)    Chest pain    Cellulitis    Heroin withdrawal (HCC)    CAD (coronary artery disease)    Polysubstance abuse (HCC)    Small bowel obstruction (HCC)    Narcotic abuse, continuous (HCC)    Hx of hepatitis Epigastric pain    Diarrhea    Constipation with Ileus    Vomiting of fecal matter with nausea    Encephalopathy    Metabolic encephalopathy    Infectious gastroenteritis    Bilateral leg weakness    Gait disturbance    Left carotid stenosis    Hypothyroidism    Osteomyelitis (HCC)    Uncontrolled type II diabetes with peripheral autonomic neuropathy    Gangrene of toe (HCC)    Acute hematogenous osteomyelitis of right foot (HCC)    Amputation of little toe (HCC)    PAD (peripheral artery disease) (HCC)    Uncontrolled pain    Generalized weakness    Simple chronic bronchitis (HCC)    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 ([address]    Skin ulcer with necrosis of muscle (HCC)    Toe ulcer ([address]    Diabetic foot ([address]    Diabetic foot infection ([address]    Abscess of right foot    WD-Diabetic ulcer of right midfoot associated with type 2 diabetes mellitus, with muscle involvement without evidence of necrosis ([address]    Necrotizing fasciitis ([address]    Bacteremia due to group B Streptococcus    Gangrene of right foot ([address]    Osteomyelitis of right lower limb (HCC)    Acute blood loss anemia    Uncontrolled type 2 diabetes mellitus with peripheral neuropathy    Essential hypertension    Hyponatremia    Cigarette nicotine dependence without complication    Thrombocytopenia (HCC)    Open wound    Infection (chronic) of amputation stump (HCC)    Surgical wound, non healing       Active Problems  Principal Problem:    Infection (chronic) of amputation stump (HCC)  Active Problems:    Surgical wound, non healing  Resolved Problems:    * No resolved hospital problems.  *              Electronically signed by: Electronically signed by Curyr Agustin MD on 2/16/2023 at 1:48 PM

## 2023-02-17 LAB
ANION GAP SERPL CALCULATED.3IONS-SCNC: 8 MMOL/L (ref 4–16)
BASOPHILS ABSOLUTE: 0.1 K/CU MM
BASOPHILS RELATIVE PERCENT: 0.7 % (ref 0–1)
BUN SERPL-MCNC: 15 MG/DL (ref 6–23)
CALCIUM SERPL-MCNC: 7.4 MG/DL (ref 8.3–10.6)
CHLORIDE BLD-SCNC: 103 MMOL/L (ref 99–110)
CO2: 25 MMOL/L (ref 21–32)
CREAT SERPL-MCNC: 0.7 MG/DL (ref 0.9–1.3)
CRP SERPL HS-MCNC: 50 MG/L
CULTURE: ABNORMAL
CULTURE: ABNORMAL
DIFFERENTIAL TYPE: ABNORMAL
EOSINOPHILS ABSOLUTE: 0.1 K/CU MM
EOSINOPHILS RELATIVE PERCENT: 1.4 % (ref 0–3)
GFR SERPL CREATININE-BSD FRML MDRD: >60 ML/MIN/1.73M2
GLUCOSE BLD-MCNC: 120 MG/DL (ref 70–99)
GLUCOSE BLD-MCNC: 127 MG/DL (ref 70–99)
GLUCOSE BLD-MCNC: 223 MG/DL (ref 70–99)
GLUCOSE BLD-MCNC: 224 MG/DL (ref 70–99)
GLUCOSE SERPL-MCNC: 85 MG/DL (ref 70–99)
HCT VFR BLD CALC: 24.7 % (ref 42–52)
HEMOGLOBIN: 8.6 GM/DL (ref 13.5–18)
IMMATURE NEUTROPHIL %: 0.3 % (ref 0–0.43)
LYMPHOCYTES ABSOLUTE: 1.2 K/CU MM
LYMPHOCYTES RELATIVE PERCENT: 17.2 % (ref 24–44)
Lab: ABNORMAL
MCH RBC QN AUTO: 29 PG (ref 27–31)
MCHC RBC AUTO-ENTMCNC: 34.8 % (ref 32–36)
MCV RBC AUTO: 83.2 FL (ref 78–100)
MONOCYTES ABSOLUTE: 0.8 K/CU MM
MONOCYTES RELATIVE PERCENT: 11 % (ref 0–4)
NUCLEATED RBC %: 0 %
PDW BLD-RTO: 14 % (ref 11.7–14.9)
PLATELET # BLD: 121 K/CU MM (ref 140–440)
PMV BLD AUTO: 10 FL (ref 7.5–11.1)
POTASSIUM SERPL-SCNC: 3.6 MMOL/L (ref 3.5–5.1)
RBC # BLD: 2.97 M/CU MM (ref 4.6–6.2)
SEGMENTED NEUTROPHILS ABSOLUTE COUNT: 4.9 K/CU MM
SEGMENTED NEUTROPHILS RELATIVE PERCENT: 69.4 % (ref 36–66)
SODIUM BLD-SCNC: 136 MMOL/L (ref 135–145)
SPECIMEN: ABNORMAL
TOTAL IMMATURE NEUTOROPHIL: 0.02 K/CU MM
TOTAL NUCLEATED RBC: 0 K/CU MM
WBC # BLD: 7.1 K/CU MM (ref 4–10.5)

## 2023-02-17 PROCEDURE — 94761 N-INVAS EAR/PLS OXIMETRY MLT: CPT

## 2023-02-17 PROCEDURE — 94150 VITAL CAPACITY TEST: CPT

## 2023-02-17 PROCEDURE — 97110 THERAPEUTIC EXERCISES: CPT

## 2023-02-17 PROCEDURE — 6370000000 HC RX 637 (ALT 250 FOR IP): Performed by: SURGERY

## 2023-02-17 PROCEDURE — 36415 COLL VENOUS BLD VENIPUNCTURE: CPT

## 2023-02-17 PROCEDURE — 76937 US GUIDE VASCULAR ACCESS: CPT

## 2023-02-17 PROCEDURE — 80048 BASIC METABOLIC PNL TOTAL CA: CPT

## 2023-02-17 PROCEDURE — 85025 COMPLETE CBC W/AUTO DIFF WBC: CPT

## 2023-02-17 PROCEDURE — 99231 SBSQ HOSP IP/OBS SF/LOW 25: CPT | Performed by: INTERNAL MEDICINE

## 2023-02-17 PROCEDURE — 97530 THERAPEUTIC ACTIVITIES: CPT

## 2023-02-17 PROCEDURE — 6360000002 HC RX W HCPCS: Performed by: SURGERY

## 2023-02-17 PROCEDURE — 82962 GLUCOSE BLOOD TEST: CPT

## 2023-02-17 PROCEDURE — 2580000003 HC RX 258: Performed by: SURGERY

## 2023-02-17 PROCEDURE — 2580000003 HC RX 258: Performed by: NURSE PRACTITIONER

## 2023-02-17 PROCEDURE — 99024 POSTOP FOLLOW-UP VISIT: CPT | Performed by: SURGERY

## 2023-02-17 PROCEDURE — 6370000000 HC RX 637 (ALT 250 FOR IP): Performed by: STUDENT IN AN ORGANIZED HEALTH CARE EDUCATION/TRAINING PROGRAM

## 2023-02-17 PROCEDURE — 97535 SELF CARE MNGMENT TRAINING: CPT

## 2023-02-17 PROCEDURE — 1200000000 HC SEMI PRIVATE

## 2023-02-17 PROCEDURE — 86140 C-REACTIVE PROTEIN: CPT

## 2023-02-17 RX ADMIN — OXYCODONE AND ACETAMINOPHEN 2 TABLET: 5; 325 TABLET ORAL at 23:17

## 2023-02-17 RX ADMIN — HYDROMORPHONE HYDROCHLORIDE 0.5 MG: 1 INJECTION, SOLUTION INTRAMUSCULAR; INTRAVENOUS; SUBCUTANEOUS at 07:52

## 2023-02-17 RX ADMIN — ASPIRIN 81 MG 81 MG: 81 TABLET ORAL at 07:59

## 2023-02-17 RX ADMIN — HYDROMORPHONE HYDROCHLORIDE 0.5 MG: 1 INJECTION, SOLUTION INTRAMUSCULAR; INTRAVENOUS; SUBCUTANEOUS at 00:14

## 2023-02-17 RX ADMIN — SODIUM CHLORIDE, PRESERVATIVE FREE 10 ML: 5 INJECTION INTRAVENOUS at 09:43

## 2023-02-17 RX ADMIN — GABAPENTIN 300 MG: 300 CAPSULE ORAL at 07:59

## 2023-02-17 RX ADMIN — HYDROMORPHONE HYDROCHLORIDE 0.5 MG: 1 INJECTION, SOLUTION INTRAMUSCULAR; INTRAVENOUS; SUBCUTANEOUS at 16:50

## 2023-02-17 RX ADMIN — DULOXETINE HYDROCHLORIDE 60 MG: 30 CAPSULE, DELAYED RELEASE ORAL at 07:59

## 2023-02-17 RX ADMIN — OXYCODONE AND ACETAMINOPHEN 2 TABLET: 5; 325 TABLET ORAL at 15:52

## 2023-02-17 RX ADMIN — ENOXAPARIN SODIUM 40 MG: 100 INJECTION SUBCUTANEOUS at 07:59

## 2023-02-17 RX ADMIN — GABAPENTIN 300 MG: 300 CAPSULE ORAL at 15:54

## 2023-02-17 RX ADMIN — SODIUM CHLORIDE, PRESERVATIVE FREE 10 ML: 5 INJECTION INTRAVENOUS at 21:01

## 2023-02-17 RX ADMIN — GABAPENTIN 300 MG: 300 CAPSULE ORAL at 20:56

## 2023-02-17 RX ADMIN — SODIUM CHLORIDE, PRESERVATIVE FREE 10 ML: 5 INJECTION INTRAVENOUS at 15:34

## 2023-02-17 RX ADMIN — INSULIN GLARGINE 6 UNITS: 100 INJECTION, SOLUTION SUBCUTANEOUS at 20:56

## 2023-02-17 RX ADMIN — ATORVASTATIN CALCIUM 40 MG: 40 TABLET, FILM COATED ORAL at 20:56

## 2023-02-17 RX ADMIN — OXYCODONE AND ACETAMINOPHEN 2 TABLET: 5; 325 TABLET ORAL at 04:48

## 2023-02-17 RX ADMIN — HYDROMORPHONE HYDROCHLORIDE 0.5 MG: 1 INJECTION, SOLUTION INTRAMUSCULAR; INTRAVENOUS; SUBCUTANEOUS at 11:08

## 2023-02-17 RX ADMIN — SODIUM CHLORIDE, POTASSIUM CHLORIDE, SODIUM LACTATE AND CALCIUM CHLORIDE: 600; 310; 30; 20 INJECTION, SOLUTION INTRAVENOUS at 00:15

## 2023-02-17 RX ADMIN — SODIUM CHLORIDE, PRESERVATIVE FREE 10 ML: 5 INJECTION INTRAVENOUS at 20:57

## 2023-02-17 RX ADMIN — OXYCODONE AND ACETAMINOPHEN 2 TABLET: 5; 325 TABLET ORAL at 09:38

## 2023-02-17 ASSESSMENT — PAIN DESCRIPTION - ORIENTATION
ORIENTATION: RIGHT;LOWER
ORIENTATION: RIGHT

## 2023-02-17 ASSESSMENT — PAIN SCALES - GENERAL
PAINLEVEL_OUTOF10: 9
PAINLEVEL_OUTOF10: 10
PAINLEVEL_OUTOF10: 8
PAINLEVEL_OUTOF10: 8
PAINLEVEL_OUTOF10: 4
PAINLEVEL_OUTOF10: 9
PAINLEVEL_OUTOF10: 5
PAINLEVEL_OUTOF10: 7
PAINLEVEL_OUTOF10: 6
PAINLEVEL_OUTOF10: 10
PAINLEVEL_OUTOF10: 8

## 2023-02-17 ASSESSMENT — ENCOUNTER SYMPTOMS
CONSTIPATION: 0
CHEST TIGHTNESS: 0
SINUS PAIN: 0
BACK PAIN: 0
TROUBLE SWALLOWING: 0
VOICE CHANGE: 0
NAUSEA: 0
SHORTNESS OF BREATH: 0
VOMITING: 0
DIARRHEA: 0
ABDOMINAL PAIN: 0
COUGH: 0
SORE THROAT: 0
SINUS PRESSURE: 0
COLOR CHANGE: 1
WHEEZING: 0

## 2023-02-17 ASSESSMENT — PAIN - FUNCTIONAL ASSESSMENT
PAIN_FUNCTIONAL_ASSESSMENT: PREVENTS OR INTERFERES WITH ALL ACTIVE AND SOME PASSIVE ACTIVITIES
PAIN_FUNCTIONAL_ASSESSMENT: PREVENTS OR INTERFERES SOME ACTIVE ACTIVITIES AND ADLS
PAIN_FUNCTIONAL_ASSESSMENT: PREVENTS OR INTERFERES WITH MANY ACTIVE NOT PASSIVE ACTIVITIES
PAIN_FUNCTIONAL_ASSESSMENT: PREVENTS OR INTERFERES WITH MANY ACTIVE NOT PASSIVE ACTIVITIES
PAIN_FUNCTIONAL_ASSESSMENT: PREVENTS OR INTERFERES SOME ACTIVE ACTIVITIES AND ADLS

## 2023-02-17 ASSESSMENT — PAIN DESCRIPTION - FREQUENCY
FREQUENCY: CONTINUOUS

## 2023-02-17 ASSESSMENT — PAIN DESCRIPTION - DESCRIPTORS
DESCRIPTORS: ACHING;STABBING
DESCRIPTORS: ACHING;DISCOMFORT;NAGGING
DESCRIPTORS: ACHING
DESCRIPTORS: ACHING;JABBING
DESCRIPTORS: CRUSHING;ACHING
DESCRIPTORS: ACHING;STABBING

## 2023-02-17 ASSESSMENT — PAIN DESCRIPTION - PAIN TYPE
TYPE: NEUROPATHIC PAIN
TYPE: SURGICAL PAIN
TYPE: NEUROPATHIC PAIN
TYPE: NEUROPATHIC PAIN

## 2023-02-17 ASSESSMENT — PAIN DESCRIPTION - LOCATION
LOCATION: LEG
LOCATION: INCISION
LOCATION: LEG;KNEE
LOCATION: LEG;KNEE
LOCATION: LEG

## 2023-02-17 ASSESSMENT — PAIN DESCRIPTION - ONSET
ONSET: ON-GOING

## 2023-02-17 NOTE — PROGRESS NOTES
V2.0  [facility] [place] Hospitalist Progress Note      Name:  Katheryn Flores /Age/Sex: 1960  (60 y.o. male)   MRN & CSN:  [id number] & [id number] Encounter Date/Time: 2023 3:36 PM EST    Location:  [facility] PCP: Sunday Logan MD       Hospital Day: 5    Assessment and Plan:   Katheryn Flores is a 60 y.o. male with pmh of right BKA, insulin-dependent diabetes mellitus type 2, peripheral neuropathy, hyperlipidemia  who presents with Infection (chronic) of amputation stump ([facility] [address]      Plan:    Right BKA stump wound infection: Rule out osteomyelitis. X-ray of the right stump shows no bony erosion or gas. surgery on board. Patient previously treated for osteomyelitis had recent hospitalization with a wound VAC. Did leave  [address] prior to his hospital stay being completed. Surgical intervention on 2023  Follow-up blood cultures. Infectious disease consulted: Stopping IV antibiotics today  Postop pain control: Has had difficult control pain in the past.  Use Percocet with IV Dilaudid for breakthrough. Also have patient on Cymbalta and gabapentin for neuropathic type pain. If improved, can discontinue gabapentin prior to DC. Benefit from long-term and high-dose Cymbalta for safety depressive properties as well. Hypochloremic hyponatremia in the setting of dehydration continue to monitor with IV hydration    Chronic normocytic normochromic anemia in the setting of anemia of chronic disease hemoglobin of 11 close to baseline    Coronary artery disease s/p CABG X4 history currently on DAPT at home    Hyperlipidemia on statin    Chronic GERD on Pepcid    Insulin-dependent diabetes mellitus type 2 complicated with peripheral neuropathy and PAD: Patient was not compliant with statin encouraged to do so. Need to discuss with primary team regarding Xarelto for peripheral vascular disease.   Takes Lantus 8 mg nightly at home will start 6 units of Lantus along with sliding scale insulin hemoglobin A1c is pending. Tobacco use disorder encouraged to discontinue smoking    Diet ADULT DIET; Regular   DVT Prophylaxis [x] Lovenox, []  Heparin, [] SCDs, [] Ambulation,    [] Eliquis, [] Xarelto  [] Coumadin [] other   Code Status Full Code   Disposition From: Home  Expected Disposition: To be determined  Estimated Date of Discharge: 3 to 4 days  Patient requires continued admission due to surgical intervention for right BKA stump wound infection. We will need precert for SNF placement. Surrogate Decision Maker/ POA      Subjective:     Chief Complaint: Leg Pain and Wound Check     Patient states that his pain is manageable, but severe. Has historically difficult to control pain after procedures. On an optimized regimen at this moment and the hypertrophic a lot. Still awaiting final culture results and arrangements for placement after hospitalization. Review of Systems:    Review of Systems   Constitutional:  Negative for activity change, appetite change, chills, fatigue and fever. HENT:  Negative for congestion, hearing loss, sinus pressure, sinus pain, sore throat, trouble swallowing and voice change. Eyes:  Negative for visual disturbance. Respiratory:  Negative for cough, chest tightness, shortness of breath and wheezing. Cardiovascular:  Negative for chest pain, palpitations and leg swelling. Gastrointestinal:  Negative for abdominal pain, constipation, diarrhea, nausea and vomiting. Endocrine: Negative for cold intolerance, heat intolerance and polyuria. Genitourinary:  Negative for dysuria. Musculoskeletal:  Negative for arthralgias, back pain and gait problem. Skin:  Positive for color change and wound. Neurological:  Negative for dizziness, weakness and headaches. Psychiatric/Behavioral:  Negative for agitation and confusion. The patient is not nervous/anxious. Objective:      Intake/Output Summary (Last 24 hours) at 2/17/2023 1056  Last data filed at 2/17/2023 0937  Gross per 24 hour   Intake 3049.24 ml   Output 2400 ml   Net 649.24 ml          Vitals:   Vitals:    02/17/23 0938   BP:    Pulse:    Resp: 16   Temp:    SpO2:        Physical Exam:     Physical Exam  Constitutional:       General: He is not in acute distress. Appearance: Normal appearance. HENT:      Head: Normocephalic and atraumatic. Nose: Nose normal.      Mouth/Throat:      Mouth: Mucous membranes are moist.      Pharynx: Oropharynx is clear. Eyes:      Extraocular Movements: Extraocular movements intact. Conjunctiva/sclera: Conjunctivae normal.      Pupils: Pupils are equal, round, and reactive to light. Cardiovascular:      Rate and Rhythm: Normal rate and regular rhythm. Pulses: Normal pulses. Heart sounds: Normal heart sounds. Pulmonary:      Effort: Pulmonary effort is normal.   Abdominal:      General: Abdomen is flat. Bowel sounds are normal.      Palpations: Abdomen is soft. Musculoskeletal:         General: Swelling, tenderness and signs of injury present. Normal range of motion. Cervical back: Normal range of motion and neck supple. Skin:     General: Skin is warm and dry. Findings: Lesion present. Neurological:      General: No focal deficit present. Mental Status: He is alert and oriented to person, place, and time. Mental status is at baseline. Psychiatric:         Mood and Affect: Mood normal.         Behavior: Behavior normal.         Thought Content:  Thought content normal.       Medications:   Medications:    lidocaine PF  5 mL IntraDERmal See Admin Instructions    sodium chloride flush  5-40 mL IntraVENous 2 times per day    gabapentin  300 mg Oral TID    DULoxetine  60 mg Oral Daily    vitamin D  50,000 Units Oral Weekly    sodium chloride flush  5-40 mL IntraVENous 2 times per day    aspirin  81 mg Oral Daily    atorvastatin  40 mg Oral Nightly    insulin glargine  6 Units SubCUTAneous Nightly    insulin lispro  0-4 Units SubCUTAneous TID WC    insulin lispro  0-4 Units SubCUTAneous Nightly    sodium chloride flush  5-40 mL IntraVENous 2 times per day    enoxaparin  40 mg SubCUTAneous Daily      Infusions:    sodium chloride      dextrose      sodium chloride       PRN Meds: sodium chloride flush, 5-40 mL, PRN  sodium chloride, , PRN  oxyCODONE-acetaminophen, 1 tablet, Q4H PRN   Or  oxyCODONE-acetaminophen, 2 tablet, Q4H PRN  HYDROmorphone, 0.5 mg, Q2H PRN  droperidol, 0.625 mg, Q10 Min PRN  sodium chloride flush, 5-40 mL, PRN  glucose, 4 tablet, PRN  dextrose bolus, 125 mL, PRN   Or  dextrose bolus, 250 mL, PRN  glucagon (rDNA), 1 mg, PRN  dextrose, , Continuous PRN  sodium chloride flush, 5-40 mL, PRN  sodium chloride, , PRN  ondansetron, 4 mg, Q8H PRN   Or  ondansetron, 4 mg, Q6H PRN  polyethylene glycol, 17 g, Daily PRN  acetaminophen, 650 mg, Q6H PRN   Or  acetaminophen, 650 mg, Q6H PRN      Labs      Recent Results (from the past 24 hour(s))   POCT Glucose    Collection Time: 02/16/23  4:47 PM   Result Value Ref Range    POC Glucose 108 (H) 70 - 99 MG/DL   POCT Glucose    Collection Time: 02/16/23  8:10 PM   Result Value Ref Range    POC Glucose 138 (H) 70 - 99 MG/DL   C-Reactive Protein    Collection Time: 02/17/23  7:32 AM   Result Value Ref Range    CRP High Sensitivity 50.0 (H) <5.0 mg/L   Basic Metabolic Panel w/ Reflex to MG    Collection Time: 02/17/23  7:32 AM   Result Value Ref Range    Sodium 136 135 - 145 MMOL/L    Potassium 3.6 3.5 - 5.1 MMOL/L    Chloride 103 99 - 110 mMol/L    CO2 25 21 - 32 MMOL/L    Anion Gap 8 4 - 16    BUN 15 6 - 23 MG/DL    Creatinine 0.7 (L) 0.9 - 1.3 MG/DL    Est, Glom Filt Rate >60 >60 mL/min/1.73m2    Glucose 85 70 - 99 MG/DL    Calcium 7.4 (L) 8.3 - 10.6 MG/DL   CBC with Auto Differential    Collection Time: 02/17/23  7:32 AM   Result Value Ref Range    WBC 7.1 4.0 - 10.5 K/CU MM    RBC 2.97 (L) 4.6 - 6.2 M/CU MM    Hemoglobin 8.6 (L) 13.5 - 18.0 GM/DL    Hematocrit 24.7 (L) 42 - 52 % MCV 83.2 78 - 100 FL    MCH 29.0 27 - 31 PG    MCHC 34.8 32.0 - 36.0 %    RDW 14.0 11.7 - 14.9 %    Platelets 954 (L) 601 - 440 K/CU MM    MPV 10.0 7.5 - 11.1 FL    Differential Type AUTOMATED DIFFERENTIAL     Segs Relative 69.4 (H) 36 - 66 %    Lymphocytes % 17.2 (L) 24 - 44 %    Monocytes % 11.0 (H) 0 - 4 %    Eosinophils % 1.4 0 - 3 %    Basophils % 0.7 0 - 1 %    Segs Absolute 4.9 K/CU MM    Lymphocytes Absolute 1.2 K/CU MM    Monocytes Absolute 0.8 K/CU MM    Eosinophils Absolute 0.1 K/CU MM    Basophils Absolute 0.1 K/CU MM    Nucleated RBC % 0.0 %    Total Nucleated RBC 0.0 K/CU MM    Total Immature Neutrophil 0.02 K/CU MM    Immature Neutrophil % 0.3 0 - 0.43 %   POCT Glucose    Collection Time: 02/17/23  8:02 AM   Result Value Ref Range    POC Glucose 127 (H) 70 - 99 MG/DL        Imaging/Diagnostics Last 24 Hours   XR KNEE RIGHT (1-2 VIEWS)    Result Date: 2/13/2023  EXAMINATION: TWO XRAY VIEWS OF THE RIGHT KNEE 2/13/2023 8:48 pm COMPARISON: None. HISTORY: ORDERING SYSTEM PROVIDED HISTORY: Sharif Henson in fall, nonhealing TECHNOLOGIST PROVIDED HISTORY: Reason for exam:->nec fasc in fall, nonhealing Reason for Exam: Sharif Henson in fall, nonhealing Additional signs and symptoms: none Relevant Medical/Surgical History: diabetes FINDINGS: Status post below-knee amputation. No acute periostitis or bony destruction. Suspected wound on anterior aspect of the stomach. The knee joint is maintained. No acute periostitis or bony destruction. Status post BKA. Comment: Please note this report has been produced using speech recognition software and may contain errors related to that system including errors in grammar, punctuation, and spelling, as well as words and phrases that may be inappropriate. If there are any questions or concerns please feel free to contact the dictating provider for clarification.      Electronically signed by Fran Daniel MD on 2/17/2023 at 10:56 AM

## 2023-02-17 NOTE — PROGRESS NOTES
GENERAL SURGERY PROGRESS NOTE    Maribell Leal is a 60 y.o. male POD 2 right AKA. Subjective:    Pain improving. Denies shortness of breath, chest pain, fevers/chills, nausea/vomiting. Objective:    Vitals: VITALS:  BP (!) 144/71   Pulse 75   Temp 98.8 °F (37.1 °C) (Oral)   Resp 16   Ht 5' 8\" (1.727 m)   Wt 140 lb 9.6 oz (63.8 kg)   SpO2 96%   BMI 21.38 kg/m²     I/O: 02/16 0701 - 02/17 0700  In: 2421 [P.O.:240; I.V.:2181]  Out: 2170 [Urine:2170]    Labs/Imaging Results:   Lab Results   Component Value Date/Time     02/17/2023 07:32 AM    K 3.6 02/17/2023 07:32 AM     02/17/2023 07:32 AM    CO2 25 02/17/2023 07:32 AM    BUN 15 02/17/2023 07:32 AM    CREATININE 0.7 02/17/2023 07:32 AM    GLUCOSE 85 02/17/2023 07:32 AM    CALCIUM 7.4 02/17/2023 07:32 AM      Lab Results   Component Value Date    WBC 7.1 02/17/2023    HGB 8.6 (L) 02/17/2023    HCT 24.7 (L) 02/17/2023    MCV 83.2 02/17/2023     (L) 02/17/2023          IV Fluids:   sodium chloride    dextrose    sodium chloride    Scheduled Meds:   lidocaine PF, 5 mL, IntraDERmal, See Admin Instructions    sodium chloride flush, 5-40 mL, IntraVENous, 2 times per day    gabapentin, 300 mg, Oral, TID    DULoxetine, 60 mg, Oral, Daily    vitamin D, 50,000 Units, Oral, Weekly    sodium chloride flush, 5-40 mL, IntraVENous, 2 times per day    aspirin, 81 mg, Oral, Daily    atorvastatin, 40 mg, Oral, Nightly    insulin glargine, 6 Units, SubCUTAneous, Nightly    insulin lispro, 0-4 Units, SubCUTAneous, TID WC    insulin lispro, 0-4 Units, SubCUTAneous, Nightly    sodium chloride flush, 5-40 mL, IntraVENous, 2 times per day    enoxaparin, 40 mg, SubCUTAneous, Daily    Physical Exam:  General: A&O x 3, no distress. HEENT: Anicteric sclerae, MMM. Extremities: No edema bilat LE. Abdomen: Soft, nondistended, nontender   Right AKA stump with incision clean/dry/intact with staples in place. No drainage. No erythema.       Assessment and Plan:     Patient Active Problem List:     Diabetes mellitus     H/O echocardiogram     S/P CABG (coronary artery bypass graft)     Chest pain     Cellulitis     Heroin withdrawal (HCC)     CAD (coronary artery disease)     Polysubstance abuse (HCC)     Small bowel obstruction (HCC)     Narcotic abuse, continuous (HCC)     Hx of hepatitis     Epigastric pain     Diarrhea     Constipation with Ileus     Vomiting of fecal matter with nausea     Encephalopathy     Metabolic encephalopathy     Infectious gastroenteritis     Bilateral leg weakness     Gait disturbance     Left carotid stenosis     Hypothyroidism     Osteomyelitis ([address]     Uncontrolled type II diabetes with peripheral autonomic neuropathy     Gangrene of toe (HCC)     Acute hematogenous osteomyelitis of right foot (HCC)     Amputation of little toe (HCC)     PAD (peripheral artery disease) (HCC)     Uncontrolled pain     Generalized weakness     Simple chronic bronchitis (HCC)     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 ([address]     Skin ulcer with necrosis of muscle (HCC)     Toe ulcer ([address]     Diabetic foot ([address]     Diabetic foot infection ([address]     Abscess of right foot     WD-Diabetic ulcer of right midfoot associated with type 2 diabetes mellitus, with muscle involvement without evidence of necrosis ([address]     Necrotizing fasciitis ([address]     Bacteremia due to group B Streptococcus     Gangrene of right foot ([address]     Osteomyelitis of right lower limb ([address]     Acute blood loss anemia     Uncontrolled type 2 diabetes mellitus with peripheral neuropathy     Essential hypertension     Hyponatremia     Cigarette nicotine dependence without complication     Thrombocytopenia (HCC)     Open wound     Infection (chronic) of amputation stump ([address]     Surgical wound, non healing      Status post right AKA 2/14  Antibiotics per ID  Pain control with Percocet, Dilaudid as needed.   PT/OT        Ning Riley DO

## 2023-02-17 NOTE — PROGRESS NOTES
Infectious Disease Progress Note  2023   Patient Name: Krista Valderrama : 1960     Assessment  Right BKA stump OM  Known from previous admission, had a debridement of the stump on 2023. Cx positive for MSSA and P mirabilis. 6 week course of IV cefazolin was planned but he left AMA on 2023. Found to be unsalvageable and an AKA was performed on 2023  AKA stump is clean, and is not inflamed   discontinue vancomycin, cefepime and metronidazole - 2023  Asymptomatic E faecalis bacteriuria  Does not require abx  T2DM  CAD  PAD  Comorbid conditions:      Plan  Therapeutic: off abx  Diagnostic:n/a  F/u: n/a  Other: n/a  Will sign off and not follow the patient actively, please reconsult as needed. Reason for visit: F/u right BKA stump OM? History:? Interval history noted  Denies n/v/d/f or untoward effects of antimicrobials  Physical Exam:  Vital Signs: BP (!) 144/71   Pulse 75   Temp 98.8 °F (37.1 °C) (Oral)   Resp 16   Ht 5' 8\" (1.727 m)   Wt 140 lb 9.6 oz (63.8 kg)   SpO2 96%   BMI 21.38 kg/m²     Gen: alert and oriented X3, no distress  Skin: no stigmata of endocarditis  Wounds: right AKA stump (pictures in media)   HEMT: AT/NC Oropharynx pink, moist, and without lesions or exudates  Eyes: PERRLA, EOMI, conjunctiva pink, sclera anicteric. Neck: Supple. Trachea midline. No LAD. Chest: no distress and CTA. Good air movement. Heart: RRR and no MRG. Abd: soft, non-distended, no tenderness, no hepatomegaly. Normoactive bowel sounds. Ext: no clubbing, cyanosis, or edema  Catheter Site: without erythema or tenderness  LDA:   Neuro: Mental status intact. CN 2-12 intact and no focal sensory or motor deficits       Radiologic / Imaging / TESTING  No results found.      Labs:    Recent Results (from the past 24 hour(s))   POCT Glucose    Collection Time: 23  4:47 PM   Result Value Ref Range    POC Glucose 108 (H) 70 - 99 MG/DL   POCT Glucose    Collection Time: 23 8:10 PM   Result Value Ref Range    POC Glucose 138 (H) 70 - 99 MG/DL   C-Reactive Protein    Collection Time: 02/17/23  7:32 AM   Result Value Ref Range    CRP High Sensitivity 50.0 (H) <5.0 mg/L   Basic Metabolic Panel w/ Reflex to MG    Collection Time: 02/17/23  7:32 AM   Result Value Ref Range    Sodium 136 135 - 145 MMOL/L    Potassium 3.6 3.5 - 5.1 MMOL/L    Chloride 103 99 - 110 mMol/L    CO2 25 21 - 32 MMOL/L    Anion Gap 8 4 - 16    BUN 15 6 - 23 MG/DL    Creatinine 0.7 (L) 0.9 - 1.3 MG/DL    Est, Glom Filt Rate >60 >60 mL/min/1.73m2    Glucose 85 70 - 99 MG/DL    Calcium 7.4 (L) 8.3 - 10.6 MG/DL   CBC with Auto Differential    Collection Time: 02/17/23  7:32 AM   Result Value Ref Range    WBC 7.1 4.0 - 10.5 K/CU MM    RBC 2.97 (L) 4.6 - 6.2 M/CU MM    Hemoglobin 8.6 (L) 13.5 - 18.0 GM/DL    Hematocrit 24.7 (L) 42 - 52 %    MCV 83.2 78 - 100 FL    MCH 29.0 27 - 31 PG    MCHC 34.8 32.0 - 36.0 %    RDW 14.0 11.7 - 14.9 %    Platelets 900 (L) 346 - 440 K/CU MM    MPV 10.0 7.5 - 11.1 FL    Differential Type AUTOMATED DIFFERENTIAL     Segs Relative 69.4 (H) 36 - 66 %    Lymphocytes % 17.2 (L) 24 - 44 %    Monocytes % 11.0 (H) 0 - 4 %    Eosinophils % 1.4 0 - 3 %    Basophils % 0.7 0 - 1 %    Segs Absolute 4.9 K/CU MM    Lymphocytes Absolute 1.2 K/CU MM    Monocytes Absolute 0.8 K/CU MM    Eosinophils Absolute 0.1 K/CU MM    Basophils Absolute 0.1 K/CU MM    Nucleated RBC % 0.0 %    Total Nucleated RBC 0.0 K/CU MM    Total Immature Neutrophil 0.02 K/CU MM    Immature Neutrophil % 0.3 0 - 0.43 %   POCT Glucose    Collection Time: 02/17/23  8:02 AM   Result Value Ref Range    POC Glucose 127 (H) 70 - 99 MG/DL     CULTURE results: Invalid input(s): BLOOD CULTURE,  URINE CULTURE, SURGICAL CULTURE    Diagnosis:  Patient Active Problem List   Diagnosis    Diabetes mellitus    H/O echocardiogram    S/P CABG (coronary artery bypass graft)    Chest pain    Cellulitis    Heroin withdrawal (HCC)    CAD (coronary artery disease)    Polysubstance abuse ([address]    Small bowel obstruction (HCC)    Narcotic abuse, continuous (HCC)    Hx of hepatitis    Epigastric pain    Diarrhea    Constipation with Ileus    Vomiting of fecal matter with nausea    Encephalopathy    Metabolic encephalopathy    Infectious gastroenteritis    Bilateral leg weakness    Gait disturbance    Left carotid stenosis    Hypothyroidism    Osteomyelitis ([address]    Uncontrolled type II diabetes with peripheral autonomic neuropathy    Gangrene of toe (HCC)    Acute hematogenous osteomyelitis of right foot (HCC)    Amputation of little toe (HCC)    PAD (peripheral artery disease) (HCC)    Uncontrolled pain    Generalized weakness    Simple chronic bronchitis (HCC)    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 ([address]    Skin ulcer with necrosis of muscle (HCC)    Toe ulcer ([address]    Diabetic foot ([address]    Diabetic foot infection ([address]    Abscess of right foot    WD-Diabetic ulcer of right midfoot associated with type 2 diabetes mellitus, with muscle involvement without evidence of necrosis ([address]    Necrotizing fasciitis ([address]    Bacteremia due to group B Streptococcus    Gangrene of right foot ([address]    Osteomyelitis of right lower limb ([address]    Acute blood loss anemia    Uncontrolled type 2 diabetes mellitus with peripheral neuropathy    Essential hypertension    Hyponatremia    Cigarette nicotine dependence without complication    Thrombocytopenia (HCC)    Open wound    Infection (chronic) of amputation stump (HCC)    Surgical wound, non healing       Active Problems  Principal Problem:    Infection (chronic) of amputation stump (HCC)  Active Problems:    Surgical wound, non healing  Resolved Problems:    * No resolved hospital problems.  *              Electronically signed by: Electronically signed by Candie Headley MD on 2/17/2023 at 10:22 AM

## 2023-02-17 NOTE — PROGRESS NOTES
Physical Therapy Treatment Note  Name: Mary Luke MRN: [id number] :   1960   Date:  2023   Admission Date: 2023 Room:  [address] [place], [place] [address]A     Restrictions/Precautions:          general precautions, recent R AKA, fall risk    Communication with other providers:  RN, OT    Subjective:  Patient states:  \"I don't know if I can stand today\"  Pain:   Location, Type, Intensity (0/10 to 10/10):  pain in residual limb with movement, pt does not rate    Objective:    Observation:  pt supine in bed upon entry and agreeable to therapy    Treatment, including education/measures:    Bed mobility: pt completed supine to sitting x2 trials with first trial SBA and second trial min A with assist at trunk. HOB raised for both trials. Pt completed sit to supine x2 trials SBA. Cues provided for sequencing throughout. Pt able to scoot independently in bed. Transfers: pt completed one STS to/from EOB CGA with cues for sequencing and hand placement    Therapeutic Exercise: Pt stood at walker while performing x8 R hip abd/add, ext, and forward flexion    Balance: pt sat EOB SBA and stood at walker CGA with no LOB throughout.     Education: pt educated on prone lying to promote R hip extension, benefits of mobility/therapy, and therapy goals    Assessment / Impression:    Pt supine in bed at end of session with needs in reach and alarm on    Patient's tolerance of treatment:  well   Adverse Reaction: n/a  Significant change in status and impact:  n/a  Barriers to improvement:  decreased endurance, increased pain    Plan for Next Session:    Continue to address transfer training and gait training in future sessions    Time in:  1423  Time out:  1448  Timed treatment minutes:  25  Total treatment time:  25    Previously filed items:           Short Term Goals  Time Frame for Short Term Goals: 1 week  Short Term Goal 1: Pt to complete all bed mobility mod I  Short Term Goal 2: pt to complete STS transfer to/from bed, commode, and chair mod A  Short Term Goal 3: Pt to ambulate 22' with LRAD mod A    Electronically signed by:    Nga Guerrier, IRAIDA  2/17/2023, 3:18 PM

## 2023-02-17 NOTE — CONSULTS
Consult completed. Nexiva 22g 1 inch peripheral IV initiated into right anterior forearm x 1 attempt with ultrasound guidance. Brisk blood return, flushes without resistance. Patient tolerated well. Please consult IV/PICC team if patient's needs change, questions, or concerns.
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
## 2023-02-18 LAB
ANION GAP SERPL CALCULATED.3IONS-SCNC: 4 MMOL/L (ref 4–16)
BASOPHILS ABSOLUTE: 0.1 K/CU MM
BASOPHILS RELATIVE PERCENT: 1 % (ref 0–1)
BUN SERPL-MCNC: 16 MG/DL (ref 6–23)
CALCIUM SERPL-MCNC: 7.5 MG/DL (ref 8.3–10.6)
CHLORIDE BLD-SCNC: 100 MMOL/L (ref 99–110)
CO2: 28 MMOL/L (ref 21–32)
CREAT SERPL-MCNC: 0.7 MG/DL (ref 0.9–1.3)
CULTURE: NORMAL
CULTURE: NORMAL
DIFFERENTIAL TYPE: ABNORMAL
EOSINOPHILS ABSOLUTE: 0.1 K/CU MM
EOSINOPHILS RELATIVE PERCENT: 1.5 % (ref 0–3)
GFR SERPL CREATININE-BSD FRML MDRD: >60 ML/MIN/1.73M2
GLUCOSE BLD-MCNC: 118 MG/DL (ref 70–99)
GLUCOSE BLD-MCNC: 135 MG/DL (ref 70–99)
GLUCOSE BLD-MCNC: 182 MG/DL (ref 70–99)
GLUCOSE BLD-MCNC: 232 MG/DL (ref 70–99)
GLUCOSE SERPL-MCNC: 115 MG/DL (ref 70–99)
HCT VFR BLD CALC: 26.4 % (ref 42–52)
HEMOGLOBIN: 8.7 GM/DL (ref 13.5–18)
IMMATURE NEUTROPHIL %: 0.5 % (ref 0–0.43)
LYMPHOCYTES ABSOLUTE: 1.4 K/CU MM
LYMPHOCYTES RELATIVE PERCENT: 23.2 % (ref 24–44)
Lab: NORMAL
Lab: NORMAL
MAGNESIUM: 1.8 MG/DL (ref 1.8–2.4)
MCH RBC QN AUTO: 28.8 PG (ref 27–31)
MCHC RBC AUTO-ENTMCNC: 33 % (ref 32–36)
MCV RBC AUTO: 87.4 FL (ref 78–100)
MONOCYTES ABSOLUTE: 0.7 K/CU MM
MONOCYTES RELATIVE PERCENT: 11.4 % (ref 0–4)
NUCLEATED RBC %: 0 %
PDW BLD-RTO: 14.1 % (ref 11.7–14.9)
PLATELET # BLD: 124 K/CU MM (ref 140–440)
PMV BLD AUTO: 10.2 FL (ref 7.5–11.1)
POTASSIUM SERPL-SCNC: 3.5 MMOL/L (ref 3.5–5.1)
RBC # BLD: 3.02 M/CU MM (ref 4.6–6.2)
SEGMENTED NEUTROPHILS ABSOLUTE COUNT: 3.7 K/CU MM
SEGMENTED NEUTROPHILS RELATIVE PERCENT: 62.4 % (ref 36–66)
SODIUM BLD-SCNC: 132 MMOL/L (ref 135–145)
SPECIMEN: NORMAL
SPECIMEN: NORMAL
TOTAL IMMATURE NEUTOROPHIL: 0.03 K/CU MM
TOTAL NUCLEATED RBC: 0 K/CU MM
WBC # BLD: 5.9 K/CU MM (ref 4–10.5)

## 2023-02-18 PROCEDURE — 94761 N-INVAS EAR/PLS OXIMETRY MLT: CPT

## 2023-02-18 PROCEDURE — 6370000000 HC RX 637 (ALT 250 FOR IP): Performed by: SURGERY

## 2023-02-18 PROCEDURE — 6370000000 HC RX 637 (ALT 250 FOR IP): Performed by: STUDENT IN AN ORGANIZED HEALTH CARE EDUCATION/TRAINING PROGRAM

## 2023-02-18 PROCEDURE — 1200000000 HC SEMI PRIVATE

## 2023-02-18 PROCEDURE — 80048 BASIC METABOLIC PNL TOTAL CA: CPT

## 2023-02-18 PROCEDURE — 2580000003 HC RX 258: Performed by: NURSE PRACTITIONER

## 2023-02-18 PROCEDURE — 94669 MECHANICAL CHEST WALL OSCILL: CPT

## 2023-02-18 PROCEDURE — 94150 VITAL CAPACITY TEST: CPT

## 2023-02-18 PROCEDURE — 99024 POSTOP FOLLOW-UP VISIT: CPT | Performed by: NURSE PRACTITIONER

## 2023-02-18 PROCEDURE — 6360000002 HC RX W HCPCS: Performed by: SURGERY

## 2023-02-18 PROCEDURE — 94664 DEMO&/EVAL PT USE INHALER: CPT

## 2023-02-18 PROCEDURE — 85025 COMPLETE CBC W/AUTO DIFF WBC: CPT

## 2023-02-18 PROCEDURE — 82962 GLUCOSE BLOOD TEST: CPT

## 2023-02-18 PROCEDURE — APPNB15 APP NON BILLABLE TIME 0-15 MINS: Performed by: NURSE PRACTITIONER

## 2023-02-18 PROCEDURE — 2580000003 HC RX 258: Performed by: SURGERY

## 2023-02-18 PROCEDURE — 36415 COLL VENOUS BLD VENIPUNCTURE: CPT

## 2023-02-18 PROCEDURE — 83735 ASSAY OF MAGNESIUM: CPT

## 2023-02-18 RX ADMIN — SODIUM CHLORIDE, PRESERVATIVE FREE 10 ML: 5 INJECTION INTRAVENOUS at 21:00

## 2023-02-18 RX ADMIN — ENOXAPARIN SODIUM 40 MG: 100 INJECTION SUBCUTANEOUS at 09:09

## 2023-02-18 RX ADMIN — OXYCODONE AND ACETAMINOPHEN 2 TABLET: 5; 325 TABLET ORAL at 09:09

## 2023-02-18 RX ADMIN — HYDROMORPHONE HYDROCHLORIDE 0.5 MG: 1 INJECTION, SOLUTION INTRAMUSCULAR; INTRAVENOUS; SUBCUTANEOUS at 19:43

## 2023-02-18 RX ADMIN — GABAPENTIN 300 MG: 300 CAPSULE ORAL at 09:09

## 2023-02-18 RX ADMIN — INSULIN GLARGINE 6 UNITS: 100 INJECTION, SOLUTION SUBCUTANEOUS at 20:41

## 2023-02-18 RX ADMIN — ATORVASTATIN CALCIUM 40 MG: 40 TABLET, FILM COATED ORAL at 19:43

## 2023-02-18 RX ADMIN — HYDROMORPHONE HYDROCHLORIDE 0.5 MG: 1 INJECTION, SOLUTION INTRAMUSCULAR; INTRAVENOUS; SUBCUTANEOUS at 10:34

## 2023-02-18 RX ADMIN — OXYCODONE AND ACETAMINOPHEN 2 TABLET: 5; 325 TABLET ORAL at 23:09

## 2023-02-18 RX ADMIN — OXYCODONE AND ACETAMINOPHEN 2 TABLET: 5; 325 TABLET ORAL at 14:26

## 2023-02-18 RX ADMIN — HYDROMORPHONE HYDROCHLORIDE 0.5 MG: 1 INJECTION, SOLUTION INTRAMUSCULAR; INTRAVENOUS; SUBCUTANEOUS at 05:36

## 2023-02-18 RX ADMIN — GABAPENTIN 300 MG: 300 CAPSULE ORAL at 19:43

## 2023-02-18 RX ADMIN — ASPIRIN 81 MG 81 MG: 81 TABLET ORAL at 09:09

## 2023-02-18 RX ADMIN — GABAPENTIN 300 MG: 300 CAPSULE ORAL at 14:26

## 2023-02-18 RX ADMIN — DULOXETINE HYDROCHLORIDE 60 MG: 30 CAPSULE, DELAYED RELEASE ORAL at 09:09

## 2023-02-18 RX ADMIN — HYDROMORPHONE HYDROCHLORIDE 0.5 MG: 1 INJECTION, SOLUTION INTRAMUSCULAR; INTRAVENOUS; SUBCUTANEOUS at 16:04

## 2023-02-18 ASSESSMENT — PAIN SCALES - WONG BAKER
WONGBAKER_NUMERICALRESPONSE: 0

## 2023-02-18 ASSESSMENT — PAIN SCALES - GENERAL
PAINLEVEL_OUTOF10: 9
PAINLEVEL_OUTOF10: 10
PAINLEVEL_OUTOF10: 7
PAINLEVEL_OUTOF10: 6
PAINLEVEL_OUTOF10: 6
PAINLEVEL_OUTOF10: 8
PAINLEVEL_OUTOF10: 8
PAINLEVEL_OUTOF10: 6
PAINLEVEL_OUTOF10: 9
PAINLEVEL_OUTOF10: 10

## 2023-02-18 ASSESSMENT — PAIN DESCRIPTION - LOCATION
LOCATION: LEG;KNEE
LOCATION: KNEE;LEG
LOCATION: LEG;KNEE
LOCATION: LEG

## 2023-02-18 ASSESSMENT — ENCOUNTER SYMPTOMS
SINUS PAIN: 0
COLOR CHANGE: 1
SINUS PRESSURE: 0
WHEEZING: 0
CHEST TIGHTNESS: 0
COUGH: 0
CONSTIPATION: 0
BACK PAIN: 0
DIARRHEA: 0
NAUSEA: 0
VOICE CHANGE: 0
SORE THROAT: 0
ABDOMINAL PAIN: 0
SHORTNESS OF BREATH: 0
TROUBLE SWALLOWING: 0
VOMITING: 0

## 2023-02-18 ASSESSMENT — PAIN DESCRIPTION - DESCRIPTORS
DESCRIPTORS: SHARP
DESCRIPTORS: ACHING;SHARP;SQUEEZING
DESCRIPTORS: SHARP
DESCRIPTORS: SHARP

## 2023-02-18 ASSESSMENT — PAIN DESCRIPTION - ORIENTATION
ORIENTATION: RIGHT

## 2023-02-18 ASSESSMENT — PAIN - FUNCTIONAL ASSESSMENT
PAIN_FUNCTIONAL_ASSESSMENT: PREVENTS OR INTERFERES SOME ACTIVE ACTIVITIES AND ADLS

## 2023-02-18 ASSESSMENT — PAIN DESCRIPTION - PAIN TYPE
TYPE: SURGICAL PAIN
TYPE: SURGICAL PAIN

## 2023-02-18 NOTE — PROGRESS NOTES
V2.0  [facility] Hospitalist Progress Note      Name:  Eunice Pena /Age/Sex: 1960  (60 y.o. male)   MRN & CSN:  [id number] & [id number] Encounter Date/Time: 2023 3:36 PM EST    Location:  [address] [place], [place] [address] PCP: Eddi Castorena MD       Hospital Day: 6    Assessment and Plan:   Eunice Pena is a 60 y.o. male with pmh of right BKA, insulin-dependent diabetes mellitus type 2, peripheral neuropathy, hyperlipidemia  who presents with Infection (chronic) of amputation stump ([facility]      Plan:    Right BKA stump wound infection: Rule out osteomyelitis. X-ray of the right stump shows no bony erosion or gas. surgery on board. Patient previously treated for osteomyelitis had recent hospitalization with a wound VAC. Did leave  [address] prior to his hospital stay being completed. Surgical intervention on 2023  Follow-up blood cultures. Infectious disease consulted: Off IV antibiotics postop pain control: Has had difficult control pain in the past.  Use Percocet with IV Dilaudid for breakthrough. Also have patient on Cymbalta and gabapentin for neuropathic type pain. If improved, can discontinue gabapentin prior to DC. Benefit from long-term and high-dose Cymbalta for safety depressive properties as well. Hypochloremic hyponatremia in the setting of dehydration continue to monitor with IV hydration    Chronic normocytic normochromic anemia in the setting of anemia of chronic disease hemoglobin of 11 close to baseline    Coronary artery disease s/p CABG X4 history currently on DAPT at home    Hyperlipidemia on statin    Chronic GERD on Pepcid    Insulin-dependent diabetes mellitus type 2 complicated with peripheral neuropathy and PAD: Patient was not compliant with statin encouraged to do so. Need to discuss with primary team regarding Xarelto for peripheral vascular disease.   Takes Lantus 8 mg nightly at home will start 6 units of Lantus along with sliding scale insulin hemoglobin A1c is pending. Tobacco use disorder encouraged to discontinue smoking    Diet ADULT DIET; Regular   DVT Prophylaxis [x] Lovenox, []  Heparin, [] SCDs, [] Ambulation,    [] Eliquis, [] Xarelto  [] Coumadin [] other   Code Status Full Code   Disposition From: Home  Expected Disposition: To be determined  Estimated Date of Discharge: 3 to 4 days  Patient requires continued admission due to surgical intervention for right BKA stump wound infection. We will need precert for SNF placement. Surrogate Decision Maker/ POA      Subjective:     Chief Complaint: Leg Pain and Wound Check     Seen and examined at bedside. Complains of pain in his right leg, states that he has not been able to eat or drink much upon asking why he states that because he does not feel like eating and does not feel hungry. Discussed with him importance of nutrition in the healing process. Understands and will try to eat as much as he can. Review of Systems:    Review of Systems   Constitutional:  Negative for activity change, appetite change, chills, fatigue and fever. HENT:  Negative for congestion, hearing loss, sinus pressure, sinus pain, sore throat, trouble swallowing and voice change. Eyes:  Negative for visual disturbance. Respiratory:  Negative for cough, chest tightness, shortness of breath and wheezing. Cardiovascular:  Negative for chest pain, palpitations and leg swelling. Gastrointestinal:  Negative for abdominal pain, constipation, diarrhea, nausea and vomiting. Endocrine: Negative for cold intolerance, heat intolerance and polyuria. Genitourinary:  Negative for dysuria. Musculoskeletal:  Negative for arthralgias, back pain and gait problem. Skin:  Positive for color change and wound. Neurological:  Negative for dizziness, weakness and headaches. Psychiatric/Behavioral:  Negative for agitation and confusion. The patient is not nervous/anxious. Objective:      Intake/Output Summary (Last 24 hours) at 2/18/2023 1502  Last data filed at 2/18/2023 1427  Gross per 24 hour   Intake 410 ml   Output 800 ml   Net -390 ml          Vitals:   Vitals:    02/18/23 1426   BP:    Pulse:    Resp: 18   Temp:    SpO2:        Physical Exam:     Physical Exam  Constitutional:       General: He is not in acute distress. Appearance: Normal appearance. HENT:      Head: Normocephalic and atraumatic. Nose: Nose normal.      Mouth/Throat:      Mouth: Mucous membranes are moist.      Pharynx: Oropharynx is clear. Eyes:      Extraocular Movements: Extraocular movements intact. Conjunctiva/sclera: Conjunctivae normal.      Pupils: Pupils are equal, round, and reactive to light. Cardiovascular:      Rate and Rhythm: Normal rate and regular rhythm. Pulses: Normal pulses. Heart sounds: Normal heart sounds. Pulmonary:      Effort: Pulmonary effort is normal.   Abdominal:      General: Abdomen is flat. Bowel sounds are normal.      Palpations: Abdomen is soft. Musculoskeletal:         General: Swelling, tenderness and signs of injury present. Normal range of motion. Cervical back: Normal range of motion and neck supple. Skin:     General: Skin is warm and dry. Findings: Lesion present. Neurological:      General: No focal deficit present. Mental Status: He is alert and oriented to person, place, and time. Mental status is at baseline. Psychiatric:         Mood and Affect: Mood normal.         Behavior: Behavior normal.         Thought Content:  Thought content normal.       Medications:   Medications:    lidocaine PF  5 mL IntraDERmal See Admin Instructions    sodium chloride flush  5-40 mL IntraVENous 2 times per day    gabapentin  300 mg Oral TID    DULoxetine  60 mg Oral Daily    vitamin D  50,000 Units Oral Weekly    sodium chloride flush  5-40 mL IntraVENous 2 times per day    aspirin  81 mg Oral Daily    atorvastatin  40 mg Oral Nightly    insulin glargine  6 Units SubCUTAneous Nightly insulin lispro  0-4 Units SubCUTAneous TID WC    insulin lispro  0-4 Units SubCUTAneous Nightly    sodium chloride flush  5-40 mL IntraVENous 2 times per day    enoxaparin  40 mg SubCUTAneous Daily      Infusions:    sodium chloride      dextrose      sodium chloride       PRN Meds: sodium chloride flush, 5-40 mL, PRN  sodium chloride, , PRN  oxyCODONE-acetaminophen, 1 tablet, Q4H PRN   Or  oxyCODONE-acetaminophen, 2 tablet, Q4H PRN  HYDROmorphone, 0.5 mg, Q2H PRN  droperidol, 0.625 mg, Q10 Min PRN  sodium chloride flush, 5-40 mL, PRN  glucose, 4 tablet, PRN  dextrose bolus, 125 mL, PRN   Or  dextrose bolus, 250 mL, PRN  glucagon (rDNA), 1 mg, PRN  dextrose, , Continuous PRN  sodium chloride flush, 5-40 mL, PRN  sodium chloride, , PRN  ondansetron, 4 mg, Q8H PRN   Or  ondansetron, 4 mg, Q6H PRN  polyethylene glycol, 17 g, Daily PRN  acetaminophen, 650 mg, Q6H PRN   Or  acetaminophen, 650 mg, Q6H PRN      Labs      Recent Results (from the past 24 hour(s))   POCT Glucose    Collection Time: 02/17/23  4:43 PM   Result Value Ref Range    POC Glucose 223 (H) 70 - 99 MG/DL   POCT Glucose    Collection Time: 02/17/23  7:35 PM   Result Value Ref Range    POC Glucose 224 (H) 70 - 99 MG/DL   Basic Metabolic Panel w/ Reflex to MG    Collection Time: 02/18/23  5:24 AM   Result Value Ref Range    Sodium 132 (L) 135 - 145 MMOL/L    Potassium 3.5 3.5 - 5.1 MMOL/L    Chloride 100 99 - 110 mMol/L    CO2 28 21 - 32 MMOL/L    Anion Gap 4 4 - 16    BUN 16 6 - 23 MG/DL    Creatinine 0.7 (L) 0.9 - 1.3 MG/DL    Est, Glom Filt Rate >60 >60 mL/min/1.73m2    Glucose 115 (H) 70 - 99 MG/DL    Calcium 7.5 (L) 8.3 - 10.6 MG/DL   CBC with Auto Differential    Collection Time: 02/18/23  5:24 AM   Result Value Ref Range    WBC 5.9 4.0 - 10.5 K/CU MM    RBC 3.02 (L) 4.6 - 6.2 M/CU MM    Hemoglobin 8.7 (L) 13.5 - 18.0 GM/DL    Hematocrit 26.4 (L) 42 - 52 %    MCV 87.4 78 - 100 FL    MCH 28.8 27 - 31 PG    MCHC 33.0 32.0 - 36.0 %    RDW 14.1 11.7 - 14.9 %    Platelets 582 (L) 246 - 440 K/CU MM    MPV 10.2 7.5 - 11.1 FL    Differential Type AUTOMATED DIFFERENTIAL     Segs Relative 62.4 36 - 66 %    Lymphocytes % 23.2 (L) 24 - 44 %    Monocytes % 11.4 (H) 0 - 4 %    Eosinophils % 1.5 0 - 3 %    Basophils % 1.0 0 - 1 %    Segs Absolute 3.7 K/CU MM    Lymphocytes Absolute 1.4 K/CU MM    Monocytes Absolute 0.7 K/CU MM    Eosinophils Absolute 0.1 K/CU MM    Basophils Absolute 0.1 K/CU MM    Nucleated RBC % 0.0 %    Total Nucleated RBC 0.0 K/CU MM    Total Immature Neutrophil 0.03 K/CU MM    Immature Neutrophil % 0.5 (H) 0 - 0.43 %   Magnesium    Collection Time: 02/18/23  5:24 AM   Result Value Ref Range    Magnesium 1.8 1.8 - 2.4 mg/dl   POCT Glucose    Collection Time: 02/18/23  7:49 AM   Result Value Ref Range    POC Glucose 118 (H) 70 - 99 MG/DL   POCT Glucose    Collection Time: 02/18/23 11:53 AM   Result Value Ref Range    POC Glucose 135 (H) 70 - 99 MG/DL        Imaging/Diagnostics Last 24 Hours   XR KNEE RIGHT (1-2 VIEWS)    Result Date: 2/13/2023  EXAMINATION: TWO XRAY VIEWS OF THE RIGHT KNEE 2/13/2023 8:48 pm COMPARISON: None. HISTORY: ORDERING SYSTEM PROVIDED HISTORY: Alba Skains in fall, nonhealing TECHNOLOGIST PROVIDED HISTORY: Reason for exam:->nec fasc in fall, nonhealing Reason for Exam: Alba Skains in fall, nonhealing Additional signs and symptoms: none Relevant Medical/Surgical History: diabetes FINDINGS: Status post below-knee amputation. No acute periostitis or bony destruction. Suspected wound on anterior aspect of the stomach. The knee joint is maintained. No acute periostitis or bony destruction. Status post BKA. Comment: Please note this report has been produced using speech recognition software and may contain errors related to that system including errors in grammar, punctuation, and spelling, as well as words and phrases that may be inappropriate.  If there are any questions or concerns please feel free to contact the dictating provider for clarification.      Electronically signed by Jacey Fitch MD on 2/18/2023 at 3:02 PM
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
## 2023-02-18 NOTE — PROGRESS NOTES
GENERAL SURGERY PROGRESS NOTE    Cindy Duarte is a 60 y.o. male POD 4 right AKA. Subjective:    Pain improving. Incision intact with staples. No erythema, some edema    Objective:    Vitals: VITALS:  BP (!) 153/63   Pulse 64   Temp 98.2 °F (36.8 °C) (Oral)   Resp 20   Ht 5' 8\" (1.727 m)   Wt 142 lb 6.4 oz (64.6 kg)   SpO2 98%   BMI 21.65 kg/m²     I/O: 02/17 0701 - 02/18 0700  In: 808.2 [P.O.:370; I.V.:438.2]  Out: 1250 [Urine:1250]    Labs/Imaging Results:   Lab Results   Component Value Date/Time     02/18/2023 05:24 AM    K 3.5 02/18/2023 05:24 AM     02/18/2023 05:24 AM    CO2 28 02/18/2023 05:24 AM    BUN 16 02/18/2023 05:24 AM    CREATININE 0.7 02/18/2023 05:24 AM    GLUCOSE 115 02/18/2023 05:24 AM    CALCIUM 7.5 02/18/2023 05:24 AM      Lab Results   Component Value Date    WBC 5.9 02/18/2023    HGB 8.7 (L) 02/18/2023    HCT 26.4 (L) 02/18/2023    MCV 87.4 02/18/2023     (L) 02/18/2023          IV Fluids:   sodium chloride    dextrose    sodium chloride    Scheduled Meds:   lidocaine PF, 5 mL, IntraDERmal, See Admin Instructions    sodium chloride flush, 5-40 mL, IntraVENous, 2 times per day    gabapentin, 300 mg, Oral, TID    DULoxetine, 60 mg, Oral, Daily    vitamin D, 50,000 Units, Oral, Weekly    sodium chloride flush, 5-40 mL, IntraVENous, 2 times per day    aspirin, 81 mg, Oral, Daily    atorvastatin, 40 mg, Oral, Nightly    insulin glargine, 6 Units, SubCUTAneous, Nightly    insulin lispro, 0-4 Units, SubCUTAneous, TID WC    insulin lispro, 0-4 Units, SubCUTAneous, Nightly    sodium chloride flush, 5-40 mL, IntraVENous, 2 times per day    enoxaparin, 40 mg, SubCUTAneous, Daily    Physical Exam:  General: A&O x 3, no distress. HEENT: Anicteric sclerae, MMM. Extremities: No edema bilat LE. Abdomen: Soft, nondistended, nontender   Right AKA stump with incision clean/dry/intact with staples in place. No drainage. No erythema.       Assessment and Plan: Patient Active Problem List:     Diabetes mellitus     H/O echocardiogram     S/P CABG (coronary artery bypass graft)     Chest pain     Cellulitis     Heroin withdrawal (HCC)     CAD (coronary artery disease)     Polysubstance abuse (HCC)     Small bowel obstruction (HCC)     Narcotic abuse, continuous (HCC)     Hx of hepatitis     Epigastric pain     Diarrhea     Constipation with Ileus     Vomiting of fecal matter with nausea     Encephalopathy     Metabolic encephalopathy     Infectious gastroenteritis     Bilateral leg weakness     Gait disturbance     Left carotid stenosis     Hypothyroidism     Osteomyelitis ([address]     Uncontrolled type II diabetes with peripheral autonomic neuropathy     Gangrene of toe (HCC)     Acute hematogenous osteomyelitis of right foot (HCC)     Amputation of little toe (HCC)     PAD (peripheral artery disease) (HCC)     Uncontrolled pain     Generalized weakness     Simple chronic bronchitis (HCC)     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 ([address]     Skin ulcer with necrosis of muscle (HCC)     Toe ulcer ([address]     Diabetic foot ([address]     Diabetic foot infection ([address]     Abscess of right foot     WD-Diabetic ulcer of right midfoot associated with type 2 diabetes mellitus, with muscle involvement without evidence of necrosis ([address]     Necrotizing fasciitis ([address]     Bacteremia due to group B Streptococcus     Gangrene of right foot ([address]     Osteomyelitis of right lower limb ([address]     Acute blood loss anemia     Uncontrolled type 2 diabetes mellitus with peripheral neuropathy     Essential hypertension     Hyponatremia     Cigarette nicotine dependence without complication     Thrombocytopenia (HCC)     Open wound     Infection (chronic) of amputation stump ([address]     Surgical wound, non healing      Status post right AKA 2/14  Antibiotics per ID  Pain control with Percocet, Dilaudid as needed.   PT/OT    Glenn Marie, APRN - CNP

## 2023-02-18 NOTE — PROGRESS NOTES
Patient instructed and educated on [employer]. Patient able to do 500 ml. Vital capacity. Patient's goal is 1750ml.  Electronically signed by Clover Villatoro RCP on 2/18/2023 at 4:44 PM

## 2023-02-19 LAB
ANION GAP SERPL CALCULATED.3IONS-SCNC: 6 MMOL/L (ref 4–16)
BASOPHILS ABSOLUTE: 0 K/CU MM
BASOPHILS RELATIVE PERCENT: 0.5 % (ref 0–1)
BUN SERPL-MCNC: 17 MG/DL (ref 6–23)
CALCIUM SERPL-MCNC: 7.6 MG/DL (ref 8.3–10.6)
CHLORIDE BLD-SCNC: 103 MMOL/L (ref 99–110)
CO2: 29 MMOL/L (ref 21–32)
CREAT SERPL-MCNC: 0.8 MG/DL (ref 0.9–1.3)
DIFFERENTIAL TYPE: ABNORMAL
EOSINOPHILS ABSOLUTE: 0.1 K/CU MM
EOSINOPHILS RELATIVE PERCENT: 1 % (ref 0–3)
GFR SERPL CREATININE-BSD FRML MDRD: >60 ML/MIN/1.73M2
GLUCOSE BLD-MCNC: 123 MG/DL (ref 70–99)
GLUCOSE BLD-MCNC: 127 MG/DL (ref 70–99)
GLUCOSE BLD-MCNC: 165 MG/DL (ref 70–99)
GLUCOSE BLD-MCNC: 191 MG/DL (ref 70–99)
GLUCOSE SERPL-MCNC: 151 MG/DL (ref 70–99)
HCT VFR BLD CALC: 27.9 % (ref 42–52)
HEMOGLOBIN: 9.2 GM/DL (ref 13.5–18)
IMMATURE NEUTROPHIL %: 0.5 % (ref 0–0.43)
LYMPHOCYTES ABSOLUTE: 1.2 K/CU MM
LYMPHOCYTES RELATIVE PERCENT: 20.4 % (ref 24–44)
MCH RBC QN AUTO: 28.7 PG (ref 27–31)
MCHC RBC AUTO-ENTMCNC: 33 % (ref 32–36)
MCV RBC AUTO: 86.9 FL (ref 78–100)
MONOCYTES ABSOLUTE: 0.6 K/CU MM
MONOCYTES RELATIVE PERCENT: 10.1 % (ref 0–4)
NUCLEATED RBC %: 0 %
PDW BLD-RTO: 14.1 % (ref 11.7–14.9)
PLATELET # BLD: 143 K/CU MM (ref 140–440)
PMV BLD AUTO: 10.1 FL (ref 7.5–11.1)
POTASSIUM SERPL-SCNC: 4.1 MMOL/L (ref 3.5–5.1)
RBC # BLD: 3.21 M/CU MM (ref 4.6–6.2)
SEGMENTED NEUTROPHILS ABSOLUTE COUNT: 4 K/CU MM
SEGMENTED NEUTROPHILS RELATIVE PERCENT: 67.5 % (ref 36–66)
SODIUM BLD-SCNC: 138 MMOL/L (ref 135–145)
TOTAL IMMATURE NEUTOROPHIL: 0.03 K/CU MM
TOTAL NUCLEATED RBC: 0 K/CU MM
WBC # BLD: 5.9 K/CU MM (ref 4–10.5)

## 2023-02-19 PROCEDURE — 6370000000 HC RX 637 (ALT 250 FOR IP): Performed by: STUDENT IN AN ORGANIZED HEALTH CARE EDUCATION/TRAINING PROGRAM

## 2023-02-19 PROCEDURE — 99024 POSTOP FOLLOW-UP VISIT: CPT | Performed by: NURSE PRACTITIONER

## 2023-02-19 PROCEDURE — 1200000000 HC SEMI PRIVATE

## 2023-02-19 PROCEDURE — 2580000003 HC RX 258: Performed by: NURSE PRACTITIONER

## 2023-02-19 PROCEDURE — 82962 GLUCOSE BLOOD TEST: CPT

## 2023-02-19 PROCEDURE — 2580000003 HC RX 258: Performed by: SURGERY

## 2023-02-19 PROCEDURE — 6370000000 HC RX 637 (ALT 250 FOR IP): Performed by: SURGERY

## 2023-02-19 PROCEDURE — 6360000002 HC RX W HCPCS: Performed by: SURGERY

## 2023-02-19 PROCEDURE — 80048 BASIC METABOLIC PNL TOTAL CA: CPT

## 2023-02-19 PROCEDURE — 6360000002 HC RX W HCPCS: Performed by: NURSE PRACTITIONER

## 2023-02-19 PROCEDURE — 36415 COLL VENOUS BLD VENIPUNCTURE: CPT

## 2023-02-19 PROCEDURE — 85025 COMPLETE CBC W/AUTO DIFF WBC: CPT

## 2023-02-19 PROCEDURE — APPNB15 APP NON BILLABLE TIME 0-15 MINS: Performed by: NURSE PRACTITIONER

## 2023-02-19 PROCEDURE — 94761 N-INVAS EAR/PLS OXIMETRY MLT: CPT

## 2023-02-19 RX ORDER — CALCIUM GLUCONATE 20 MG/ML
2000 INJECTION, SOLUTION INTRAVENOUS ONCE
Status: COMPLETED | OUTPATIENT
Start: 2023-02-19 | End: 2023-02-19

## 2023-02-19 RX ADMIN — INSULIN GLARGINE 6 UNITS: 100 INJECTION, SOLUTION SUBCUTANEOUS at 20:40

## 2023-02-19 RX ADMIN — GABAPENTIN 300 MG: 300 CAPSULE ORAL at 13:09

## 2023-02-19 RX ADMIN — OXYCODONE AND ACETAMINOPHEN 2 TABLET: 5; 325 TABLET ORAL at 18:07

## 2023-02-19 RX ADMIN — ATORVASTATIN CALCIUM 40 MG: 40 TABLET, FILM COATED ORAL at 20:41

## 2023-02-19 RX ADMIN — SODIUM CHLORIDE, PRESERVATIVE FREE 10 ML: 5 INJECTION INTRAVENOUS at 13:11

## 2023-02-19 RX ADMIN — HYDROMORPHONE HYDROCHLORIDE 0.5 MG: 1 INJECTION, SOLUTION INTRAMUSCULAR; INTRAVENOUS; SUBCUTANEOUS at 06:36

## 2023-02-19 RX ADMIN — OXYCODONE AND ACETAMINOPHEN 2 TABLET: 5; 325 TABLET ORAL at 22:26

## 2023-02-19 RX ADMIN — SODIUM CHLORIDE, PRESERVATIVE FREE 10 ML: 5 INJECTION INTRAVENOUS at 22:39

## 2023-02-19 RX ADMIN — CALCIUM GLUCONATE 2000 MG: 20 INJECTION, SOLUTION INTRAVENOUS at 06:51

## 2023-02-19 RX ADMIN — OXYCODONE AND ACETAMINOPHEN 2 TABLET: 5; 325 TABLET ORAL at 09:01

## 2023-02-19 RX ADMIN — HYDROMORPHONE HYDROCHLORIDE 0.5 MG: 1 INJECTION, SOLUTION INTRAMUSCULAR; INTRAVENOUS; SUBCUTANEOUS at 01:48

## 2023-02-19 RX ADMIN — GABAPENTIN 300 MG: 300 CAPSULE ORAL at 20:40

## 2023-02-19 RX ADMIN — SODIUM CHLORIDE, PRESERVATIVE FREE 10 ML: 5 INJECTION INTRAVENOUS at 20:41

## 2023-02-19 RX ADMIN — HYDROMORPHONE HYDROCHLORIDE 0.5 MG: 1 INJECTION, SOLUTION INTRAMUSCULAR; INTRAVENOUS; SUBCUTANEOUS at 03:55

## 2023-02-19 RX ADMIN — OXYCODONE AND ACETAMINOPHEN 2 TABLET: 5; 325 TABLET ORAL at 13:09

## 2023-02-19 RX ADMIN — SODIUM CHLORIDE, PRESERVATIVE FREE 10 ML: 5 INJECTION INTRAVENOUS at 22:28

## 2023-02-19 RX ADMIN — DULOXETINE HYDROCHLORIDE 60 MG: 30 CAPSULE, DELAYED RELEASE ORAL at 08:46

## 2023-02-19 RX ADMIN — GABAPENTIN 300 MG: 300 CAPSULE ORAL at 08:46

## 2023-02-19 RX ADMIN — ASPIRIN 81 MG 81 MG: 81 TABLET ORAL at 08:45

## 2023-02-19 RX ADMIN — ENOXAPARIN SODIUM 40 MG: 100 INJECTION SUBCUTANEOUS at 08:45

## 2023-02-19 RX ADMIN — SODIUM CHLORIDE, PRESERVATIVE FREE 10 ML: 5 INJECTION INTRAVENOUS at 08:46

## 2023-02-19 ASSESSMENT — PAIN DESCRIPTION - DESCRIPTORS
DESCRIPTORS: ACHING
DESCRIPTORS: ACHING;NAGGING;GNAWING
DESCRIPTORS: ACHING;HEAVINESS;NAGGING
DESCRIPTORS: ACHING;HEAVINESS;TENDER

## 2023-02-19 ASSESSMENT — ENCOUNTER SYMPTOMS
CHEST TIGHTNESS: 0
SHORTNESS OF BREATH: 0
BACK PAIN: 0
VOICE CHANGE: 0
SINUS PRESSURE: 0
ABDOMINAL PAIN: 0
WHEEZING: 0
SINUS PAIN: 0
SORE THROAT: 0
NAUSEA: 0
COLOR CHANGE: 1
CONSTIPATION: 0
COUGH: 0
DIARRHEA: 0
TROUBLE SWALLOWING: 0
VOMITING: 0

## 2023-02-19 ASSESSMENT — PAIN SCALES - GENERAL
PAINLEVEL_OUTOF10: 8
PAINLEVEL_OUTOF10: 5
PAINLEVEL_OUTOF10: 8
PAINLEVEL_OUTOF10: 8
PAINLEVEL_OUTOF10: 9
PAINLEVEL_OUTOF10: 8
PAINLEVEL_OUTOF10: 8
PAINLEVEL_OUTOF10: 7
PAINLEVEL_OUTOF10: 9

## 2023-02-19 ASSESSMENT — PAIN - FUNCTIONAL ASSESSMENT: PAIN_FUNCTIONAL_ASSESSMENT: PREVENTS OR INTERFERES SOME ACTIVE ACTIVITIES AND ADLS

## 2023-02-19 ASSESSMENT — PAIN SCALES - WONG BAKER: WONGBAKER_NUMERICALRESPONSE: 2

## 2023-02-19 ASSESSMENT — PAIN DESCRIPTION - FREQUENCY: FREQUENCY: CONTINUOUS

## 2023-02-19 ASSESSMENT — PAIN DESCRIPTION - LOCATION
LOCATION: LEG

## 2023-02-19 ASSESSMENT — PAIN DESCRIPTION - PAIN TYPE: TYPE: SURGICAL PAIN

## 2023-02-19 ASSESSMENT — PAIN DESCRIPTION - ORIENTATION
ORIENTATION: RIGHT

## 2023-02-19 ASSESSMENT — PAIN DESCRIPTION - ONSET: ONSET: ON-GOING

## 2023-02-19 NOTE — PROGRESS NOTES
GENERAL SURGERY PROGRESS NOTE    Chiquis Hurtado is a 60 y.o. male POD 5 right AKA. Subjective:    Pain improving. Incision intact with staples. No erythema, some edema    Objective:    Vitals: VITALS:  BP (!) 143/93   Pulse 61   Temp 98.1 °F (36.7 °C) (Oral)   Resp 12   Ht 5' 8\" (1.727 m)   Wt 142 lb 6.4 oz (64.6 kg)   SpO2 96%   BMI 21.65 kg/m²     I/O: 02/18 0701 - 02/19 0700  In: 380 [P.O.:380]  Out: 400 [Urine:400]    Labs/Imaging Results:   Lab Results   Component Value Date/Time     02/19/2023 02:42 AM    K 4.1 02/19/2023 02:42 AM     02/19/2023 02:42 AM    CO2 29 02/19/2023 02:42 AM    BUN 17 02/19/2023 02:42 AM    CREATININE 0.8 02/19/2023 02:42 AM    GLUCOSE 151 02/19/2023 02:42 AM    CALCIUM 7.6 02/19/2023 02:42 AM      Lab Results   Component Value Date    WBC 5.9 02/19/2023    HGB 9.2 (L) 02/19/2023    HCT 27.9 (L) 02/19/2023    MCV 86.9 02/19/2023     02/19/2023          IV Fluids:   sodium chloride    dextrose    sodium chloride    Scheduled Meds:   lidocaine PF, 5 mL, IntraDERmal, See Admin Instructions    sodium chloride flush, 5-40 mL, IntraVENous, 2 times per day    gabapentin, 300 mg, Oral, TID    DULoxetine, 60 mg, Oral, Daily    vitamin D, 50,000 Units, Oral, Weekly    sodium chloride flush, 5-40 mL, IntraVENous, 2 times per day    aspirin, 81 mg, Oral, Daily    atorvastatin, 40 mg, Oral, Nightly    insulin glargine, 6 Units, SubCUTAneous, Nightly    insulin lispro, 0-4 Units, SubCUTAneous, TID WC    insulin lispro, 0-4 Units, SubCUTAneous, Nightly    sodium chloride flush, 5-40 mL, IntraVENous, 2 times per day    enoxaparin, 40 mg, SubCUTAneous, Daily    Physical Exam:  General: A&O x 3, no distress. HEENT: Anicteric sclerae, MMM. Extremities: No edema bilat LE. Abdomen: Soft, nondistended, nontender   Right AKA stump with incision clean/dry/intact with staples in place. No drainage. No erythema.       Assessment and Plan:     Patient Active Problem List:     Diabetes mellitus     H/O echocardiogram     S/P CABG (coronary artery bypass graft)     Chest pain     Cellulitis     Heroin withdrawal (HCC)     CAD (coronary artery disease)     Polysubstance abuse (HCC)     Small bowel obstruction (HCC)     Narcotic abuse, continuous (HCC)     Hx of hepatitis     Epigastric pain     Diarrhea     Constipation with Ileus     Vomiting of fecal matter with nausea     Encephalopathy     Metabolic encephalopathy     Infectious gastroenteritis     Bilateral leg weakness     Gait disturbance     Left carotid stenosis     Hypothyroidism     Osteomyelitis ([address]     Uncontrolled type II diabetes with peripheral autonomic neuropathy     Gangrene of toe (HCC)     Acute hematogenous osteomyelitis of right foot (HCC)     Amputation of little toe (HCC)     PAD (peripheral artery disease) (HCC)     Uncontrolled pain     Generalized weakness     Simple chronic bronchitis (HCC)     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 ([address]     Skin ulcer with necrosis of muscle (HCC)     Toe ulcer ([address]     Diabetic foot ([address]     Diabetic foot infection ([address]     Abscess of right foot     WD-Diabetic ulcer of right midfoot associated with type 2 diabetes mellitus, with muscle involvement without evidence of necrosis ([address]     Necrotizing fasciitis ([address]     Bacteremia due to group B Streptococcus     Gangrene of right foot ([address]     Osteomyelitis of right lower limb ([address]     Acute blood loss anemia     Uncontrolled type 2 diabetes mellitus with peripheral neuropathy     Essential hypertension     Hyponatremia     Cigarette nicotine dependence without complication     Thrombocytopenia (HCC)     Open wound     Infection (chronic) of amputation stump ([address]     Surgical wound, non healing      Status post right AKA 2/14  Antibiotics per ID  Pain control with Percocet, Dilaudid as needed.   PT/OT  OK for discharge from surgical standpoint, follow up with Dr. Kalpana Palmer next week    Jason Goodman Sharon Dudley, APRN - CNP

## 2023-02-19 NOTE — PROGRESS NOTES
V2.0  [facility] Hospitalist Progress Note      Name:  Ann-Marie Vela /Age/Sex: 1960  (60 y.o. male)   MRN & CSN:  [id number] & [id number] Encounter Date/Time: 2023 3:36 PM EST    Location:  [address] [place] [address]-A PCP: Luz Elena Isbell MD       Hospital Day: 7    Assessment and Plan:   Ann-Marie Vela is a 60 y.o. male with pmh of right BKA, insulin-dependent diabetes mellitus type 2, peripheral neuropathy, hyperlipidemia  who presents with Infection (chronic) of amputation stump ([address]      Plan:    Right BKA stump wound infection: Rule out osteomyelitis. X-ray of the right stump shows no bony erosion or gas. surgery on board. Patient previously treated for osteomyelitis had recent hospitalization with a wound VAC. Did leave  [address] prior to his hospital stay being completed. Surgical intervention on 2023  Follow-up blood cultures. Infectious disease consulted: Off IV antibiotics postop pain control: Has had difficult control pain in the past.  Use Percocet with IV Dilaudid for breakthrough. Also have patient on Cymbalta and gabapentin for neuropathic type pain. If improved, can discontinue gabapentin prior to DC. Benefit from long-term and high-dose Cymbalta for safety depressive properties as well. Hypochloremic hyponatremia in the setting of dehydration continue to monitor with IV hydration    Chronic normocytic normochromic anemia in the setting of anemia of chronic disease hemoglobin of 11 close to baseline    Coronary artery disease s/p CABG X4 history currently on DAPT at home    Hyperlipidemia on statin    Chronic GERD on Pepcid    Insulin-dependent diabetes mellitus type 2 complicated with peripheral neuropathy and PAD: Patient was not compliant with statin encouraged to do so. Need to discuss with primary team regarding Xarelto for peripheral vascular disease.   Takes Lantus 8 mg nightly at home will start 6 units of Lantus along with sliding scale insulin hemoglobin A1c is pending. Tobacco use disorder encouraged to discontinue smoking    Diet ADULT DIET; Regular   DVT Prophylaxis [x] Lovenox, []  Heparin, [] SCDs, [] Ambulation,    [] Eliquis, [] Xarelto  [] Coumadin [] other   Code Status Full Code   Disposition From: Home  Expected Disposition: To be determined  Estimated Date of Discharge: 3 to 4 days  Patient requires continued admission due to surgical intervention for right BKA stump wound infection. We will need precert for SNF placement. Surrogate Decision Maker/ POA      Subjective:     Chief Complaint: Leg Pain and Wound Check     Seen and examined at bedside. Complains of pain in his right leg, states that he has not been able to eat or drink much upon asking why he states that because he does not feel like eating and does not feel hungry. Discussed with him importance of nutrition in the healing process. Understands and will try to eat as much as he can. Review of Systems:    Review of Systems   Constitutional:  Negative for activity change, appetite change, chills, fatigue and fever. HENT:  Negative for congestion, hearing loss, sinus pressure, sinus pain, sore throat, trouble swallowing and voice change. Eyes:  Negative for visual disturbance. Respiratory:  Negative for cough, chest tightness, shortness of breath and wheezing. Cardiovascular:  Negative for chest pain, palpitations and leg swelling. Gastrointestinal:  Negative for abdominal pain, constipation, diarrhea, nausea and vomiting. Endocrine: Negative for cold intolerance, heat intolerance and polyuria. Genitourinary:  Negative for dysuria. Musculoskeletal:  Negative for arthralgias, back pain and gait problem. Skin:  Positive for color change and wound. Neurological:  Negative for dizziness, weakness and headaches. Psychiatric/Behavioral:  Negative for agitation and confusion. The patient is not nervous/anxious. Objective:      Intake/Output Summary (Last 24 hours) at 2/19/2023 1633  Last data filed at 2/19/2023 1154  Gross per 24 hour   Intake --   Output 800 ml   Net -800 ml          Vitals:   Vitals:    02/19/23 1154   BP:    Pulse: 61   Resp: 12   Temp:    SpO2:        Physical Exam:     Physical Exam  Constitutional:       General: He is not in acute distress. Appearance: Normal appearance. HENT:      Head: Normocephalic and atraumatic. Nose: Nose normal.      Mouth/Throat:      Mouth: Mucous membranes are moist.      Pharynx: Oropharynx is clear. Eyes:      Extraocular Movements: Extraocular movements intact. Conjunctiva/sclera: Conjunctivae normal.      Pupils: Pupils are equal, round, and reactive to light. Cardiovascular:      Rate and Rhythm: Normal rate and regular rhythm. Pulses: Normal pulses. Heart sounds: Normal heart sounds. Pulmonary:      Effort: Pulmonary effort is normal.   Abdominal:      General: Abdomen is flat. Bowel sounds are normal.      Palpations: Abdomen is soft. Musculoskeletal:         General: Swelling, tenderness and signs of injury present. Normal range of motion. Cervical back: Normal range of motion and neck supple. Skin:     General: Skin is warm and dry. Findings: Lesion present. Neurological:      General: No focal deficit present. Mental Status: He is alert and oriented to person, place, and time. Mental status is at baseline. Psychiatric:         Mood and Affect: Mood normal.         Behavior: Behavior normal.         Thought Content:  Thought content normal.       Medications:   Medications:    lidocaine PF  5 mL IntraDERmal See Admin Instructions    sodium chloride flush  5-40 mL IntraVENous 2 times per day    gabapentin  300 mg Oral TID    DULoxetine  60 mg Oral Daily    vitamin D  50,000 Units Oral Weekly    sodium chloride flush  5-40 mL IntraVENous 2 times per day    aspirin  81 mg Oral Daily    atorvastatin  40 mg Oral Nightly    insulin glargine  6 Units SubCUTAneous Nightly insulin lispro  0-4 Units SubCUTAneous TID WC    insulin lispro  0-4 Units SubCUTAneous Nightly    sodium chloride flush  5-40 mL IntraVENous 2 times per day    enoxaparin  40 mg SubCUTAneous Daily      Infusions:    sodium chloride      dextrose      sodium chloride       PRN Meds: sodium chloride flush, 5-40 mL, PRN  sodium chloride, , PRN  oxyCODONE-acetaminophen, 1 tablet, Q4H PRN   Or  oxyCODONE-acetaminophen, 2 tablet, Q4H PRN  HYDROmorphone, 0.5 mg, Q2H PRN  droperidol, 0.625 mg, Q10 Min PRN  sodium chloride flush, 5-40 mL, PRN  glucose, 4 tablet, PRN  dextrose bolus, 125 mL, PRN   Or  dextrose bolus, 250 mL, PRN  glucagon (rDNA), 1 mg, PRN  dextrose, , Continuous PRN  sodium chloride flush, 5-40 mL, PRN  sodium chloride, , PRN  ondansetron, 4 mg, Q8H PRN   Or  ondansetron, 4 mg, Q6H PRN  polyethylene glycol, 17 g, Daily PRN  acetaminophen, 650 mg, Q6H PRN   Or  acetaminophen, 650 mg, Q6H PRN      Labs      Recent Results (from the past 24 hour(s))   POCT Glucose    Collection Time: 02/18/23  8:40 PM   Result Value Ref Range    POC Glucose 232 (H) 70 - 99 MG/DL   Basic Metabolic Panel w/ Reflex to MG    Collection Time: 02/19/23  2:42 AM   Result Value Ref Range    Sodium 138 135 - 145 MMOL/L    Potassium 4.1 3.5 - 5.1 MMOL/L    Chloride 103 99 - 110 mMol/L    CO2 29 21 - 32 MMOL/L    Anion Gap 6 4 - 16    BUN 17 6 - 23 MG/DL    Creatinine 0.8 (L) 0.9 - 1.3 MG/DL    Est, Glom Filt Rate >60 >60 mL/min/1.73m2    Glucose 151 (H) 70 - 99 MG/DL    Calcium 7.6 (L) 8.3 - 10.6 MG/DL   CBC with Auto Differential    Collection Time: 02/19/23  2:42 AM   Result Value Ref Range    WBC 5.9 4.0 - 10.5 K/CU MM    RBC 3.21 (L) 4.6 - 6.2 M/CU MM    Hemoglobin 9.2 (L) 13.5 - 18.0 GM/DL    Hematocrit 27.9 (L) 42 - 52 %    MCV 86.9 78 - 100 FL    MCH 28.7 27 - 31 PG    MCHC 33.0 32.0 - 36.0 %    RDW 14.1 11.7 - 14.9 %    Platelets 096 009 - 430 K/CU MM    MPV 10.1 7.5 - 11.1 FL    Differential Type AUTOMATED DIFFERENTIAL     Segs Relative 67.5 (H) 36 - 66 %    Lymphocytes % 20.4 (L) 24 - 44 %    Monocytes % 10.1 (H) 0 - 4 %    Eosinophils % 1.0 0 - 3 %    Basophils % 0.5 0 - 1 %    Segs Absolute 4.0 K/CU MM    Lymphocytes Absolute 1.2 K/CU MM    Monocytes Absolute 0.6 K/CU MM    Eosinophils Absolute 0.1 K/CU MM    Basophils Absolute 0.0 K/CU MM    Nucleated RBC % 0.0 %    Total Nucleated RBC 0.0 K/CU MM    Total Immature Neutrophil 0.03 K/CU MM    Immature Neutrophil % 0.5 (H) 0 - 0.43 %   POCT Glucose    Collection Time: 02/19/23  6:39 AM   Result Value Ref Range    POC Glucose 127 (H) 70 - 99 MG/DL   POCT Glucose    Collection Time: 02/19/23 12:04 PM   Result Value Ref Range    POC Glucose 123 (H) 70 - 99 MG/DL        Imaging/Diagnostics Last 24 Hours   XR KNEE RIGHT (1-2 VIEWS)    Result Date: 2/13/2023  EXAMINATION: TWO XRAY VIEWS OF THE RIGHT KNEE 2/13/2023 8:48 pm COMPARISON: None. HISTORY: ORDERING SYSTEM PROVIDED HISTORY: Jonathan Wei in fall, nonhealing TECHNOLOGIST PROVIDED HISTORY: Reason for exam:->nec fasc in fall, nonhealing Reason for Exam: Jonathan Sanjuana in fall, nonhealing Additional signs and symptoms: none Relevant Medical/Surgical History: diabetes FINDINGS: Status post below-knee amputation. No acute periostitis or bony destruction. Suspected wound on anterior aspect of the stomach. The knee joint is maintained. No acute periostitis or bony destruction. Status post BKA. Comment: Please note this report has been produced using speech recognition software and may contain errors related to that system including errors in grammar, punctuation, and spelling, as well as words and phrases that may be inappropriate. If there are any questions or concerns please feel free to contact the dictating provider for clarification.      Electronically signed by Adrianna Ocampo MD on 2/19/2023 at 4:33 PM

## 2023-02-20 PROBLEM — E44.0 MODERATE MALNUTRITION (HCC): Chronic | Status: ACTIVE | Noted: 2023-02-20

## 2023-02-20 LAB
ANION GAP SERPL CALCULATED.3IONS-SCNC: 5 MMOL/L (ref 4–16)
BASOPHILS ABSOLUTE: 0.1 K/CU MM
BASOPHILS RELATIVE PERCENT: 0.9 % (ref 0–1)
BUN SERPL-MCNC: 19 MG/DL (ref 6–23)
CALCIUM SERPL-MCNC: 7.6 MG/DL (ref 8.3–10.6)
CHLORIDE BLD-SCNC: 102 MMOL/L (ref 99–110)
CO2: 29 MMOL/L (ref 21–32)
CREAT SERPL-MCNC: 0.9 MG/DL (ref 0.9–1.3)
DIFFERENTIAL TYPE: ABNORMAL
EOSINOPHILS ABSOLUTE: 0.1 K/CU MM
EOSINOPHILS RELATIVE PERCENT: 1.3 % (ref 0–3)
GFR SERPL CREATININE-BSD FRML MDRD: >60 ML/MIN/1.73M2
GLUCOSE BLD-MCNC: 125 MG/DL (ref 70–99)
GLUCOSE BLD-MCNC: 183 MG/DL (ref 70–99)
GLUCOSE BLD-MCNC: 226 MG/DL (ref 70–99)
GLUCOSE BLD-MCNC: 244 MG/DL (ref 70–99)
GLUCOSE SERPL-MCNC: 131 MG/DL (ref 70–99)
HCT VFR BLD CALC: 26.5 % (ref 42–52)
HEMOGLOBIN: 8.7 GM/DL (ref 13.5–18)
IMMATURE NEUTROPHIL %: 0.4 % (ref 0–0.43)
LYMPHOCYTES ABSOLUTE: 1.2 K/CU MM
LYMPHOCYTES RELATIVE PERCENT: 22.7 % (ref 24–44)
MCH RBC QN AUTO: 28.8 PG (ref 27–31)
MCHC RBC AUTO-ENTMCNC: 32.8 % (ref 32–36)
MCV RBC AUTO: 87.7 FL (ref 78–100)
MONOCYTES ABSOLUTE: 0.5 K/CU MM
MONOCYTES RELATIVE PERCENT: 9.7 % (ref 0–4)
NUCLEATED RBC %: 0 %
PDW BLD-RTO: 14.1 % (ref 11.7–14.9)
PLATELET # BLD: 137 K/CU MM (ref 140–440)
PMV BLD AUTO: 9.8 FL (ref 7.5–11.1)
POTASSIUM SERPL-SCNC: 4.3 MMOL/L (ref 3.5–5.1)
RBC # BLD: 3.02 M/CU MM (ref 4.6–6.2)
SEGMENTED NEUTROPHILS ABSOLUTE COUNT: 3.6 K/CU MM
SEGMENTED NEUTROPHILS RELATIVE PERCENT: 65 % (ref 36–66)
SODIUM BLD-SCNC: 136 MMOL/L (ref 135–145)
TOTAL IMMATURE NEUTOROPHIL: 0.02 K/CU MM
TOTAL NUCLEATED RBC: 0 K/CU MM
WBC # BLD: 5.5 K/CU MM (ref 4–10.5)

## 2023-02-20 PROCEDURE — 2580000003 HC RX 258: Performed by: SURGERY

## 2023-02-20 PROCEDURE — 94761 N-INVAS EAR/PLS OXIMETRY MLT: CPT

## 2023-02-20 PROCEDURE — 97530 THERAPEUTIC ACTIVITIES: CPT

## 2023-02-20 PROCEDURE — 85025 COMPLETE CBC W/AUTO DIFF WBC: CPT

## 2023-02-20 PROCEDURE — 82962 GLUCOSE BLOOD TEST: CPT

## 2023-02-20 PROCEDURE — 2580000003 HC RX 258: Performed by: NURSE PRACTITIONER

## 2023-02-20 PROCEDURE — 6360000002 HC RX W HCPCS: Performed by: NURSE PRACTITIONER

## 2023-02-20 PROCEDURE — 6370000000 HC RX 637 (ALT 250 FOR IP): Performed by: SURGERY

## 2023-02-20 PROCEDURE — 97112 NEUROMUSCULAR REEDUCATION: CPT

## 2023-02-20 PROCEDURE — 80048 BASIC METABOLIC PNL TOTAL CA: CPT

## 2023-02-20 PROCEDURE — 6360000002 HC RX W HCPCS: Performed by: SURGERY

## 2023-02-20 PROCEDURE — 6370000000 HC RX 637 (ALT 250 FOR IP): Performed by: STUDENT IN AN ORGANIZED HEALTH CARE EDUCATION/TRAINING PROGRAM

## 2023-02-20 PROCEDURE — 1200000000 HC SEMI PRIVATE

## 2023-02-20 PROCEDURE — 36415 COLL VENOUS BLD VENIPUNCTURE: CPT

## 2023-02-20 RX ORDER — KETOROLAC TROMETHAMINE 30 MG/ML
30 INJECTION, SOLUTION INTRAMUSCULAR; INTRAVENOUS ONCE
Status: COMPLETED | OUTPATIENT
Start: 2023-02-20 | End: 2023-02-20

## 2023-02-20 RX ADMIN — DULOXETINE HYDROCHLORIDE 60 MG: 30 CAPSULE, DELAYED RELEASE ORAL at 09:16

## 2023-02-20 RX ADMIN — INSULIN LISPRO 1 UNITS: 100 INJECTION, SOLUTION INTRAVENOUS; SUBCUTANEOUS at 17:28

## 2023-02-20 RX ADMIN — HYDROMORPHONE HYDROCHLORIDE 0.5 MG: 1 INJECTION, SOLUTION INTRAMUSCULAR; INTRAVENOUS; SUBCUTANEOUS at 02:36

## 2023-02-20 RX ADMIN — OXYCODONE AND ACETAMINOPHEN 2 TABLET: 5; 325 TABLET ORAL at 20:06

## 2023-02-20 RX ADMIN — HYDROMORPHONE HYDROCHLORIDE 0.5 MG: 1 INJECTION, SOLUTION INTRAMUSCULAR; INTRAVENOUS; SUBCUTANEOUS at 17:46

## 2023-02-20 RX ADMIN — ATORVASTATIN CALCIUM 40 MG: 40 TABLET, FILM COATED ORAL at 20:05

## 2023-02-20 RX ADMIN — ASPIRIN 81 MG 81 MG: 81 TABLET ORAL at 09:16

## 2023-02-20 RX ADMIN — HYDROMORPHONE HYDROCHLORIDE 0.5 MG: 1 INJECTION, SOLUTION INTRAMUSCULAR; INTRAVENOUS; SUBCUTANEOUS at 00:19

## 2023-02-20 RX ADMIN — HYDROMORPHONE HYDROCHLORIDE 0.5 MG: 1 INJECTION, SOLUTION INTRAMUSCULAR; INTRAVENOUS; SUBCUTANEOUS at 21:10

## 2023-02-20 RX ADMIN — OXYCODONE AND ACETAMINOPHEN 2 TABLET: 5; 325 TABLET ORAL at 15:56

## 2023-02-20 RX ADMIN — SODIUM CHLORIDE, PRESERVATIVE FREE 10 ML: 5 INJECTION INTRAVENOUS at 21:10

## 2023-02-20 RX ADMIN — INSULIN GLARGINE 6 UNITS: 100 INJECTION, SOLUTION SUBCUTANEOUS at 21:13

## 2023-02-20 RX ADMIN — HYDROMORPHONE HYDROCHLORIDE 0.5 MG: 1 INJECTION, SOLUTION INTRAMUSCULAR; INTRAVENOUS; SUBCUTANEOUS at 10:32

## 2023-02-20 RX ADMIN — ENOXAPARIN SODIUM 40 MG: 100 INJECTION SUBCUTANEOUS at 09:16

## 2023-02-20 RX ADMIN — GABAPENTIN 300 MG: 300 CAPSULE ORAL at 09:16

## 2023-02-20 RX ADMIN — GABAPENTIN 300 MG: 300 CAPSULE ORAL at 20:05

## 2023-02-20 RX ADMIN — SODIUM CHLORIDE, PRESERVATIVE FREE 10 ML: 5 INJECTION INTRAVENOUS at 21:09

## 2023-02-20 RX ADMIN — OXYCODONE AND ACETAMINOPHEN 2 TABLET: 5; 325 TABLET ORAL at 09:20

## 2023-02-20 RX ADMIN — GABAPENTIN 300 MG: 300 CAPSULE ORAL at 14:18

## 2023-02-20 RX ADMIN — KETOROLAC TROMETHAMINE 30 MG: 30 INJECTION, SOLUTION INTRAMUSCULAR at 03:33

## 2023-02-20 RX ADMIN — SODIUM CHLORIDE, PRESERVATIVE FREE 10 ML: 5 INJECTION INTRAVENOUS at 20:07

## 2023-02-20 RX ADMIN — SODIUM CHLORIDE, PRESERVATIVE FREE 10 ML: 5 INJECTION INTRAVENOUS at 09:16

## 2023-02-20 ASSESSMENT — PAIN SCALES - GENERAL
PAINLEVEL_OUTOF10: 8
PAINLEVEL_OUTOF10: 9
PAINLEVEL_OUTOF10: 7
PAINLEVEL_OUTOF10: 3
PAINLEVEL_OUTOF10: 9
PAINLEVEL_OUTOF10: 8
PAINLEVEL_OUTOF10: 8
PAINLEVEL_OUTOF10: 3
PAINLEVEL_OUTOF10: 3
PAINLEVEL_OUTOF10: 8
PAINLEVEL_OUTOF10: 9
PAINLEVEL_OUTOF10: 5
PAINLEVEL_OUTOF10: 8
PAINLEVEL_OUTOF10: 5
PAINLEVEL_OUTOF10: 8
PAINLEVEL_OUTOF10: 5
PAINLEVEL_OUTOF10: 8

## 2023-02-20 ASSESSMENT — PAIN - FUNCTIONAL ASSESSMENT

## 2023-02-20 ASSESSMENT — PAIN DESCRIPTION - DESCRIPTORS
DESCRIPTORS: ACHING
DESCRIPTORS: ACHING
DESCRIPTORS: ACHING;SHARP
DESCRIPTORS: ACHING
DESCRIPTORS: ACHING;SHARP
DESCRIPTORS: ACHING;CRAMPING
DESCRIPTORS: ACHING

## 2023-02-20 ASSESSMENT — ENCOUNTER SYMPTOMS
CONSTIPATION: 0
VOMITING: 0
SORE THROAT: 0
ABDOMINAL PAIN: 0
SINUS PAIN: 0
SHORTNESS OF BREATH: 0
COUGH: 0
CHEST TIGHTNESS: 0
COLOR CHANGE: 1
DIARRHEA: 0
VOICE CHANGE: 0
TROUBLE SWALLOWING: 0
SINUS PRESSURE: 0
WHEEZING: 0
BACK PAIN: 0
NAUSEA: 0

## 2023-02-20 ASSESSMENT — PAIN DESCRIPTION - ORIENTATION
ORIENTATION: RIGHT;UPPER
ORIENTATION: RIGHT
ORIENTATION: RIGHT
ORIENTATION: LEFT
ORIENTATION: UPPER
ORIENTATION: RIGHT
ORIENTATION: LEFT
ORIENTATION: RIGHT

## 2023-02-20 ASSESSMENT — PAIN DESCRIPTION - PAIN TYPE
TYPE: SURGICAL PAIN

## 2023-02-20 ASSESSMENT — PAIN SCALES - WONG BAKER
WONGBAKER_NUMERICALRESPONSE: 0
WONGBAKER_NUMERICALRESPONSE: 2

## 2023-02-20 ASSESSMENT — PAIN DESCRIPTION - FREQUENCY
FREQUENCY: CONTINUOUS

## 2023-02-20 ASSESSMENT — PAIN DESCRIPTION - LOCATION
LOCATION: LEG

## 2023-02-20 ASSESSMENT — PAIN DESCRIPTION - ONSET
ONSET: ON-GOING

## 2023-02-20 NOTE — PROGRESS NOTES
Comprehensive Nutrition Assessment    Type and Reason for Visit:  Initial (los d 7)    Nutrition Recommendations/Plan:   Continue liberalized Regular Diet, encourage protein containing foods  Begin diabetic oral nutrition supplement tid, chocolate or strawberry, between meals     Malnutrition Assessment:  Malnutrition Status: Moderate malnutrition (02/20/23 1254)    Context:  Chronic Illness     Findings of the 6 clinical characteristics of malnutrition:  Energy Intake:  Mild decrease in energy intake   Weight Loss:  No significant weight loss     Body Fat Loss:  Mild body fat loss Buccal region, Orbital, Triceps   Muscle Mass Loss:  Mild muscle mass loss Temples (temporalis), Clavicles (pectoralis & deltoids), Hand (interosseous)  Fluid Accumulation:  No significant fluid accumulation Extremities   Strength:  Not Performed    Nutrition Assessment:    Pt admitted with chronic infection of amputation stump. H/O R BKA, insulin-dependent diabetes mellitus type 2, peripheral neuropathy, hyperlipidemia. POD 6 s/p R AKA. Reports his appetite/intake has improved over stay, consuming greater than half to all of recent meals on Regular Diet. Denies recent wt loss, endorses poor intake/appetite PTA. Reviewed protein-containing foods fore healing. Pt willing to drink oral supplement, prefers chocolate/strawberry. Pt meets criteria for moderate malnutrition. Will continue to follow as moderate nutrition risk. Nutrition Related Findings:    Glu 125-183, A1c 7.6   Wound Type: Surgical Incision       Current Nutrition Intake & Therapies:    Average Meal Intake: 51-75%, %  Average Supplements Intake: None Ordered  ADULT DIET; Regular    Anthropometric Measures:  Height: 5' 8\" (172.7 cm)  Ideal Body Weight (IBW): 154 lbs (70 kg)    Admission Body Weight: 140 lb 9.6 oz (63.8 kg)  Current Body Weight: 142 lb 6.4 oz (64.6 kg), 92.5 % IBW.     Current BMI (kg/m2): 21.7  Usual Body Weight: 145 lb 1 oz (65.8 kg) (9/6/22)  % Weight Change (Calculated): -1.8  Weight Adjustment For: Amputation  Total Adjusted Percentage (Calculated): 10.1  Adjusted Ideal Body Weight (lbs) (Calculated): 138.4 lbs  Adjusted Ideal Body Weight (kg) (Calculated): 62.91 kg  Adjusted % Ideal Body Weight (Calculated): 102.9  Adjusted BMI (kg/m2) (Calculated): 23.9  BMI Categories: Normal Weight (BMI 18.5-24. 9)    Estimated Daily Nutrient Needs:  Energy Requirements Based On: Kcal/kg  Weight Used for Energy Requirements: Current  Energy (kcal/day): 2406-4825 (30-35 kcal/kg)  Weight Used for Protein Requirements: Current  Protein (g/day): 78-91 (1.2-1.4 g/kg)  Method Used for Fluid Requirements: 1 ml/kcal  Fluid (ml/day): 2000    Nutrition Diagnosis:   Moderate malnutrition, In context of chronic illness related to inadequate protein-energy intake, increase demand for energy/nutrients as evidenced by poor intake prior to admission, mild muscle loss, mild loss of subcutaneous fat    Nutrition Interventions:   Food and/or Nutrient Delivery: Continue Current Diet, Start Oral Nutrition Supplement  Nutrition Education/Counseling: Education initiated  Coordination of Nutrition Care: Continue to monitor while inpatient  Plan of Care discussed with: perfect served Attending re malnutriton    Goals:     Goals: Meet at least 75% of estimated needs       Nutrition Monitoring and Evaluation:   Behavioral-Environmental Outcomes: None Identified  Food/Nutrient Intake Outcomes: Food and Nutrient Intake, Supplement Intake  Physical Signs/Symptoms Outcomes: Biochemical Data, GI Status, Meal Time Behavior, Nutrition Focused Physical Findings, Skin, Weight    Discharge Planning:    Continue Oral Nutrition Supplement     Kayden Torre, [address] [place] [address],   Contact: [phone]

## 2023-02-20 NOTE — PLAN OF CARE
Problem: Discharge Planning  Goal: Discharge to home or other facility with appropriate resources  Outcome: Progressing     Problem: Pain  Goal: Verbalizes/displays adequate comfort level or baseline comfort level  Outcome: Progressing  Flowsheets (Taken 2/20/2023 0200)  Verbalizes/displays adequate comfort level or baseline comfort level: Assess pain using appropriate pain scale     Problem: Skin/Tissue Integrity  Goal: Absence of new skin breakdown  Description: 1. Monitor for areas of redness and/or skin breakdown  2. Assess vascular access sites hourly  3. Every 4-6 hours minimum:  Change oxygen saturation probe site  4. Every 4-6 hours:  If on nasal continuous positive airway pressure, respiratory therapy assess nares and determine need for appliance change or resting period.   Outcome: Progressing     Problem: ABCDS Injury Assessment  Goal: Absence of physical injury  Outcome: Progressing     Problem: Safety - Adult  Goal: Free from fall injury  Outcome: Progressing

## 2023-02-20 NOTE — PROGRESS NOTES
Physical Therapy Treatment Note  Name: Monica Keita MRN: [id number] :   1960   Date:  2023   Admission Date: 2023 Room:  [address] [place] [address]-A     Restrictions/Precautions:          general precautions, recent R AKA, fall risk    Communication with other providers:  RN, OT (due to precert needs)    Subjective:  Patient states:  \"I don't know how much I have in me\"  Pain:   Location, Type, Intensity (0/10 to 10/10):  states pain in R LE but does not numerically rate    Objective:    Observation:  Pt supine in bed upon entry and agreeable to therapy    Treatment, including education/measures:    Bed mobility: pt completed supine to sitting EOB SBA with cues for sequencing    Transfers: pt completed STS from EOB CGA, stand pivot from EOB to chair with RW CGA, and to/from chair CGA. Cues provided for hand placement throughout. Exercise: pt stood at [facility] and performed x10 hip extension and flexion of the R LE    Balance: pt stood at RW x2 bouts CGA during static and dynamic activities.  Cues provided for upright posture throughout and seated rest break taken in between bouts due to fatigue and pain    Gait: pt deferred    Assessment / Impression:    Pt up in chair at end of session with needs in reach and alarm on    Patient's tolerance of treatment:  well   Adverse Reaction: n/a  Significant change in status and impact:  n/a  Barriers to improvement:  decreased endurance, impaired balance, impaired gait    Plan for Next Session:    Continue to address transfer training and gait training in future sessions    Time in:  09  Time out:  959  Timed treatment minutes:  9  Total treatment time:  19    Previously filed items:           Short Term Goals  Time Frame for Short Term Goals: 1 week  Short Term Goal 1: Pt to complete all bed mobility mod I  Short Term Goal 2: pt to complete STS transfer to/from bed, commode, and chair mod A  Short Term Goal 3: Pt to ambulate 22' with LRAD mod A    Electronically signed by:    Thaddeus Syed, PT  2/20/2023, 10:43 AM
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
## 2023-02-20 NOTE — PROGRESS NOTES
V2.0  [facility] [place]      Name:  Eyad Ellison /Age/Sex: 1960  (60 y.o. male)   MRN & CSN:  [id number] & [id number] Encounter Date/Time: 2023 3:36 PM EST    Location:  [address] [place] [address]-A PCP: Geraldine Ecehverria MD       Hospital Day: 8    Assessment and Plan:   Eyad Ellison is a 60 y.o. male with pmh of right BKA, insulin-dependent diabetes mellitus type 2, peripheral neuropathy, hyperlipidemia  who presents with Infection (chronic) of amputation stump ([facility] [address]      Plan:    Right BKA stump wound infection: Rule out osteomyelitis. X-ray of the right stump shows no bony erosion or gas. surgery on board. Patient previously treated for osteomyelitis had recent hospitalization with a wound VAC. Did leave  [address] prior to his hospital stay being completed. Surgical intervention on 2023  Follow-up blood cultures. Infectious disease consulted: Off IV antibiotics postop pain control: Has had difficult control pain in the past.  Use Percocet with IV Dilaudid for breakthrough. Also have patient on Cymbalta and gabapentin for neuropathic type pain. If improved, can discontinue gabapentin prior to DC. Benefit from long-term and high-dose Cymbalta for safety depressive properties as well. Hypochloremic hyponatremia resolved    Chronic normocytic normochromic anemia in the setting of anemia of chronic disease hemoglobin 8.7 today    Coronary artery disease s/p CABG X4 history currently on DAPT at home    Hyperlipidemia on statin    Chronic GERD on Pepcid    Insulin-dependent diabetes mellitus type 2 complicated with peripheral neuropathy and PAD: Patient was not compliant with statin encouraged to do so. Need to discuss with primary team regarding Xarelto for peripheral vascular disease. Takes Lantus 8 mg nightly at home will start 6 units of Lantus along with sliding scale insulin hemoglobin A1c is pending.      Tobacco use disorder encouraged to discontinue smoking    Diet ADULT DIET; Regular   DVT Prophylaxis [x] Lovenox, []  Heparin, [] SCDs, [] Ambulation,    [] Eliquis, [] Xarelto  [] Coumadin [] other   Code Status Full Code   Disposition From: Home  Expected Disposition: To be determined  Estimated Date of Discharge: 3 to 4 days  Patient requires continued admission due to surgical intervention for right BKA stump wound infection. We will need precert for SNF placement. Surrogate Decision Maker/ POA      Subjective:     Chief Complaint: Leg Pain and Wound Check     Seen and examined at bedside. Complains of pain in his right leg, could not sleep well last night. Review of Systems:    Review of Systems   Constitutional:  Negative for activity change, appetite change, chills, fatigue and fever. HENT:  Negative for congestion, hearing loss, sinus pressure, sinus pain, sore throat, trouble swallowing and voice change. Eyes:  Negative for visual disturbance. Respiratory:  Negative for cough, chest tightness, shortness of breath and wheezing. Cardiovascular:  Negative for chest pain, palpitations and leg swelling. Gastrointestinal:  Negative for abdominal pain, constipation, diarrhea, nausea and vomiting. Endocrine: Negative for cold intolerance, heat intolerance and polyuria. Genitourinary:  Negative for dysuria. Musculoskeletal:  Negative for arthralgias, back pain and gait problem. Skin:  Positive for color change and wound. Neurological:  Negative for dizziness, weakness and headaches. Psychiatric/Behavioral:  Negative for agitation and confusion. The patient is not nervous/anxious. Objective:      Intake/Output Summary (Last 24 hours) at 2/20/2023 1230  Last data filed at 2/20/2023 1027  Gross per 24 hour   Intake 250 ml   Output 650 ml   Net -400 ml          Vitals:   Vitals:    02/20/23 1133   BP:    Pulse:    Resp:    Temp:    SpO2: 97%       Physical Exam:     Physical Exam  Constitutional:       General: He is not in acute distress. Appearance: Normal appearance. HENT:      Head: Normocephalic and atraumatic. Nose: Nose normal.      Mouth/Throat:      Mouth: Mucous membranes are moist.      Pharynx: Oropharynx is clear. Eyes:      Extraocular Movements: Extraocular movements intact. Conjunctiva/sclera: Conjunctivae normal.      Pupils: Pupils are equal, round, and reactive to light. Cardiovascular:      Rate and Rhythm: Normal rate and regular rhythm. Pulses: Normal pulses. Heart sounds: Normal heart sounds. Pulmonary:      Effort: Pulmonary effort is normal.   Abdominal:      General: Abdomen is flat. Bowel sounds are normal.      Palpations: Abdomen is soft. Musculoskeletal:         General: Swelling, tenderness and signs of injury present. Normal range of motion. Cervical back: Normal range of motion and neck supple. Skin:     General: Skin is warm and dry. Findings: Lesion present. Neurological:      General: No focal deficit present. Mental Status: He is alert and oriented to person, place, and time. Mental status is at baseline. Psychiatric:         Mood and Affect: Mood normal.         Behavior: Behavior normal.         Thought Content:  Thought content normal.       Medications:   Medications:    lidocaine PF  5 mL IntraDERmal See Admin Instructions    sodium chloride flush  5-40 mL IntraVENous 2 times per day    gabapentin  300 mg Oral TID    DULoxetine  60 mg Oral Daily    vitamin D  50,000 Units Oral Weekly    sodium chloride flush  5-40 mL IntraVENous 2 times per day    aspirin  81 mg Oral Daily    atorvastatin  40 mg Oral Nightly    insulin glargine  6 Units SubCUTAneous Nightly    insulin lispro  0-4 Units SubCUTAneous TID WC    insulin lispro  0-4 Units SubCUTAneous Nightly    sodium chloride flush  5-40 mL IntraVENous 2 times per day    enoxaparin  40 mg SubCUTAneous Daily      Infusions:    sodium chloride      dextrose      sodium chloride       PRN Meds: sodium chloride flush, 5-40 mL, PRN  sodium chloride, , PRN  oxyCODONE-acetaminophen, 1 tablet, Q4H PRN   Or  oxyCODONE-acetaminophen, 2 tablet, Q4H PRN  HYDROmorphone, 0.5 mg, Q2H PRN  droperidol, 0.625 mg, Q10 Min PRN  sodium chloride flush, 5-40 mL, PRN  glucose, 4 tablet, PRN  dextrose bolus, 125 mL, PRN   Or  dextrose bolus, 250 mL, PRN  glucagon (rDNA), 1 mg, PRN  dextrose, , Continuous PRN  sodium chloride flush, 5-40 mL, PRN  sodium chloride, , PRN  ondansetron, 4 mg, Q8H PRN   Or  ondansetron, 4 mg, Q6H PRN  polyethylene glycol, 17 g, Daily PRN  acetaminophen, 650 mg, Q6H PRN   Or  acetaminophen, 650 mg, Q6H PRN      Labs      Recent Results (from the past 24 hour(s))   POCT Glucose    Collection Time: 02/19/23  4:48 PM   Result Value Ref Range    POC Glucose 165 (H) 70 - 99 MG/DL   POCT Glucose    Collection Time: 02/19/23  8:38 PM   Result Value Ref Range    POC Glucose 191 (H) 70 - 99 MG/DL   Basic Metabolic Panel w/ Reflex to MG    Collection Time: 02/20/23  1:14 AM   Result Value Ref Range    Sodium 136 135 - 145 MMOL/L    Potassium 4.3 3.5 - 5.1 MMOL/L    Chloride 102 99 - 110 mMol/L    CO2 29 21 - 32 MMOL/L    Anion Gap 5 4 - 16    BUN 19 6 - 23 MG/DL    Creatinine 0.9 0.9 - 1.3 MG/DL    Est, Glom Filt Rate >60 >60 mL/min/1.73m2    Glucose 131 (H) 70 - 99 MG/DL    Calcium 7.6 (L) 8.3 - 10.6 MG/DL   CBC with Auto Differential    Collection Time: 02/20/23  1:14 AM   Result Value Ref Range    WBC 5.5 4.0 - 10.5 K/CU MM    RBC 3.02 (L) 4.6 - 6.2 M/CU MM    Hemoglobin 8.7 (L) 13.5 - 18.0 GM/DL    Hematocrit 26.5 (L) 42 - 52 %    MCV 87.7 78 - 100 FL    MCH 28.8 27 - 31 PG    MCHC 32.8 32.0 - 36.0 %    RDW 14.1 11.7 - 14.9 %    Platelets 049 (L) 669 - 440 K/CU MM    MPV 9.8 7.5 - 11.1 FL    Differential Type AUTOMATED DIFFERENTIAL     Segs Relative 65.0 36 - 66 %    Lymphocytes % 22.7 (L) 24 - 44 %    Monocytes % 9.7 (H) 0 - 4 %    Eosinophils % 1.3 0 - 3 %    Basophils % 0.9 0 - 1 %    Segs Absolute 3.6 K/CU MM Lymphocytes Absolute 1.2 K/CU MM    Monocytes Absolute 0.5 K/CU MM    Eosinophils Absolute 0.1 K/CU MM    Basophils Absolute 0.1 K/CU MM    Nucleated RBC % 0.0 %    Total Nucleated RBC 0.0 K/CU MM    Total Immature Neutrophil 0.02 K/CU MM    Immature Neutrophil % 0.4 0 - 0.43 %   POCT Glucose    Collection Time: 02/20/23  7:34 AM   Result Value Ref Range    POC Glucose 183 (H) 70 - 99 MG/DL   POCT Glucose    Collection Time: 02/20/23 11:18 AM   Result Value Ref Range    POC Glucose 125 (H) 70 - 99 MG/DL        Imaging/Diagnostics Last 24 Hours   XR KNEE RIGHT (1-2 VIEWS)    Result Date: 2/13/2023  EXAMINATION: TWO XRAY VIEWS OF THE RIGHT KNEE 2/13/2023 8:48 pm COMPARISON: None. HISTORY: ORDERING SYSTEM PROVIDED HISTORY: Bolivar Ward in fall, nonhealing TECHNOLOGIST PROVIDED HISTORY: Reason for exam:->nec fasc in fall, nonhealing Reason for Exam: Bolivar Starring in fall, nonhealing Additional signs and symptoms: none Relevant Medical/Surgical History: diabetes FINDINGS: Status post below-knee amputation. No acute periostitis or bony destruction. Suspected wound on anterior aspect of the stomach. The knee joint is maintained. No acute periostitis or bony destruction. Status post BKA. Comment: Please note this report has been produced using speech recognition software and may contain errors related to that system including errors in grammar, punctuation, and spelling, as well as words and phrases that may be inappropriate. If there are any questions or concerns please feel free to contact the dictating provider for clarification.      Electronically signed by Panchito Argueta MD on 2/20/2023 at 12:30 PM

## 2023-02-20 NOTE — CARE COORDINATION
Student placed  for updated PT/OT notes. Precert pending for [facility]. Student left  for Franci/Suhas checking on precert status. Waiting for call back. Franci at [facility] called student back and informed her that precert is still pending for pt.

## 2023-02-20 NOTE — PLAN OF CARE
Problem: Discharge Planning  Goal: Discharge to home or other facility with appropriate resources  2/20/2023 1504 by Beverly Hatfield RN  Outcome: Progressing  Flowsheets (Taken 2/20/2023 0911)  Discharge to home or other facility with appropriate resources:   Identify barriers to discharge with patient and caregiver   Arrange for needed discharge resources and transportation as appropriate   Identify discharge learning needs (meds, wound care, etc)   Refer to discharge planning if patient needs post-hospital services based on physician order or complex needs related to functional status, cognitive ability or social support system  2/20/2023 0448 by Lucius Aranda RN  Outcome: Progressing     Problem: Pain  Goal: Verbalizes/displays adequate comfort level or baseline comfort level  2/20/2023 1504 by Beverly Hatfield RN  Outcome: Progressing  Flowsheets  Taken 2/20/2023 1501  Verbalizes/displays adequate comfort level or baseline comfort level:   Encourage patient to monitor pain and request assistance   Assess pain using appropriate pain scale  Taken 2/20/2023 0900  Verbalizes/displays adequate comfort level or baseline comfort level:   Encourage patient to monitor pain and request assistance   Assess pain using appropriate pain scale  2/20/2023 0448 by Lucius Aranda RN  Outcome: Progressing  Flowsheets (Taken 2/20/2023 0200)  Verbalizes/displays adequate comfort level or baseline comfort level: Assess pain using appropriate pain scale     Problem: Skin/Tissue Integrity  Goal: Absence of new skin breakdown  Description: 1. Monitor for areas of redness and/or skin breakdown  2. Assess vascular access sites hourly  3. Every 4-6 hours minimum:  Change oxygen saturation probe site  4. Every 4-6 hours:  If on nasal continuous positive airway pressure, respiratory therapy assess nares and determine need for appliance change or resting period.   2/20/2023 1504 by Beverly Hatfield RN  Outcome: Progressing  2/20/2023 0 by Ari Chen RN  Outcome: Progressing     Problem: ABCDS Injury Assessment  Goal: Absence of physical injury  2/20/2023 1504 by Leonard Alberto RN  Outcome: Progressing  Flowsheets (Taken 2/20/2023 0910)  Absence of Physical Injury: Implement safety measures based on patient assessment  2/20/2023 0448 by Ari Chen RN  Outcome: Progressing     Problem: Safety - Adult  Goal: Free from fall injury  2/20/2023 1504 by Leonard Alberto RN  Outcome: Progressing  Flowsheets (Taken 2/20/2023 0910)  Free From Fall Injury: Instruct family/caregiver on patient safety  2/20/2023 0448 by Ari Chen RN  Outcome: Progressing     Problem: Nutrition Deficit:  Goal: Optimize nutritional status  Outcome: Progressing

## 2023-02-21 LAB
ANION GAP SERPL CALCULATED.3IONS-SCNC: 8 MMOL/L (ref 4–16)
BASOPHILS ABSOLUTE: 0.1 K/CU MM
BASOPHILS RELATIVE PERCENT: 1.2 % (ref 0–1)
BUN SERPL-MCNC: 24 MG/DL (ref 6–23)
CALCIUM SERPL-MCNC: 7.6 MG/DL (ref 8.3–10.6)
CHLORIDE BLD-SCNC: 102 MMOL/L (ref 99–110)
CO2: 28 MMOL/L (ref 21–32)
CREAT SERPL-MCNC: 0.9 MG/DL (ref 0.9–1.3)
DIFFERENTIAL TYPE: ABNORMAL
EOSINOPHILS ABSOLUTE: 0.1 K/CU MM
EOSINOPHILS RELATIVE PERCENT: 1.6 % (ref 0–3)
GFR SERPL CREATININE-BSD FRML MDRD: >60 ML/MIN/1.73M2
GLUCOSE BLD-MCNC: 131 MG/DL (ref 70–99)
GLUCOSE BLD-MCNC: 185 MG/DL (ref 70–99)
GLUCOSE BLD-MCNC: 206 MG/DL (ref 70–99)
GLUCOSE BLD-MCNC: 207 MG/DL (ref 70–99)
GLUCOSE SERPL-MCNC: 120 MG/DL (ref 70–99)
HCT VFR BLD CALC: 27.4 % (ref 42–52)
HEMOGLOBIN: 9.1 GM/DL (ref 13.5–18)
IMMATURE NEUTROPHIL %: 0.8 % (ref 0–0.43)
LYMPHOCYTES ABSOLUTE: 1.1 K/CU MM
LYMPHOCYTES RELATIVE PERCENT: 22 % (ref 24–44)
MCH RBC QN AUTO: 28.9 PG (ref 27–31)
MCHC RBC AUTO-ENTMCNC: 33.2 % (ref 32–36)
MCV RBC AUTO: 87 FL (ref 78–100)
MONOCYTES ABSOLUTE: 0.5 K/CU MM
MONOCYTES RELATIVE PERCENT: 10 % (ref 0–4)
NUCLEATED RBC %: 0 %
PDW BLD-RTO: 14.3 % (ref 11.7–14.9)
PLATELET # BLD: 145 K/CU MM (ref 140–440)
PMV BLD AUTO: 10.2 FL (ref 7.5–11.1)
POTASSIUM SERPL-SCNC: 4.5 MMOL/L (ref 3.5–5.1)
RBC # BLD: 3.15 M/CU MM (ref 4.6–6.2)
SEGMENTED NEUTROPHILS ABSOLUTE COUNT: 3.2 K/CU MM
SEGMENTED NEUTROPHILS RELATIVE PERCENT: 64.4 % (ref 36–66)
SODIUM BLD-SCNC: 138 MMOL/L (ref 135–145)
TOTAL IMMATURE NEUTOROPHIL: 0.04 K/CU MM
TOTAL NUCLEATED RBC: 0 K/CU MM
WBC # BLD: 5 K/CU MM (ref 4–10.5)

## 2023-02-21 PROCEDURE — 6370000000 HC RX 637 (ALT 250 FOR IP): Performed by: STUDENT IN AN ORGANIZED HEALTH CARE EDUCATION/TRAINING PROGRAM

## 2023-02-21 PROCEDURE — 97542 WHEELCHAIR MNGMENT TRAINING: CPT

## 2023-02-21 PROCEDURE — 82962 GLUCOSE BLOOD TEST: CPT

## 2023-02-21 PROCEDURE — 2580000003 HC RX 258: Performed by: SURGERY

## 2023-02-21 PROCEDURE — 6370000000 HC RX 637 (ALT 250 FOR IP): Performed by: SURGERY

## 2023-02-21 PROCEDURE — 85025 COMPLETE CBC W/AUTO DIFF WBC: CPT

## 2023-02-21 PROCEDURE — 94761 N-INVAS EAR/PLS OXIMETRY MLT: CPT

## 2023-02-21 PROCEDURE — 6360000002 HC RX W HCPCS: Performed by: SURGERY

## 2023-02-21 PROCEDURE — 97530 THERAPEUTIC ACTIVITIES: CPT

## 2023-02-21 PROCEDURE — 94150 VITAL CAPACITY TEST: CPT

## 2023-02-21 PROCEDURE — 1200000000 HC SEMI PRIVATE

## 2023-02-21 PROCEDURE — 2580000003 HC RX 258: Performed by: NURSE PRACTITIONER

## 2023-02-21 PROCEDURE — 80048 BASIC METABOLIC PNL TOTAL CA: CPT

## 2023-02-21 PROCEDURE — 36415 COLL VENOUS BLD VENIPUNCTURE: CPT

## 2023-02-21 RX ORDER — CYCLOBENZAPRINE HCL 10 MG
10 TABLET ORAL 3 TIMES DAILY PRN
Status: DISCONTINUED | OUTPATIENT
Start: 2023-02-21 | End: 2023-02-24 | Stop reason: HOSPADM

## 2023-02-21 RX ADMIN — SODIUM CHLORIDE, PRESERVATIVE FREE 5 ML: 5 INJECTION INTRAVENOUS at 21:43

## 2023-02-21 RX ADMIN — ASPIRIN 81 MG 81 MG: 81 TABLET ORAL at 07:51

## 2023-02-21 RX ADMIN — OXYCODONE AND ACETAMINOPHEN 2 TABLET: 5; 325 TABLET ORAL at 12:03

## 2023-02-21 RX ADMIN — ENOXAPARIN SODIUM 40 MG: 100 INJECTION SUBCUTANEOUS at 07:53

## 2023-02-21 RX ADMIN — HYDROMORPHONE HYDROCHLORIDE 0.5 MG: 1 INJECTION, SOLUTION INTRAMUSCULAR; INTRAVENOUS; SUBCUTANEOUS at 18:06

## 2023-02-21 RX ADMIN — HYDROMORPHONE HYDROCHLORIDE 0.5 MG: 1 INJECTION, SOLUTION INTRAMUSCULAR; INTRAVENOUS; SUBCUTANEOUS at 03:38

## 2023-02-21 RX ADMIN — INSULIN LISPRO 1 UNITS: 100 INJECTION, SOLUTION INTRAVENOUS; SUBCUTANEOUS at 12:57

## 2023-02-21 RX ADMIN — ONDANSETRON 4 MG: 2 INJECTION INTRAMUSCULAR; INTRAVENOUS at 15:03

## 2023-02-21 RX ADMIN — DULOXETINE HYDROCHLORIDE 60 MG: 30 CAPSULE, DELAYED RELEASE ORAL at 07:51

## 2023-02-21 RX ADMIN — OXYCODONE AND ACETAMINOPHEN 2 TABLET: 5; 325 TABLET ORAL at 16:34

## 2023-02-21 RX ADMIN — SODIUM CHLORIDE, PRESERVATIVE FREE 10 ML: 5 INJECTION INTRAVENOUS at 20:31

## 2023-02-21 RX ADMIN — CYCLOBENZAPRINE 10 MG: 10 TABLET, FILM COATED ORAL at 15:03

## 2023-02-21 RX ADMIN — SODIUM CHLORIDE, PRESERVATIVE FREE 5 ML: 5 INJECTION INTRAVENOUS at 09:53

## 2023-02-21 RX ADMIN — INSULIN LISPRO 1 UNITS: 100 INJECTION, SOLUTION INTRAVENOUS; SUBCUTANEOUS at 17:58

## 2023-02-21 RX ADMIN — HYDROMORPHONE HYDROCHLORIDE 0.5 MG: 1 INJECTION, SOLUTION INTRAMUSCULAR; INTRAVENOUS; SUBCUTANEOUS at 09:56

## 2023-02-21 RX ADMIN — GABAPENTIN 300 MG: 300 CAPSULE ORAL at 20:29

## 2023-02-21 RX ADMIN — GABAPENTIN 300 MG: 300 CAPSULE ORAL at 07:50

## 2023-02-21 RX ADMIN — SODIUM CHLORIDE, PRESERVATIVE FREE 10 ML: 5 INJECTION INTRAVENOUS at 07:53

## 2023-02-21 RX ADMIN — OXYCODONE AND ACETAMINOPHEN 2 TABLET: 5; 325 TABLET ORAL at 07:51

## 2023-02-21 RX ADMIN — ATORVASTATIN CALCIUM 40 MG: 40 TABLET, FILM COATED ORAL at 20:30

## 2023-02-21 RX ADMIN — OXYCODONE AND ACETAMINOPHEN 2 TABLET: 5; 325 TABLET ORAL at 01:53

## 2023-02-21 RX ADMIN — GABAPENTIN 300 MG: 300 CAPSULE ORAL at 14:53

## 2023-02-21 RX ADMIN — OXYCODONE AND ACETAMINOPHEN 2 TABLET: 5; 325 TABLET ORAL at 20:30

## 2023-02-21 RX ADMIN — ERGOCALCIFEROL 50000 UNITS: 1.25 CAPSULE ORAL at 07:50

## 2023-02-21 RX ADMIN — INSULIN GLARGINE 6 UNITS: 100 INJECTION, SOLUTION SUBCUTANEOUS at 20:30

## 2023-02-21 ASSESSMENT — PAIN SCALES - GENERAL
PAINLEVEL_OUTOF10: 9
PAINLEVEL_OUTOF10: 7
PAINLEVEL_OUTOF10: 8
PAINLEVEL_OUTOF10: 9
PAINLEVEL_OUTOF10: 9
PAINLEVEL_OUTOF10: 8
PAINLEVEL_OUTOF10: 10

## 2023-02-21 ASSESSMENT — ENCOUNTER SYMPTOMS
COLOR CHANGE: 1
NAUSEA: 0
SINUS PRESSURE: 0
SINUS PAIN: 0
VOICE CHANGE: 0
WHEEZING: 0
CONSTIPATION: 0
BACK PAIN: 0
CHEST TIGHTNESS: 0
ABDOMINAL PAIN: 0
SORE THROAT: 0
COUGH: 0
DIARRHEA: 0
VOMITING: 0
SHORTNESS OF BREATH: 0
TROUBLE SWALLOWING: 0

## 2023-02-21 ASSESSMENT — PAIN DESCRIPTION - PAIN TYPE
TYPE: SURGICAL PAIN

## 2023-02-21 ASSESSMENT — PAIN SCALES - WONG BAKER
WONGBAKER_NUMERICALRESPONSE: 8
WONGBAKER_NUMERICALRESPONSE: 2

## 2023-02-21 ASSESSMENT — PAIN DESCRIPTION - LOCATION
LOCATION: LEG

## 2023-02-21 ASSESSMENT — PAIN DESCRIPTION - ORIENTATION
ORIENTATION: RIGHT;UPPER
ORIENTATION: RIGHT
ORIENTATION: RIGHT
ORIENTATION: RIGHT;UPPER
ORIENTATION: RIGHT;UPPER

## 2023-02-21 ASSESSMENT — PAIN DESCRIPTION - DESCRIPTORS
DESCRIPTORS: ACHING
DESCRIPTORS: ACHING;CRUSHING

## 2023-02-21 ASSESSMENT — PAIN DESCRIPTION - ONSET
ONSET: ON-GOING

## 2023-02-21 ASSESSMENT — PAIN DESCRIPTION - FREQUENCY
FREQUENCY: CONTINUOUS

## 2023-02-21 NOTE — PROGRESS NOTES
Physician Progress Note      Elisa Sandhu  CSN #:                  [id number]  :                       1960  ADMIT DATE:       2023 8:01 PM  [address] [place] DATE:  RESPONDING  PROVIDER #:        Augustin Valdes MD          QUERY TEXT:    Hospitalists,    Pt admitted with amputation stump infection and noted to have urine culture +   Enterococcus. If possible, please document in the progress notes and discharge   summary if you are evaluating and/or treating any of the following: The medical record reflects the following:  Risk Factors: DM  Clinical Indicators: urine culture with enterococcus  Treatment: labs, IV atb    Thank you,  Monserrat June RN [facility]  [phone]  Options provided:  -- Urinary Tract Infection (UTI)  -- Bacteriuria  -- Other - I will add my own diagnosis  -- Disagree - Not applicable / Not valid  -- Disagree - Clinically unable to determine / Unknown  -- Refer to Clinical Documentation Reviewer    PROVIDER RESPONSE TEXT:    Provider disagreed with this query.     Query created by: Lizandro Cody on 2023 12:31 PM      Electronically signed by:  Augustin Valdes MD 2023 7:15 AM

## 2023-02-21 NOTE — PROGRESS NOTES
Physical Therapy    Physical Therapy Treatment Note  Name: Ivan Flores MRN: [id number] :   1960   Date:  2023   Admission Date: 2023 Room:  3003/3003-A   Restrictions/Precautions:          general precautions, fall risk, AKA 2023  Communication with other providers:  per nurse ok to tx and doing dressing change and will check on pain meds. Subjective:  Patient states:  pt agreeable to tx getting up to wc and out of room but declined amb at this time  Pain:   Location, Type, Intensity (0/10 to 10/10):  pt c/o muscle spasms in residual limb  Objective:    Observation:  alert and oriented     Treatment, including education/measures:  Nurse applied dressing. This therapist ace wrapped residual imp in figure 8 and pt educated on getting wrap up in groin area and may needed to re-wrap several times a day. While in side lying pt was encouraged to do hip ext. to stretch hip flexors to help with muscle spasms. SPT bed to wc sba. Pt sba and cues for wc set up for transfers. Pt independent for wc mobility in [place]. Pt wanting to stay up in wc and was educated on importance of call nurse before transferring back to bed and pt verbalized understanding. Nurse notified pt left up in wc. Safety  Patient left safely in the wc, with call light/phone in reach with alarm applied. Gait belt was used for transfers and gait. Assessment / Impression:      Patient's tolerance of treatment:  good   Adverse Reaction: na  Significant change in status and impact:  na  Barriers to improvement:  pt having muscle spams  Plan for Next Session:    Cont.  POC  Time in:  1450  Time out:  1515  Timed treatment minutes:  25  Total treatment time:  25    Previously filed items:           Short Term Goals  Time Frame for Short Term Goals: 1 week  Short Term Goal 1: Pt to complete all bed mobility mod I  Short Term Goal 2: pt to complete STS transfer to/from bed, commode, and chair mod A  Short Term Goal 3: Pt to ambulate 22' with LRAD mod A    Electronically signed by:    Consuelo Breaux PTA  2/21/2023, 9:03 AM

## 2023-02-21 NOTE — PROGRESS NOTES
V2.0  [facility] [place]      Name:  Eyad Ellison /Age/Sex: 1960  (60 y.o. male)   MRN & CSN:  [id number] & [id number] Encounter Date/Time: 2023 3:36 PM EST    [facility] PCP: Geraldine Echeverria MD       Hospital Day: 9    Assessment and Plan:   Eyad Ellison is a 60 y.o. male with pmh of right BKA, insulin-dependent diabetes mellitus type 2, peripheral neuropathy, hyperlipidemia  who presents with Infection (chronic) of amputation stump ([facility] [address]      Plan:    Right BKA stump wound infection: Rule out osteomyelitis. X-ray of the right stump shows no bony erosion or gas. surgery on board. Patient previously treated for osteomyelitis had recent hospitalization with a wound VAC. Did leave  [address] prior to his hospital stay being completed. Surgical intervention on 2023  Follow-up blood cultures. Infectious disease consulted: Off IV antibiotics postop pain control: Has had difficult control pain in the past.  Use Percocet with IV Dilaudid for breakthrough. Also have patient on Cymbalta and gabapentin for neuropathic type pain. If improved, can discontinue gabapentin prior to DC. Benefit from long-term and high-dose Cymbalta for safety depressive properties as well. Hypochloremic hyponatremia resolved    Chronic normocytic normochromic anemia in the setting of anemia of chronic disease hemoglobin 9.1 today    Coronary artery disease s/p CABG X4 history currently on DAPT at home    Hyperlipidemia on statin    Chronic GERD on Pepcid    Insulin-dependent diabetes mellitus type 2 complicated with peripheral neuropathy and PAD: Patient was not compliant with statin encouraged to do so. Need to discuss with primary team regarding Xarelto for peripheral vascular disease. Takes Lantus 8 mg nightly at home will start 6 units of Lantus along with sliding scale insulin hemoglobin A1c is 7.6.      Tobacco use disorder encouraged to discontinue smoking    Diet ADULT DIET; Regular  ADULT ORAL NUTRITION SUPPLEMENT; Breakfast, Lunch, Dinner; Diabetic Oral Supplement   DVT Prophylaxis [x] Lovenox, []  Heparin, [] SCDs, [] Ambulation,    [] Eliquis, [] Xarelto  [] Coumadin [] other   Code Status Full Code   Disposition From: Home  Expected Disposition: To be determined  Estimated Date of Discharge: 3 to 4 days  Patient requires continued admission due to surgical intervention for right BKA stump wound infection. We will need precert for SNF placement. Surrogate Decision Maker/ POA      Subjective:     Chief Complaint: Leg Pain and Wound Check     Seen and examined at bedside. Complains of pain in his right leg, could not sleep well last night. Review of Systems:    Review of Systems   Constitutional:  Negative for activity change, appetite change, chills, fatigue and fever. HENT:  Negative for congestion, hearing loss, sinus pressure, sinus pain, sore throat, trouble swallowing and voice change. Eyes:  Negative for visual disturbance. Respiratory:  Negative for cough, chest tightness, shortness of breath and wheezing. Cardiovascular:  Negative for chest pain, palpitations and leg swelling. Gastrointestinal:  Negative for abdominal pain, constipation, diarrhea, nausea and vomiting. Endocrine: Negative for cold intolerance, heat intolerance and polyuria. Genitourinary:  Negative for dysuria. Musculoskeletal:  Negative for arthralgias, back pain and gait problem. Skin:  Positive for color change and wound. Neurological:  Negative for dizziness, weakness and headaches. Psychiatric/Behavioral:  Negative for agitation and confusion. The patient is not nervous/anxious. Objective:      Intake/Output Summary (Last 24 hours) at 2/21/2023 1218  Last data filed at 2/21/2023 0328  Gross per 24 hour   Intake --   Output 700 ml   Net -700 ml          Vitals:   Vitals:    02/21/23 0950   BP:    Pulse: 57   Resp:    Temp:    SpO2: Physical Exam:     Physical Exam  Constitutional:       General: He is not in acute distress. Appearance: Normal appearance. HENT:      Head: Normocephalic and atraumatic. Nose: Nose normal.      Mouth/Throat:      Mouth: Mucous membranes are moist.      Pharynx: Oropharynx is clear. Eyes:      Extraocular Movements: Extraocular movements intact. Conjunctiva/sclera: Conjunctivae normal.      Pupils: Pupils are equal, round, and reactive to light. Cardiovascular:      Rate and Rhythm: Normal rate and regular rhythm. Pulses: Normal pulses. Heart sounds: Normal heart sounds. Pulmonary:      Effort: Pulmonary effort is normal.   Abdominal:      General: Abdomen is flat. Bowel sounds are normal.      Palpations: Abdomen is soft. Musculoskeletal:         General: Tenderness and signs of injury present. No swelling. Normal range of motion. Cervical back: Normal range of motion and neck supple. Skin:     General: Skin is warm and dry. Findings: Lesion present. Neurological:      General: No focal deficit present. Mental Status: He is alert and oriented to person, place, and time. Mental status is at baseline. Psychiatric:         Mood and Affect: Mood normal.         Behavior: Behavior normal.         Thought Content:  Thought content normal.       Medications:   Medications:    lidocaine PF  5 mL IntraDERmal See Admin Instructions    sodium chloride flush  5-40 mL IntraVENous 2 times per day    gabapentin  300 mg Oral TID    DULoxetine  60 mg Oral Daily    vitamin D  50,000 Units Oral Weekly    sodium chloride flush  5-40 mL IntraVENous 2 times per day    aspirin  81 mg Oral Daily    atorvastatin  40 mg Oral Nightly    insulin glargine  6 Units SubCUTAneous Nightly    insulin lispro  0-4 Units SubCUTAneous TID WC    insulin lispro  0-4 Units SubCUTAneous Nightly    sodium chloride flush  5-40 mL IntraVENous 2 times per day    enoxaparin  40 mg SubCUTAneous Daily Infusions:    sodium chloride      dextrose      sodium chloride       PRN Meds: sodium chloride flush, 5-40 mL, PRN  sodium chloride, , PRN  oxyCODONE-acetaminophen, 1 tablet, Q4H PRN   Or  oxyCODONE-acetaminophen, 2 tablet, Q4H PRN  HYDROmorphone, 0.5 mg, Q2H PRN  droperidol, 0.625 mg, Q10 Min PRN  sodium chloride flush, 5-40 mL, PRN  glucose, 4 tablet, PRN  dextrose bolus, 125 mL, PRN   Or  dextrose bolus, 250 mL, PRN  glucagon (rDNA), 1 mg, PRN  dextrose, , Continuous PRN  sodium chloride flush, 5-40 mL, PRN  sodium chloride, , PRN  ondansetron, 4 mg, Q8H PRN   Or  ondansetron, 4 mg, Q6H PRN  polyethylene glycol, 17 g, Daily PRN  acetaminophen, 650 mg, Q6H PRN   Or  acetaminophen, 650 mg, Q6H PRN      Labs      Recent Results (from the past 24 hour(s))   POCT Glucose    Collection Time: 02/20/23  4:56 PM   Result Value Ref Range    POC Glucose 226 (H) 70 - 99 MG/DL   POCT Glucose    Collection Time: 02/20/23  9:01 PM   Result Value Ref Range    POC Glucose 244 (H) 70 - 99 MG/DL   Basic Metabolic Panel w/ Reflex to MG    Collection Time: 02/21/23  3:07 AM   Result Value Ref Range    Sodium 138 135 - 145 MMOL/L    Potassium 4.5 3.5 - 5.1 MMOL/L    Chloride 102 99 - 110 mMol/L    CO2 28 21 - 32 MMOL/L    Anion Gap 8 4 - 16    BUN 24 (H) 6 - 23 MG/DL    Creatinine 0.9 0.9 - 1.3 MG/DL    Est, Glom Filt Rate >60 >60 mL/min/1.73m2    Glucose 120 (H) 70 - 99 MG/DL    Calcium 7.6 (L) 8.3 - 10.6 MG/DL   CBC with Auto Differential    Collection Time: 02/21/23  3:07 AM   Result Value Ref Range    WBC 5.0 4.0 - 10.5 K/CU MM    RBC 3.15 (L) 4.6 - 6.2 M/CU MM    Hemoglobin 9.1 (L) 13.5 - 18.0 GM/DL    Hematocrit 27.4 (L) 42 - 52 %    MCV 87.0 78 - 100 FL    MCH 28.9 27 - 31 PG    MCHC 33.2 32.0 - 36.0 %    RDW 14.3 11.7 - 14.9 %    Platelets 161 604 - 977 K/CU MM    MPV 10.2 7.5 - 11.1 FL    Differential Type AUTOMATED DIFFERENTIAL     Segs Relative 64.4 36 - 66 %    Lymphocytes % 22.0 (L) 24 - 44 %    Monocytes % 10.0 (H) 0 - 4 %    Eosinophils % 1.6 0 - 3 %    Basophils % 1.2 (H) 0 - 1 %    Segs Absolute 3.2 K/CU MM    Lymphocytes Absolute 1.1 K/CU MM    Monocytes Absolute 0.5 K/CU MM    Eosinophils Absolute 0.1 K/CU MM    Basophils Absolute 0.1 K/CU MM    Nucleated RBC % 0.0 %    Total Nucleated RBC 0.0 K/CU MM    Total Immature Neutrophil 0.04 K/CU MM    Immature Neutrophil % 0.8 (H) 0 - 0.43 %   POCT Glucose    Collection Time: 02/21/23  7:37 AM   Result Value Ref Range    POC Glucose 131 (H) 70 - 99 MG/DL   POCT Glucose    Collection Time: 02/21/23 12:05 PM   Result Value Ref Range    POC Glucose 207 (H) 70 - 99 MG/DL        Imaging/Diagnostics Last 24 Hours   XR KNEE RIGHT (1-2 VIEWS)    Result Date: 2/13/2023  EXAMINATION: TWO XRAY VIEWS OF THE RIGHT KNEE 2/13/2023 8:48 pm COMPARISON: None. HISTORY: ORDERING SYSTEM PROVIDED HISTORY: Gretchen Brochure in fall, nonhealing TECHNOLOGIST PROVIDED HISTORY: Reason for exam:->nec fasc in fall, nonhealing Reason for Exam: Gretchen Brochure in fall, nonhealing Additional signs and symptoms: none Relevant Medical/Surgical History: diabetes FINDINGS: Status post below-knee amputation. No acute periostitis or bony destruction. Suspected wound on anterior aspect of the stomach. The knee joint is maintained. No acute periostitis or bony destruction. Status post BKA. Comment: Please note this report has been produced using speech recognition software and may contain errors related to that system including errors in grammar, punctuation, and spelling, as well as words and phrases that may be inappropriate. If there are any questions or concerns please feel free to contact the dictating provider for clarification.      Electronically signed by Teresa Goodman MD on 2/21/2023 at 12:18 PM

## 2023-02-21 NOTE — PROGRESS NOTES
Physician Progress Note      David Krishnamurthy  CSN #:                  [id number]  :                       1960  ADMIT DATE:       2023 8:01 PM  [address] [place] [place] DATE:  RESPONDING  PROVIDER #:        Nicolasa Garvin MD          QUERY TEXT:    Hospitalists,    Pt admitted with amputation stump infection and has moderate malnutrition   documented by the Dietitian  consultant. If possible, please document in   progress notes and discharge summary:    The medical record reflects the following:  Risk Factors: chronic illness  Clinical Indicators: Nutrition assessment: Energy Intake:  Mild decrease in   energy intake Weight Loss:  No significant weight loss  Body Fat Loss:  Mild body fat loss Buccal region, Orbital, Triceps Muscle Mass   Loss:  Mild muscle mass loss Temples (temporalis  Treatment: I&O, oral nutrition supplement tid    Thank you,  Nimo Clark RN [facility]  [phone]  Options provided:  -- Moderate malnutrition confirmed present on admission  -- Other - I will add my own diagnosis  -- Disagree - Not applicable / Not valid  -- Disagree - Clinically unable to determine / Unknown  -- Refer to Clinical Documentation Reviewer    PROVIDER RESPONSE TEXT:    The diagnosis of moderate malnutrition was confirmed as present on admission.     Query created by: Royal Doan on 2023 10:49 AM      Electronically signed by:  Nicolasa Garvin MD 2023 12:17 PM

## 2023-02-22 LAB
ANION GAP SERPL CALCULATED.3IONS-SCNC: 6 MMOL/L (ref 4–16)
BASOPHILS ABSOLUTE: 0 K/CU MM
BASOPHILS RELATIVE PERCENT: 0.7 % (ref 0–1)
BUN SERPL-MCNC: 25 MG/DL (ref 6–23)
CALCIUM SERPL-MCNC: 7.6 MG/DL (ref 8.3–10.6)
CHLORIDE BLD-SCNC: 102 MMOL/L (ref 99–110)
CO2: 28 MMOL/L (ref 21–32)
CREAT SERPL-MCNC: 0.9 MG/DL (ref 0.9–1.3)
DIFFERENTIAL TYPE: ABNORMAL
EOSINOPHILS ABSOLUTE: 0.1 K/CU MM
EOSINOPHILS RELATIVE PERCENT: 1.9 % (ref 0–3)
GFR SERPL CREATININE-BSD FRML MDRD: >60 ML/MIN/1.73M2
GLUCOSE BLD-MCNC: 131 MG/DL (ref 70–99)
GLUCOSE BLD-MCNC: 156 MG/DL (ref 70–99)
GLUCOSE BLD-MCNC: 165 MG/DL (ref 70–99)
GLUCOSE BLD-MCNC: 171 MG/DL (ref 70–99)
GLUCOSE SERPL-MCNC: 158 MG/DL (ref 70–99)
HCT VFR BLD CALC: 27.7 % (ref 42–52)
HEMOGLOBIN: 8.7 GM/DL (ref 13.5–18)
IMMATURE NEUTROPHIL %: 0.7 % (ref 0–0.43)
LYMPHOCYTES ABSOLUTE: 1 K/CU MM
LYMPHOCYTES RELATIVE PERCENT: 24.3 % (ref 24–44)
MCH RBC QN AUTO: 28.3 PG (ref 27–31)
MCHC RBC AUTO-ENTMCNC: 31.4 % (ref 32–36)
MCV RBC AUTO: 90.2 FL (ref 78–100)
MONOCYTES ABSOLUTE: 0.5 K/CU MM
MONOCYTES RELATIVE PERCENT: 11 % (ref 0–4)
NUCLEATED RBC %: 0 %
PDW BLD-RTO: 14.5 % (ref 11.7–14.9)
PLATELET # BLD: 139 K/CU MM (ref 140–440)
PMV BLD AUTO: 10.4 FL (ref 7.5–11.1)
POTASSIUM SERPL-SCNC: 4.9 MMOL/L (ref 3.5–5.1)
RBC # BLD: 3.07 M/CU MM (ref 4.6–6.2)
SEGMENTED NEUTROPHILS ABSOLUTE COUNT: 2.6 K/CU MM
SEGMENTED NEUTROPHILS RELATIVE PERCENT: 61.4 % (ref 36–66)
SODIUM BLD-SCNC: 136 MMOL/L (ref 135–145)
TOTAL IMMATURE NEUTOROPHIL: 0.03 K/CU MM
TOTAL NUCLEATED RBC: 0 K/CU MM
WBC # BLD: 4.3 K/CU MM (ref 4–10.5)

## 2023-02-22 PROCEDURE — 97116 GAIT TRAINING THERAPY: CPT

## 2023-02-22 PROCEDURE — 94664 DEMO&/EVAL PT USE INHALER: CPT

## 2023-02-22 PROCEDURE — 2580000003 HC RX 258: Performed by: NURSE PRACTITIONER

## 2023-02-22 PROCEDURE — 6370000000 HC RX 637 (ALT 250 FOR IP): Performed by: STUDENT IN AN ORGANIZED HEALTH CARE EDUCATION/TRAINING PROGRAM

## 2023-02-22 PROCEDURE — 80048 BASIC METABOLIC PNL TOTAL CA: CPT

## 2023-02-22 PROCEDURE — 6360000002 HC RX W HCPCS: Performed by: SURGERY

## 2023-02-22 PROCEDURE — 85025 COMPLETE CBC W/AUTO DIFF WBC: CPT

## 2023-02-22 PROCEDURE — 6370000000 HC RX 637 (ALT 250 FOR IP): Performed by: SURGERY

## 2023-02-22 PROCEDURE — 94761 N-INVAS EAR/PLS OXIMETRY MLT: CPT

## 2023-02-22 PROCEDURE — 1200000000 HC SEMI PRIVATE

## 2023-02-22 PROCEDURE — 97110 THERAPEUTIC EXERCISES: CPT

## 2023-02-22 PROCEDURE — 94150 VITAL CAPACITY TEST: CPT

## 2023-02-22 PROCEDURE — 36415 COLL VENOUS BLD VENIPUNCTURE: CPT

## 2023-02-22 PROCEDURE — 82962 GLUCOSE BLOOD TEST: CPT

## 2023-02-22 PROCEDURE — 97542 WHEELCHAIR MNGMENT TRAINING: CPT

## 2023-02-22 RX ADMIN — HYDROMORPHONE HYDROCHLORIDE 0.5 MG: 1 INJECTION, SOLUTION INTRAMUSCULAR; INTRAVENOUS; SUBCUTANEOUS at 00:10

## 2023-02-22 RX ADMIN — GABAPENTIN 300 MG: 300 CAPSULE ORAL at 14:18

## 2023-02-22 RX ADMIN — SODIUM CHLORIDE, PRESERVATIVE FREE 10 ML: 5 INJECTION INTRAVENOUS at 20:35

## 2023-02-22 RX ADMIN — GABAPENTIN 300 MG: 300 CAPSULE ORAL at 08:25

## 2023-02-22 RX ADMIN — OXYCODONE AND ACETAMINOPHEN 2 TABLET: 5; 325 TABLET ORAL at 03:36

## 2023-02-22 RX ADMIN — GABAPENTIN 300 MG: 300 CAPSULE ORAL at 20:34

## 2023-02-22 RX ADMIN — INSULIN GLARGINE 6 UNITS: 100 INJECTION, SOLUTION SUBCUTANEOUS at 20:37

## 2023-02-22 RX ADMIN — OXYCODONE AND ACETAMINOPHEN 2 TABLET: 5; 325 TABLET ORAL at 09:04

## 2023-02-22 RX ADMIN — SODIUM CHLORIDE, PRESERVATIVE FREE 10 ML: 5 INJECTION INTRAVENOUS at 08:59

## 2023-02-22 RX ADMIN — ASPIRIN 81 MG 81 MG: 81 TABLET ORAL at 08:26

## 2023-02-22 RX ADMIN — HYDROMORPHONE HYDROCHLORIDE 0.5 MG: 1 INJECTION, SOLUTION INTRAMUSCULAR; INTRAVENOUS; SUBCUTANEOUS at 11:59

## 2023-02-22 RX ADMIN — OXYCODONE AND ACETAMINOPHEN 2 TABLET: 5; 325 TABLET ORAL at 14:23

## 2023-02-22 RX ADMIN — ENOXAPARIN SODIUM 40 MG: 100 INJECTION SUBCUTANEOUS at 08:26

## 2023-02-22 RX ADMIN — OXYCODONE AND ACETAMINOPHEN 2 TABLET: 5; 325 TABLET ORAL at 20:35

## 2023-02-22 RX ADMIN — ATORVASTATIN CALCIUM 40 MG: 40 TABLET, FILM COATED ORAL at 20:34

## 2023-02-22 RX ADMIN — DULOXETINE HYDROCHLORIDE 60 MG: 30 CAPSULE, DELAYED RELEASE ORAL at 08:26

## 2023-02-22 ASSESSMENT — PAIN SCALES - GENERAL
PAINLEVEL_OUTOF10: 3
PAINLEVEL_OUTOF10: 8
PAINLEVEL_OUTOF10: 0
PAINLEVEL_OUTOF10: 5
PAINLEVEL_OUTOF10: 10
PAINLEVEL_OUTOF10: 3
PAINLEVEL_OUTOF10: 5
PAINLEVEL_OUTOF10: 4
PAINLEVEL_OUTOF10: 8
PAINLEVEL_OUTOF10: 8
PAINLEVEL_OUTOF10: 10
PAINLEVEL_OUTOF10: 9
PAINLEVEL_OUTOF10: 0
PAINLEVEL_OUTOF10: 9
PAINLEVEL_OUTOF10: 8

## 2023-02-22 ASSESSMENT — PAIN DESCRIPTION - LOCATION
LOCATION: LEG
LOCATION: LEG;INCISION
LOCATION: LEG
LOCATION: LEG;INCISION
LOCATION: INCISION;LEG

## 2023-02-22 ASSESSMENT — PAIN DESCRIPTION - PAIN TYPE
TYPE: SURGICAL PAIN

## 2023-02-22 ASSESSMENT — PAIN DESCRIPTION - ORIENTATION
ORIENTATION: RIGHT
ORIENTATION: RIGHT;UPPER
ORIENTATION: RIGHT
ORIENTATION: RIGHT;LOWER
ORIENTATION: RIGHT;UPPER

## 2023-02-22 ASSESSMENT — PAIN DESCRIPTION - DESCRIPTORS
DESCRIPTORS: ACHING;DISCOMFORT;SHOOTING
DESCRIPTORS: ACHING;DISCOMFORT
DESCRIPTORS: ACHING;BURNING
DESCRIPTORS: ACHING;CRUSHING
DESCRIPTORS: ACHING;CRUSHING;DISCOMFORT
DESCRIPTORS: ACHING
DESCRIPTORS: ACHING;DISCOMFORT
DESCRIPTORS: ACHING;BURNING

## 2023-02-22 ASSESSMENT — PAIN DESCRIPTION - ONSET: ONSET: ON-GOING

## 2023-02-22 ASSESSMENT — ENCOUNTER SYMPTOMS
ABDOMINAL PAIN: 0
SHORTNESS OF BREATH: 0
SINUS PAIN: 0
WHEEZING: 0
BACK PAIN: 0
NAUSEA: 0
CONSTIPATION: 0
COLOR CHANGE: 1
COUGH: 0
CHEST TIGHTNESS: 0
DIARRHEA: 0
TROUBLE SWALLOWING: 0
VOICE CHANGE: 0
SORE THROAT: 0
VOMITING: 0
SINUS PRESSURE: 0

## 2023-02-22 ASSESSMENT — PAIN SCALES - WONG BAKER
WONGBAKER_NUMERICALRESPONSE: 2
WONGBAKER_NUMERICALRESPONSE: 0
WONGBAKER_NUMERICALRESPONSE: 0
WONGBAKER_NUMERICALRESPONSE: 2
WONGBAKER_NUMERICALRESPONSE: 2
WONGBAKER_NUMERICALRESPONSE: 4
WONGBAKER_NUMERICALRESPONSE: 4
WONGBAKER_NUMERICALRESPONSE: 2
WONGBAKER_NUMERICALRESPONSE: 2
WONGBAKER_NUMERICALRESPONSE: 0
WONGBAKER_NUMERICALRESPONSE: 2

## 2023-02-22 ASSESSMENT — PAIN - FUNCTIONAL ASSESSMENT
PAIN_FUNCTIONAL_ASSESSMENT: PREVENTS OR INTERFERES SOME ACTIVE ACTIVITIES AND ADLS
PAIN_FUNCTIONAL_ASSESSMENT: ACTIVITIES ARE NOT PREVENTED
PAIN_FUNCTIONAL_ASSESSMENT: PREVENTS OR INTERFERES SOME ACTIVE ACTIVITIES AND ADLS
PAIN_FUNCTIONAL_ASSESSMENT: PREVENTS OR INTERFERES SOME ACTIVE ACTIVITIES AND ADLS
PAIN_FUNCTIONAL_ASSESSMENT: ACTIVITIES ARE NOT PREVENTED
PAIN_FUNCTIONAL_ASSESSMENT: PREVENTS OR INTERFERES SOME ACTIVE ACTIVITIES AND ADLS

## 2023-02-22 ASSESSMENT — PAIN DESCRIPTION - FREQUENCY: FREQUENCY: CONTINUOUS

## 2023-02-22 NOTE — PROGRESS NOTES
Occupational Therapy  . Occupational Therapy Treatment Note    Name: Royal Solis MRN: [id number] :   1960   Date:  2023   Admission Date: 2023 Room:  [facility]     Primary Problem:  The primary encounter diagnosis was Non-healing surgical wound, subsequent encounter. Diagnoses of Hyponatremia, Wound infection, and Infection were also pertinent to this visit. Restrictions/Precautions:          general precautions, fall risk, tele, R AKA     Communication with other providers: Per chart review and Nurse Kristyn Zambrano, patient is appropriate for therapeutic intervention. Nurse reports pt just had pain meds. Subjective:  Patient states:  \"I can't get up right now. The pain is really bad today. \"   Pain:   Location, Type, Intensity (0/10 to 10/10):  9/10, R BKA    Objective:    Observation: Pt received seated EOB, actively participated in resistive therapeutic AROM BUE exercises. Objective Measures:  Telemetry, HR 76, RR 25    Treatment, including education:  Therapeutic Exercise:  Cues were given for technique, safety, recruitment, and rationale. Cues were verbal and/or tactile. Sup seated EOB  c min cues for tehnique to perform moderate resistive therapeutic BUE AROM exercises c Theraband, 4 sets each UE x10 reps each. Pt educated for performing twice a day and gradually building up tolerance c increased repetitions to increase BUE strength / endurance for functional transfers / mobility and ADL tasks. Safety  Patient safely seated EOB as found on approach at end of session, with call light/phone in reach, and nursing aware. Assessment / Impression:    Patient's tolerance of treatment: Well  Adverse Reaction: None  Significant change in status and impact: Limited by pain this Tx session  Barriers to improvement: Pain, recent R AKA      Plan for Next Session:    Continue per OT POC c plan to address functional transfers to chair or BSC.     Time in:  0956  Time out:  1016   Timed treatment minutes:  20  Total treatment time:  20      Electronically signed by:    TARIQ Encarnacion  2/22/2023, 10:01 AM    Goals:  Time frame for goals: 2 weeks     Pt will complete grooming tasks with CGA at standing level   Pt will complete toileting tasks with ModA using BSC  Pt will complete UB dressing tasks with SetupA seated EOB   Pt will complete LB dressing tasks with ModA seated EOB   Pt will complete UB bathing tasks with SetupA seated and AE PRN   Pt will complete LB bathing tasks with Jesusita seated and AE PRN   Pt will complete therapeutic exercise/activity to increase independence in ADL/IADL function  Pt will practice functional transfers and mobility with AD for increased safety and independence

## 2023-02-22 NOTE — PROGRESS NOTES
V2.0  [facility] Hospitalist Progress Note      Name:  Mary Luke /Age/Sex: 1960  (60 y.o. male)   MRN & CSN:  [id number] & [id number] Encounter Date/Time: 2023 3:36 PM EST    Location:  [address] [place] [address]-A PCP: Kike Hernández MD       Hospital Day: 10    Assessment and Plan:   Mary Luke is a 60 y.o. male with pmh of right BKA, insulin-dependent diabetes mellitus type 2, peripheral neuropathy, hyperlipidemia  who presents with Infection (chronic) of amputation stump ([facility] [address]      Plan:    Right BKA stump wound infection: Rule out osteomyelitis. X-ray of the right stump shows no bony erosion or gas. surgery on board. Patient previously treated for osteomyelitis had recent hospitalization with a wound VAC. Did leave  [address] prior to his hospital stay being completed. Surgical intervention on 2023  Follow-up blood cultures. Infectious disease consulted: Off IV antibiotics postop pain control: Has had difficult control pain in the past.  Use Percocet with IV Dilaudid for breakthrough. Also have patient on Cymbalta and gabapentin for neuropathic type pain. If improved, can discontinue gabapentin prior to DC. Benefit from long-term and high-dose Cymbalta for safety depressive properties as well. Hypochloremic hyponatremia resolved    Chronic normocytic normochromic anemia in the setting of anemia of chronic disease hemoglobin 8.7 today, fairly stable    Coronary artery disease s/p CABG X4 history currently on DAPT at home    Hyperlipidemia on statin    Chronic GERD on Pepcid    Insulin-dependent diabetes mellitus type 2 complicated with peripheral neuropathy and PAD: Patient was not compliant with statin encouraged to do so. Need to discuss with primary team regarding Xarelto for peripheral vascular disease. Takes Lantus 8 mg nightly at home will start 6 units of Lantus along with sliding scale insulin hemoglobin A1c is 7.6.      Tobacco use disorder encouraged to discontinue smoking    Diet ADULT DIET; Regular  ADULT ORAL NUTRITION SUPPLEMENT; Breakfast, Lunch, Dinner; Diabetic Oral Supplement   DVT Prophylaxis [x] Lovenox, []  Heparin, [] SCDs, [] Ambulation,    [] Eliquis, [] Xarelto  [] Coumadin [] other   Code Status Full Code   Disposition From: Home  Expected Disposition: To be determined  Estimated Date of Discharge: 3 to 4 days  Patient requires continued admission due to surgical intervention for right BKA stump wound infection. We will need precert for SNF placement. Surrogate Decision Maker/ POA      Subjective:     Chief Complaint: Leg Pain and Wound Check     Seen and examined at bedside. Was working with physical therapy during my encounter. Complains of some pain at the surgical site otherwise no acute complaints  Review of Systems:    Review of Systems   Constitutional:  Negative for activity change, appetite change, chills, fatigue and fever. HENT:  Negative for congestion, hearing loss, sinus pressure, sinus pain, sore throat, trouble swallowing and voice change. Eyes:  Negative for visual disturbance. Respiratory:  Negative for cough, chest tightness, shortness of breath and wheezing. Cardiovascular:  Negative for chest pain, palpitations and leg swelling. Gastrointestinal:  Negative for abdominal pain, constipation, diarrhea, nausea and vomiting. Endocrine: Negative for cold intolerance, heat intolerance and polyuria. Genitourinary:  Negative for dysuria. Musculoskeletal:  Negative for arthralgias, back pain and gait problem. Skin:  Positive for color change and wound. Neurological:  Negative for dizziness, weakness and headaches. Psychiatric/Behavioral:  Negative for agitation and confusion. The patient is not nervous/anxious. Objective:      Intake/Output Summary (Last 24 hours) at 2/22/2023 1404  Last data filed at 2/22/2023 0913  Gross per 24 hour   Intake 790 ml   Output 1575 ml   Net -785 ml          Vitals: Vitals:    02/22/23 1229   BP:    Pulse:    Resp: 16   Temp:    SpO2:        Physical Exam:     Physical Exam  Constitutional:       General: He is not in acute distress. Appearance: Normal appearance. HENT:      Head: Normocephalic and atraumatic. Nose: Nose normal.      Mouth/Throat:      Mouth: Mucous membranes are moist.      Pharynx: Oropharynx is clear. Eyes:      Extraocular Movements: Extraocular movements intact. Conjunctiva/sclera: Conjunctivae normal.      Pupils: Pupils are equal, round, and reactive to light. Cardiovascular:      Rate and Rhythm: Normal rate and regular rhythm. Pulses: Normal pulses. Heart sounds: Normal heart sounds. Pulmonary:      Effort: Pulmonary effort is normal.   Abdominal:      General: Abdomen is flat. Bowel sounds are normal.      Palpations: Abdomen is soft. Musculoskeletal:         General: Tenderness and signs of injury present. No swelling. Normal range of motion. Cervical back: Normal range of motion and neck supple. Skin:     General: Skin is warm and dry. Findings: Lesion present. Neurological:      General: No focal deficit present. Mental Status: He is alert and oriented to person, place, and time. Mental status is at baseline. Psychiatric:         Mood and Affect: Mood normal.         Behavior: Behavior normal.         Thought Content:  Thought content normal.       Medications:   Medications:    lidocaine PF  5 mL IntraDERmal See Admin Instructions    sodium chloride flush  5-40 mL IntraVENous 2 times per day    gabapentin  300 mg Oral TID    DULoxetine  60 mg Oral Daily    vitamin D  50,000 Units Oral Weekly    sodium chloride flush  5-40 mL IntraVENous 2 times per day    aspirin  81 mg Oral Daily    atorvastatin  40 mg Oral Nightly    insulin glargine  6 Units SubCUTAneous Nightly    insulin lispro  0-4 Units SubCUTAneous TID     insulin lispro  0-4 Units SubCUTAneous Nightly    sodium chloride flush  5-40 mL IntraVENous 2 times per day    enoxaparin  40 mg SubCUTAneous Daily      Infusions:    sodium chloride      dextrose      sodium chloride       PRN Meds: cyclobenzaprine, 10 mg, TID PRN  sodium chloride flush, 5-40 mL, PRN  sodium chloride, , PRN  oxyCODONE-acetaminophen, 1 tablet, Q4H PRN   Or  oxyCODONE-acetaminophen, 2 tablet, Q4H PRN  HYDROmorphone, 0.5 mg, Q2H PRN  droperidol, 0.625 mg, Q10 Min PRN  sodium chloride flush, 5-40 mL, PRN  glucose, 4 tablet, PRN  dextrose bolus, 125 mL, PRN   Or  dextrose bolus, 250 mL, PRN  glucagon (rDNA), 1 mg, PRN  dextrose, , Continuous PRN  sodium chloride flush, 5-40 mL, PRN  sodium chloride, , PRN  ondansetron, 4 mg, Q8H PRN   Or  ondansetron, 4 mg, Q6H PRN  polyethylene glycol, 17 g, Daily PRN  acetaminophen, 650 mg, Q6H PRN   Or  acetaminophen, 650 mg, Q6H PRN      Labs      Recent Results (from the past 24 hour(s))   POCT Glucose    Collection Time: 02/21/23  5:25 PM   Result Value Ref Range    POC Glucose 206 (H) 70 - 99 MG/DL   POCT Glucose    Collection Time: 02/21/23  8:27 PM   Result Value Ref Range    POC Glucose 185 (H) 70 - 99 MG/DL   Basic Metabolic Panel w/ Reflex to MG    Collection Time: 02/22/23  3:49 AM   Result Value Ref Range    Sodium 136 135 - 145 MMOL/L    Potassium 4.9 3.5 - 5.1 MMOL/L    Chloride 102 99 - 110 mMol/L    CO2 28 21 - 32 MMOL/L    Anion Gap 6 4 - 16    BUN 25 (H) 6 - 23 MG/DL    Creatinine 0.9 0.9 - 1.3 MG/DL    Est, Glom Filt Rate >60 >60 mL/min/1.73m2    Glucose 158 (H) 70 - 99 MG/DL    Calcium 7.6 (L) 8.3 - 10.6 MG/DL   CBC with Auto Differential    Collection Time: 02/22/23  3:49 AM   Result Value Ref Range    WBC 4.3 4.0 - 10.5 K/CU MM    RBC 3.07 (L) 4.6 - 6.2 M/CU MM    Hemoglobin 8.7 (L) 13.5 - 18.0 GM/DL    Hematocrit 27.7 (L) 42 - 52 %    MCV 90.2 78 - 100 FL    MCH 28.3 27 - 31 PG    MCHC 31.4 (L) 32.0 - 36.0 %    RDW 14.5 11.7 - 14.9 %    Platelets 139 (L) 140 - 440 K/CU MM    MPV 10.4 7.5 - 11.1 FL    Differential Type  AUTOMATED DIFFERENTIAL     Segs Relative 61.4 36 - 66 %    Lymphocytes % 24.3 24 - 44 %    Monocytes % 11.0 (H) 0 - 4 %    Eosinophils % 1.9 0 - 3 %    Basophils % 0.7 0 - 1 %    Segs Absolute 2.6 K/CU MM    Lymphocytes Absolute 1.0 K/CU MM    Monocytes Absolute 0.5 K/CU MM    Eosinophils Absolute 0.1 K/CU MM    Basophils Absolute 0.0 K/CU MM    Nucleated RBC % 0.0 %    Total Nucleated RBC 0.0 K/CU MM    Total Immature Neutrophil 0.03 K/CU MM    Immature Neutrophil % 0.7 (H) 0 - 0.43 %   POCT Glucose    Collection Time: 02/22/23  8:24 AM   Result Value Ref Range    POC Glucose 131 (H) 70 - 99 MG/DL   POCT Glucose    Collection Time: 02/22/23 12:04 PM   Result Value Ref Range    POC Glucose 165 (H) 70 - 99 MG/DL        Imaging/Diagnostics Last 24 Hours   XR KNEE RIGHT (1-2 VIEWS)    Result Date: 2/13/2023  EXAMINATION: TWO XRAY VIEWS OF THE RIGHT KNEE 2/13/2023 8:48 pm COMPARISON: None. HISTORY: ORDERING SYSTEM PROVIDED HISTORY: Epifanio Kuhn in fall, nonhealing TECHNOLOGIST PROVIDED HISTORY: Reason for exam:->nec fasc in fall, nonhealing Reason for Exam: Epifanio Kuhn in fall, nonhealing Additional signs and symptoms: none Relevant Medical/Surgical History: diabetes FINDINGS: Status post below-knee amputation. No acute periostitis or bony destruction. Suspected wound on anterior aspect of the stomach. The knee joint is maintained. No acute periostitis or bony destruction. Status post BKA. Comment: Please note this report has been produced using speech recognition software and may contain errors related to that system including errors in grammar, punctuation, and spelling, as well as words and phrases that may be inappropriate. If there are any questions or concerns please feel free to contact the dictating provider for clarification.      Electronically signed by Jacey Fitch MD on 2/22/2023 at 2:04 PM

## 2023-02-22 NOTE — PROGRESS NOTES
Physical Therapy  Name: Jayna Ambrosio MRN: [id number] :   1960   Date:  2023   Admission Date: 2023 Room:  [address] [place], [place] [address]A   Restrictions/Precautions:        general precautions, fall risk, AKA 2023  Communication with other providers:   RN states pt is ok to see for therapy  Subjective:  Patient states:  he wanted to get in the w/c  Pain:   Location, Type, Intensity (0/10 to 10/10):  8/10 in residual limb  Objective:    Observation:  pt was resting in the bed  Treatment, including education/measures:  Transfers with line management of tele  Rolling: Ind  Supine to sit :SBA    Scooting :CGA  Sit to stand :CGA  Pt stood with the RW and took 4 steps to the w/c with CGA  Stand to sit :CGA  Pt worked on w/c mobility in the halls, needed min a for obstacles. Pt was able to go 45 ft x 3 with SBA  Pt did a SPT to the bed after w/c mobility d/t pain. Nursing was called for pain meds  Sit to supine :SBA  Safety  Patient left safely in the bed, with call light/phone in reach. Gait belt was used for transfers and gait. Assessment / Impression:     Patient's tolerance of treatment:  fair d/t pain   Adverse Reaction: none  Significant change in status and impact:  none  Barriers to improvement:  pain AKA on R LE  Plan for Next Session:    Will cont to work towards pt's goals per his tolerance  Time in:  1340  Time out:  1410  Timed treatment minutes:  30  Total treatment time:  30  Previously filed items:     Short Term Goals  Time Frame for Short Term Goals: 1 week  Short Term Goal 1: Pt to complete all bed mobility mod I  Short Term Goal 2: pt to complete STS transfer to/from bed, commode, and chair mod A  Short Term Goal 3: Pt to ambulate 22' with LRAD mod A     Electronically signed by:     Abel Muñoz PTA  2023, 2:13 PM

## 2023-02-23 LAB
GLUCOSE BLD-MCNC: 104 MG/DL (ref 70–99)
GLUCOSE BLD-MCNC: 116 MG/DL (ref 70–99)
GLUCOSE BLD-MCNC: 150 MG/DL (ref 70–99)
GLUCOSE BLD-MCNC: 177 MG/DL (ref 70–99)

## 2023-02-23 PROCEDURE — 6370000000 HC RX 637 (ALT 250 FOR IP): Performed by: SURGERY

## 2023-02-23 PROCEDURE — 6370000000 HC RX 637 (ALT 250 FOR IP): Performed by: STUDENT IN AN ORGANIZED HEALTH CARE EDUCATION/TRAINING PROGRAM

## 2023-02-23 PROCEDURE — 2580000003 HC RX 258: Performed by: NURSE PRACTITIONER

## 2023-02-23 PROCEDURE — 82962 GLUCOSE BLOOD TEST: CPT

## 2023-02-23 PROCEDURE — 97110 THERAPEUTIC EXERCISES: CPT

## 2023-02-23 PROCEDURE — 6360000002 HC RX W HCPCS: Performed by: SURGERY

## 2023-02-23 PROCEDURE — 1200000000 HC SEMI PRIVATE

## 2023-02-23 PROCEDURE — 94761 N-INVAS EAR/PLS OXIMETRY MLT: CPT

## 2023-02-23 RX ADMIN — SODIUM CHLORIDE, PRESERVATIVE FREE 10 ML: 5 INJECTION INTRAVENOUS at 20:25

## 2023-02-23 RX ADMIN — DULOXETINE HYDROCHLORIDE 60 MG: 30 CAPSULE, DELAYED RELEASE ORAL at 09:14

## 2023-02-23 RX ADMIN — GABAPENTIN 300 MG: 300 CAPSULE ORAL at 20:23

## 2023-02-23 RX ADMIN — GABAPENTIN 300 MG: 300 CAPSULE ORAL at 13:45

## 2023-02-23 RX ADMIN — CYCLOBENZAPRINE 10 MG: 10 TABLET, FILM COATED ORAL at 09:12

## 2023-02-23 RX ADMIN — OXYCODONE AND ACETAMINOPHEN 2 TABLET: 5; 325 TABLET ORAL at 11:47

## 2023-02-23 RX ADMIN — OXYCODONE AND ACETAMINOPHEN 2 TABLET: 5; 325 TABLET ORAL at 02:30

## 2023-02-23 RX ADMIN — GABAPENTIN 300 MG: 300 CAPSULE ORAL at 09:14

## 2023-02-23 RX ADMIN — OXYCODONE AND ACETAMINOPHEN 2 TABLET: 5; 325 TABLET ORAL at 20:24

## 2023-02-23 RX ADMIN — ENOXAPARIN SODIUM 40 MG: 100 INJECTION SUBCUTANEOUS at 09:14

## 2023-02-23 RX ADMIN — INSULIN GLARGINE 6 UNITS: 100 INJECTION, SOLUTION SUBCUTANEOUS at 20:26

## 2023-02-23 RX ADMIN — ATORVASTATIN CALCIUM 40 MG: 40 TABLET, FILM COATED ORAL at 20:23

## 2023-02-23 RX ADMIN — OXYCODONE AND ACETAMINOPHEN 2 TABLET: 5; 325 TABLET ORAL at 16:05

## 2023-02-23 RX ADMIN — OXYCODONE AND ACETAMINOPHEN 2 TABLET: 5; 325 TABLET ORAL at 07:11

## 2023-02-23 RX ADMIN — CYCLOBENZAPRINE 10 MG: 10 TABLET, FILM COATED ORAL at 18:05

## 2023-02-23 RX ADMIN — HYDROMORPHONE HYDROCHLORIDE 0.5 MG: 1 INJECTION, SOLUTION INTRAMUSCULAR; INTRAVENOUS; SUBCUTANEOUS at 09:52

## 2023-02-23 RX ADMIN — ASPIRIN 81 MG 81 MG: 81 TABLET ORAL at 09:14

## 2023-02-23 RX ADMIN — SODIUM CHLORIDE, PRESERVATIVE FREE 10 ML: 5 INJECTION INTRAVENOUS at 09:16

## 2023-02-23 ASSESSMENT — PAIN DESCRIPTION - DESCRIPTORS
DESCRIPTORS: ACHING;SPASM
DESCRIPTORS: ACHING
DESCRIPTORS: ACHING;BURNING
DESCRIPTORS: STABBING;DISCOMFORT;ACHING
DESCRIPTORS: ACHING;BURNING;SHARP
DESCRIPTORS: ACHING
DESCRIPTORS: ACHING;BURNING;SHARP
DESCRIPTORS: ACHING;BURNING

## 2023-02-23 ASSESSMENT — ENCOUNTER SYMPTOMS
WHEEZING: 0
CHEST TIGHTNESS: 0
SINUS PAIN: 0
ABDOMINAL PAIN: 0
SHORTNESS OF BREATH: 0
COLOR CHANGE: 1
SINUS PRESSURE: 0
COUGH: 0
TROUBLE SWALLOWING: 0
DIARRHEA: 0
NAUSEA: 0
CONSTIPATION: 0
VOMITING: 0
BACK PAIN: 0
VOICE CHANGE: 0
SORE THROAT: 0

## 2023-02-23 ASSESSMENT — PAIN DESCRIPTION - LOCATION
LOCATION: LEG
LOCATION: HEAD

## 2023-02-23 ASSESSMENT — PAIN - FUNCTIONAL ASSESSMENT
PAIN_FUNCTIONAL_ASSESSMENT: PREVENTS OR INTERFERES SOME ACTIVE ACTIVITIES AND ADLS
PAIN_FUNCTIONAL_ASSESSMENT: ACTIVITIES ARE NOT PREVENTED
PAIN_FUNCTIONAL_ASSESSMENT: PREVENTS OR INTERFERES SOME ACTIVE ACTIVITIES AND ADLS

## 2023-02-23 ASSESSMENT — PAIN DESCRIPTION - ORIENTATION
ORIENTATION: RIGHT

## 2023-02-23 ASSESSMENT — PAIN SCALES - WONG BAKER
WONGBAKER_NUMERICALRESPONSE: 2

## 2023-02-23 ASSESSMENT — PAIN SCALES - GENERAL
PAINLEVEL_OUTOF10: 8
PAINLEVEL_OUTOF10: 8
PAINLEVEL_OUTOF10: 9
PAINLEVEL_OUTOF10: 10
PAINLEVEL_OUTOF10: 8
PAINLEVEL_OUTOF10: 8
PAINLEVEL_OUTOF10: 10
PAINLEVEL_OUTOF10: 8

## 2023-02-23 NOTE — PROGRESS NOTES
Physical Therapy    Physical Therapy Treatment Note  Name: Kentrell Corrales MRN: [id number] :   1960   Date:  2023   Admission Date: 2023 Room:  [address] [place] [address]-A   Restrictions/Precautions:          general precautions, fall risk, AKA 2023  Communication with other providers:  nurse checking on pain meds  Subjective:  Patient states:  pt agreeable to tx  Pain:   Location, Type, Intensity (0/10 to 10/10):  rates pain at end of tx 9  Objective:    Observation:  alert and oriented     Treatment, including education/measures:  Pt given written copy of AKA ex and able to perform with verbal and tactile cues:  10 reps hip flex  10 reps pushing residual limb down against bed  10 reps glut sets  10 reps bridging  10 reps left hip flex with right hip down on bed  10 reps right hip ext. in side lying  10 reps right abd in side lying   Pt declined lying on his stomach at this time but was educated on benefit and importance of doing so. Pt verbalized understanding  10 reps wc push up. Sup <=>sit Kash  SPT bed<=>wc sba/cga after wc set up for transfers and was able to do wc set up for transfers with cues to return to bed  Pt is independent to propel wc with UEs and LLE    Assessment / Impression:      Patient's tolerance of treatment:  good   Adverse Reaction: na  Significant change in status and impact:  na  Barriers to improvement:  strength and safety  Plan for Next Session:    Cont.  POC  Time in:  1305  Time out:  1335  Timed treatment minutes:  30  Total treatment time:  30    Previously filed items:           Short Term Goals  Time Frame for Short Term Goals: 1 week  Short Term Goal 1: Pt to complete all bed mobility mod I  Short Term Goal 2: pt to complete STS transfer to/from bed, commode, and chair mod A  Short Term Goal 3: Pt to ambulate 22' with LRAD mod A    Electronically signed by:    Mercy Holland PTA  2023, 8:50 AM

## 2023-02-23 NOTE — CARE COORDINATION
RUTHANN was informed by Franci with [facility] yesterday that they got approval however the the paperwork stated the approval is for [facility] stated they are trying to get it corrected. RUTHANN called Franci this morning to check if it was corrected and had to leave a message.

## 2023-02-23 NOTE — PROGRESS NOTES
I am working as a [facility] [employer] clinical instructor today. I have a student assigned to this patient.

## 2023-02-23 NOTE — PLAN OF CARE
Problem: Discharge Planning  Goal: Discharge to home or other facility with appropriate resources  2/23/2023 1243 by Mahendra Mendez RN  Outcome: Progressing  2/23/2023 0429 by Shine Carey RN  Outcome: Progressing

## 2023-02-23 NOTE — PROGRESS NOTES
V2.0  [facility]      Name:  Ellen Gamino /Age/Sex: 1960  (60 y.o. male)   MRN & CSN:  [id number] & [id number] Encounter Date/Time: 2023 3:36 PM EST    [facility]:  [facility] PCP: Raman Leo MD       Hospital Day: 11    Assessment and Plan:   Ellen Gamino is a 60 y.o. male with pmh of right BKA, insulin-dependent diabetes mellitus type 2, peripheral neuropathy, hyperlipidemia  who presents with Infection (chronic) of amputation stump ([address]      Plan:    Right BKA stump wound infection: Rule out osteomyelitis. X-ray of the right stump shows no bony erosion or gas. surgery on board. Patient previously treated for osteomyelitis had recent hospitalization with a wound VAC. Did leave  [address] prior to his hospital stay being completed. Surgical intervention on 2023  Follow-up blood cultures. Infectious disease consulted: Off IV antibiotics postop pain control: Has had difficult control pain in the past.  Use Percocet with IV Dilaudid for breakthrough. Also have patient on Cymbalta and gabapentin for neuropathic type pain. If improved, can discontinue gabapentin prior to DC. Hypochloremic hyponatremia resolved    Chronic normocytic normochromic anemia in the setting of anemia of chronic disease hemoglobin 8.7 today, fairly stable    Coronary artery disease s/p CABG X4 history currently on DAPT at home    Hyperlipidemia on statin    Chronic GERD on Pepcid    Insulin-dependent diabetes mellitus type 2 complicated with peripheral neuropathy and PAD: Patient was not compliant with statin encouraged to do so. Takes Lantus 8 mg nightly at home will start 6 units of Lantus along with sliding scale insulin hemoglobin A1c is 7.6. Tobacco use disorder encouraged to discontinue smoking    Diet ADULT DIET;  Regular  ADULT ORAL NUTRITION SUPPLEMENT; Breakfast, Lunch, Dinner; Diabetic Oral Supplement   DVT Prophylaxis [x] Lovenox, []  Heparin, [] SCDs, [] Ambulation,    [] Eliquis, [] Xarelto  [] Coumadin [] other   Code Status Full Code   Disposition From: Home  Expected Disposition: To be determined  Estimated Date of Discharge: 3 to 4 days  Patient requires continued admission due to surgical intervention for right BKA stump wound infection. We will need precert for SNF placement. Surrogate Decision Maker/ POA      Subjective:     Chief Complaint: Leg Pain and Wound Check     Seen and examined at bedside. Feels about the same as yesterday. Denies any active complaints. Awaiting approval  Review of Systems:    Review of Systems   Constitutional:  Negative for activity change, appetite change, chills, fatigue and fever. HENT:  Negative for congestion, hearing loss, sinus pressure, sinus pain, sore throat, trouble swallowing and voice change. Eyes:  Negative for visual disturbance. Respiratory:  Negative for cough, chest tightness, shortness of breath and wheezing. Cardiovascular:  Negative for chest pain, palpitations and leg swelling. Gastrointestinal:  Negative for abdominal pain, constipation, diarrhea, nausea and vomiting. Endocrine: Negative for cold intolerance, heat intolerance and polyuria. Genitourinary:  Negative for dysuria. Musculoskeletal:  Negative for arthralgias, back pain and gait problem. Skin:  Positive for color change and wound. Neurological:  Negative for dizziness, weakness and headaches. Psychiatric/Behavioral:  Negative for agitation and confusion. The patient is not nervous/anxious. Objective:   No intake or output data in the 24 hours ending 02/23/23 1049       Vitals:   Vitals:    02/23/23 0952   BP:    Pulse:    Resp: 14   Temp:    SpO2:        Physical Exam:     Physical Exam  Constitutional:       General: He is not in acute distress. Appearance: Normal appearance. HENT:      Head: Normocephalic and atraumatic.       Nose: Nose normal.      Mouth/Throat:      Mouth: Mucous membranes are moist.      Pharynx: Oropharynx is clear. Eyes:      Extraocular Movements: Extraocular movements intact. Conjunctiva/sclera: Conjunctivae normal.      Pupils: Pupils are equal, round, and reactive to light. Cardiovascular:      Rate and Rhythm: Normal rate and regular rhythm. Pulses: Normal pulses. Heart sounds: Normal heart sounds. Pulmonary:      Effort: Pulmonary effort is normal.   Abdominal:      General: Abdomen is flat. Bowel sounds are normal.      Palpations: Abdomen is soft. Musculoskeletal:         General: Tenderness and signs of injury present. No swelling. Normal range of motion. Cervical back: Normal range of motion and neck supple. Skin:     General: Skin is warm and dry. Findings: Lesion present. Neurological:      General: No focal deficit present. Mental Status: He is alert and oriented to person, place, and time. Mental status is at baseline. Psychiatric:         Mood and Affect: Mood normal.         Behavior: Behavior normal.         Thought Content:  Thought content normal.       Medications:   Medications:    lidocaine PF  5 mL IntraDERmal See Admin Instructions    sodium chloride flush  5-40 mL IntraVENous 2 times per day    gabapentin  300 mg Oral TID    DULoxetine  60 mg Oral Daily    vitamin D  50,000 Units Oral Weekly    sodium chloride flush  5-40 mL IntraVENous 2 times per day    aspirin  81 mg Oral Daily    atorvastatin  40 mg Oral Nightly    insulin glargine  6 Units SubCUTAneous Nightly    insulin lispro  0-4 Units SubCUTAneous TID WC    insulin lispro  0-4 Units SubCUTAneous Nightly    sodium chloride flush  5-40 mL IntraVENous 2 times per day    enoxaparin  40 mg SubCUTAneous Daily      Infusions:    sodium chloride      dextrose      sodium chloride       PRN Meds: cyclobenzaprine, 10 mg, TID PRN  sodium chloride flush, 5-40 mL, PRN  sodium chloride, , PRN  oxyCODONE-acetaminophen, 1 tablet, Q4H PRN   Or  oxyCODONE-acetaminophen, 2 tablet, Q4H PRN  HYDROmorphone, 0.5 mg, Q2H PRN  droperidol, 0.625 mg, Q10 Min PRN  sodium chloride flush, 5-40 mL, PRN  glucose, 4 tablet, PRN  dextrose bolus, 125 mL, PRN   Or  dextrose bolus, 250 mL, PRN  glucagon (rDNA), 1 mg, PRN  dextrose, , Continuous PRN  sodium chloride flush, 5-40 mL, PRN  sodium chloride, , PRN  ondansetron, 4 mg, Q8H PRN   Or  ondansetron, 4 mg, Q6H PRN  polyethylene glycol, 17 g, Daily PRN  acetaminophen, 650 mg, Q6H PRN   Or  acetaminophen, 650 mg, Q6H PRN      Labs      Recent Results (from the past 24 hour(s))   POCT Glucose    Collection Time: 02/22/23 12:04 PM   Result Value Ref Range    POC Glucose 165 (H) 70 - 99 MG/DL   POCT Glucose    Collection Time: 02/22/23  5:18 PM   Result Value Ref Range    POC Glucose 171 (H) 70 - 99 MG/DL   POCT Glucose    Collection Time: 02/22/23  8:29 PM   Result Value Ref Range    POC Glucose 156 (H) 70 - 99 MG/DL   POCT Glucose    Collection Time: 02/23/23  8:22 AM   Result Value Ref Range    POC Glucose 104 (H) 70 - 99 MG/DL        Imaging/Diagnostics Last 24 Hours   XR KNEE RIGHT (1-2 VIEWS)    Result Date: 2/13/2023  EXAMINATION: TWO XRAY VIEWS OF THE RIGHT KNEE 2/13/2023 8:48 pm COMPARISON: None. HISTORY: ORDERING SYSTEM PROVIDED HISTORY: Randell Solis in fall, nonhealing TECHNOLOGIST PROVIDED HISTORY: Reason for exam:->nec fasc in fall, nonhealing Reason for Exam: Randell Mount in fall, nonhealing Additional signs and symptoms: none Relevant Medical/Surgical History: diabetes FINDINGS: Status post below-knee amputation. No acute periostitis or bony destruction. Suspected wound on anterior aspect of the stomach. The knee joint is maintained. No acute periostitis or bony destruction. Status post BKA. Comment: Please note this report has been produced using speech recognition software and may contain errors related to that system including errors in grammar, punctuation, and spelling, as well as words and phrases that may be inappropriate.  If there are any questions or concerns please feel free to contact the dictating provider for clarification.      Electronically signed by Naresh Mitchell MD on 2/23/2023 at 10:49 AM

## 2023-02-24 VITALS
HEART RATE: 61 BPM | SYSTOLIC BLOOD PRESSURE: 107 MMHG | RESPIRATION RATE: 16 BRPM | DIASTOLIC BLOOD PRESSURE: 68 MMHG | WEIGHT: 125.5 LBS | HEIGHT: 68 IN | TEMPERATURE: 97.4 F | OXYGEN SATURATION: 98 % | BODY MASS INDEX: 19.02 KG/M2

## 2023-02-24 LAB
GLUCOSE BLD-MCNC: 122 MG/DL (ref 70–99)
GLUCOSE BLD-MCNC: 122 MG/DL (ref 70–99)
GLUCOSE BLD-MCNC: 195 MG/DL (ref 70–99)

## 2023-02-24 PROCEDURE — 94761 N-INVAS EAR/PLS OXIMETRY MLT: CPT

## 2023-02-24 PROCEDURE — 2580000003 HC RX 258: Performed by: NURSE PRACTITIONER

## 2023-02-24 PROCEDURE — 82962 GLUCOSE BLOOD TEST: CPT

## 2023-02-24 PROCEDURE — 97530 THERAPEUTIC ACTIVITIES: CPT

## 2023-02-24 PROCEDURE — 6370000000 HC RX 637 (ALT 250 FOR IP): Performed by: STUDENT IN AN ORGANIZED HEALTH CARE EDUCATION/TRAINING PROGRAM

## 2023-02-24 PROCEDURE — 94150 VITAL CAPACITY TEST: CPT

## 2023-02-24 PROCEDURE — 99024 POSTOP FOLLOW-UP VISIT: CPT | Performed by: SURGERY

## 2023-02-24 PROCEDURE — 6370000000 HC RX 637 (ALT 250 FOR IP): Performed by: SURGERY

## 2023-02-24 PROCEDURE — 6360000002 HC RX W HCPCS: Performed by: SURGERY

## 2023-02-24 PROCEDURE — 97535 SELF CARE MNGMENT TRAINING: CPT

## 2023-02-24 PROCEDURE — 97110 THERAPEUTIC EXERCISES: CPT

## 2023-02-24 PROCEDURE — 87635 SARS-COV-2 COVID-19 AMP PRB: CPT

## 2023-02-24 RX ORDER — CYCLOBENZAPRINE HCL 10 MG
10 TABLET ORAL 3 TIMES DAILY PRN
Qty: 30 TABLET | Refills: 0 | Status: SHIPPED | OUTPATIENT
Start: 2023-02-24 | End: 2023-03-06

## 2023-02-24 RX ORDER — ERGOCALCIFEROL 1.25 MG/1
50000 CAPSULE ORAL WEEKLY
Qty: 5 CAPSULE | Refills: 0 | Status: SHIPPED | OUTPATIENT
Start: 2023-02-28 | End: 2023-04-05

## 2023-02-24 RX ORDER — OXYCODONE HYDROCHLORIDE AND ACETAMINOPHEN 5; 325 MG/1; MG/1
1 TABLET ORAL EVERY 4 HOURS PRN
Qty: 30 TABLET | Refills: 0 | Status: SHIPPED | OUTPATIENT
Start: 2023-02-24 | End: 2023-03-01

## 2023-02-24 RX ADMIN — SODIUM CHLORIDE, PRESERVATIVE FREE 10 ML: 5 INJECTION INTRAVENOUS at 09:16

## 2023-02-24 RX ADMIN — OXYCODONE AND ACETAMINOPHEN 2 TABLET: 5; 325 TABLET ORAL at 14:32

## 2023-02-24 RX ADMIN — GABAPENTIN 300 MG: 300 CAPSULE ORAL at 14:02

## 2023-02-24 RX ADMIN — OXYCODONE AND ACETAMINOPHEN 2 TABLET: 5; 325 TABLET ORAL at 06:31

## 2023-02-24 RX ADMIN — OXYCODONE AND ACETAMINOPHEN 2 TABLET: 5; 325 TABLET ORAL at 10:35

## 2023-02-24 RX ADMIN — GABAPENTIN 300 MG: 300 CAPSULE ORAL at 09:15

## 2023-02-24 RX ADMIN — ENOXAPARIN SODIUM 40 MG: 100 INJECTION SUBCUTANEOUS at 09:15

## 2023-02-24 RX ADMIN — OXYCODONE AND ACETAMINOPHEN 2 TABLET: 5; 325 TABLET ORAL at 18:41

## 2023-02-24 RX ADMIN — OXYCODONE AND ACETAMINOPHEN 2 TABLET: 5; 325 TABLET ORAL at 02:45

## 2023-02-24 RX ADMIN — ASPIRIN 81 MG 81 MG: 81 TABLET ORAL at 09:15

## 2023-02-24 RX ADMIN — DULOXETINE HYDROCHLORIDE 60 MG: 30 CAPSULE, DELAYED RELEASE ORAL at 09:15

## 2023-02-24 ASSESSMENT — PAIN SCALES - GENERAL
PAINLEVEL_OUTOF10: 10
PAINLEVEL_OUTOF10: 10
PAINLEVEL_OUTOF10: 8
PAINLEVEL_OUTOF10: 9
PAINLEVEL_OUTOF10: 8
PAINLEVEL_OUTOF10: 8

## 2023-02-24 ASSESSMENT — PAIN - FUNCTIONAL ASSESSMENT
PAIN_FUNCTIONAL_ASSESSMENT: PREVENTS OR INTERFERES SOME ACTIVE ACTIVITIES AND ADLS
PAIN_FUNCTIONAL_ASSESSMENT: PREVENTS OR INTERFERES SOME ACTIVE ACTIVITIES AND ADLS
PAIN_FUNCTIONAL_ASSESSMENT: ACTIVITIES ARE NOT PREVENTED
PAIN_FUNCTIONAL_ASSESSMENT: PREVENTS OR INTERFERES SOME ACTIVE ACTIVITIES AND ADLS
PAIN_FUNCTIONAL_ASSESSMENT: ACTIVITIES ARE NOT PREVENTED

## 2023-02-24 ASSESSMENT — PAIN DESCRIPTION - DESCRIPTORS
DESCRIPTORS: ACHING
DESCRIPTORS: BURNING;ACHING
DESCRIPTORS: ACHING
DESCRIPTORS: STABBING
DESCRIPTORS: ACHING

## 2023-02-24 ASSESSMENT — PAIN DESCRIPTION - LOCATION
LOCATION: LEG

## 2023-02-24 ASSESSMENT — PAIN SCALES - WONG BAKER
WONGBAKER_NUMERICALRESPONSE: 2

## 2023-02-24 ASSESSMENT — PAIN DESCRIPTION - ORIENTATION
ORIENTATION: RIGHT

## 2023-02-24 ASSESSMENT — PAIN DESCRIPTION - PAIN TYPE: TYPE: SURGICAL PAIN

## 2023-02-24 NOTE — DISCHARGE SUMMARY
V2.0  Discharge Summary    Name:  Royal Solis /Age/Sex: 1960 (3 y.o. male)   Admit Date: 2023  Discharge Date: 23    MRN & CSN:  [id number] & [id number] Encounter Date and Time 23 2:01 PM EST    Attending:  Jacey Fitch MD Discharging Provider: Jacey Fitch MD       Hospital Course:     Brief HPI: The patient states that several months ago the patient had a wound on the toes on the right foot ultimately turning into gangrenous tissue that was amputated. 1 thing led to another and all the digits were amputated with ultimate extension of the amputation to the foot and ultimately BKA happened. Patient states that patient has been trying to take care of the wound but for the last several days there has been oozing around the area and it is very tender to touch. Patient denies any chest pain shortness of breath dizziness lightheadedness    Brief Problem Based Course:      Right BKA stump wound infection:   X-ray of the right stump shows no bony erosion or gas. surgery on board. Patient previously treated for osteomyelitis had recent hospitalization with a wound VAC. Did leave  [address] prior to his hospital stay being completed. Surgical intervention on 2023, underwent above-knee amputation  Infectious disease consulted: Off IV antibiotics postop pain control: Has had difficult control pain in the past.  Use Percocet with IV Dilaudid for breakthrough. Also have patient on Cymbalta and gabapentin for neuropathic type pain. Will discharge patient on Percocet for 5 days, plan for discharge to SNF. Hypochloremic hyponatremia resolved     Chronic normocytic normochromic anemia in the setting of anemia of chronic disease hemoglobin stable during the hospitalization.   Coronary artery disease s/p CABG X4 history currently on DAPT at home     Hyperlipidemia on statin     Chronic GERD on Pepcid     Insulin-dependent diabetes mellitus type 2 complicated with peripheral neuropathy and PAD: Patient was not compliant with statin encouraged to do so. Takes Lantus 8 mg nightly at home hemoglobin A1c is 7.6. The patient expressed appropriate understanding of, and agreement with the discharge recommendations, medications, and plan. Consults this admission:  IP CONSULT TO GENERAL SURGERY  PHARMACY TO DOSE VANCOMYCIN  IP CONSULT TO INFECTIOUS DISEASES  IP CONSULT TO IV TEAM    Discharge Diagnosis:   Infection (chronic) of amputation stump ([address]        Discharge Instruction:   Follow up appointments:   Primary care physician: Robert Lowery MD within 2 weeks  Diet: cardiac diet   Activity: activity as tolerated  Disposition: Discharged to:   []Home, []C, [x]SNF, []Acute Rehab, []Hospice   Condition on discharge: Stable  Labs and Tests to be Followed up as an outpatient by PCP or Specialist:     Discharge Medications:        Medication List        START taking these medications      cyclobenzaprine 10 MG tablet  Commonly known as: FLEXERIL  Take 1 tablet by mouth 3 times daily as needed for Muscle spasms     oxyCODONE-acetaminophen 5-325 MG per tablet  Commonly known as: PERCOCET  Take 1 tablet by mouth every 4 hours as needed for Pain for up to 5 days. Max Daily Amount: 6 tablets     Vitamin D (Ergocalciferol) 32090 units Caps  Take 50,000 Units by mouth once a week for 6 doses  Start taking on: February 28, 2023            Molly Palacios taking these medications      aspirin 81 MG chewable tablet     blood glucose test strips strip  Commonly known as: FREESTYLE LITE  Test 3 times daily as needed. DULoxetine 20 MG extended release capsule  Commonly known as: CYMBALTA  Take 1 capsule by mouth daily     FreeStyle Lite Rakel  Apply 1 Device topically three times daily. gabapentin 300 MG capsule  Commonly known as: NEURONTIN  Take 2 capsules by mouth 3 times daily for 30 days.      * Gauze Pads & Dressings 2\"X2\" Pads  1 each by Does not apply route daily as needed (for wound care)     * Kerlix Gauze Roll Medium Misc  1 each by Does not apply route daily as needed (wound care)     Lantus SoloStar 100 UNIT/ML injection pen  Generic drug: insulin glargine  Inject 8 Units into the skin nightly     lidocaine 4 % external patch  Place 1 patch onto the skin daily     NovoLOG FlexPen 100 UNIT/ML injection pen  Generic drug: insulin aspart  Inject 2 Units into the skin 3 times daily (before meals) 10 units before each meal     Pen Needles 3/16\" 31G X 5 MM Misc  1 each by Does not apply route daily     SteriLance TL Misc  USE AS DIRECTED TO TEST BLOOD SUGAR THREE TIMES DAILY BEFORE MEALS           * This list has 2 medication(s) that are the same as other medications prescribed for you. Read the directions carefully, and ask your doctor or other care provider to review them with you.                 STOP taking these medications      clopidogrel 75 MG tablet  Commonly known as: PLAVIX     ibuprofen 400 MG tablet  Commonly known as: ADVIL;MOTRIN     levothyroxine 100 MCG tablet  Commonly known as: Synthroid     naproxen 500 MG tablet  Commonly known as: NAPROSYN            ASK your doctor about these medications      atorvastatin 40 MG tablet  Commonly known as: LIPITOR  Take 1 tablet by mouth nightly     famotidine 20 MG tablet  Commonly known as: PEPCID     nicotine 21 MG/24HR  Commonly known as: NICODERM CQ  Place 1 patch onto the skin daily               Where to Get Your Medications        These medications were sent to [address] [place] [address] [phone] - [phone]  [address] [place] [id number]      Phone: [phone]   cyclobenzaprine 10 MG tablet  Vitamin D (Ergocalciferol) 55441 units Caps       You can get these medications from any pharmacy    Bring a paper prescription for each of these medications  oxyCODONE-acetaminophen 5-325 MG per tablet        Objective Findings at Discharge:   /68   Pulse 61   Temp 97.4 °F (36.3 °C) (Axillary)   Resp 18   Ht 5' 8\" (1.727 m)   Wt 125 lb 8 oz (56.9 kg)   SpO2 98%   PF (!) 2 L/min   BMI 19.08 kg/m²       Physical Exam:   Physical Exam Constitutional:       General: He is not in acute distress. Appearance: Normal appearance. HENT:      Head: Normocephalic and atraumatic. Nose: Nose normal.      Mouth/Throat:      Mouth: Mucous membranes are moist.      Pharynx: Oropharynx is clear. Eyes:      Extraocular Movements: Extraocular movements intact. Conjunctiva/sclera: Conjunctivae normal.      Pupils: Pupils are equal, round, and reactive to light. Cardiovascular:      Rate and Rhythm: Normal rate and regular rhythm. Pulses: Normal pulses. Heart sounds: Normal heart sounds. Pulmonary:      Effort: Pulmonary effort is normal.   Abdominal:      General: Abdomen is flat. Bowel sounds are normal.      Palpations: Abdomen is soft. Musculoskeletal:         General: Tenderness and signs of injury present. No swelling. Normal range of motion. Cervical back: Normal range of motion and neck supple. Skin:     General: Skin is warm and dry. Findings: Lesion present. Neurological:      General: No focal deficit present. Mental Status: He is alert and oriented to person, place, and time. Mental status is at baseline. Psychiatric:         Mood and Affect: Mood normal.         Behavior: Behavior normal.         Thought Content: Thought content normal.         Labs and Imaging   XR KNEE RIGHT (1-2 VIEWS)    Result Date: 2/13/2023  EXAMINATION: TWO XRAY VIEWS OF THE RIGHT KNEE 2/13/2023 8:48 pm COMPARISON: None. HISTORY: ORDERING SYSTEM PROVIDED HISTORY: Epifanio Kuhn in fall, nonhealing TECHNOLOGIST PROVIDED HISTORY: Reason for exam:->nec fasc in fall, nonhealing Reason for Exam: Epifanio Kuhn in fall, nonhealing Additional signs and symptoms: none Relevant Medical/Surgical History: diabetes FINDINGS: Status post below-knee amputation.   No acute periostitis or bony destruction. Suspected wound on anterior aspect of the stomach. The knee joint is maintained. No acute periostitis or bony destruction. Status post BKA. CBC:   Recent Labs     02/22/23  0349   WBC 4.3   HGB 8.7*   *     BMP:    Recent Labs     02/22/23  0349      K 4.9      CO2 28   BUN 25*   CREATININE 0.9   GLUCOSE 158*     Hepatic: No results for input(s): AST, ALT, ALB, BILITOT, ALKPHOS in the last 72 hours. Lipids:   Lab Results   Component Value Date/Time    CHOL 179 11/24/2021 08:49 AM    HDL 95 11/24/2021 08:49 AM    TRIG 80 11/24/2021 08:49 AM     Hemoglobin A1C:   Lab Results   Component Value Date/Time    LABA1C 7.6 02/13/2023 08:45 PM     TSH: No results found for: TSH  Troponin:   Lab Results   Component Value Date/Time    TROPONINT <0.010 08/26/2018 03:00 AM    TROPONINT <0.010 07/29/2015 08:56 AM    TROPONINT <0.010 05/15/2015 01:20 PM     Lactic Acid: No results for input(s): LACTA in the last 72 hours. BNP: No results for input(s): PROBNP in the last 72 hours.   UA:  Lab Results   Component Value Date/Time    NITRU NEGATIVE 09/02/2022 07:50 PM    COLORU YELLOW 09/02/2022 07:50 PM    WBCUA NONE SEEN 09/02/2022 07:50 PM    RBCUA NONE SEEN 09/02/2022 07:50 PM    MUCUS RARE 09/02/2022 07:50 PM    TRICHOMONAS NONE SEEN 09/02/2022 07:50 PM    BACTERIA NEGATIVE 09/02/2022 07:50 PM    CLARITYU CLEAR 09/02/2022 07:50 PM    SPECGRAV 1.025 09/02/2022 07:50 PM    LEUKOCYTESUR NEGATIVE 09/02/2022 07:50 PM    UROBILINOGEN NORMAL 09/02/2022 07:50 PM    BILIRUBINUR NEGATIVE 09/02/2022 07:50 PM    BLOODU NEGATIVE 09/02/2022 07:50 PM    KETUA NEGATIVE 09/02/2022 07:50 PM     Urine Cultures: No results found for: LABURIN  Blood Cultures: No results found for: BC  No results found for: BLOODCULT2  Organism: No results found for: ORG    Time Spent Discharging patient 35 minutes    Electronically signed by Panchito Argueta MD on 2/24/2023 at 2:01 PM

## 2023-02-24 NOTE — PROGRESS NOTES
S: No acute events overnight. Pain controlled. No issues with stump at this point. O:  Vitals:    02/24/23 0914   BP: 107/68   Pulse: 61   Resp: 20   Temp: 97.4 °F (36.3 °C)   SpO2: 98%     General: No acute distress  Respiratory: Chest rise equal bilaterally  CV: Appears well perfused   Abdomen: Soft, nontender, nondistended, no rebound or guarding  BKA stump with incision clean/dry/intact. No erythema. No induration. No hematoma. Staples in place. A/P:  Dressing changes daily to protect stump-can wrap with Kerlix. Stump healing well without signs of infection.

## 2023-02-24 NOTE — CARE COORDINATION
Student called Franci to check on progress with Precert. Waiting on call back. 1:04 Franci called student back and let her know that pt is able to go to SNF today. Transport needs to be setup for after 7pm.    If pt is discharged afterhours, please completethe following: Call report to [phone], fax AVS with both MARCIAL's and any written Rx to [phone]. Set up transportation [facility]. Call Family. Packet started. Student will complete PASS.

## 2023-02-24 NOTE — DISCHARGE INSTR - COC
Continuity of Care Form    Patient Name: Cindy Duarte   :  1960  MRN:  [id number]    Admit date:  2023  Discharge date:  2023    Code Status Order: Full Code   Advance Directives:   [facility] Documentation       Date/Time Healthcare Directive Type of Healthcare Directive Copy in [address] Agent's Name Healthcare Agent's Phone Number    23 1128 No, patient does not have an advance directive for healthcare treatment -- -- -- -- --            Admitting Physician:  No admitting provider for patient encounter. PCP: Robert Lowery MD    Discharging Nurse: [facility] Unit/Room#: 0054/0492-M  Discharging Unit Phone Number: [phone]    Emergency Contact:   Extended Emergency Contact Information  Primary Emergency Contact: Mylene Orellana  Address: [address] [place] [address] Phone: [phone]  Relation: Domestic Partner  Secondary Emergency Contact: [facility] Phone: [phone]  Relation: Child    Past Surgical History:  Past Surgical History:   Procedure Laterality Date    CORONARY ARTERY BYPASS GRAFT  12    DENTAL SURGERY  2010    All upper teeth and some teeth on the bottom extracted.     FOOT DEBRIDEMENT Right 2022    RIGHT FOOT WOUND DEBRIDEMENT, WOUND VAC PLACEMENT with Dr. Solis Varma at [address] [place] [address] Right 2022    RIGHT FOOT WOUND DEBRIDEMENT, WOUND VAC PLACEMENT performed by Rena Hightower DO at [address]  15 yrs ago    right thumb    LEG AMPUTATION BELOW KNEE Right 2022    LEG AMPUTATION BELOW KNEE performed by Rena Hightower DO at [address] Right 2023    LEG AMPUTATION ABOVE KNEE performed by Rena Hightower DO at [address] Right 2022    BKA WOUND DEBRIDEMENT INCISION AND DRAINAGE WITH WOUND VAC AND PURAPLY APPLICATION performed by Rena Hightower DO at [facility] OR    TOE AMPUTATION Right 3/31/2020    RIGHT 5TH TOE AMPUTATION AND WOUND VAC PLACEMENT performed by Gurvinder Mason MD at [address] Right 11/5/2021    RIGHT GREAT TOE AMPUTATION performed by Jesus Stanford MD at [facility] OR       Immunization History:   Immunization History   Administered Date(s) Administered    Influenza Virus Vaccine 12/02/2013    Pneumococcal Polysaccharide ([id number]) 06/14/2013       Active Problems:  Patient Active Problem List   Diagnosis Code    Diabetes mellitus E11.9    H/O echocardiogram Z92.89    S/P CABG (coronary artery bypass graft) Z95.1    Chest pain R07.9    Cellulitis L03.90    Heroin withdrawal ([facility]) F11.93    CAD (coronary artery disease) I25.10    Polysubstance abuse ([facility]) F19.10    Small bowel obstruction ([address] K56.609    Narcotic abuse, continuous ([facility]) F11.10    Hx of hepatitis Z86.19    Epigastric pain R10.13    Diarrhea R19.7    Constipation with Ileus K59.00    Vomiting of fecal matter with nausea R11.13    Encephalopathy U41.31    Metabolic encephalopathy O69.58    Infectious gastroenteritis A09    Bilateral leg weakness R29.898    Gait disturbance R26.9    Left carotid stenosis I65.22    Hypothyroidism E03.9    Osteomyelitis ([facility]) M86.9    Uncontrolled type II diabetes with peripheral autonomic neuropathy [id number]    Gangrene of toe ([facility]) I96    Acute hematogenous osteomyelitis of right foot ([facility]) M86.071    Amputation of little toe ([address] I82.826G    PAD (peripheral artery disease) ([facility]) I73.9    Uncontrolled pain R52    Generalized weakness R53.1    Simple chronic bronchitis ([facility]) J41.0    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 ([address] Z89.421    Skin ulcer with necrosis of muscle ([facility]) L98.493    Toe ulcer ([address] L97.509    Diabetic foot ([address] E11.8    Diabetic foot infection ([address] E11.628, L08.9    Abscess of right foot L02.611    WD-Diabetic ulcer of right midfoot associated with type 2 diabetes mellitus, with muscle involvement without evidence of necrosis ([facility] E11.621, L97.415    Necrotizing fasciitis ([facility] M72.6    Bacteremia due to group B Streptococcus R78.81, B95.1    Gangrene of right foot ([facility] I96    Osteomyelitis of right lower limb ([facility] M86.9    Acute blood loss anemia D62    Uncontrolled type 2 diabetes mellitus with peripheral neuropathy [id number]    Essential hypertension I10    Hyponatremia E87.1    Cigarette nicotine dependence without complication C21.812    Thrombocytopenia (HCC) D69.6    Open wound T14. 8XXA    Infection (chronic) of amputation stump ([facility] T87.40    Surgical wound, non healing T81.89XA    Moderate malnutrition (HCC) E44.0       Isolation/Infection:   Isolation            No Isolation          Patient Infection Status       None to display            Nurse Assessment:  Last Vital Signs: /68   Pulse 61   Temp 97.4 °F (36.3 °C) (Axillary)   Resp 18   Ht 5' 8\" (1.727 m)   Wt 125 lb 8 oz (56.9 kg)   SpO2 98%   PF (!) 2 L/min   BMI 19.08 kg/m²     Last documented pain score (0-10 scale): Pain Level: 8  Last Weight:   Wt Readings from Last 1 Encounters:   02/24/23 125 lb 8 oz (56.9 kg)     Mental Status:  oriented, alert, and thought processes intact    IV Access:  - None    Nursing Mobility/ADLs:  Walking   Dependent  Transfer  Assisted  Bathing  Assisted  Dressing  Assisted  Toileting  Assisted  Feeding  Independent  Med Admin  Assisted  Med Delivery   whole    Wound Care Documentation and Therapy:  Wound 12/23/22 Pretibial Proximal Right amp site (Active)   Number of days: 63       Incision 02/14/23 Thigh Anterior;Distal;Right (Active)   Dressing Status New dressing applied 02/24/23 1035   Dressing Change Due 02/23/23 02/23/23 1946   Incision Cleansed Cleansed with saline 02/23/23 1946   Dressing/Treatment ABD pad;Roll gauze; Ace wrap 02/24/23 1035   Incision Length (cm) 0.1 02/20/23 1508   Incision Width (cm) 16.6 cm 02/20/23 1508   Incision Depth (cm) 0.1 cm 02/20/23 1508   Closure Staples 02/24/23 1035   Margins Approximated 02/24/23 1035   Incision Assessment Epithelialization 02/24/23 1035   Drainage Amount None 02/24/23 1035   Odor None 02/24/23 1035   Sindhu-incision Assessment Intact; Warm 02/22/23 0200   Number of days: 10        Elimination:  Continence: Bowel: Yes  Bladder: Yes  Urinary Catheter: None   Colostomy/Ileostomy/Ileal Conduit: No       Date of Last BM: 2/24/2023    Intake/Output Summary (Last 24 hours) at 2/24/2023 1413  Last data filed at 2/24/2023 1035  Gross per 24 hour   Intake --   Output 1000 ml   Net -1000 ml     No intake/output data recorded. Safety Concerns:     History of Falls (last 30 days)    Impairments/Disabilities:      None      Patient's personal belongings (please select all that are sent with patient):  None    RN SIGNATURE:  Electronically signed by Brooks Halsted, RN on 2/24/23 at 3:02 PM EST          PHYSICIAN SECTION    Prognosis: Guarded    Condition at Discharge: Stable    Rehab Potential (if transferring to Rehab): Fair    Recommended Labs or Other Treatments After Discharge:     Nutrition Therapy:  Current Nutrition Therapy:   - Oral Diet:  General    Routes of Feeding: Oral  Liquids: No Restrictions  Daily Fluid Restriction: no  Last Modified Barium Swallow with Video (Video Swallowing Test): not done    Treatments at the Time of Hospital Discharge:   Respiratory Treatments:   Oxygen Therapy:  is not on home oxygen therapy.   Ventilator:    - No ventilator support    Rehab Therapies: Physical Therapy, Occupational Therapy, and Orthotics/Prosthetics  Weight Bearing Status/Restrictions: No weight bearing restrictions  Other Medical Equipment (for information only, NOT a DME order):  wheelchair, cane, and walker  Other Treatments:     Physician Certification: I certify the above information and [facility]  is necessary for the continuing treatment of the diagnosis listed and that he requires Kyle Tang for greater 30 days.     Update Admission H&P: No change in H&P    PHYSICIAN SIGNATURE:  Electronically signed by Cecelia Bradley MD on 2/24/23 at 2:18 PM EST

## 2023-02-24 NOTE — PROGRESS NOTES
Noted pt unable to discharge to [facility] until after 7 pm though called report to [facility] RN with all questions answered. 7:23 PM  Pt discharged to [facility] via [facility]. Pt EMMIE/Mylene updated of transportation approximately a hour ago.

## 2023-02-24 NOTE — PROGRESS NOTES
Physical Therapy    Physical Therapy Treatment Note  Name: Letitia Garrison MRN: [id number] :   1960   Date:  2023   Admission Date: 2023 Room:  [address] [place] [address]-A   Restrictions/Precautions:          general precautions, fall risk, AKA 2023  Communication with other providers:    Subjective:  Patient states:  pt agreeable to tx  Pain:   Location, Type, Intensity (0/10 to 10/10):  5-6/10    Objective:    Observation:  sitting EOB upon entry. Gauze and acewrap not on residual limb. Notified nurse. Treatment, including education/measures:  Therapeutic Ex  Supine:  2x10 reps hip flex  2x10 reps glut sets  2x10 reps bridging  X4x8\" hold hip flexor stretch  Sidelying:  2x10 reps right hip ext. in side lying /c pelvic stability per PTA  2x10 reps right abd in side lying pelvic stability/tc per PTA  Prone:  Hip ext 2x10 /c vc/tc for glute activation  Vc for proper form, tc for muscle recruitment in ROM for all ex's  Bed Mob  Bed mob training /c rolling side<>side and onto stomach for prone. Vc for safe techs. Vc also for R stump positioning to encourage neutral hip alignment and reduce chance of contracture. Safety  Patient left safely in the bed, with call light/phone in reach with alarm applied. Left in care of nurse who begins wrapping pt stump. Assessment / Impression:    Pt has good AROM. Struggles more /c prone hip ext but able to recruit glute muscles, hip ext AROM more optimal in side lying. Patient's tolerance of treatment:  good   Adverse Reaction: na  Significant change in status and impact:  na  Barriers to improvement:  strength and safety  Plan for Next Session:    Cont.  POC  Time in:  1005  Time out:  1040  Timed treatment minutes:  30  Total treatment time:  35    Previously filed items:           Short Term Goals  Time Frame for Short Term Goals: 1 week  Short Term Goal 1: Pt to complete all bed mobility mod I  Short Term Goal 2: pt to complete STS transfer to/from bed, commode, and chair mod A  Short Term Goal 3: Pt to ambulate 22' with LRAD mod A    Electronically signed by:    Anastasia Torres, IRWIN PTA  2/24/2023, 10:13 AM

## 2023-02-24 NOTE — PROGRESS NOTES
Occupational Therapy  . Occupational Therapy Treatment Note    Name: Simba Calle MRN: [id number] :   1960   Date:  2023   Admission Date: 2023 Room:  [facility]/3[facility]-A     Primary Problem:  The primary encounter diagnosis was Non-healing surgical wound, subsequent encounter. Diagnoses of Hyponatremia, Wound infection, and Infection were also pertinent to this visit. Restrictions/Precautions:          general precautions, fall risk, R AKA     Communication with other providers: Per chart review, patient is appropriate for therapeutic intervention. Nurse Radha      Subjective:  Patient states:  Pt agreeable to OT Tx session. Pain:   Location, Type, Intensity (0/10 to 10/10):  Unrated, \"it's bad, it's always bad\" (R AKA)    Objective:    Observation: Pt received supine in bed, A&O to self/situation, actively participated. Objective Measures:  Telemetry, HR 84    Treatment, including education:  Therapeutic Activity Training:   Therapeutic activity training was instructed today. Cues were given for safety, sequence, UE/LE placement, awareness, and balance. Activities performed today included bed mobility training, sup-sit, sit-stand, SPT. Supine<>sit: Indep  Scooting: Indep, use of good safety awareness     Sitting balance / tolerance: Indep to sit EOB, Sup on toilet    Sit to stands: Varied CGA to Min A c RW (varied c surface height and fatigue: see toilet transfer). Stand to sit: SBA c RW  Required occasional cue for hand placement during sit<>stands. Functional Mobility: CGA c RW inside room to access toilet, bathroom sink, and return to bed. Standing balance / tolerance: Intermittent CGA for steadying assist during dynamic reaching for tasks such as hand hygiene, clothing mgmt, SBA c RW for static stands, tolerated to stands 3-4 minutes each. Self Care Training:   Cues were given for safety, sequence, UE/LE placement, visual cues, and balance.     Activities performed today included toileting, hand hygiene at sink    Toilet Transfer: Min A (for sit to stand from toilet s/p prolonged efforts for BM) / intermittent CGA/SBA for approach to target and stand to sit portion c RW. Pt require occasional cue for safe body positioning. Toileting: Min A for assist to right-side back of pants during clothing hike s/p pt exhibited decreased activity tolerance, CGA for steadying throughout pt's efforts to perform clothing mgmt down and two attempts for completion of hike of pants. No assistance needed for seated hygiene. Intermittent CGA c RW during hand hygiene at sink. All therapeutic intervention performed c emphasis on dynamic balance / standing tolerance to inc strength, endurance and act tolerance for inc Indep c ADL tasks, func transfers / mobility. Safety  Patient safely supine in bed at end of session, with call light/phone in reach, and nurse Radha aware no alarm, pt verbalizes will not attempt getting up w/o assistance. Gait belt was used for func transfers / mobility. Assessment / Impression:    Patient's tolerance of treatment: Well  Adverse Reaction: None  Significant change in status and impact: Improved from initial evaluation  Barriers to improvement: None noted      Plan for Next Session:    Continue per OT POC c emphasis on standing balance / tolerance during ADL tasks.     Time in:  1335  Time out:  1409  Timed treatment minutes:  34  Total treatment time:  34      Electronically signed by:    TARIQ Reyes  2/24/2023, 1:31 PM    Goals:  Time frame for goals: 2 weeks     Pt will complete grooming tasks with CGA at standing level   Pt will complete toileting tasks with ModA using BSC  Pt will complete UB dressing tasks with SetupA seated EOB   Pt will complete LB dressing tasks with ModA seated EOB   Pt will complete UB bathing tasks with SetupA seated and AE PRN   Pt will complete LB bathing tasks with Jesusita seated and AE PRN   Pt will complete therapeutic exercise/activity to increase independence in ADL/IADL function  Pt will practice functional transfers and mobility with AD for increased safety and independence

## 2023-02-24 NOTE — CARE COORDINATION
Pt has a discharge order. [place] to  pt at 7:00. LSW will fax AVS with both MARCIAL once RN has completed her part of the [address]. PASS completed.

## 2023-02-28 ENCOUNTER — HOSPITAL ENCOUNTER (OUTPATIENT)
Age: 63
Setting detail: SPECIMEN
Discharge: HOME | End: 2023-02-28

## 2023-02-28 LAB
ALBUMIN SERPL-MCNC: 2.8 GM/DL (ref 3.4–5)
ALP BLD-CCNC: 178 IU/L (ref 40–128)
ALT SERPL-CCNC: 29 U/L (ref 10–40)
ANION GAP SERPL CALCULATED.3IONS-SCNC: 14 MMOL/L (ref 4–16)
AST SERPL-CCNC: 58 IU/L (ref 15–37)
BASOPHILS ABSOLUTE: 0.1 K/CU MM
BASOPHILS RELATIVE PERCENT: 1.1 % (ref 0–1)
BILIRUB SERPL-MCNC: 0.2 MG/DL (ref 0–1)
BUN SERPL-MCNC: 25 MG/DL (ref 6–23)
CALCIUM SERPL-MCNC: 7.7 MG/DL (ref 8.3–10.6)
CHLORIDE BLD-SCNC: 102 MMOL/L (ref 99–110)
CO2: 21 MMOL/L (ref 21–32)
CREAT SERPL-MCNC: 0.8 MG/DL (ref 0.9–1.3)
DIFFERENTIAL TYPE: ABNORMAL
EOSINOPHILS ABSOLUTE: 0.1 K/CU MM
EOSINOPHILS RELATIVE PERCENT: 1.6 % (ref 0–3)
GFR SERPL CREATININE-BSD FRML MDRD: >60 ML/MIN/1.73M2
GLUCOSE SERPL-MCNC: 178 MG/DL (ref 70–99)
HCT VFR BLD CALC: 28.7 % (ref 42–52)
HEMOGLOBIN: 9.4 GM/DL (ref 13.5–18)
IMMATURE NEUTROPHIL %: 0.7 % (ref 0–0.43)
LYMPHOCYTES ABSOLUTE: 1.4 K/CU MM
LYMPHOCYTES RELATIVE PERCENT: 25.1 % (ref 24–44)
MCH RBC QN AUTO: 28.7 PG (ref 27–31)
MCHC RBC AUTO-ENTMCNC: 32.8 % (ref 32–36)
MCV RBC AUTO: 87.8 FL (ref 78–100)
MONOCYTES ABSOLUTE: 0.6 K/CU MM
MONOCYTES RELATIVE PERCENT: 11.3 % (ref 0–4)
NUCLEATED RBC %: 0 %
PDW BLD-RTO: 14.9 % (ref 11.7–14.9)
PLATELET # BLD: 156 K/CU MM (ref 140–440)
PMV BLD AUTO: 10.3 FL (ref 7.5–11.1)
POTASSIUM SERPL-SCNC: 4 MMOL/L (ref 3.5–5.1)
RBC # BLD: 3.27 M/CU MM (ref 4.6–6.2)
SEGMENTED NEUTROPHILS ABSOLUTE COUNT: 3.4 K/CU MM
SEGMENTED NEUTROPHILS RELATIVE PERCENT: 60.2 % (ref 36–66)
SODIUM BLD-SCNC: 137 MMOL/L (ref 135–145)
TOTAL IMMATURE NEUTOROPHIL: 0.04 K/CU MM
TOTAL NUCLEATED RBC: 0 K/CU MM
TOTAL PROTEIN: 6.4 GM/DL (ref 6.4–8.2)
WBC # BLD: 5.7 K/CU MM (ref 4–10.5)

## 2023-02-28 PROCEDURE — 85025 COMPLETE CBC W/AUTO DIFF WBC: CPT

## 2023-02-28 PROCEDURE — 36415 COLL VENOUS BLD VENIPUNCTURE: CPT

## 2023-02-28 PROCEDURE — 80053 COMPREHEN METABOLIC PANEL: CPT

## 2023-03-08 ENCOUNTER — OFFICE VISIT (OUTPATIENT)
Dept: SURGERY | Age: 63
End: 2023-03-08

## 2023-03-08 VITALS
BODY MASS INDEX: 19.08 KG/M2 | DIASTOLIC BLOOD PRESSURE: 70 MMHG | HEART RATE: 75 BPM | HEIGHT: 68 IN | SYSTOLIC BLOOD PRESSURE: 148 MMHG | OXYGEN SATURATION: 99 %

## 2023-03-08 DIAGNOSIS — Z48.89 POSTOPERATIVE VISIT: Primary | ICD-10-CM

## 2023-03-08 RX ORDER — OXYCODONE HYDROCHLORIDE 5 MG/1
TABLET ORAL EVERY 6 HOURS PRN
COMMUNITY
Start: 2015-12-18

## 2023-03-08 RX ORDER — POLYETHYLENE GLYCOL 3350 17 G/17G
POWDER, FOR SOLUTION ORAL DAILY
COMMUNITY
Start: 2015-12-18
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
## 2023-03-10 ENCOUNTER — TELEPHONE (OUTPATIENT)
Dept: SURGERY | Age: 63
End: 2023-03-10

## 2023-03-10 RX ORDER — OXYCODONE HYDROCHLORIDE AND ACETAMINOPHEN 5; 325 MG/1; MG/1
1 TABLET ORAL EVERY 6 HOURS PRN
Qty: 20 TABLET | Refills: 0 | Status: SHIPPED | OUTPATIENT
Start: 2023-03-10 | End: 2023-03-15

## 2023-03-10 NOTE — TELEPHONE ENCOUNTER
Dr. Renetta Flores called in pain medication for Henry Rodriguez.  Called and informed wife of information

## 2023-04-16 NOTE — PROGRESS NOTES
GENERAL SURGERY PROGRESS NOTE    Letitia Garrison is a 60 y.o. male status post debridement of BKA stump wound. Subjective:    No acute events overnight. Wound VAC replaced by wound care today. Patient without new complaints today. Objective:    Vitals: VITALS:  BP (!) 152/60   Pulse 61   Temp 98.4 °F (36.9 °C) (Oral)   Resp 18   Ht 5' 7\" (1.702 m)   Wt 145 lb (65.8 kg)   SpO2 97%   BMI 22.71 kg/m²     I/O: 12/22 0701 - 12/23 0700  In: 395 [P.O.:395]  Out: 1700 [Urine:1700]    Labs/Imaging Results:   Lab Results   Component Value Date/Time     12/23/2022 04:15 AM    K 4.3 12/23/2022 04:15 AM     12/23/2022 04:15 AM    CO2 23 12/23/2022 04:15 AM    BUN 23 12/23/2022 04:15 AM    CREATININE 0.8 12/23/2022 04:15 AM    GLUCOSE 176 12/23/2022 04:15 AM    CALCIUM 7.9 12/23/2022 04:15 AM      Lab Results   Component Value Date    WBC 5.5 12/23/2022    HGB 9.8 (L) 12/23/2022    HCT 28.9 (L) 12/23/2022    MCV 86.5 12/23/2022    PLT 85 (L) 12/23/2022          IV Fluids:   sodium chloride Last Rate: 40 mL/hr at 12/21/22 0802    dextrose    Scheduled Meds:   ceFAZolin, 2,000 mg, IntraVENous, 3 times per day    senna, 1 tablet, Oral, Nightly    insulin lispro, 4 Units, SubCUTAneous, TID WC    insulin lispro, 0-4 Units, SubCUTAneous, TID WC    insulin lispro, 0-4 Units, SubCUTAneous, Nightly    sodium chloride flush, 5-40 mL, IntraVENous, 2 times per day    enoxaparin, 40 mg, SubCUTAneous, Daily    nicotine, 1 patch, TransDERmal, Daily    aspirin, 81 mg, Oral, Daily    gabapentin, 600 mg, Oral, TID    insulin glargine, 8 Units, SubCUTAneous, Nightly    atorvastatin, 40 mg, Oral, Nightly    Physical Exam:  General: A&O x 3, no distress. HEENT: Anicteric sclerae, MMM. Extremities: No edema bilat LE. Abdomen: Soft, nondistended, nontender   Right BKA stump with wound VAC in place.       Assessment and Plan:     Patient Active Problem List:     Diabetes mellitus     H/O echocardiogram     S/P CABG (coronary artery bypass graft)     Chest pain     Cellulitis     Heroin withdrawal (HCC)     CAD (coronary artery disease)     Polysubstance abuse (HCC)     Small bowel obstruction (HCC)     Narcotic abuse, continuous (HCC)     Hx of hepatitis     Epigastric pain     Diarrhea     Constipation with Ileus     Vomiting of fecal matter with nausea     Encephalopathy     Metabolic encephalopathy     Infectious gastroenteritis     Bilateral leg weakness     Gait disturbance     Left carotid stenosis     Hypothyroidism     Osteomyelitis (HCC)     Uncontrolled type II diabetes with peripheral autonomic neuropathy     Gangrene of toe (HCC)     Acute hematogenous osteomyelitis of right foot (HCC)     Amputation of little toe (HCC)     PAD (peripheral artery disease) (HCC)     Uncontrolled pain     Generalized weakness     Simple chronic bronchitis (HCC)     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 (HCC)     Skin ulcer with necrosis of muscle (HCC)     Toe ulcer ([address]     Diabetic foot ([address]     Diabetic foot infection ([address]     Abscess of right foot     WD-Diabetic ulcer of right midfoot associated with type 2 diabetes mellitus, with muscle involvement without evidence of necrosis ([address]     Necrotizing fasciitis ([address]     Bacteremia due to group B Streptococcus     Gangrene of right foot ([address]     Osteomyelitis of right lower limb ([address]     Acute blood loss anemia     Uncontrolled type 2 diabetes mellitus with peripheral neuropathy     Essential hypertension     Hyponatremia     Cigarette nicotine dependence without complication     Thrombocytopenia (HCC)     Open wound      Principal Problem:    Osteomyelitis ([address]  Plan: Active Problems: Thrombocytopenia ([address]  Plan:       Open wound  Plan:     MRI noted. Continue antibiotics. We will continue with local wound care. He needs to follow-up with wound care clinic on discharge. He was noncompliant with this after last discharge/issue with stump.   Also needs avoid walker as he has had 2 falls with this. PT/OT/SW for DC/mobility needs.   Will eventually get repeat MRI as outpatient as wound continues to heal.      Toni Adams, DO .

## 2023-05-25 ENCOUNTER — APPOINTMENT (OUTPATIENT)
Dept: CT IMAGING | Age: 63
DRG: 854 | End: 2023-05-25
Payer: MEDICARE

## 2023-05-25 ENCOUNTER — HOSPITAL ENCOUNTER (INPATIENT)
Age: 63
LOS: 6 days | Discharge: HOME | DRG: 854 | End: 2023-05-31
Attending: EMERGENCY MEDICINE | Admitting: STUDENT IN AN ORGANIZED HEALTH CARE EDUCATION/TRAINING PROGRAM
Payer: MEDICARE

## 2023-05-25 DIAGNOSIS — N10 ACUTE PYELONEPHRITIS: Primary | ICD-10-CM

## 2023-05-25 DIAGNOSIS — R73.9 HYPERGLYCEMIA: ICD-10-CM

## 2023-05-25 DIAGNOSIS — K74.60 CIRRHOSIS OF LIVER WITH ASCITES, UNSPECIFIED HEPATIC CIRRHOSIS TYPE (HCC): ICD-10-CM

## 2023-05-25 DIAGNOSIS — K80.20 CALCULUS OF GALLBLADDER WITHOUT CHOLECYSTITIS WITHOUT OBSTRUCTION: ICD-10-CM

## 2023-05-25 DIAGNOSIS — R18.8 CIRRHOSIS OF LIVER WITH ASCITES, UNSPECIFIED HEPATIC CIRRHOSIS TYPE (HCC): ICD-10-CM

## 2023-05-25 LAB
ALBUMIN SERPL-MCNC: 2.1 GM/DL (ref 3.4–5)
ALP BLD-CCNC: 160 IU/L (ref 40–129)
ALT SERPL-CCNC: 28 U/L (ref 10–40)
ANION GAP SERPL CALCULATED.3IONS-SCNC: 11 MMOL/L (ref 4–16)
AST SERPL-CCNC: 36 IU/L (ref 15–37)
BACTERIA: ABNORMAL /HPF
BASOPHILS ABSOLUTE: 0.1 K/CU MM
BASOPHILS RELATIVE PERCENT: 0.4 % (ref 0–1)
BILIRUB SERPL-MCNC: 0.6 MG/DL (ref 0–1)
BILIRUBIN URINE: NEGATIVE MG/DL
BLOOD, URINE: ABNORMAL
BUN SERPL-MCNC: 22 MG/DL (ref 6–23)
CALCIUM SERPL-MCNC: 7.5 MG/DL (ref 8.3–10.6)
CHLORIDE BLD-SCNC: 93 MMOL/L (ref 99–110)
CLARITY: ABNORMAL
CO2: 21 MMOL/L (ref 21–32)
COLOR: YELLOW
CREAT SERPL-MCNC: 0.9 MG/DL (ref 0.9–1.3)
DIFFERENTIAL TYPE: ABNORMAL
EOSINOPHILS ABSOLUTE: 0 K/CU MM
EOSINOPHILS RELATIVE PERCENT: 0.2 % (ref 0–3)
GFR SERPL CREATININE-BSD FRML MDRD: >60 ML/MIN/1.73M2
GLUCOSE BLD-MCNC: 221 MG/DL (ref 70–99)
GLUCOSE SERPL-MCNC: 450 MG/DL (ref 70–99)
GLUCOSE, URINE: >1000 MG/DL
HCT VFR BLD CALC: 32.6 % (ref 42–52)
HEMOGLOBIN: 11.2 GM/DL (ref 13.5–18)
IMMATURE NEUTROPHIL %: 1.1 % (ref 0–0.43)
KETONES, URINE: NEGATIVE MG/DL
LACTIC ACID, SEPSIS: 1.5 MMOL/L (ref 0.5–1.9)
LEUKOCYTE ESTERASE, URINE: NEGATIVE
LIPASE: 78 IU/L (ref 13–60)
LYMPHOCYTES ABSOLUTE: 1.2 K/CU MM
LYMPHOCYTES RELATIVE PERCENT: 8.1 % (ref 24–44)
MCH RBC QN AUTO: 27.8 PG (ref 27–31)
MCHC RBC AUTO-ENTMCNC: 34.4 % (ref 32–36)
MCV RBC AUTO: 80.9 FL (ref 78–100)
MONOCYTES ABSOLUTE: 1.7 K/CU MM
MONOCYTES RELATIVE PERCENT: 12.2 % (ref 0–4)
NITRITE URINE, QUANTITATIVE: NEGATIVE
NUCLEATED RBC %: 0 %
PDW BLD-RTO: 15.5 % (ref 11.7–14.9)
PH, URINE: 5 (ref 5–8)
PLATELET # BLD: 148 K/CU MM (ref 140–440)
PMV BLD AUTO: 10.8 FL (ref 7.5–11.1)
POTASSIUM SERPL-SCNC: 3.3 MMOL/L (ref 3.5–5.1)
PROTEIN UA: >300 MG/DL
RBC # BLD: 4.03 M/CU MM (ref 4.6–6.2)
RBC URINE: 5 /HPF (ref 0–3)
SEGMENTED NEUTROPHILS ABSOLUTE COUNT: 11.1 K/CU MM
SEGMENTED NEUTROPHILS RELATIVE PERCENT: 78 % (ref 36–66)
SODIUM BLD-SCNC: 125 MMOL/L (ref 135–145)
SPECIFIC GRAVITY UA: 1.01 (ref 1–1.03)
SQUAMOUS EPITHELIAL: <1 /HPF
TOTAL IMMATURE NEUTOROPHIL: 0.15 K/CU MM
TOTAL NUCLEATED RBC: 0 K/CU MM
TOTAL PROTEIN: 6.6 GM/DL (ref 6.4–8.2)
TRICHOMONAS: ABNORMAL /HPF
UROBILINOGEN, URINE: 0.2 MG/DL (ref 0.2–1)
WBC # BLD: 14.2 K/CU MM (ref 4–10.5)
WBC CLUMP: ABNORMAL /HPF
WBC UA: 22 /HPF (ref 0–2)

## 2023-05-25 PROCEDURE — 87086 URINE CULTURE/COLONY COUNT: CPT

## 2023-05-25 PROCEDURE — 85025 COMPLETE CBC W/AUTO DIFF WBC: CPT

## 2023-05-25 PROCEDURE — 6360000002 HC RX W HCPCS: Performed by: EMERGENCY MEDICINE

## 2023-05-25 PROCEDURE — 82962 GLUCOSE BLOOD TEST: CPT

## 2023-05-25 PROCEDURE — 96365 THER/PROPH/DIAG IV INF INIT: CPT

## 2023-05-25 PROCEDURE — 96375 TX/PRO/DX INJ NEW DRUG ADDON: CPT

## 2023-05-25 PROCEDURE — 96376 TX/PRO/DX INJ SAME DRUG ADON: CPT

## 2023-05-25 PROCEDURE — 74177 CT ABD & PELVIS W/CONTRAST: CPT

## 2023-05-25 PROCEDURE — 6360000004 HC RX CONTRAST MEDICATION: Performed by: EMERGENCY MEDICINE

## 2023-05-25 PROCEDURE — 87088 URINE BACTERIA CULTURE: CPT

## 2023-05-25 PROCEDURE — 80053 COMPREHEN METABOLIC PANEL: CPT

## 2023-05-25 PROCEDURE — 1200000000 HC SEMI PRIVATE

## 2023-05-25 PROCEDURE — 2580000003 HC RX 258: Performed by: EMERGENCY MEDICINE

## 2023-05-25 PROCEDURE — 81001 URINALYSIS AUTO W/SCOPE: CPT

## 2023-05-25 PROCEDURE — 83690 ASSAY OF LIPASE: CPT

## 2023-05-25 PROCEDURE — 87186 SC STD MICRODIL/AGAR DIL: CPT

## 2023-05-25 PROCEDURE — 83605 ASSAY OF LACTIC ACID: CPT

## 2023-05-25 PROCEDURE — 87040 BLOOD CULTURE FOR BACTERIA: CPT

## 2023-05-25 PROCEDURE — 6370000000 HC RX 637 (ALT 250 FOR IP): Performed by: EMERGENCY MEDICINE

## 2023-05-25 PROCEDURE — 2500000003 HC RX 250 WO HCPCS: Performed by: EMERGENCY MEDICINE

## 2023-05-25 PROCEDURE — 96367 TX/PROPH/DG ADDL SEQ IV INF: CPT

## 2023-05-25 PROCEDURE — 80307 DRUG TEST PRSMV CHEM ANLYZR: CPT

## 2023-05-25 PROCEDURE — 87150 DNA/RNA AMPLIFIED PROBE: CPT

## 2023-05-25 PROCEDURE — 99285 EMERGENCY DEPT VISIT HI MDM: CPT

## 2023-05-25 RX ORDER — 0.9 % SODIUM CHLORIDE 0.9 %
770 INTRAVENOUS SOLUTION INTRAVENOUS ONCE
Status: COMPLETED | OUTPATIENT
Start: 2023-05-25 | End: 2023-05-26

## 2023-05-25 RX ORDER — 0.9 % SODIUM CHLORIDE 0.9 %
1000 INTRAVENOUS SOLUTION INTRAVENOUS ONCE
Status: COMPLETED | OUTPATIENT
Start: 2023-05-25 | End: 2023-05-25

## 2023-05-25 RX ORDER — DEXTROSE MONOHYDRATE 100 MG/ML
INJECTION, SOLUTION INTRAVENOUS CONTINUOUS PRN
Status: DISCONTINUED | OUTPATIENT
Start: 2023-05-25 | End: 2023-05-31 | Stop reason: HOSPADM

## 2023-05-25 RX ORDER — SODIUM CHLORIDE 0.9 % (FLUSH) 0.9 %
5-40 SYRINGE (ML) INJECTION EVERY 12 HOURS SCHEDULED
Status: DISCONTINUED | OUTPATIENT
Start: 2023-05-26 | End: 2023-05-31 | Stop reason: HOSPADM

## 2023-05-25 RX ORDER — SODIUM CHLORIDE, SODIUM LACTATE, POTASSIUM CHLORIDE, CALCIUM CHLORIDE 600; 310; 30; 20 MG/100ML; MG/100ML; MG/100ML; MG/100ML
INJECTION, SOLUTION INTRAVENOUS CONTINUOUS
Status: DISCONTINUED | OUTPATIENT
Start: 2023-05-26 | End: 2023-05-26

## 2023-05-25 RX ORDER — ONDANSETRON 2 MG/ML
4 INJECTION INTRAMUSCULAR; INTRAVENOUS EVERY 30 MIN PRN
Status: DISCONTINUED | OUTPATIENT
Start: 2023-05-25 | End: 2023-05-25 | Stop reason: ALTCHOICE

## 2023-05-25 RX ORDER — OXYCODONE HYDROCHLORIDE 5 MG/1
5 TABLET ORAL EVERY 6 HOURS PRN
Status: COMPLETED | OUTPATIENT
Start: 2023-05-25 | End: 2023-05-27

## 2023-05-25 RX ORDER — GLUCAGON 1 MG/ML
1 KIT INJECTION PRN
Status: DISCONTINUED | OUTPATIENT
Start: 2023-05-25 | End: 2023-05-31 | Stop reason: HOSPADM

## 2023-05-25 RX ORDER — NICOTINE 21 MG/24HR
1 PATCH, TRANSDERMAL 24 HOURS TRANSDERMAL DAILY PRN
Status: DISCONTINUED | OUTPATIENT
Start: 2023-05-25 | End: 2023-05-31 | Stop reason: HOSPADM

## 2023-05-25 RX ORDER — INSULIN LISPRO 100 [IU]/ML
0-8 INJECTION, SOLUTION INTRAVENOUS; SUBCUTANEOUS EVERY 4 HOURS
Status: DISCONTINUED | OUTPATIENT
Start: 2023-05-25 | End: 2023-05-26

## 2023-05-25 RX ORDER — METRONIDAZOLE 500 MG/100ML
500 INJECTION, SOLUTION INTRAVENOUS ONCE
Status: COMPLETED | OUTPATIENT
Start: 2023-05-25 | End: 2023-05-26

## 2023-05-25 RX ORDER — SODIUM CHLORIDE 0.9 % (FLUSH) 0.9 %
5-40 SYRINGE (ML) INJECTION PRN
Status: DISCONTINUED | OUTPATIENT
Start: 2023-05-25 | End: 2023-05-31 | Stop reason: HOSPADM

## 2023-05-25 RX ORDER — ACETAMINOPHEN 325 MG/1
650 TABLET ORAL EVERY 6 HOURS PRN
Status: DISCONTINUED | OUTPATIENT
Start: 2023-05-25 | End: 2023-05-31 | Stop reason: HOSPADM

## 2023-05-25 RX ORDER — ASPIRIN 81 MG/1
81 TABLET, CHEWABLE ORAL DAILY
Status: DISCONTINUED | OUTPATIENT
Start: 2023-05-26 | End: 2023-05-31 | Stop reason: HOSPADM

## 2023-05-25 RX ORDER — INSULIN LISPRO 100 [IU]/ML
0-8 INJECTION, SOLUTION INTRAVENOUS; SUBCUTANEOUS
Status: DISCONTINUED | OUTPATIENT
Start: 2023-05-26 | End: 2023-05-26

## 2023-05-25 RX ORDER — SODIUM CHLORIDE 9 MG/ML
INJECTION, SOLUTION INTRAVENOUS PRN
Status: DISCONTINUED | OUTPATIENT
Start: 2023-05-25 | End: 2023-05-31 | Stop reason: HOSPADM

## 2023-05-25 RX ORDER — METRONIDAZOLE 500 MG/100ML
500 INJECTION, SOLUTION INTRAVENOUS EVERY 8 HOURS
Status: DISCONTINUED | OUTPATIENT
Start: 2023-05-26 | End: 2023-05-26

## 2023-05-25 RX ORDER — INSULIN LISPRO 100 [IU]/ML
0-4 INJECTION, SOLUTION INTRAVENOUS; SUBCUTANEOUS NIGHTLY
Status: DISCONTINUED | OUTPATIENT
Start: 2023-05-25 | End: 2023-05-26

## 2023-05-25 RX ORDER — DULOXETIN HYDROCHLORIDE 20 MG/1
20 CAPSULE, DELAYED RELEASE ORAL DAILY
Status: DISCONTINUED | OUTPATIENT
Start: 2023-05-26 | End: 2023-05-31 | Stop reason: HOSPADM

## 2023-05-25 RX ORDER — MORPHINE SULFATE 4 MG/ML
4 INJECTION, SOLUTION INTRAMUSCULAR; INTRAVENOUS EVERY 30 MIN PRN
Status: DISCONTINUED | OUTPATIENT
Start: 2023-05-25 | End: 2023-05-25 | Stop reason: ALTCHOICE

## 2023-05-25 RX ORDER — ONDANSETRON 2 MG/ML
4 INJECTION INTRAMUSCULAR; INTRAVENOUS EVERY 6 HOURS PRN
Status: DISCONTINUED | OUTPATIENT
Start: 2023-05-25 | End: 2023-05-31 | Stop reason: HOSPADM

## 2023-05-25 RX ORDER — SODIUM CHLORIDE 0.9 % (FLUSH) 0.9 %
10 SYRINGE (ML) INJECTION
Status: COMPLETED | OUTPATIENT
Start: 2023-05-25 | End: 2023-05-25

## 2023-05-25 RX ORDER — GABAPENTIN 300 MG/1
300 CAPSULE ORAL 3 TIMES DAILY PRN
Status: DISCONTINUED | OUTPATIENT
Start: 2023-05-25 | End: 2023-05-31 | Stop reason: HOSPADM

## 2023-05-25 RX ORDER — ACETAMINOPHEN 650 MG/1
650 SUPPOSITORY RECTAL EVERY 6 HOURS PRN
Status: DISCONTINUED | OUTPATIENT
Start: 2023-05-25 | End: 2023-05-31 | Stop reason: HOSPADM

## 2023-05-25 RX ORDER — ONDANSETRON 4 MG/1
4 TABLET, ORALLY DISINTEGRATING ORAL EVERY 8 HOURS PRN
Status: DISCONTINUED | OUTPATIENT
Start: 2023-05-25 | End: 2023-05-31 | Stop reason: HOSPADM

## 2023-05-25 RX ORDER — INSULIN GLARGINE 100 [IU]/ML
10 INJECTION, SOLUTION SUBCUTANEOUS NIGHTLY
Status: DISCONTINUED | OUTPATIENT
Start: 2023-05-25 | End: 2023-05-29

## 2023-05-25 RX ORDER — MAGNESIUM SULFATE IN WATER 40 MG/ML
2000 INJECTION, SOLUTION INTRAVENOUS PRN
Status: DISCONTINUED | OUTPATIENT
Start: 2023-05-25 | End: 2023-05-31 | Stop reason: HOSPADM

## 2023-05-25 RX ORDER — POTASSIUM CHLORIDE 7.45 MG/ML
10 INJECTION INTRAVENOUS
Status: DISPENSED | OUTPATIENT
Start: 2023-05-25 | End: 2023-05-25

## 2023-05-25 RX ORDER — POTASSIUM CHLORIDE 7.45 MG/ML
10 INJECTION INTRAVENOUS PRN
Status: DISCONTINUED | OUTPATIENT
Start: 2023-05-25 | End: 2023-05-31 | Stop reason: HOSPADM

## 2023-05-25 RX ORDER — POLYETHYLENE GLYCOL 3350 17 G/17G
17 POWDER, FOR SOLUTION ORAL DAILY PRN
Status: DISCONTINUED | OUTPATIENT
Start: 2023-05-25 | End: 2023-05-31 | Stop reason: HOSPADM

## 2023-05-25 RX ORDER — ATORVASTATIN CALCIUM 40 MG/1
40 TABLET, FILM COATED ORAL NIGHTLY
Status: DISCONTINUED | OUTPATIENT
Start: 2023-05-26 | End: 2023-05-31 | Stop reason: HOSPADM

## 2023-05-25 RX ADMIN — METRONIDAZOLE 500 MG: 500 INJECTION, SOLUTION INTRAVENOUS at 22:16

## 2023-05-25 RX ADMIN — INSULIN HUMAN 6 UNITS: 100 INJECTION, SOLUTION PARENTERAL at 17:47

## 2023-05-25 RX ADMIN — MORPHINE SULFATE 4 MG: 4 INJECTION, SOLUTION INTRAMUSCULAR; INTRAVENOUS at 15:55

## 2023-05-25 RX ADMIN — SODIUM CHLORIDE, PRESERVATIVE FREE 10 ML: 5 INJECTION INTRAVENOUS at 18:55

## 2023-05-25 RX ADMIN — SODIUM CHLORIDE 1000 ML: 9 INJECTION, SOLUTION INTRAVENOUS at 15:55

## 2023-05-25 RX ADMIN — MORPHINE SULFATE 4 MG: 4 INJECTION, SOLUTION INTRAMUSCULAR; INTRAVENOUS at 21:13

## 2023-05-25 RX ADMIN — SODIUM CHLORIDE 770 ML: 9 INJECTION, SOLUTION INTRAVENOUS at 21:03

## 2023-05-25 RX ADMIN — CEFTRIAXONE SODIUM 1000 MG: 1 INJECTION, POWDER, FOR SOLUTION INTRAMUSCULAR; INTRAVENOUS at 21:03

## 2023-05-25 RX ADMIN — ONDANSETRON 4 MG: 2 INJECTION INTRAMUSCULAR; INTRAVENOUS at 21:12

## 2023-05-25 RX ADMIN — IOPAMIDOL 75 ML: 755 INJECTION, SOLUTION INTRAVENOUS at 18:54

## 2023-05-25 RX ADMIN — POTASSIUM CHLORIDE 10 MEQ: 7.46 INJECTION, SOLUTION INTRAVENOUS at 17:47

## 2023-05-25 ASSESSMENT — PAIN DESCRIPTION - LOCATION
LOCATION: BACK
LOCATION: BACK
LOCATION: BACK;ABDOMEN

## 2023-05-25 ASSESSMENT — PAIN DESCRIPTION - DESCRIPTORS
DESCRIPTORS: STABBING
DESCRIPTORS: ACHING;DISCOMFORT
DESCRIPTORS: ACHING

## 2023-05-25 ASSESSMENT — PAIN SCALES - GENERAL
PAINLEVEL_OUTOF10: 8

## 2023-05-25 ASSESSMENT — PAIN DESCRIPTION - PAIN TYPE: TYPE: ACUTE PAIN

## 2023-05-25 ASSESSMENT — LIFESTYLE VARIABLES: HOW OFTEN DO YOU HAVE A DRINK CONTAINING ALCOHOL: NEVER

## 2023-05-25 ASSESSMENT — PAIN - FUNCTIONAL ASSESSMENT: PAIN_FUNCTIONAL_ASSESSMENT: 0-10

## 2023-05-26 ENCOUNTER — APPOINTMENT (OUTPATIENT)
Dept: NUCLEAR MEDICINE | Age: 63
DRG: 854 | End: 2023-05-26
Payer: MEDICARE

## 2023-05-26 LAB
ALBUMIN SERPL-MCNC: 1.9 GM/DL (ref 3.4–5)
ALP BLD-CCNC: 126 IU/L (ref 40–129)
ALT SERPL-CCNC: 19 U/L (ref 10–40)
AMPHETAMINES: NEGATIVE
ANION GAP SERPL CALCULATED.3IONS-SCNC: 8 MMOL/L (ref 4–16)
AST SERPL-CCNC: 23 IU/L (ref 15–37)
BARBITURATE SCREEN URINE: NEGATIVE
BASOPHILS ABSOLUTE: 0.1 K/CU MM
BASOPHILS RELATIVE PERCENT: 0.5 % (ref 0–1)
BENZODIAZEPINE SCREEN, URINE: NEGATIVE
BILIRUB SERPL-MCNC: 0.3 MG/DL (ref 0–1)
BUN SERPL-MCNC: 20 MG/DL (ref 6–23)
CALCIUM SERPL-MCNC: 6.8 MG/DL (ref 8.3–10.6)
CANNABINOID SCREEN URINE: ABNORMAL
CHLORIDE BLD-SCNC: 103 MMOL/L (ref 99–110)
CO2: 20 MMOL/L (ref 21–32)
COCAINE METABOLITE: NEGATIVE
CREAT SERPL-MCNC: 0.8 MG/DL (ref 0.9–1.3)
DIFFERENTIAL TYPE: ABNORMAL
EOSINOPHILS ABSOLUTE: 0.1 K/CU MM
EOSINOPHILS RELATIVE PERCENT: 0.9 % (ref 0–3)
ESTIMATED AVERAGE GLUCOSE: 235 MG/DL
FENTANYL URINE: NEGATIVE
FERRITIN: 205 NG/ML (ref 30–400)
FOLATE SERPL-MCNC: 11.6 NG/ML (ref 3.1–17.5)
GFR SERPL CREATININE-BSD FRML MDRD: >60 ML/MIN/1.73M2
GLUCOSE BLD-MCNC: 185 MG/DL (ref 70–99)
GLUCOSE BLD-MCNC: 187 MG/DL (ref 70–99)
GLUCOSE BLD-MCNC: 188 MG/DL (ref 70–99)
GLUCOSE BLD-MCNC: 189 MG/DL (ref 70–99)
GLUCOSE BLD-MCNC: 197 MG/DL (ref 70–99)
GLUCOSE BLD-MCNC: 248 MG/DL (ref 70–99)
GLUCOSE BLD-MCNC: 263 MG/DL (ref 70–99)
GLUCOSE BLD-MCNC: 266 MG/DL (ref 70–99)
GLUCOSE BLD-MCNC: 310 MG/DL (ref 70–99)
GLUCOSE SERPL-MCNC: 196 MG/DL (ref 70–99)
HBA1C MFR BLD: 9.8 % (ref 4.2–6.3)
HCT VFR BLD CALC: 28.9 % (ref 42–52)
HEMOGLOBIN: 9.8 GM/DL (ref 13.5–18)
IMMATURE NEUTROPHIL %: 1.2 % (ref 0–0.43)
INR BLD: 0.97 INDEX
IRON: 22 UG/DL (ref 59–158)
LYMPHOCYTES ABSOLUTE: 1.5 K/CU MM
LYMPHOCYTES RELATIVE PERCENT: 11.5 % (ref 24–44)
MAGNESIUM: 1.8 MG/DL (ref 1.8–2.4)
MCH RBC QN AUTO: 27.8 PG (ref 27–31)
MCHC RBC AUTO-ENTMCNC: 33.9 % (ref 32–36)
MCV RBC AUTO: 81.9 FL (ref 78–100)
MONOCYTES ABSOLUTE: 1.8 K/CU MM
MONOCYTES RELATIVE PERCENT: 13.8 % (ref 0–4)
NUCLEATED RBC %: 0 %
OPIATES, URINE: NEGATIVE
OXYCODONE: NEGATIVE
PCT TRANSFERRIN: 15 % (ref 10–44)
PDW BLD-RTO: 15.8 % (ref 11.7–14.9)
PLATELET # BLD: 133 K/CU MM (ref 140–440)
PMV BLD AUTO: 10.5 FL (ref 7.5–11.1)
POTASSIUM SERPL-SCNC: 3.4 MMOL/L (ref 3.5–5.1)
PROTHROMBIN TIME: 12.3 SECONDS (ref 11.7–14.5)
RBC # BLD: 3.53 M/CU MM (ref 4.6–6.2)
RETICULOCYTE COUNT PCT: 2.5 % (ref 0.2–2.2)
SEGMENTED NEUTROPHILS ABSOLUTE COUNT: 9.4 K/CU MM
SEGMENTED NEUTROPHILS RELATIVE PERCENT: 72.1 % (ref 36–66)
SODIUM BLD-SCNC: 131 MMOL/L (ref 135–145)
TOTAL IMMATURE NEUTOROPHIL: 0.16 K/CU MM
TOTAL IRON BINDING CAPACITY: 149 UG/DL (ref 250–450)
TOTAL NUCLEATED RBC: 0 K/CU MM
TOTAL PROTEIN: 5.1 GM/DL (ref 6.4–8.2)
UNSATURATED IRON BINDING CAPACITY: 127 UG/DL (ref 110–370)
VITAMIN B-12: 1024 PG/ML (ref 211–911)
WBC # BLD: 13.1 K/CU MM (ref 4–10.5)

## 2023-05-26 PROCEDURE — 94761 N-INVAS EAR/PLS OXIMETRY MLT: CPT

## 2023-05-26 PROCEDURE — 2580000003 HC RX 258: Performed by: STUDENT IN AN ORGANIZED HEALTH CARE EDUCATION/TRAINING PROGRAM

## 2023-05-26 PROCEDURE — 6370000000 HC RX 637 (ALT 250 FOR IP): Performed by: STUDENT IN AN ORGANIZED HEALTH CARE EDUCATION/TRAINING PROGRAM

## 2023-05-26 PROCEDURE — 82607 VITAMIN B-12: CPT

## 2023-05-26 PROCEDURE — 78226 HEPATOBILIARY SYSTEM IMAGING: CPT

## 2023-05-26 PROCEDURE — 82962 GLUCOSE BLOOD TEST: CPT

## 2023-05-26 PROCEDURE — 85610 PROTHROMBIN TIME: CPT

## 2023-05-26 PROCEDURE — 80053 COMPREHEN METABOLIC PANEL: CPT

## 2023-05-26 PROCEDURE — 85025 COMPLETE CBC W/AUTO DIFF WBC: CPT

## 2023-05-26 PROCEDURE — 83550 IRON BINDING TEST: CPT

## 2023-05-26 PROCEDURE — APPSS60 APP SPLIT SHARED TIME 46-60 MINUTES: Performed by: PHYSICIAN ASSISTANT

## 2023-05-26 PROCEDURE — 6360000002 HC RX W HCPCS: Performed by: STUDENT IN AN ORGANIZED HEALTH CARE EDUCATION/TRAINING PROGRAM

## 2023-05-26 PROCEDURE — 1200000000 HC SEMI PRIVATE

## 2023-05-26 PROCEDURE — 83540 ASSAY OF IRON: CPT

## 2023-05-26 PROCEDURE — 82728 ASSAY OF FERRITIN: CPT

## 2023-05-26 PROCEDURE — 83036 HEMOGLOBIN GLYCOSYLATED A1C: CPT

## 2023-05-26 PROCEDURE — 3430000000 HC RX DIAGNOSTIC RADIOPHARMACEUTICAL: Performed by: EMERGENCY MEDICINE

## 2023-05-26 PROCEDURE — 82746 ASSAY OF FOLIC ACID SERUM: CPT

## 2023-05-26 PROCEDURE — 85045 AUTOMATED RETICULOCYTE COUNT: CPT

## 2023-05-26 PROCEDURE — A9537 TC99M MEBROFENIN: HCPCS | Performed by: EMERGENCY MEDICINE

## 2023-05-26 PROCEDURE — 99222 1ST HOSP IP/OBS MODERATE 55: CPT | Performed by: SURGERY

## 2023-05-26 PROCEDURE — 36415 COLL VENOUS BLD VENIPUNCTURE: CPT

## 2023-05-26 PROCEDURE — 83735 ASSAY OF MAGNESIUM: CPT

## 2023-05-26 PROCEDURE — 2500000003 HC RX 250 WO HCPCS: Performed by: STUDENT IN AN ORGANIZED HEALTH CARE EDUCATION/TRAINING PROGRAM

## 2023-05-26 RX ORDER — MORPHINE SULFATE 2 MG/ML
1 INJECTION, SOLUTION INTRAMUSCULAR; INTRAVENOUS ONCE
Status: COMPLETED | OUTPATIENT
Start: 2023-05-26 | End: 2023-05-26

## 2023-05-26 RX ORDER — INSULIN LISPRO 100 [IU]/ML
0-4 INJECTION, SOLUTION INTRAVENOUS; SUBCUTANEOUS NIGHTLY
Status: DISCONTINUED | OUTPATIENT
Start: 2023-05-26 | End: 2023-05-31 | Stop reason: HOSPADM

## 2023-05-26 RX ORDER — ENOXAPARIN SODIUM 100 MG/ML
40 INJECTION SUBCUTANEOUS DAILY
Status: DISCONTINUED | OUTPATIENT
Start: 2023-05-26 | End: 2023-05-31 | Stop reason: HOSPADM

## 2023-05-26 RX ORDER — POTASSIUM CHLORIDE 20 MEQ/1
40 TABLET, EXTENDED RELEASE ORAL ONCE
Status: COMPLETED | OUTPATIENT
Start: 2023-05-26 | End: 2023-05-26

## 2023-05-26 RX ORDER — INSULIN LISPRO 100 [IU]/ML
0-4 INJECTION, SOLUTION INTRAVENOUS; SUBCUTANEOUS
Status: DISCONTINUED | OUTPATIENT
Start: 2023-05-27 | End: 2023-05-31 | Stop reason: HOSPADM

## 2023-05-26 RX ADMIN — INSULIN LISPRO 4 UNITS: 100 INJECTION, SOLUTION INTRAVENOUS; SUBCUTANEOUS at 20:37

## 2023-05-26 RX ADMIN — ATORVASTATIN CALCIUM 40 MG: 40 TABLET, FILM COATED ORAL at 00:53

## 2023-05-26 RX ADMIN — SODIUM CHLORIDE, PRESERVATIVE FREE 10 ML: 5 INJECTION INTRAVENOUS at 23:40

## 2023-05-26 RX ADMIN — INSULIN GLARGINE 10 UNITS: 100 INJECTION, SOLUTION SUBCUTANEOUS at 20:37

## 2023-05-26 RX ADMIN — ENOXAPARIN SODIUM 40 MG: 100 INJECTION SUBCUTANEOUS at 18:40

## 2023-05-26 RX ADMIN — CEFTRIAXONE SODIUM 1000 MG: 1 INJECTION, POWDER, FOR SOLUTION INTRAMUSCULAR; INTRAVENOUS at 20:46

## 2023-05-26 RX ADMIN — POTASSIUM CHLORIDE 40 MEQ: 1500 TABLET, EXTENDED RELEASE ORAL at 18:40

## 2023-05-26 RX ADMIN — OXYCODONE HYDROCHLORIDE 5 MG: 5 TABLET ORAL at 18:40

## 2023-05-26 RX ADMIN — ASPIRIN 81 MG CHEWABLE TABLET 81 MG: 81 TABLET CHEWABLE at 18:40

## 2023-05-26 RX ADMIN — INSULIN GLARGINE 10 UNITS: 100 INJECTION, SOLUTION SUBCUTANEOUS at 00:55

## 2023-05-26 RX ADMIN — MORPHINE SULFATE 1 MG: 2 INJECTION, SOLUTION INTRAMUSCULAR; INTRAVENOUS at 14:56

## 2023-05-26 RX ADMIN — OXYCODONE HYDROCHLORIDE 5 MG: 5 TABLET ORAL at 07:09

## 2023-05-26 RX ADMIN — METRONIDAZOLE 500 MG: 500 INJECTION, SOLUTION INTRAVENOUS at 16:32

## 2023-05-26 RX ADMIN — DULOXETINE HYDROCHLORIDE 20 MG: 20 CAPSULE, DELAYED RELEASE ORAL at 18:39

## 2023-05-26 RX ADMIN — ATORVASTATIN CALCIUM 40 MG: 40 TABLET, FILM COATED ORAL at 20:35

## 2023-05-26 RX ADMIN — SODIUM CHLORIDE, POTASSIUM CHLORIDE, SODIUM LACTATE AND CALCIUM CHLORIDE: 600; 310; 30; 20 INJECTION, SOLUTION INTRAVENOUS at 00:56

## 2023-05-26 RX ADMIN — METRONIDAZOLE 500 MG: 500 INJECTION, SOLUTION INTRAVENOUS at 06:28

## 2023-05-26 RX ADMIN — INSULIN LISPRO 4 UNITS: 100 INJECTION, SOLUTION INTRAVENOUS; SUBCUTANEOUS at 00:54

## 2023-05-26 RX ADMIN — Medication 5 MILLICURIE: at 13:27

## 2023-05-26 ASSESSMENT — ENCOUNTER SYMPTOMS
EYE ITCHING: 0
PHOTOPHOBIA: 0
ANAL BLEEDING: 0
ABDOMINAL PAIN: 1
EYE REDNESS: 0
CONSTIPATION: 0
NAUSEA: 1
COLOR CHANGE: 0
STRIDOR: 0
RECTAL PAIN: 0
VOMITING: 0
BACK PAIN: 1
APNEA: 0
CHOKING: 0
SORE THROAT: 0

## 2023-05-26 ASSESSMENT — PAIN - FUNCTIONAL ASSESSMENT
PAIN_FUNCTIONAL_ASSESSMENT: PREVENTS OR INTERFERES SOME ACTIVE ACTIVITIES AND ADLS
PAIN_FUNCTIONAL_ASSESSMENT: ACTIVITIES ARE NOT PREVENTED
PAIN_FUNCTIONAL_ASSESSMENT: ACTIVITIES ARE NOT PREVENTED
PAIN_FUNCTIONAL_ASSESSMENT: PREVENTS OR INTERFERES SOME ACTIVE ACTIVITIES AND ADLS
PAIN_FUNCTIONAL_ASSESSMENT: PREVENTS OR INTERFERES SOME ACTIVE ACTIVITIES AND ADLS

## 2023-05-26 ASSESSMENT — PAIN DESCRIPTION - ONSET: ONSET: SUDDEN

## 2023-05-26 ASSESSMENT — PAIN DESCRIPTION - ORIENTATION
ORIENTATION: LOWER
ORIENTATION: MID;LOWER;RIGHT
ORIENTATION: MID;LOWER;RIGHT
ORIENTATION: UPPER;LOWER;RIGHT;LEFT

## 2023-05-26 ASSESSMENT — PAIN DESCRIPTION - LOCATION
LOCATION: BACK;ABDOMEN
LOCATION: BACK
LOCATION: ABDOMEN
LOCATION: ABDOMEN;BACK
LOCATION: ABDOMEN;BACK

## 2023-05-26 ASSESSMENT — PAIN SCALES - GENERAL
PAINLEVEL_OUTOF10: 8
PAINLEVEL_OUTOF10: 8
PAINLEVEL_OUTOF10: 6
PAINLEVEL_OUTOF10: 8
PAINLEVEL_OUTOF10: 9

## 2023-05-26 ASSESSMENT — PAIN DESCRIPTION - DESCRIPTORS
DESCRIPTORS: ACHING
DESCRIPTORS: ACHING;DISCOMFORT
DESCRIPTORS: STABBING;ACHING

## 2023-05-26 ASSESSMENT — PAIN DESCRIPTION - PAIN TYPE: TYPE: ACUTE PAIN

## 2023-05-26 ASSESSMENT — PAIN DESCRIPTION - FREQUENCY: FREQUENCY: CONTINUOUS

## 2023-05-27 LAB
ALBUMIN SERPL-MCNC: 1.9 GM/DL (ref 3.4–5)
ALP BLD-CCNC: 165 IU/L (ref 40–128)
ALT SERPL-CCNC: 19 U/L (ref 10–40)
ANION GAP SERPL CALCULATED.3IONS-SCNC: 8 MMOL/L (ref 4–16)
AST SERPL-CCNC: 32 IU/L (ref 15–37)
BASOPHILS ABSOLUTE: 0.1 K/CU MM
BASOPHILS RELATIVE PERCENT: 0.6 % (ref 0–1)
BILIRUB SERPL-MCNC: 0.3 MG/DL (ref 0–1)
BUN SERPL-MCNC: 26 MG/DL (ref 6–23)
CALCIUM SERPL-MCNC: 7.1 MG/DL (ref 8.3–10.6)
CHLORIDE BLD-SCNC: 105 MMOL/L (ref 99–110)
CO2: 19 MMOL/L (ref 21–32)
CREAT SERPL-MCNC: 1.1 MG/DL (ref 0.9–1.3)
DIFFERENTIAL TYPE: ABNORMAL
EOSINOPHILS ABSOLUTE: 0.1 K/CU MM
EOSINOPHILS RELATIVE PERCENT: 1 % (ref 0–3)
GFR SERPL CREATININE-BSD FRML MDRD: >60 ML/MIN/1.73M2
GLUCOSE BLD-MCNC: 157 MG/DL (ref 70–99)
GLUCOSE BLD-MCNC: 223 MG/DL (ref 70–99)
GLUCOSE BLD-MCNC: 240 MG/DL (ref 70–99)
GLUCOSE BLD-MCNC: 263 MG/DL (ref 70–99)
GLUCOSE SERPL-MCNC: 195 MG/DL (ref 70–99)
HCT VFR BLD CALC: 30.3 % (ref 42–52)
HEMOGLOBIN: 9.6 GM/DL (ref 13.5–18)
IMMATURE NEUTROPHIL %: 1.8 % (ref 0–0.43)
LYMPHOCYTES ABSOLUTE: 1.6 K/CU MM
LYMPHOCYTES RELATIVE PERCENT: 11.8 % (ref 24–44)
MCH RBC QN AUTO: 27.2 PG (ref 27–31)
MCHC RBC AUTO-ENTMCNC: 31.7 % (ref 32–36)
MCV RBC AUTO: 85.8 FL (ref 78–100)
MONOCYTES ABSOLUTE: 1.7 K/CU MM
MONOCYTES RELATIVE PERCENT: 12.2 % (ref 0–4)
NUCLEATED RBC %: 0 %
PDW BLD-RTO: 16 % (ref 11.7–14.9)
PLATELET # BLD: 126 K/CU MM (ref 140–440)
PMV BLD AUTO: 10.7 FL (ref 7.5–11.1)
POTASSIUM SERPL-SCNC: 5.1 MMOL/L (ref 3.5–5.1)
RBC # BLD: 3.53 M/CU MM (ref 4.6–6.2)
SEGMENTED NEUTROPHILS ABSOLUTE COUNT: 9.9 K/CU MM
SEGMENTED NEUTROPHILS RELATIVE PERCENT: 72.6 % (ref 36–66)
SODIUM BLD-SCNC: 132 MMOL/L (ref 135–145)
TOTAL IMMATURE NEUTOROPHIL: 0.24 K/CU MM
TOTAL NUCLEATED RBC: 0 K/CU MM
TOTAL PROTEIN: 5.2 GM/DL (ref 6.4–8.2)
WBC # BLD: 13.7 K/CU MM (ref 4–10.5)

## 2023-05-27 PROCEDURE — 87040 BLOOD CULTURE FOR BACTERIA: CPT

## 2023-05-27 PROCEDURE — 99232 SBSQ HOSP IP/OBS MODERATE 35: CPT | Performed by: PHYSICIAN ASSISTANT

## 2023-05-27 PROCEDURE — 36415 COLL VENOUS BLD VENIPUNCTURE: CPT

## 2023-05-27 PROCEDURE — 6360000002 HC RX W HCPCS: Performed by: STUDENT IN AN ORGANIZED HEALTH CARE EDUCATION/TRAINING PROGRAM

## 2023-05-27 PROCEDURE — 6370000000 HC RX 637 (ALT 250 FOR IP): Performed by: STUDENT IN AN ORGANIZED HEALTH CARE EDUCATION/TRAINING PROGRAM

## 2023-05-27 PROCEDURE — 1200000000 HC SEMI PRIVATE

## 2023-05-27 PROCEDURE — 82962 GLUCOSE BLOOD TEST: CPT

## 2023-05-27 PROCEDURE — 2580000003 HC RX 258: Performed by: STUDENT IN AN ORGANIZED HEALTH CARE EDUCATION/TRAINING PROGRAM

## 2023-05-27 PROCEDURE — 94761 N-INVAS EAR/PLS OXIMETRY MLT: CPT

## 2023-05-27 PROCEDURE — 80053 COMPREHEN METABOLIC PANEL: CPT

## 2023-05-27 PROCEDURE — 85025 COMPLETE CBC W/AUTO DIFF WBC: CPT

## 2023-05-27 RX ORDER — OXYCODONE HYDROCHLORIDE 5 MG/1
5 TABLET ORAL EVERY 6 HOURS PRN
Status: DISCONTINUED | OUTPATIENT
Start: 2023-05-27 | End: 2023-05-31 | Stop reason: HOSPADM

## 2023-05-27 RX ADMIN — DULOXETINE HYDROCHLORIDE 20 MG: 20 CAPSULE, DELAYED RELEASE ORAL at 09:12

## 2023-05-27 RX ADMIN — SODIUM CHLORIDE, PRESERVATIVE FREE 10 ML: 5 INJECTION INTRAVENOUS at 22:16

## 2023-05-27 RX ADMIN — OXYCODONE HYDROCHLORIDE 5 MG: 5 TABLET ORAL at 09:06

## 2023-05-27 RX ADMIN — INSULIN LISPRO 1 UNITS: 100 INJECTION, SOLUTION INTRAVENOUS; SUBCUTANEOUS at 12:12

## 2023-05-27 RX ADMIN — ONDANSETRON 4 MG: 2 INJECTION INTRAMUSCULAR; INTRAVENOUS at 09:07

## 2023-05-27 RX ADMIN — OXYCODONE 5 MG: 5 TABLET ORAL at 22:14

## 2023-05-27 RX ADMIN — CEFTRIAXONE SODIUM 2000 MG: 2 INJECTION, POWDER, FOR SOLUTION INTRAMUSCULAR; INTRAVENOUS at 22:12

## 2023-05-27 RX ADMIN — OXYCODONE HYDROCHLORIDE 5 MG: 5 TABLET ORAL at 01:04

## 2023-05-27 RX ADMIN — SODIUM CHLORIDE, PRESERVATIVE FREE 10 ML: 5 INJECTION INTRAVENOUS at 09:13

## 2023-05-27 RX ADMIN — INSULIN LISPRO 2 UNITS: 100 INJECTION, SOLUTION INTRAVENOUS; SUBCUTANEOUS at 16:56

## 2023-05-27 RX ADMIN — OXYCODONE 5 MG: 5 TABLET ORAL at 16:56

## 2023-05-27 RX ADMIN — ATORVASTATIN CALCIUM 40 MG: 40 TABLET, FILM COATED ORAL at 22:14

## 2023-05-27 RX ADMIN — INSULIN GLARGINE 10 UNITS: 100 INJECTION, SOLUTION SUBCUTANEOUS at 22:22

## 2023-05-27 ASSESSMENT — PAIN DESCRIPTION - ORIENTATION
ORIENTATION: MID;RIGHT
ORIENTATION: RIGHT
ORIENTATION: LEFT
ORIENTATION: ANTERIOR

## 2023-05-27 ASSESSMENT — PAIN SCALES - GENERAL
PAINLEVEL_OUTOF10: 6
PAINLEVEL_OUTOF10: 8
PAINLEVEL_OUTOF10: 7
PAINLEVEL_OUTOF10: 3
PAINLEVEL_OUTOF10: 6
PAINLEVEL_OUTOF10: 4
PAINLEVEL_OUTOF10: 7

## 2023-05-27 ASSESSMENT — PAIN DESCRIPTION - FREQUENCY
FREQUENCY: CONTINUOUS
FREQUENCY: CONTINUOUS

## 2023-05-27 ASSESSMENT — ENCOUNTER SYMPTOMS
STRIDOR: 0
NAUSEA: 1
COLOR CHANGE: 0
CHOKING: 0
EYE ITCHING: 0
APNEA: 0
ABDOMINAL PAIN: 1
PHOTOPHOBIA: 0
ABDOMINAL DISTENTION: 1
CONSTIPATION: 0
BACK PAIN: 1
EYE REDNESS: 0
RECTAL PAIN: 0
SORE THROAT: 0
ANAL BLEEDING: 0

## 2023-05-27 ASSESSMENT — PAIN DESCRIPTION - LOCATION
LOCATION: ABDOMEN;BACK
LOCATION: ABDOMEN
LOCATION: ABDOMEN
LOCATION: ABDOMEN;BACK
LOCATION: BACK;ABDOMEN

## 2023-05-27 ASSESSMENT — PAIN DESCRIPTION - PAIN TYPE
TYPE: ACUTE PAIN
TYPE: ACUTE PAIN

## 2023-05-27 ASSESSMENT — PAIN DESCRIPTION - DESCRIPTORS
DESCRIPTORS: ACHING
DESCRIPTORS: THROBBING;HEAVINESS
DESCRIPTORS: ACHING
DESCRIPTORS: STABBING;THROBBING
DESCRIPTORS: ACHING;DISCOMFORT

## 2023-05-27 ASSESSMENT — PAIN DESCRIPTION - ONSET
ONSET: ON-GOING
ONSET: ON-GOING

## 2023-05-28 ENCOUNTER — ANESTHESIA EVENT (OUTPATIENT)
Dept: OPERATING ROOM | Age: 63
End: 2023-05-28
Payer: MEDICARE

## 2023-05-28 ENCOUNTER — ANESTHESIA (OUTPATIENT)
Dept: OPERATING ROOM | Age: 63
End: 2023-05-28
Payer: MEDICARE

## 2023-05-28 LAB
ALBUMIN SERPL-MCNC: 1.8 GM/DL (ref 3.4–5)
ALP BLD-CCNC: 136 IU/L (ref 40–128)
ALT SERPL-CCNC: 17 U/L (ref 10–40)
ANION GAP SERPL CALCULATED.3IONS-SCNC: 10 MMOL/L (ref 4–16)
AST SERPL-CCNC: 29 IU/L (ref 15–37)
BASOPHILS ABSOLUTE: 0.1 K/CU MM
BASOPHILS RELATIVE PERCENT: 0.7 % (ref 0–1)
BILIRUB SERPL-MCNC: 0.3 MG/DL (ref 0–1)
BUN SERPL-MCNC: 22 MG/DL (ref 6–23)
CALCIUM SERPL-MCNC: 7.2 MG/DL (ref 8.3–10.6)
CHLORIDE BLD-SCNC: 104 MMOL/L (ref 99–110)
CO2: 20 MMOL/L (ref 21–32)
CREAT SERPL-MCNC: 0.9 MG/DL (ref 0.9–1.3)
CULTURE: ABNORMAL
DIFFERENTIAL TYPE: ABNORMAL
EOSINOPHILS ABSOLUTE: 0.2 K/CU MM
EOSINOPHILS RELATIVE PERCENT: 1.4 % (ref 0–3)
GFR SERPL CREATININE-BSD FRML MDRD: >60 ML/MIN/1.73M2
GLUCOSE BLD-MCNC: 101 MG/DL (ref 70–99)
GLUCOSE BLD-MCNC: 116 MG/DL (ref 70–99)
GLUCOSE BLD-MCNC: 148 MG/DL (ref 70–99)
GLUCOSE BLD-MCNC: 256 MG/DL (ref 70–99)
GLUCOSE BLD-MCNC: 326 MG/DL (ref 70–99)
GLUCOSE SERPL-MCNC: 132 MG/DL (ref 70–99)
HCT VFR BLD CALC: 28.8 % (ref 42–52)
HCT VFR BLD CALC: 30 % (ref 42–52)
HEMOGLOBIN: 9.5 GM/DL (ref 13.5–18)
HEMOGLOBIN: 9.9 GM/DL (ref 13.5–18)
IMMATURE NEUTROPHIL %: 2.4 % (ref 0–0.43)
LYMPHOCYTES ABSOLUTE: 1.5 K/CU MM
LYMPHOCYTES RELATIVE PERCENT: 13.2 % (ref 24–44)
Lab: ABNORMAL
MCH RBC QN AUTO: 27.4 PG (ref 27–31)
MCHC RBC AUTO-ENTMCNC: 33 % (ref 32–36)
MCV RBC AUTO: 83 FL (ref 78–100)
MONOCYTES ABSOLUTE: 1.1 K/CU MM
MONOCYTES RELATIVE PERCENT: 9.6 % (ref 0–4)
NUCLEATED RBC %: 0 %
PDW BLD-RTO: 15.7 % (ref 11.7–14.9)
PLATELET # BLD: 167 K/CU MM (ref 140–440)
PMV BLD AUTO: 10.5 FL (ref 7.5–11.1)
POTASSIUM SERPL-SCNC: 3.8 MMOL/L (ref 3.5–5.1)
RBC # BLD: 3.47 M/CU MM (ref 4.6–6.2)
SEGMENTED NEUTROPHILS ABSOLUTE COUNT: 8.3 K/CU MM
SEGMENTED NEUTROPHILS RELATIVE PERCENT: 72.7 % (ref 36–66)
SODIUM BLD-SCNC: 134 MMOL/L (ref 135–145)
SPECIMEN: ABNORMAL
TOTAL IMMATURE NEUTOROPHIL: 0.27 K/CU MM
TOTAL NUCLEATED RBC: 0 K/CU MM
TOTAL PROTEIN: 5.2 GM/DL (ref 6.4–8.2)
WBC # BLD: 11.5 K/CU MM (ref 4–10.5)

## 2023-05-28 PROCEDURE — 6370000000 HC RX 637 (ALT 250 FOR IP): Performed by: PHYSICIAN ASSISTANT

## 2023-05-28 PROCEDURE — 0FJ44ZZ INSPECTION OF GALLBLADDER, PERCUTANEOUS ENDOSCOPIC APPROACH: ICD-10-PCS | Performed by: SURGERY

## 2023-05-28 PROCEDURE — 6360000002 HC RX W HCPCS

## 2023-05-28 PROCEDURE — 2580000003 HC RX 258: Performed by: SURGERY

## 2023-05-28 PROCEDURE — 6360000002 HC RX W HCPCS: Performed by: SURGERY

## 2023-05-28 PROCEDURE — 85018 HEMOGLOBIN: CPT

## 2023-05-28 PROCEDURE — 2709999900 HC NON-CHARGEABLE SUPPLY: Performed by: SURGERY

## 2023-05-28 PROCEDURE — 36415 COLL VENOUS BLD VENIPUNCTURE: CPT

## 2023-05-28 PROCEDURE — 3600000004 HC SURGERY LEVEL 4 BASE: Performed by: SURGERY

## 2023-05-28 PROCEDURE — 2580000003 HC RX 258: Performed by: PHYSICIAN ASSISTANT

## 2023-05-28 PROCEDURE — 7100000000 HC PACU RECOVERY - FIRST 15 MIN: Performed by: SURGERY

## 2023-05-28 PROCEDURE — APPNB180 APP NON BILLABLE TIME > 60 MINS: Performed by: PHYSICIAN ASSISTANT

## 2023-05-28 PROCEDURE — 2500000003 HC RX 250 WO HCPCS: Performed by: SURGERY

## 2023-05-28 PROCEDURE — 80053 COMPREHEN METABOLIC PANEL: CPT

## 2023-05-28 PROCEDURE — 85014 HEMATOCRIT: CPT

## 2023-05-28 PROCEDURE — 2580000003 HC RX 258

## 2023-05-28 PROCEDURE — 47562 LAPAROSCOPIC CHOLECYSTECTOMY: CPT | Performed by: PHYSICIAN ASSISTANT

## 2023-05-28 PROCEDURE — 47001 NDL BIOPSY LVR TM OTH MAJ PX: CPT | Performed by: PHYSICIAN ASSISTANT

## 2023-05-28 PROCEDURE — 88307 TISSUE EXAM BY PATHOLOGIST: CPT | Performed by: PATHOLOGY

## 2023-05-28 PROCEDURE — 47562 LAPAROSCOPIC CHOLECYSTECTOMY: CPT | Performed by: SURGERY

## 2023-05-28 PROCEDURE — 2500000003 HC RX 250 WO HCPCS

## 2023-05-28 PROCEDURE — 99024 POSTOP FOLLOW-UP VISIT: CPT | Performed by: PHYSICIAN ASSISTANT

## 2023-05-28 PROCEDURE — 88312 SPECIAL STAINS GROUP 1: CPT | Performed by: PATHOLOGY

## 2023-05-28 PROCEDURE — 3700000001 HC ADD 15 MINUTES (ANESTHESIA): Performed by: SURGERY

## 2023-05-28 PROCEDURE — A4217 STERILE WATER/SALINE, 500 ML: HCPCS | Performed by: SURGERY

## 2023-05-28 PROCEDURE — 94761 N-INVAS EAR/PLS OXIMETRY MLT: CPT

## 2023-05-28 PROCEDURE — 6370000000 HC RX 637 (ALT 250 FOR IP): Performed by: STUDENT IN AN ORGANIZED HEALTH CARE EDUCATION/TRAINING PROGRAM

## 2023-05-28 PROCEDURE — 6360000002 HC RX W HCPCS: Performed by: ANESTHESIOLOGY

## 2023-05-28 PROCEDURE — 7100000001 HC PACU RECOVERY - ADDTL 15 MIN: Performed by: SURGERY

## 2023-05-28 PROCEDURE — 82962 GLUCOSE BLOOD TEST: CPT

## 2023-05-28 PROCEDURE — 3700000000 HC ANESTHESIA ATTENDED CARE: Performed by: SURGERY

## 2023-05-28 PROCEDURE — 1200000000 HC SEMI PRIVATE

## 2023-05-28 PROCEDURE — 85025 COMPLETE CBC W/AUTO DIFF WBC: CPT

## 2023-05-28 PROCEDURE — 3600000014 HC SURGERY LEVEL 4 ADDTL 15MIN: Performed by: SURGERY

## 2023-05-28 PROCEDURE — 88304 TISSUE EXAM BY PATHOLOGIST: CPT | Performed by: PATHOLOGY

## 2023-05-28 PROCEDURE — 6360000002 HC RX W HCPCS: Performed by: PHYSICIAN ASSISTANT

## 2023-05-28 PROCEDURE — 47001 NDL BIOPSY LVR TM OTH MAJ PX: CPT | Performed by: SURGERY

## 2023-05-28 PROCEDURE — 88313 SPECIAL STAINS GROUP 2: CPT | Performed by: PATHOLOGY

## 2023-05-28 PROCEDURE — 0FT40ZZ RESECTION OF GALLBLADDER, OPEN APPROACH: ICD-10-PCS | Performed by: SURGERY

## 2023-05-28 DEVICE — CLIP INT XL YEL POLYMER HEM-O-LOK WECK: Type: IMPLANTABLE DEVICE | Status: FUNCTIONAL

## 2023-05-28 RX ORDER — PROPOFOL 10 MG/ML
INJECTION, EMULSION INTRAVENOUS PRN
Status: DISCONTINUED | OUTPATIENT
Start: 2023-05-28 | End: 2023-05-28 | Stop reason: SDUPTHER

## 2023-05-28 RX ORDER — LIDOCAINE HYDROCHLORIDE 20 MG/ML
INJECTION, SOLUTION INTRAVENOUS PRN
Status: DISCONTINUED | OUTPATIENT
Start: 2023-05-28 | End: 2023-05-28 | Stop reason: SDUPTHER

## 2023-05-28 RX ORDER — SODIUM CHLORIDE 0.9 % (FLUSH) 0.9 %
5-40 SYRINGE (ML) INJECTION EVERY 12 HOURS SCHEDULED
Status: DISCONTINUED | OUTPATIENT
Start: 2023-05-28 | End: 2023-05-28 | Stop reason: HOSPADM

## 2023-05-28 RX ORDER — ONDANSETRON 2 MG/ML
INJECTION INTRAMUSCULAR; INTRAVENOUS PRN
Status: DISCONTINUED | OUTPATIENT
Start: 2023-05-28 | End: 2023-05-28 | Stop reason: SDUPTHER

## 2023-05-28 RX ORDER — DEXAMETHASONE SODIUM PHOSPHATE 4 MG/ML
INJECTION, SOLUTION INTRA-ARTICULAR; INTRALESIONAL; INTRAMUSCULAR; INTRAVENOUS; SOFT TISSUE PRN
Status: DISCONTINUED | OUTPATIENT
Start: 2023-05-28 | End: 2023-05-28 | Stop reason: SDUPTHER

## 2023-05-28 RX ORDER — OXYCODONE HYDROCHLORIDE 5 MG/1
5 TABLET ORAL
Status: DISCONTINUED | OUTPATIENT
Start: 2023-05-28 | End: 2023-05-28 | Stop reason: HOSPADM

## 2023-05-28 RX ORDER — LABETALOL HYDROCHLORIDE 5 MG/ML
10 INJECTION, SOLUTION INTRAVENOUS
Status: DISCONTINUED | OUTPATIENT
Start: 2023-05-28 | End: 2023-05-28 | Stop reason: HOSPADM

## 2023-05-28 RX ORDER — SODIUM CHLORIDE 0.9 % (FLUSH) 0.9 %
5-40 SYRINGE (ML) INJECTION PRN
Status: DISCONTINUED | OUTPATIENT
Start: 2023-05-28 | End: 2023-05-28 | Stop reason: HOSPADM

## 2023-05-28 RX ORDER — HYDRALAZINE HYDROCHLORIDE 20 MG/ML
10 INJECTION INTRAMUSCULAR; INTRAVENOUS
Status: DISCONTINUED | OUTPATIENT
Start: 2023-05-28 | End: 2023-05-28 | Stop reason: HOSPADM

## 2023-05-28 RX ORDER — ONDANSETRON 2 MG/ML
4 INJECTION INTRAMUSCULAR; INTRAVENOUS
Status: DISCONTINUED | OUTPATIENT
Start: 2023-05-28 | End: 2023-05-28 | Stop reason: HOSPADM

## 2023-05-28 RX ORDER — SODIUM CHLORIDE 9 MG/ML
INJECTION, SOLUTION INTRAVENOUS CONTINUOUS PRN
Status: DISCONTINUED | OUTPATIENT
Start: 2023-05-28 | End: 2023-05-28 | Stop reason: SDUPTHER

## 2023-05-28 RX ORDER — PHENYLEPHRINE HCL IN 0.9% NACL 1 MG/10 ML
SYRINGE (ML) INTRAVENOUS PRN
Status: DISCONTINUED | OUTPATIENT
Start: 2023-05-28 | End: 2023-05-28 | Stop reason: SDUPTHER

## 2023-05-28 RX ORDER — ROCURONIUM BROMIDE 10 MG/ML
INJECTION, SOLUTION INTRAVENOUS PRN
Status: DISCONTINUED | OUTPATIENT
Start: 2023-05-28 | End: 2023-05-28 | Stop reason: SDUPTHER

## 2023-05-28 RX ORDER — FENTANYL CITRATE 50 UG/ML
25 INJECTION, SOLUTION INTRAMUSCULAR; INTRAVENOUS EVERY 5 MIN PRN
Status: DISCONTINUED | OUTPATIENT
Start: 2023-05-28 | End: 2023-05-28 | Stop reason: HOSPADM

## 2023-05-28 RX ORDER — DIPHENHYDRAMINE HYDROCHLORIDE 50 MG/ML
12.5 INJECTION INTRAMUSCULAR; INTRAVENOUS
Status: DISCONTINUED | OUTPATIENT
Start: 2023-05-28 | End: 2023-05-28 | Stop reason: HOSPADM

## 2023-05-28 RX ORDER — DROPERIDOL 2.5 MG/ML
0.62 INJECTION, SOLUTION INTRAMUSCULAR; INTRAVENOUS EVERY 10 MIN PRN
Status: DISCONTINUED | OUTPATIENT
Start: 2023-05-28 | End: 2023-05-28 | Stop reason: HOSPADM

## 2023-05-28 RX ORDER — BUPIVACAINE HYDROCHLORIDE 5 MG/ML
INJECTION, SOLUTION EPIDURAL; INTRACAUDAL
Status: COMPLETED | OUTPATIENT
Start: 2023-05-28 | End: 2023-05-28

## 2023-05-28 RX ORDER — SODIUM CHLORIDE, SODIUM LACTATE, POTASSIUM CHLORIDE, CALCIUM CHLORIDE 600; 310; 30; 20 MG/100ML; MG/100ML; MG/100ML; MG/100ML
INJECTION, SOLUTION INTRAVENOUS CONTINUOUS
Status: DISCONTINUED | OUTPATIENT
Start: 2023-05-28 | End: 2023-05-28

## 2023-05-28 RX ORDER — MAGNESIUM HYDROXIDE 1200 MG/15ML
LIQUID ORAL CONTINUOUS PRN
Status: COMPLETED | OUTPATIENT
Start: 2023-05-28 | End: 2023-05-28

## 2023-05-28 RX ORDER — FENTANYL CITRATE 50 UG/ML
INJECTION, SOLUTION INTRAMUSCULAR; INTRAVENOUS PRN
Status: DISCONTINUED | OUTPATIENT
Start: 2023-05-28 | End: 2023-05-28 | Stop reason: SDUPTHER

## 2023-05-28 RX ADMIN — Medication 150 MCG: at 08:57

## 2023-05-28 RX ADMIN — ONDANSETRON 4 MG: 2 INJECTION INTRAMUSCULAR; INTRAVENOUS at 09:53

## 2023-05-28 RX ADMIN — INSULIN GLARGINE 10 UNITS: 100 INJECTION, SOLUTION SUBCUTANEOUS at 21:46

## 2023-05-28 RX ADMIN — FENTANYL CITRATE 25 MCG: 50 INJECTION, SOLUTION INTRAMUSCULAR; INTRAVENOUS at 10:02

## 2023-05-28 RX ADMIN — SODIUM CHLORIDE: 900 INJECTION INTRAVENOUS at 08:42

## 2023-05-28 RX ADMIN — OXYCODONE 5 MG: 5 TABLET ORAL at 11:49

## 2023-05-28 RX ADMIN — HYDROMORPHONE HYDROCHLORIDE 0.5 MG: 1 INJECTION, SOLUTION INTRAMUSCULAR; INTRAVENOUS; SUBCUTANEOUS at 10:26

## 2023-05-28 RX ADMIN — CEFAZOLIN 2000 MG: 2 INJECTION, POWDER, FOR SOLUTION INTRAMUSCULAR; INTRAVENOUS at 08:51

## 2023-05-28 RX ADMIN — Medication 50 MCG: at 09:04

## 2023-05-28 RX ADMIN — OXYCODONE 5 MG: 5 TABLET ORAL at 03:31

## 2023-05-28 RX ADMIN — ROCURONIUM BROMIDE 50 MG: 10 INJECTION INTRAVENOUS at 08:51

## 2023-05-28 RX ADMIN — INSULIN LISPRO 2 UNITS: 100 INJECTION, SOLUTION INTRAVENOUS; SUBCUTANEOUS at 16:59

## 2023-05-28 RX ADMIN — FENTANYL CITRATE 25 MCG: 50 INJECTION, SOLUTION INTRAMUSCULAR; INTRAVENOUS at 09:15

## 2023-05-28 RX ADMIN — PROPOFOL 170 MG: 10 INJECTION, EMULSION INTRAVENOUS at 08:51

## 2023-05-28 RX ADMIN — OXYCODONE 5 MG: 5 TABLET ORAL at 20:35

## 2023-05-28 RX ADMIN — DEXAMETHASONE SODIUM PHOSPHATE 4 MG: 4 INJECTION, SOLUTION INTRAMUSCULAR; INTRAVENOUS at 08:51

## 2023-05-28 RX ADMIN — SODIUM CHLORIDE, PRESERVATIVE FREE 10 ML: 5 INJECTION INTRAVENOUS at 11:41

## 2023-05-28 RX ADMIN — DULOXETINE HYDROCHLORIDE 20 MG: 20 CAPSULE, DELAYED RELEASE ORAL at 11:49

## 2023-05-28 RX ADMIN — ATORVASTATIN CALCIUM 40 MG: 40 TABLET, FILM COATED ORAL at 20:35

## 2023-05-28 RX ADMIN — FENTANYL CITRATE 25 MCG: 50 INJECTION, SOLUTION INTRAMUSCULAR; INTRAVENOUS at 09:19

## 2023-05-28 RX ADMIN — INSULIN LISPRO 4 UNITS: 100 INJECTION, SOLUTION INTRAVENOUS; SUBCUTANEOUS at 21:46

## 2023-05-28 RX ADMIN — FENTANYL CITRATE 25 MCG: 50 INJECTION, SOLUTION INTRAMUSCULAR; INTRAVENOUS at 09:08

## 2023-05-28 RX ADMIN — SUGAMMADEX 200 MG: 100 INJECTION, SOLUTION INTRAVENOUS at 09:53

## 2023-05-28 RX ADMIN — CEFTRIAXONE SODIUM 2000 MG: 2 INJECTION, POWDER, FOR SOLUTION INTRAMUSCULAR; INTRAVENOUS at 20:40

## 2023-05-28 RX ADMIN — LIDOCAINE HYDROCHLORIDE 100 MG: 20 INJECTION, SOLUTION INTRAVENOUS at 08:51

## 2023-05-28 RX ADMIN — HYDROMORPHONE HYDROCHLORIDE 0.5 MG: 1 INJECTION, SOLUTION INTRAMUSCULAR; INTRAVENOUS; SUBCUTANEOUS at 10:41

## 2023-05-28 ASSESSMENT — PAIN SCALES - GENERAL
PAINLEVEL_OUTOF10: 5
PAINLEVEL_OUTOF10: 7
PAINLEVEL_OUTOF10: 8
PAINLEVEL_OUTOF10: 0
PAINLEVEL_OUTOF10: 8
PAINLEVEL_OUTOF10: 8
PAINLEVEL_OUTOF10: 7
PAINLEVEL_OUTOF10: 8

## 2023-05-28 ASSESSMENT — PAIN DESCRIPTION - FREQUENCY
FREQUENCY: CONTINUOUS

## 2023-05-28 ASSESSMENT — ENCOUNTER SYMPTOMS
CONSTIPATION: 0
EYE ITCHING: 0
BACK PAIN: 1
SORE THROAT: 0
EYE REDNESS: 0
CHOKING: 0
PHOTOPHOBIA: 0
VOMITING: 0
SHORTNESS OF BREATH: 1
ANAL BLEEDING: 0
STRIDOR: 0
COLOR CHANGE: 0
ABDOMINAL PAIN: 1
NAUSEA: 0
RECTAL PAIN: 0
APNEA: 0

## 2023-05-28 ASSESSMENT — PAIN DESCRIPTION - LOCATION
LOCATION: ABDOMEN

## 2023-05-28 ASSESSMENT — PAIN DESCRIPTION - PAIN TYPE
TYPE: SURGICAL PAIN

## 2023-05-28 ASSESSMENT — PAIN DESCRIPTION - DESCRIPTORS
DESCRIPTORS: DISCOMFORT
DESCRIPTORS: ACHING
DESCRIPTORS: STABBING;BURNING
DESCRIPTORS: ACHING
DESCRIPTORS: DISCOMFORT

## 2023-05-28 ASSESSMENT — PAIN - FUNCTIONAL ASSESSMENT
PAIN_FUNCTIONAL_ASSESSMENT: PREVENTS OR INTERFERES SOME ACTIVE ACTIVITIES AND ADLS

## 2023-05-28 ASSESSMENT — PAIN DESCRIPTION - ORIENTATION
ORIENTATION: MID
ORIENTATION: UPPER;MID
ORIENTATION: MID
ORIENTATION: RIGHT
ORIENTATION: MID

## 2023-05-28 ASSESSMENT — PAIN DESCRIPTION - ONSET
ONSET: ON-GOING

## 2023-05-28 ASSESSMENT — LIFESTYLE VARIABLES: SMOKING_STATUS: 1

## 2023-05-29 LAB
ALBUMIN SERPL-MCNC: 1.7 GM/DL (ref 3.4–5)
ALP BLD-CCNC: 122 IU/L (ref 40–128)
ALT SERPL-CCNC: 14 U/L (ref 10–40)
ANION GAP SERPL CALCULATED.3IONS-SCNC: 10 MMOL/L (ref 4–16)
AST SERPL-CCNC: 33 IU/L (ref 15–37)
BASOPHILS ABSOLUTE: 0 K/CU MM
BASOPHILS RELATIVE PERCENT: 0.3 % (ref 0–1)
BILIRUB SERPL-MCNC: 0.2 MG/DL (ref 0–1)
BUN SERPL-MCNC: 24 MG/DL (ref 6–23)
CALCIUM SERPL-MCNC: 7 MG/DL (ref 8.3–10.6)
CHLORIDE BLD-SCNC: 100 MMOL/L (ref 99–110)
CO2: 20 MMOL/L (ref 21–32)
CREAT SERPL-MCNC: 1 MG/DL (ref 0.9–1.3)
DIFFERENTIAL TYPE: ABNORMAL
EOSINOPHILS ABSOLUTE: 0.1 K/CU MM
EOSINOPHILS RELATIVE PERCENT: 0.5 % (ref 0–3)
GFR SERPL CREATININE-BSD FRML MDRD: >60 ML/MIN/1.73M2
GLUCOSE BLD-MCNC: 109 MG/DL (ref 70–99)
GLUCOSE BLD-MCNC: 179 MG/DL (ref 70–99)
GLUCOSE BLD-MCNC: 301 MG/DL (ref 70–99)
GLUCOSE BLD-MCNC: 341 MG/DL (ref 70–99)
GLUCOSE BLD-MCNC: 350 MG/DL (ref 70–99)
GLUCOSE SERPL-MCNC: 316 MG/DL (ref 70–99)
HCT VFR BLD CALC: 27.3 % (ref 42–52)
HEMOGLOBIN: 9 GM/DL (ref 13.5–18)
IMMATURE NEUTROPHIL %: 1.7 % (ref 0–0.43)
LYMPHOCYTES ABSOLUTE: 2 K/CU MM
LYMPHOCYTES RELATIVE PERCENT: 15.1 % (ref 24–44)
MCH RBC QN AUTO: 27.7 PG (ref 27–31)
MCHC RBC AUTO-ENTMCNC: 33 % (ref 32–36)
MCV RBC AUTO: 84 FL (ref 78–100)
MONOCYTES ABSOLUTE: 0.8 K/CU MM
MONOCYTES RELATIVE PERCENT: 6.4 % (ref 0–4)
NUCLEATED RBC %: 0 %
PDW BLD-RTO: 15.5 % (ref 11.7–14.9)
PLATELET # BLD: 205 K/CU MM (ref 140–440)
PMV BLD AUTO: 10.3 FL (ref 7.5–11.1)
POTASSIUM SERPL-SCNC: 4.1 MMOL/L (ref 3.5–5.1)
RBC # BLD: 3.25 M/CU MM (ref 4.6–6.2)
SEGMENTED NEUTROPHILS ABSOLUTE COUNT: 9.9 K/CU MM
SEGMENTED NEUTROPHILS RELATIVE PERCENT: 76 % (ref 36–66)
SODIUM BLD-SCNC: 130 MMOL/L (ref 135–145)
TOTAL IMMATURE NEUTOROPHIL: 0.22 K/CU MM
TOTAL NUCLEATED RBC: 0 K/CU MM
TOTAL PROTEIN: 4.7 GM/DL (ref 6.4–8.2)
WBC # BLD: 13.1 K/CU MM (ref 4–10.5)

## 2023-05-29 PROCEDURE — 80053 COMPREHEN METABOLIC PANEL: CPT

## 2023-05-29 PROCEDURE — APPNB30 APP NON BILLABLE TIME 0-30 MINS: Performed by: PHYSICIAN ASSISTANT

## 2023-05-29 PROCEDURE — 2580000003 HC RX 258: Performed by: PHYSICIAN ASSISTANT

## 2023-05-29 PROCEDURE — 6370000000 HC RX 637 (ALT 250 FOR IP): Performed by: PHYSICIAN ASSISTANT

## 2023-05-29 PROCEDURE — 6370000000 HC RX 637 (ALT 250 FOR IP): Performed by: STUDENT IN AN ORGANIZED HEALTH CARE EDUCATION/TRAINING PROGRAM

## 2023-05-29 PROCEDURE — 6360000002 HC RX W HCPCS: Performed by: STUDENT IN AN ORGANIZED HEALTH CARE EDUCATION/TRAINING PROGRAM

## 2023-05-29 PROCEDURE — 82962 GLUCOSE BLOOD TEST: CPT

## 2023-05-29 PROCEDURE — 99024 POSTOP FOLLOW-UP VISIT: CPT | Performed by: PHYSICIAN ASSISTANT

## 2023-05-29 PROCEDURE — 2580000003 HC RX 258: Performed by: STUDENT IN AN ORGANIZED HEALTH CARE EDUCATION/TRAINING PROGRAM

## 2023-05-29 PROCEDURE — 94761 N-INVAS EAR/PLS OXIMETRY MLT: CPT

## 2023-05-29 PROCEDURE — 1200000000 HC SEMI PRIVATE

## 2023-05-29 PROCEDURE — 85025 COMPLETE CBC W/AUTO DIFF WBC: CPT

## 2023-05-29 PROCEDURE — 36415 COLL VENOUS BLD VENIPUNCTURE: CPT

## 2023-05-29 RX ORDER — INSULIN LISPRO 100 [IU]/ML
5 INJECTION, SOLUTION INTRAVENOUS; SUBCUTANEOUS
Status: DISCONTINUED | OUTPATIENT
Start: 2023-05-29 | End: 2023-05-31 | Stop reason: HOSPADM

## 2023-05-29 RX ORDER — INSULIN GLARGINE 100 [IU]/ML
15 INJECTION, SOLUTION SUBCUTANEOUS NIGHTLY
Status: DISCONTINUED | OUTPATIENT
Start: 2023-05-29 | End: 2023-05-31 | Stop reason: HOSPADM

## 2023-05-29 RX ADMIN — POLYETHYLENE GLYCOL 3350 17 G: 17 POWDER, FOR SOLUTION ORAL at 08:32

## 2023-05-29 RX ADMIN — CEFTRIAXONE SODIUM 2000 MG: 2 INJECTION, POWDER, FOR SOLUTION INTRAMUSCULAR; INTRAVENOUS at 21:38

## 2023-05-29 RX ADMIN — DULOXETINE HYDROCHLORIDE 20 MG: 20 CAPSULE, DELAYED RELEASE ORAL at 08:24

## 2023-05-29 RX ADMIN — OXYCODONE 5 MG: 5 TABLET ORAL at 14:51

## 2023-05-29 RX ADMIN — OXYCODONE 5 MG: 5 TABLET ORAL at 08:27

## 2023-05-29 RX ADMIN — INSULIN LISPRO 4 UNITS: 100 INJECTION, SOLUTION INTRAVENOUS; SUBCUTANEOUS at 08:24

## 2023-05-29 RX ADMIN — ATORVASTATIN CALCIUM 40 MG: 40 TABLET, FILM COATED ORAL at 21:42

## 2023-05-29 RX ADMIN — INSULIN LISPRO 5 UNITS: 100 INJECTION, SOLUTION INTRAVENOUS; SUBCUTANEOUS at 17:04

## 2023-05-29 RX ADMIN — OXYCODONE 5 MG: 5 TABLET ORAL at 02:14

## 2023-05-29 RX ADMIN — SODIUM CHLORIDE, PRESERVATIVE FREE 10 ML: 5 INJECTION INTRAVENOUS at 21:42

## 2023-05-29 RX ADMIN — INSULIN LISPRO 5 UNITS: 100 INJECTION, SOLUTION INTRAVENOUS; SUBCUTANEOUS at 11:50

## 2023-05-29 RX ADMIN — OXYCODONE 5 MG: 5 TABLET ORAL at 21:41

## 2023-05-29 RX ADMIN — INSULIN LISPRO 3 UNITS: 100 INJECTION, SOLUTION INTRAVENOUS; SUBCUTANEOUS at 11:50

## 2023-05-29 RX ADMIN — SODIUM CHLORIDE, PRESERVATIVE FREE 10 ML: 5 INJECTION INTRAVENOUS at 08:25

## 2023-05-29 RX ADMIN — INSULIN LISPRO 5 UNITS: 100 INJECTION, SOLUTION INTRAVENOUS; SUBCUTANEOUS at 08:24

## 2023-05-29 ASSESSMENT — PAIN SCALES - WONG BAKER
WONGBAKER_NUMERICALRESPONSE: 4
WONGBAKER_NUMERICALRESPONSE: 2
WONGBAKER_NUMERICALRESPONSE: 2

## 2023-05-29 ASSESSMENT — PAIN DESCRIPTION - DESCRIPTORS
DESCRIPTORS: THROBBING
DESCRIPTORS: STABBING
DESCRIPTORS: STABBING

## 2023-05-29 ASSESSMENT — ENCOUNTER SYMPTOMS
BACK PAIN: 0
STRIDOR: 0
ANAL BLEEDING: 0
EYE ITCHING: 0
NAUSEA: 0
PHOTOPHOBIA: 0
CHOKING: 0
APNEA: 0
CONSTIPATION: 0
RECTAL PAIN: 0
COLOR CHANGE: 0
SORE THROAT: 0
ABDOMINAL PAIN: 1
EYE REDNESS: 0
VOMITING: 0

## 2023-05-29 ASSESSMENT — PAIN DESCRIPTION - ORIENTATION
ORIENTATION: MID
ORIENTATION: MID;UPPER
ORIENTATION: MID;UPPER

## 2023-05-29 ASSESSMENT — PAIN SCALES - GENERAL
PAINLEVEL_OUTOF10: 8
PAINLEVEL_OUTOF10: 9
PAINLEVEL_OUTOF10: 9
PAINLEVEL_OUTOF10: 5
PAINLEVEL_OUTOF10: 9

## 2023-05-29 ASSESSMENT — PAIN DESCRIPTION - LOCATION
LOCATION: ABDOMEN

## 2023-05-29 ASSESSMENT — PAIN - FUNCTIONAL ASSESSMENT: PAIN_FUNCTIONAL_ASSESSMENT: ACTIVITIES ARE NOT PREVENTED

## 2023-05-30 LAB
ALBUMIN SERPL-MCNC: 1.6 GM/DL (ref 3.4–5)
ALP BLD-CCNC: 146 IU/L (ref 40–128)
ALT SERPL-CCNC: 13 U/L (ref 10–40)
ANION GAP SERPL CALCULATED.3IONS-SCNC: 8 MMOL/L (ref 4–16)
AST SERPL-CCNC: 32 IU/L (ref 15–37)
BASOPHILS ABSOLUTE: 0.1 K/CU MM
BASOPHILS RELATIVE PERCENT: 0.6 % (ref 0–1)
BILIRUB SERPL-MCNC: 0.2 MG/DL (ref 0–1)
BUN SERPL-MCNC: 21 MG/DL (ref 6–23)
CALCIUM SERPL-MCNC: 6.9 MG/DL (ref 8.3–10.6)
CHLORIDE BLD-SCNC: 102 MMOL/L (ref 99–110)
CO2: 23 MMOL/L (ref 21–32)
CREAT SERPL-MCNC: 1 MG/DL (ref 0.9–1.3)
DIFFERENTIAL TYPE: ABNORMAL
EOSINOPHILS ABSOLUTE: 0.1 K/CU MM
EOSINOPHILS RELATIVE PERCENT: 1 % (ref 0–3)
GFR SERPL CREATININE-BSD FRML MDRD: >60 ML/MIN/1.73M2
GLUCOSE BLD-MCNC: 176 MG/DL (ref 70–99)
GLUCOSE BLD-MCNC: 193 MG/DL (ref 70–99)
GLUCOSE BLD-MCNC: 207 MG/DL (ref 70–99)
GLUCOSE BLD-MCNC: 324 MG/DL (ref 70–99)
GLUCOSE SERPL-MCNC: 298 MG/DL (ref 70–99)
HCT VFR BLD CALC: 29.6 % (ref 42–52)
HEMOGLOBIN: 9.8 GM/DL (ref 13.5–18)
IMMATURE NEUTROPHIL %: 2.6 % (ref 0–0.43)
LYMPHOCYTES ABSOLUTE: 1.6 K/CU MM
LYMPHOCYTES RELATIVE PERCENT: 14.8 % (ref 24–44)
MCH RBC QN AUTO: 27.7 PG (ref 27–31)
MCHC RBC AUTO-ENTMCNC: 33.1 % (ref 32–36)
MCV RBC AUTO: 83.6 FL (ref 78–100)
MONOCYTES ABSOLUTE: 1 K/CU MM
MONOCYTES RELATIVE PERCENT: 9.2 % (ref 0–4)
NUCLEATED RBC %: 0 %
PDW BLD-RTO: 15.5 % (ref 11.7–14.9)
PLATELET # BLD: 204 K/CU MM (ref 140–440)
PMV BLD AUTO: 10.1 FL (ref 7.5–11.1)
POTASSIUM SERPL-SCNC: 4.4 MMOL/L (ref 3.5–5.1)
RBC # BLD: 3.54 M/CU MM (ref 4.6–6.2)
SEGMENTED NEUTROPHILS ABSOLUTE COUNT: 7.8 K/CU MM
SEGMENTED NEUTROPHILS RELATIVE PERCENT: 71.8 % (ref 36–66)
SODIUM BLD-SCNC: 133 MMOL/L (ref 135–145)
TOTAL IMMATURE NEUTOROPHIL: 0.28 K/CU MM
TOTAL NUCLEATED RBC: 0 K/CU MM
TOTAL PROTEIN: 5 GM/DL (ref 6.4–8.2)
WBC # BLD: 10.8 K/CU MM (ref 4–10.5)

## 2023-05-30 PROCEDURE — 85025 COMPLETE CBC W/AUTO DIFF WBC: CPT

## 2023-05-30 PROCEDURE — 97161 PT EVAL LOW COMPLEX 20 MIN: CPT

## 2023-05-30 PROCEDURE — 97530 THERAPEUTIC ACTIVITIES: CPT

## 2023-05-30 PROCEDURE — 99024 POSTOP FOLLOW-UP VISIT: CPT | Performed by: PHYSICIAN ASSISTANT

## 2023-05-30 PROCEDURE — 6370000000 HC RX 637 (ALT 250 FOR IP): Performed by: STUDENT IN AN ORGANIZED HEALTH CARE EDUCATION/TRAINING PROGRAM

## 2023-05-30 PROCEDURE — 2580000003 HC RX 258: Performed by: PHYSICIAN ASSISTANT

## 2023-05-30 PROCEDURE — 1200000000 HC SEMI PRIVATE

## 2023-05-30 PROCEDURE — APPNB30 APP NON BILLABLE TIME 0-30 MINS: Performed by: PHYSICIAN ASSISTANT

## 2023-05-30 PROCEDURE — 80053 COMPREHEN METABOLIC PANEL: CPT

## 2023-05-30 PROCEDURE — 6360000002 HC RX W HCPCS: Performed by: STUDENT IN AN ORGANIZED HEALTH CARE EDUCATION/TRAINING PROGRAM

## 2023-05-30 PROCEDURE — 6370000000 HC RX 637 (ALT 250 FOR IP): Performed by: FAMILY MEDICINE

## 2023-05-30 PROCEDURE — 6370000000 HC RX 637 (ALT 250 FOR IP): Performed by: PHYSICIAN ASSISTANT

## 2023-05-30 PROCEDURE — 2580000003 HC RX 258: Performed by: STUDENT IN AN ORGANIZED HEALTH CARE EDUCATION/TRAINING PROGRAM

## 2023-05-30 PROCEDURE — 36415 COLL VENOUS BLD VENIPUNCTURE: CPT

## 2023-05-30 PROCEDURE — 94761 N-INVAS EAR/PLS OXIMETRY MLT: CPT

## 2023-05-30 PROCEDURE — 82962 GLUCOSE BLOOD TEST: CPT

## 2023-05-30 RX ORDER — CALCIUM CARBONATE 500(1250)
500 TABLET ORAL 2 TIMES DAILY
Status: DISCONTINUED | OUTPATIENT
Start: 2023-05-30 | End: 2023-05-31 | Stop reason: HOSPADM

## 2023-05-30 RX ADMIN — SODIUM CHLORIDE, PRESERVATIVE FREE 10 ML: 5 INJECTION INTRAVENOUS at 08:54

## 2023-05-30 RX ADMIN — OXYCODONE 5 MG: 5 TABLET ORAL at 10:23

## 2023-05-30 RX ADMIN — ATORVASTATIN CALCIUM 40 MG: 40 TABLET, FILM COATED ORAL at 21:21

## 2023-05-30 RX ADMIN — OXYCODONE 5 MG: 5 TABLET ORAL at 23:04

## 2023-05-30 RX ADMIN — INSULIN LISPRO 3 UNITS: 100 INJECTION, SOLUTION INTRAVENOUS; SUBCUTANEOUS at 08:50

## 2023-05-30 RX ADMIN — INSULIN LISPRO 1 UNITS: 100 INJECTION, SOLUTION INTRAVENOUS; SUBCUTANEOUS at 11:53

## 2023-05-30 RX ADMIN — CEFTRIAXONE SODIUM 2000 MG: 2 INJECTION, POWDER, FOR SOLUTION INTRAMUSCULAR; INTRAVENOUS at 21:24

## 2023-05-30 RX ADMIN — SODIUM CHLORIDE, PRESERVATIVE FREE 10 ML: 5 INJECTION INTRAVENOUS at 21:28

## 2023-05-30 RX ADMIN — CALCIUM 500 MG: 500 TABLET ORAL at 08:51

## 2023-05-30 RX ADMIN — OXYCODONE 5 MG: 5 TABLET ORAL at 17:04

## 2023-05-30 RX ADMIN — DULOXETINE HYDROCHLORIDE 20 MG: 20 CAPSULE, DELAYED RELEASE ORAL at 08:52

## 2023-05-30 RX ADMIN — INSULIN LISPRO 5 UNITS: 100 INJECTION, SOLUTION INTRAVENOUS; SUBCUTANEOUS at 08:50

## 2023-05-30 RX ADMIN — CALCIUM 500 MG: 500 TABLET ORAL at 21:21

## 2023-05-30 RX ADMIN — OXYCODONE 5 MG: 5 TABLET ORAL at 03:41

## 2023-05-30 RX ADMIN — INSULIN GLARGINE 15 UNITS: 100 INJECTION, SOLUTION SUBCUTANEOUS at 21:30

## 2023-05-30 RX ADMIN — INSULIN LISPRO 5 UNITS: 100 INJECTION, SOLUTION INTRAVENOUS; SUBCUTANEOUS at 11:53

## 2023-05-30 RX ADMIN — INSULIN LISPRO 5 UNITS: 100 INJECTION, SOLUTION INTRAVENOUS; SUBCUTANEOUS at 17:05

## 2023-05-30 ASSESSMENT — PAIN SCALES - GENERAL
PAINLEVEL_OUTOF10: 7
PAINLEVEL_OUTOF10: 10
PAINLEVEL_OUTOF10: 9
PAINLEVEL_OUTOF10: 7
PAINLEVEL_OUTOF10: 9

## 2023-05-30 ASSESSMENT — ENCOUNTER SYMPTOMS
CONSTIPATION: 0
SORE THROAT: 0
PHOTOPHOBIA: 0
ANAL BLEEDING: 0
VOMITING: 0
STRIDOR: 0
CHOKING: 0
RECTAL PAIN: 0
COLOR CHANGE: 0
BACK PAIN: 0
NAUSEA: 0
EYE REDNESS: 0
EYE ITCHING: 0
APNEA: 0
ABDOMINAL PAIN: 1

## 2023-05-30 ASSESSMENT — PAIN DESCRIPTION - DESCRIPTORS
DESCRIPTORS: ACHING;SHARP
DESCRIPTORS: STABBING
DESCRIPTORS: STABBING

## 2023-05-30 ASSESSMENT — PAIN - FUNCTIONAL ASSESSMENT
PAIN_FUNCTIONAL_ASSESSMENT: ACTIVITIES ARE NOT PREVENTED
PAIN_FUNCTIONAL_ASSESSMENT: PREVENTS OR INTERFERES SOME ACTIVE ACTIVITIES AND ADLS
PAIN_FUNCTIONAL_ASSESSMENT: ACTIVITIES ARE NOT PREVENTED

## 2023-05-30 ASSESSMENT — PAIN SCALES - WONG BAKER
WONGBAKER_NUMERICALRESPONSE: 2

## 2023-05-30 ASSESSMENT — PAIN DESCRIPTION - LOCATION
LOCATION: ABDOMEN

## 2023-05-30 ASSESSMENT — PAIN DESCRIPTION - ORIENTATION
ORIENTATION: RIGHT;LEFT;MID
ORIENTATION: MID
ORIENTATION: RIGHT

## 2023-05-31 VITALS
HEIGHT: 68 IN | DIASTOLIC BLOOD PRESSURE: 57 MMHG | RESPIRATION RATE: 16 BRPM | BODY MASS INDEX: 19.7 KG/M2 | SYSTOLIC BLOOD PRESSURE: 122 MMHG | OXYGEN SATURATION: 99 % | TEMPERATURE: 98.1 F | HEART RATE: 65 BPM | WEIGHT: 130 LBS

## 2023-05-31 PROBLEM — E43 SEVERE MALNUTRITION (HCC): Chronic | Status: ACTIVE | Noted: 2023-05-31

## 2023-05-31 LAB
GLUCOSE BLD-MCNC: 157 MG/DL (ref 70–99)
GLUCOSE BLD-MCNC: 192 MG/DL (ref 70–99)

## 2023-05-31 PROCEDURE — 6360000002 HC RX W HCPCS: Performed by: PHYSICIAN ASSISTANT

## 2023-05-31 PROCEDURE — APPNB30 APP NON BILLABLE TIME 0-30 MINS: Performed by: PHYSICIAN ASSISTANT

## 2023-05-31 PROCEDURE — 2580000003 HC RX 258: Performed by: PHYSICIAN ASSISTANT

## 2023-05-31 PROCEDURE — 99024 POSTOP FOLLOW-UP VISIT: CPT | Performed by: PHYSICIAN ASSISTANT

## 2023-05-31 PROCEDURE — 94761 N-INVAS EAR/PLS OXIMETRY MLT: CPT

## 2023-05-31 PROCEDURE — 6370000000 HC RX 637 (ALT 250 FOR IP): Performed by: FAMILY MEDICINE

## 2023-05-31 PROCEDURE — 6370000000 HC RX 637 (ALT 250 FOR IP): Performed by: STUDENT IN AN ORGANIZED HEALTH CARE EDUCATION/TRAINING PROGRAM

## 2023-05-31 PROCEDURE — 6370000000 HC RX 637 (ALT 250 FOR IP): Performed by: PHYSICIAN ASSISTANT

## 2023-05-31 PROCEDURE — 82962 GLUCOSE BLOOD TEST: CPT

## 2023-05-31 RX ORDER — OXYCODONE HYDROCHLORIDE 5 MG/1
5 TABLET ORAL EVERY 6 HOURS PRN
Qty: 20 TABLET | Refills: 0 | Status: SHIPPED | OUTPATIENT
Start: 2023-05-31 | End: 2023-06-05

## 2023-05-31 RX ORDER — INSULIN GLARGINE 100 [IU]/ML
15 INJECTION, SOLUTION SUBCUTANEOUS NIGHTLY
Qty: 1 ADJUSTABLE DOSE PRE-FILLED PEN SYRINGE | Refills: 0 | Status: SHIPPED | OUTPATIENT
Start: 2023-05-31

## 2023-05-31 RX ADMIN — INSULIN LISPRO 5 UNITS: 100 INJECTION, SOLUTION INTRAVENOUS; SUBCUTANEOUS at 12:26

## 2023-05-31 RX ADMIN — ASPIRIN 81 MG CHEWABLE TABLET 81 MG: 81 TABLET CHEWABLE at 08:57

## 2023-05-31 RX ADMIN — ENOXAPARIN SODIUM 40 MG: 100 INJECTION SUBCUTANEOUS at 08:56

## 2023-05-31 RX ADMIN — INSULIN LISPRO 5 UNITS: 100 INJECTION, SOLUTION INTRAVENOUS; SUBCUTANEOUS at 09:19

## 2023-05-31 RX ADMIN — DULOXETINE HYDROCHLORIDE 20 MG: 20 CAPSULE, DELAYED RELEASE ORAL at 08:57

## 2023-05-31 RX ADMIN — OXYCODONE 5 MG: 5 TABLET ORAL at 08:57

## 2023-05-31 RX ADMIN — SODIUM CHLORIDE, PRESERVATIVE FREE 10 ML: 5 INJECTION INTRAVENOUS at 08:57

## 2023-05-31 RX ADMIN — CALCIUM 500 MG: 500 TABLET ORAL at 08:57

## 2023-05-31 ASSESSMENT — ENCOUNTER SYMPTOMS
CONSTIPATION: 0
RECTAL PAIN: 0
COLOR CHANGE: 0
SORE THROAT: 0
ANAL BLEEDING: 0
ABDOMINAL PAIN: 1
BACK PAIN: 0
STRIDOR: 0
NAUSEA: 0
EYE REDNESS: 0
EYE ITCHING: 0
CHOKING: 0
APNEA: 0
PHOTOPHOBIA: 0
VOMITING: 0

## 2023-05-31 ASSESSMENT — PAIN SCALES - WONG BAKER: WONGBAKER_NUMERICALRESPONSE: 0

## 2023-05-31 ASSESSMENT — PAIN SCALES - GENERAL
PAINLEVEL_OUTOF10: 8
PAINLEVEL_OUTOF10: 8

## 2023-05-31 ASSESSMENT — PAIN DESCRIPTION - LOCATION: LOCATION: LEG;HIP;ABDOMEN

## 2023-05-31 ASSESSMENT — PAIN DESCRIPTION - DESCRIPTORS: DESCRIPTORS: ACHING

## 2023-05-31 ASSESSMENT — PAIN DESCRIPTION - ORIENTATION: ORIENTATION: RIGHT

## 2023-05-31 ASSESSMENT — PAIN - FUNCTIONAL ASSESSMENT: PAIN_FUNCTIONAL_ASSESSMENT: ACTIVITIES ARE NOT PREVENTED

## 2023-05-31 NOTE — CARE COORDINATION
Rec'd a call from [facility] in outpt. Pharmacy asking if we could help w/ patient's d/c lantus pens. This gentleman is already taking this so we could not assist with a one time voucher.

## 2023-06-01 LAB
CULTURE: NORMAL
CULTURE: NORMAL
Lab: NORMAL
Lab: NORMAL
SPECIMEN: NORMAL
SPECIMEN: NORMAL

## 2023-06-05 NOTE — PROGRESS NOTES
05/30/23 1043   Encounter Summary   Encounter Overview/Reason  Attempted Encounter   Service Provided For: Patient not available   Last Encounter  05/30/23  (attempted visit.  patient was deeply sleeping)   Begin Time 1043   End Time  1045   Total Time Calculated 2 min   Assessment/Intervention/Outcome   Assessment Unable to assess  (patient was not available at the time of visit.)   Plan and Referrals   Plan/Referrals Continue Support (comment)  (as needed)
GENERAL SURGERY PROGRESS NOTE    Gabriela Capellan is a 58 y.o. male with 2 Days Post-Op Lap catherine with drain placement. Subjective:    Pain: 8/10 at drain site  BM: -ve   Diet: ADULT DIET; Regular; 4 carb choices (60 gm/meal); Low Fat/Low Chol/High Fiber/2 gm Na  Activity: Limited 2/2 R AKA. Pt reports worse pain this AM, mostly at RUQ drain site. Denies N/V    Review of Systems   Constitutional:  Negative for chills and fever. HENT:  Negative for ear pain, mouth sores, sore throat and tinnitus. Eyes:  Negative for photophobia, redness and itching. Respiratory:  Negative for apnea, choking and stridor. Cardiovascular:  Negative for chest pain and palpitations. Gastrointestinal:  Positive for abdominal pain. Negative for anal bleeding, constipation, nausea, rectal pain and vomiting. Endocrine: Negative for polydipsia. Genitourinary:  Negative for enuresis, flank pain and hematuria. Musculoskeletal:  Negative for back pain, joint swelling and myalgias. Skin:  Negative for color change and pallor. Allergic/Immunologic: Negative for environmental allergies. Neurological:  Negative for syncope and speech difficulty. Psychiatric/Behavioral:  Negative for confusion and hallucinations. Objective:    Vitals: VITALS:  BP (!) 114/56   Pulse 69   Temp 98.8 °F (37.1 °C)   Resp 16   Ht 5' 8\" (1.727 m)   Wt 130 lb (59 kg)   SpO2 96%   BMI 19.77 kg/m²   TEMPERATURE:  Current - Temp: 98.8 °F (37.1 °C);  Max - Temp  Av.3 °F (36.8 °C)  Min: 97.9 °F (36.6 °C)  Max: 98.8 °F (37.1 °C)    I/O:  0701 -  0700  In: 600 [P.O.:600]  Out: 1895 [Urine:1225; Drains:670]    Labs/Imaging Results:   Lab Results   Component Value Date    WBC 10.8 (H) 2023    HGB 9.8 (L) 2023    HCT 29.6 (L) 2023    MCV 83.6 2023     2023     Lab Results   Component Value Date     (L) 2023    K 4.4 2023     2023    CO2 23 2023
GENERAL SURGERY PROGRESS NOTE    Kanika Inman is a 58 y.o. male with 1 Day Post-Op Lap catherine with drain placement. Subjective:    Pain: 6/10  BM: -ve   Diet: ADULT DIET; Regular; 4 carb choices (60 gm/meal); Low Fat/Low Chol/High Fiber/2 gm Na  Activity: Limited 2/2 R AKA. Pt doing well overall. Feels better, but still with some abd pain. Low back pain is also still present. Review of Systems   Constitutional:  Negative for chills and fever. HENT:  Negative for ear pain, mouth sores, sore throat and tinnitus. Eyes:  Negative for photophobia, redness and itching. Respiratory:  Negative for apnea, choking and stridor. Cardiovascular:  Negative for chest pain and palpitations. Gastrointestinal:  Positive for abdominal pain. Negative for anal bleeding, constipation, nausea, rectal pain and vomiting. Endocrine: Negative for polydipsia. Genitourinary:  Negative for enuresis, flank pain and hematuria. Musculoskeletal:  Negative for back pain, joint swelling and myalgias. Skin:  Negative for color change and pallor. Allergic/Immunologic: Negative for environmental allergies. Neurological:  Negative for syncope and speech difficulty. Psychiatric/Behavioral:  Negative for confusion and hallucinations. Objective:    Vitals: VITALS:  BP (!) 120/56   Pulse 64   Temp 97.6 °F (36.4 °C) (Oral)   Resp 20   Ht 5' 8\" (1.727 m)   Wt 130 lb (59 kg)   SpO2 100%   BMI 19.77 kg/m²   TEMPERATURE:  Current - Temp: 97.6 °F (36.4 °C); Max - Temp  Av.7 °F (36.5 °C)  Min: 97.5 °F (36.4 °C)  Max: 98 °F (36.7 °C)    I/O:  0701 -  0700  In: 2314 [P.O.:600;  I.V.:900]  Out: 210 [Urine:1175; Drains:330]    Labs/Imaging Results:   Lab Results   Component Value Date    WBC 13.1 (H) 2023    HGB 9.0 (L) 2023    HCT 27.3 (L) 2023    MCV 84.0 2023     2023     Lab Results   Component Value Date     (L) 2023    K 4.1 2023
GENERAL SURGERY PROGRESS NOTE    Levy Green is a 58 y.o. male with 3 Days Post-Op Lap catherine with drain placement. Subjective:    Pain: \"Sore\" but improved now that RADAMES is out. Diet: ADULT DIET; Regular; 4 carb choices (60 gm/meal); Low Fat/Low Chol/High Fiber/2 gm Na  Activity: Limited 2/2 R AKA. Pt reports that he is feeling somewhat better today. Abd pain at drain site improved. Denies N/V. Review of Systems   Constitutional:  Negative for chills and fever. HENT:  Negative for ear pain, mouth sores, sore throat and tinnitus. Eyes:  Negative for photophobia, redness and itching. Respiratory:  Negative for apnea, choking and stridor. Cardiovascular:  Negative for chest pain and palpitations. Gastrointestinal:  Positive for abdominal pain. Negative for anal bleeding, constipation, nausea, rectal pain and vomiting. Endocrine: Negative for polydipsia. Genitourinary:  Negative for enuresis, flank pain and hematuria. Musculoskeletal:  Negative for back pain, joint swelling and myalgias. Skin:  Negative for color change and pallor. Allergic/Immunologic: Negative for environmental allergies. Neurological:  Negative for syncope and speech difficulty. Psychiatric/Behavioral:  Negative for confusion and hallucinations. Objective:    Vitals: VITALS:  /67   Pulse 61   Temp 98.1 °F (36.7 °C) (Oral)   Resp 14   Ht 5' 8\" (1.727 m)   Wt 130 lb (59 kg)   SpO2 97%   BMI 19.77 kg/m²   TEMPERATURE:  Current - Temp: 98.1 °F (36.7 °C);  Max - Temp  Av.9 °F (36.6 °C)  Min: 97.8 °F (36.6 °C)  Max: 98.1 °F (36.7 °C)    I/O:  0701 -  0700  In: 290 [P.O.:240]  Out: 1120 [Urine:1000; Drains:120]    Labs/Imaging Results:   Lab Results   Component Value Date    WBC 10.8 (H) 2023    HGB 9.8 (L) 2023    HCT 29.6 (L) 2023    MCV 83.6 2023     2023     Lab Results   Component Value Date     (L) 2023    K 4.4
Outpatient Pharmacy Progress Note for Meds-to-Beds    Total number of Prescriptions Filled: 1  The following medications were dispensed to the patient during the discharge process:  Oxycodone    Additional Documentation:  Medication(s) were delivered to the patient's room prior to discharge  Patient did not have money for lantus, but stated his wife will return to pick-up      Thank you for letting us serve your patients.   [address] [facility]    [address], [address] [facility]    Phone: [phone]    Fax: [phone]
Physical Therapy  [facility] ACUTE CARE PHYSICAL THERAPY EVALUATION  Dianne Melvin, 1960, 1106/1106-A, 5/30/2023    History  [place]:  The primary encounter diagnosis was Acute pyelonephritis. Diagnoses of Calculus of gallbladder without cholecystitis without obstruction, Hyperglycemia, and Cirrhosis of liver with ascites, unspecified hepatic cirrhosis type [facility]) were also pertinent to this visit. Patient  has a past medical history of Anesthesia, Anxiety, CAD (coronary artery disease), COPD (chronic obstructive pulmonary disease) ([address] Degenerative disc disease, Diabetes mellitus ([facility] Gall bladder stones, H/O cardiovascular stress test, H/O echocardiogram, Heroin abuse ([facility] Hx MRSA infection, Hyperlipidemia, Hypertension, Low back pain, Migraine, Pancreatitis, S/P CABG x 4, and Shortness of breath on exertion. Patient  has a past surgical history that includes Hand surgery (15 yrs ago); Dental surgery (2010); Coronary artery bypass graft (1/6/13); Toe amputation (Right, 3/31/2020); Toe amputation (Right, 11/5/2021); Foot Debridement (Right, 06/24/2022); Foot Debridement (Right, 6/24/2022); Leg amputation below knee (Right, 8/29/2022); Leg Surgery (Right, 12/21/2022); Leg amputation below knee (Right, 2/14/2023); and Cholecystectomy, laparoscopic (N/A, 5/28/2023). Assessment:  Pt presents 5 days s/p admission for fever, general malaise, weakness ; evidence of pyelonephritis and cholecystitis, underwent lap catherine 5/28. Pt is from home w/ family, in ramped entry house, 1 level, ind PLOF, w/c vs FWW for short distance amb, wife assists w/ home mgmt and heavier household tasks. Pt is primarily limited by pain, additional deficits include functional mobility, endurance, strength. Able to complete transfers w/o assist, good family support in well adapted environment, pain only slightly impacting most function post-operatively. Recommending home w/ assist ; maye GLOVER.     Complexity: Low  Prognosis:
Physician Progress Note      Vandana Rodriguez  [facility] #:                  [id number]  :                       1960  ADMIT DATE:       2023 3:02 PM  [address] [place] DATE:        2023 4:24 PM  RESPONDING  PROVIDER #:        Selena Goins MD          QUERY TEXT:    Patient admitted with sepsis. Noted to have dietician assessment with   malnutrition diagnosis in  PN. If possible, please document in progress   notes and discharge summary if you are evaluating and /or treating any of the   following: The medical record reflects the following:  Risk Factors: DM  Clinical Indicators:  dietitian PN \"Severe malnutrition related to   inadequate protein-energy intake as evidenced by severe muscle loss, moderate   loss of subcutaneous fat\"  Treatment: Dietitian consult, oral nutrition supplement    ASPEN Criteria:    https://aspenjournals. onlinelibrary. morrow. com/doi/full/10.1177/974256041559332  5    Thank you,  MARIXA LeosN, RN, CDS  [phone]  Options provided:  -- Protein calorie malnutrition severe  -- Other - I will add my own diagnosis  -- Disagree - Not applicable / Not valid  -- Disagree - Clinically unable to determine / Unknown  -- Refer to Clinical Documentation Reviewer    PROVIDER RESPONSE TEXT:    This patient has severe protein calorie malnutrition.     Query created by: Cody Lee on 2023 8:05 AM      Electronically signed by:  Selena Goins MD 2023 6:57 AM
V2.0    [facility] [place]      Name:  Jameel Valdez /Age/Sex: 1960  (58 y.o. male)   MRN & CSN:  [id number] & [id number] Encounter Date/Time: 2023 2:36 PM EDT   [facility]:  [facility] PCP: Reyes Garcia MD     Attending:Elana Whitney MD       Hospital Day: 5    Assessment and Recommendations   Jameel Valdez is a 58 y.o. male with R. AKA, HCV associated liver cirrhosis, CAD with history of CABG in , Type II DM (Insulin Dependent), hyperlipidemia is admitted with lower abdominal pain. Assessment    Sepsis present on admission  Secondary to Acute cholecystitis  CT abdomen and pelvis findings consistent with bladder wall thickening and ascending infection and pericholecystic fluid. HIDA scan negative   Initially thought to be due to pyelonephritis but given clinical sign and symptoms- underwent Lap Cholecystectomy 23  Bcx 23 E.coli   Repeat Bcx 23 Negative at 48 hours  Urine culture E.coli (probably from bacteremia)    Pseudo hypocalcemia  Corrected for albumin within normal range    Cannabis use disorder  UDS positive    Thrombocytopenia  Cirrhosis secondary to Hepatitis C    CAD with history of CABG in     Tobacco Abuse    IDDM with Hyperglycemia  A1c 9.8% on 23    R. AKA in 2022    Plan     Continue IV Rocephin until drain in place  Can be switched to PO Ciprofloxacin to complete a total of 14 days of therapy given defervescence, clinical stability and source control once cleared by General Surgery. General surgery following, post op care including pain, diet and DVT Prophylaxis per their recommendations. Repeat metabolic panel & CBC in am  Adjusted basal bolus regimen  Continue SSI  Outpatient follow up with GI for management of hepatitis C related cirrhosis  Continue Aspirin and Statin    Diet ADULT DIET; Full Liquid; 4 carb choices (60 gm/meal);  Low Fat/Low Chol/High Fiber/2 gm Na   DVT Prophylaxis [x] Lovenox, []  Heparin, [] SCDs, []
Average Meal Intake: %, 51-75%  Average Supplements Intake: None Ordered  ADULT DIET; Regular; 4 carb choices (60 gm/meal); Low Fat/Low Chol/High Fiber/2 gm Na    Anthropometric Measures:  Height: 5' 8\" (172.7 cm)  Ideal Body Weight (IBW): 154 lbs (70 kg)    Admission Body Weight: 130 lb (59 kg)  Current Body Weight: 130 lb 1.1 oz (59 kg), 84.5 % IBW. Weight Source: Bed Scale  Current BMI (kg/m2): 19.8  Usual Body Weight: 130 lb (59 kg) (9/28/2022)  % Weight Change (Calculated): 0.1  Weight Adjustment For: Amputation  Total Adjusted Percentage (Calculated): 10.1  Adjusted Ideal Body Weight (lbs) (Calculated): 138.4 lbs  Adjusted Ideal Body Weight (kg) (Calculated): 62.91 kg  Adjusted % Ideal Body Weight (Calculated): 94  Adjusted BMI (kg/m2) (Calculated): 21.8  BMI Categories: Normal Weight (BMI 18.5-24. 9)    Estimated Daily Nutrient Needs:  Energy Requirements Based On: Kcal/kg  Weight Used for Energy Requirements: Current  Energy (kcal/day): 5267-0326 (25-30 kcals/kg), additional 350-500 kcals/day for weight gain  Weight Used for Protein Requirements: Adjusted  Protein (g/day): 63-75 (1-1.2 g/kg)  Method Used for Fluid Requirements: 1 ml/kcal  Fluid (ml/day): 1500 at least    Nutrition Diagnosis:    In context of chronic illness, Severe malnutrition related to inadequate protein-energy intake as evidenced by severe muscle loss, moderate loss of subcutaneous fat    Nutrition Interventions:   Food and/or Nutrient Delivery: Continue Current Diet, Start Oral Nutrition Supplement  Nutrition Education/Counseling: Education not indicated  Coordination of Nutrition Care: Continue to monitor while inpatient, Coordination of Care  Plan of Care discussed with: pt, PS hospitalist over malnutrition diagnosis    Goals:     Goals: PO intake 75% or greater (with BS mgt)       Nutrition Monitoring and Evaluation:   Behavioral-Environmental Outcomes: None Identified  Food/Nutrient Intake Outcomes: Food and Nutrient Intake,
sodium chloride flush  5-40 mL IntraVENous 2 times per day      Infusions:    sodium chloride      dextrose       PRN Meds: oxyCODONE, 5 mg, Q6H PRN  gabapentin, 300 mg, TID PRN  sodium chloride flush, 5-40 mL, PRN  sodium chloride, , PRN  potassium chloride, 10 mEq, PRN  magnesium sulfate, 2,000 mg, PRN  ondansetron, 4 mg, Q8H PRN   Or  ondansetron, 4 mg, Q6H PRN  polyethylene glycol, 17 g, Daily PRN  nicotine, 1 patch, Daily PRN  acetaminophen, 650 mg, Q6H PRN   Or  acetaminophen, 650 mg, Q6H PRN  glucose, 4 tablet, PRN  dextrose bolus, 125 mL, PRN   Or  dextrose bolus, 250 mL, PRN  glucagon (rDNA), 1 mg, PRN  dextrose, , Continuous PRN        Labs and Imaging   CT ABDOMEN PELVIS W IV CONTRAST Additional Contrast? None    Result Date: 5/25/2023  EXAMINATION: CT OF THE ABDOMEN AND PELVIS WITH CONTRAST 5/25/2023 6:20 pm TECHNIQUE: CT of the abdomen and pelvis was performed with the administration of intravenous contrast. Multiplanar reformatted images are provided for review. Automated exposure control, iterative reconstruction, and/or weight based adjustment of the mA/kV was utilized to reduce the radiation dose to as low as reasonably achievable. COMPARISON: 08/26/2018 HISTORY: ORDERING SYSTEM PROVIDED HISTORY: abdominal pain, nausea TECHNOLOGIST PROVIDED HISTORY: Additional Contrast?->None Reason for exam:->abdominal pain, nausea Decision Support Exception - unselect if not a suspected or confirmed emergency medical condition->Emergency Medical Condition (MA) Reason for Exam: abdominal pain, nausea Additional signs and symptoms: none Relevant Medical/Surgical History: 75ml isovue 370/ gfr>60 FINDINGS: Lower Chest: The lung bases are clear and the heart size is. Bulky three-vessel calcific coronary artery disease status post sternal splitting procedure for CABG. Calcification in the region of the aortic valve. The heart size is. There is concentric mural thickening in the distal esophagus. Organs:  There is a
moderately distended. There is diffuse gallbladder wall and mild pericholecystic fat stranding. There is unchanged common bile dilatation. No evidence of choledocholithiasis. Splenomegaly. There are upper abdominal abdominal varices. There is mild pancreatic duct dilatation. Pancreas is otherwise. The adrenal glands and right kidney are grossly normal.  There patchy areas of diminished cortical enhancement in the left kidney suspicious for acute pyelonephritis. There is no evidence of a renal or perinephric abscess. GI/Bowel: There are thickened mucosal folds in the stomach. Stomach was not opacified distended. Right colon mural thickening versus underdistention. No bowel obstruction. Pelvis: Moderate diffuse urinary bladder wall thickening. Peritoneum/Retroperitoneum: There is a small volume low-attenuation ascites in the abdomen and pelvis. There is ascites in the right perihepatic space, perisplenic space, bilateral pericolic gutters and pelvis. There is no free air. There are mildly enlarged nonspecific retroperitoneal lymph nodes. Calcific aorto iliac atherosclerotic disease with no abdominal aortic aneurysm. The right external iliac artery is occluded with reconstitution of the right common femoral artery. Bones/Soft Tissues: There are degenerative changes in lumbar spine. There is a large Schmorl's node involving the superior endplate of L3. Smaller Schmorl's nodes involve L2 and L4. There is no acute bone finding. Patchy diminished cortical enhancement in the left kidney suspicious for acute pyelonephritis. Correlate clinically There is moderate diffuse urinary bladder wall thickening. This may reflect cystitis with an ascending infection to involve the left kidney. Hypertrophy from outlet obstruction is also a consideration. Cirrhotic morphology liver portal venous hypertension, upper abdominal varices and splenomegaly. Small volume of ascites in the abdomen and pelvis.  Concentric

## 2023-06-05 NOTE — DISCHARGE SUMMARY
V2.0  Discharge Summary    Name:  Sherrell Amor /Age/Sex: 1960 (58 y.o. male)   Admit Date: 2023  Discharge Date: 23    MRN & CSN:  [id number] & [id number] Encounter Date and Time 23 4:42 PM EDT    Attending:  No att. providers found Discharging Provider: Pa Phillips MD       Hospital Course:     Brief HPI: Patient is a 27-year-old male with a PMHx of CAD s/p CABG in , cirrhosis likely secondary to HCV, right BKA in 2022, normocytic anemia, uncontrolled T2DM with PAD and peripheral neuropathy, and tobacco abuse who presented to the ED with lower abdominal pain for the past x4 days with fevers of 100-102 °F, nausea and NBNB emesis. Denied any previous episodes. UCx in 2023 grew E faecalis. Denied any SOB, cough, CP or C/D. Endorsed smoking about a pack a day but denied any alcohol or illicit drug use. Brief Problem Based Course:   Sepsis present on admission  Secondary to Acute cholecystitis  CT abdomen and pelvis findings consistent with bladder wall thickening and ascending infection and pericholecystic fluid. HIDA scan negative   Initially thought to be due to pyelonephritis but given clinical sign and symptoms- underwent Lap Cholecystectomy 23  Bcx 23 E.coli, likely source  as seen on Urine culture as well  Repeat Bcx 23 Negative at 48 hours  switch to PO ciprofloxacin  on discharge for total of 14 days of Abx course        The patient expressed appropriate understanding of, and agreement with the discharge recommendations, medications, and plan.      Consults this admission:  IP CONSULT TO GENERAL SURGERY    Discharge Diagnosis:   Acute pyelonephritis  Acute cholecystitis and gram -ve bacteremia    Discharge Instruction:   Follow up appointments:    Primary care physician: Sheree Bains MD within 2 weeks  Diet: cardiac diet   Activity: activity as tolerated  Disposition: Discharged to:   [x]Home, []HHC, []SNF, []Acute Rehab, []Hospice   Condition on

## 2023-07-17 ENCOUNTER — TELEPHONE (OUTPATIENT)
Dept: BARIATRICS/WEIGHT MGMT | Age: 63
End: 2023-07-17

## 2023-07-17 NOTE — TELEPHONE ENCOUNTER
[address] [place] [address] clinic sent fax stating that they have reached out to the pt multiple times -  with no return call . Office tried to call pt - phone is not in service at this time.

## 2023-10-10 ENCOUNTER — APPOINTMENT (OUTPATIENT)
Dept: ULTRASOUND IMAGING | Age: 63
End: 2023-10-10
Payer: MEDICARE

## 2023-10-10 ENCOUNTER — APPOINTMENT (OUTPATIENT)
Dept: CT IMAGING | Age: 63
End: 2023-10-10
Payer: MEDICARE

## 2023-10-10 ENCOUNTER — HOSPITAL ENCOUNTER (EMERGENCY)
Age: 63
Discharge: HOME | End: 2023-10-10
Attending: STUDENT IN AN ORGANIZED HEALTH CARE EDUCATION/TRAINING PROGRAM
Payer: MEDICARE

## 2023-10-10 VITALS
SYSTOLIC BLOOD PRESSURE: 112 MMHG | OXYGEN SATURATION: 99 % | HEART RATE: 67 BPM | TEMPERATURE: 98.2 F | DIASTOLIC BLOOD PRESSURE: 52 MMHG | RESPIRATION RATE: 16 BRPM

## 2023-10-10 DIAGNOSIS — N49.2 CELLULITIS OF SCROTUM: ICD-10-CM

## 2023-10-10 DIAGNOSIS — N43.2 OTHER HYDROCELE: ICD-10-CM

## 2023-10-10 DIAGNOSIS — N39.0 URINARY TRACT INFECTION WITH HEMATURIA, SITE UNSPECIFIED: Primary | ICD-10-CM

## 2023-10-10 DIAGNOSIS — R31.9 URINARY TRACT INFECTION WITH HEMATURIA, SITE UNSPECIFIED: Primary | ICD-10-CM

## 2023-10-10 LAB
ANION GAP SERPL CALCULATED.3IONS-SCNC: 8 MMOL/L (ref 4–16)
BACTERIA: ABNORMAL /HPF
BASOPHILS ABSOLUTE: 0 K/CU MM
BASOPHILS RELATIVE PERCENT: 0.3 % (ref 0–1)
BILIRUBIN URINE: ABNORMAL MG/DL
BLOOD, URINE: ABNORMAL
BUN SERPL-MCNC: 18 MG/DL (ref 6–23)
CALCIUM SERPL-MCNC: 8.2 MG/DL (ref 8.3–10.6)
CHLORIDE BLD-SCNC: 98 MMOL/L (ref 99–110)
CLARITY: ABNORMAL
CO2: 23 MMOL/L (ref 21–32)
COLOR: YELLOW
CREAT SERPL-MCNC: 0.9 MG/DL (ref 0.9–1.3)
DIFFERENTIAL TYPE: ABNORMAL
EOSINOPHILS ABSOLUTE: 0.1 K/CU MM
EOSINOPHILS RELATIVE PERCENT: 0.8 % (ref 0–3)
GFR SERPL CREATININE-BSD FRML MDRD: >60 ML/MIN/1.73M2
GLUCOSE SERPL-MCNC: 255 MG/DL (ref 70–99)
GLUCOSE, URINE: 500 MG/DL
HCT VFR BLD CALC: 28.1 % (ref 42–52)
HEMOGLOBIN: 9.9 GM/DL (ref 13.5–18)
IMMATURE NEUTROPHIL %: 0.7 % (ref 0–0.43)
KETONES, URINE: NEGATIVE MG/DL
LEUKOCYTE ESTERASE, URINE: ABNORMAL
LYMPHOCYTES ABSOLUTE: 1.2 K/CU MM
LYMPHOCYTES RELATIVE PERCENT: 9.3 % (ref 24–44)
MCH RBC QN AUTO: 30 PG (ref 27–31)
MCHC RBC AUTO-ENTMCNC: 35.2 % (ref 32–36)
MCV RBC AUTO: 85.2 FL (ref 78–100)
MONOCYTES ABSOLUTE: 1.3 K/CU MM
MONOCYTES RELATIVE PERCENT: 9.6 % (ref 0–4)
NITRITE URINE, QUANTITATIVE: NEGATIVE
NUCLEATED RBC %: 0 %
PDW BLD-RTO: 14.6 % (ref 11.7–14.9)
PH, URINE: 5.5 (ref 5–8)
PLATELET # BLD: 133 K/CU MM (ref 140–440)
PMV BLD AUTO: 10.7 FL (ref 7.5–11.1)
POTASSIUM SERPL-SCNC: 3.7 MMOL/L (ref 3.5–5.1)
PROTEIN UA: >300 MG/DL
RBC # BLD: 3.3 M/CU MM (ref 4.6–6.2)
RBC URINE: 22 /HPF (ref 0–3)
REASON FOR REJECTION: NORMAL
REJECTED TEST: NORMAL
SEGMENTED NEUTROPHILS ABSOLUTE COUNT: 10.5 K/CU MM
SEGMENTED NEUTROPHILS RELATIVE PERCENT: 79.3 % (ref 36–66)
SODIUM BLD-SCNC: 129 MMOL/L (ref 135–145)
SPECIFIC GRAVITY UA: >1.03 (ref 1–1.03)
TOTAL IMMATURE NEUTOROPHIL: 0.09 K/CU MM
TOTAL NUCLEATED RBC: 0 K/CU MM
TRICHOMONAS: ABNORMAL /HPF
UROBILINOGEN, URINE: 1 MG/DL (ref 0.2–1)
WBC # BLD: 13.2 K/CU MM (ref 4–10.5)
WBC CLUMP: ABNORMAL /HPF
WBC UA: 820 /HPF (ref 0–2)

## 2023-10-10 PROCEDURE — 96375 TX/PRO/DX INJ NEW DRUG ADDON: CPT

## 2023-10-10 PROCEDURE — 96365 THER/PROPH/DIAG IV INF INIT: CPT

## 2023-10-10 PROCEDURE — 93975 VASCULAR STUDY: CPT

## 2023-10-10 PROCEDURE — 81001 URINALYSIS AUTO W/SCOPE: CPT

## 2023-10-10 PROCEDURE — 87186 SC STD MICRODIL/AGAR DIL: CPT

## 2023-10-10 PROCEDURE — 76870 US EXAM SCROTUM: CPT

## 2023-10-10 PROCEDURE — 2580000003 HC RX 258: Performed by: STUDENT IN AN ORGANIZED HEALTH CARE EDUCATION/TRAINING PROGRAM

## 2023-10-10 PROCEDURE — 99285 EMERGENCY DEPT VISIT HI MDM: CPT

## 2023-10-10 PROCEDURE — 6370000000 HC RX 637 (ALT 250 FOR IP): Performed by: STUDENT IN AN ORGANIZED HEALTH CARE EDUCATION/TRAINING PROGRAM

## 2023-10-10 PROCEDURE — 87088 URINE BACTERIA CULTURE: CPT

## 2023-10-10 PROCEDURE — 85025 COMPLETE CBC W/AUTO DIFF WBC: CPT

## 2023-10-10 PROCEDURE — 80048 BASIC METABOLIC PNL TOTAL CA: CPT

## 2023-10-10 PROCEDURE — 87086 URINE CULTURE/COLONY COUNT: CPT

## 2023-10-10 PROCEDURE — 6360000004 HC RX CONTRAST MEDICATION: Performed by: STUDENT IN AN ORGANIZED HEALTH CARE EDUCATION/TRAINING PROGRAM

## 2023-10-10 PROCEDURE — 74177 CT ABD & PELVIS W/CONTRAST: CPT

## 2023-10-10 PROCEDURE — 6360000002 HC RX W HCPCS: Performed by: STUDENT IN AN ORGANIZED HEALTH CARE EDUCATION/TRAINING PROGRAM

## 2023-10-10 RX ORDER — SULFAMETHOXAZOLE AND TRIMETHOPRIM 800; 160 MG/1; MG/1
1 TABLET ORAL 2 TIMES DAILY
Qty: 28 TABLET | Refills: 0 | Status: SHIPPED | OUTPATIENT
Start: 2023-10-10 | End: 2023-10-24

## 2023-10-10 RX ORDER — ACETAMINOPHEN 500 MG
1000 TABLET ORAL ONCE
Status: COMPLETED | OUTPATIENT
Start: 2023-10-10 | End: 2023-10-10

## 2023-10-10 RX ORDER — KETOROLAC TROMETHAMINE 15 MG/ML
15 INJECTION, SOLUTION INTRAMUSCULAR; INTRAVENOUS ONCE
Status: COMPLETED | OUTPATIENT
Start: 2023-10-10 | End: 2023-10-10

## 2023-10-10 RX ORDER — KETOROLAC TROMETHAMINE 30 MG/ML
15 INJECTION, SOLUTION INTRAMUSCULAR; INTRAVENOUS ONCE
Status: DISCONTINUED | OUTPATIENT
Start: 2023-10-10 | End: 2023-10-10 | Stop reason: CLARIF

## 2023-10-10 RX ORDER — NAPROXEN 500 MG/1
500 TABLET ORAL 2 TIMES DAILY PRN
Qty: 14 TABLET | Refills: 0 | Status: SHIPPED | OUTPATIENT
Start: 2023-10-10 | End: 2023-10-17

## 2023-10-10 RX ORDER — DOXYCYCLINE HYCLATE 100 MG
100 TABLET ORAL 2 TIMES DAILY
Qty: 28 TABLET | Refills: 0 | Status: SHIPPED | OUTPATIENT
Start: 2023-10-10 | End: 2023-10-24

## 2023-10-10 RX ORDER — MORPHINE SULFATE 2 MG/ML
2 INJECTION, SOLUTION INTRAMUSCULAR; INTRAVENOUS ONCE
Status: COMPLETED | OUTPATIENT
Start: 2023-10-10 | End: 2023-10-10

## 2023-10-10 RX ADMIN — MORPHINE SULFATE 2 MG: 2 INJECTION, SOLUTION INTRAMUSCULAR; INTRAVENOUS at 22:42

## 2023-10-10 RX ADMIN — ACETAMINOPHEN 1000 MG: 500 TABLET ORAL at 19:04

## 2023-10-10 RX ADMIN — KETOROLAC TROMETHAMINE 15 MG: 15 INJECTION, SOLUTION INTRAMUSCULAR; INTRAVENOUS at 19:50

## 2023-10-10 RX ADMIN — IOPAMIDOL 75 ML: 755 INJECTION, SOLUTION INTRAVENOUS at 22:22

## 2023-10-10 RX ADMIN — CEFTRIAXONE SODIUM 1000 MG: 1 INJECTION, POWDER, FOR SOLUTION INTRAMUSCULAR; INTRAVENOUS at 21:23

## 2023-10-10 ASSESSMENT — PAIN SCALES - GENERAL
PAINLEVEL_OUTOF10: 9
PAINLEVEL_OUTOF10: 8

## 2023-10-10 ASSESSMENT — PAIN DESCRIPTION - LOCATION: LOCATION: BACK;SCROTUM

## 2023-10-10 NOTE — ED PROVIDER NOTES
Emergency Department Encounter        Pt Name: Gonzalez Garibay  MRN: [id number]  [address] [facility] 1960  Date of evaluation: 10/10/2023  ED Physician: Pratik Dean MD    CHIEF COMPLAINT     Triage Chief Complaint:   Groin Swelling (Right testicle smashed with another wheelchair)      HISTORY OF PRESENT ILLNESS & REVIEW OF SYSTEMS     History obtained from the patient. Gonzalez Garibay is a 58 y.o. male who presents to the emergency department for evaluation of testicular pain. Patient says that about a week ago he was moving around in his wheelchair when he accidentally smashed his right testicle against one of the rails. Says that he has been having worsening pain and swelling to the area. Says he did not get checked out until now. Says that he thinks he may have had a UTI but denies dysuria or frequency. No fevers or chills. Denies any nausea vomiting. Patient denies any new Headache, Fever, Chills, Cough, Chest pain, Shortness of breath, Abdominal pain, Nausea, Vomiting, Diarrhea, Constipation, and Leg swelling. The patient has no other acute complaints at this time. Review of systems as above. PAST MED/SURG/SOCIAL/FAM HISTORY & ALLERGY & MEDICATIONS     Past Medical History:   Diagnosis Date    Anesthesia     Difficulty waking up    Anxiety     \"came into the er last month with chest pain, everything tested out ok, decided it was just anxiety- alot of stress in my life\"    CAD (coronary artery disease)     COPD (chronic obstructive pulmonary disease) ([facility] [address] [facility])     Degenerative disc disease     neck, back and leg    Diabetes mellitus ([facility] [address] [facility])     dx 2006    Gall bladder stones     H/O cardiovascular stress test 7/17/13 7/13-WNL EF 70%    H/O echocardiogram 7/17/13, 05/28/13 7/13-EF-50-55%, small pericardial effusion.  5/13-EF>55%, normal LV systolic function, mild concentric left ventricular hypertrophy, no pericardial effusion    Heroin abuse ([address] [facility])     Hx MRSA infection 2005    On neck

## 2023-10-10 NOTE — ED TRIAGE NOTES
Pt arrived to the ED with complaints of right groin swelling after slamming right testicle against another wheelchair.
no

## 2023-10-11 NOTE — DISCHARGE INSTRUCTIONS
Take the full course of antibiotics  You can use naproxen as needed for pain, do not take with ibuprofen or other NSAIDs  Make sure to eat and drink plenty fluids to stay well-hydrated  Call and follow-up with urology as soon as you can. Ideally in the next 24 hours  Follow-up with your family doctor within the next 2 to 3 days.   Return to the ED if your symptoms worsen or you begin having fever, worsening of your pain, worsening of the redness on your scrotum or blackness, severe swelling, or you feel you need to be reevaluated

## 2023-10-13 LAB
CULTURE: ABNORMAL
CULTURE: ABNORMAL
Lab: ABNORMAL
SPECIMEN: ABNORMAL

## 2023-10-16 NOTE — PROGRESS NOTES
Pharmacy Note  ED Culture Follow-up    Erich Egan is a 58 y.o. male. Allergies: Patient has no known allergies. Current antimicrobials:   Reviewed patient's urine culture - culture positive for E. Coli >100,000 CFU/ml. Patient was discharged on Bactrim -160 mg by mouth twice daily for 14 days, and culture is sensitive to prescribed medication. Antibiotic prescribed at discharge is appropriate - no changes made to antibiotic regimen. Please call with any questions.  NIKKI Peterson [facility], PharmD 10:36 AM 10/16/2023

## 2023-12-24 NOTE — PROGRESS NOTES
Occupational Therapy    [facility] ACUTE CARE OCCUPATIONAL THERAPY EVALUATION    Kentrell Corrales, 1960, [id number], 12/20/2022    Discharge Recommendation: Inpatient Rehabilitation      History:  [place]:  There were no encounter diagnoses. Subjective:  Patient states: \"I need to use the bathroom. \"  Pain: Pt reported 5/10 pain at wound site of residual Rt LE  Communication with other providers: MELISSA Mejía  Restrictions: General Precautions, Fall Risk, Rt BKA, IV, Bed/chair alarm    Home Setup/Prior level of function:  Social/Functional History  Lives With: Spouse  Type of Home: House  Home Layout: One level  Home Access: Ramp   Entrance Stairs - Rails: Both  Bathroom Shower/Tub: Tub/Shower unit, Shower chair with back, grab bars   [employer]: raised toilet seat   Bathroom Accessibility: Anderson Korin accessible  Has the patient had two or more falls in the past year or any fall with injury in the past year?: Yes   Home Equipment: Zenovia Bjornstad, Rolling Walker, \"Bent-up wheelchair\"  ADL Assistance: Independent  Homemaking Assistance: Independent (shared with spouse)  Homemaking Responsibilities: Yes  Ambulation Assistance: Independent (with RW on Lt LE or using manual wheelchair)   Transfer Assistance: Independent  Active : Yes   Occupation: Disabled    Examination:  Observation: Sitting EOB upon arrival. Agreeable to evaluation. ACE bandage had fallen off of residual Rt LE and wound visible. RN notified who re-dressed wound at end of session.   Vision: WFL (Glasses)  Hearing: WFL  Vitals: Stable vitals throughout session on room air    Body Systems and functions:  ROM: WNL all joints in BL UEs  Strength: 4+/5 MMT all major muscle groups BL UEs  Sensation: WFL in BL UEs (See PT evaluation for LE assessment)  Tone: Normal  Coordination: WNL   Perception: WNL    Activities of Daily Living (ADLs):  Feeding: Independent   Grooming: CGA (completed hand hygiene and facial hygiene in standing at sink on Lt LE; min cues for safe RW placement and body positioning during tasks)  UB bathing: SBA   LB bathing: CGA (dynamic standing balance for safety with reaching bottom while standing on Lt LE)  UB dressing: SBA (donning clean robe seated EOB)  LB dressing: CGA (dynamic standing balance for safety with pants mgmt to hips while standing on Lt LE, able to don Lt sock seated EOB SBA by crossing leg into \"figure 4 position\")  Toileting: CGA (pt urinated while seated on regular toilet; CGA for steadying with pants mgmt both directions while on Lt LE, able to complete seated vicki care without assist)    Cognitive and Psychosocial Functioning:  Overall cognitive status: WFL (grossly; decreased overall insight/safety awareness)  Affect: Normal     Balance:   Sitting: SBA in unsupported sitting EOB and on toilet  Standing: CGA with RW on Lt LE    Functional Mobility:  Bed Mobility: Not observed. Pt received sitting EOB upon OT arrival.  Transfers: CGA on Lt LE to/from bed, toilet, and recliner (min cues for technique/safe hand placement each direction)  Ambulation: CGA on Lt LE with RW using hop-gait pattern to/from bathroom for toileting      AM-PAC 6 click short form for inpatient daily activity:   How much help from another person does the patient currently need. .. Unable  Dep A Lot  Max A A Lot   Mod A A Little  Min A A Little   CGA  SBA None   Mod I  Indep  Sup   1. Putting on and taking off regular lower body clothing? [] 1    [] 2   [] 2   [] 3   [x] 3   [] 4      2. Bathing (including washing, rinsing, drying)? [] 1   [] 2   [] 2 [] 3 [x] 3 [] 4   3. Toileting, which includes using toilet, bedpan, or urinal? [] 1    [] 2   [] 2   [] 3   [x] 3   [] 4     4. Putting on and taking off regular upper body clothing? [] 1   [] 2   [] 2   [] 3   [x] 3    [] 4      5. Taking care of personal grooming such as brushing teeth? [] 1   [] 2    [] 2 [] 3    [x] 3   [] 4      6. Eating meals?    [] 1   [] 2   [] 2   [] 3   [] 3   [x] 4 Raw Score:  19   [24=0% impaired(CH), 23=1-19%(CI), 20-22=20-39%(CJ), 15-19=40-59%(CK), 10-14=60-79%(CL), 7-9=80-99%(CM), 6=100%(CN)]     Treatment:  Self Care Training:   Cues were given for safety, sequence, UE/LE placement, visual cues, and balance. Activities performed today included UB/LB dressing tasks, toileting, hand hygiene and facial hygiene on Lt LE at sink    Safety Measures: Gait belt used, Left in Chair, Alarm in place    Assessment:  Pt is a 60year old male with a past medical history of Anesthesia, Anxiety, CAD (coronary artery disease), COPD (chronic obstructive pulmonary disease) ([facility] [address] Degenerative disc disease, Diabetes mellitus ([facility] [address] Gall bladder stones, H/O cardiovascular stress test, H/O echocardiogram, Heroin abuse ([facility] [address] Hx MRSA infection, Hyperlipidemia, Hypertension, Low back pain, Migraine, Pancreatitis, S/P CABG x 4, and Shortness of breath on exertion. Pt admitted with a Rt stump open wound following a recent Rt BKA in August, 2022. Pt lives at home with his spouse and reports at baseline he is independent with ADLs, IADLs, ambulates mod I with a RW or uses a manual wheelchair, and drives. Pt currently presents with the above impairments, and is below baseline level of functioning. Recommend continued OT services in inpatient rehab setting at discharge. Complexity: Moderate  Prognosis: Good  Plan: 3x/week    Goals:  1. Pt will complete all aspects of bed mobility for EOB/OOB ADLs mod I with HOB flat  2. Pt will complete UB/LB bathing SBA with setup  3. Pt will complete all aspects of LB dressing SBA with setup  4. Pt will complete all functional transfers to and from bed, chair, toilet, shower chair SBA on Lt LE  5. Pt will ambulate HH distance to bathroom for toileting SBA with RW using hop gait pattern on Lt LE  6. Pt will complete all aspects of toileting task SBA  7. Pt will complete oral hygiene/grooming routine in standing at sink SBA on Lt LE  8.  Pt will complete ther ex/ther act with focus on UE strengthening and functional activity tolerance in standing on Lt LE >10 minutes    Time:   Time in: 1031  Time out: 1055  Timed treatment minutes: 9  Total time: 24    Electronically signed by:    ARVIND Michael/L, [address] [place] [address], .[phone] on the discharge service for the patient. I have reviewed and made amendments to the documentation where necessary.

## 2024-08-15 ENCOUNTER — HOSPITAL ENCOUNTER (EMERGENCY)
Age: 64
Discharge: HOME | End: 2024-08-16
Attending: EMERGENCY MEDICINE
Payer: MEDICARE

## 2024-08-15 ENCOUNTER — APPOINTMENT (OUTPATIENT)
Dept: GENERAL RADIOLOGY | Age: 64
End: 2024-08-15
Payer: MEDICARE

## 2024-08-15 VITALS
HEIGHT: 67 IN | TEMPERATURE: 98.3 F | BODY MASS INDEX: 21.97 KG/M2 | DIASTOLIC BLOOD PRESSURE: 78 MMHG | HEART RATE: 87 BPM | RESPIRATION RATE: 17 BRPM | SYSTOLIC BLOOD PRESSURE: 166 MMHG | WEIGHT: 140 LBS | OXYGEN SATURATION: 99 %

## 2024-08-15 DIAGNOSIS — S22.31XA CLOSED FRACTURE OF ONE RIB OF RIGHT SIDE, INITIAL ENCOUNTER: Primary | ICD-10-CM

## 2024-08-15 PROCEDURE — 71101 X-RAY EXAM UNILAT RIBS/CHEST: CPT

## 2024-08-15 PROCEDURE — 99283 EMERGENCY DEPT VISIT LOW MDM: CPT

## 2024-08-15 PROCEDURE — 6370000000 HC RX 637 (ALT 250 FOR IP): Performed by: EMERGENCY MEDICINE

## 2024-08-15 RX ORDER — HYDROCODONE BITARTRATE AND ACETAMINOPHEN 5; 325 MG/1; MG/1
1 TABLET ORAL EVERY 6 HOURS PRN
Qty: 8 TABLET | Refills: 0 | Status: SHIPPED | OUTPATIENT
Start: 2024-08-15 | End: 2024-08-18

## 2024-08-15 RX ORDER — NAPROXEN 375 MG/1
375 TABLET ORAL 2 TIMES DAILY PRN
Qty: 14 TABLET | Refills: 0 | Status: SHIPPED | OUTPATIENT
Start: 2024-08-15

## 2024-08-15 RX ORDER — CYCLOBENZAPRINE HCL 10 MG
10 TABLET ORAL 3 TIMES DAILY PRN
Qty: 21 TABLET | Refills: 0 | Status: SHIPPED | OUTPATIENT
Start: 2024-08-15 | End: 2024-08-25

## 2024-08-15 RX ORDER — IBUPROFEN 600 MG/1
600 TABLET ORAL ONCE
Status: COMPLETED | OUTPATIENT
Start: 2024-08-15 | End: 2024-08-15

## 2024-08-15 RX ORDER — CYCLOBENZAPRINE HCL 10 MG
10 TABLET ORAL ONCE
Status: COMPLETED | OUTPATIENT
Start: 2024-08-15 | End: 2024-08-15

## 2024-08-15 RX ORDER — HYDROCODONE BITARTRATE AND ACETAMINOPHEN 5; 325 MG/1; MG/1
1 TABLET ORAL ONCE
Status: COMPLETED | OUTPATIENT
Start: 2024-08-15 | End: 2024-08-15

## 2024-08-15 RX ADMIN — IBUPROFEN 600 MG: 600 TABLET, FILM COATED ORAL at 23:03

## 2024-08-15 RX ADMIN — HYDROCODONE BITARTRATE AND ACETAMINOPHEN 1 TABLET: 5; 325 TABLET ORAL at 23:03

## 2024-08-15 RX ADMIN — CYCLOBENZAPRINE 10 MG: 10 TABLET, FILM COATED ORAL at 23:03

## 2024-08-15 ASSESSMENT — PAIN SCALES - GENERAL: PAINLEVEL_OUTOF10: 7

## 2024-08-15 ASSESSMENT — PAIN DESCRIPTION - ORIENTATION: ORIENTATION: RIGHT

## 2024-08-15 ASSESSMENT — PAIN DESCRIPTION - DESCRIPTORS: DESCRIPTORS: ACHING;DISCOMFORT

## 2024-08-15 ASSESSMENT — PAIN DESCRIPTION - LOCATION: LOCATION: RIB CAGE

## 2024-08-16 ASSESSMENT — PAIN - FUNCTIONAL ASSESSMENT: PAIN_FUNCTIONAL_ASSESSMENT: 0-10

## 2024-08-16 ASSESSMENT — PAIN SCALES - GENERAL: PAINLEVEL_OUTOF10: 0

## 2024-08-16 NOTE — ED PROVIDER NOTES
CHIEF COMPLAINT    Chief Complaint   Patient presents with    Rib Pain     HPI  Carlos Alberto Lyman is a 64 y.o. male with history of diabetes, coronary disease, peripheral arterial disease, diabetic neuropathy, cirrhosis who presents to the ED with complaints of right-sided rib injury.  Patient states that he lost his balance and fell towards the tub this evening striking his right rib margin against the tub.  He feels that he felt a pop or crack sensation as he fell into the tub.  Did not strike head or lose consciousness but has been experiencing right-sided rib pain since that time.  Pain is described as a stabbing pain rated as moderate to severe and exacerbated by palpation and deep inspiration.  Nothing makes it better.  Symptoms are constant.  Pain radiates to lateral thorax on the right.  He denies headache, neck pain, shortness of breath, abdominal pain, nausea, vomiting      REVIEW OF SYSTEMS  Constitutional: No fever, chills   Eye: No visual changes  HENT: No earache or sore throat.  Resp: No SOB or productive cough.  Cardio: No chest pain or palpitations.  GI: No abdominal pain, nausea, vomiting, constipation or diarrhea. No melena.  : No dysuria, urgency or frequency.  Endocrine: No heat intolerance, no cold intolerance, no polydipsia   Lymphatics: No adenopathy  Musculoskeletal: Complains of right-sided rib pain  Neuro: No headaches.  Psych: No homicidal or suicidal thoughts  Skin: No rash, No itching.  ?  ?  PAST MEDICAL HISTORY  Past Medical History:   Diagnosis Date    Anesthesia     Difficulty waking up    Anxiety     \"came into the er last month with chest pain, everything tested out ok, decided it was just anxiety- alot of stress in my life\"    CAD (coronary artery disease)     COPD (chronic obstructive pulmonary disease) (HCC)     Degenerative disc disease     neck, back and leg    Diabetes mellitus (HCC)     dx 2006    Gall bladder stones     H/O cardiovascular stress test 7/17/13 7/13-WNL EF

## 2024-12-19 ENCOUNTER — HOSPITAL ENCOUNTER (EMERGENCY)
Age: 64
Discharge: LEFT BEFORE DISCHARGE | End: 2024-12-19
Payer: MEDICARE

## 2024-12-19 ENCOUNTER — APPOINTMENT (OUTPATIENT)
Dept: CT IMAGING | Age: 64
End: 2024-12-19
Payer: MEDICARE

## 2024-12-19 VITALS
HEART RATE: 83 BPM | DIASTOLIC BLOOD PRESSURE: 81 MMHG | RESPIRATION RATE: 16 BRPM | OXYGEN SATURATION: 100 % | SYSTOLIC BLOOD PRESSURE: 170 MMHG | TEMPERATURE: 97.6 F

## 2024-12-19 DIAGNOSIS — R56.9 WITNESSED SEIZURE-LIKE ACTIVITY (HCC): Primary | ICD-10-CM

## 2024-12-19 LAB
ALBUMIN SERPL-MCNC: 3.1 G/DL (ref 3.4–5)
ALBUMIN/GLOB SERPL: 0.8 {RATIO} (ref 1.1–2.2)
ALP SERPL-CCNC: 162 U/L (ref 40–129)
ALT SERPL-CCNC: 42 U/L (ref 10–40)
ANION GAP SERPL CALCULATED.3IONS-SCNC: 9 MMOL/L (ref 9–17)
AST SERPL-CCNC: 44 U/L (ref 15–37)
BASOPHILS # BLD: 0.04 K/UL
BASOPHILS NFR BLD: 1 % (ref 0–1)
BILIRUB DIRECT SERPL-MCNC: 0.2 MG/DL (ref 0–0.3)
BILIRUB INDIRECT SERPL-MCNC: 0.3 MG/DL (ref 0–0.7)
BILIRUB SERPL-MCNC: 0.5 MG/DL (ref 0–1)
BUN SERPL-MCNC: 33 MG/DL (ref 7–20)
CALCIUM SERPL-MCNC: 8.6 MG/DL (ref 8.3–10.6)
CHLORIDE SERPL-SCNC: 106 MMOL/L (ref 99–110)
CO2 SERPL-SCNC: 21 MMOL/L (ref 21–32)
CREAT SERPL-MCNC: 1.4 MG/DL (ref 0.8–1.3)
EOSINOPHIL # BLD: 0.13 K/UL
EOSINOPHILS RELATIVE PERCENT: 2 % (ref 0–3)
ERYTHROCYTE [DISTWIDTH] IN BLOOD BY AUTOMATED COUNT: 15.9 % (ref 11.7–14.9)
ETHANOLAMINE SERPL-MCNC: <10 MG/DL (ref 0–0.08)
GFR, ESTIMATED: 50 ML/MIN/1.73M2
GLUCOSE SERPL-MCNC: 257 MG/DL (ref 74–99)
HCT VFR BLD AUTO: 32.6 % (ref 42–52)
HGB BLD-MCNC: 11 G/DL (ref 13.5–18)
IMM GRANULOCYTES # BLD AUTO: 0.02 K/UL
IMM GRANULOCYTES NFR BLD: 0 %
LACTATE BLDV-SCNC: 0.8 MMOL/L (ref 0.4–2)
LIPASE SERPL-CCNC: 198 U/L (ref 13–60)
LYMPHOCYTES NFR BLD: 0.91 K/UL
LYMPHOCYTES RELATIVE PERCENT: 16 % (ref 24–44)
MCH RBC QN AUTO: 29.9 PG (ref 27–31)
MCHC RBC AUTO-ENTMCNC: 33.7 G/DL (ref 32–36)
MCV RBC AUTO: 88.6 FL (ref 78–100)
MONOCYTES NFR BLD: 0.49 K/UL
MONOCYTES NFR BLD: 9 % (ref 0–4)
NEUTROPHILS NFR BLD: 72 % (ref 36–66)
NEUTS SEG NFR BLD: 4.1 K/UL
PLATELET # BLD AUTO: 100 K/UL (ref 140–440)
PMV BLD AUTO: 10.7 FL (ref 7.5–11.1)
POTASSIUM SERPL-SCNC: 4.6 MMOL/L (ref 3.5–5.1)
PROT SERPL-MCNC: 7.1 G/DL (ref 6.4–8.2)
RBC # BLD AUTO: 3.68 M/UL (ref 4.6–6.2)
SODIUM SERPL-SCNC: 135 MMOL/L (ref 136–145)
TROPONIN I SERPL HS-MCNC: 41 NG/L (ref 0–22)
WBC OTHER # BLD: 5.7 K/UL (ref 4–10.5)

## 2024-12-19 PROCEDURE — 82248 BILIRUBIN DIRECT: CPT

## 2024-12-19 PROCEDURE — 83605 ASSAY OF LACTIC ACID: CPT

## 2024-12-19 PROCEDURE — 83690 ASSAY OF LIPASE: CPT

## 2024-12-19 PROCEDURE — 93005 ELECTROCARDIOGRAM TRACING: CPT

## 2024-12-19 PROCEDURE — 85025 COMPLETE CBC W/AUTO DIFF WBC: CPT

## 2024-12-19 PROCEDURE — 99284 EMERGENCY DEPT VISIT MOD MDM: CPT

## 2024-12-19 PROCEDURE — 80053 COMPREHEN METABOLIC PANEL: CPT

## 2024-12-19 PROCEDURE — 84484 ASSAY OF TROPONIN QUANT: CPT

## 2024-12-19 PROCEDURE — G0480 DRUG TEST DEF 1-7 CLASSES: HCPCS

## 2024-12-19 NOTE — ED PROVIDER NOTES
[facility] EMERGENCY DEPARTMENT  EMERGENCY DEPARTMENT ENCOUNTER        Pt Name: Carlos Alberto Lyman  MRN: [id number]  Birthdate 1960  Date of evaluation: 12/19/2024  Provider: Carlos Alberto Grover PA-C  PCP: Fortino Perez MD  Note Started: 10:18 AM EST 12/19/24      CHRIS. I have evaluated this patient.        CHIEF COMPLAINT       Chief Complaint   Patient presents with    Seizures       HISTORY OF PRESENT ILLNESS: 1 or more Elements     Carlos Alberto Lyman is a 64 y.o. male with coronary artery disease and remote TIA on aspirin and Lipitor with residual expressive dysphagia, type II diabetes on insulin, polysubstance abuse, CKD, and is status post right above-the-knee amputation who presents via EMS for concerns of seizure-like activity.  Patient does appear to be postictal to me, denies biting his tongue or having bowel incontinence, and when asked about seizure history, he tells me no.  Is not on antiepileptics      Nursing Notes were all reviewed and agreed with or any disagreements were addressed in the HPI.    Historians other than the patient: EMS    Limitations to history : Altered Mental Status    Social Determinants Significantly Affecting Health : None    I reviewed and interpreted prior non-ED medical record specialty: Office visit with the primary care team from April 12, 2024    PAST MEDICAL HISTORY      has a past medical history of Anesthesia, Anxiety, CAD (coronary artery disease), COPD (chronic obstructive pulmonary disease) (HCC), Degenerative disc disease, Diabetes mellitus (HCC), Gall bladder stones, H/O cardiovascular stress test (7/17/13), H/O echocardiogram (7/17/13, 05/28/13), Heroin abuse (HCC), MRSA infection (2005), Hyperlipidemia, Hypertension, Low back pain, Migraine, Pancreatitis, S/P CABG x 4 (2013), and Shortness of breath on exertion.     CURRENTMEDICATIONS       Discharge Medication List as of 12/19/2024 11:48 AM        CONTINUE these

## 2024-12-19 NOTE — ED NOTES
Risks of leaving AMA explained to patient at this time. Patient verbalized understanding. Patient advised to return to ER for any new or worsening symptoms. Patient signed AMA form and placed into wheelchair and propelled self to lobby at this time.

## 2024-12-19 NOTE — ED TRIAGE NOTES
Patient to ED with complaints of possible seizure. Per medics patients wife states he woke her up having a full body seizure. No post ictal period.

## 2024-12-20 LAB
EKG ATRIAL RATE: 80 BPM
EKG DIAGNOSIS: NORMAL
EKG P AXIS: 74 DEGREES
EKG P-R INTERVAL: 160 MS
EKG Q-T INTERVAL: 428 MS
EKG QRS DURATION: 74 MS
EKG QTC CALCULATION (BAZETT): 493 MS
EKG R AXIS: 63 DEGREES
EKG T AXIS: 77 DEGREES
EKG VENTRICULAR RATE: 80 BPM

## 2024-12-20 PROCEDURE — 93010 ELECTROCARDIOGRAM REPORT: CPT | Performed by: INTERNAL MEDICINE

## 2025-01-07 ENCOUNTER — INPATIENT HOSPITAL (OUTPATIENT)
Dept: URBAN - METROPOLITAN AREA HOSPITAL 104 | Facility: HOSPITAL | Age: 65
End: 2025-01-07
Payer: COMMERCIAL

## 2025-01-07 DIAGNOSIS — D68.4 ACQUIRED COAGULATION FACTOR DEFICIENCY: ICD-10-CM

## 2025-01-07 DIAGNOSIS — R78.81 BACTEREMIA: ICD-10-CM

## 2025-01-07 DIAGNOSIS — K74.60 UNSPECIFIED CIRRHOSIS OF LIVER: ICD-10-CM

## 2025-01-07 DIAGNOSIS — D69.59 OTHER SECONDARY THROMBOCYTOPENIA: ICD-10-CM

## 2025-01-07 DIAGNOSIS — B18.2 CHRONIC VIRAL HEPATITIS C: ICD-10-CM

## 2025-01-07 DIAGNOSIS — K72.90 HEPATIC FAILURE, UNSPECIFIED WITHOUT COMA: ICD-10-CM

## 2025-01-07 PROCEDURE — 99222 1ST HOSP IP/OBS MODERATE 55: CPT | Mod: FS | Performed by: NURSE PRACTITIONER

## 2025-01-08 PROCEDURE — 99232 SBSQ HOSP IP/OBS MODERATE 35: CPT | Mod: FS | Performed by: NURSE PRACTITIONER

## (undated) DEVICE — GLOVE ORANGE PI 8   MSG9080

## (undated) DEVICE — GOWN,ECLIPSE,POLYRNF,BRTHSLV,L,30/CS: Brand: MEDLINE

## (undated) DEVICE — SPONGE GZ W4XL8IN COT WVN 12 PLY

## (undated) DEVICE — TUBING, SUCTION, 9/32" X 10', STRAIGHT: Brand: MEDLINE

## (undated) DEVICE — SPONGE LAP W18XL18IN WHT COT 4 PLY FLD STRUNG RADPQ DISP ST

## (undated) DEVICE — BANDAGE,SELF ADHRNT,COFLEX,4"X5YD,STRL: Brand: COLABEL

## (undated) DEVICE — COUNTER NDL 30 COUNT FOAM STRP SGL MAG

## (undated) DEVICE — SUTURE VCRL SZ 3-0 L27IN ABSRB UD L36MM CT-1 1/2 CIR J258H

## (undated) DEVICE — PAD,ABDOMINAL,5"X9",ST,LF,25/BX: Brand: MEDLINE INDUSTRIES, INC.

## (undated) DEVICE — PREMIUM WET SKIN PREP TRAY: Brand: MEDLINE INDUSTRIES, INC.

## (undated) DEVICE — SUTURE VCRL SZ 0 L18IN ABSRB UD L36MM CT-1 1/2 CIR J840D

## (undated) DEVICE — DRESSING NEG PRSS M 18X12.5X3.3CM POLYUR FOR WND THER VAC

## (undated) DEVICE — DRAPE,EXTREMITY,89X128,STERILE: Brand: MEDLINE

## (undated) DEVICE — PENCIL ES CRD L10FT HND SWCHING ROCK SWCH W/ EDGE COAT BLDE

## (undated) DEVICE — DRESSING NEG PRSS SM 10X7.5X3.3CM POLYUR FOR WND THER VAC

## (undated) DEVICE — SYRINGE IRRIG 60ML SFT PLIABLE BLB EZ TO GRP 1 HND USE W/

## (undated) DEVICE — TROCAR: Brand: KII FIOS FIRST ENTRY

## (undated) DEVICE — SUTURE NONABSORBABLE MONOFILAMENT 4-0 FS-2 18 IN ETHILON 662H

## (undated) DEVICE — MARKER SURG SKIN UTIL REGULAR/FINE 2 TIP W/ RUL AND 9 LBL

## (undated) DEVICE — TRAY PREP DRY W/ PREM GLV 2 APPL 6 SPNG 2 UNDPD 1 OVERWRAP

## (undated) DEVICE — DRESSING PETRO W2XL2IN 100% USP COT GZ 3% BISMUTH

## (undated) DEVICE — GLOVE SURG SZ 65 L12IN FNGR THK79MIL GRN LTX FREE

## (undated) DEVICE — GLOVE ORANGE PI 7 1/2   MSG9075

## (undated) DEVICE — SYRINGE MED 10ML LUERLOCK TIP W/O SFTY DISP

## (undated) DEVICE — SOLUTION PREP POVIDONE IOD FOR SKIN MUCOUS MEM PRIOR TO

## (undated) DEVICE — SOLUTION IV 1000ML 0.9% SOD CHL FOR IRRIG PLAS CONT

## (undated) DEVICE — SPONGE DRN W4XL4IN RAYON/POLYESTER 6 PLY NONWOVEN PRECUT 2 PER PK

## (undated) DEVICE — GLOVE SURG SZ 6 THK91MIL LTX FREE SYN POLYISOPRENE ANTI

## (undated) DEVICE — GAUZE,SPONGE,2"X2",8PLY,STERILE,LF,2'S: Brand: MEDLINE

## (undated) DEVICE — TOWEL,OR,DSP,ST,BLUE,STD,6/PK,12PK/CS: Brand: MEDLINE

## (undated) DEVICE — SUTURE PROL SZ 3-0 L30IN NONABSORBABLE BLU L26MM CT-2 1/2 8422H

## (undated) DEVICE — STAPLER SKIN H3.9MM WIRE DIA0.58MM CRWN 6.9MM 35 STPL FIX

## (undated) DEVICE — DRESSING NEG PRSS M W18XH3.3XL12.5CM BLK POLYUR FOAM WND

## (undated) DEVICE — SUTURE PERMAHAND SZ 3-0 L18IN NONABSORBABLE BLK L26MM SH C013D

## (undated) DEVICE — GLOVE SURG SZ 6 CRM LTX FREE POLYISOPRENE POLYMER BEAD ANTI

## (undated) DEVICE — DRAIN SURG 15FR SIL RND CHN W/ TRCR FULL FLUT DBL WRP TRAD

## (undated) DEVICE — 3M™ STERI-DRAPE™ U-DRAPE 1015: Brand: STERI-DRAPE™

## (undated) DEVICE — STERILE LATEX POWDER-FREE SURGICAL GLOVESWITH NITRILE COATING: Brand: PROTEXIS

## (undated) DEVICE — INTENDED FOR TISSUE SEPARATION, AND OTHER PROCEDURES THAT REQUIRE A SHARP SURGICAL BLADE TO PUNCTURE OR CUT.: Brand: BARD-PARKER ® STAINLESS STEEL BLADES

## (undated) DEVICE — CONVERTORS STOCKINETTE: Brand: CONVERTORS

## (undated) DEVICE — NEEDLE,20GX1.5",BD,YALE,DISP,RG: Brand: MEDLINE

## (undated) DEVICE — LINER,SEMI-RIGID,3000CC,50EA/CS: Brand: MEDLINE

## (undated) DEVICE — GOWN,SIRUS,POLYRNF,BRTHSLV,XLN/XL,20/CS: Brand: MEDLINE

## (undated) DEVICE — WAX SURG 2.5GM HEMSTAT BNE BEESWAX PARAFFIN ISO PALMITATE

## (undated) DEVICE — TUBING, SUCTION, 9/32" X 20', STRAIGHT: Brand: MEDLINE INDUSTRIES, INC.

## (undated) DEVICE — SCISSORS ENDOSCP DIA5MM CRV MPLR CAUT W/ RATCH HNDL

## (undated) DEVICE — SYRINGE ONLY,20ML LUER LOCK: Brand: MEDLINE INDUSTRIES, INC.

## (undated) DEVICE — APPLICATOR  COTTON-TIPPED 6 IN WOOD STRL

## (undated) DEVICE — GLOVE SURG SZ 65 CRM LTX FREE POLYISOPRENE POLYMER BEAD ANTI

## (undated) DEVICE — SET TBNG DISP TIP FOR AHTO

## (undated) DEVICE — YANKAUER,FLEXIBLE HANDLE,REGLR CAPACITY: Brand: MEDLINE INDUSTRIES, INC.

## (undated) DEVICE — SUTURE VCRL SZ 0 L27IN ABSRB UD L36MM CT-1 1/2 CIR J260H

## (undated) DEVICE — SYRINGE 20ML LL S/C 50

## (undated) DEVICE — PACK,BASIC,IX: Brand: MEDLINE

## (undated) DEVICE — TUBING INSUFFLATOR HEAT HI FLO SET PNEUMOCLEAR

## (undated) DEVICE — PACK,BASIC,SIRUS,V: Brand: MEDLINE

## (undated) DEVICE — CONTAINER,SPECIMEN,OR STERILE,4OZ: Brand: MEDLINE

## (undated) DEVICE — FAN SPRAY KIT: Brand: PULSAVAC®

## (undated) DEVICE — GLOVE ORANGE PI 7   MSG9070

## (undated) DEVICE — CANISTER VAC 500ML TBNG RESVR FOR INFOVAC W/O GEL CLMP CONN

## (undated) DEVICE — SUTURE PERMAHAND SZ 2-0 L18IN NONABSORBABLE BLK L26MM FS 685G

## (undated) DEVICE — DRAPE SHEET ULTRAGARD: Brand: MEDLINE

## (undated) DEVICE — SUTURE VCRL SZ 3-0 L18IN ABSRB UD W/O NDL POLYGLACTIN 910 J110T

## (undated) DEVICE — RESERVOIR,SUCTION,100CC,SILICONE: Brand: MEDLINE

## (undated) DEVICE — SHEET,DRAPE,53X77,STERILE: Brand: MEDLINE

## (undated) DEVICE — BANDAGE,GAUZE,BULKEE II,4.5"X4.1YD,STRL: Brand: MEDLINE

## (undated) DEVICE — CANISTER VAC 500ML TBNG RESVR FOR INFOVAC W O GEL CLMP CONN

## (undated) DEVICE — DRESSING HEMSTAT W1X2IN ABSRB SURGCEL SNOW

## (undated) DEVICE — ADHESIVE SKIN CLSR 0.7ML TOP DERMBND ADV

## (undated) DEVICE — SUTURE PERMAHAND SZ 2-0 L17X18IN NONABSORBABLE BLK SILK SA65H

## (undated) DEVICE — NEEDLE HYPO 20GA L1.5IN YEL POLYPR HUB S STL REG BVL STR

## (undated) DEVICE — GAUZE,SPONGE,4"X4",16PLY,XRAY,STRL,LF: Brand: MEDLINE

## (undated) DEVICE — AGENT HEMSTAT W2XL4IN OXIDIZED REGENERATED CELOS ABSRB

## (undated) DEVICE — DRESSING,GAUZE,XEROFORM,CURAD,5"X9",ST: Brand: CURAD

## (undated) DEVICE — CHLORAPREP 26ML ORANGE

## (undated) DEVICE — DRESSING,GAUZE,XEROFORM,CURAD,1"X8",ST: Brand: CURAD

## (undated) DEVICE — LIQUIBAND RAPID ADHESIVE 36/CS 0.8ML: Brand: MEDLINE

## (undated) DEVICE — GLOVE SURG SZ 8 L12IN THK75MIL DK GRN LTX FREE

## (undated) DEVICE — SUTURE VCRL SZ 2-0 L18IN ABSRB UD CT-1 L36MM 1/2 CIR J839D

## (undated) DEVICE — SOLUTION IV IRRIG WATER 1000ML POUR BRL 2F7114

## (undated) DEVICE — ELECTRODE ES AD CRDLSS PT RET REM POLYHESIVE

## (undated) DEVICE — Device

## (undated) DEVICE — DRAPE ORTH 60X70IN POLY FLM ANCIL U RESIST FLAME TEARING

## (undated) DEVICE — SUTURE 3-0 VCRL CTD SH-1 J219H

## (undated) DEVICE — SOLUTION PREP 4OZ 3% H PEROX 1ST AID ANTISEP ORAL DEBRIDING

## (undated) DEVICE — TUBE CULTURE LF UNIFORM BOTTOM STER

## (undated) DEVICE — TROCAR: Brand: KII® SLEEVE

## (undated) DEVICE — BAG SPEC REM 224ML W4XL6IN DIA10MM 1 HND GYN DISP ENDOPCH

## (undated) DEVICE — ZIMMER® STERILE DISPOSABLE TOURNIQUET CUFF WITH PLC, DUAL PORT, SINGLE BLADDER, 24 IN. (61 CM)

## (undated) DEVICE — SUTURE SZ 0 27IN 5/8 CIR UR-6  TAPER PT VIOLET ABSRB VICRYL J603H

## (undated) DEVICE — GOWN,ECLIPSE,POLYRNF,BRTHSLV,XL,30/CS: Brand: MEDLINE

## (undated) DEVICE — Z INACTIVE USE 2660664 SOLUTION IRRIG 3000ML 0.9% SOD CHL USP UROMATIC PLAS CONT

## (undated) DEVICE — WAX,BONE,SYNTHETIC: Brand: MEDLINE INDUSTRIES

## (undated) DEVICE — SUTURE VCRL SZ 4-0 L27IN ABSRB UD L19MM FS-2 3/8 CIR REV J422H

## (undated) DEVICE — PACK SURG LAP CHOLE

## (undated) DEVICE — GOWN,SIRUS,FABRNF,RAGLAN,XL,ST,28/CS: Brand: MEDLINE

## (undated) DEVICE — SUTURE VCRL SZ 3-0 L27IN ABSRB UD L26MM CT-2 1/2 CIR J232H

## (undated) DEVICE — SUTURE PERMAHAND SZ 0 L30IN NONABSORBABLE BLK SILK UNIDIR SA86G

## (undated) DEVICE — CHARNLEY SAW EX FLEXIBLE 30CM: Brand: CHARNLEY

## (undated) DEVICE — PACK,BASIC,2 GOWNS,NO DRAPES: Brand: MEDLINE

## (undated) DEVICE — PAD,NON-ADHERENT,3X8,STERILE,LF,1/PK: Brand: MEDLINE

## (undated) DEVICE — NEEDLE HYPO 25GA L1.5IN BLU POLYPR HUB S STL REG BVL STR

## (undated) DEVICE — DRESSING TRNSPAR W2XL2.75IN FLM SHT SEMIPERMEABLE WIND